# Patient Record
Sex: MALE | Race: BLACK OR AFRICAN AMERICAN | NOT HISPANIC OR LATINO | Employment: OTHER | URBAN - METROPOLITAN AREA
[De-identification: names, ages, dates, MRNs, and addresses within clinical notes are randomized per-mention and may not be internally consistent; named-entity substitution may affect disease eponyms.]

---

## 2017-01-10 ENCOUNTER — HOSPITAL ENCOUNTER (OUTPATIENT)
Dept: OTHER | Facility: HOSPITAL | Age: 82
Discharge: HOME | End: 2017-01-10
Attending: INTERNAL MEDICINE | Admitting: INTERNAL MEDICINE

## 2017-01-10 LAB
ANION GAP SERPL CALC-SCNC: 12.7 MMOL/L (ref 10–20)
APTT BLD: 26.8 SEC (ref 24–31)
BASOPHILS # BLD AUTO: 0 10*3/UL (ref 0–0.2)
BASOPHILS NFR BLD AUTO: 1 % (ref 0–2)
BUN SERPL-MCNC: 17 MG/DL (ref 8–20)
BUN/CREAT SERPL: 15.5 (ref 6.2–20.3)
CALCIUM SERPL-MCNC: 9.2 MG/DL (ref 8.9–10.3)
CHLORIDE SERPL-SCNC: 110 MMOL/L (ref 101–111)
CONV CO2: 24 MMOL/L (ref 22–32)
CREAT UR-MCNC: 1.1 MG/DL (ref 0.7–1.2)
DIFFERENTIAL METHOD BLD: (no result)
EOSINOPHIL # BLD AUTO: 0.1 10*3/UL (ref 0–0.3)
EOSINOPHIL # BLD AUTO: 2 % (ref 0–3)
ERYTHROCYTE [DISTWIDTH] IN BLOOD BY AUTOMATED COUNT: 16.7 % (ref 11.5–14.5)
GLUCOSE SERPL-MCNC: 88 MG/DL (ref 65–99)
HCT VFR BLD AUTO: 29.5 % (ref 40–54)
HGB BLD-MCNC: 9.2 G/DL (ref 14–18)
INR PPP: 0.9 (ref 2–3)
LYMPHOCYTES # BLD AUTO: 0.8 10*3/UL (ref 0.8–4.8)
LYMPHOCYTES NFR BLD AUTO: 14 % (ref 18–42)
MAGNESIUM SERPL-MCNC: 1.9 MG/DL (ref 1.8–2.5)
MCH RBC QN AUTO: 23.5 PG (ref 26–32)
MCHC RBC AUTO-ENTMCNC: 31.1 G/DL (ref 32–36)
MCV RBC AUTO: 75.6 FL (ref 80–94)
MONOCYTES # BLD AUTO: 0.6 10*3/UL (ref 0.1–1.3)
MONOCYTES NFR BLD AUTO: 11 % (ref 2–11)
NEUTROPHILS # BLD AUTO: 3.9 10*3/UL (ref 2.3–8.6)
NEUTROPHILS NFR BLD AUTO: 72 % (ref 50–75)
NRBC BLD AUTO-RTO: 0 /100{WBCS}
NRBC/RBC NFR BLD MANUAL: 0 10*3/UL
PLATELET # BLD AUTO: 177 10*3/UL (ref 150–450)
PMV BLD AUTO: 8.4 FL (ref 7.4–10.4)
POTASSIUM SERPL-SCNC: 3.7 MMOL/L (ref 3.6–5.1)
PROTHROMBIN TIME: 11.7 SEC (ref 19.4–28.5)
RBC # BLD AUTO: 3.9 10*6/UL (ref 4.6–6)
SODIUM SERPL-SCNC: 143 MMOL/L (ref 136–144)
WBC # BLD AUTO: 5.4 10*3/UL (ref 4.5–11.5)

## 2017-02-03 ENCOUNTER — HOSPITAL ENCOUNTER (OUTPATIENT)
Dept: ONCOLOGY | Facility: CLINIC | Age: 82
Discharge: HOME | End: 2017-02-03
Attending: INTERNAL MEDICINE | Admitting: INTERNAL MEDICINE

## 2017-02-03 LAB
FERRITIN SERPL-MCNC: 27 NG/ML (ref 24–336)
IRON SATN MFR SERPL: 6 % (ref 20–50)
IRON SERPL-MCNC: 20 UG/DL (ref 45–182)
LDH SERPL-CCNC: 195 IU/L (ref 98–192)
TIBC SERPL-MCNC: 363 UG/DL (ref 228–428)

## 2017-02-13 ENCOUNTER — HOSPITAL ENCOUNTER (OUTPATIENT)
Dept: ONCOLOGY | Facility: CLINIC | Age: 82
Discharge: HOME | End: 2017-02-13
Attending: INTERNAL MEDICINE | Admitting: INTERNAL MEDICINE

## 2017-02-13 LAB — GLUCOSE BLD-MCNC: 73 MG/DL (ref 70–105)

## 2017-02-17 ENCOUNTER — HOSPITAL ENCOUNTER (OUTPATIENT)
Dept: INFUSION THERAPY | Facility: HOSPITAL | Age: 82
Discharge: HOME | End: 2017-02-17
Attending: RADIOLOGY | Admitting: RADIOLOGY

## 2017-02-17 ENCOUNTER — HOSPITAL ENCOUNTER (OUTPATIENT)
Dept: ONCOLOGY | Facility: CLINIC | Age: 82
Discharge: HOME | End: 2017-02-17
Attending: NURSE PRACTITIONER | Admitting: NURSE PRACTITIONER

## 2017-02-20 ENCOUNTER — HOSPITAL ENCOUNTER (OUTPATIENT)
Dept: INFUSION THERAPY | Facility: HOSPITAL | Age: 82
Discharge: HOME | End: 2017-02-20
Attending: RADIOLOGY | Admitting: RADIOLOGY

## 2017-02-20 LAB
HBV CORE IGM SERPL QL IA: NONREACTIVE
HBV SURFACE AB SER QL: NONREACTIVE
HBV SURFACE AB SER RIA-ACNC: NORMAL

## 2017-02-21 ENCOUNTER — HOSPITAL ENCOUNTER (OUTPATIENT)
Dept: OTHER | Facility: HOSPITAL | Age: 82
Discharge: HOME | End: 2017-02-21
Attending: INTERNAL MEDICINE | Admitting: INTERNAL MEDICINE

## 2017-02-21 LAB
ALBUMIN SERPL-MCNC: 3.3 G/DL (ref 3.5–4.8)
ALBUMIN/GLOB SERPL: 1.1 {RATIO} (ref 1–1.7)
ALP SERPL-CCNC: 74 IU/L (ref 32–91)
ALT SERPL-CCNC: 18 IU/L (ref 17–63)
ANION GAP SERPL CALC-SCNC: 10.3 MMOL/L (ref 10–20)
AST SERPL-CCNC: 26 IU/L (ref 15–41)
BILIRUB SERPL-MCNC: 0.8 MG/DL (ref 0.3–1.2)
BUN SERPL-MCNC: 13 MG/DL (ref 8–20)
BUN/CREAT SERPL: 10.8 (ref 6.2–20.3)
CALCIUM SERPL-MCNC: 8.6 MG/DL (ref 8.9–10.3)
CHLORIDE SERPL-SCNC: 107 MMOL/L (ref 101–111)
CONV CO2: 28 MMOL/L (ref 22–32)
CONV TOTAL PROTEIN: 6.3 G/DL (ref 6.1–7.9)
CREAT UR-MCNC: 1.2 MG/DL (ref 0.7–1.2)
GLOBULIN UR ELPH-MCNC: 3 G/DL (ref 2.5–3.8)
GLUCOSE SERPL-MCNC: 77 MG/DL (ref 65–99)
POTASSIUM SERPL-SCNC: 3.3 MMOL/L (ref 3.6–5.1)
SODIUM SERPL-SCNC: 142 MMOL/L (ref 136–144)
URATE SERPL-MCNC: 8.3 MG/DL (ref 4.8–8.7)

## 2017-02-22 ENCOUNTER — HOSPITAL ENCOUNTER (OUTPATIENT)
Dept: ONCOLOGY | Facility: CLINIC | Age: 82
Discharge: HOME | End: 2017-02-22
Attending: INTERNAL MEDICINE | Admitting: INTERNAL MEDICINE

## 2017-03-01 ENCOUNTER — HOSPITAL ENCOUNTER (OUTPATIENT)
Dept: ONCOLOGY | Facility: CLINIC | Age: 82
Discharge: HOME | End: 2017-03-01
Attending: INTERNAL MEDICINE | Admitting: INTERNAL MEDICINE

## 2017-03-01 LAB
ALBUMIN SERPL-MCNC: 3.3 G/DL (ref 3.5–4.8)
ALBUMIN/GLOB SERPL: 1.1 {RATIO} (ref 1–1.7)
ALP SERPL-CCNC: 75 IU/L (ref 32–91)
ALT SERPL-CCNC: 24 IU/L (ref 17–63)
ANION GAP SERPL CALC-SCNC: 12.9 MMOL/L (ref 10–20)
AST SERPL-CCNC: 32 IU/L (ref 15–41)
BILIRUB SERPL-MCNC: 0.7 MG/DL (ref 0.3–1.2)
BUN SERPL-MCNC: 17 MG/DL (ref 8–20)
BUN/CREAT SERPL: 14.2 (ref 6.2–20.3)
CALCIUM SERPL-MCNC: 8.7 MG/DL (ref 8.9–10.3)
CHLORIDE SERPL-SCNC: 105 MMOL/L (ref 101–111)
CONV CO2: 29 MMOL/L (ref 22–32)
CONV TOTAL PROTEIN: 6.4 G/DL (ref 6.1–7.9)
CREAT UR-MCNC: 1.2 MG/DL (ref 0.7–1.2)
GLOBULIN UR ELPH-MCNC: 3.1 G/DL (ref 2.5–3.8)
GLUCOSE SERPL-MCNC: 80 MG/DL (ref 65–99)
POTASSIUM SERPL-SCNC: 3.9 MMOL/L (ref 3.6–5.1)
SODIUM SERPL-SCNC: 143 MMOL/L (ref 136–144)
URATE SERPL-MCNC: 9.3 MG/DL (ref 4.8–8.7)

## 2017-03-08 ENCOUNTER — HOSPITAL ENCOUNTER (OUTPATIENT)
Dept: ONCOLOGY | Facility: CLINIC | Age: 82
Discharge: HOME | End: 2017-03-08
Attending: INTERNAL MEDICINE | Admitting: INTERNAL MEDICINE

## 2017-03-15 ENCOUNTER — HOSPITAL ENCOUNTER (OUTPATIENT)
Dept: ONCOLOGY | Facility: CLINIC | Age: 82
Discharge: HOME | End: 2017-03-15
Attending: INTERNAL MEDICINE | Admitting: INTERNAL MEDICINE

## 2017-03-22 ENCOUNTER — HOSPITAL ENCOUNTER (OUTPATIENT)
Dept: ONCOLOGY | Facility: CLINIC | Age: 82
Discharge: HOME | End: 2017-03-22
Attending: INTERNAL MEDICINE | Admitting: INTERNAL MEDICINE

## 2017-03-22 LAB
ALBUMIN SERPL-MCNC: 3 G/DL (ref 3.5–4.8)
ALBUMIN/GLOB SERPL: 1.1 {RATIO} (ref 1–1.7)
ALP SERPL-CCNC: 76 IU/L (ref 32–91)
ALT SERPL-CCNC: 24 IU/L (ref 17–63)
ANION GAP SERPL CALC-SCNC: 13.5 MMOL/L (ref 10–20)
AST SERPL-CCNC: 34 IU/L (ref 15–41)
BILIRUB SERPL-MCNC: 0.5 MG/DL (ref 0.3–1.2)
BUN SERPL-MCNC: 15 MG/DL (ref 8–20)
BUN/CREAT SERPL: 12.5 (ref 6.2–20.3)
CALCIUM SERPL-MCNC: 8.7 MG/DL (ref 8.9–10.3)
CHLORIDE SERPL-SCNC: 105 MMOL/L (ref 101–111)
CONV CO2: 26 MMOL/L (ref 22–32)
CONV TOTAL PROTEIN: 5.7 G/DL (ref 6.1–7.9)
CREAT UR-MCNC: 1.2 MG/DL (ref 0.7–1.2)
GLOBULIN UR ELPH-MCNC: 2.7 G/DL (ref 2.5–3.8)
GLUCOSE SERPL-MCNC: 116 MG/DL (ref 65–99)
POTASSIUM SERPL-SCNC: 3.5 MMOL/L (ref 3.6–5.1)
SODIUM SERPL-SCNC: 141 MMOL/L (ref 136–144)

## 2017-03-23 ENCOUNTER — HOSPITAL ENCOUNTER (OUTPATIENT)
Dept: ONCOLOGY | Facility: CLINIC | Age: 82
Discharge: HOME | End: 2017-03-23
Attending: INTERNAL MEDICINE | Admitting: INTERNAL MEDICINE

## 2017-03-29 ENCOUNTER — HOSPITAL ENCOUNTER (OUTPATIENT)
Dept: ONCOLOGY | Facility: CLINIC | Age: 82
Discharge: HOME | End: 2017-03-29
Attending: NURSE PRACTITIONER | Admitting: NURSE PRACTITIONER

## 2017-03-29 LAB — URATE SERPL-MCNC: 6.1 MG/DL (ref 4.8–8.7)

## 2017-04-05 ENCOUNTER — HOSPITAL ENCOUNTER (OUTPATIENT)
Dept: ONCOLOGY | Facility: CLINIC | Age: 82
Discharge: HOME | End: 2017-04-05
Attending: INTERNAL MEDICINE | Admitting: INTERNAL MEDICINE

## 2017-04-12 ENCOUNTER — HOSPITAL ENCOUNTER (OUTPATIENT)
Dept: ONCOLOGY | Facility: CLINIC | Age: 82
Discharge: HOME | End: 2017-04-12
Attending: INTERNAL MEDICINE | Admitting: INTERNAL MEDICINE

## 2017-04-19 ENCOUNTER — HOSPITAL ENCOUNTER (OUTPATIENT)
Dept: ONCOLOGY | Facility: CLINIC | Age: 82
Discharge: HOME | End: 2017-04-19
Attending: INTERNAL MEDICINE | Admitting: INTERNAL MEDICINE

## 2017-04-19 LAB
ALBUMIN SERPL-MCNC: 3.2 G/DL (ref 3.5–4.8)
ALBUMIN/GLOB SERPL: 1 {RATIO} (ref 1–1.7)
ALP SERPL-CCNC: 75 IU/L (ref 32–91)
ALT SERPL-CCNC: 17 IU/L (ref 17–63)
ANION GAP SERPL CALC-SCNC: 13.9 MMOL/L (ref 10–20)
AST SERPL-CCNC: 25 IU/L (ref 15–41)
BILIRUB SERPL-MCNC: 0.4 MG/DL (ref 0.3–1.2)
BUN SERPL-MCNC: 18 MG/DL (ref 8–20)
BUN/CREAT SERPL: 20 (ref 6.2–20.3)
CALCIUM SERPL-MCNC: 8.9 MG/DL (ref 8.9–10.3)
CHLORIDE SERPL-SCNC: 104 MMOL/L (ref 101–111)
CONV CO2: 25 MMOL/L (ref 22–32)
CONV TOTAL PROTEIN: 6.3 G/DL (ref 6.1–7.9)
CREAT UR-MCNC: 0.9 MG/DL (ref 0.7–1.2)
GLOBULIN UR ELPH-MCNC: 3.1 G/DL (ref 2.5–3.8)
GLUCOSE SERPL-MCNC: 72 MG/DL (ref 65–99)
POTASSIUM SERPL-SCNC: 3.9 MMOL/L (ref 3.6–5.1)
SODIUM SERPL-SCNC: 139 MMOL/L (ref 136–144)

## 2017-04-20 ENCOUNTER — HOSPITAL ENCOUNTER (OUTPATIENT)
Dept: ONCOLOGY | Facility: CLINIC | Age: 82
Discharge: HOME | End: 2017-04-20
Attending: INTERNAL MEDICINE | Admitting: INTERNAL MEDICINE

## 2017-04-25 ENCOUNTER — HOSPITAL ENCOUNTER (OUTPATIENT)
Dept: ONCOLOGY | Facility: CLINIC | Age: 82
Discharge: HOME | End: 2017-04-25
Attending: INTERNAL MEDICINE | Admitting: INTERNAL MEDICINE

## 2017-05-03 ENCOUNTER — HOSPITAL ENCOUNTER (OUTPATIENT)
Dept: ONCOLOGY | Facility: CLINIC | Age: 82
Discharge: HOME | End: 2017-05-03
Attending: INTERNAL MEDICINE | Admitting: INTERNAL MEDICINE

## 2017-05-17 ENCOUNTER — HOSPITAL ENCOUNTER (OUTPATIENT)
Dept: ONCOLOGY | Facility: CLINIC | Age: 82
Discharge: HOME | End: 2017-05-17
Attending: INTERNAL MEDICINE | Admitting: INTERNAL MEDICINE

## 2017-05-17 LAB
ALBUMIN SERPL-MCNC: 3.1 G/DL (ref 3.5–4.8)
ALBUMIN/GLOB SERPL: 0.9 {RATIO} (ref 1–1.7)
ALP SERPL-CCNC: 64 IU/L (ref 32–91)
ALT SERPL-CCNC: 32 IU/L (ref 17–63)
ANION GAP SERPL CALC-SCNC: 10.9 MMOL/L (ref 10–20)
AST SERPL-CCNC: 37 IU/L (ref 15–41)
BILIRUB SERPL-MCNC: 0.4 MG/DL (ref 0.3–1.2)
BUN SERPL-MCNC: 18 MG/DL (ref 8–20)
BUN/CREAT SERPL: 13.8 (ref 6.2–20.3)
CALCIUM SERPL-MCNC: 9 MG/DL (ref 8.9–10.3)
CHLORIDE SERPL-SCNC: 103 MMOL/L (ref 101–111)
CONV CO2: 27 MMOL/L (ref 22–32)
CONV TOTAL PROTEIN: 6.7 G/DL (ref 6.1–7.9)
CREAT UR-MCNC: 1.3 MG/DL (ref 0.7–1.2)
GLOBULIN UR ELPH-MCNC: 3.6 G/DL (ref 2.5–3.8)
GLUCOSE SERPL-MCNC: 118 MG/DL (ref 65–99)
POTASSIUM SERPL-SCNC: 3.9 MMOL/L (ref 3.6–5.1)
SODIUM SERPL-SCNC: 137 MMOL/L (ref 136–144)

## 2017-05-18 ENCOUNTER — HOSPITAL ENCOUNTER (OUTPATIENT)
Dept: ONCOLOGY | Facility: CLINIC | Age: 82
Discharge: HOME | End: 2017-05-18
Attending: INTERNAL MEDICINE | Admitting: INTERNAL MEDICINE

## 2017-05-23 ENCOUNTER — HOSPITAL ENCOUNTER (OUTPATIENT)
Dept: ONCOLOGY | Facility: CLINIC | Age: 82
Discharge: HOME | End: 2017-05-23
Attending: NURSE PRACTITIONER | Admitting: NURSE PRACTITIONER

## 2017-05-23 LAB
ALBUMIN SERPL-MCNC: 2.9 G/DL (ref 3.5–4.8)
ALBUMIN/GLOB SERPL: 0.8 {RATIO} (ref 1–1.7)
ALP SERPL-CCNC: 55 IU/L (ref 32–91)
ALT SERPL-CCNC: 42 IU/L (ref 17–63)
ANION GAP SERPL CALC-SCNC: 14.7 MMOL/L (ref 10–20)
AST SERPL-CCNC: 51 IU/L (ref 15–41)
BILIRUB SERPL-MCNC: 0.5 MG/DL (ref 0.3–1.2)
BUN SERPL-MCNC: 27 MG/DL (ref 8–20)
BUN/CREAT SERPL: 20.8 (ref 6.2–20.3)
CALCIUM SERPL-MCNC: 8.7 MG/DL (ref 8.9–10.3)
CHLORIDE SERPL-SCNC: 101 MMOL/L (ref 101–111)
CONV CO2: 27 MMOL/L (ref 22–32)
CONV TOTAL PROTEIN: 6.7 G/DL (ref 6.1–7.9)
CREAT UR-MCNC: 1.3 MG/DL (ref 0.7–1.2)
FERRITIN SERPL-MCNC: 90 NG/ML (ref 24–336)
GLOBULIN UR ELPH-MCNC: 3.8 G/DL (ref 2.5–3.8)
GLUCOSE SERPL-MCNC: 85 MG/DL (ref 65–99)
POTASSIUM SERPL-SCNC: 3.7 MMOL/L (ref 3.6–5.1)
SODIUM SERPL-SCNC: 139 MMOL/L (ref 136–144)
URATE SERPL-MCNC: 6.9 MG/DL (ref 4.8–8.7)

## 2017-05-31 ENCOUNTER — HOSPITAL ENCOUNTER (OUTPATIENT)
Dept: ONCOLOGY | Facility: CLINIC | Age: 82
Discharge: HOME | End: 2017-05-31
Attending: INTERNAL MEDICINE | Admitting: INTERNAL MEDICINE

## 2017-06-07 ENCOUNTER — HOSPITAL ENCOUNTER (OUTPATIENT)
Dept: ONCOLOGY | Facility: CLINIC | Age: 82
Discharge: HOME | End: 2017-06-07
Attending: INTERNAL MEDICINE | Admitting: INTERNAL MEDICINE

## 2017-06-14 ENCOUNTER — HOSPITAL ENCOUNTER (OUTPATIENT)
Dept: ONCOLOGY | Facility: CLINIC | Age: 82
Discharge: HOME | End: 2017-06-14
Attending: INTERNAL MEDICINE | Admitting: INTERNAL MEDICINE

## 2017-06-14 LAB
ALBUMIN SERPL-MCNC: 3.2 G/DL (ref 3.5–4.8)
ALBUMIN/GLOB SERPL: 0.9 {RATIO} (ref 1–1.7)
ALP SERPL-CCNC: 74 IU/L (ref 32–91)
ALT SERPL-CCNC: 22 IU/L (ref 17–63)
ANION GAP SERPL CALC-SCNC: 13.7 MMOL/L (ref 10–20)
AST SERPL-CCNC: 31 IU/L (ref 15–41)
BILIRUB SERPL-MCNC: 0.6 MG/DL (ref 0.3–1.2)
BUN SERPL-MCNC: 20 MG/DL (ref 8–20)
BUN/CREAT SERPL: ABNORMAL (ref 6.2–20.3)
CALCIUM SERPL-MCNC: 9 MG/DL (ref 8.9–10.3)
CHLORIDE SERPL-SCNC: 106 MMOL/L (ref 101–111)
CONV CO2: 24 MMOL/L (ref 22–32)
CONV TOTAL PROTEIN: 6.6 G/DL (ref 6.1–7.9)
CREAT UR-MCNC: ABNORMAL MG/DL (ref 0.7–1.2)
GLOBULIN UR ELPH-MCNC: 3.4 G/DL (ref 2.5–3.8)
GLUCOSE SERPL-MCNC: 115 MG/DL (ref 65–99)
POTASSIUM SERPL-SCNC: 3.7 MMOL/L (ref 3.6–5.1)
SODIUM SERPL-SCNC: 140 MMOL/L (ref 136–144)
URATE SERPL-MCNC: 6 MG/DL (ref 4.8–8.7)

## 2017-06-15 ENCOUNTER — HOSPITAL ENCOUNTER (OUTPATIENT)
Dept: ONCOLOGY | Facility: CLINIC | Age: 82
Discharge: HOME | End: 2017-06-15
Attending: INTERNAL MEDICINE | Admitting: INTERNAL MEDICINE

## 2017-06-21 ENCOUNTER — HOSPITAL ENCOUNTER (OUTPATIENT)
Dept: ONCOLOGY | Facility: CLINIC | Age: 82
Discharge: HOME | End: 2017-06-21
Attending: INTERNAL MEDICINE | Admitting: INTERNAL MEDICINE

## 2017-06-28 ENCOUNTER — HOSPITAL ENCOUNTER (OUTPATIENT)
Dept: ONCOLOGY | Facility: CLINIC | Age: 82
Discharge: HOME | End: 2017-06-28
Attending: INTERNAL MEDICINE | Admitting: INTERNAL MEDICINE

## 2017-07-05 ENCOUNTER — HOSPITAL ENCOUNTER (OUTPATIENT)
Dept: ONCOLOGY | Facility: CLINIC | Age: 82
Discharge: HOME | End: 2017-07-05
Attending: INTERNAL MEDICINE | Admitting: INTERNAL MEDICINE

## 2017-07-12 ENCOUNTER — HOSPITAL ENCOUNTER (OUTPATIENT)
Dept: ONCOLOGY | Facility: CLINIC | Age: 82
Discharge: HOME | End: 2017-07-12
Attending: INTERNAL MEDICINE | Admitting: INTERNAL MEDICINE

## 2017-07-12 LAB
ALBUMIN SERPL-MCNC: 2.8 G/DL (ref 3.5–4.8)
ALBUMIN/GLOB SERPL: 0.9 {RATIO} (ref 1–1.7)
ALP SERPL-CCNC: 60 IU/L (ref 32–91)
ALT SERPL-CCNC: 18 IU/L (ref 17–63)
ANION GAP SERPL CALC-SCNC: 14.4 MMOL/L (ref 10–20)
AST SERPL-CCNC: 33 IU/L (ref 15–41)
BILIRUB SERPL-MCNC: 0.6 MG/DL (ref 0.3–1.2)
BUN SERPL-MCNC: 15 MG/DL (ref 8–20)
BUN/CREAT SERPL: 11.5 (ref 6.2–20.3)
CALCIUM SERPL-MCNC: 8.9 MG/DL (ref 8.9–10.3)
CHLORIDE SERPL-SCNC: 103 MMOL/L (ref 101–111)
CONV CO2: 24 MMOL/L (ref 22–32)
CONV TOTAL PROTEIN: 6 G/DL (ref 6.1–7.9)
CREAT UR-MCNC: 1.3 MG/DL (ref 0.7–1.2)
GLOBULIN UR ELPH-MCNC: 3.2 G/DL (ref 2.5–3.8)
GLUCOSE SERPL-MCNC: 82 MG/DL (ref 65–99)
POTASSIUM SERPL-SCNC: 3.4 MMOL/L (ref 3.6–5.1)
SODIUM SERPL-SCNC: 138 MMOL/L (ref 136–144)

## 2017-07-19 ENCOUNTER — HOSPITAL ENCOUNTER (OUTPATIENT)
Dept: ONCOLOGY | Facility: CLINIC | Age: 82
Discharge: HOME | End: 2017-07-19
Attending: INTERNAL MEDICINE | Admitting: INTERNAL MEDICINE

## 2017-07-19 LAB
ALBUMIN SERPL-MCNC: 2.9 G/DL (ref 3.5–4.8)
ALBUMIN/GLOB SERPL: 0.9 {RATIO} (ref 1–1.7)
ALP SERPL-CCNC: 61 IU/L (ref 32–91)
ALT SERPL-CCNC: 14 IU/L (ref 17–63)
ANION GAP SERPL CALC-SCNC: 11.3 MMOL/L (ref 10–20)
AST SERPL-CCNC: 25 IU/L (ref 15–41)
BILIRUB SERPL-MCNC: 0.7 MG/DL (ref 0.3–1.2)
BUN SERPL-MCNC: 12 MG/DL (ref 8–20)
BUN/CREAT SERPL: 10 (ref 6.2–20.3)
CALCIUM SERPL-MCNC: 8.9 MG/DL (ref 8.9–10.3)
CHLORIDE SERPL-SCNC: 105 MMOL/L (ref 101–111)
CONV CO2: 24 MMOL/L (ref 22–32)
CONV TOTAL PROTEIN: 6 G/DL (ref 6.1–7.9)
CREAT UR-MCNC: 1.2 MG/DL (ref 0.7–1.2)
FERRITIN SERPL-MCNC: 205 NG/ML (ref 24–336)
FOLATE SERPL-MCNC: 14 NG/ML (ref 5.9–24.8)
GLOBULIN UR ELPH-MCNC: 3.1 G/DL (ref 2.5–3.8)
GLUCOSE SERPL-MCNC: 71 MG/DL (ref 65–99)
IRON SATN MFR SERPL: 9 % (ref 20–50)
IRON SERPL-MCNC: 28 UG/DL (ref 45–182)
MAGNESIUM SERPL-MCNC: 2 MG/DL (ref 1.8–2.5)
POTASSIUM SERPL-SCNC: 3.3 MMOL/L (ref 3.6–5.1)
SODIUM SERPL-SCNC: 137 MMOL/L (ref 136–144)
TIBC SERPL-MCNC: 298 UG/DL (ref 228–428)

## 2017-07-24 ENCOUNTER — HOSPITAL ENCOUNTER (OUTPATIENT)
Dept: ONCOLOGY | Facility: CLINIC | Age: 82
Discharge: HOME | End: 2017-07-24
Attending: INTERNAL MEDICINE | Admitting: INTERNAL MEDICINE

## 2017-07-24 LAB — GLUCOSE BLD-MCNC: 81 MG/DL (ref 70–105)

## 2017-07-26 ENCOUNTER — HOSPITAL ENCOUNTER (OUTPATIENT)
Dept: ONCOLOGY | Facility: CLINIC | Age: 82
Discharge: HOME | End: 2017-07-26
Attending: INTERNAL MEDICINE | Admitting: INTERNAL MEDICINE

## 2017-07-26 LAB
ALBUMIN SERPL-MCNC: 3.3 G/DL (ref 3.5–4.8)
ALBUMIN/GLOB SERPL: 0.9 {RATIO} (ref 1–1.7)
ALP SERPL-CCNC: 66 IU/L (ref 32–91)
ALT SERPL-CCNC: 12 IU/L (ref 17–63)
ANION GAP SERPL CALC-SCNC: 14.2 MMOL/L (ref 10–20)
AST SERPL-CCNC: 25 IU/L (ref 15–41)
BILIRUB SERPL-MCNC: 0.8 MG/DL (ref 0.3–1.2)
BUN SERPL-MCNC: 16 MG/DL (ref 8–20)
BUN/CREAT SERPL: 12.3 (ref 6.2–20.3)
CALCIUM SERPL-MCNC: 9 MG/DL (ref 8.9–10.3)
CHLORIDE SERPL-SCNC: 104 MMOL/L (ref 101–111)
CONV CO2: 22 MMOL/L (ref 22–32)
CONV TOTAL PROTEIN: 6.8 G/DL (ref 6.1–7.9)
CREAT UR-MCNC: 1.3 MG/DL (ref 0.7–1.2)
GLOBULIN UR ELPH-MCNC: 3.5 G/DL (ref 2.5–3.8)
GLUCOSE SERPL-MCNC: 82 MG/DL (ref 65–99)
POTASSIUM SERPL-SCNC: 3.2 MMOL/L (ref 3.6–5.1)
SODIUM SERPL-SCNC: 137 MMOL/L (ref 136–144)
URATE SERPL-MCNC: 6 MG/DL (ref 4.8–8.7)

## 2017-08-02 ENCOUNTER — HOSPITAL ENCOUNTER (OUTPATIENT)
Dept: ONCOLOGY | Facility: CLINIC | Age: 82
Discharge: HOME | End: 2017-08-02
Attending: INTERNAL MEDICINE | Admitting: INTERNAL MEDICINE

## 2017-08-23 ENCOUNTER — HOSPITAL ENCOUNTER (OUTPATIENT)
Dept: ONCOLOGY | Facility: HOSPITAL | Age: 82
Discharge: HOME | End: 2017-08-23
Attending: INTERNAL MEDICINE | Admitting: INTERNAL MEDICINE

## 2017-08-30 ENCOUNTER — HOSPITAL ENCOUNTER (OUTPATIENT)
Dept: ONCOLOGY | Facility: HOSPITAL | Age: 82
Discharge: HOME | End: 2017-08-30
Attending: INTERNAL MEDICINE | Admitting: INTERNAL MEDICINE

## 2017-08-30 ENCOUNTER — HOSPITAL ENCOUNTER (OUTPATIENT)
Dept: ONCOLOGY | Facility: CLINIC | Age: 82
Setting detail: INFUSION SERIES
Discharge: HOME | End: 2017-08-30
Attending: INTERNAL MEDICINE | Admitting: INTERNAL MEDICINE

## 2017-08-30 ENCOUNTER — CLINICAL SUPPORT (OUTPATIENT)
Dept: ONCOLOGY | Facility: HOSPITAL | Age: 82
End: 2017-08-30

## 2017-08-30 LAB
ALBUMIN SERPL-MCNC: 3.6 G/DL (ref 3.5–4.8)
ALBUMIN/GLOB SERPL: 1.1 {RATIO} (ref 1–1.7)
ALP SERPL-CCNC: 68 IU/L (ref 32–91)
ALT SERPL-CCNC: 16 IU/L (ref 17–63)
ANION GAP SERPL CALC-SCNC: 13.5 MMOL/L (ref 10–20)
AST SERPL-CCNC: 26 IU/L (ref 15–41)
BILIRUB SERPL-MCNC: 0.6 MG/DL (ref 0.3–1.2)
BUN SERPL-MCNC: 16 MG/DL (ref 8–20)
BUN/CREAT SERPL: 13.3 (ref 6.2–20.3)
CALCIUM SERPL-MCNC: 9.3 MG/DL (ref 8.9–10.3)
CHLORIDE SERPL-SCNC: 107 MMOL/L (ref 101–111)
CONV CO2: 25 MMOL/L (ref 22–32)
CONV TOTAL PROTEIN: 6.8 G/DL (ref 6.1–7.9)
CREAT UR-MCNC: 1.2 MG/DL (ref 0.7–1.2)
GLOBULIN UR ELPH-MCNC: 3.2 G/DL (ref 2.5–3.8)
GLUCOSE SERPL-MCNC: 81 MG/DL (ref 65–99)
POTASSIUM SERPL-SCNC: 3.5 MMOL/L (ref 3.6–5.1)
SODIUM SERPL-SCNC: 142 MMOL/L (ref 136–144)

## 2017-08-30 NOTE — PROGRESS NOTES
PATIENTS ONCOLOGY RECORD LOCATED IN [facility]      Subjective     Name:  MERCED PADGETT     Date:  2017  Address:  [address] [place] [address]  Home: [phone]  :  1932 AGE:  85 y.o.        RECORDS OBTAINED:  Patients Oncology Record is located in [facility]

## 2017-09-19 ENCOUNTER — HOSPITAL ENCOUNTER (OUTPATIENT)
Dept: ONCOLOGY | Facility: CLINIC | Age: 82
Setting detail: INFUSION SERIES
Discharge: HOME | End: 2017-09-19
Attending: INTERNAL MEDICINE | Admitting: INTERNAL MEDICINE

## 2017-09-19 ENCOUNTER — HOSPITAL ENCOUNTER (OUTPATIENT)
Dept: ONCOLOGY | Facility: HOSPITAL | Age: 82
Discharge: HOME | End: 2017-09-19
Attending: INTERNAL MEDICINE | Admitting: INTERNAL MEDICINE

## 2017-09-19 ENCOUNTER — CLINICAL SUPPORT (OUTPATIENT)
Dept: ONCOLOGY | Facility: HOSPITAL | Age: 82
End: 2017-09-19

## 2017-09-19 NOTE — PROGRESS NOTES
PATIENTS ONCOLOGY RECORD LOCATED IN [facility]      Subjective     Name:  MERCED PADGETT     Date:  2017  Address:  [address] [place] [address]  Home: [phone]  :  1932 AGE:  85 y.o.        RECORDS OBTAINED:  Patients Oncology Record is located in [facility]

## 2017-10-04 ENCOUNTER — HOSPITAL ENCOUNTER (OUTPATIENT)
Dept: ONCOLOGY | Facility: HOSPITAL | Age: 82
Discharge: HOME | End: 2017-10-04
Attending: INTERNAL MEDICINE | Admitting: INTERNAL MEDICINE

## 2017-10-04 LAB
ALBUMIN SERPL-MCNC: 3.4 G/DL (ref 3.5–4.8)
ALBUMIN/GLOB SERPL: 1 {RATIO} (ref 1–1.7)
ALP SERPL-CCNC: 65 IU/L (ref 32–91)
ALT SERPL-CCNC: 13 IU/L (ref 17–63)
ANION GAP SERPL CALC-SCNC: 10.4 MMOL/L (ref 10–20)
AST SERPL-CCNC: 25 IU/L (ref 15–41)
BILIRUB SERPL-MCNC: 0.6 MG/DL (ref 0.3–1.2)
BUN SERPL-MCNC: 21 MG/DL (ref 8–20)
BUN/CREAT SERPL: 16.2 (ref 6.2–20.3)
CALCIUM SERPL-MCNC: 9.2 MG/DL (ref 8.9–10.3)
CHLORIDE SERPL-SCNC: 107 MMOL/L (ref 101–111)
CONV CO2: 24 MMOL/L (ref 22–32)
CONV TOTAL PROTEIN: 6.8 G/DL (ref 6.1–7.9)
CREAT UR-MCNC: 1.3 MG/DL (ref 0.7–1.2)
GLOBULIN UR ELPH-MCNC: 3.4 G/DL (ref 2.5–3.8)
GLUCOSE SERPL-MCNC: 79 MG/DL (ref 65–99)
POTASSIUM SERPL-SCNC: 3.4 MMOL/L (ref 3.6–5.1)
SODIUM SERPL-SCNC: 138 MMOL/L (ref 136–144)

## 2017-10-11 ENCOUNTER — HOSPITAL ENCOUNTER (OUTPATIENT)
Dept: ONCOLOGY | Facility: HOSPITAL | Age: 82
Discharge: HOME | End: 2017-10-11
Attending: INTERNAL MEDICINE | Admitting: INTERNAL MEDICINE

## 2017-10-24 ENCOUNTER — CLINICAL SUPPORT (OUTPATIENT)
Dept: ONCOLOGY | Facility: HOSPITAL | Age: 82
End: 2017-10-24

## 2017-10-24 ENCOUNTER — HOSPITAL ENCOUNTER (OUTPATIENT)
Dept: ONCOLOGY | Facility: HOSPITAL | Age: 82
Discharge: HOME | End: 2017-10-24
Attending: NURSE PRACTITIONER | Admitting: NURSE PRACTITIONER

## 2017-10-24 ENCOUNTER — HOSPITAL ENCOUNTER (OUTPATIENT)
Dept: ONCOLOGY | Facility: CLINIC | Age: 82
Setting detail: INFUSION SERIES
Discharge: HOME | End: 2017-10-24
Attending: NURSE PRACTITIONER | Admitting: NURSE PRACTITIONER

## 2017-10-24 LAB
FERRITIN SERPL-MCNC: 706 NG/ML (ref 24–336)
IRON SATN MFR SERPL: 14 % (ref 20–50)
IRON SERPL-MCNC: 38 UG/DL (ref 45–182)
TIBC SERPL-MCNC: 280 UG/DL (ref 228–428)

## 2017-10-24 NOTE — PROGRESS NOTES
PATIENTS ONCOLOGY RECORD LOCATED IN [facility]      Subjective     Name:  MERCED PADGETT     Date:  10/24/2017  Address:  [address] [place] [address]  Home: [phone]  :  1932 AGE:  85 y.o.        RECORDS OBTAINED:  Patients Oncology Record is located in [facility]

## 2017-10-31 ENCOUNTER — HOSPITAL ENCOUNTER (OUTPATIENT)
Dept: ONCOLOGY | Facility: HOSPITAL | Age: 82
Discharge: HOME | End: 2017-10-31
Attending: INTERNAL MEDICINE | Admitting: INTERNAL MEDICINE

## 2017-10-31 LAB
FERRITIN SERPL-MCNC: 440 NG/ML (ref 24–336)
INR PPP: 1.8
IRON SATN MFR SERPL: 15 % (ref 20–50)
IRON SERPL-MCNC: 41 UG/DL (ref 45–182)
PROTHROMBIN TIME: 18.3 SEC (ref 9.6–11.7)
TIBC SERPL-MCNC: 281 UG/DL (ref 228–428)

## 2017-11-08 ENCOUNTER — HOSPITAL ENCOUNTER (OUTPATIENT)
Dept: ONCOLOGY | Facility: HOSPITAL | Age: 82
Discharge: HOME | End: 2017-11-08
Attending: INTERNAL MEDICINE | Admitting: INTERNAL MEDICINE

## 2017-12-06 ENCOUNTER — HOSPITAL ENCOUNTER (OUTPATIENT)
Dept: ONCOLOGY | Facility: HOSPITAL | Age: 82
Discharge: HOME | End: 2017-12-06
Attending: INTERNAL MEDICINE | Admitting: INTERNAL MEDICINE

## 2017-12-06 LAB
ALBUMIN SERPL-MCNC: 3.8 G/DL (ref 3.5–4.8)
ALBUMIN/GLOB SERPL: 1.3 {RATIO} (ref 1–1.7)
ALP SERPL-CCNC: 69 IU/L (ref 32–91)
ALT SERPL-CCNC: 9 IU/L (ref 17–63)
ANION GAP SERPL CALC-SCNC: 12.4 MMOL/L (ref 10–20)
AST SERPL-CCNC: 21 IU/L (ref 15–41)
BILIRUB SERPL-MCNC: 0.6 MG/DL (ref 0.3–1.2)
BUN SERPL-MCNC: 16 MG/DL (ref 8–20)
BUN/CREAT SERPL: 11.4 (ref 6.2–20.3)
CALCIUM SERPL-MCNC: 9.2 MG/DL (ref 8.9–10.3)
CHLORIDE SERPL-SCNC: 103 MMOL/L (ref 101–111)
CONV CO2: 26 MMOL/L (ref 22–32)
CONV TOTAL PROTEIN: 6.8 G/DL (ref 6.1–7.9)
CREAT UR-MCNC: 1.4 MG/DL (ref 0.7–1.2)
GLOBULIN UR ELPH-MCNC: 3 G/DL (ref 2.5–3.8)
GLUCOSE SERPL-MCNC: 77 MG/DL (ref 65–99)
POTASSIUM SERPL-SCNC: 3.4 MMOL/L (ref 3.6–5.1)
SODIUM SERPL-SCNC: 138 MMOL/L (ref 136–144)

## 2017-12-07 ENCOUNTER — HOSPITAL ENCOUNTER (OUTPATIENT)
Dept: ONCOLOGY | Facility: CLINIC | Age: 82
Setting detail: INFUSION SERIES
Discharge: HOME | End: 2017-12-07
Attending: INTERNAL MEDICINE | Admitting: INTERNAL MEDICINE

## 2017-12-07 ENCOUNTER — HOSPITAL ENCOUNTER (OUTPATIENT)
Dept: ONCOLOGY | Facility: HOSPITAL | Age: 82
Discharge: HOME | End: 2017-12-07
Attending: INTERNAL MEDICINE | Admitting: INTERNAL MEDICINE

## 2017-12-07 ENCOUNTER — CLINICAL SUPPORT (OUTPATIENT)
Dept: ONCOLOGY | Facility: HOSPITAL | Age: 82
End: 2017-12-07

## 2017-12-07 NOTE — PROGRESS NOTES
PATIENTS ONCOLOGY RECORD LOCATED IN [facility]      Subjective     Name:  MERCED PADGETT     Date:  2017  Address:  [address] [place] [address]  Home: [phone]  :  1932 AGE:  85 y.o.        RECORDS OBTAINED:  Patients Oncology Record is located in [facility]

## 2018-01-03 ENCOUNTER — CLINICAL SUPPORT (OUTPATIENT)
Dept: ONCOLOGY | Facility: HOSPITAL | Age: 83
End: 2018-01-03

## 2018-01-03 ENCOUNTER — HOSPITAL ENCOUNTER (OUTPATIENT)
Dept: ONCOLOGY | Facility: HOSPITAL | Age: 83
Discharge: HOME | End: 2018-01-03
Attending: NURSE PRACTITIONER | Admitting: NURSE PRACTITIONER

## 2018-01-03 ENCOUNTER — HOSPITAL ENCOUNTER (OUTPATIENT)
Dept: ONCOLOGY | Facility: CLINIC | Age: 83
Setting detail: INFUSION SERIES
Discharge: HOME | End: 2018-01-03
Attending: NURSE PRACTITIONER | Admitting: NURSE PRACTITIONER

## 2018-01-03 NOTE — PROGRESS NOTES
PATIENTS ONCOLOGY RECORD LOCATED IN [facility]      Subjective     Name:  MERCED PADGETT     Date:  2018  Address:  [address] [place] [address]  Home: [phone]  :  1932 AGE:  85 y.o.        RECORDS OBTAINED:  Patients Oncology Record is located in [facility]

## 2018-01-04 LAB
ALBUMIN SERPL-MCNC: 3.9 G/DL (ref 3.5–4.8)
ALBUMIN/GLOB SERPL: 1.4 {RATIO} (ref 1–1.7)
ALP SERPL-CCNC: 70 IU/L (ref 32–91)
ALT SERPL-CCNC: 12 IU/L (ref 17–63)
ANION GAP SERPL CALC-SCNC: 13.9 MMOL/L (ref 10–20)
AST SERPL-CCNC: 26 IU/L (ref 15–41)
BILIRUB SERPL-MCNC: 0.3 MG/DL (ref 0.3–1.2)
BUN SERPL-MCNC: 33 MG/DL (ref 8–20)
BUN/CREAT SERPL: 20.6 (ref 6.2–20.3)
CALCIUM SERPL-MCNC: 9.5 MG/DL (ref 8.9–10.3)
CHLORIDE SERPL-SCNC: 103 MMOL/L (ref 101–111)
CONV CO2: 24 MMOL/L (ref 22–32)
CONV TOTAL PROTEIN: 6.7 G/DL (ref 6.1–7.9)
CREAT UR-MCNC: 1.6 MG/DL (ref 0.7–1.2)
GLOBULIN UR ELPH-MCNC: 2.8 G/DL (ref 2.5–3.8)
GLUCOSE SERPL-MCNC: 92 MG/DL (ref 65–99)
MAGNESIUM SERPL-MCNC: 2.6 MG/DL (ref 1.8–2.5)
POTASSIUM SERPL-SCNC: 4.9 MMOL/L (ref 3.6–5.1)
SODIUM SERPL-SCNC: 136 MMOL/L (ref 136–144)
URATE SERPL-MCNC: 7.6 MG/DL (ref 4.8–8.7)

## 2018-01-31 ENCOUNTER — HOSPITAL ENCOUNTER (OUTPATIENT)
Dept: ONCOLOGY | Facility: HOSPITAL | Age: 83
Discharge: HOME | End: 2018-01-31
Attending: INTERNAL MEDICINE | Admitting: INTERNAL MEDICINE

## 2018-02-05 ENCOUNTER — HOSPITAL ENCOUNTER (OUTPATIENT)
Dept: ONCOLOGY | Facility: CLINIC | Age: 83
Setting detail: INFUSION SERIES
Discharge: HOME | End: 2018-02-05
Attending: INTERNAL MEDICINE | Admitting: INTERNAL MEDICINE

## 2018-02-05 ENCOUNTER — CLINICAL SUPPORT (OUTPATIENT)
Dept: ONCOLOGY | Facility: HOSPITAL | Age: 83
End: 2018-02-05

## 2018-02-05 NOTE — PROGRESS NOTES
PATIENTS ONCOLOGY RECORD LOCATED IN [facility]      Subjective     Name:  MERCED PADGETT     Date:  2018  Address:  [address] [place] [address]  Home: [phone]  :  1932 AGE:  85 y.o.        RECORDS OBTAINED:  Patients Oncology Record is located in [facility]

## 2018-02-06 ENCOUNTER — HOSPITAL ENCOUNTER (OUTPATIENT)
Dept: ONCOLOGY | Facility: HOSPITAL | Age: 83
Discharge: HOME | End: 2018-02-06
Attending: INTERNAL MEDICINE | Admitting: INTERNAL MEDICINE

## 2018-02-06 ENCOUNTER — CLINICAL SUPPORT (OUTPATIENT)
Dept: ONCOLOGY | Facility: HOSPITAL | Age: 83
End: 2018-02-06

## 2018-02-06 ENCOUNTER — HOSPITAL ENCOUNTER (OUTPATIENT)
Dept: ONCOLOGY | Facility: CLINIC | Age: 83
Setting detail: INFUSION SERIES
Discharge: HOME | End: 2018-02-06
Attending: INTERNAL MEDICINE | Admitting: INTERNAL MEDICINE

## 2018-02-06 LAB
ALBUMIN SERPL-MCNC: 3.8 G/DL (ref 3.5–4.8)
ALBUMIN/GLOB SERPL: 1.3 {RATIO} (ref 1–1.7)
ALP SERPL-CCNC: 65 IU/L (ref 32–91)
ALT SERPL-CCNC: 19 IU/L (ref 17–63)
ANION GAP SERPL CALC-SCNC: 11.6 MMOL/L (ref 10–20)
AST SERPL-CCNC: 30 IU/L (ref 15–41)
BILIRUB SERPL-MCNC: 0.6 MG/DL (ref 0.3–1.2)
BUN SERPL-MCNC: 25 MG/DL (ref 8–20)
BUN/CREAT SERPL: 17.9 (ref 6.2–20.3)
CALCIUM SERPL-MCNC: 9.2 MG/DL (ref 8.9–10.3)
CHLORIDE SERPL-SCNC: 106 MMOL/L (ref 101–111)
CONV CO2: 26 MMOL/L (ref 22–32)
CONV TOTAL PROTEIN: 6.8 G/DL (ref 6.1–7.9)
CREAT UR-MCNC: 1.4 MG/DL (ref 0.7–1.2)
GLOBULIN UR ELPH-MCNC: 3 G/DL (ref 2.5–3.8)
GLUCOSE SERPL-MCNC: 93 MG/DL (ref 65–99)
POTASSIUM SERPL-SCNC: 3.6 MMOL/L (ref 3.6–5.1)
SODIUM SERPL-SCNC: 140 MMOL/L (ref 136–144)

## 2018-02-06 NOTE — PROGRESS NOTES
PATIENTS ONCOLOGY RECORD LOCATED IN [facility]      Subjective     Name:  MERCED PADGETT     Date:  2018  Address:  [address] [place] [address]  Home: [phone]  :  1932 AGE:  85 y.o.        RECORDS OBTAINED:  Patients Oncology Record is located in [facility]

## 2018-04-03 ENCOUNTER — HOSPITAL ENCOUNTER (OUTPATIENT)
Dept: ONCOLOGY | Facility: HOSPITAL | Age: 83
Discharge: HOME | End: 2018-04-03
Attending: INTERNAL MEDICINE | Admitting: INTERNAL MEDICINE

## 2018-04-03 ENCOUNTER — CLINICAL SUPPORT (OUTPATIENT)
Dept: ONCOLOGY | Facility: HOSPITAL | Age: 83
End: 2018-04-03

## 2018-04-03 ENCOUNTER — HOSPITAL ENCOUNTER (OUTPATIENT)
Dept: ONCOLOGY | Facility: CLINIC | Age: 83
Setting detail: INFUSION SERIES
Discharge: HOME | End: 2018-04-03
Attending: INTERNAL MEDICINE | Admitting: INTERNAL MEDICINE

## 2018-04-03 LAB
ALBUMIN SERPL-MCNC: 3.9 G/DL (ref 3.5–4.8)
ALBUMIN/GLOB SERPL: 1.5 {RATIO} (ref 1–1.7)
ALP SERPL-CCNC: 68 IU/L (ref 32–91)
ALT SERPL-CCNC: 26 IU/L (ref 17–63)
ANION GAP SERPL CALC-SCNC: 10.9 MMOL/L (ref 10–20)
AST SERPL-CCNC: 31 IU/L (ref 15–41)
BILIRUB SERPL-MCNC: 0.6 MG/DL (ref 0.3–1.2)
BUN SERPL-MCNC: 30 MG/DL (ref 8–20)
BUN/CREAT SERPL: 20 (ref 6.2–20.3)
CALCIUM SERPL-MCNC: 9.2 MG/DL (ref 8.9–10.3)
CHLORIDE SERPL-SCNC: 106 MMOL/L (ref 101–111)
CONV CO2: 26 MMOL/L (ref 22–32)
CONV TOTAL PROTEIN: 6.5 G/DL (ref 6.1–7.9)
CREAT UR-MCNC: 1.5 MG/DL (ref 0.7–1.2)
GLOBULIN UR ELPH-MCNC: 2.6 G/DL (ref 2.5–3.8)
GLUCOSE SERPL-MCNC: 79 MG/DL (ref 65–99)
POTASSIUM SERPL-SCNC: 3.9 MMOL/L (ref 3.6–5.1)
SODIUM SERPL-SCNC: 139 MMOL/L (ref 136–144)

## 2018-04-03 NOTE — PROGRESS NOTES
PATIENTS ONCOLOGY RECORD LOCATED IN [facility]      Subjective     Name:  MERCED PADGETT     Date:  2018  Address:  [address] [place] [address]  Home: [phone]  :  1932 AGE:  85 y.o.        RECORDS OBTAINED:  Patients Oncology Record is located in [facility]

## 2018-04-09 ENCOUNTER — HOSPITAL ENCOUNTER (OUTPATIENT)
Dept: ONCOLOGY | Facility: HOSPITAL | Age: 83
Discharge: HOME | End: 2018-04-09
Attending: NURSE PRACTITIONER | Admitting: NURSE PRACTITIONER

## 2018-04-09 ENCOUNTER — CLINICAL SUPPORT (OUTPATIENT)
Dept: ONCOLOGY | Facility: HOSPITAL | Age: 83
End: 2018-04-09

## 2018-04-09 ENCOUNTER — HOSPITAL ENCOUNTER (OUTPATIENT)
Dept: ONCOLOGY | Facility: CLINIC | Age: 83
Setting detail: INFUSION SERIES
Discharge: HOME | End: 2018-04-09
Attending: NURSE PRACTITIONER | Admitting: NURSE PRACTITIONER

## 2018-04-09 LAB — ERYTHROCYTE [SEDIMENTATION RATE] IN BLOOD BY WESTERGREN METHOD: 16 MM/HR (ref 0–20)

## 2018-04-09 NOTE — PROGRESS NOTES
PATIENTS ONCOLOGY RECORD LOCATED IN [facility]      Subjective     Name:  MERCED PADGETT     Date:  2018  Address:  [address] [place] [address]  Home: [phone]  :  1932 AGE:  85 y.o.        RECORDS OBTAINED:  Patients Oncology Record is located in [facility]

## 2018-05-08 ENCOUNTER — CLINICAL SUPPORT (OUTPATIENT)
Dept: ONCOLOGY | Facility: HOSPITAL | Age: 83
End: 2018-05-08

## 2018-05-08 ENCOUNTER — HOSPITAL ENCOUNTER (OUTPATIENT)
Dept: ONCOLOGY | Facility: CLINIC | Age: 83
Setting detail: INFUSION SERIES
Discharge: HOME | End: 2018-05-08
Attending: INTERNAL MEDICINE | Admitting: INTERNAL MEDICINE

## 2018-05-08 NOTE — PROGRESS NOTES
PATIENTS ONCOLOGY RECORD LOCATED IN [facility]      Subjective     Name:  MERCED PADGETT     Date:  2018  Address:  [address] [place] [address]  Home: [phone]  :  1932 AGE:  85 y.o.        RECORDS OBTAINED:  Patients Oncology Record is located in [facility]

## 2018-06-04 ENCOUNTER — CLINICAL SUPPORT (OUTPATIENT)
Dept: ONCOLOGY | Facility: HOSPITAL | Age: 83
End: 2018-06-04

## 2018-06-04 ENCOUNTER — HOSPITAL ENCOUNTER (OUTPATIENT)
Dept: ONCOLOGY | Facility: HOSPITAL | Age: 83
Discharge: HOME | End: 2018-06-04
Attending: INTERNAL MEDICINE | Admitting: INTERNAL MEDICINE

## 2018-06-04 ENCOUNTER — HOSPITAL ENCOUNTER (OUTPATIENT)
Dept: ONCOLOGY | Facility: CLINIC | Age: 83
Setting detail: INFUSION SERIES
Discharge: HOME | End: 2018-06-04
Attending: INTERNAL MEDICINE | Admitting: INTERNAL MEDICINE

## 2018-06-04 LAB
ALBUMIN SERPL-MCNC: 3.7 G/DL (ref 3.5–4.8)
ALBUMIN/GLOB SERPL: 1.2 {RATIO} (ref 1–1.7)
ALP SERPL-CCNC: 73 IU/L (ref 32–91)
ALT SERPL-CCNC: 19 IU/L (ref 17–63)
ANION GAP SERPL CALC-SCNC: 11.1 MMOL/L (ref 10–20)
AST SERPL-CCNC: 27 IU/L (ref 15–41)
BILIRUB SERPL-MCNC: 0.9 MG/DL (ref 0.3–1.2)
BUN SERPL-MCNC: 23 MG/DL (ref 8–20)
BUN/CREAT SERPL: 14.4 (ref 6.2–20.3)
CALCIUM SERPL-MCNC: 9 MG/DL (ref 8.9–10.3)
CHLORIDE SERPL-SCNC: 102 MMOL/L (ref 101–111)
CONV CO2: 26 MMOL/L (ref 22–32)
CONV TOTAL PROTEIN: 6.8 G/DL (ref 6.1–7.9)
CREAT UR-MCNC: 1.6 MG/DL (ref 0.7–1.2)
GLOBULIN UR ELPH-MCNC: 3.1 G/DL (ref 2.5–3.8)
GLUCOSE SERPL-MCNC: 89 MG/DL (ref 65–99)
POTASSIUM SERPL-SCNC: 4.1 MMOL/L (ref 3.6–5.1)
SODIUM SERPL-SCNC: 135 MMOL/L (ref 136–144)

## 2018-06-04 NOTE — PROGRESS NOTES
PATIENTS ONCOLOGY RECORD LOCATED IN [facility]      Subjective     Name:  MERCED PADGETT     Date:  2018  Address:  [address] [place] [address]  Home: [phone]  :  1932 AGE:  85 y.o.        RECORDS OBTAINED:  Patients Oncology Record is located in [facility]

## 2018-06-11 ENCOUNTER — HOSPITAL ENCOUNTER (OUTPATIENT)
Dept: ONCOLOGY | Facility: CLINIC | Age: 83
Setting detail: INFUSION SERIES
Discharge: HOME | End: 2018-06-11
Attending: INTERNAL MEDICINE | Admitting: INTERNAL MEDICINE

## 2018-06-11 ENCOUNTER — CLINICAL SUPPORT (OUTPATIENT)
Dept: ONCOLOGY | Facility: HOSPITAL | Age: 83
End: 2018-06-11

## 2018-06-11 ENCOUNTER — HOSPITAL ENCOUNTER (OUTPATIENT)
Dept: ONCOLOGY | Facility: HOSPITAL | Age: 83
Discharge: HOME | End: 2018-06-11
Attending: INTERNAL MEDICINE | Admitting: INTERNAL MEDICINE

## 2018-06-11 LAB
ALBUMIN SERPL-MCNC: 3.7 G/DL (ref 3.5–4.8)
ALBUMIN/GLOB SERPL: 1.3 {RATIO} (ref 1–1.7)
ALP SERPL-CCNC: 72 IU/L (ref 32–91)
ALT SERPL-CCNC: 12 IU/L (ref 17–63)
ANION GAP SERPL CALC-SCNC: 14.9 MMOL/L (ref 10–20)
AST SERPL-CCNC: 20 IU/L (ref 15–41)
BILIRUB SERPL-MCNC: 0.6 MG/DL (ref 0.3–1.2)
BUN SERPL-MCNC: 25 MG/DL (ref 8–20)
BUN/CREAT SERPL: 16.7 (ref 6.2–20.3)
CALCIUM SERPL-MCNC: 8.8 MG/DL (ref 8.9–10.3)
CHLORIDE SERPL-SCNC: 99 MMOL/L (ref 101–111)
CONV CO2: 26 MMOL/L (ref 22–32)
CONV TOTAL PROTEIN: 6.6 G/DL (ref 6.1–7.9)
CREAT UR-MCNC: 1.5 MG/DL (ref 0.7–1.2)
GLOBULIN UR ELPH-MCNC: 2.9 G/DL (ref 2.5–3.8)
GLUCOSE SERPL-MCNC: 89 MG/DL (ref 65–99)
POTASSIUM SERPL-SCNC: 3.9 MMOL/L (ref 3.6–5.1)
SODIUM SERPL-SCNC: 136 MMOL/L (ref 136–144)

## 2018-06-11 NOTE — PROGRESS NOTES
PATIENTS ONCOLOGY RECORD LOCATED IN [facility]      Subjective     Name:  MERCED PADGETT     Date:  2018  Address:  [address] [place] [address]  Home: [phone]  :  1932 AGE:  85 y.o.        RECORDS OBTAINED:  Patients Oncology Record is located in [facility]

## 2018-07-30 ENCOUNTER — HOSPITAL ENCOUNTER (OUTPATIENT)
Dept: ONCOLOGY | Facility: CLINIC | Age: 83
Setting detail: INFUSION SERIES
Discharge: HOME | End: 2018-07-30
Attending: INTERNAL MEDICINE | Admitting: INTERNAL MEDICINE

## 2018-07-30 ENCOUNTER — HOSPITAL ENCOUNTER (OUTPATIENT)
Dept: ONCOLOGY | Facility: HOSPITAL | Age: 83
Discharge: HOME | End: 2018-07-30
Attending: INTERNAL MEDICINE | Admitting: INTERNAL MEDICINE

## 2018-07-30 ENCOUNTER — CLINICAL SUPPORT (OUTPATIENT)
Dept: ONCOLOGY | Facility: HOSPITAL | Age: 83
End: 2018-07-30

## 2018-07-30 LAB
ALBUMIN SERPL-MCNC: 3.7 G/DL (ref 3.5–4.8)
ALBUMIN/GLOB SERPL: 1.3 {RATIO} (ref 1–1.7)
ALP SERPL-CCNC: 68 IU/L (ref 32–91)
ALT SERPL-CCNC: 12 IU/L (ref 17–63)
ANION GAP SERPL CALC-SCNC: 9.7 MMOL/L (ref 10–20)
AST SERPL-CCNC: 22 IU/L (ref 15–41)
BILIRUB SERPL-MCNC: 0.9 MG/DL (ref 0.3–1.2)
BUN SERPL-MCNC: 22 MG/DL (ref 8–20)
BUN/CREAT SERPL: 13.8 (ref 6.2–20.3)
CALCIUM SERPL-MCNC: 9.2 MG/DL (ref 8.9–10.3)
CHLORIDE SERPL-SCNC: 105 MMOL/L (ref 101–111)
CONV CO2: 27 MMOL/L (ref 22–32)
CONV TOTAL PROTEIN: 6.6 G/DL (ref 6.1–7.9)
CREAT UR-MCNC: 1.6 MG/DL (ref 0.7–1.2)
GLOBULIN UR ELPH-MCNC: 2.9 G/DL (ref 2.5–3.8)
GLUCOSE SERPL-MCNC: 86 MG/DL (ref 65–99)
POTASSIUM SERPL-SCNC: 3.7 MMOL/L (ref 3.6–5.1)
SODIUM SERPL-SCNC: 138 MMOL/L (ref 136–144)

## 2018-07-30 NOTE — PROGRESS NOTES
PATIENTS ONCOLOGY RECORD LOCATED IN [facility]      Subjective     Name:  MERCED PADGETT     Date:  2018  Address:  [address] [place] [address]  Home: [phone]  :  1932 AGE:  86 y.o.        RECORDS OBTAINED:  Patients Oncology Record is located in [facility]

## 2018-08-08 ENCOUNTER — HOSPITAL ENCOUNTER (OUTPATIENT)
Dept: ONCOLOGY | Facility: CLINIC | Age: 83
Setting detail: INFUSION SERIES
Discharge: HOME | End: 2018-08-08
Attending: NURSE PRACTITIONER | Admitting: NURSE PRACTITIONER

## 2018-08-08 ENCOUNTER — CLINICAL SUPPORT (OUTPATIENT)
Dept: ONCOLOGY | Facility: HOSPITAL | Age: 83
End: 2018-08-08

## 2018-08-08 NOTE — PROGRESS NOTES
PATIENTS ONCOLOGY RECORD LOCATED IN [facility]      Subjective     Name:  MERCED PADGETT     Date:  2018  Address:  [address] [place] [address]  Home: [phone]  :  1932 AGE:  86 y.o.        RECORDS OBTAINED:  Patients Oncology Record is located in [facility]

## 2018-09-24 ENCOUNTER — CLINICAL SUPPORT (OUTPATIENT)
Dept: ONCOLOGY | Facility: HOSPITAL | Age: 83
End: 2018-09-24

## 2018-09-24 ENCOUNTER — HOSPITAL ENCOUNTER (OUTPATIENT)
Dept: ONCOLOGY | Facility: CLINIC | Age: 83
Setting detail: INFUSION SERIES
Discharge: HOME | End: 2018-09-24
Attending: INTERNAL MEDICINE | Admitting: INTERNAL MEDICINE

## 2018-09-24 ENCOUNTER — HOSPITAL ENCOUNTER (OUTPATIENT)
Dept: ONCOLOGY | Facility: HOSPITAL | Age: 83
Discharge: HOME | End: 2018-09-24
Attending: INTERNAL MEDICINE | Admitting: INTERNAL MEDICINE

## 2018-09-24 LAB
ALBUMIN SERPL-MCNC: 3.7 G/DL (ref 3.5–4.8)
ALBUMIN/GLOB SERPL: 1.3 {RATIO} (ref 1–1.7)
ALP SERPL-CCNC: 73 IU/L (ref 32–91)
ALT SERPL-CCNC: 17 IU/L (ref 17–63)
ANION GAP SERPL CALC-SCNC: 9.9 MMOL/L (ref 10–20)
AST SERPL-CCNC: 22 IU/L (ref 15–41)
BILIRUB SERPL-MCNC: 0.7 MG/DL (ref 0.3–1.2)
BUN SERPL-MCNC: 25 MG/DL (ref 8–20)
BUN/CREAT SERPL: 17.9 (ref 6.2–20.3)
CALCIUM SERPL-MCNC: 8.8 MG/DL (ref 8.9–10.3)
CHLORIDE SERPL-SCNC: 103 MMOL/L (ref 101–111)
CONV CO2: 27 MMOL/L (ref 22–32)
CONV TOTAL PROTEIN: 6.5 G/DL (ref 6.1–7.9)
CREAT UR-MCNC: 1.4 MG/DL (ref 0.7–1.2)
GLOBULIN UR ELPH-MCNC: 2.8 G/DL (ref 2.5–3.8)
GLUCOSE SERPL-MCNC: 81 MG/DL (ref 65–99)
POTASSIUM SERPL-SCNC: 3.9 MMOL/L (ref 3.6–5.1)
SODIUM SERPL-SCNC: 136 MMOL/L (ref 136–144)

## 2018-09-24 NOTE — PROGRESS NOTES
PATIENTS ONCOLOGY RECORD LOCATED IN [facility]      Subjective     Name:  MERCED PADGETT     Date:  2018  Address:  [address] [place] [address]  Home: [phone]  :  1932 AGE:  86 y.o.        RECORDS OBTAINED:  Patients Oncology Record is located in [facility]

## 2018-10-10 ENCOUNTER — HOSPITAL ENCOUNTER (OUTPATIENT)
Dept: ONCOLOGY | Facility: CLINIC | Age: 83
Setting detail: INFUSION SERIES
Discharge: HOME | End: 2018-10-10
Attending: INTERNAL MEDICINE | Admitting: INTERNAL MEDICINE

## 2018-10-10 ENCOUNTER — CLINICAL SUPPORT (OUTPATIENT)
Dept: ONCOLOGY | Facility: HOSPITAL | Age: 83
End: 2018-10-10

## 2018-10-10 NOTE — PROGRESS NOTES
PATIENTS ONCOLOGY RECORD LOCATED IN [facility]      Subjective     Name:  MERCED PADGETT     Date:  10/10/2018  Address:  [address] [place] [address]  Home: [phone]  :  1932 AGE:  86 y.o.        RECORDS OBTAINED:  Patients Oncology Record is located in [facility]

## 2018-10-18 ENCOUNTER — HOSPITAL ENCOUNTER (OUTPATIENT)
Dept: ONCOLOGY | Facility: HOSPITAL | Age: 83
Discharge: HOME | End: 2018-10-18
Attending: INTERNAL MEDICINE | Admitting: INTERNAL MEDICINE

## 2018-11-26 ENCOUNTER — HOSPITAL ENCOUNTER (OUTPATIENT)
Dept: ONCOLOGY | Facility: CLINIC | Age: 83
Setting detail: INFUSION SERIES
Discharge: HOME | End: 2018-11-26
Attending: INTERNAL MEDICINE | Admitting: INTERNAL MEDICINE

## 2018-11-26 ENCOUNTER — HOSPITAL ENCOUNTER (OUTPATIENT)
Dept: ONCOLOGY | Facility: HOSPITAL | Age: 83
Discharge: HOME | End: 2018-11-26
Attending: INTERNAL MEDICINE | Admitting: INTERNAL MEDICINE

## 2018-11-26 ENCOUNTER — CLINICAL SUPPORT (OUTPATIENT)
Dept: ONCOLOGY | Facility: HOSPITAL | Age: 83
End: 2018-11-26

## 2018-11-26 LAB
ALBUMIN SERPL-MCNC: 3.6 G/DL (ref 3.5–4.8)
ALBUMIN/GLOB SERPL: 1.3 {RATIO} (ref 1–1.7)
ALP SERPL-CCNC: 79 IU/L (ref 32–91)
ALT SERPL-CCNC: 9 IU/L (ref 17–63)
ANION GAP SERPL CALC-SCNC: 11.8 MMOL/L (ref 10–20)
AST SERPL-CCNC: 22 IU/L (ref 15–41)
BILIRUB SERPL-MCNC: 0.9 MG/DL (ref 0.3–1.2)
BUN SERPL-MCNC: 32 MG/DL (ref 8–20)
BUN/CREAT SERPL: 20 (ref 6.2–20.3)
CALCIUM SERPL-MCNC: 8.8 MG/DL (ref 8.9–10.3)
CHLORIDE SERPL-SCNC: 102 MMOL/L (ref 101–111)
CONV CO2: 26 MMOL/L (ref 22–32)
CONV TOTAL PROTEIN: 6.4 G/DL (ref 6.1–7.9)
CREAT UR-MCNC: 1.6 MG/DL (ref 0.7–1.2)
GLOBULIN UR ELPH-MCNC: 2.8 G/DL (ref 2.5–3.8)
GLUCOSE SERPL-MCNC: 81 MG/DL (ref 65–99)
HBV CORE IGM SERPL QL IA: NONREACTIVE
HBV SURFACE AG SERPL QL IA: NONREACTIVE
HCV AB SER DONR QL: ABNORMAL
POTASSIUM SERPL-SCNC: 3.8 MMOL/L (ref 3.6–5.1)
SODIUM SERPL-SCNC: 136 MMOL/L (ref 136–144)

## 2018-11-29 ENCOUNTER — CLINICAL SUPPORT (OUTPATIENT)
Dept: ONCOLOGY | Facility: HOSPITAL | Age: 83
End: 2018-11-29

## 2018-11-29 ENCOUNTER — HOSPITAL ENCOUNTER (OUTPATIENT)
Dept: ONCOLOGY | Facility: CLINIC | Age: 83
Setting detail: INFUSION SERIES
Discharge: HOME | End: 2018-11-29
Attending: INTERNAL MEDICINE | Admitting: INTERNAL MEDICINE

## 2018-11-29 NOTE — PROGRESS NOTES
PATIENTS ONCOLOGY RECORD LOCATED IN [facility]      Subjective     Name:  MERCED PADGETT     Date:  2018  Address:  [address] [place] [address] [place] [facility]  Home: [unfilled]  :  1932 AGE:  86 y.o.        RECORDS OBTAINED:  Patients Oncology Record is located in [facility]

## 2018-12-12 ENCOUNTER — HOSPITAL ENCOUNTER (OUTPATIENT)
Dept: ONCOLOGY | Facility: CLINIC | Age: 83
Setting detail: INFUSION SERIES
Discharge: HOME | End: 2018-12-12
Attending: NURSE PRACTITIONER | Admitting: NURSE PRACTITIONER

## 2018-12-12 ENCOUNTER — HOSPITAL ENCOUNTER (OUTPATIENT)
Dept: ONCOLOGY | Facility: HOSPITAL | Age: 83
Discharge: HOME | End: 2018-12-12
Attending: NURSE PRACTITIONER | Admitting: NURSE PRACTITIONER

## 2018-12-12 ENCOUNTER — CLINICAL SUPPORT (OUTPATIENT)
Dept: ONCOLOGY | Facility: HOSPITAL | Age: 83
End: 2018-12-12

## 2018-12-12 LAB — ERYTHROCYTE [SEDIMENTATION RATE] IN BLOOD BY WESTERGREN METHOD: 18 MM/HR (ref 0–20)

## 2018-12-12 NOTE — PROGRESS NOTES
PATIENTS ONCOLOGY RECORD LOCATED IN [facility]      Subjective     Name:  MERCED PADGETT     Date:  2018  Address:  [address] [place] [address]  Home: [unfilled]  :  1932 AGE:  86 y.o.        RECORDS OBTAINED:  Patients Oncology Record is located in [facility]

## 2019-01-21 ENCOUNTER — CLINICAL SUPPORT (OUTPATIENT)
Dept: ONCOLOGY | Facility: HOSPITAL | Age: 84
End: 2019-01-21

## 2019-01-21 ENCOUNTER — HOSPITAL ENCOUNTER (OUTPATIENT)
Dept: ONCOLOGY | Facility: HOSPITAL | Age: 84
Discharge: HOME | End: 2019-01-21
Attending: INTERNAL MEDICINE | Admitting: INTERNAL MEDICINE

## 2019-01-21 ENCOUNTER — HOSPITAL ENCOUNTER (OUTPATIENT)
Dept: ONCOLOGY | Facility: CLINIC | Age: 84
Setting detail: INFUSION SERIES
Discharge: HOME | End: 2019-01-21
Attending: INTERNAL MEDICINE | Admitting: INTERNAL MEDICINE

## 2019-01-21 LAB
ALBUMIN SERPL-MCNC: 3.7 G/DL (ref 3.5–4.8)
ALBUMIN/GLOB SERPL: 1.5 {RATIO} (ref 1–1.7)
ALP SERPL-CCNC: 77 IU/L (ref 32–91)
ALT SERPL-CCNC: 23 IU/L (ref 17–63)
ANION GAP SERPL CALC-SCNC: 13.1 MMOL/L (ref 10–20)
AST SERPL-CCNC: 37 IU/L (ref 15–41)
BILIRUB SERPL-MCNC: 0.7 MG/DL (ref 0.3–1.2)
BUN SERPL-MCNC: 36 MG/DL (ref 8–20)
BUN/CREAT SERPL: 22.5 (ref 6.2–20.3)
CALCIUM SERPL-MCNC: 8.8 MG/DL (ref 8.9–10.3)
CHLORIDE SERPL-SCNC: 103 MMOL/L (ref 101–111)
CONV CO2: 24 MMOL/L (ref 22–32)
CONV TOTAL PROTEIN: 6.2 G/DL (ref 6.1–7.9)
CREAT UR-MCNC: 1.6 MG/DL (ref 0.7–1.2)
GLOBULIN UR ELPH-MCNC: 2.5 G/DL (ref 2.5–3.8)
GLUCOSE SERPL-MCNC: 81 MG/DL (ref 65–99)
POTASSIUM SERPL-SCNC: 4.1 MMOL/L (ref 3.6–5.1)
SODIUM SERPL-SCNC: 136 MMOL/L (ref 136–144)

## 2019-01-21 NOTE — PROGRESS NOTES
PATIENTS ONCOLOGY RECORD LOCATED IN [facility]      Subjective     Name:  MERCED PADGETT     Date:  2019  Address:  [address] [place] [address]  Home: [unfilled]  :  1932 AGE:  86 y.o.        RECORDS OBTAINED:  Patients Oncology Record is located in [facility]

## 2019-01-31 ENCOUNTER — HOSPITAL ENCOUNTER (OUTPATIENT)
Dept: ONCOLOGY | Facility: CLINIC | Age: 84
Setting detail: INFUSION SERIES
Discharge: HOME | End: 2019-01-31
Attending: INTERNAL MEDICINE | Admitting: INTERNAL MEDICINE

## 2019-01-31 ENCOUNTER — CLINICAL SUPPORT (OUTPATIENT)
Dept: ONCOLOGY | Facility: HOSPITAL | Age: 84
End: 2019-01-31

## 2019-01-31 NOTE — PROGRESS NOTES
PATIENTS ONCOLOGY RECORD LOCATED IN [facility]      Subjective     Name:  MERCED PADGETT     Date:  2019  Address:  [address] [place] [address]  Home: [unfilled]  :  1932 AGE:  86 y.o.        RECORDS OBTAINED:  Patients Oncology Record is located in [facility]

## 2019-02-04 ENCOUNTER — CLINICAL SUPPORT (OUTPATIENT)
Dept: ONCOLOGY | Facility: HOSPITAL | Age: 84
End: 2019-02-04

## 2019-02-04 ENCOUNTER — HOSPITAL ENCOUNTER (OUTPATIENT)
Dept: ONCOLOGY | Facility: CLINIC | Age: 84
Setting detail: INFUSION SERIES
Discharge: HOME | End: 2019-02-04
Attending: INTERNAL MEDICINE | Admitting: INTERNAL MEDICINE

## 2019-02-04 NOTE — PROGRESS NOTES
PATIENTS ONCOLOGY RECORD LOCATED IN [facility]      Subjective     Name:  MERCED PADGETT     Date:  2019  Address:  [address] [place] [address]  Home: [unfilled]  :  1932 AGE:  86 y.o.        RECORDS OBTAINED:  Patients Oncology Record is located in [facility]

## 2019-02-13 ENCOUNTER — HOSPITAL ENCOUNTER (OUTPATIENT)
Dept: ONCOLOGY | Facility: CLINIC | Age: 84
Setting detail: INFUSION SERIES
Discharge: HOME | End: 2019-02-13
Attending: INTERNAL MEDICINE | Admitting: INTERNAL MEDICINE

## 2019-02-13 ENCOUNTER — CLINICAL SUPPORT (OUTPATIENT)
Dept: ONCOLOGY | Facility: HOSPITAL | Age: 84
End: 2019-02-13

## 2019-02-13 NOTE — PROGRESS NOTES
PATIENTS ONCOLOGY RECORD LOCATED IN [facility]      Subjective     Name:  MERCED PADGETT     Date:  2019  Address:  [address] [place] [address]  Home: [unfilled]  :  1932 AGE:  86 y.o.        RECORDS OBTAINED:  Patients Oncology Record is located in [facility]

## 2019-03-18 ENCOUNTER — HOSPITAL ENCOUNTER (OUTPATIENT)
Dept: ONCOLOGY | Facility: CLINIC | Age: 84
Setting detail: INFUSION SERIES
Discharge: HOME | End: 2019-03-18
Attending: INTERNAL MEDICINE | Admitting: INTERNAL MEDICINE

## 2019-03-18 ENCOUNTER — CLINICAL SUPPORT (OUTPATIENT)
Dept: ONCOLOGY | Facility: HOSPITAL | Age: 84
End: 2019-03-18

## 2019-03-18 ENCOUNTER — HOSPITAL ENCOUNTER (OUTPATIENT)
Dept: ONCOLOGY | Facility: HOSPITAL | Age: 84
Discharge: HOME | End: 2019-03-18
Attending: INTERNAL MEDICINE | Admitting: INTERNAL MEDICINE

## 2019-03-18 LAB
ALBUMIN SERPL-MCNC: 3.8 G/DL (ref 3.5–4.8)
ALBUMIN/GLOB SERPL: 1.3 {RATIO} (ref 1–1.7)
ALP SERPL-CCNC: 77 IU/L (ref 32–91)
ALT SERPL-CCNC: 22 IU/L (ref 17–63)
ANION GAP SERPL CALC-SCNC: 13.8 MMOL/L (ref 10–20)
AST SERPL-CCNC: 31 IU/L (ref 15–41)
BILIRUB SERPL-MCNC: 0.6 MG/DL (ref 0.3–1.2)
BUN SERPL-MCNC: 30 MG/DL (ref 8–20)
BUN/CREAT SERPL: 18.8 (ref 6.2–20.3)
CALCIUM SERPL-MCNC: 9 MG/DL (ref 8.9–10.3)
CHLORIDE SERPL-SCNC: 104 MMOL/L (ref 101–111)
CONV CO2: 24 MMOL/L (ref 22–32)
CONV TOTAL PROTEIN: 6.7 G/DL (ref 6.1–7.9)
CREAT UR-MCNC: 1.6 MG/DL (ref 0.7–1.2)
GLOBULIN UR ELPH-MCNC: 2.9 G/DL (ref 2.5–3.8)
GLUCOSE SERPL-MCNC: 83 MG/DL (ref 65–99)
POTASSIUM SERPL-SCNC: 3.8 MMOL/L (ref 3.6–5.1)
SODIUM SERPL-SCNC: 138 MMOL/L (ref 136–144)

## 2019-03-18 NOTE — PROGRESS NOTES
PATIENTS ONCOLOGY RECORD LOCATED IN [facility]      Subjective     Name:  MERCED PADGETT     Date:  2019  Address:  [address] [place] [address]  Home: [unfilled]  :  1932 AGE:  86 y.o.        RECORDS OBTAINED:  Patients Oncology Record is located in [facility]

## 2019-04-08 ENCOUNTER — CLINICAL SUPPORT (OUTPATIENT)
Dept: ONCOLOGY | Facility: HOSPITAL | Age: 84
End: 2019-04-08

## 2019-04-08 ENCOUNTER — HOSPITAL ENCOUNTER (OUTPATIENT)
Dept: ONCOLOGY | Facility: CLINIC | Age: 84
Setting detail: INFUSION SERIES
Discharge: HOME | End: 2019-04-08
Attending: NURSE PRACTITIONER | Admitting: NURSE PRACTITIONER

## 2019-04-08 ENCOUNTER — HOSPITAL ENCOUNTER (OUTPATIENT)
Dept: ONCOLOGY | Facility: HOSPITAL | Age: 84
Discharge: HOME | End: 2019-04-08
Attending: NURSE PRACTITIONER | Admitting: NURSE PRACTITIONER

## 2019-04-08 LAB
IRON SATN MFR SERPL: 5 % (ref 20–50)
IRON SERPL-MCNC: 22 UG/DL (ref 45–182)
MAGNESIUM UR-MCNC: 1.01 % (ref 0.5–1.5)
RETICS/RBC NFR MANUAL: 0.04 10*6/UL
TIBC SERPL-MCNC: 412 UG/DL (ref 228–428)

## 2019-04-08 NOTE — PROGRESS NOTES
PATIENTS ONCOLOGY RECORD LOCATED IN [facility]      Subjective     Name:  MERCED PADGETT     Date:  2019  Address:  [address] [place] [address]  Home: [unfilled]  :  1932 AGE:  86 y.o.        RECORDS OBTAINED:  Patients Oncology Record is located in [facility]

## 2019-04-09 LAB — HAPTOGLOB SERPL-MCNC: 120 MG/DL (ref 36–195)

## 2019-04-10 LAB
ALBUMIN SERPL-MCNC: 3.5 G/DL (ref 3.5–4.8)
ALPHA1 GLOB FLD ELPH-MCNC: 0.2 GM/DL (ref 0.1–0.4)
ALPHA2 GLOB SERPL ELPH-MCNC: 0.7 GM/DL (ref 0.5–1)
B-GLOBULIN SERPL ELPH-MCNC: 0.9 GM/DL (ref 0.7–1.4)
CONV TOTAL PROTEIN: 6.4 G/DL (ref 6.1–7.9)
GAMMA GLOB SERPL ELPH-MCNC: 1.1 GM/DL (ref 0.6–1.6)
INSULIN SERPL-ACNC: NORMAL U[IU]/ML

## 2019-04-17 ENCOUNTER — HOSPITAL ENCOUNTER (OUTPATIENT)
Dept: ONCOLOGY | Facility: CLINIC | Age: 84
Setting detail: INFUSION SERIES
Discharge: HOME | End: 2019-04-17
Attending: INTERNAL MEDICINE | Admitting: INTERNAL MEDICINE

## 2019-04-17 ENCOUNTER — CLINICAL SUPPORT (OUTPATIENT)
Dept: ONCOLOGY | Facility: HOSPITAL | Age: 84
End: 2019-04-17

## 2019-04-17 NOTE — PROGRESS NOTES
PATIENTS ONCOLOGY RECORD LOCATED IN [facility]      Subjective     Name:  MERCED PADGETT     Date:  2019  Address:  [address] [place] [address]  Home: [unfilled]  :  1932 AGE:  86 y.o.        RECORDS OBTAINED:  Patients Oncology Record is located in [facility]

## 2019-05-21 ENCOUNTER — CLINICAL SUPPORT (OUTPATIENT)
Dept: ONCOLOGY | Facility: HOSPITAL | Age: 84
End: 2019-05-21

## 2019-05-21 ENCOUNTER — HOSPITAL ENCOUNTER (OUTPATIENT)
Dept: ONCOLOGY | Facility: HOSPITAL | Age: 84
Discharge: HOME | End: 2019-05-21
Attending: INTERNAL MEDICINE | Admitting: INTERNAL MEDICINE

## 2019-05-21 ENCOUNTER — HOSPITAL ENCOUNTER (OUTPATIENT)
Dept: ONCOLOGY | Facility: CLINIC | Age: 84
Setting detail: INFUSION SERIES
Discharge: HOME | End: 2019-05-21
Attending: INTERNAL MEDICINE | Admitting: INTERNAL MEDICINE

## 2019-05-21 VITALS — HEIGHT: 69 IN | BODY MASS INDEX: 25.34 KG/M2 | WEIGHT: 171.1 LBS

## 2019-05-21 DIAGNOSIS — C82.53 DIFFUSE FOLLICLE CENTER LYMPHOMA OF INTRA-ABDOMINAL LYMPH NODES (HCC): ICD-10-CM

## 2019-05-21 PROBLEM — C83.33: Status: ACTIVE | Noted: 2019-05-21

## 2019-05-21 PROBLEM — C85.90 NON-HODGKIN'S LYMPHOMA (HCC): Status: ACTIVE | Noted: 2019-05-21

## 2019-05-21 LAB
ALBUMIN SERPL-MCNC: 3.7 G/DL (ref 3.5–4.8)
ALBUMIN/GLOB SERPL: 1.3 {RATIO} (ref 1–1.7)
ALP SERPL-CCNC: 73 IU/L (ref 32–91)
ALT SERPL-CCNC: 17 IU/L (ref 17–63)
ANION GAP SERPL CALC-SCNC: 15.1 MMOL/L (ref 10–20)
AST SERPL-CCNC: 29 IU/L (ref 15–41)
BILIRUB SERPL-MCNC: 0.5 MG/DL (ref 0.3–1.2)
BUN SERPL-MCNC: 25 MG/DL (ref 8–20)
BUN/CREAT SERPL: 15.6 (ref 6.2–20.3)
CALCIUM SERPL-MCNC: 9 MG/DL (ref 8.9–10.3)
CHLORIDE SERPL-SCNC: 104 MMOL/L (ref 101–111)
CONV CO2: 22 MMOL/L (ref 22–32)
CONV TOTAL PROTEIN: 6.6 G/DL (ref 6.1–7.9)
CREAT UR-MCNC: 1.6 MG/DL (ref 0.7–1.2)
GLOBULIN UR ELPH-MCNC: 2.9 G/DL (ref 2.5–3.8)
GLUCOSE SERPL-MCNC: 84 MG/DL (ref 65–99)
POTASSIUM SERPL-SCNC: 4.1 MMOL/L (ref 3.6–5.1)
SODIUM SERPL-SCNC: 137 MMOL/L (ref 136–144)

## 2019-05-21 RX ORDER — SODIUM CHLORIDE 9 MG/ML
250 INJECTION, SOLUTION INTRAVENOUS ONCE
Status: CANCELLED | OUTPATIENT
Start: 2019-07-08

## 2019-05-21 RX ORDER — ACETAMINOPHEN 325 MG/1
650 TABLET ORAL ONCE
Status: CANCELLED | OUTPATIENT
Start: 2019-07-08

## 2019-05-21 RX ORDER — MEPERIDINE HYDROCHLORIDE 50 MG/ML
25 INJECTION INTRAMUSCULAR; INTRAVENOUS; SUBCUTANEOUS
Status: CANCELLED | OUTPATIENT
Start: 2019-07-08 | End: 2019-07-09

## 2019-05-21 RX ORDER — DIPHENHYDRAMINE HYDROCHLORIDE 50 MG/ML
50 INJECTION INTRAMUSCULAR; INTRAVENOUS AS NEEDED
Status: CANCELLED | OUTPATIENT
Start: 2019-07-08

## 2019-05-21 RX ORDER — FAMOTIDINE 10 MG/ML
20 INJECTION, SOLUTION INTRAVENOUS AS NEEDED
Status: CANCELLED | OUTPATIENT
Start: 2019-07-08

## 2019-05-21 NOTE — PROGRESS NOTES
PATIENTS ONCOLOGY RECORD LOCATED IN [facility]      Subjective     Name:  MERCED PADGETT     Date:  2019  Address:  [address] [place] [address]  Home: [unfilled]  :  1932 AGE:  86 y.o.        RECORDS OBTAINED:  Patients Oncology Record is located in [facility]

## 2019-06-11 ENCOUNTER — HOSPITAL ENCOUNTER (OUTPATIENT)
Dept: ONCOLOGY | Facility: CLINIC | Age: 84
Setting detail: INFUSION SERIES
Discharge: HOME | End: 2019-06-11
Attending: NURSE PRACTITIONER | Admitting: NURSE PRACTITIONER

## 2019-06-11 ENCOUNTER — HOSPITAL ENCOUNTER (OUTPATIENT)
Dept: ONCOLOGY | Facility: HOSPITAL | Age: 84
Discharge: HOME | End: 2019-06-11
Attending: NURSE PRACTITIONER | Admitting: NURSE PRACTITIONER

## 2019-06-11 LAB
ERYTHROCYTE [SEDIMENTATION RATE] IN BLOOD BY WESTERGREN METHOD: 13 MM/HR (ref 0–20)
LDH SERPL-CCNC: 223 IU/L (ref 98–192)
URATE SERPL-MCNC: 6.5 MG/DL (ref 4.8–8.7)

## 2019-06-14 VITALS — HEIGHT: 69 IN | WEIGHT: 170 LBS | BODY MASS INDEX: 25.18 KG/M2

## 2019-06-14 PROBLEM — Z45.2 ENCOUNTER FOR CARE RELATED TO VASCULAR ACCESS PORT: Status: ACTIVE | Noted: 2019-06-14

## 2019-06-14 RX ORDER — SODIUM CHLORIDE 0.9 % (FLUSH) 0.9 %
10 SYRINGE (ML) INJECTION AS NEEDED
Status: CANCELLED | OUTPATIENT
Start: 2019-06-14

## 2019-06-14 RX ORDER — ACETAMINOPHEN 500 MG
1000 TABLET ORAL ONCE
Status: CANCELLED | OUTPATIENT
Start: 2019-07-08

## 2019-07-08 ENCOUNTER — HOSPITAL ENCOUNTER (OUTPATIENT)
Dept: ONCOLOGY | Facility: HOSPITAL | Age: 84
Setting detail: INFUSION SERIES
Discharge: HOME | End: 2019-07-08

## 2019-07-08 VITALS
RESPIRATION RATE: 18 BRPM | TEMPERATURE: 97.5 F | DIASTOLIC BLOOD PRESSURE: 75 MMHG | HEIGHT: 69 IN | HEART RATE: 70 BPM | BODY MASS INDEX: 25.25 KG/M2 | WEIGHT: 170.5 LBS | SYSTOLIC BLOOD PRESSURE: 126 MMHG

## 2019-07-08 DIAGNOSIS — Z45.2 ENCOUNTER FOR CARE RELATED TO VASCULAR ACCESS PORT: ICD-10-CM

## 2019-07-08 DIAGNOSIS — C82.53 DIFFUSE FOLLICLE CENTER LYMPHOMA OF INTRA-ABDOMINAL LYMPH NODES (HCC): Primary | ICD-10-CM

## 2019-07-08 LAB
ALBUMIN SERPL-MCNC: 3.9 G/DL (ref 3.5–4.8)
ALBUMIN/GLOB SERPL: 1.4 G/DL (ref 1–1.7)
ALP SERPL-CCNC: 71 U/L (ref 32–91)
ALT SERPL W P-5'-P-CCNC: 12 U/L (ref 17–63)
ANION GAP SERPL CALCULATED.3IONS-SCNC: 12.7 MMOL/L (ref 10–20)
AST SERPL-CCNC: 23 U/L (ref 15–41)
BASOPHILS NFR BLD AUTO: ABNORMAL % (ref 0–1.5)
BILIRUB SERPL-MCNC: 0.9 MG/DL (ref 0.3–1.2)
BUN BLD-MCNC: 30 MG/DL (ref 8–20)
BUN/CREAT SERPL: 20 (ref 6.2–20.3)
CALCIUM SPEC-SCNC: 9.1 MG/DL (ref 8.9–10.3)
CHLORIDE SERPL-SCNC: 107 MMOL/L (ref 101–111)
CO2 SERPL-SCNC: 23 MMOL/L (ref 22–32)
CREAT BLD-MCNC: 1.5 MG/DL (ref 0.7–1.2)
DEPRECATED RDW RBC AUTO: 60.8 FL (ref 37–54)
EOSINOPHIL # BLD AUTO: 0.14 10*3/MM3 (ref 0–0.4)
EOSINOPHIL NFR BLD AUTO: 2.3 % (ref 0.3–6.2)
ERYTHROCYTE [DISTWIDTH] IN BLOOD BY AUTOMATED COUNT: 20 % (ref 12.3–15.4)
GFR SERPL CREATININE-BSD FRML MDRD: 54 ML/MIN/1.73
GLOBULIN UR ELPH-MCNC: 2.8 GM/DL (ref 2.5–3.8)
GLUCOSE BLD-MCNC: 78 MG/DL (ref 65–99)
HCT VFR BLD AUTO: 36.7 % (ref 37.5–51)
HGB BLD-MCNC: 11.5 G/DL (ref 13–17.7)
LYMPHOCYTES # BLD AUTO: 0.79 10*3/MM3 (ref 0.7–3.1)
LYMPHOCYTES NFR BLD AUTO: 12.8 % (ref 19.6–45.3)
MCH RBC QN AUTO: 26.7 PG (ref 26.6–33)
MCHC RBC AUTO-ENTMCNC: 31.3 G/DL (ref 31.5–35.7)
MCV RBC AUTO: 85.2 FL (ref 79–97)
MONOCYTES # BLD AUTO: 0.64 10*3/MM3 (ref 0.1–0.9)
MONOCYTES NFR BLD AUTO: 10.4 % (ref 5–12)
NEUTROPHILS NFR BLD AUTO: ABNORMAL % (ref 42.7–76)
PLATELET # BLD AUTO: 112 10*3/MM3 (ref 140–450)
PMV BLD AUTO: 10.4 FL (ref 6–12)
POTASSIUM BLD-SCNC: 3.7 MMOL/L (ref 3.6–5.1)
PROT SERPL-MCNC: 6.7 G/DL (ref 6.1–7.9)
RBC # BLD AUTO: 4.31 10*6/MM3 (ref 4.14–5.8)
SODIUM BLD-SCNC: 139 MMOL/L (ref 136–144)
WBC NRBC COR # BLD: 6.16 10*3/MM3 (ref 3.4–10.8)

## 2019-07-08 PROCEDURE — 25010000002 RITUXIMAB 10 MG/ML SOLUTION 50 ML VIAL: Performed by: NURSE PRACTITIONER

## 2019-07-08 PROCEDURE — 25010000002 RITUXIMAB 10 MG/ML SOLUTION 10 ML VIAL: Performed by: NURSE PRACTITIONER

## 2019-07-08 PROCEDURE — 96413 CHEMO IV INFUSION 1 HR: CPT | Performed by: INTERNAL MEDICINE

## 2019-07-08 PROCEDURE — 96367 TX/PROPH/DG ADDL SEQ IV INF: CPT | Performed by: INTERNAL MEDICINE

## 2019-07-08 PROCEDURE — 96415 CHEMO IV INFUSION ADDL HR: CPT | Performed by: INTERNAL MEDICINE

## 2019-07-08 PROCEDURE — 80053 COMPREHEN METABOLIC PANEL: CPT | Performed by: INTERNAL MEDICINE

## 2019-07-08 PROCEDURE — 96375 TX/PRO/DX INJ NEW DRUG ADDON: CPT | Performed by: INTERNAL MEDICINE

## 2019-07-08 PROCEDURE — 85025 COMPLETE CBC W/AUTO DIFF WBC: CPT | Performed by: INTERNAL MEDICINE

## 2019-07-08 PROCEDURE — 25010000002 DEXAMETHASONE: Performed by: INTERNAL MEDICINE

## 2019-07-08 PROCEDURE — 25010000002 DIPHENHYDRAMINE PER 50 MG: Performed by: NURSE PRACTITIONER

## 2019-07-08 RX ORDER — CLONIDINE 0.1 MG/24H
1 PATCH, EXTENDED RELEASE TRANSDERMAL WEEKLY
COMMUNITY
End: 2019-11-06

## 2019-07-08 RX ORDER — CLOPIDOGREL BISULFATE 75 MG/1
75 TABLET ORAL DAILY
COMMUNITY
End: 2021-08-30 | Stop reason: SDUPTHER

## 2019-07-08 RX ORDER — ATORVASTATIN CALCIUM 20 MG/1
20 TABLET, FILM COATED ORAL NIGHTLY
Status: ON HOLD | COMMUNITY
End: 2022-08-26

## 2019-07-08 RX ORDER — BUMETANIDE 1 MG/1
1 TABLET ORAL DAILY
COMMUNITY

## 2019-07-08 RX ORDER — ISOSORBIDE MONONITRATE 30 MG/1
30 TABLET, EXTENDED RELEASE ORAL DAILY
COMMUNITY

## 2019-07-08 RX ORDER — WARFARIN SODIUM 1 MG/1
1 TABLET ORAL
COMMUNITY
End: 2021-04-09 | Stop reason: SDUPTHER

## 2019-07-08 RX ORDER — ZOLPIDEM TARTRATE 5 MG/1
TABLET ORAL
Qty: 30 TABLET | Refills: 0 | Status: SHIPPED | OUTPATIENT
Start: 2019-07-08 | End: 2019-07-16 | Stop reason: SDUPTHER

## 2019-07-08 RX ORDER — SODIUM CHLORIDE 0.9 % (FLUSH) 0.9 %
10 SYRINGE (ML) INJECTION AS NEEDED
Status: DISCONTINUED | OUTPATIENT
Start: 2019-07-08 | End: 2019-07-09 | Stop reason: HOSPADM

## 2019-07-08 RX ORDER — POTASSIUM CHLORIDE 750 MG/1
10 TABLET, FILM COATED, EXTENDED RELEASE ORAL 3 TIMES DAILY
COMMUNITY
End: 2019-11-06 | Stop reason: SDUPTHER

## 2019-07-08 RX ORDER — AMOXICILLIN AND CLAVULANATE POTASSIUM 500; 125 MG/1; MG/1
1 TABLET, FILM COATED ORAL 2 TIMES DAILY
COMMUNITY
End: 2019-08-12

## 2019-07-08 RX ORDER — SODIUM CHLORIDE 9 MG/ML
250 INJECTION, SOLUTION INTRAVENOUS ONCE
Status: COMPLETED | OUTPATIENT
Start: 2019-07-08 | End: 2019-07-08

## 2019-07-08 RX ORDER — CLONIDINE HYDROCHLORIDE 0.1 MG/1
0.1 TABLET ORAL EVERY 6 HOURS
COMMUNITY

## 2019-07-08 RX ORDER — SODIUM CHLORIDE 0.9 % (FLUSH) 0.9 %
10 SYRINGE (ML) INJECTION AS NEEDED
Status: CANCELLED | OUTPATIENT
Start: 2019-07-08

## 2019-07-08 RX ORDER — ALLOPURINOL 100 MG/1
100 TABLET ORAL DAILY
COMMUNITY
End: 2020-05-22

## 2019-07-08 RX ORDER — RANITIDINE 150 MG/1
150 TABLET ORAL DAILY
COMMUNITY
End: 2021-02-15

## 2019-07-08 RX ORDER — HYDRALAZINE HYDROCHLORIDE 50 MG/1
50 TABLET, FILM COATED ORAL 4 TIMES DAILY
COMMUNITY
End: 2021-08-30 | Stop reason: SDUPTHER

## 2019-07-08 RX ORDER — ACETAMINOPHEN 500 MG
1000 TABLET ORAL ONCE
Status: COMPLETED | OUTPATIENT
Start: 2019-07-08 | End: 2019-07-08

## 2019-07-08 RX ADMIN — DIPHENHYDRAMINE HYDROCHLORIDE 50 MG: 50 INJECTION, SOLUTION INTRAMUSCULAR; INTRAVENOUS at 09:48

## 2019-07-08 RX ADMIN — Medication 500 UNITS: at 13:26

## 2019-07-08 RX ADMIN — SODIUM CHLORIDE 250 ML: 900 INJECTION, SOLUTION INTRAVENOUS at 09:46

## 2019-07-08 RX ADMIN — Medication 10 ML: at 13:26

## 2019-07-08 RX ADMIN — ACETAMINOPHEN 1000 MG: 500 TABLET ORAL at 09:45

## 2019-07-08 RX ADMIN — DEXAMETHASONE SODIUM PHOSPHATE 12 MG: 4 INJECTION, SOLUTION INTRAMUSCULAR; INTRAVENOUS at 10:29

## 2019-07-08 RX ADMIN — RITUXIMAB 700 MG: 10 INJECTION, SOLUTION INTRAVENOUS at 11:03

## 2019-07-10 ENCOUNTER — TRANSCRIBE ORDERS (OUTPATIENT)
Dept: PET IMAGING | Facility: HOSPITAL | Age: 84
End: 2019-07-10

## 2019-07-10 DIAGNOSIS — D63.1 ANEMIA IN END-STAGE RENAL DISEASE (HCC): ICD-10-CM

## 2019-07-10 DIAGNOSIS — N18.6 ANEMIA IN END-STAGE RENAL DISEASE (HCC): ICD-10-CM

## 2019-07-10 DIAGNOSIS — N18.30 CHRONIC KIDNEY DISEASE, STAGE III (MODERATE) (HCC): ICD-10-CM

## 2019-07-10 DIAGNOSIS — C83.33 LARGE CELL ABDOMINAL LYMPHOMA (HCC): Primary | ICD-10-CM

## 2019-07-10 DIAGNOSIS — D61.818 ACQUIRED PANCYTOPENIA (HCC): ICD-10-CM

## 2019-07-15 ENCOUNTER — HOSPITAL ENCOUNTER (OUTPATIENT)
Dept: PET IMAGING | Facility: HOSPITAL | Age: 84
Discharge: HOME | End: 2019-07-15
Admitting: INTERNAL MEDICINE

## 2019-07-15 DIAGNOSIS — N18.6 ANEMIA IN END-STAGE RENAL DISEASE (HCC): ICD-10-CM

## 2019-07-15 DIAGNOSIS — N18.30 CHRONIC KIDNEY DISEASE, STAGE III (MODERATE) (HCC): ICD-10-CM

## 2019-07-15 DIAGNOSIS — D61.818 ACQUIRED PANCYTOPENIA (HCC): ICD-10-CM

## 2019-07-15 DIAGNOSIS — C83.33 LARGE CELL ABDOMINAL LYMPHOMA (HCC): ICD-10-CM

## 2019-07-15 DIAGNOSIS — D63.1 ANEMIA IN END-STAGE RENAL DISEASE (HCC): ICD-10-CM

## 2019-07-15 DIAGNOSIS — C85.90 NON HODGKIN'S LYMPHOMA (HCC): ICD-10-CM

## 2019-07-15 PROCEDURE — 74177 CT ABD & PELVIS W/CONTRAST: CPT

## 2019-07-15 PROCEDURE — 0 IOPAMIDOL PER 1 ML: Performed by: INTERNAL MEDICINE

## 2019-07-15 PROCEDURE — 71260 CT THORAX DX C+: CPT

## 2019-07-15 RX ADMIN — IOPAMIDOL 100 ML: 755 INJECTION, SOLUTION INTRAVENOUS at 11:15

## 2019-07-17 RX ORDER — ZOLPIDEM TARTRATE 5 MG/1
TABLET ORAL
Qty: 30 TABLET | Refills: 0 | Status: SHIPPED | OUTPATIENT
Start: 2019-07-17 | End: 2019-08-14 | Stop reason: SDUPTHER

## 2019-08-12 ENCOUNTER — HOSPITAL ENCOUNTER (OUTPATIENT)
Dept: ONCOLOGY | Facility: HOSPITAL | Age: 84
Discharge: HOME | End: 2019-08-12
Admitting: NURSE PRACTITIONER

## 2019-08-12 ENCOUNTER — APPOINTMENT (OUTPATIENT)
Dept: LAB | Facility: HOSPITAL | Age: 84
End: 2019-08-12

## 2019-08-12 ENCOUNTER — OFFICE VISIT (OUTPATIENT)
Dept: ONCOLOGY | Facility: CLINIC | Age: 84
End: 2019-08-12

## 2019-08-12 ENCOUNTER — HOSPITAL ENCOUNTER (OUTPATIENT)
Dept: ONCOLOGY | Facility: HOSPITAL | Age: 84
Discharge: HOME | End: 2019-08-12

## 2019-08-12 VITALS
BODY MASS INDEX: 26.07 KG/M2 | SYSTOLIC BLOOD PRESSURE: 162 MMHG | TEMPERATURE: 97.9 F | HEIGHT: 69 IN | DIASTOLIC BLOOD PRESSURE: 78 MMHG | HEART RATE: 61 BPM | WEIGHT: 176 LBS | RESPIRATION RATE: 18 BRPM

## 2019-08-12 VITALS
TEMPERATURE: 98 F | WEIGHT: 176.8 LBS | HEART RATE: 61 BPM | SYSTOLIC BLOOD PRESSURE: 162 MMHG | BODY MASS INDEX: 27.75 KG/M2 | RESPIRATION RATE: 18 BRPM | HEIGHT: 67 IN | DIASTOLIC BLOOD PRESSURE: 73 MMHG

## 2019-08-12 DIAGNOSIS — C82.93 FOLLICULAR LYMPHOMA OF INTRA-ABDOMINAL LYMPH NODES, UNSPECIFIED FOLLICULAR LYMPHOMA TYPE (HCC): ICD-10-CM

## 2019-08-12 DIAGNOSIS — D63.1 ANEMIA DUE TO STAGE 3 CHRONIC KIDNEY DISEASE (HCC): ICD-10-CM

## 2019-08-12 DIAGNOSIS — Z45.2 ENCOUNTER FOR CARE RELATED TO VASCULAR ACCESS PORT: Primary | ICD-10-CM

## 2019-08-12 DIAGNOSIS — N18.30 ANEMIA DUE TO STAGE 3 CHRONIC KIDNEY DISEASE (HCC): ICD-10-CM

## 2019-08-12 DIAGNOSIS — Z79.01 LONG TERM CURRENT USE OF ANTICOAGULANT: ICD-10-CM

## 2019-08-12 DIAGNOSIS — Z51.12 ENCOUNTER FOR MONOCLONAL ANTIBODY TREATMENT FOR MALIGNANCY: ICD-10-CM

## 2019-08-12 DIAGNOSIS — D50.0 IRON DEFICIENCY ANEMIA DUE TO CHRONIC BLOOD LOSS: ICD-10-CM

## 2019-08-12 DIAGNOSIS — I48.20 CHRONIC ATRIAL FIBRILLATION (HCC): ICD-10-CM

## 2019-08-12 DIAGNOSIS — D61.818 PANCYTOPENIA (HCC): ICD-10-CM

## 2019-08-12 DIAGNOSIS — Z45.2 ENCOUNTER FOR CARE RELATED TO VASCULAR ACCESS PORT: ICD-10-CM

## 2019-08-12 DIAGNOSIS — C82.93 FOLLICULAR LYMPHOMA OF INTRA-ABDOMINAL LYMPH NODES, UNSPECIFIED FOLLICULAR LYMPHOMA TYPE (HCC): Primary | ICD-10-CM

## 2019-08-12 DIAGNOSIS — Z51.81 ENCOUNTER FOR MONITORING COUMADIN THERAPY: ICD-10-CM

## 2019-08-12 DIAGNOSIS — R31.0 GROSS HEMATURIA: ICD-10-CM

## 2019-08-12 DIAGNOSIS — Z79.01 ENCOUNTER FOR MONITORING COUMADIN THERAPY: ICD-10-CM

## 2019-08-12 PROBLEM — D63.8 ANEMIA OF CHRONIC DISEASE: Status: ACTIVE | Noted: 2019-08-12

## 2019-08-12 PROBLEM — I48.91 ATRIAL FIBRILLATION: Status: ACTIVE | Noted: 2019-08-12

## 2019-08-12 PROBLEM — N18.9 ANEMIA DUE TO CHRONIC KIDNEY DISEASE: Status: ACTIVE | Noted: 2019-08-12

## 2019-08-12 LAB
BASOPHILS NFR BLD AUTO: ABNORMAL % (ref 0–1.5)
DEPRECATED RDW RBC AUTO: 57.4 FL (ref 37–54)
EOSINOPHIL # BLD AUTO: 0.13 10*3/MM3 (ref 0–0.4)
EOSINOPHIL NFR BLD AUTO: 2.2 % (ref 0.3–6.2)
ERYTHROCYTE [DISTWIDTH] IN BLOOD BY AUTOMATED COUNT: 18.5 % (ref 12.3–15.4)
HCT VFR BLD AUTO: 37.1 % (ref 37.5–51)
HGB BLD-MCNC: 12.1 G/DL (ref 13–17.7)
INR PPP: 1.9
LYMPHOCYTES # BLD AUTO: 0.96 10*3/MM3 (ref 0.7–3.1)
LYMPHOCYTES NFR BLD AUTO: 16.1 % (ref 19.6–45.3)
MCH RBC QN AUTO: 28.5 PG (ref 26.6–33)
MCHC RBC AUTO-ENTMCNC: 32.6 G/DL (ref 31.5–35.7)
MCV RBC AUTO: 87.5 FL (ref 79–97)
MONOCYTES # BLD AUTO: 0.6 10*3/MM3 (ref 0.1–0.9)
MONOCYTES NFR BLD AUTO: 10.1 % (ref 5–12)
NEUTROPHILS NFR BLD AUTO: ABNORMAL % (ref 42.7–76)
PLATELET # BLD AUTO: 103 10*3/MM3 (ref 140–450)
PMV BLD AUTO: 10 FL (ref 6–12)
PROTHROMBIN TIME: 22.9 SECONDS (ref 11–15)
RBC # BLD AUTO: 4.24 10*6/MM3 (ref 4.14–5.8)
WBC NRBC COR # BLD: 5.97 10*3/MM3 (ref 3.4–10.8)

## 2019-08-12 PROCEDURE — 96523 IRRIG DRUG DELIVERY DEVICE: CPT

## 2019-08-12 PROCEDURE — 85610 PROTHROMBIN TIME: CPT | Performed by: INTERNAL MEDICINE

## 2019-08-12 PROCEDURE — 85025 COMPLETE CBC W/AUTO DIFF WBC: CPT | Performed by: NURSE PRACTITIONER

## 2019-08-12 PROCEDURE — 99214 OFFICE O/P EST MOD 30 MIN: CPT | Performed by: INTERNAL MEDICINE

## 2019-08-12 RX ORDER — SODIUM CHLORIDE 0.9 % (FLUSH) 0.9 %
10 SYRINGE (ML) INJECTION AS NEEDED
Status: CANCELLED | OUTPATIENT
Start: 2019-08-12

## 2019-08-12 RX ORDER — SODIUM CHLORIDE 0.9 % (FLUSH) 0.9 %
10 SYRINGE (ML) INJECTION AS NEEDED
Status: DISCONTINUED | OUTPATIENT
Start: 2019-08-12 | End: 2019-08-13 | Stop reason: HOSPADM

## 2019-08-12 RX ADMIN — Medication 30 ML: at 09:36

## 2019-08-12 RX ADMIN — HEPARIN 500 UNITS: 100 SYRINGE at 11:04

## 2019-08-12 NOTE — PROGRESS NOTES
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 fib on Coumadin        HISTORY OF PRESENT ILLNESS:   1. 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   • CT abdomen and pelvis done 11/4/15 that showed a new retroperitoneal tissue between the aorta and IVC measuring 2.6 x 2.2 cm, extending from the level of origin of the superior mesenteric artery. The differential diagnosis included lymphoma as well as atypical retroperitoneal fibrosis. Metastatic adenopathy was also in the differential as well as amyloidosis. Recommending a PET CT for correlation. There was non-specific prostate enlargement and coronary artery and aortoiliac atherosclerosis. On 3/8/16 he saw Dr. Pedro Purvis in follow-up. He was complaining of leg swelling and insomnia. He was able to go to sleep but would wake up a few hours after sleeping and could not get back to sleep. He also reported a metallic taste in his mouth. Bilateral lower extremity Dopplers were performed that were negative for DVT. A CT abdomen and pelvis was ordered and performed on 3/8/16 that showed a retroperitoneal mass extending dorsal to the aorta on the left and right side, obscuring the periaortic fat, transversing caudally along the psoas muscles. The mass caused left-sided obstructive uropathy just caudal to the ureteropelvic junction. Differential diagnosis included lymphoma, metastasis or primary sarcoma. There were no other soft tissue masses throughout the abdomen. There was an enlarged prostate with central lucent lesion, possibly due to a TURP  defect. The mass measured 4.2 x 1.9 cm on transaxial on the right periaortic retroperitoneum. On the left periaortic retroperitoneum it measured 3.8 x 2.8 cm. He was seen by Dr. Briseno on 3/16/16. Urinalysis was negative for blood, nitrates and leukocytes. He was diagnosed with BPH with LUTS and hydronephrosis with plan to perform a cysto with stent placement soon.   • 3/17/16 - Patient seen for initial consultation regarding enlarging retroperitoneal mass at [facility]. Quantitative hCG <0.1 (<2.0). ESR 45 (<21). AFP 4 (0-9). Chest x-ray with linear opacity right middle lobe. Question of atelectasis or scar and cardiomegaly with changes of CABG. PSA 6.37 (0-4).  Result sent to Dr. Briseno.   • 4/2/16 - CT head had a focal hyperdense area in the left cerebral hemisphere representing acute ischemia.   • 4/5/16 - CT chest, abdomen and pelvis done during hospitalization at [facility] revealed no findings of primary or metastatic disease in the chest. Dilated main pulmonary artery. Mild ascending thoracic aortic ectasia measuring up to 4.1 cm. Bilateral ureteral stents. The proximal left ureteral stent may be located within the renal parenchyma or perhaps anterior calyx. Possibly mild left hydronephrosis. Abnormal periaortic soft tissue mass ascending from the renal vessels through the iliac bifurcation.   • 4/14/16 - Patient did not have CT-guided biopsy of retroperitoneal mass done as he was hospitalized at [facility] with a stroke.   • 5/2/16 - Chest x-ray with cardiomegaly and chronic changes about the chest. Recording time device overlying the left side of the chest.   • 5/17/16 - Patient did not want to schedule biopsy of the lymph nodes. Patient claims not to want to go for CT-guided retroperitoneal mass biopsy, but would proceed with a bone marrow biopsy.   • 5/27/16 - Bone marrow aspiration and biopsy with adequate normocellular bone marrow (35%) showing trilineage  hematopoiesis, a moderate plasma cytosis, and increased iron stores. Flow cytometry revealed a decreased CD-4, CD-8 helper suppressor T-cell ratio suggesting viral infection or immunocompromise. Cytogenetics revealed normal karyotype. Plasma cells were increased to 12-15%. Copper 141 ().  • 6/9/16 - WBC 4.23, hemoglobin 10.1, MCV 85.9, platelet count 152,000.   • 7/7/16 - Patient has agreed to have a CT-guided retroperitoneal mass biopsy which I am concerned may be a plasma cytoma. Beta-2 microglobulin 3.72 (<2.16).    • 8/2/16 - CT abdomen and pelvis with significant progression of extensive infiltrative retroperitoneal process. New left ureteral stent with mild residual caliectasis on the left with new right hydronephrosis due to partial obstruction of the right ureter. Enlarged prostate.   • 12/9/16 - Patient underwent cystoscopy with bilateral stent exchange and bilateral retrograde pyelogram by Ganesh Briseno M.D.   • 1/13/17 - Chest x-ray with mild cardiac enlargement with borderline pulmonary venous redistribution , unchanged, with chronic elevation or eventration of the right hemidiaphragm.   • 1/17/17 - Patient was hospitalized at [facility] between 1/17/17 and 1/25/17 with right scrotal swelling. Stool Hemoccult was negative. Hemoglobin 8.9, MCV 79.2. IV diuretics were given. Cardiology and Urology consultations were obtained. He then underwent a core needle biopsy of the retroperitoneal mass while holding Coumadin, Aspirin and Plavix. Bilateral lower extremity Dopplers were negative for DVT. CT of the abdomen and pelvis had extensive confluent soft tissue density in the retroperitoneum, increased in size from previous study. In addition, there was some iliac adenopathy, especially on the right. Bilateral double pigtail ureteral stents were in good position without hydronephrosis. Extensive soft tissue stranding in the subcutaneous sub mesenteric fat was present.   • 1/18/17 - Echocardiogram with pulmonary  artery systolic pressure 60-65 mmHg. Moderate pulmonary hypertension. Left ventricular systolic function normal with EF of 55-60%. LV diastolic dysfunction noted. Transmitral spectral Doppler flow pattern suggestive of restrictive physiology with moderate mitral regurgitation.   • 1/23/17 - Patient underwent ultrasound-guided fine needle aspiration of retroperitoneal lymph node at [facility] by Stephen Ricci M.D. with pathology revealing friable adipose tissue with prominent infiltrative lymphoid cell population suspicious for lymphoma. Focal areas demonstrated vague follicle formations. Crush artifact was present. Flow cytometry revealed low viability and hypocellular specimen which failed to reveal a significant B-cell or monoclonal B-cell population. The majority of the lymphocytes were negative for lambda and kappa light chains. The impression was B-cell lymphoma favoring follicle center cell lymphoma and final report was supposed to be pending outside expert consultation.   • 1/30/17 - Retroperitoneal lymph node biopsy specimen was reviewed by Lenny Johnson M.D. and classified as B-cell lymphoma. It was somewhat difficult to classify given the architectural distortion as well as the crusted areas of the tumor. It was thought that at least a follicular lymphoma with areas of diffuse fat was present. There was concern of areas of large cell lymphoma, particularly in the adipose tissue.   • 2/3/17 - Patient returns after a seven month absence. Had to cancel his scheduled lymph node biopsies until his last hospitalization at [facility] where he was not seen by us in consultation. Diagnosis discussion had with patient in detail. Though he may have a higher grade lymphoma than follicular given his age and cardiac status, he is not going to be able to tolerate more aggressive chemotherapy. Will hence treat it as a follicular lymphoma and then follow him for response in hopes of palliating him and decreasing his total swelling.  Chemotherapy orders written for Rituximab 375 mg/M2 IV day 1 and Bendamustine 70 mg/M2 IV piggyback days 1 and 2, repeat q 4 weeks.   • 2/13/17 - PET scan with hypermetabolic enlarged retroperitoneal lymph nodes increased in size since March 2016. Retrocrural lymph nodes along the iliac chains bilaterally were also hypermetabolic and a single hypermetabolic lymph node in the paratracheal location in the chest. Cardiomegaly with small pleural effusions and severe coronary artery atherosclerotic calcifications. Bilateral ureteral stents with mild to moderate hydronephrosis. Moderate amount of stool within the colon, questioning constipation. Diffuse anasarca.   • 2/16/17 - Patient underwent right IJ port insertion by Stephen Ricci M.D. in IR under fluoro guidance.   • 2/17/17 - Hepatitis B/C non-reactive.   • 2/21/17 - Potassium 3.3. Patient asked to increase dietary potassium intake. Uric acid 8.3 (4.8-8.7). Patient developed upper abdominal pain shortly after starting Rituxan infusion with stable vital signs and examination. Was taking Zantac at home. Patient given Pepcid 40 mg IV and Decadron 10 mg IV prior to resuming Rituxan.  Patient received cycle 1 chemotherapy.   • 3/1/17 -  (). ESR 28 (<21). Patient complains of insomnia. Ambien 5 mg p.o. q.h.s. p.r.n. prescribed. CMP significant for calcium 8.7 (L), albumin 3.3 (L), creatinine 1.2 (N). Uric acid elevated at 9.3 (H).   • 3/2/17 - Patient started on Allopurinol 100 mg by mouth daily for hyperuricemia.   • 3/22/17 - The patient started cycle 2 day 1 of Rituxan and Bendamustine.   • 5/23/17 - Uric acid 6.9 (4.8-8.7), ESR 68 (<21).     • 6/14/17 - Comprehensive metabolic panel with no significant abnormality. Cycle 5 chemotherapy given.   • 6/28/17 - INR 2.6 on 7.5 alternating with 10 mg of Warfarin being managed by Dr. Purvis. Patient complains of dry throat and dysuria.   • 3/29/17 - WBC 4.71, hemoglobin 9.9, MCV 76.9, platelets 146,000,  ANC 3.6. Patient is scheduled for a CT scan of the chest, abdomen and pelvis with p.o. contrast only on 4/19/17. Orders written to continue chemotherapy. Patient instructed to continue Allopurinol daily and Ambien as needed. Uric acid level ordered. Uric acid 6.1 (4.8-8.7).    • 4/19/17 - Comprehensive metabolic panel with no significant abnormality. CT chest, abdomen and pelvis with 16 mm lymph node seen on recent PET CT decreased to 12 mm but pretracheal lymph node increased to 14 mm. Mild atelectasis in the right middle lobe with mild scarring change in the right lower lobe medially. Decreasing lymphadenopathy in the abdomen and pelvis. Marked adenopathy at the level of the renal hilum measuring 11.4 x 5.5 down to 10 x 4.5 cm. Bilateral renal stents. Cycle 3 chemotherapy given. Patient’s Coumadin dose is being adjusted by Dr. Soni. He has an appointment with Dr. Briseno in follow-up of hematuria. Claims not to be gaining weight and advised taking Ensure or Boost.   • 5/17/17 - Received cycle 4 Bendamustine and Rituxan.   • 5/18/17 - White blood cell count 8.7, hemoglobin 9.1, MCV 78.1 and platelets 237,000. Weight loss of 18 pounds in five weeks.   • 5/23/17 - White count 4.48, hemoglobin 8.9, MCV 77.1 and platelet count 199,000. Allopurinol planned to discontinue if uric acid <6.0. ESR, CMP and uric acid ordered. Megace 200 mg p.o. daily ordered. Ferritin 90 ().    • 6/14/17 - Creatinine 1.2 (0.7-1.2).  Cycle 5 chemotherapy given.   • 6/28/17 - WBC 5.5, hemoglobin 8.8, MCV 74.5, platelet count 190,000.   • 7/12/17 - Chemotherapy held one week. ANC 0.48. Ciprofloxacin 500 mg b.i.d. prescribed for five days. Neutropenic precautions reviewed with the patient and reinforced.   • 7/17/17 - Potassium increased from 10 mEq daily to 10 mEq b.i.d.   • 7/19/17 - Weight loss of 7 pounds in one month. Persistent neutropenia. WBC 2.4, ANC 0.3, platelet count 136,000, hemoglobin 8.7, MCV 77.8. EKG performed for  irregular heartbeat. Reviewed neutropenic precautions. Ciprofloxacin refilled 500 mg b.i.d. x1 week. Leukopenia and thrombocytopenia workup ordered. Chemotherapy on hold for one week. Magnesium 2.0 (1.8-2.5), potassium 3.3 (3.6-5.1). Oral potassium supplementation increased. Platelet antibodies not detected. EBV IgM <0.2 (<0.9), EBV IgG >8 (<0.9). CMV IgM 0.35 (<0.91), CMV IgG 17.3 (<0.9). Neutrophil-associated antibody negative.        • 7/24/17 - PET scan with interval mixed treatment response with marked improvement in retroperitoneal adenopathy with metabolic activity just slightly higher than background. Interval marked increase in mediastinal adenopathy and development of bilateral hilar adenopathy.   • 7/26/17 - Order written to discontinue current chemotherapy because of progressive disease in chest and consultation obtained with CTS for mediastinal lymph node biopsy. ESR 37 (<21). Comprehensive metabolic panel with potassium 3.2. Potassium supplementation increased orally. Creatinine 1.3 (0.7-1.2), uric acid 6 (4.8-8.7).      • 8/10/17 - Patient underwent a mediastinoscopy by Donald Islas M.D. at [facility] with pathology of lymph node R4 revealing portions of benign lymph node with prominent anthracotic pigmented histocytes and focal hyalinized fibrosis. Special stain for acid-fast bacilli and fungal organisms was negative.   • 8/30/17 - Discussed options with the patient. Ideally would like to do six cycles of chemotherapy and then put him on maintenance Rituxan. However, cannot proceed because of low blood counts. Patient has not had INR’s in followup of AFib. Will have it drawn and sent to Dr. Bustamante for adjustment. Will check leukopenia workup and plan on maintenance Rituxan after improvement in blood counts. Comprehensive metabolic panel with potassium 3.5 (3.6-5.1).   • 9/6/17 - Patient hospitalized at [facility] between 9/6/17 and 9/15/17 with urosepsis. Chest x-ray had stable  cardiomegaly and postsurgical changes with stable elevation of the right hemidiaphragm with overlying scarring or atelectasis. CT scan of the abdomen and pelvis had bilateral ureteral stents, retroperitoneal soft tissue masses/adenopathy significantly decreased in size compared to January 2017, large stool burden throughout the colon, small volume of pelvic ascites, enlarged prostate gland, extensive atherosclerotic calcification and advanced multilevel lumbar disc degermation and facet arthropathy. Patient underwent cystoscopy with bilateral stent change by Yao Woods M.D. and was seen in consultation by Bimal Saenz M.D.   • 9/19/17 - Order written for Rituxan 375 mg/M2 IV q.2 months for two years as maintenance treatment and for monthly port flushes. BMP without clinical significance. Uric acid 5.4 (4.8-8.7).    • 10/11/17 - Patient began cycle 1 of maintenance dose Rituxan.   • 12/6/17 - Cycle 2 maintenance Rituxan given.   • 12/7/17 - Patient only taking one potassium pill a day. Asked to increase to twice a day due to hypokalemia. INR’s being managed by Pedro Purvis M.D. with AFib.   • 1/31/18 - CT chest with stable appearance of right paratracheal and bilateral hilar adenopathy.  CT scan of abdomen and pelvis showed stable appearance of extensive retroperitoneal stranding related to an adenopathy that encases the infrarenal aorta without significant change from prior exams.  Bilateral nephroureteral stents were in place.  Mild prostatomegaly was present.     • 2/6/18 - Cycle 3 Rituximab given.   • 4/3/18 - Patient received cycle 4 maintenance Rituximab.   • 4/9/18 - ESR 16 (0-20).    • 6/4/18 - Cycle 5 maintenance Rituximab administered.   • 7/30/18 - Cycle 6 maintenance Rituximab administered.   • 9/24/18 - Cycle 7 maintenance Rituximab initiated.   • 10/18/18 - CT chest, abdomen and pelvis without contrast showed stable appearance of paratracheal and bilateral hilar adenopathy and paratracheal  lymph node measured 10 mm. The report stated evaluation of hilar lymph nodes was suboptimal but lymphadenopathy appeared normal. There was stable appearance of the retroperitoneal stranding that encases the abdominal aorta and inferior vena cava felt related to lymphadenopathy and stable. Bilateral ureteral stents appeared in good position.   • 11/26/18 - Cycle 8 maintenance Rituximab initiated. Hepatitis B core antibody and hepatitis B surface antigen both non-reactive. Hepatitis C antibody was positive with hepatitis CRNA <15 not detected.   • 12/12/18 - ESR 18 (0-20).    • 1/21/19 - Patient received cycle 9 maintenance Rituximab.   • 3/18/19 - Cycle 10 Rituxan maintenance initiated.   • 5/21/19 - Received cycle 11 Rituxan.   • 6/11/19 - Uric acid 6.5 (4.8-8.7).  (). Sed rate 13 (0-20).   • 7/8/19-Cycle 12 Rituximab.  • 7/15/19 - CT scan chest, abdomen and pelvis showed mildly prominent bilateral hilar mediastinal lymph nodes unchanged since 10/18/2018 and there is no evidence of new or progressive metastatic disease in the chest. Irregular retroperitoneal soft tissue stranding in the abdomen and pelvis which may represent features related to patient's treated lymphoma. No evidence of disease progression in abdomen or pelvis since the 10/18/2018 examination.    • 8/12/19 -Rituxan discontinued due to completion of planned course.     2. Anemia diagnosed March 2016. Iron deficiency anemia diagnosed February 2017.   • CBC showed a white blood cell count of 4.8, hemoglobin 11.8, MCV 88.2, RDW 15.6 (H), platelet count 146,000. Creatinine 1.31 (H). LFT’s normal. Stool heme negative.  • 3/17/16 - Vitamin B12 of 228 (180-914), folate 21.2 (5.9-24.8), erythropoietin 13.65 (2.59-18.5). Serum protein electrophoresis with normal pattern.  (), ferritin 147 (), iron 36 (), TIBC 291 (228-428), retic count 0.69 (0.5-1.5), haptoglobin 158 ().         • 4/5/16 - Labs drawn during  hospitalization at [facility]: Vitamin B12 of 265 (180-914), serum iron 11 (). Hemoglobin 10.9. Creatinine 1.0 (0.6-1.3).      • 4/14/16 - WBC 5.61, hemoglobin 11.6, MCV 84.5, platelet count 186,000.   • 5/17/16 - WBC 4.5, hemoglobin 11.1, MCV 87.5, platelet count 150,000. Hemoglobin electrophoresis with normal pattern.   • 5/27/16 - Bone marrow aspiration and biopsy with adequate normocellular bone marrow (35%) showing trilineage hematopoiesis, a moderate plasma cytosis, and increased iron stores. Flow cytometry revealed a decreased CD-4, CD-8 helper suppressor T-cell ratio suggesting viral infection or immunocompromise. Cytogenetics revealed normal karyotype. Plasma cells were increased to 12-15%. Copper 141 ().   • 6/9/16 - HFE gene with no mutation.   • 6/10/16 - Serum immunofixation with no monoclonal gammopathy identified. Kappa/lambda FLC ratio 17.38 (0.26-1.65). Comprehensive metabolic panel with no significant abnormality. Creatinine 1.2 (0.7-1.2).     • 7/7/16 - Beta-2 microglobulin 3.72 (<2.16).    • 7/28/16 - Skeletal survey with no suspicious lytic or blastic bony lesions identified. Right ureteral stent migrated and coiled within the urinary bladder. Degenerative changes as noted above. Patient referred to his urologist.   • 2/3/17 - WBC 5.7, hemoglobin 10.2, MCV 78.6, platelet count 237,000. Ferrous Sulfate 325 mg p.o. b.i.d. started. Ferritin 27 (), iron 20 (), TIBC 363 (228-428).      • 3/1/17 - Patient not taking oral iron supplements. WBC 5, hemoglobin 9.7, MCV 77.2, platelet count 164,000. Ferrous Sulfate 325 mg p.o. b.i.d. prescribed.   • 3/29/17 - Hemoglobin 9.9, MCV 76.9. Patient instructed to continue Iron Sulfate 325 mg b.i.d. Patient instructed to followup with urologist regarding hematuria.   • 4/25/17 - WBC 3.6 with 75% neutrophils, 6% lymphocytes, 14% monocytes, hemoglobin 9.6, MCV 77.4, platelet count 149,000. Hemoglobin electrophoresis normal with  98% hemoglobin A.   • 5/23/17 - White count 4.48, hemoglobin 8.9, MCV 77.1 and platelet count 199,000. Ferrous Sulfate 325 mg p.o. b.i.d. ordered. Stool cards ordered. Ferritin ordered.   • 7/19/17 - WBC 2.4, ANC 0.3, platelet count 136,000, hemoglobin 8.7, MCV 77.8. Iron 28 (), TIBC 298 (228-428), iron saturation 9 (20-50), ferritin 205 (), folate 14 (5.9-24.8), creatinine 1.2 (0.7-1.2). Iron saturation 9 (20-50).          • 7/26/17 - WBC 2.5 with 14% neutrophils, 62% lymphocytes, 23% monocytes, hemoglobin 8.9, MCV 75.7, RDW 20.6 and platelet count 151,000. Orders written for Injectafer 750 mg IV days 1 and 8.   • 8/2/17 - Injectafer 750 mg IV given. Hemoglobin 8.6.   • 8/30/17 - Hemoglobin electrophoresis with normal pattern. Vitamin B12 of 597 (211-911).  Creatinine 1.2 (0.7-1.2).    • 9/19/17 - Order written for Procrit 30,000 units subq q.2 weeks for ACD secondary to CKD Stage III. Iron 32 low (), TIBC 297 normal (228-428), percentage saturation 11 low (20-50). Erythropoietin 32.3 high (2.59-18.50). Uric acid 5.4 normal (4.8-8.7).   • 10/4/17 - Patient received cycle 1 day 8 of Injectafer 750 mg IV.   • 10/24/17 - WBC 2.0, hemoglobin 10.3, MCV 86.0, platelet count 133,000.       • 10/31/17 - Iron 41 (), TIBC 281 (228-428), iron saturation 15 (20-50), ferritin 440 ().   • 12/7/17 - Procrit order discontinued as hemoglobin running above 11 post IV iron.    • 1/3/18 - Patient reports taking one Ferrous Sulfate 325 mg tablet daily. Hemoglobin 12.5, MCV 91.7. Reports hematuria. Referred for followup with Dr. Woods. Creatinine 1.6 (0.7-1.2).    • 6/4/18 - Creatinine 1.6 (0.7-1.2).    • 4/8/19 - Patient reports continued hematuria. He has not seen Dr. Briseno since last stent change in approximately November 2018. Hemoglobin dropping to 9.7 today. Anemia labs sent. INR subtherapeutic at 1.4. Patient instructed to contact Dr. Purvis today regarding dosing. Expressed patient concerns  regarding change of Coumadin dose with continued hematuria and worsening anemia and advised patient to followup with Dr. Purvis about Coumadin dosing. Will fax today’s CBC and INR with notation regarding drop in hemoglobin to Dr. Purvis. Reviewed note from Dr. Burns, cardiologist, from 2/18/19 visit where Dr. Burns reported possible Watchman procedure should hematuria continue. SPEP normal. Haptoglobin 120 (H). B12 was 1031 (H). Iron 22 (L),  (N), iron saturation 5 (L), ferritin 17 (L), folate 14.3 (N), retic 1.01 (N). Ordered to receive Injectafer 750 day 1 and day 8.   • 4/17/19 - Received day 1 Injectafer.   • 4/24/19 - Received day 8 Injectafer.   • 5/13/19 - Cystoscopy per Dr. Briseno for bilateral hydronephrosis due to ureteral obstruction. Bilateral ureteral stents were exchanged. Creatinine 1.6 (H).     3.   Pancytopenia diagnosed in July 2017.   • 7/5/17 - WBC 3.1, hemoglobin 8.2, platelet count 147,000.   • 7/19/17 - Iron 28 (), TIBC 298 (228-428), ferritin 205 (), folate 14 (5.9-24.8), creatinine 1.2 (0.7-1.2).        • 8/30/17 - WBC 1.4 with 33% neutrophils, 45% lymphocytes, 16% monocytes, hemoglobin 10.1, MCV 79.7, platelet count 129,000. EBV capsid antibody IgM 0.3 (<0.9), EBV capsid antigen antibodies/IgG >8 (<0.9). CMV antibody IgM 0.45 (<0.91), CMV antibody IgG 19.8 (<0.9).  Neutrophil-associated antibody negative.   • 9/19/17 - WBC 2.8 with 58% neutrophils, 28% lymphocytes, 11% monocytes, hemoglobin 9.8, MCV 85.8, platelet count 220,000. ANNE screen negative. Erythropoietin 32.3 high (2.59-18.50). Iron 32 low (), TIBC 297 normal (228-428), percentage saturation 11 low (20-50). BMP without clinical significance. ANNE screen negative.   • 10/24/17 -WBC 2.0 with 24% neutrophils, 62% lymphocytes, 10% monocytes, hemoglobin 10.3, platelet count 133,000.     4.  Atrial fibrillation on long-term Coumadin (warfarin).      · 8/12/2019.  Patient requesting INR and  Coumadin dosing to be followed through the cancer center.    Past Medical History:   Diagnosis Date   • Asthma    • COPD (chronic obstructive pulmonary disease) (CMS/HCC)    • Coronary heart disease     CABG   • CVA (cerebral vascular accident) (CMS/[facility])     recent CVA   • Gout    • Hypertension        Past Surgical History:   Procedure Laterality Date   • CARDIAC CATHETERIZATION  2016    BHF   • CORONARY ARTERY BYPASS GRAFT      CABG X3, reoperation, 09; CAB and    • OTHER SURGICAL HISTORY  2016    loop recorder implant          Current Outpatient Medications:   •  allopurinol (ZYLOPRIM) 100 MG tablet, Take 100 mg by mouth Daily., Disp: , Rfl:   •  atorvastatin (LIPITOR) 20 MG tablet, Take 20 mg by mouth Every Night., Disp: , Rfl:   •  bumetanide (BUMEX) 1 MG tablet, Take 1 mg by mouth Daily., Disp: , Rfl:   •  CloNIDine (CATAPRES) 0.1 MG tablet, Take 0.1 mg by mouth Every 6 (Six) Hours., Disp: , Rfl:   •  cloNIDine (CATAPRES-TTS) 0.1 MG/24HR patch, Place 1 patch on the skin as directed by provider 1 (One) Time Per Week., Disp: , Rfl:   •  clopidogrel (PLAVIX) 75 MG tablet, Take 75 mg by mouth Daily., Disp: , Rfl:   •  hydrALAZINE (APRESOLINE) 50 MG tablet, Take 50 mg by mouth 4 (Four) Times a Day., Disp: , Rfl:   •  isosorbide mononitrate (IMDUR) 30 MG 24 hr tablet, Take 30 mg by mouth Daily., Disp: , Rfl:   •  metoprolol tartrate (LOPRESSOR) 25 MG tablet, Take 12.5 mg by mouth 2 (Two) Times a Day., Disp: , Rfl:   •  potassium chloride (K-DUR) 10 MEQ CR tablet, Take 10 mEq by mouth 3 (Three) Times a Day., Disp: , Rfl:   •  raNITIdine (ZANTAC) 150 MG tablet, Take 150 mg by mouth Daily., Disp: , Rfl:   •  warfarin (COUMADIN) 1 MG tablet, Take 1 mg by mouth Daily., Disp: , Rfl:   •  zolpidem (AMBIEN) 5 MG tablet, TAKE 1 TABLET BY MOUTH EVERY DAY AT BEDTIME AS NEEDED, Disp: 30 tablet, Rfl: 0  No current facility-administered medications for this visit.     Facility-Administered Medications  Ordered in Other Visits:   •  heparin flush (porcine) 100 UNIT/ML injection 500 Units, 500 Units, Intravenous, PRN, Monalisa, Carmen, APRN  •  sodium chloride 0.9 % flush 10 mL, 10 mL, Intravenous, PRN, Monalisa, Carmen, APRN, 30 mL at 08/12/19 0936    No Known Allergies    Family History   Problem Relation Age of Onset   • Stroke Other        Cancer-related family history is not on file.    Social History     Tobacco Use   • Smoking status: Never Smoker   • Smokeless tobacco: Never Used   Substance Use Topics   • Alcohol use: No     Frequency: Never   • Drug use: No       I have reviewed the history of present illness, past medical history, family history, social history, lab results, all notes and other records since the patient was last seen on 2/13/19.    SUBJECTIVE: Hematuria remains present but is somewhat less than had been with prior visits.  He has a follow-up appointment with Dr. Briseno in November.  He is not sure the date of the last colonoscopy or EGD but was done through [facility].  He states he had inform Dr. Purvis that would like warfarin dosing managed through our office.  His appetite is very good.          REVIEW OF SYSTEMS:  Review of Systems   Constitutional: Negative for activity change, appetite change, chills, diaphoresis, fatigue, fever and unexpected weight change.   HENT: Negative for dental problem, ear pain, mouth sores, nosebleeds, postnasal drip, rhinorrhea and sore throat.    Eyes: Negative for photophobia and visual disturbance.   Respiratory: Negative for cough, chest tightness, shortness of breath, wheezing and stridor.    Cardiovascular: Negative for chest pain, palpitations and leg swelling.   Gastrointestinal: Negative for abdominal distention, abdominal pain, anal bleeding, blood in stool, constipation, diarrhea, nausea and vomiting.   Endocrine: Negative for cold intolerance and heat intolerance.   Genitourinary: Positive for hematuria. Negative for difficulty urinating, dysuria  "and frequency.   Musculoskeletal: Positive for gait problem. Negative for joint swelling and neck stiffness.   Skin: Negative for color change and rash.   Neurological: Negative for dizziness, seizures, syncope, weakness and light-headedness.   Hematological: Negative for adenopathy. Bruises/bleeds easily.        No obvious bleeding   Psychiatric/Behavioral: Negative for agitation, confusion and hallucinations.   All other systems reviewed and are negative.      OBJECTIVE:    Vitals:    08/12/19 0939   BP: 162/73   Pulse: 61   Resp: 18   Temp: 98 °F (36.7 °C)   Weight: 80.2 kg (176 lb 12.8 oz)   Height: 170.2 cm (67\")   PainSc: 0-No pain       ECOG  (1) Restricted in physically strenuous activity, ambulatory and able to do work of light nature    Physical Exam   Constitutional: He is oriented to person, place, and time. He appears well-developed and well-nourished. No distress.   HENT:   Head: Normocephalic and atraumatic.   Nose: Nose normal.   Mouth/Throat: Oropharynx is clear and moist. No oropharyngeal exudate.   dental fillings   Eyes: Conjunctivae and EOM are normal. Pupils are equal, round, and reactive to light. Right eye exhibits no discharge. Left eye exhibits no discharge. No scleral icterus.   Neck: Normal range of motion. Neck supple. No thyromegaly present.   Cardiovascular: Normal rate, normal heart sounds and intact distal pulses. An irregular rhythm present. Exam reveals no gallop and no friction rub.   No murmur heard.  Pulmonary/Chest: Effort normal and breath sounds normal. No stridor. No respiratory distress. He has no wheezes. He has no rales.   Right chest wall port.  Local midline chest wall scar present.   Abdominal: Soft. Bowel sounds are normal. He exhibits no distension and no mass. There is no tenderness. There is no rebound and no guarding.   Musculoskeletal: Normal range of motion. He exhibits edema ( Trace bilateral ankle). He exhibits no tenderness. Deformity:  Arthritic changes in " the absence of second digit on right hand.   Lymphadenopathy:     He has no cervical adenopathy.   Neurological: He is alert and oriented to person, place, and time. He exhibits normal muscle tone. Coordination ( Ambulance with the assistance of a cane) abnormal.   Skin: Skin is warm and dry. Capillary refill takes less than 2 seconds. No rash noted. He is not diaphoretic. No erythema. No pallor.   Psychiatric: He has a normal mood and affect. His behavior is normal.   Nursing note and vitals reviewed.      RECENT LABS  WBC   Date Value Ref Range Status   08/12/2019 5.97 3.40 - 10.80 10*3/mm3 Final     RBC   Date Value Ref Range Status   08/12/2019 4.24 4.14 - 5.80 10*6/mm3 Final     Hemoglobin   Date Value Ref Range Status   08/12/2019 12.1 (L) 13.0 - 17.7 g/dL Final     Hematocrit   Date Value Ref Range Status   08/12/2019 37.1 (L) 37.5 - 51.0 % Final     MCV   Date Value Ref Range Status   08/12/2019 87.5 79.0 - 97.0 fL Final     MCH   Date Value Ref Range Status   08/12/2019 28.5 26.6 - 33.0 pg Final     MCHC   Date Value Ref Range Status   08/12/2019 32.6 31.5 - 35.7 g/dL Final     RDW   Date Value Ref Range Status   08/12/2019 18.5 (H) 12.3 - 15.4 % Final     RDW-SD   Date Value Ref Range Status   08/12/2019 57.4 (H) 37.0 - 54.0 fl Final     MPV   Date Value Ref Range Status   08/12/2019 10.0 6.0 - 12.0 fL Final     Platelets   Date Value Ref Range Status   08/12/2019 103 (L) 140 - 450 10*3/mm3 Final     Neutrophil Rel %   Date Value Ref Range Status   02/16/2017 64 50 - 75 % Final     Lymphocyte %   Date Value Ref Range Status   08/12/2019 16.1 (L) 19.6 - 45.3 % Final     Monocyte %   Date Value Ref Range Status   08/12/2019 10.1 5.0 - 12.0 % Final     Eosinophil %   Date Value Ref Range Status   08/12/2019 2.2 0.3 - 6.2 % Final     Basophil Rel %   Date Value Ref Range Status   02/16/2017 1 0 - 2 % Final     Neutrophils Absolute   Date Value Ref Range Status   02/16/2017 3.2 2.3 - 8.6 10*3/uL Final      Lymphocytes, Absolute   Date Value Ref Range Status   08/12/2019 0.96 0.70 - 3.10 10*3/mm3 Final     Monocytes, Absolute   Date Value Ref Range Status   08/12/2019 0.60 0.10 - 0.90 10*3/mm3 Final     Eosinophils, Absolute   Date Value Ref Range Status   08/12/2019 0.13 0.00 - 0.40 10*3/mm3 Final     Basophils Absolute   Date Value Ref Range Status   02/16/2017 0.0 0 - 0.2 10*3/uL Final     nRBC   Date Value Ref Range Status   02/16/2017 0 0 /100[WBCs] Final       Lab Results   Component Value Date    GLUCOSE 78 07/08/2019    BUN 30 (H) 07/08/2019    CREATININE 1.50 (H) 07/08/2019    EGFRIFAFRI 54 (L) 07/08/2019    BCR 20.0 07/08/2019    K 3.7 07/08/2019    CO2 23.0 07/08/2019    CALCIUM 9.1 07/08/2019    ALBUMIN 3.90 07/08/2019    LABIL2 1.3 05/21/2019    AST 23 07/08/2019    ALT 12 (L) 07/08/2019     ASSESSMENT:    Assessment/Plan     Follicular lymphoma of intra-abdominal lymph nodes, unspecified follicular lymphoma type (CMS/HCC)  - CBC & Differential  - Comprehensive Metabolic Panel    Pancytopenia (CMS/HCC)    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  - Ferritin    Anemia due to stage 3 chronic kidney disease (CMS/HCC)  - Comprehensive Metabolic Panel    Monitoring of monoclonal antibody treatment for malignancy    Chronic atrial fibrillation (CMS/HCC)  - Protime-INR    Encounter for care related to vascular access port    Gross hematuria, chronic    Encounter for monitoring Coumadin therapy  - Protime-INR    He is completed his final dose of maintenance Rituxan.  Recent scans show no evidence of disease progression or new areas of disease.  His sed rate was normal at the last visit.  LDH was mildly elevated but not significantly so.  He does not exhibit any B symptoms. Uric acid last visit was normal. His hemoglobin had improved after receiving Injectafer and has dropped a little but is overall stable at present.  He does continue to have some hematuria and continues to  follow-up with his urologist.  He is likely overdue for screening colonoscopy and EGD and I have explained the importance of keeping up with the screening test in particular given chronic VALETNIN.  It was remains slightly low but adequate.  Chemistries have remained stable with stable elevation of creatinine.  There is no elevation of LFTs.  He is requesting warfarin monitoring to be done through our office and this will be arranged.  He will continue to have monthly port flushes.      PLAN:  · EGD and colonoscopy by [facility].  · PT/INR today through Coumadin nurse with plan to follow protocol and INR range 2-3.  · Discontinue Rituxan due to completion of planned course.  · CMP and ferritin with next office visit.  · Continue monthly port flushes.       Electronically signed by KIARA Bautista, 08/12/19, 10:31 AM.    I have reviewed labs results, imaging, vitals, and medications with the patient today. Will follow up in 3 months with the nurse practitioner x2 visits.    Patient verbalized understanding and is in agreement of the above plan.    I have personally performed a face-to-face diagnostic evaluation on this patient.  I have discussed the case with Kristy Hooker NP, have edited/reviewed the note,  and agree with the care plan.  The patient claims to be feeling well post completion of his rituximab course.  On exam he does not have any palpable lymphadenopathy and his labs are significant for a mild anemia.  He is in remission of his lymphoma but does need follow-up endoscopies which will be scheduled.        Bowen Glass M.D., F.A.C.P.     Much of the above report is an electronic transcription/translation of the spoken language to printed text using Dragon Software. As such, the subtleties and finesse of the spoken language may permit erroneous, or at times, nonsensical words or phrases to be inadvertently transcribed; thus changes may be made at a later date to rectify these errors.

## 2019-08-15 RX ORDER — ZOLPIDEM TARTRATE 5 MG/1
TABLET ORAL
Qty: 30 TABLET | Refills: 0 | Status: SHIPPED | OUTPATIENT
Start: 2019-08-15 | End: 2020-04-16 | Stop reason: SDUPTHER

## 2019-08-16 DIAGNOSIS — I48.20 CHRONIC ATRIAL FIBRILLATION (HCC): Primary | ICD-10-CM

## 2019-08-19 ENCOUNTER — HOSPITAL ENCOUNTER (OUTPATIENT)
Dept: ONCOLOGY | Facility: HOSPITAL | Age: 84
Discharge: HOME | End: 2019-08-19
Admitting: NURSE PRACTITIONER

## 2019-08-19 ENCOUNTER — LAB (OUTPATIENT)
Dept: LAB | Facility: HOSPITAL | Age: 84
End: 2019-08-19

## 2019-08-19 VITALS
TEMPERATURE: 97.9 F | HEART RATE: 62 BPM | SYSTOLIC BLOOD PRESSURE: 149 MMHG | WEIGHT: 172 LBS | BODY MASS INDEX: 27 KG/M2 | RESPIRATION RATE: 18 BRPM | HEIGHT: 67 IN | DIASTOLIC BLOOD PRESSURE: 75 MMHG

## 2019-08-19 DIAGNOSIS — I48.20 CHRONIC ATRIAL FIBRILLATION (HCC): Primary | ICD-10-CM

## 2019-08-19 DIAGNOSIS — I48.20 CHRONIC ATRIAL FIBRILLATION (HCC): ICD-10-CM

## 2019-08-19 LAB — INR PPP: 2.2

## 2019-08-19 PROCEDURE — 85610 PROTHROMBIN TIME: CPT

## 2019-08-26 ENCOUNTER — HOSPITAL ENCOUNTER (OUTPATIENT)
Dept: ONCOLOGY | Facility: HOSPITAL | Age: 84
Discharge: HOME | End: 2019-08-26
Admitting: NURSE PRACTITIONER

## 2019-08-26 ENCOUNTER — LAB (OUTPATIENT)
Dept: LAB | Facility: HOSPITAL | Age: 84
End: 2019-08-26

## 2019-08-26 VITALS
HEART RATE: 70 BPM | DIASTOLIC BLOOD PRESSURE: 85 MMHG | RESPIRATION RATE: 18 BRPM | BODY MASS INDEX: 27.15 KG/M2 | HEIGHT: 67 IN | WEIGHT: 173 LBS | SYSTOLIC BLOOD PRESSURE: 155 MMHG | TEMPERATURE: 97.6 F

## 2019-08-26 DIAGNOSIS — I48.20 CHRONIC ATRIAL FIBRILLATION (HCC): ICD-10-CM

## 2019-08-26 DIAGNOSIS — I48.20 CHRONIC ATRIAL FIBRILLATION (HCC): Primary | ICD-10-CM

## 2019-08-26 LAB — INR PPP: 2.8

## 2019-08-26 PROCEDURE — 85610 PROTHROMBIN TIME: CPT

## 2019-09-05 ENCOUNTER — TELEPHONE (OUTPATIENT)
Dept: CARDIOLOGY | Facility: CLINIC | Age: 84
End: 2019-09-05

## 2019-09-05 NOTE — TELEPHONE ENCOUNTER
Follow up call made to patient in regards to his loop recorder.  Patient aware that loop recorder has been at [facility].  I asked patient if his loop is causing him any discomfort or if he would like to have loop removed.  Pt states his loop is not bothering him.  Pt states it is alright for the office to deactivate him from home monitor website.

## 2019-09-09 RX ORDER — POTASSIUM CHLORIDE 750 MG/1
TABLET, EXTENDED RELEASE ORAL
Qty: 90 TABLET | Refills: 0 | Status: SHIPPED | OUTPATIENT
Start: 2019-09-09 | End: 2019-11-01 | Stop reason: SDUPTHER

## 2019-09-23 ENCOUNTER — LAB (OUTPATIENT)
Dept: LAB | Facility: HOSPITAL | Age: 84
End: 2019-09-23

## 2019-09-23 ENCOUNTER — HOSPITAL ENCOUNTER (OUTPATIENT)
Dept: ONCOLOGY | Facility: HOSPITAL | Age: 84
Discharge: HOME | End: 2019-09-23
Admitting: NURSE PRACTITIONER

## 2019-09-23 VITALS
DIASTOLIC BLOOD PRESSURE: 70 MMHG | RESPIRATION RATE: 18 BRPM | SYSTOLIC BLOOD PRESSURE: 100 MMHG | WEIGHT: 169 LBS | HEIGHT: 67 IN | BODY MASS INDEX: 26.53 KG/M2 | HEART RATE: 72 BPM | TEMPERATURE: 97.6 F

## 2019-09-23 DIAGNOSIS — I48.20 CHRONIC ATRIAL FIBRILLATION (HCC): ICD-10-CM

## 2019-09-23 DIAGNOSIS — I48.20 CHRONIC ATRIAL FIBRILLATION (HCC): Primary | ICD-10-CM

## 2019-09-23 LAB — INR PPP: 1.3

## 2019-09-23 PROCEDURE — 85610 PROTHROMBIN TIME: CPT

## 2019-09-23 RX ORDER — WARFARIN SODIUM 5 MG/1
TABLET ORAL
Qty: 60 TABLET | Refills: 1 | Status: SHIPPED | OUTPATIENT
Start: 2019-09-23 | End: 2019-09-23 | Stop reason: SDUPTHER

## 2019-09-23 RX ORDER — WARFARIN SODIUM 5 MG/1
TABLET ORAL
Qty: 90 TABLET | Refills: 1 | Status: SHIPPED | OUTPATIENT
Start: 2019-09-23 | End: 2020-04-14

## 2019-09-30 ENCOUNTER — LAB (OUTPATIENT)
Dept: LAB | Facility: HOSPITAL | Age: 84
End: 2019-09-30

## 2019-09-30 ENCOUNTER — HOSPITAL ENCOUNTER (OUTPATIENT)
Dept: ONCOLOGY | Facility: HOSPITAL | Age: 84
Discharge: HOME | End: 2019-09-30
Admitting: NURSE PRACTITIONER

## 2019-09-30 VITALS
HEART RATE: 50 BPM | WEIGHT: 169 LBS | RESPIRATION RATE: 18 BRPM | DIASTOLIC BLOOD PRESSURE: 72 MMHG | SYSTOLIC BLOOD PRESSURE: 142 MMHG | HEIGHT: 67 IN | BODY MASS INDEX: 26.53 KG/M2 | TEMPERATURE: 98 F

## 2019-09-30 DIAGNOSIS — I48.20 CHRONIC ATRIAL FIBRILLATION (HCC): ICD-10-CM

## 2019-09-30 DIAGNOSIS — I48.20 CHRONIC ATRIAL FIBRILLATION (HCC): Primary | ICD-10-CM

## 2019-09-30 LAB
INR PPP: 1.6
INR PPP: 1.6 (ref 2–3)

## 2019-09-30 PROCEDURE — 85610 PROTHROMBIN TIME: CPT

## 2019-10-07 ENCOUNTER — HOSPITAL ENCOUNTER (OUTPATIENT)
Dept: ONCOLOGY | Facility: HOSPITAL | Age: 84
Discharge: HOME | End: 2019-10-07
Admitting: NURSE PRACTITIONER

## 2019-10-07 ENCOUNTER — LAB (OUTPATIENT)
Dept: LAB | Facility: HOSPITAL | Age: 84
End: 2019-10-07

## 2019-10-07 VITALS
DIASTOLIC BLOOD PRESSURE: 86 MMHG | SYSTOLIC BLOOD PRESSURE: 149 MMHG | HEART RATE: 64 BPM | RESPIRATION RATE: 18 BRPM | BODY MASS INDEX: 26.37 KG/M2 | WEIGHT: 168 LBS | TEMPERATURE: 98 F | HEIGHT: 67 IN

## 2019-10-07 DIAGNOSIS — I48.20 CHRONIC ATRIAL FIBRILLATION (HCC): ICD-10-CM

## 2019-10-07 DIAGNOSIS — Z45.2 ENCOUNTER FOR CARE RELATED TO VASCULAR ACCESS PORT: ICD-10-CM

## 2019-10-07 DIAGNOSIS — I48.20 CHRONIC ATRIAL FIBRILLATION (HCC): Primary | ICD-10-CM

## 2019-10-07 LAB
INR PPP: 2.6
INR PPP: 2.6 (ref 2–3)

## 2019-10-07 PROCEDURE — 36416 COLLJ CAPILLARY BLOOD SPEC: CPT | Performed by: INTERNAL MEDICINE

## 2019-10-07 PROCEDURE — 85610 PROTHROMBIN TIME: CPT

## 2019-10-07 PROCEDURE — 96523 IRRIG DRUG DELIVERY DEVICE: CPT

## 2019-10-07 PROCEDURE — 85610 PROTHROMBIN TIME: CPT | Performed by: INTERNAL MEDICINE

## 2019-10-07 RX ORDER — SODIUM CHLORIDE 0.9 % (FLUSH) 0.9 %
10 SYRINGE (ML) INJECTION AS NEEDED
Status: CANCELLED | OUTPATIENT
Start: 2019-10-07

## 2019-10-07 RX ORDER — SODIUM CHLORIDE 0.9 % (FLUSH) 0.9 %
10 SYRINGE (ML) INJECTION AS NEEDED
Status: DISCONTINUED | OUTPATIENT
Start: 2019-10-07 | End: 2019-10-08 | Stop reason: HOSPADM

## 2019-10-07 RX ADMIN — HEPARIN 500 UNITS: 100 SYRINGE at 09:01

## 2019-10-07 RX ADMIN — Medication 30 ML: at 09:00

## 2019-10-07 NOTE — ADDENDUM NOTE
Encounter addended by: Cathryn Lu RN on: 10/7/2019 10:00 AM   Actions taken: Diagnosis association updated, Order list changed, MAR administration accepted, Flowsheet Filed in Popup/Entry activity, Charge Capture section accepted

## 2019-10-14 ENCOUNTER — LAB (OUTPATIENT)
Dept: LAB | Facility: HOSPITAL | Age: 84
End: 2019-10-14

## 2019-10-14 ENCOUNTER — HOSPITAL ENCOUNTER (OUTPATIENT)
Dept: ONCOLOGY | Facility: HOSPITAL | Age: 84
Discharge: HOME | End: 2019-10-14
Admitting: NURSE PRACTITIONER

## 2019-10-14 VITALS
RESPIRATION RATE: 18 BRPM | SYSTOLIC BLOOD PRESSURE: 114 MMHG | TEMPERATURE: 97.8 F | DIASTOLIC BLOOD PRESSURE: 69 MMHG | WEIGHT: 168 LBS | HEIGHT: 67 IN | HEART RATE: 54 BPM | BODY MASS INDEX: 26.37 KG/M2

## 2019-10-14 DIAGNOSIS — I48.20 CHRONIC ATRIAL FIBRILLATION (HCC): Primary | ICD-10-CM

## 2019-10-14 DIAGNOSIS — I48.20 CHRONIC ATRIAL FIBRILLATION (HCC): ICD-10-CM

## 2019-10-14 LAB
INR PPP: 3.7
INR PPP: 3.7 (ref 2–3)

## 2019-10-14 PROCEDURE — 85610 PROTHROMBIN TIME: CPT

## 2019-10-14 PROCEDURE — 36416 COLLJ CAPILLARY BLOOD SPEC: CPT

## 2019-10-16 ENCOUNTER — LAB (OUTPATIENT)
Dept: LAB | Facility: HOSPITAL | Age: 84
End: 2019-10-16

## 2019-10-16 ENCOUNTER — HOSPITAL ENCOUNTER (OUTPATIENT)
Dept: ONCOLOGY | Facility: HOSPITAL | Age: 84
Discharge: HOME | End: 2019-10-16
Admitting: NURSE PRACTITIONER

## 2019-10-16 VITALS
BODY MASS INDEX: 26.37 KG/M2 | HEIGHT: 67 IN | HEART RATE: 61 BPM | TEMPERATURE: 97.5 F | RESPIRATION RATE: 18 BRPM | WEIGHT: 168 LBS | DIASTOLIC BLOOD PRESSURE: 75 MMHG | SYSTOLIC BLOOD PRESSURE: 138 MMHG

## 2019-10-16 DIAGNOSIS — I48.20 CHRONIC ATRIAL FIBRILLATION (HCC): ICD-10-CM

## 2019-10-16 DIAGNOSIS — Z79.01 LONG TERM CURRENT USE OF ANTICOAGULANT: Primary | ICD-10-CM

## 2019-10-16 LAB
INR PPP: 3.6
INR PPP: 3.6 (ref 2–3)

## 2019-10-16 PROCEDURE — 85610 PROTHROMBIN TIME: CPT

## 2019-10-16 PROCEDURE — 36416 COLLJ CAPILLARY BLOOD SPEC: CPT

## 2019-10-18 ENCOUNTER — LAB (OUTPATIENT)
Dept: LAB | Facility: HOSPITAL | Age: 84
End: 2019-10-18

## 2019-10-18 ENCOUNTER — HOSPITAL ENCOUNTER (OUTPATIENT)
Dept: ONCOLOGY | Facility: HOSPITAL | Age: 84
Discharge: HOME | End: 2019-10-18
Admitting: NURSE PRACTITIONER

## 2019-10-18 VITALS
TEMPERATURE: 97.4 F | RESPIRATION RATE: 18 BRPM | WEIGHT: 169 LBS | HEIGHT: 67 IN | BODY MASS INDEX: 26.53 KG/M2 | SYSTOLIC BLOOD PRESSURE: 164 MMHG | HEART RATE: 62 BPM | DIASTOLIC BLOOD PRESSURE: 78 MMHG

## 2019-10-18 DIAGNOSIS — I48.20 CHRONIC ATRIAL FIBRILLATION (HCC): ICD-10-CM

## 2019-10-18 DIAGNOSIS — I48.20 CHRONIC ATRIAL FIBRILLATION (HCC): Primary | ICD-10-CM

## 2019-10-18 LAB
INR PPP: 3.3
INR PPP: 3.3 (ref 2–3)

## 2019-10-18 PROCEDURE — 85610 PROTHROMBIN TIME: CPT

## 2019-10-18 PROCEDURE — 36416 COLLJ CAPILLARY BLOOD SPEC: CPT

## 2019-10-24 ENCOUNTER — HOSPITAL ENCOUNTER (OUTPATIENT)
Dept: ONCOLOGY | Facility: HOSPITAL | Age: 84
Discharge: HOME | End: 2019-10-24
Admitting: NURSE PRACTITIONER

## 2019-10-24 ENCOUNTER — LAB (OUTPATIENT)
Dept: LAB | Facility: HOSPITAL | Age: 84
End: 2019-10-24

## 2019-10-24 VITALS
HEART RATE: 70 BPM | SYSTOLIC BLOOD PRESSURE: 110 MMHG | WEIGHT: 167 LBS | TEMPERATURE: 97.4 F | RESPIRATION RATE: 18 BRPM | HEIGHT: 67 IN | BODY MASS INDEX: 26.21 KG/M2 | DIASTOLIC BLOOD PRESSURE: 68 MMHG

## 2019-10-24 DIAGNOSIS — I48.20 CHRONIC ATRIAL FIBRILLATION (HCC): Primary | ICD-10-CM

## 2019-10-24 DIAGNOSIS — I48.20 CHRONIC ATRIAL FIBRILLATION (HCC): ICD-10-CM

## 2019-10-24 LAB
INR PPP: 2.3
INR PPP: 2.3 (ref 2–3)

## 2019-10-24 PROCEDURE — 36416 COLLJ CAPILLARY BLOOD SPEC: CPT

## 2019-10-24 PROCEDURE — 85610 PROTHROMBIN TIME: CPT

## 2019-11-01 RX ORDER — POTASSIUM CHLORIDE 750 MG/1
TABLET, EXTENDED RELEASE ORAL
Qty: 90 TABLET | Refills: 0 | Status: ON HOLD | OUTPATIENT
Start: 2019-11-01 | End: 2022-08-26

## 2019-11-06 ENCOUNTER — HOSPITAL ENCOUNTER (OUTPATIENT)
Dept: ONCOLOGY | Facility: HOSPITAL | Age: 84
Discharge: HOME | End: 2019-11-06
Admitting: NURSE PRACTITIONER

## 2019-11-06 ENCOUNTER — TELEPHONE (OUTPATIENT)
Dept: ONCOLOGY | Facility: CLINIC | Age: 84
End: 2019-11-06

## 2019-11-06 ENCOUNTER — HOSPITAL ENCOUNTER (OUTPATIENT)
Dept: ONCOLOGY | Facility: HOSPITAL | Age: 84
Discharge: HOME | End: 2019-11-06

## 2019-11-06 ENCOUNTER — LAB (OUTPATIENT)
Dept: LAB | Facility: HOSPITAL | Age: 84
End: 2019-11-06

## 2019-11-06 ENCOUNTER — OFFICE VISIT (OUTPATIENT)
Dept: ONCOLOGY | Facility: CLINIC | Age: 84
End: 2019-11-06

## 2019-11-06 VITALS
SYSTOLIC BLOOD PRESSURE: 107 MMHG | HEART RATE: 64 BPM | TEMPERATURE: 97.4 F | RESPIRATION RATE: 18 BRPM | HEIGHT: 67 IN | WEIGHT: 168 LBS | BODY MASS INDEX: 26.37 KG/M2 | DIASTOLIC BLOOD PRESSURE: 70 MMHG

## 2019-11-06 VITALS
HEART RATE: 64 BPM | HEIGHT: 67 IN | RESPIRATION RATE: 18 BRPM | SYSTOLIC BLOOD PRESSURE: 107 MMHG | TEMPERATURE: 97.4 F | DIASTOLIC BLOOD PRESSURE: 70 MMHG | BODY MASS INDEX: 26.37 KG/M2 | WEIGHT: 168 LBS

## 2019-11-06 DIAGNOSIS — Z45.2 ENCOUNTER FOR CARE RELATED TO VASCULAR ACCESS PORT: ICD-10-CM

## 2019-11-06 DIAGNOSIS — Z51.12 ENCOUNTER FOR MONOCLONAL ANTIBODY TREATMENT FOR MALIGNANCY: ICD-10-CM

## 2019-11-06 DIAGNOSIS — N18.30 ANEMIA DUE TO STAGE 3 CHRONIC KIDNEY DISEASE (HCC): ICD-10-CM

## 2019-11-06 DIAGNOSIS — C82.93 FOLLICULAR LYMPHOMA OF INTRA-ABDOMINAL LYMPH NODES, UNSPECIFIED FOLLICULAR LYMPHOMA TYPE (HCC): Primary | ICD-10-CM

## 2019-11-06 DIAGNOSIS — I48.20 CHRONIC ATRIAL FIBRILLATION (HCC): ICD-10-CM

## 2019-11-06 DIAGNOSIS — D61.818 PANCYTOPENIA (HCC): ICD-10-CM

## 2019-11-06 DIAGNOSIS — Z00.00 HEALTHCARE MAINTENANCE: ICD-10-CM

## 2019-11-06 DIAGNOSIS — D63.1 ANEMIA DUE TO STAGE 3 CHRONIC KIDNEY DISEASE (HCC): ICD-10-CM

## 2019-11-06 DIAGNOSIS — Z51.81 ENCOUNTER FOR MONITORING COUMADIN THERAPY: Primary | ICD-10-CM

## 2019-11-06 DIAGNOSIS — Z79.01 ENCOUNTER FOR MONITORING COUMADIN THERAPY: Primary | ICD-10-CM

## 2019-11-06 DIAGNOSIS — I48.20 CHRONIC ATRIAL FIBRILLATION (HCC): Primary | ICD-10-CM

## 2019-11-06 DIAGNOSIS — D50.0 IRON DEFICIENCY ANEMIA DUE TO CHRONIC BLOOD LOSS: ICD-10-CM

## 2019-11-06 LAB
ALBUMIN SERPL-MCNC: 4.1 G/DL (ref 3.5–5.2)
ALBUMIN/GLOB SERPL: 1.3 G/DL
ALP SERPL-CCNC: 84 U/L (ref 39–117)
ALT SERPL W P-5'-P-CCNC: 10 U/L (ref 1–41)
ANION GAP SERPL CALCULATED.3IONS-SCNC: 11 MMOL/L (ref 5–15)
AST SERPL-CCNC: 22 U/L (ref 1–40)
BASOPHILS # BLD AUTO: 0.24 10*3/MM3 (ref 0–0.2)
BASOPHILS NFR BLD AUTO: 4.5 % (ref 0–1.5)
BILIRUB SERPL-MCNC: 0.5 MG/DL (ref 0.2–1.2)
BUN BLD-MCNC: 26 MG/DL (ref 8–23)
BUN/CREAT SERPL: 18.2 (ref 7–25)
CALCIUM SPEC-SCNC: 9.4 MG/DL (ref 8.6–10.5)
CHLORIDE SERPL-SCNC: 103 MMOL/L (ref 98–107)
CO2 SERPL-SCNC: 25 MMOL/L (ref 22–29)
CREAT BLD-MCNC: 1.43 MG/DL (ref 0.76–1.27)
DEPRECATED RDW RBC AUTO: 53.4 FL (ref 37–54)
EOSINOPHIL # BLD AUTO: 0.13 10*3/MM3 (ref 0–0.4)
EOSINOPHIL NFR BLD AUTO: 2.4 % (ref 0.3–6.2)
ERYTHROCYTE [DISTWIDTH] IN BLOOD BY AUTOMATED COUNT: 16.6 % (ref 12.3–15.4)
FERRITIN SERPL-MCNC: 47.03 NG/ML (ref 30–400)
GFR SERPL CREATININE-BSD FRML MDRD: 57 ML/MIN/1.73
GLOBULIN UR ELPH-MCNC: 3.2 GM/DL
GLUCOSE BLD-MCNC: 108 MG/DL (ref 65–99)
HCT VFR BLD AUTO: 39 % (ref 37.5–51)
HGB BLD-MCNC: 12.6 G/DL (ref 13–17.7)
INR PPP: 2
INR PPP: 2 (ref 2–3)
IRON 24H UR-MRATE: 34 MCG/DL (ref 59–158)
IRON SATN MFR SERPL: 8 % (ref 20–50)
LDH SERPL-CCNC: 272 U/L (ref 135–225)
LYMPHOCYTES # BLD AUTO: 0.58 10*3/MM3 (ref 0.7–3.1)
LYMPHOCYTES NFR BLD AUTO: 10.8 % (ref 19.6–45.3)
MCH RBC QN AUTO: 29.5 PG (ref 26.6–33)
MCHC RBC AUTO-ENTMCNC: 32.3 G/DL (ref 31.5–35.7)
MCV RBC AUTO: 91.3 FL (ref 79–97)
MONOCYTES # BLD AUTO: 0.41 10*3/MM3 (ref 0.1–0.9)
MONOCYTES NFR BLD AUTO: 7.6 % (ref 5–12)
NEUTROPHILS # BLD AUTO: 4.03 10*3/MM3 (ref 1.7–7)
NEUTROPHILS NFR BLD AUTO: 74.7 % (ref 42.7–76)
PLATELET # BLD AUTO: 134 10*3/MM3 (ref 140–450)
PMV BLD AUTO: 11.1 FL (ref 6–12)
POTASSIUM BLD-SCNC: 4.3 MMOL/L (ref 3.5–5.2)
PROT SERPL-MCNC: 7.3 G/DL (ref 6–8.5)
RBC # BLD AUTO: 4.27 10*6/MM3 (ref 4.14–5.8)
SODIUM BLD-SCNC: 139 MMOL/L (ref 136–145)
TIBC SERPL-MCNC: 405 MCG/DL (ref 298–536)
TRANSFERRIN SERPL-MCNC: 272 MG/DL (ref 200–360)
WBC NRBC COR # BLD: 5.39 10*3/MM3 (ref 3.4–10.8)

## 2019-11-06 PROCEDURE — 83615 LACTATE (LD) (LDH) ENZYME: CPT | Performed by: NURSE PRACTITIONER

## 2019-11-06 PROCEDURE — 83540 ASSAY OF IRON: CPT | Performed by: NURSE PRACTITIONER

## 2019-11-06 PROCEDURE — 82728 ASSAY OF FERRITIN: CPT | Performed by: NURSE PRACTITIONER

## 2019-11-06 PROCEDURE — 85610 PROTHROMBIN TIME: CPT

## 2019-11-06 PROCEDURE — 90674 CCIIV4 VAC NO PRSV 0.5 ML IM: CPT | Performed by: NURSE PRACTITIONER

## 2019-11-06 PROCEDURE — 25010000002 INFLUENZA VAC SUBUNIT QUAD 0.5 ML SUSPENSION PREFILLED SYRINGE: Performed by: NURSE PRACTITIONER

## 2019-11-06 PROCEDURE — 85025 COMPLETE CBC W/AUTO DIFF WBC: CPT | Performed by: NURSE PRACTITIONER

## 2019-11-06 PROCEDURE — 80053 COMPREHEN METABOLIC PANEL: CPT | Performed by: NURSE PRACTITIONER

## 2019-11-06 PROCEDURE — 36416 COLLJ CAPILLARY BLOOD SPEC: CPT

## 2019-11-06 PROCEDURE — 84466 ASSAY OF TRANSFERRIN: CPT | Performed by: NURSE PRACTITIONER

## 2019-11-06 PROCEDURE — G0008 ADMIN INFLUENZA VIRUS VAC: HCPCS | Performed by: NURSE PRACTITIONER

## 2019-11-06 PROCEDURE — 36415 COLL VENOUS BLD VENIPUNCTURE: CPT | Performed by: NURSE PRACTITIONER

## 2019-11-06 PROCEDURE — 99214 OFFICE O/P EST MOD 30 MIN: CPT | Performed by: NURSE PRACTITIONER

## 2019-11-06 RX ORDER — SODIUM CHLORIDE 0.9 % (FLUSH) 0.9 %
10 SYRINGE (ML) INJECTION AS NEEDED
Status: CANCELLED | OUTPATIENT
Start: 2019-11-06

## 2019-11-06 RX ORDER — SODIUM CHLORIDE 0.9 % (FLUSH) 0.9 %
10 SYRINGE (ML) INJECTION AS NEEDED
Status: DISCONTINUED | OUTPATIENT
Start: 2019-11-06 | End: 2019-11-07 | Stop reason: HOSPADM

## 2019-11-06 RX ORDER — FERROUS SULFATE 325(65) MG
325 TABLET ORAL
Qty: 30 TABLET | Refills: 3 | Status: SHIPPED | OUTPATIENT
Start: 2019-11-06 | End: 2021-02-15

## 2019-11-06 RX ORDER — TAMSULOSIN HYDROCHLORIDE 0.4 MG/1
1 CAPSULE ORAL DAILY
Status: ON HOLD | COMMUNITY
End: 2022-08-26

## 2019-11-06 RX ADMIN — Medication 30 ML: at 09:57

## 2019-11-06 RX ADMIN — HEPARIN 500 UNITS: 100 SYRINGE at 11:31

## 2019-11-06 RX ADMIN — INFLUENZA A VIRUS A/IDAHO/07/2018 (H1N1) ANTIGEN (MDCK CELL DERIVED, PROPIOLACTONE INACTIVATED, INFLUENZA A VIRUS A/INDIANA/08/2018 (H3N2) ANTIGEN (MDCK CELL DERIVED, PROPIOLACTONE INACTIVATED), INFLUENZA B VIRUS B/SINGAPORE/INFTT-16-0610/2016 ANTIGEN (MDCK CELL DERIVED, PROPIOLACTONE INACTIVATED), INFLUENZA B VIRUS B/IOWA/06/2017 ANTIGEN (MDCK CELL DERIVED, PROPIOLACTONE INACTIVATED) 0.5 ML: 15; 15; 15; 15 INJECTION, SUSPENSION INTRAMUSCULAR at 11:32

## 2019-11-06 NOTE — ADDENDUM NOTE
Encounter addended by: Cathryn Lu RN on: 11/6/2019 11:34 AM   Actions taken: Charge Capture section accepted

## 2019-11-06 NOTE — PROGRESS NOTES
Patient has iron deficiency anemia.  He should start taking ferrous sulfate 325 mg daily I will send to his pharmacy.

## 2019-11-06 NOTE — ADDENDUM NOTE
Encounter addended by: Cathryn Lu RN on: 11/6/2019 9:59 AM   Actions taken: Order list changed, Diagnosis association updated

## 2019-11-06 NOTE — PATIENT INSTRUCTIONS
Referring pt to gastroenterology for a scope into stomach and intestines to check for bleeding.  Pt. To call Dr. Briseno for f/u. Continue coumadin 5 mg daily..

## 2019-11-06 NOTE — ADDENDUM NOTE
Encounter addended by: Cathryn Lu RN on: 11/6/2019 11:32 AM   Actions taken: Order list changed, Diagnosis association updated, MAR administration accepted, Immunization record saved

## 2019-12-05 ENCOUNTER — APPOINTMENT (OUTPATIENT)
Dept: ONCOLOGY | Facility: HOSPITAL | Age: 84
End: 2019-12-05

## 2019-12-05 ENCOUNTER — HOSPITAL ENCOUNTER (OUTPATIENT)
Dept: ONCOLOGY | Facility: HOSPITAL | Age: 84
Discharge: HOME | End: 2019-12-05
Admitting: NURSE PRACTITIONER

## 2019-12-05 ENCOUNTER — LAB (OUTPATIENT)
Dept: LAB | Facility: HOSPITAL | Age: 84
End: 2019-12-05

## 2019-12-05 VITALS
WEIGHT: 169 LBS | HEIGHT: 67 IN | HEART RATE: 69 BPM | DIASTOLIC BLOOD PRESSURE: 90 MMHG | RESPIRATION RATE: 18 BRPM | TEMPERATURE: 97.8 F | BODY MASS INDEX: 26.53 KG/M2 | SYSTOLIC BLOOD PRESSURE: 178 MMHG

## 2019-12-05 DIAGNOSIS — I48.20 CHRONIC ATRIAL FIBRILLATION (HCC): ICD-10-CM

## 2019-12-05 DIAGNOSIS — Z45.2 ENCOUNTER FOR CARE RELATED TO VASCULAR ACCESS PORT: Primary | ICD-10-CM

## 2019-12-05 LAB — INR PPP: 2.2

## 2019-12-05 PROCEDURE — 85610 PROTHROMBIN TIME: CPT

## 2019-12-05 PROCEDURE — 96523 IRRIG DRUG DELIVERY DEVICE: CPT | Performed by: INTERNAL MEDICINE

## 2019-12-05 RX ORDER — SODIUM CHLORIDE 0.9 % (FLUSH) 0.9 %
10 SYRINGE (ML) INJECTION AS NEEDED
Status: CANCELLED | OUTPATIENT
Start: 2019-12-05

## 2019-12-05 RX ORDER — SODIUM CHLORIDE 0.9 % (FLUSH) 0.9 %
10 SYRINGE (ML) INJECTION AS NEEDED
Status: DISCONTINUED | OUTPATIENT
Start: 2019-12-05 | End: 2019-12-06 | Stop reason: HOSPADM

## 2019-12-05 RX ADMIN — HEPARIN 500 UNITS: 100 SYRINGE at 11:41

## 2019-12-05 RX ADMIN — Medication 30 ML: at 11:40

## 2019-12-13 ENCOUNTER — OFFICE VISIT (OUTPATIENT)
Dept: CARDIOLOGY | Facility: CLINIC | Age: 84
End: 2019-12-13

## 2019-12-13 VITALS
SYSTOLIC BLOOD PRESSURE: 174 MMHG | WEIGHT: 168 LBS | DIASTOLIC BLOOD PRESSURE: 85 MMHG | HEART RATE: 78 BPM | BODY MASS INDEX: 26.37 KG/M2

## 2019-12-13 DIAGNOSIS — D50.0 IRON DEFICIENCY ANEMIA DUE TO CHRONIC BLOOD LOSS: ICD-10-CM

## 2019-12-13 DIAGNOSIS — I48.20 ATRIAL FIBRILLATION, CHRONIC (HCC): Primary | ICD-10-CM

## 2019-12-13 DIAGNOSIS — I10 ESSENTIAL HYPERTENSION: ICD-10-CM

## 2019-12-13 PROCEDURE — 93000 ELECTROCARDIOGRAM COMPLETE: CPT | Performed by: NURSE PRACTITIONER

## 2019-12-13 PROCEDURE — 99214 OFFICE O/P EST MOD 30 MIN: CPT | Performed by: NURSE PRACTITIONER

## 2019-12-13 NOTE — PROGRESS NOTES
Cardiology Office Follow Up Visit      Primary Care Provider:  Pedro Purvis MD    Reason for f/u: Atrial fibrillation, NSVT       Pedro Purvis MD    History of Present Illness     It was nice to see Barry Calhoun in follow-up for arrhythmia surveillance. He is a 87 y.o. male with a history of nonsustained ventricular tachycardia discovered on his loop recorder, underwent an EP study and was found to have noninducible sustained arrhythmias and was recommended for medical treatment with his having a normal ejection fraction.  Other known history includes CAD status post CABG reop x3 (most recent 2009) and PCI 2013, hypertension, CVA, loop recorder implantation (now ROCCO), COPD, permanent atrial fibrillation, chronic kidney disease, BPH, non-Hodgkin's lymphoma currently in remission and on immunosuppressive treatment with Rituxan through [facility] in [place], port placement on the right.  The patient had been having hematuria, was found to have bilateral hydronephrosis and underwent cystoscopy with bilateral stent exchange 5/2019, the patient states that he has not had no further issues with hematuria.  [facility] is following his INR, he states that it is elevated on his last check and his Coumadin is currently on hold.  Upon review of [facility] note, he is being referred to gastroenterology for ongoing iron deficiency anemia.  He comes in today for routine follow-up.  He denies shortness of breath, chest/back pain, palpitations, syncopal or near syncopal events since his last office visit.  He denies any recurrent bleeding issues at this time.    8/3/2016 cardiac cath at [facility]: Severe native three-vessel coronary artery disease, patent LIMA to LAD, patent to diagonal, known occluded vein graft to ramus intermedius, new SVG to PDA occlusion.  Medical management was recommended at that time due to ongoing biopsy plans    1/18/2017 transthoracic echo: EF 55 to 60%, moderate  LVH, LV diastolic dysfunction, moderate mitral regurgitation, moderate pulmonary hypertension    Social history: Lives with girlfriend who is in fairly good health, utilizes a cane for ambulation, does not drive and has transportation company that brings him to his appointments.  Patient denies smoking, denies EtOH, drinks approximately 4 cups of day of caffeinated beverages    Past Medical History:   Diagnosis Date   • Asthma    • COPD (chronic obstructive pulmonary disease) (CMS/[facility])    • Coronary heart disease     CABG   • CVA (cerebral vascular accident) (CMS/[facility])     recent CVA   • Gout    • Hypertension        Past Surgical History:   Procedure Laterality Date   • CARDIAC CATHETERIZATION  2016    BHF   • CORONARY ARTERY BYPASS GRAFT      CABG X3, reoperation, 09; CAB and    • OTHER SURGICAL HISTORY  2016    loop recorder implant          Current Outpatient Medications:   •  allopurinol (ZYLOPRIM) 100 MG tablet, Take 100 mg by mouth Daily., Disp: , Rfl:   •  atorvastatin (LIPITOR) 20 MG tablet, Take 20 mg by mouth Every Night., Disp: , Rfl:   •  bumetanide (BUMEX) 1 MG tablet, Take 1 mg by mouth Daily., Disp: , Rfl:   •  CloNIDine (CATAPRES) 0.1 MG tablet, Take 0.1 mg by mouth Every 6 (Six) Hours., Disp: , Rfl:   •  clopidogrel (PLAVIX) 75 MG tablet, Take 75 mg by mouth Daily., Disp: , Rfl:   •  ferrous sulfate 325 (65 FE) MG tablet, Take 1 tablet by mouth Daily With Breakfast., Disp: 30 tablet, Rfl: 3  •  hydrALAZINE (APRESOLINE) 50 MG tablet, Take 50 mg by mouth 4 (Four) Times a Day., Disp: , Rfl:   •  isosorbide mononitrate (IMDUR) 30 MG 24 hr tablet, Take 30 mg by mouth Daily., Disp: , Rfl:   •  potassium chloride (K-DUR,KLOR-CON) 10 MEQ CR tablet, TAKE 1 TABLET BY MOUTH THREE TIMES A DAY, Disp: 90 tablet, Rfl: 0  •  raNITIdine (ZANTAC) 150 MG tablet, Take 150 mg by mouth Daily., Disp: , Rfl:   •  tamsulosin (FLOMAX) 0.4 MG capsule 24 hr capsule, Take 1 capsule by mouth Daily.,  Disp: , Rfl:   •  warfarin (COUMADIN) 1 MG tablet, Take 1 mg by mouth Daily., Disp: , Rfl:   •  warfarin (COUMADIN) 5 MG tablet, TAKE 1 TABLET BY MOUTH DAILY, Disp: 90 tablet, Rfl: 1  •  zolpidem (AMBIEN) 5 MG tablet, TAKE 1 TABLET BY MOUTH EVERY DAY AT BEDTIME AS NEEDED, Disp: 30 tablet, Rfl: 0    Social History     Socioeconomic History   • Marital status:      Spouse name: Not on file   • Number of children: Not on file   • Years of education: Not on file   • Highest education level: Not on file   Tobacco Use   • Smoking status: Never Smoker   • Smokeless tobacco: Never Used   Substance and Sexual Activity   • Alcohol use: No     Frequency: Never   • Drug use: No   • Sexual activity: Defer       Family History   Problem Relation Age of Onset   • Stroke Other        The following portions of the patient's history were reviewed and updated as appropriate: allergies, current medications, past family history, past medical history, past social history, past surgical history and problem list.        Review of Systems   Constitution: Negative for diaphoresis, fever, malaise/fatigue, weight gain and weight loss.   Cardiovascular: Negative for chest pain, dyspnea on exertion, leg swelling, near-syncope, orthopnea, palpitations and syncope.   Respiratory: Negative for hemoptysis, shortness of breath, sputum production and wheezing.    Skin: Negative for rash.   Musculoskeletal: Positive for arthritis.   Gastrointestinal: Negative for abdominal pain, hematemesis, hematochezia, nausea and vomiting.   Genitourinary: Negative for hematuria and pelvic pain.   Neurological: Negative for dizziness, light-headedness, numbness and seizures.   Psychiatric/Behavioral: Negative.    All other systems reviewed and are negative.    /85   Pulse 78   Wt 76.2 kg (168 lb)   BMI 26.37 kg/m² .  Objective     Physical Exam   Constitutional: He is oriented to person, place, and time. He appears well-developed and well-nourished.  He is cooperative.   Frail appearing   Neck: Normal carotid pulses and no JVD present. Carotid bruit is not present.   Cardiovascular: Normal rate, regular rhythm, normal heart sounds and intact distal pulses. Exam reveals no gallop and no friction rub.   No murmur heard.  Pulses:       Carotid pulses are 2+ on the right side, and 2+ on the left side.       Radial pulses are 2+ on the right side, and 2+ on the left side.        Posterior tibial pulses are 2+ on the right side, and 2+ on the left side.   Pulmonary/Chest: Effort normal and breath sounds normal. He has no decreased breath sounds. He has no wheezes. He has no rhonchi. He has no rales.   Abdominal: Soft. Bowel sounds are normal. He exhibits no distension and no mass. There is no hepatosplenomegaly. There is no tenderness. There is no guarding.   Musculoskeletal: He exhibits no edema.   Neurological: He is alert and oriented to person, place, and time.   Skin: Skin is warm and dry. No rash noted. No erythema. No pallor.   Psychiatric: He has a normal mood and affect. His speech is normal and behavior is normal. Judgment and thought content normal.           ECG 12 Lead  Date/Time: 12/13/2019 5:40 PM  Performed by: Mariaa White APRN  Authorized by: Mariaa White APRN   Comparison: not compared with previous ECG   Rhythm: atrial fibrillation  BPM: 81          Assessment/Plan:    1.  Permanent atrial fibrillation, history of asymptomatic NSVT with prior negative EP study----rate controlled atrial fibrillation  2.  Hypertension----blood pressure elevated in the office today   3.  CAD, CABG 2009, subsequent PCI 3/2016----stable  4.  Iron deficiency anemia-----GI evaluation pending  5.  Non-Hodgkin's lymphoma, in remission-----managed by cancer care center  6.  Hematuria--- resolved after ureteral stent replacement, managed by urology  7.  CVA----no significant deficit  8.  COPD----stable  9.  Diastolic heart failure, pulmonary hypertension----compensated,  on bumex  10. CKD    Patient states that he did not take any of his medications today due to the early nature of his transport arrival.  He will monitor his blood pressure at home and notify us if his blood pressure is maintaining above 130, he wishes not to adjust or add any medications at this time, educated regarding the importance of maintaining adequate blood pressure control and follow-up.  The patient states that his loop recorder is not bothering him in any way, we will leave in situ.  Follow up in 6 months for continued surveillance.    MAISHA Pike  EP cardiology  **All problems new to this practitioner

## 2020-01-02 ENCOUNTER — LAB (OUTPATIENT)
Dept: LAB | Facility: HOSPITAL | Age: 85
End: 2020-01-02

## 2020-01-02 ENCOUNTER — HOSPITAL ENCOUNTER (OUTPATIENT)
Dept: ONCOLOGY | Facility: HOSPITAL | Age: 85
Discharge: HOME | End: 2020-01-02
Admitting: NURSE PRACTITIONER

## 2020-01-02 VITALS
BODY MASS INDEX: 26.84 KG/M2 | RESPIRATION RATE: 18 BRPM | HEIGHT: 67 IN | WEIGHT: 171 LBS | SYSTOLIC BLOOD PRESSURE: 155 MMHG | HEART RATE: 74 BPM | DIASTOLIC BLOOD PRESSURE: 83 MMHG | TEMPERATURE: 97.9 F

## 2020-01-02 DIAGNOSIS — Z45.2 ENCOUNTER FOR CARE RELATED TO VASCULAR ACCESS PORT: Primary | ICD-10-CM

## 2020-01-02 DIAGNOSIS — I48.20 CHRONIC ATRIAL FIBRILLATION (HCC): ICD-10-CM

## 2020-01-02 LAB — INR PPP: 2.1

## 2020-01-02 PROCEDURE — 96523 IRRIG DRUG DELIVERY DEVICE: CPT | Performed by: NURSE PRACTITIONER

## 2020-01-02 PROCEDURE — 85610 PROTHROMBIN TIME: CPT

## 2020-01-02 RX ORDER — SODIUM CHLORIDE 0.9 % (FLUSH) 0.9 %
10 SYRINGE (ML) INJECTION AS NEEDED
Status: DISCONTINUED | OUTPATIENT
Start: 2020-01-02 | End: 2020-01-03 | Stop reason: HOSPADM

## 2020-01-02 RX ORDER — HEPARIN SODIUM (PORCINE) LOCK FLUSH IV SOLN 100 UNIT/ML 100 UNIT/ML
500 SOLUTION INTRAVENOUS AS NEEDED
Status: CANCELLED | OUTPATIENT
Start: 2020-01-02

## 2020-01-02 RX ORDER — SODIUM CHLORIDE 0.9 % (FLUSH) 0.9 %
10 SYRINGE (ML) INJECTION AS NEEDED
Status: CANCELLED | OUTPATIENT
Start: 2020-01-02

## 2020-01-02 RX ADMIN — HEPARIN 500 UNITS: 100 SYRINGE at 09:41

## 2020-01-02 RX ADMIN — Medication 30 ML: at 09:40

## 2020-01-11 NOTE — PROGRESS NOTES
PATIENTS ONCOLOGY RECORD LOCATED IN [facility]      Subjective     Name:  MERCED PADGETT     Date:  2018  Address:  [address] [place] [address]  Home: [unfilled]  :  1932 AGE:  86 y.o.        RECORDS OBTAINED:  Patients Oncology Record is located in [facility]   - - -

## 2020-01-29 ENCOUNTER — HOSPITAL ENCOUNTER (OUTPATIENT)
Dept: PET IMAGING | Facility: HOSPITAL | Age: 85
Discharge: HOME | End: 2020-01-29
Admitting: NURSE PRACTITIONER

## 2020-01-29 ENCOUNTER — HOSPITAL ENCOUNTER (OUTPATIENT)
Dept: ONCOLOGY | Facility: HOSPITAL | Age: 85
Discharge: HOME | End: 2020-01-29
Admitting: NURSE PRACTITIONER

## 2020-01-29 ENCOUNTER — LAB (OUTPATIENT)
Dept: LAB | Facility: HOSPITAL | Age: 85
End: 2020-01-29

## 2020-01-29 VITALS
HEIGHT: 67 IN | DIASTOLIC BLOOD PRESSURE: 82 MMHG | SYSTOLIC BLOOD PRESSURE: 159 MMHG | HEART RATE: 67 BPM | BODY MASS INDEX: 27.15 KG/M2 | WEIGHT: 173 LBS | TEMPERATURE: 97.6 F | RESPIRATION RATE: 18 BRPM

## 2020-01-29 DIAGNOSIS — C82.93 FOLLICULAR LYMPHOMA OF INTRA-ABDOMINAL LYMPH NODES, UNSPECIFIED FOLLICULAR LYMPHOMA TYPE (HCC): ICD-10-CM

## 2020-01-29 DIAGNOSIS — Z79.01 LONG TERM (CURRENT) USE OF ANTICOAGULANTS: Primary | ICD-10-CM

## 2020-01-29 DIAGNOSIS — I48.20 CHRONIC ATRIAL FIBRILLATION (HCC): ICD-10-CM

## 2020-01-29 LAB — INR PPP: 1.9

## 2020-01-29 PROCEDURE — 74176 CT ABD & PELVIS W/O CONTRAST: CPT

## 2020-01-29 PROCEDURE — 85610 PROTHROMBIN TIME: CPT

## 2020-01-29 PROCEDURE — 71250 CT THORAX DX C-: CPT

## 2020-01-29 NOTE — ADDENDUM NOTE
Encounter addended by: Cathryn Lu RN on: 1/29/2020 11:41 AM   Actions taken: Anticoagulation related activity accessed

## 2020-02-03 NOTE — PROGRESS NOTES
Hematology/Oncology Outpatient Follow Up    PATIENT NAME:Barry Calhoun  :1932  MRN: [id number]  PRIMARY CARE PHYSICIAN: Pedro Purvis MD  REFERRING PHYSICIAN: Pedro Purvis MD    Chief Complaint   Patient presents with   • Follow-up     Follicular lymphoma        HISTORY OF PRESENT ILLNESS:   1. 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   · CT abdomen and pelvis done 11/4/15 that showed a new retroperitoneal tissue between the aorta and IVC measuring 2.6 x 2.2 cm, extending from the level of origin of the superior mesenteric artery. The differential diagnosis included lymphoma as well as atypical retroperitoneal fibrosis. Metastatic adenopathy was also in the differential as well as amyloidosis. Recommending a PET CT for correlation. There was non-specific prostate enlargement and coronary artery and aortoiliac atherosclerosis. On 3/8/16 he saw Dr. Pedro Purvis in follow-up. He was complaining of leg swelling and insomnia. He was able to go to sleep but would wake up a few hours after sleeping and could not get back to sleep. He also reported a metallic taste in his mouth. Bilateral lower extremity Dopplers were performed that were negative for DVT. A CT abdomen and pelvis was ordered and performed on 3/8/16 that showed a retroperitoneal mass extending dorsal to the aorta on the left and right side, obscuring the periaortic fat, transversing caudally along the psoas muscles. The mass caused left-sided obstructive uropathy just caudal to the ureteropelvic junction. Differential diagnosis included lymphoma, metastasis or primary sarcoma. There were no other soft tissue masses throughout the abdomen. There was an enlarged prostate with central lucent lesion, possibly due to a TURP defect. The mass measured 4.2 x 1.9 cm on transaxial on the right periaortic retroperitoneum. On the left periaortic retroperitoneum it  measured 3.8 x 2.8 cm. He was seen by Dr. Briseno on 3/16/16. Urinalysis was negative for blood, nitrates and leukocytes. He was diagnosed with BPH with LUTS and hydronephrosis with plan to perform a cysto with stent placement soon.   · 3/17/16 - Patient seen for initial consultation regarding enlarging retroperitoneal mass at the [facility]. Quantitative hCG <0.1 (<2.0). ESR 45 (<21). AFP 4 (0-9). Chest x-ray with linear opacity right middle lobe. Question of atelectasis or scar and cardiomegaly with changes of CABG. PSA 6.37 (0-4).  Result sent to Dr. Briseno.   · 4/2/16 - CT head had a focal hyperdense area in the left cerebral hemisphere representing acute ischemia.   · 4/5/16 - CT chest, abdomen and pelvis done during hospitalization at [facility] revealed no findings of primary or metastatic disease in the chest. Dilated main pulmonary artery. Mild ascending thoracic aortic ectasia measuring up to 4.1 cm. Bilateral ureteral stents. The proximal left ureteral stent may be located within the renal parenchyma or perhaps anterior calyx. Possibly mild left hydronephrosis. Abnormal periaortic soft tissue mass ascending from the renal vessels through the iliac bifurcation.   · 4/14/16 - Patient did not have CT-guided biopsy of retroperitoneal mass done as he was hospitalized at [facility] with a stroke.   · 5/2/16 - Chest x-ray with cardiomegaly and chronic changes about the chest. Recording time device overlying the left side of the chest.   · 5/17/16 - Patient did not want to schedule biopsy of the lymph nodes. Patient claims not to want to go for CT-guided retroperitoneal mass biopsy, but would proceed with a bone marrow biopsy.   · 5/27/16 - Bone marrow aspiration and biopsy with adequate normocellular bone marrow (35%) showing trilineage hematopoiesis, a moderate plasma cytosis, and increased iron stores. Flow cytometry revealed a decreased CD-4, CD-8 helper suppressor  T-cell ratio suggesting viral infection or immunocompromise. Cytogenetics revealed normal karyotype. Plasma cells were increased to 12-15%. Copper 141 ().  · 6/9/16 - WBC 4.23, hemoglobin 10.1, MCV 85.9, platelet count 152,000.   · 7/7/16 - Patient has agreed to have a CT-guided retroperitoneal mass biopsy which I am concerned may be a plasma cytoma. Beta-2 microglobulin 3.72 (<2.16).    · 8/2/16 - CT abdomen and pelvis with significant progression of extensive infiltrative retroperitoneal process. New left ureteral stent with mild residual caliectasis on the left with new right hydronephrosis due to partial obstruction of the right ureter. Enlarged prostate.   · 12/9/16 - Patient underwent cystoscopy with bilateral stent exchange and bilateral retrograde pyelogram by Ganesh Briseno M.D.   · 1/13/17 - Chest x-ray with mild cardiac enlargement with borderline pulmonary venous redistribution , unchanged, with chronic elevation or eventration of the right hemidiaphragm.   · 1/17/17 - Patient was hospitalized at [facility] between 1/17/17 and 1/25/17 with right scrotal swelling. Stool Hemoccult was negative. Hemoglobin 8.9, MCV 79.2. IV diuretics were given. Cardiology and Urology consultations were obtained. He then underwent a core needle biopsy of the retroperitoneal mass while holding Coumadin, Aspirin and Plavix. Bilateral lower extremity Dopplers were negative for DVT. CT of the abdomen and pelvis had extensive confluent soft tissue density in the retroperitoneum, increased in size from previous study. In addition, there was some iliac adenopathy, especially on the right. Bilateral double pigtail ureteral stents were in good position without hydronephrosis. Extensive soft tissue stranding in the subcutaneous sub mesenteric fat was present.   · 1/18/17 - Echocardiogram with pulmonary artery systolic pressure 60-65 mmHg. Moderate pulmonary hypertension. Left ventricular systolic function normal with EF of 55-60%. LV  diastolic dysfunction noted. Transmitral spectral Doppler flow pattern suggestive of restrictive physiology with moderate mitral regurgitation.   · 1/23/17 - Patient underwent ultrasound-guided fine needle aspiration of retroperitoneal lymph node at [facility] by Stephen Ricci M.D. with pathology revealing friable adipose tissue with prominent infiltrative lymphoid cell population suspicious for lymphoma. Focal areas demonstrated vague follicle formations. Crush artifact was present. Flow cytometry revealed low viability and hypocellular specimen which failed to reveal a significant B-cell or monoclonal B-cell population. The majority of the lymphocytes were negative for lambda and kappa light chains. The impression was B-cell lymphoma favoring follicle center cell lymphoma and final report was supposed to be pending outside expert consultation.   · 1/30/17 - Retroperitoneal lymph node biopsy specimen was reviewed by Lenny Johnson M.D. and classified as B-cell lymphoma. It was somewhat difficult to classify given the architectural distortion as well as the crusted areas of the tumor. It was thought that at least a follicular lymphoma with areas of diffuse fat was present. There was concern of areas of large cell lymphoma, particularly in the adipose tissue.   · 2/3/17 - Patient returns after a seven month absence. Had to cancel his scheduled lymph node biopsies until his last hospitalization at [facility] where he was not seen by us in consultation. Diagnosis discussion had with patient in detail. Though he may have a higher grade lymphoma than follicular given his age and cardiac status, he is not going to be able to tolerate more aggressive chemotherapy. Will hence treat it as a follicular lymphoma and then follow him for response in hopes of palliating him and decreasing his total swelling. Chemotherapy orders written for Rituximab 375 mg/M2 IV day 1 and Bendamustine 70 mg/M2 IV piggyback days 1 and 2, repeat q 4 weeks.    · 2/13/17 - PET scan with hypermetabolic enlarged retroperitoneal lymph nodes increased in size since March 2016. Retrocrural lymph nodes along the iliac chains bilaterally were also hypermetabolic and a single hypermetabolic lymph node in the paratracheal location in the chest. Cardiomegaly with small pleural effusions and severe coronary artery atherosclerotic calcifications. Bilateral ureteral stents with mild to moderate hydronephrosis. Moderate amount of stool within the colon, questioning constipation. Diffuse anasarca.   · 2/16/17 - Patient underwent right IJ port insertion by Stephen Ricci M.D. in IR under fluoro guidance.   · 2/17/17 - Hepatitis B/C non-reactive.   · 2/21/17 - Potassium 3.3. Patient asked to increase dietary potassium intake. Uric acid 8.3 (4.8-8.7). Patient developed upper abdominal pain shortly after starting Rituxan infusion with stable vital signs and examination. Was taking Zantac at home. Patient given Pepcid 40 mg IV and Decadron 10 mg IV prior to resuming Rituxan.  Patient received cycle 1 chemotherapy.   · 3/1/17 -  (). ESR 28 (<21). Patient complains of insomnia. Ambien 5 mg p.o. q.h.s. p.r.n. prescribed. CMP significant for calcium 8.7 (L), albumin 3.3 (L), creatinine 1.2 (N). Uric acid elevated at 9.3 (H).   · 3/2/17 - Patient started on Allopurinol 100 mg by mouth daily for hyperuricemia.   · 3/22/17 - The patient started cycle 2 day 1 of Rituxan and Bendamustine.   · 5/23/17 - Uric acid 6.9 (4.8-8.7), ESR 68 (<21).     · 6/14/17 - Comprehensive metabolic panel with no significant abnormality. Cycle 5 chemotherapy given.   · 6/28/17 - INR 2.6 on 7.5 alternating with 10 mg of Warfarin being managed by Dr. Purvis. Patient complains of dry throat and dysuria.   · 3/29/17 - WBC 4.71, hemoglobin 9.9, MCV 76.9, platelets 146,000, ANC 3.6. Patient is scheduled for a CT scan of the chest, abdomen and pelvis with p.o. contrast only on 4/19/17. Orders written to  continue chemotherapy. Patient instructed to continue Allopurinol daily and Ambien as needed. Uric acid level ordered. Uric acid 6.1 (4.8-8.7).    · 4/19/17 - Comprehensive metabolic panel with no significant abnormality. CT chest, abdomen and pelvis with 16 mm lymph node seen on recent PET CT decreased to 12 mm but pretracheal lymph node increased to 14 mm. Mild atelectasis in the right middle lobe with mild scarring change in the right lower lobe medially. Decreasing lymphadenopathy in the abdomen and pelvis. Marked adenopathy at the level of the renal hilum measuring 11.4 x 5.5 down to 10 x 4.5 cm. Bilateral renal stents. Cycle 3 chemotherapy given. Patient’s Coumadin dose is being adjusted by Dr. Soni. He has an appointment with Dr. Briseno in follow-up of hematuria. Claims not to be gaining weight and advised taking Ensure or Boost.   · 5/17/17 - Received cycle 4 Bendamustine and Rituxan.   · 5/18/17 - White blood cell count 8.7, hemoglobin 9.1, MCV 78.1 and platelets 237,000. Weight loss of 18 pounds in five weeks.   · 5/23/17 - White count 4.48, hemoglobin 8.9, MCV 77.1 and platelet count 199,000. Allopurinol planned to discontinue if uric acid <6.0. ESR, CMP and uric acid ordered. Megace 200 mg p.o. daily ordered. Ferritin 90 ().    · 6/14/17 - Creatinine 1.2 (0.7-1.2).  Cycle 5 chemotherapy given.   · 6/28/17 - WBC 5.5, hemoglobin 8.8, MCV 74.5, platelet count 190,000.   · 7/12/17 - Chemotherapy held one week. ANC 0.48. Ciprofloxacin 500 mg b.i.d. prescribed for five days. Neutropenic precautions reviewed with the patient and reinforced.   · 7/17/17 - Potassium increased from 10 mEq daily to 10 mEq b.i.d.   · 7/19/17 - Weight loss of 7 pounds in one month. Persistent neutropenia. WBC 2.4, ANC 0.3, platelet count 136,000, hemoglobin 8.7, MCV 77.8. EKG performed for irregular heartbeat. Reviewed neutropenic precautions. Ciprofloxacin refilled 500 mg b.i.d. x1 week. Leukopenia and thrombocytopenia  workup ordered. Chemotherapy on hold for one week. Magnesium 2.0 (1.8-2.5), potassium 3.3 (3.6-5.1). Oral potassium supplementation increased. Platelet antibodies not detected. EBV IgM <0.2 (<0.9), EBV IgG >8 (<0.9). CMV IgM 0.35 (<0.91), CMV IgG 17.3 (<0.9). Neutrophil-associated antibody negative.        · 7/24/17 - PET scan with interval mixed treatment response with marked improvement in retroperitoneal adenopathy with metabolic activity just slightly higher than background. Interval marked increase in mediastinal adenopathy and development of bilateral hilar adenopathy.   · 7/26/17 - Order written to discontinue current chemotherapy because of progressive disease in chest and consultation obtained with CTS for mediastinal lymph node biopsy. ESR 37 (<21). Comprehensive metabolic panel with potassium 3.2. Potassium supplementation increased orally. Creatinine 1.3 (0.7-1.2), uric acid 6 (4.8-8.7).      · 8/10/17 - Patient underwent a mediastinoscopy by Donald Islas M.D. at [facility] with pathology of lymph node R4 revealing portions of benign lymph node with prominent anthracotic pigmented histocytes and focal hyalinized fibrosis. Special stain for acid-fast bacilli and fungal organisms was negative.   · 8/30/17 - Discussed options with the patient. Ideally would like to do six cycles of chemotherapy and then put him on maintenance Rituxan. However, cannot proceed because of low blood counts. Patient has not had INR’s in followup of AFib. Will have it drawn and sent to Dr. Bustamante for adjustment. Will check leukopenia workup and plan on maintenance Rituxan after improvement in blood counts. Comprehensive metabolic panel with potassium 3.5 (3.6-5.1).   · 9/6/17 - Patient hospitalized at [facility] between 9/6/17 and 9/15/17 with urosepsis. Chest x-ray had stable cardiomegaly and postsurgical changes with stable elevation of the right hemidiaphragm with overlying scarring or atelectasis. CT scan of the  abdomen and pelvis had bilateral ureteral stents, retroperitoneal soft tissue masses/adenopathy significantly decreased in size compared to January 2017, large stool burden throughout the colon, small volume of pelvic ascites, enlarged prostate gland, extensive atherosclerotic calcification and advanced multilevel lumbar disc degermation and facet arthropathy. Patient underwent cystoscopy with bilateral stent change by Yao Woods M.D. and was seen in consultation by Bimal Saenz M.D.   · 9/19/17 - Order written for Rituxan 375 mg/M2 IV q.2 months for two years as maintenance treatment and for monthly port flushes. BMP without clinical significance. Uric acid 5.4 (4.8-8.7).    · 10/11/17 - Patient began cycle 1 of maintenance dose Rituxan.   · 12/6/17 - Cycle 2 maintenance Rituxan given.   · 12/7/17 - Patient only taking one potassium pill a day. Asked to increase to twice a day due to hypokalemia. INR’s being managed by Pedro Purvis M.D. with AFib.   · 1/31/18 - CT chest with stable appearance of right paratracheal and bilateral hilar adenopathy.  CT scan of abdomen and pelvis showed stable appearance of extensive retroperitoneal stranding related to an adenopathy that encases the infrarenal aorta without significant change from prior exams.  Bilateral nephroureteral stents were in place.  Mild prostatomegaly was present.     · 2/6/18 - Cycle 3 Rituximab given.   · 4/3/18 - Patient received cycle 4 maintenance Rituximab.   · 4/9/18 - ESR 16 (0-20).    · 6/4/18 - Cycle 5 maintenance Rituximab administered.   · 7/30/18 - Cycle 6 maintenance Rituximab administered.   · 9/24/18 - Cycle 7 maintenance Rituximab initiated.   · 10/18/18 - CT chest, abdomen and pelvis without contrast showed stable appearance of paratracheal and bilateral hilar adenopathy and paratracheal lymph node measured 10 mm. The report stated evaluation of hilar lymph nodes was suboptimal but lymphadenopathy appeared normal. There was stable  appearance of the retroperitoneal stranding that encases the abdominal aorta and inferior vena cava felt related to lymphadenopathy and stable. Bilateral ureteral stents appeared in good position.   · 11/26/18 - Cycle 8 maintenance Rituximab initiated. Hepatitis B core antibody and hepatitis B surface antigen both non-reactive. Hepatitis C antibody was positive with hepatitis CRNA <15 not detected.   · 12/12/18 - ESR 18 (0-20).    · 1/21/19 - Patient received cycle 9 maintenance Rituximab.   · 3/18/19 - Cycle 10 Rituxan maintenance initiated.   · 5/21/19 - Received cycle 11 Rituxan.   · 6/11/19 - Uric acid 6.5 (4.8-8.7).  (). Sed rate 13 (0-20).   · 7/8/19-Cycle 12 Rituximab.  · 7/15/19 - CT scan chest, abdomen and pelvis showed mildly prominent bilateral hilar mediastinal lymph nodes unchanged since 10/18/2018 and there is no evidence of new or progressive metastatic disease in the chest. Irregular retroperitoneal soft tissue stranding in the abdomen and pelvis which may represent features related to patient's treated lymphoma. No evidence of disease progression in abdomen or pelvis since the 10/18/2018 examination.    · 8/12/19 -Rituxan discontinued due to completion of planned course.   · 11/6/2019 patient with a 8 pound weight loss but denies night sweats or fevers.   · 1/29/2020 patient had CT scan of the chest abdomen and pelvis this showed a dominant index right lower lobe paratracheal node measuring 11 mm and a right infrahilar hilar node measuring 16 mm and left hilar node 10 mm unchanged.   Stable thoracic aortic aneurysm at 4.2 cm.  In the abdomen there was no evidence of relapsed lymphoma.  He has prostatic enlargement and is seen by urologist Dr. Woods.  There is evidence of treated disease in the retroperitoneal soft tissue.  2. Anemia diagnosed March 2016. Iron deficiency anemia diagnosed February 2017.   ? CBC showed a white blood cell count of 4.8, hemoglobin 11.8, MCV 88.2, RDW 15.6  (H), platelet count 146,000. Creatinine 1.31 (H). LFT’s normal. Stool heme negative.  ? 3/17/16 - Vitamin B12 of 228 (180-914), folate 21.2 (5.9-24.8), erythropoietin 13.65 (2.59-18.5). Serum protein electrophoresis with normal pattern.  (), ferritin 147 (), iron 36 (), TIBC 291 (228-428), retic count 0.69 (0.5-1.5), haptoglobin 158 ().         ? 4/5/16 - Labs drawn during hospitalization at [facility]: Vitamin B12 of 265 (180-914), serum iron 11 (). Hemoglobin 10.9. Creatinine 1.0 (0.6-1.3).      ? 4/14/16 - WBC 5.61, hemoglobin 11.6, MCV 84.5, platelet count 186,000.   ? 5/17/16 - WBC 4.5, hemoglobin 11.1, MCV 87.5, platelet count 150,000. Hemoglobin electrophoresis with normal pattern.   ? 5/27/16 - Bone marrow aspiration and biopsy with adequate normocellular bone marrow (35%) showing trilineage hematopoiesis, a moderate plasma cytosis, and increased iron stores. Flow cytometry revealed a decreased CD-4, CD-8 helper suppressor T-cell ratio suggesting viral infection or immunocompromise. Cytogenetics revealed normal karyotype. Plasma cells were increased to 12-15%. Copper 141 ().   ? 6/9/16 - HFE gene with no mutation.   ? 6/10/16 - Serum immunofixation with no monoclonal gammopathy identified. Kappa/lambda FLC ratio 17.38 (0.26-1.65). Comprehensive metabolic panel with no significant abnormality. Creatinine 1.2 (0.7-1.2).     ? 7/7/16 - Beta-2 microglobulin 3.72 (<2.16).    ? 7/28/16 - Skeletal survey with no suspicious lytic or blastic bony lesions identified. Right ureteral stent migrated and coiled within the urinary bladder. Degenerative changes as noted above. Patient referred to his urologist.   ? 2/3/17 - WBC 5.7, hemoglobin 10.2, MCV 78.6, platelet count 237,000. Ferrous Sulfate 325 mg p.o. b.i.d. started. Ferritin 27 (), iron 20 (), TIBC 363 (228-428).      ? 3/1/17 - Patient not taking oral iron supplements. WBC 5, hemoglobin 9.7, MCV  77.2, platelet count 164,000. Ferrous Sulfate 325 mg p.o. b.i.d. prescribed.   ? 3/29/17 - Hemoglobin 9.9, MCV 76.9. Patient instructed to continue Iron Sulfate 325 mg b.i.d. Patient instructed to followup with urologist regarding hematuria.   ? 4/25/17 - WBC 3.6 with 75% neutrophils, 6% lymphocytes, 14% monocytes, hemoglobin 9.6, MCV 77.4, platelet count 149,000. Hemoglobin electrophoresis normal with 98% hemoglobin A.   ? 5/23/17 - White count 4.48, hemoglobin 8.9, MCV 77.1 and platelet count 199,000. Ferrous Sulfate 325 mg p.o. b.i.d. ordered. Stool cards ordered. Ferritin ordered.   ? 7/19/17 - WBC 2.4, ANC 0.3, platelet count 136,000, hemoglobin 8.7, MCV 77.8. Iron 28 (), TIBC 298 (228-428), iron saturation 9 (20-50), ferritin 205 (), folate 14 (5.9-24.8), creatinine 1.2 (0.7-1.2). Iron saturation 9 (20-50).          ? 7/26/17 - WBC 2.5 with 14% neutrophils, 62% lymphocytes, 23% monocytes, hemoglobin 8.9, MCV 75.7, RDW 20.6 and platelet count 151,000. Orders written for Injectafer 750 mg IV days 1 and 8.   ? 8/2/17 - Injectafer 750 mg IV given. Hemoglobin 8.6.   ? 8/30/17 - Hemoglobin electrophoresis with normal pattern. Vitamin B12 of 597 (211-911).  Creatinine 1.2 (0.7-1.2).    ? 9/19/17 - Order written for Procrit 30,000 units subq q.2 weeks for ACD secondary to CKD Stage III. Iron 32 low (), TIBC 297 normal (228-428), percentage saturation 11 low (20-50). Erythropoietin 32.3 high (2.59-18.50). Uric acid 5.4 normal (4.8-8.7).   ? 10/4/17 - Patient received cycle 1 day 8 of Injectafer 750 mg IV.   ? 10/24/17 - WBC 2.0, hemoglobin 10.3, MCV 86.0, platelet count 133,000.       ? 10/31/17 - Iron 41 (), TIBC 281 (228-428), iron saturation 15 (20-50), ferritin 440 ().   ? 12/7/17 - Procrit order discontinued as hemoglobin running above 11 post IV iron.    ? 1/3/18 - Patient reports taking one Ferrous Sulfate 325 mg tablet daily. Hemoglobin 12.5, MCV 91.7. Reports hematuria. Referred  for followup with Dr. Woods. Creatinine 1.6 (0.7-1.2).    ? 6/4/18 - Creatinine 1.6 (0.7-1.2).    ? 4/8/19 - Patient reports continued hematuria. He has not seen Dr. Briseno since last stent change in approximately November 2018. Hemoglobin dropping to 9.7 today. Anemia labs sent. INR subtherapeutic at 1.4. Patient instructed to contact Dr. Purvis today regarding dosing. Expressed patient concerns regarding change of Coumadin dose with continued hematuria and worsening anemia and advised patient to followup with Dr. Purvis about Coumadin dosing. Will fax today’s CBC and INR with notation regarding drop in hemoglobin to Dr. Purvis. Reviewed note from Dr. Burns, cardiologist, from 2/18/19 visit where Dr. Burns reported possible Watchman procedure should hematuria continue. SPEP normal. Haptoglobin 120 (H). B12 was 1031 (H). Iron 22 (L),  (N), iron saturation 5 (L), ferritin 17 (L), folate 14.3 (N), retic 1.01 (N). Ordered to receive Injectafer 750 day 1 and day 8.   ? 4/17/19 - Received day 1 Injectafer.   ? 4/24/19 - Received day 8 Injectafer.   ? 5/13/19 - Cystoscopy per Dr. Briseno for bilateral hydronephrosis due to ureteral obstruction. Bilateral ureteral stents were exchanged. Creatinine 1.6 (H).   · 11/6/2019 referred to [facility] for EGD/colonoscopy due to chronic VALENTIN.     3.   Pancytopenia diagnosed in July 2017.   · 7/5/17 - WBC 3.1, hemoglobin 8.2, platelet count 147,000.   · 7/19/17 - Iron 28 (), TIBC 298 (228-428), ferritin 205 (), folate 14 (5.9-24.8), creatinine 1.2 (0.7-1.2).        · 8/30/17 - WBC 1.4 with 33% neutrophils, 45% lymphocytes, 16% monocytes, hemoglobin 10.1, MCV 79.7, platelet count 129,000. EBV capsid antibody IgM 0.3 (<0.9), EBV capsid antigen antibodies/IgG >8 (<0.9). CMV antibody IgM 0.45 (<0.91), CMV antibody IgG 19.8 (<0.9).  Neutrophil-associated antibody negative.   · 9/19/17 - WBC 2.8 with 58% neutrophils, 28% lymphocytes, 11% monocytes, hemoglobin  9.8, MCV 85.8, platelet count 220,000. ANNE screen negative. Erythropoietin 32.3 high (2.59-18.50). Iron 32 low (), TIBC 297 normal (228-428), percentage saturation 11 low (20-50). BMP without clinical significance. ANNE screen negative.   · 10/24/17 -WBC 2.0 with 24% neutrophils, 62% lymphocytes, 10% monocytes, hemoglobin 10.3, platelet count 133,000.      4.  Atrial fibrillation on long-term Coumadin (warfarin).      · 2019.  Patient requesting INR and Coumadin dosing to be followed through the cancer center.  Past Medical History:   Diagnosis Date   • Asthma    • COPD (chronic obstructive pulmonary disease) (CMS/[facility] [facility])    • Coronary heart disease     CABG   • CVA (cerebral vascular accident) (CMS/[facility])     recent CVA   • Gout    • Hypertension        Past Surgical History:   Procedure Laterality Date   • CARDIAC CATHETERIZATION  2016    [facility]   • CORONARY ARTERY BYPASS GRAFT      CABG X3, reoperation, 09; CAB and    • OTHER SURGICAL HISTORY  2016    loop recorder implant          Current Outpatient Medications:   •  allopurinol (ZYLOPRIM) 100 MG tablet, Take 100 mg by mouth Daily., Disp: , Rfl:   •  CloNIDine (CATAPRES) 0.1 MG tablet, Take 0.1 mg by mouth Every 6 (Six) Hours., Disp: , Rfl:   •  clopidogrel (PLAVIX) 75 MG tablet, Take 75 mg by mouth Daily., Disp: , Rfl:   •  ferrous sulfate 325 (65 FE) MG tablet, Take 1 tablet by mouth Daily With Breakfast., Disp: 30 tablet, Rfl: 3  •  hydrALAZINE (APRESOLINE) 50 MG tablet, Take 50 mg by mouth 4 (Four) Times a Day., Disp: , Rfl:   •  isosorbide mononitrate (IMDUR) 30 MG 24 hr tablet, Take 30 mg by mouth Daily., Disp: , Rfl:   •  metoprolol tartrate (LOPRESSOR) 12.5 MG half tablet, Take 12.5 mg by mouth 2 (Two) Times a Day., Disp: , Rfl:   •  potassium chloride (K-DUR,KLOR-CON) 10 MEQ CR tablet, TAKE 1 TABLET BY MOUTH THREE TIMES A DAY, Disp: 90 tablet, Rfl: 0  •  raNITIdine (ZANTAC) 150 MG tablet, Take 150 mg by mouth Daily., Disp: ,  Rfl:   •  tamsulosin (FLOMAX) 0.4 MG capsule 24 hr capsule, Take 1 capsule by mouth Daily., Disp: , Rfl:   •  warfarin (COUMADIN) 1 MG tablet, Take 1 mg by mouth Daily., Disp: , Rfl:   •  warfarin (COUMADIN) 5 MG tablet, TAKE 1 TABLET BY MOUTH DAILY, Disp: 90 tablet, Rfl: 1  •  zolpidem (AMBIEN) 5 MG tablet, TAKE 1 TABLET BY MOUTH EVERY DAY AT BEDTIME AS NEEDED, Disp: 30 tablet, Rfl: 0  •  atorvastatin (LIPITOR) 20 MG tablet, Take 20 mg by mouth Every Night., Disp: , Rfl:   •  bumetanide (BUMEX) 1 MG tablet, Take 1 mg by mouth Daily., Disp: , Rfl:   No current facility-administered medications for this visit.     Facility-Administered Medications Ordered in Other Visits:   •  heparin injection 500 Units, 500 Units, Intravenous, PRN, Monalisa, Carmen, APRN, 500 Units at 02/04/20 1103  •  sodium chloride 0.9 % flush 10 mL, 10 mL, Intravenous, PRN, Monalisa, Carmen, APRN, 30 mL at 02/04/20 0925    No Known Allergies    Family History   Problem Relation Age of Onset   • Stroke Other        Cancer-related family history is not on file.    Social History     Tobacco Use   • Smoking status: Never Smoker   • Smokeless tobacco: Never Used   Substance Use Topics   • Alcohol use: No     Frequency: Never   • Drug use: No       I have reviewed the history of present illness, past medical history, family history, social history, lab results, all notes and other records since the patient was last seen on 11/6/2019.    SUBJECTIVE:  The patient is here for a follow up appointment.  The patient is accompanied by family for this appointment.  The patient denies any new symptoms today.            REVIEW OF SYSTEMS:  Review of Systems   Constitutional: Negative for chills and fever.   HENT: Negative for ear pain, mouth sores, nosebleeds and sore throat.    Eyes: Negative for photophobia and visual disturbance.   Respiratory: Negative for wheezing and stridor.    Cardiovascular: Negative for chest pain and palpitations.   Gastrointestinal:  "Negative for abdominal pain, diarrhea, nausea and vomiting.   Endocrine: Negative for cold intolerance and heat intolerance.   Genitourinary: Negative for dysuria and hematuria.   Musculoskeletal: Negative for joint swelling and neck stiffness.   Skin: Negative for color change and rash.   Neurological: Negative for seizures and syncope.   Hematological: Negative for adenopathy.        No obvious bleeding   Psychiatric/Behavioral: Negative for agitation, confusion and hallucinations.       OBJECTIVE:    Vitals:    02/04/20 1008   BP: 156/75   Pulse: 61   Resp: 18   Temp: 97.8 °F (36.6 °C)   Weight: 78.4 kg (172 lb 12.8 oz)   Height: 175.3 cm (69\")   PainSc: 0-No pain       ECOG  (2) Ambulatory and capable of self care, unable to carry out work activity, up and about > 50% or waking hours    Physical Exam   Constitutional: He is oriented to person, place, and time. No distress.   HENT:   Head: Normocephalic and atraumatic.   Eyes: Conjunctivae and EOM are normal. Right eye exhibits no discharge. Left eye exhibits no discharge. No scleral icterus.   Neck: Normal range of motion. Neck supple. No thyromegaly present.   Cardiovascular: Normal rate, regular rhythm and normal heart sounds. Exam reveals no gallop and no friction rub.   Pulmonary/Chest: Effort normal. No stridor. No respiratory distress. He has no wheezes.   Abdominal: Soft. Bowel sounds are normal. He exhibits no mass. There is no tenderness. There is no rebound and no guarding.   Musculoskeletal: Normal range of motion. He exhibits no tenderness.   Lymphadenopathy:     He has no cervical adenopathy.   Neurological: He is alert and oriented to person, place, and time. He exhibits normal muscle tone.   Skin: Skin is warm. No rash noted. He is not diaphoretic. No erythema.   Psychiatric: He has a normal mood and affect. His behavior is normal.   Nursing note and vitals reviewed.      RECENT LABS  WBC   Date Value Ref Range Status   02/04/2020 6.18 3.40 - " 10.80 10*3/mm3 Final     RBC   Date Value Ref Range Status   02/04/2020 4.30 4.14 - 5.80 10*6/mm3 Final     Hemoglobin   Date Value Ref Range Status   02/04/2020 12.8 (L) 13.0 - 17.7 g/dL Final     Hematocrit   Date Value Ref Range Status   02/04/2020 40.1 37.5 - 51.0 % Final     MCV   Date Value Ref Range Status   02/04/2020 93.3 79.0 - 97.0 fL Final     MCH   Date Value Ref Range Status   02/04/2020 29.8 26.6 - 33.0 pg Final     MCHC   Date Value Ref Range Status   02/04/2020 31.9 31.5 - 35.7 g/dL Final     RDW   Date Value Ref Range Status   02/04/2020 16.5 (H) 12.3 - 15.4 % Final     RDW-SD   Date Value Ref Range Status   02/04/2020 54.9 (H) 37.0 - 54.0 fl Final     MPV   Date Value Ref Range Status   02/04/2020 10.8 6.0 - 12.0 fL Final     Platelets   Date Value Ref Range Status   02/04/2020 110 (L) 140 - 450 10*3/mm3 Final     Neutrophil %   Date Value Ref Range Status   02/04/2020 67.7 42.7 - 76.0 % Final     Lymphocyte %   Date Value Ref Range Status   02/04/2020 20.2 19.6 - 45.3 % Final     Monocyte %   Date Value Ref Range Status   02/04/2020 8.4 5.0 - 12.0 % Final     Eosinophil %   Date Value Ref Range Status   02/04/2020 1.9 0.3 - 6.2 % Final     Basophil %   Date Value Ref Range Status   02/04/2020 1.8 (H) 0.0 - 1.5 % Final     Neutrophils, Absolute   Date Value Ref Range Status   02/04/2020 4.18 1.70 - 7.00 10*3/mm3 Final     Lymphocytes, Absolute   Date Value Ref Range Status   02/04/2020 1.25 0.70 - 3.10 10*3/mm3 Final     Monocytes, Absolute   Date Value Ref Range Status   02/04/2020 0.52 0.10 - 0.90 10*3/mm3 Final     Eosinophils, Absolute   Date Value Ref Range Status   02/04/2020 0.12 0.00 - 0.40 10*3/mm3 Final     Basophils, Absolute   Date Value Ref Range Status   02/04/2020 0.11 0.00 - 0.20 10*3/mm3 Final     nRBC   Date Value Ref Range Status   02/16/2017 0 0 /100[WBCs] Final       Lab Results   Component Value Date    GLUCOSE 108 (H) 11/06/2019    BUN 26 (H) 11/06/2019    CREATININE 1.43  (H) 11/06/2019    EGFRIFAFRI 57 (L) 11/06/2019    BCR 18.2 11/06/2019    K 4.3 11/06/2019    CO2 25.0 11/06/2019    CALCIUM 9.4 11/06/2019    ALBUMIN 4.10 11/06/2019    LABIL2 1.3 05/21/2019    AST 22 11/06/2019    ALT 10 11/06/2019         Assessment/Plan     There are no diagnoses linked to this encounter.    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  He has stable findings with no evidence of progression.  He also has an ascending aortic aneurysm 4.2 cm.  I have given patient referral to vascular for further evaluation.  His INR is subtherapeutic and is being monitored in the warfarin clinic. Adjustment has been made.        PLANS:     · Follow-up in Coumadin clinic  · Continue monthly port flushes.  · Follow up with Dr. Briseno with Urology   · Reviewed labs including ferritin which was noted to be 47 as of 11/6/2019  · To vascular for aortic aneurysm  · Referred to [facility] for EGD/colonoscopy.  · Follow-up in 3 months         I have reviewed labs results, imaging, vitals, and medications with the patient today. Will follow up in 3 months with me.    Patient verbalized understanding and is in agreement of the above plan.    Part of this document was scribed by Zuleika Mccord, RN, BSN.      This patient is new to me  I spent greater than 40 minutes in face-to-face time with the patient and 95% of that time were spent in counseling and  coordination of care, including review of imaging and pathology; indications for treatment, goals of therapy, alternatives and risks - both common and rare - as well as surveillance and potential outcomes.

## 2020-02-04 ENCOUNTER — HOSPITAL ENCOUNTER (OUTPATIENT)
Dept: ONCOLOGY | Facility: HOSPITAL | Age: 85
Discharge: HOME | End: 2020-02-04
Admitting: NURSE PRACTITIONER

## 2020-02-04 ENCOUNTER — OFFICE VISIT (OUTPATIENT)
Dept: ONCOLOGY | Facility: CLINIC | Age: 85
End: 2020-02-04

## 2020-02-04 ENCOUNTER — HOSPITAL ENCOUNTER (OUTPATIENT)
Dept: ONCOLOGY | Facility: HOSPITAL | Age: 85
Discharge: HOME | End: 2020-02-04

## 2020-02-04 ENCOUNTER — LAB (OUTPATIENT)
Dept: LAB | Facility: HOSPITAL | Age: 85
End: 2020-02-04

## 2020-02-04 VITALS
RESPIRATION RATE: 18 BRPM | DIASTOLIC BLOOD PRESSURE: 75 MMHG | SYSTOLIC BLOOD PRESSURE: 156 MMHG | HEART RATE: 61 BPM | HEIGHT: 69 IN | TEMPERATURE: 97.8 F | WEIGHT: 172.8 LBS | BODY MASS INDEX: 25.59 KG/M2

## 2020-02-04 DIAGNOSIS — D63.1 ANEMIA DUE TO STAGE 3 CHRONIC KIDNEY DISEASE (HCC): ICD-10-CM

## 2020-02-04 DIAGNOSIS — C82.93 FOLLICULAR LYMPHOMA OF INTRA-ABDOMINAL LYMPH NODES, UNSPECIFIED FOLLICULAR LYMPHOMA TYPE (HCC): Primary | ICD-10-CM

## 2020-02-04 DIAGNOSIS — I48.20 CHRONIC ATRIAL FIBRILLATION (HCC): ICD-10-CM

## 2020-02-04 DIAGNOSIS — I72.9 ANEURYSM (HCC): ICD-10-CM

## 2020-02-04 DIAGNOSIS — Z79.01 LONG TERM (CURRENT) USE OF ANTICOAGULANTS: Primary | ICD-10-CM

## 2020-02-04 DIAGNOSIS — N18.30 ANEMIA DUE TO STAGE 3 CHRONIC KIDNEY DISEASE (HCC): ICD-10-CM

## 2020-02-04 DIAGNOSIS — Z45.2 ENCOUNTER FOR CARE RELATED TO VASCULAR ACCESS PORT: ICD-10-CM

## 2020-02-04 DIAGNOSIS — Z45.2 ENCOUNTER FOR CARE RELATED TO VASCULAR ACCESS PORT: Primary | ICD-10-CM

## 2020-02-04 DIAGNOSIS — D50.0 IRON DEFICIENCY ANEMIA DUE TO CHRONIC BLOOD LOSS: ICD-10-CM

## 2020-02-04 DIAGNOSIS — D61.818 PANCYTOPENIA (HCC): ICD-10-CM

## 2020-02-04 LAB
BASOPHILS # BLD AUTO: 0.11 10*3/MM3 (ref 0–0.2)
BASOPHILS NFR BLD AUTO: 1.8 % (ref 0–1.5)
DEPRECATED RDW RBC AUTO: 54.9 FL (ref 37–54)
EOSINOPHIL # BLD AUTO: 0.12 10*3/MM3 (ref 0–0.4)
EOSINOPHIL NFR BLD AUTO: 1.9 % (ref 0.3–6.2)
ERYTHROCYTE [DISTWIDTH] IN BLOOD BY AUTOMATED COUNT: 16.5 % (ref 12.3–15.4)
HCT VFR BLD AUTO: 40.1 % (ref 37.5–51)
HGB BLD-MCNC: 12.8 G/DL (ref 13–17.7)
INR PPP: 1.8
LYMPHOCYTES # BLD AUTO: 1.25 10*3/MM3 (ref 0.7–3.1)
LYMPHOCYTES NFR BLD AUTO: 20.2 % (ref 19.6–45.3)
MCH RBC QN AUTO: 29.8 PG (ref 26.6–33)
MCHC RBC AUTO-ENTMCNC: 31.9 G/DL (ref 31.5–35.7)
MCV RBC AUTO: 93.3 FL (ref 79–97)
MONOCYTES # BLD AUTO: 0.52 10*3/MM3 (ref 0.1–0.9)
MONOCYTES NFR BLD AUTO: 8.4 % (ref 5–12)
NEUTROPHILS # BLD AUTO: 4.18 10*3/MM3 (ref 1.7–7)
NEUTROPHILS NFR BLD AUTO: 67.7 % (ref 42.7–76)
PLATELET # BLD AUTO: 110 10*3/MM3 (ref 140–450)
PMV BLD AUTO: 10.8 FL (ref 6–12)
RBC # BLD AUTO: 4.3 10*6/MM3 (ref 4.14–5.8)
WBC NRBC COR # BLD: 6.18 10*3/MM3 (ref 3.4–10.8)

## 2020-02-04 PROCEDURE — 85610 PROTHROMBIN TIME: CPT

## 2020-02-04 PROCEDURE — 85025 COMPLETE CBC W/AUTO DIFF WBC: CPT | Performed by: NURSE PRACTITIONER

## 2020-02-04 PROCEDURE — G0463 HOSPITAL OUTPT CLINIC VISIT: HCPCS

## 2020-02-04 PROCEDURE — 25010000003 HEPARIN LOCK FLUCH PER 10 UNITS: Performed by: NURSE PRACTITIONER

## 2020-02-04 PROCEDURE — 99215 OFFICE O/P EST HI 40 MIN: CPT | Performed by: INTERNAL MEDICINE

## 2020-02-04 RX ORDER — HEPARIN SODIUM (PORCINE) LOCK FLUSH IV SOLN 100 UNIT/ML 100 UNIT/ML
500 SOLUTION INTRAVENOUS AS NEEDED
Status: DISCONTINUED | OUTPATIENT
Start: 2020-02-04 | End: 2020-02-05 | Stop reason: HOSPADM

## 2020-02-04 RX ORDER — HEPARIN SODIUM (PORCINE) LOCK FLUSH IV SOLN 100 UNIT/ML 100 UNIT/ML
500 SOLUTION INTRAVENOUS AS NEEDED
Status: CANCELLED | OUTPATIENT
Start: 2020-02-04

## 2020-02-04 RX ORDER — SODIUM CHLORIDE 0.9 % (FLUSH) 0.9 %
10 SYRINGE (ML) INJECTION AS NEEDED
Status: DISCONTINUED | OUTPATIENT
Start: 2020-02-04 | End: 2020-02-05 | Stop reason: HOSPADM

## 2020-02-04 RX ORDER — SODIUM CHLORIDE 0.9 % (FLUSH) 0.9 %
10 SYRINGE (ML) INJECTION AS NEEDED
Status: CANCELLED | OUTPATIENT
Start: 2020-02-04

## 2020-02-04 RX ORDER — POTASSIUM CHLORIDE 750 MG/1
TABLET, FILM COATED, EXTENDED RELEASE ORAL
COMMUNITY
Start: 2019-12-16 | End: 2020-02-04 | Stop reason: SDUPTHER

## 2020-02-04 RX ADMIN — HEPARIN 500 UNITS: 100 SYRINGE at 11:03

## 2020-02-04 RX ADMIN — Medication 30 ML: at 09:25

## 2020-02-05 ENCOUNTER — APPOINTMENT (OUTPATIENT)
Dept: ONCOLOGY | Facility: HOSPITAL | Age: 85
End: 2020-02-05

## 2020-02-05 ENCOUNTER — APPOINTMENT (OUTPATIENT)
Dept: LAB | Facility: HOSPITAL | Age: 85
End: 2020-02-05

## 2020-02-11 ENCOUNTER — LAB (OUTPATIENT)
Dept: LAB | Facility: HOSPITAL | Age: 85
End: 2020-02-11

## 2020-02-11 ENCOUNTER — HOSPITAL ENCOUNTER (OUTPATIENT)
Dept: ONCOLOGY | Facility: HOSPITAL | Age: 85
Discharge: HOME | End: 2020-02-11
Admitting: NURSE PRACTITIONER

## 2020-02-11 VITALS
WEIGHT: 169 LBS | HEIGHT: 69 IN | SYSTOLIC BLOOD PRESSURE: 187 MMHG | RESPIRATION RATE: 18 BRPM | BODY MASS INDEX: 25.03 KG/M2 | DIASTOLIC BLOOD PRESSURE: 92 MMHG | TEMPERATURE: 97.4 F | HEART RATE: 61 BPM

## 2020-02-11 DIAGNOSIS — I48.20 CHRONIC ATRIAL FIBRILLATION (HCC): ICD-10-CM

## 2020-02-11 DIAGNOSIS — Z79.01 LONG TERM (CURRENT) USE OF ANTICOAGULANTS: Primary | ICD-10-CM

## 2020-02-11 LAB — INR PPP: 2.2

## 2020-02-11 PROCEDURE — 85610 PROTHROMBIN TIME: CPT

## 2020-02-11 PROCEDURE — G0463 HOSPITAL OUTPT CLINIC VISIT: HCPCS

## 2020-02-18 ENCOUNTER — HOSPITAL ENCOUNTER (OUTPATIENT)
Dept: ONCOLOGY | Facility: HOSPITAL | Age: 85
Discharge: HOME | End: 2020-02-18
Admitting: NURSE PRACTITIONER

## 2020-02-18 ENCOUNTER — LAB (OUTPATIENT)
Dept: LAB | Facility: HOSPITAL | Age: 85
End: 2020-02-18

## 2020-02-18 VITALS
TEMPERATURE: 98 F | HEART RATE: 56 BPM | DIASTOLIC BLOOD PRESSURE: 82 MMHG | RESPIRATION RATE: 18 BRPM | WEIGHT: 170 LBS | SYSTOLIC BLOOD PRESSURE: 157 MMHG | HEIGHT: 69 IN | BODY MASS INDEX: 25.18 KG/M2

## 2020-02-18 DIAGNOSIS — I48.20 CHRONIC ATRIAL FIBRILLATION (HCC): ICD-10-CM

## 2020-02-18 DIAGNOSIS — Z79.01 LONG TERM (CURRENT) USE OF ANTICOAGULANTS: Primary | ICD-10-CM

## 2020-02-18 LAB — INR PPP: 2.6

## 2020-02-18 PROCEDURE — 36416 COLLJ CAPILLARY BLOOD SPEC: CPT

## 2020-02-18 PROCEDURE — 85610 PROTHROMBIN TIME: CPT

## 2020-03-04 ENCOUNTER — APPOINTMENT (OUTPATIENT)
Dept: ONCOLOGY | Facility: HOSPITAL | Age: 85
End: 2020-03-04

## 2020-03-05 ENCOUNTER — OFFICE VISIT (OUTPATIENT)
Dept: CARDIAC SURGERY | Facility: CLINIC | Age: 85
End: 2020-03-05

## 2020-03-05 VITALS
OXYGEN SATURATION: 96 % | DIASTOLIC BLOOD PRESSURE: 84 MMHG | BODY MASS INDEX: 25.49 KG/M2 | TEMPERATURE: 97.6 F | SYSTOLIC BLOOD PRESSURE: 142 MMHG | HEART RATE: 69 BPM | HEIGHT: 69 IN | RESPIRATION RATE: 20 BRPM | WEIGHT: 172.1 LBS

## 2020-03-05 DIAGNOSIS — I10 ESSENTIAL HYPERTENSION: ICD-10-CM

## 2020-03-05 DIAGNOSIS — R31.0 GROSS HEMATURIA: ICD-10-CM

## 2020-03-05 DIAGNOSIS — I34.0 MODERATE TO SEVERE MITRAL REGURGITATION: ICD-10-CM

## 2020-03-05 DIAGNOSIS — I48.20 CHRONIC ATRIAL FIBRILLATION (HCC): Primary | ICD-10-CM

## 2020-03-05 DIAGNOSIS — C82.93 FOLLICULAR LYMPHOMA OF INTRA-ABDOMINAL LYMPH NODES, UNSPECIFIED FOLLICULAR LYMPHOMA TYPE (HCC): ICD-10-CM

## 2020-03-05 PROCEDURE — 99204 OFFICE O/P NEW MOD 45 MIN: CPT | Performed by: THORACIC SURGERY (CARDIOTHORACIC VASCULAR SURGERY)

## 2020-03-05 NOTE — PROGRESS NOTES
3/5/2020    Chief Complaint     Evaluation of ascending aortic dilatation    History of Present Illness:       Dear Dr. Vargas, Stacie Decker,* and Colleagues,  It was nice to see Barry Calhoun in consultation at your request. He is a 87 y.o. male with a history of lymphoma, pancytopenia, presumably atrial fibrillation,  hypertension, hematuria and anemia with chronic kidney disease 3 previous CABG operations with a recent CT scan of 4.2 cm ascending aorta.  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  CT scan of the abdomen and pelvis showed a 2.6 x 2.2 cm mass between the IVC and aorta which extends to the origin of his coronary.  There was diagnosed metastatic adenopathy however a differential could have been amyloidosis.  A PET scan seemingly is scheduled.  The findings in the ascending aorta showed a mild iron without a central ulceration and also mild rotation of the pulmonary artery.  He has no family history of aneurysms, dissections or connective tissue disorders.  He has history of coronary artery disease and multiple cavities however he is not having chest pain at the present.    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   • Anemia due to chronic kidney disease stage III   • Atrial fibrillation (CMS/HCC)   • Monitoring of monoclonal antibody treatment for malignancy   • Gross hematuria, chronic   • Healthcare maintenance   • Essential hypertension   • Moderate to severe mitral regurgitation       Past Medical History:   Diagnosis Date   • Asthma    • COPD (chronic obstructive pulmonary disease) (CMS/HCC)    • Coronary heart disease     CABG   • CVA (cerebral vascular accident) (CMS/HCC)     recent CVA   •  Gout    • Hypertension        Past Surgical History:   Procedure Laterality Date   • CARDIAC CATHETERIZATION  2016    [facility]   • CORONARY ARTERY BYPASS GRAFT      CABG X3, reoperation, 09; CAB and    • OTHER SURGICAL HISTORY  2016    loop recorder implant        No Known Allergies      Current Outpatient Medications:   •  allopurinol (ZYLOPRIM) 100 MG tablet, Take 100 mg by mouth Daily., Disp: , Rfl:   •  atorvastatin (LIPITOR) 20 MG tablet, Take 20 mg by mouth Every Night., Disp: , Rfl:   •  bumetanide (BUMEX) 1 MG tablet, Take 1 mg by mouth Daily., Disp: , Rfl:   •  CloNIDine (CATAPRES) 0.1 MG tablet, Take 0.1 mg by mouth Every 6 (Six) Hours., Disp: , Rfl:   •  clopidogrel (PLAVIX) 75 MG tablet, Take 75 mg by mouth Daily., Disp: , Rfl:   •  ferrous sulfate 325 (65 FE) MG tablet, Take 1 tablet by mouth Daily With Breakfast., Disp: 30 tablet, Rfl: 3  •  hydrALAZINE (APRESOLINE) 50 MG tablet, Take 50 mg by mouth 4 (Four) Times a Day., Disp: , Rfl:   •  isosorbide mononitrate (IMDUR) 30 MG 24 hr tablet, Take 30 mg by mouth Daily., Disp: , Rfl:   •  metoprolol tartrate (LOPRESSOR) 12.5 MG half tablet, Take 12.5 mg by mouth 2 (Two) Times a Day., Disp: , Rfl:   •  potassium chloride (K-DUR,KLOR-CON) 10 MEQ CR tablet, TAKE 1 TABLET BY MOUTH THREE TIMES A DAY, Disp: 90 tablet, Rfl: 0  •  raNITIdine (ZANTAC) 150 MG tablet, Take 150 mg by mouth Daily., Disp: , Rfl:   •  tamsulosin (FLOMAX) 0.4 MG capsule 24 hr capsule, Take 1 capsule by mouth Daily., Disp: , Rfl:   •  warfarin (COUMADIN) 1 MG tablet, Take 1 mg by mouth Daily., Disp: , Rfl:   •  warfarin (COUMADIN) 5 MG tablet, TAKE 1 TABLET BY MOUTH DAILY, Disp: 90 tablet, Rfl: 1  •  zolpidem (AMBIEN) 5 MG tablet, TAKE 1 TABLET BY MOUTH EVERY DAY AT BEDTIME AS NEEDED, Disp: 30 tablet, Rfl: 0    Social History     Socioeconomic History   • Marital status:      Spouse name: Not on file   • Number of children: Not on file   • Years of education: Not  on file   • Highest education level: Not on file   Tobacco Use   • Smoking status: Never Smoker   • Smokeless tobacco: Never Used   Substance and Sexual Activity   • Alcohol use: No     Frequency: Never   • Drug use: No   • Sexual activity: Defer       Family History   Problem Relation Age of Onset   • Stroke Other      Review of Systems   Constitutional: Positive for fatigue.   Respiratory: Negative for shortness of breath.    Cardiovascular: Negative for palpitations and leg swelling.   Gastrointestinal: Negative for blood in stool, constipation and diarrhea.   Genitourinary: Negative for flank pain and hematuria.   Neurological: Negative for dizziness and weakness.   All other systems reviewed and are negative.      Physical Exam:    Vital Signs:  Weight: 78.1 kg (172 lb 1.6 oz)   Body mass index is 25.41 kg/m².  Temp: 97.6 °F (36.4 °C)   Heart Rate: 69   BP: 142/84     Physical Exam   Constitutional: He is oriented to person, place, and time. He appears well-developed and well-nourished.   HENT:   Head: Normocephalic and atraumatic.   Nose: Nose normal.   Mouth/Throat: Oropharynx is clear and moist.   Eyes: Pupils are equal, round, and reactive to light. Conjunctivae are normal.   Neck: Normal range of motion. Neck supple. No JVD present. No thyromegaly present.   Cardiovascular: Normal rate, regular rhythm, S1 normal, S2 normal and intact distal pulses. Exam reveals no gallop and no friction rub.   No murmur heard.  Pulses:       Radial pulses are 2+ on the right side, and 2+ on the left side.        Posterior tibial pulses are 2+ on the right side, and 2+ on the left side.   Pulmonary/Chest: Effort normal and breath sounds normal. He has no rales.   Abdominal: Soft. Bowel sounds are normal. He exhibits no distension and no mass. There is no tenderness.   Musculoskeletal: Normal range of motion. He exhibits no tenderness or deformity.   Lymphadenopathy:     He has no cervical adenopathy.   Neurological: He is  alert and oriented to person, place, and time. He has normal reflexes.   Skin: Skin is warm and dry. No rash noted. No erythema.   Psychiatric: He has a normal mood and affect. His behavior is normal.   No neck or groin adenopathy     Assessment:     Problem List Items Addressed This Visit        Cardiovascular and Mediastinum    Atrial fibrillation (CMS/HCC) - Primary    Essential hypertension    Moderate to severe mitral regurgitation       Genitourinary    Gross hematuria, chronic       Hematopoietic and Hemostatic    Non-Hodgkin's lymphoma (CMS/HCC)          1. History of hematological malignancy.  He has stage III follicular, and evidence of a new mass in the abdomen/peritoneum.  Oncology is following him regarding further studies.  2. He has mild elevation of ascending aorta.  I not see any evidence of dissection or ulceration.  3. Hypertension, with borderline control  4. Chronic hematuria, not at the present    Diagnosis above of atrial fibrillation and mitral regurgitations are incorrect    Recommendation/Plan:     He has mild dilatation of the ascending aorta, near aneurysmal. No concerning changes. I recommend a chest CT and office visit in 2 years  HTN, needs better control . He will see primary  CAD S/P CABG. No anginal symptoms    Thank you for allowing me to participate in his care.    Regards,    Juan Daniel Prajapati M.D.

## 2020-03-16 ENCOUNTER — TELEPHONE (OUTPATIENT)
Dept: ONCOLOGY | Facility: HOSPITAL | Age: 85
End: 2020-03-16

## 2020-03-17 ENCOUNTER — HOSPITAL ENCOUNTER (OUTPATIENT)
Dept: ONCOLOGY | Facility: HOSPITAL | Age: 85
Discharge: HOME | End: 2020-03-17
Admitting: NURSE PRACTITIONER

## 2020-03-17 ENCOUNTER — LAB (OUTPATIENT)
Dept: LAB | Facility: HOSPITAL | Age: 85
End: 2020-03-17

## 2020-03-17 VITALS
SYSTOLIC BLOOD PRESSURE: 135 MMHG | DIASTOLIC BLOOD PRESSURE: 81 MMHG | WEIGHT: 171 LBS | TEMPERATURE: 97.6 F | HEIGHT: 69 IN | BODY MASS INDEX: 25.33 KG/M2 | HEART RATE: 67 BPM | RESPIRATION RATE: 18 BRPM

## 2020-03-17 DIAGNOSIS — I48.20 CHRONIC ATRIAL FIBRILLATION (HCC): ICD-10-CM

## 2020-03-17 DIAGNOSIS — Z45.2 ENCOUNTER FOR CARE RELATED TO VASCULAR ACCESS PORT: Primary | ICD-10-CM

## 2020-03-17 LAB — INR PPP: 2.4

## 2020-03-17 PROCEDURE — 25010000003 HEPARIN LOCK FLUCH PER 10 UNITS: Performed by: NURSE PRACTITIONER

## 2020-03-17 PROCEDURE — 85610 PROTHROMBIN TIME: CPT

## 2020-03-17 PROCEDURE — 36416 COLLJ CAPILLARY BLOOD SPEC: CPT

## 2020-03-17 PROCEDURE — G0463 HOSPITAL OUTPT CLINIC VISIT: HCPCS

## 2020-03-17 RX ORDER — HEPARIN SODIUM (PORCINE) LOCK FLUSH IV SOLN 100 UNIT/ML 100 UNIT/ML
500 SOLUTION INTRAVENOUS AS NEEDED
Status: DISCONTINUED | OUTPATIENT
Start: 2020-03-17 | End: 2020-03-19 | Stop reason: HOSPADM

## 2020-03-17 RX ORDER — HEPARIN SODIUM (PORCINE) LOCK FLUSH IV SOLN 100 UNIT/ML 100 UNIT/ML
500 SOLUTION INTRAVENOUS AS NEEDED
Status: CANCELLED | OUTPATIENT
Start: 2020-03-17

## 2020-03-17 RX ORDER — SODIUM CHLORIDE 0.9 % (FLUSH) 0.9 %
10 SYRINGE (ML) INJECTION AS NEEDED
Status: CANCELLED | OUTPATIENT
Start: 2020-03-17

## 2020-03-17 RX ORDER — SODIUM CHLORIDE 0.9 % (FLUSH) 0.9 %
10 SYRINGE (ML) INJECTION AS NEEDED
Status: DISCONTINUED | OUTPATIENT
Start: 2020-03-17 | End: 2020-03-19 | Stop reason: HOSPADM

## 2020-03-17 RX ADMIN — Medication 30 ML: at 09:34

## 2020-03-17 RX ADMIN — HEPARIN 500 UNITS: 100 SYRINGE at 09:40

## 2020-04-01 ENCOUNTER — APPOINTMENT (OUTPATIENT)
Dept: ONCOLOGY | Facility: HOSPITAL | Age: 85
End: 2020-04-01

## 2020-04-14 ENCOUNTER — LAB (OUTPATIENT)
Dept: LAB | Facility: HOSPITAL | Age: 85
End: 2020-04-14

## 2020-04-14 ENCOUNTER — HOSPITAL ENCOUNTER (OUTPATIENT)
Dept: ONCOLOGY | Facility: HOSPITAL | Age: 85
Discharge: HOME | End: 2020-04-14
Admitting: NURSE PRACTITIONER

## 2020-04-14 DIAGNOSIS — Z45.2 ENCOUNTER FOR CARE RELATED TO VASCULAR ACCESS PORT: Primary | ICD-10-CM

## 2020-04-14 DIAGNOSIS — I48.20 CHRONIC ATRIAL FIBRILLATION (HCC): ICD-10-CM

## 2020-04-14 LAB — INR PPP: 2.9

## 2020-04-14 PROCEDURE — 85610 PROTHROMBIN TIME: CPT

## 2020-04-14 PROCEDURE — 25010000003 HEPARIN LOCK FLUCH PER 10 UNITS: Performed by: NURSE PRACTITIONER

## 2020-04-14 PROCEDURE — 36416 COLLJ CAPILLARY BLOOD SPEC: CPT

## 2020-04-14 RX ORDER — HEPARIN SODIUM (PORCINE) LOCK FLUSH IV SOLN 100 UNIT/ML 100 UNIT/ML
500 SOLUTION INTRAVENOUS AS NEEDED
Status: CANCELLED | OUTPATIENT
Start: 2020-04-14

## 2020-04-14 RX ORDER — WARFARIN SODIUM 5 MG/1
TABLET ORAL
Qty: 90 TABLET | Refills: 1 | Status: SHIPPED | OUTPATIENT
Start: 2020-04-14 | End: 2020-11-18 | Stop reason: SDUPTHER

## 2020-04-14 RX ORDER — HEPARIN SODIUM (PORCINE) LOCK FLUSH IV SOLN 100 UNIT/ML 100 UNIT/ML
500 SOLUTION INTRAVENOUS AS NEEDED
Status: DISCONTINUED | OUTPATIENT
Start: 2020-04-14 | End: 2020-04-15 | Stop reason: HOSPADM

## 2020-04-14 RX ORDER — SODIUM CHLORIDE 0.9 % (FLUSH) 0.9 %
10 SYRINGE (ML) INJECTION AS NEEDED
Status: CANCELLED | OUTPATIENT
Start: 2020-04-14

## 2020-04-14 RX ORDER — SODIUM CHLORIDE 0.9 % (FLUSH) 0.9 %
10 SYRINGE (ML) INJECTION AS NEEDED
Status: DISCONTINUED | OUTPATIENT
Start: 2020-04-14 | End: 2020-04-15 | Stop reason: HOSPADM

## 2020-04-14 RX ADMIN — HEPARIN 500 UNITS: 100 SYRINGE at 08:52

## 2020-04-14 RX ADMIN — Medication 30 ML: at 08:51

## 2020-04-16 RX ORDER — ZOLPIDEM TARTRATE 5 MG/1
5 TABLET ORAL NIGHTLY PRN
Qty: 30 TABLET | Refills: 1 | Status: SHIPPED | OUTPATIENT
Start: 2020-04-16 | End: 2020-06-15

## 2020-05-04 ENCOUNTER — TELEPHONE (OUTPATIENT)
Dept: ONCOLOGY | Facility: CLINIC | Age: 85
End: 2020-05-04

## 2020-05-04 NOTE — TELEPHONE ENCOUNTER
CALLED I FOR EGD, OFFIC NOTE. PATIENT HAS NOT BEEN THERE SINCE 2013. HE WAS SCHEDULED FOR AN EGD BUT CANCELLED THE PROCEDURE.

## 2020-05-04 NOTE — TELEPHONE ENCOUNTER
CALLED [facility] FOR RECENT PROGRESS NOTE AND ANY RECORDS PERTAINING TO VISIT.    PER TASHI GOMEZ/ DR. MARLOW. PATIENT HAS NOT BEEN INTO THE OFFICE SINCE HIS SURGERY AT [facility] IN 2019. RECORDS ALREADY IN HIS CHART.

## 2020-05-05 ENCOUNTER — OFFICE VISIT (OUTPATIENT)
Dept: ONCOLOGY | Facility: CLINIC | Age: 85
End: 2020-05-05

## 2020-05-05 ENCOUNTER — APPOINTMENT (OUTPATIENT)
Dept: ONCOLOGY | Facility: HOSPITAL | Age: 85
End: 2020-05-05

## 2020-05-05 ENCOUNTER — APPOINTMENT (OUTPATIENT)
Dept: LAB | Facility: HOSPITAL | Age: 85
End: 2020-05-05

## 2020-05-05 DIAGNOSIS — C82.93 FOLLICULAR LYMPHOMA OF INTRA-ABDOMINAL LYMPH NODES, UNSPECIFIED FOLLICULAR LYMPHOMA TYPE (HCC): Primary | ICD-10-CM

## 2020-05-05 PROCEDURE — 99443 PR PHYS/QHP TELEPHONE EVALUATION 21-30 MIN: CPT | Performed by: INTERNAL MEDICINE

## 2020-05-05 NOTE — PROGRESS NOTES
Hematology/Oncology Outpatient Follow Up    PATIENT NAME:Barry Calhoun  :1932  MRN: [id number]  PRIMARY CARE PHYSICIAN: Pedro Purvis MD  REFERRING PHYSICIAN: Pedro Purvis MD    Chief Complaint   Patient presents with   • Follow-up     History of follicular lymphoma   • Follow-up     History of iron deficiency anemia       You have chosen to receive care through a telephone visit. Do you consent to use a telephone visit for your medical care today? Yes     HISTORY OF PRESENT ILLNESS:   1. 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   · CT abdomen and pelvis done 11/4/15 that showed a new retroperitoneal tissue between the aorta and IVC measuring 2.6 x 2.2 cm, extending from the level of origin of the superior mesenteric artery. The differential diagnosis included lymphoma as well as atypical retroperitoneal fibrosis. Metastatic adenopathy was also in the differential as well as amyloidosis. Recommending a PET CT for correlation. There was non-specific prostate enlargement and coronary artery and aortoiliac atherosclerosis. On 3/8/16 he saw Dr. Pedro Purvis in follow-up. He was complaining of leg swelling and insomnia. He was able to go to sleep but would wake up a few hours after sleeping and could not get back to sleep. He also reported a metallic taste in his mouth. Bilateral lower extremity Dopplers were performed that were negative for DVT. A CT abdomen and pelvis was ordered and performed on 3/8/16 that showed a retroperitoneal mass extending dorsal to the aorta on the left and right side, obscuring the periaortic fat, transversing caudally along the psoas muscles. The mass caused left-sided obstructive uropathy just caudal to the ureteropelvic junction. Differential diagnosis included lymphoma, metastasis or primary sarcoma. There were no other soft tissue masses throughout the abdomen. There was an enlarged  prostate with central lucent lesion, possibly due to a TURP defect. The mass measured 4.2 x 1.9 cm on transaxial on the right periaortic retroperitoneum. On the left periaortic retroperitoneum it measured 3.8 x 2.8 cm. He was seen by Dr. Briseno on 3/16/16. Urinalysis was negative for blood, nitrates and leukocytes. He was diagnosed with BPH with LUTS and hydronephrosis with plan to perform a cysto with stent placement soon.   · 3/17/16 - Patient seen for initial consultation regarding enlarging retroperitoneal mass at the [facility]. Quantitative hCG <0.1 (<2.0). ESR 45 (<21). AFP 4 (0-9). Chest x-ray with linear opacity right middle lobe. Question of atelectasis or scar and cardiomegaly with changes of CABG. PSA 6.37 (0-4).  Result sent to Dr. Briseno.   · 4/2/16 - CT head had a focal hyperdense area in the left cerebral hemisphere representing acute ischemia.   · 4/5/16 - CT chest, abdomen and pelvis done during hospitalization at [facility] revealed no findings of primary or metastatic disease in the chest. Dilated main pulmonary artery. Mild ascending thoracic aortic ectasia measuring up to 4.1 cm. Bilateral ureteral stents. The proximal left ureteral stent may be located within the renal parenchyma or perhaps anterior calyx. Possibly mild left hydronephrosis. Abnormal periaortic soft tissue mass ascending from the renal vessels through the iliac bifurcation.   · 4/14/16 - Patient did not have CT-guided biopsy of retroperitoneal mass done as he was hospitalized at [facility] with a stroke.   · 5/2/16 - Chest x-ray with cardiomegaly and chronic changes about the chest. Recording time device overlying the left side of the chest.   · 5/17/16 - Patient did not want to schedule biopsy of the lymph nodes. Patient claims not to want to go for CT-guided retroperitoneal mass biopsy, but would proceed with a bone marrow biopsy.   · 5/27/16 - Bone marrow aspiration and biopsy with  adequate normocellular bone marrow (35%) showing trilineage hematopoiesis, a moderate plasma cytosis, and increased iron stores. Flow cytometry revealed a decreased CD-4, CD-8 helper suppressor T-cell ratio suggesting viral infection or immunocompromise. Cytogenetics revealed normal karyotype. Plasma cells were increased to 12-15%. Copper 141 ().  · 6/9/16 - WBC 4.23, hemoglobin 10.1, MCV 85.9, platelet count 152,000.   · 7/7/16 - Patient has agreed to have a CT-guided retroperitoneal mass biopsy which I am concerned may be a plasma cytoma. Beta-2 microglobulin 3.72 (<2.16).    · 8/2/16 - CT abdomen and pelvis with significant progression of extensive infiltrative retroperitoneal process. New left ureteral stent with mild residual caliectasis on the left with new right hydronephrosis due to partial obstruction of the right ureter. Enlarged prostate.   · 12/9/16 - Patient underwent cystoscopy with bilateral stent exchange and bilateral retrograde pyelogram by Ganesh Briseno M.D.   · 1/13/17 - Chest x-ray with mild cardiac enlargement with borderline pulmonary venous redistribution , unchanged, with chronic elevation or eventration of the right hemidiaphragm.   · 1/17/17 - Patient was hospitalized at [facility] between 1/17/17 and 1/25/17 with right scrotal swelling. Stool Hemoccult was negative. Hemoglobin 8.9, MCV 79.2. IV diuretics were given. Cardiology and Urology consultations were obtained. He then underwent a core needle biopsy of the retroperitoneal mass while holding Coumadin, Aspirin and Plavix. Bilateral lower extremity Dopplers were negative for DVT. CT of the abdomen and pelvis had extensive confluent soft tissue density in the retroperitoneum, increased in size from previous study. In addition, there was some iliac adenopathy, especially on the right. Bilateral double pigtail ureteral stents were in good position without hydronephrosis. Extensive soft tissue stranding in the subcutaneous sub mesenteric fat  was present.   · 1/18/17 - Echocardiogram with pulmonary artery systolic pressure 60-65 mmHg. Moderate pulmonary hypertension. Left ventricular systolic function normal with EF of 55-60%. LV diastolic dysfunction noted. Transmitral spectral Doppler flow pattern suggestive of restrictive physiology with moderate mitral regurgitation.   · 1/23/17 - Patient underwent ultrasound-guided fine needle aspiration of retroperitoneal lymph node at [facility] by Stephen Ricci M.D. with pathology revealing friable adipose tissue with prominent infiltrative lymphoid cell population suspicious for lymphoma. Focal areas demonstrated vague follicle formations. Crush artifact was present. Flow cytometry revealed low viability and hypocellular specimen which failed to reveal a significant B-cell or monoclonal B-cell population. The majority of the lymphocytes were negative for lambda and kappa light chains. The impression was B-cell lymphoma favoring follicle center cell lymphoma and final report was supposed to be pending outside expert consultation.   · 1/30/17 - Retroperitoneal lymph node biopsy specimen was reviewed by Lenny Johnson M.D. and classified as B-cell lymphoma. It was somewhat difficult to classify given the architectural distortion as well as the crusted areas of the tumor. It was thought that at least a follicular lymphoma with areas of diffuse fat was present. There was concern of areas of large cell lymphoma, particularly in the adipose tissue.   · 2/3/17 - Patient returns after a seven month absence. Had to cancel his scheduled lymph node biopsies until his last hospitalization at [facility] where he was not seen by us in consultation. Diagnosis discussion had with patient in detail. Though he may have a higher grade lymphoma than follicular given his age and cardiac status, he is not going to be able to tolerate more aggressive chemotherapy. Will hence treat it as a follicular lymphoma and then follow him for response in  hopes of palliating him and decreasing his total swelling. Chemotherapy orders written for Rituximab 375 mg/M2 IV day 1 and Bendamustine 70 mg/M2 IV piggyback days 1 and 2, repeat q 4 weeks.   · 2/13/17 - PET scan with hypermetabolic enlarged retroperitoneal lymph nodes increased in size since March 2016. Retrocrural lymph nodes along the iliac chains bilaterally were also hypermetabolic and a single hypermetabolic lymph node in the paratracheal location in the chest. Cardiomegaly with small pleural effusions and severe coronary artery atherosclerotic calcifications. Bilateral ureteral stents with mild to moderate hydronephrosis. Moderate amount of stool within the colon, questioning constipation. Diffuse anasarca.   · 2/16/17 - Patient underwent right IJ port insertion by Stephen Ricci M.D. in IR under fluoro guidance.   · 2/17/17 - Hepatitis B/C non-reactive.   · 2/21/17 - Potassium 3.3. Patient asked to increase dietary potassium intake. Uric acid 8.3 (4.8-8.7). Patient developed upper abdominal pain shortly after starting Rituxan infusion with stable vital signs and examination. Was taking Zantac at home. Patient given Pepcid 40 mg IV and Decadron 10 mg IV prior to resuming Rituxan.  Patient received cycle 1 chemotherapy.   · 3/1/17 -  (). ESR 28 (<21). Patient complains of insomnia. Ambien 5 mg p.o. q.h.s. p.r.n. prescribed. CMP significant for calcium 8.7 (L), albumin 3.3 (L), creatinine 1.2 (N). Uric acid elevated at 9.3 (H).   · 3/2/17 - Patient started on Allopurinol 100 mg by mouth daily for hyperuricemia.   · 3/22/17 - The patient started cycle 2 day 1 of Rituxan and Bendamustine.   · 5/23/17 - Uric acid 6.9 (4.8-8.7), ESR 68 (<21).     · 6/14/17 - Comprehensive metabolic panel with no significant abnormality. Cycle 5 chemotherapy given.   · 6/28/17 - INR 2.6 on 7.5 alternating with 10 mg of Warfarin being managed by Dr. Purvis. Patient complains of dry throat and dysuria.   · 3/29/17  - WBC 4.71, hemoglobin 9.9, MCV 76.9, platelets 146,000, ANC 3.6. Patient is scheduled for a CT scan of the chest, abdomen and pelvis with p.o. contrast only on 4/19/17. Orders written to continue chemotherapy. Patient instructed to continue Allopurinol daily and Ambien as needed. Uric acid level ordered. Uric acid 6.1 (4.8-8.7).    · 4/19/17 - Comprehensive metabolic panel with no significant abnormality. CT chest, abdomen and pelvis with 16 mm lymph node seen on recent PET CT decreased to 12 mm but pretracheal lymph node increased to 14 mm. Mild atelectasis in the right middle lobe with mild scarring change in the right lower lobe medially. Decreasing lymphadenopathy in the abdomen and pelvis. Marked adenopathy at the level of the renal hilum measuring 11.4 x 5.5 down to 10 x 4.5 cm. Bilateral renal stents. Cycle 3 chemotherapy given. Patient’s Coumadin dose is being adjusted by Dr. Soni. He has an appointment with Dr. Briseno in follow-up of hematuria. Claims not to be gaining weight and advised taking Ensure or Boost.   · 5/17/17 - Received cycle 4 Bendamustine and Rituxan.   · 5/18/17 - White blood cell count 8.7, hemoglobin 9.1, MCV 78.1 and platelets 237,000. Weight loss of 18 pounds in five weeks.   · 5/23/17 - White count 4.48, hemoglobin 8.9, MCV 77.1 and platelet count 199,000. Allopurinol planned to discontinue if uric acid <6.0. ESR, CMP and uric acid ordered. Megace 200 mg p.o. daily ordered. Ferritin 90 ().    · 6/14/17 - Creatinine 1.2 (0.7-1.2).  Cycle 5 chemotherapy given.   · 6/28/17 - WBC 5.5, hemoglobin 8.8, MCV 74.5, platelet count 190,000.   · 7/12/17 - Chemotherapy held one week. ANC 0.48. Ciprofloxacin 500 mg b.i.d. prescribed for five days. Neutropenic precautions reviewed with the patient and reinforced.   · 7/17/17 - Potassium increased from 10 mEq daily to 10 mEq b.i.d.   · 7/19/17 - Weight loss of 7 pounds in one month. Persistent neutropenia. WBC 2.4, ANC 0.3, platelet count  136,000, hemoglobin 8.7, MCV 77.8. EKG performed for irregular heartbeat. Reviewed neutropenic precautions. Ciprofloxacin refilled 500 mg b.i.d. x1 week. Leukopenia and thrombocytopenia workup ordered. Chemotherapy on hold for one week. Magnesium 2.0 (1.8-2.5), potassium 3.3 (3.6-5.1). Oral potassium supplementation increased. Platelet antibodies not detected. EBV IgM <0.2 (<0.9), EBV IgG >8 (<0.9). CMV IgM 0.35 (<0.91), CMV IgG 17.3 (<0.9). Neutrophil-associated antibody negative.        · 7/24/17 - PET scan with interval mixed treatment response with marked improvement in retroperitoneal adenopathy with metabolic activity just slightly higher than background. Interval marked increase in mediastinal adenopathy and development of bilateral hilar adenopathy.   · 7/26/17 - Order written to discontinue current chemotherapy because of progressive disease in chest and consultation obtained with CTS for mediastinal lymph node biopsy. ESR 37 (<21). Comprehensive metabolic panel with potassium 3.2. Potassium supplementation increased orally. Creatinine 1.3 (0.7-1.2), uric acid 6 (4.8-8.7).      · 8/10/17 - Patient underwent a mediastinoscopy by Donald Islas M.D. at [facility] with pathology of lymph node R4 revealing portions of benign lymph node with prominent anthracotic pigmented histocytes and focal hyalinized fibrosis. Special stain for acid-fast bacilli and fungal organisms was negative.   · 8/30/17 - Discussed options with the patient. Ideally would like to do six cycles of chemotherapy and then put him on maintenance Rituxan. However, cannot proceed because of low blood counts. Patient has not had INR’s in followup of AFib. Will have it drawn and sent to Dr. Bustamante for adjustment. Will check leukopenia workup and plan on maintenance Rituxan after improvement in blood counts. Comprehensive metabolic panel with potassium 3.5 (3.6-5.1).   · 9/6/17 - Patient hospitalized at [facility] between 9/6/17 and  9/15/17 with urosepsis. Chest x-ray had stable cardiomegaly and postsurgical changes with stable elevation of the right hemidiaphragm with overlying scarring or atelectasis. CT scan of the abdomen and pelvis had bilateral ureteral stents, retroperitoneal soft tissue masses/adenopathy significantly decreased in size compared to January 2017, large stool burden throughout the colon, small volume of pelvic ascites, enlarged prostate gland, extensive atherosclerotic calcification and advanced multilevel lumbar disc degermation and facet arthropathy. Patient underwent cystoscopy with bilateral stent change by Yao Woods M.D. and was seen in consultation by Bimal Saenz M.D.   · 9/19/17 - Order written for Rituxan 375 mg/M2 IV q.2 months for two years as maintenance treatment and for monthly port flushes. BMP without clinical significance. Uric acid 5.4 (4.8-8.7).    · 10/11/17 - Patient began cycle 1 of maintenance dose Rituxan.   · 12/6/17 - Cycle 2 maintenance Rituxan given.   · 12/7/17 - Patient only taking one potassium pill a day. Asked to increase to twice a day due to hypokalemia. INR’s being managed by Pedro Purvis M.D. with AFib.   · 1/31/18 - CT chest with stable appearance of right paratracheal and bilateral hilar adenopathy.  CT scan of abdomen and pelvis showed stable appearance of extensive retroperitoneal stranding related to an adenopathy that encases the infrarenal aorta without significant change from prior exams.  Bilateral nephroureteral stents were in place.  Mild prostatomegaly was present.     · 2/6/18 - Cycle 3 Rituximab given.   · 4/3/18 - Patient received cycle 4 maintenance Rituximab.   · 4/9/18 - ESR 16 (0-20).    · 6/4/18 - Cycle 5 maintenance Rituximab administered.   · 7/30/18 - Cycle 6 maintenance Rituximab administered.   · 9/24/18 - Cycle 7 maintenance Rituximab initiated.   · 10/18/18 - CT chest, abdomen and pelvis without contrast showed stable appearance of paratracheal and  bilateral hilar adenopathy and paratracheal lymph node measured 10 mm. The report stated evaluation of hilar lymph nodes was suboptimal but lymphadenopathy appeared normal. There was stable appearance of the retroperitoneal stranding that encases the abdominal aorta and inferior vena cava felt related to lymphadenopathy and stable. Bilateral ureteral stents appeared in good position.   · 11/26/18 - Cycle 8 maintenance Rituximab initiated. Hepatitis B core antibody and hepatitis B surface antigen both non-reactive. Hepatitis C antibody was positive with hepatitis CRNA <15 not detected.   · 12/12/18 - ESR 18 (0-20).    · 1/21/19 - Patient received cycle 9 maintenance Rituximab.   · 3/18/19 - Cycle 10 Rituxan maintenance initiated.   · 5/21/19 - Received cycle 11 Rituxan.   · 6/11/19 - Uric acid 6.5 (4.8-8.7).  (). Sed rate 13 (0-20).   · 7/8/19-Cycle 12 Rituximab.  · 7/15/19 - CT scan chest, abdomen and pelvis showed mildly prominent bilateral hilar mediastinal lymph nodes unchanged since 10/18/2018 and there is no evidence of new or progressive metastatic disease in the chest. Irregular retroperitoneal soft tissue stranding in the abdomen and pelvis which may represent features related to patient's treated lymphoma. No evidence of disease progression in abdomen or pelvis since the 10/18/2018 examination.    · 8/12/19 -Rituxan discontinued due to completion of planned course.   · 11/6/2019 patient with a 8 pound weight loss but denies night sweats or fevers.   · 1/29/2020 patient had CT scan of the chest abdomen and pelvis this showed a dominant index right lower lobe paratracheal node measuring 11 mm and a right infrahilar hilar node measuring 16 mm and left hilar node 10 mm unchanged.   Stable thoracic aortic aneurysm at 4.2 cm.  In the abdomen there was no evidence of relapsed lymphoma.  He has prostatic enlargement and is seen by urologist Dr. Woods.  There is evidence of treated disease in the  retroperitoneal soft tissue.  · Patient denies any new issues since the last visit  2. Anemia diagnosed March 2016. Iron deficiency anemia diagnosed February 2017.   ? CBC showed a white blood cell count of 4.8, hemoglobin 11.8, MCV 88.2, RDW 15.6 (H), platelet count 146,000. Creatinine 1.31 (H). LFT’s normal. Stool heme negative.  ? 3/17/16 - Vitamin B12 of 228 (180-914), folate 21.2 (5.9-24.8), erythropoietin 13.65 (2.59-18.5). Serum protein electrophoresis with normal pattern.  (), ferritin 147 (), iron 36 (), TIBC 291 (228-428), retic count 0.69 (0.5-1.5), haptoglobin 158 ().         ? 4/5/16 - Labs drawn during hospitalization at [facility]: Vitamin B12 of 265 (180-914), serum iron 11 (). Hemoglobin 10.9. Creatinine 1.0 (0.6-1.3).      ? 4/14/16 - WBC 5.61, hemoglobin 11.6, MCV 84.5, platelet count 186,000.   ? 5/17/16 - WBC 4.5, hemoglobin 11.1, MCV 87.5, platelet count 150,000. Hemoglobin electrophoresis with normal pattern.   ? 5/27/16 - Bone marrow aspiration and biopsy with adequate normocellular bone marrow (35%) showing trilineage hematopoiesis, a moderate plasma cytosis, and increased iron stores. Flow cytometry revealed a decreased CD-4, CD-8 helper suppressor T-cell ratio suggesting viral infection or immunocompromise. Cytogenetics revealed normal karyotype. Plasma cells were increased to 12-15%. Copper 141 ().   ? 6/9/16 - HFE gene with no mutation.   ? 6/10/16 - Serum immunofixation with no monoclonal gammopathy identified. Kappa/lambda FLC ratio 17.38 (0.26-1.65). Comprehensive metabolic panel with no significant abnormality. Creatinine 1.2 (0.7-1.2).     ? 7/7/16 - Beta-2 microglobulin 3.72 (<2.16).    ? 7/28/16 - Skeletal survey with no suspicious lytic or blastic bony lesions identified. Right ureteral stent migrated and coiled within the urinary bladder. Degenerative changes as noted above. Patient referred to his urologist.   ? 2/3/17 -  WBC 5.7, hemoglobin 10.2, MCV 78.6, platelet count 237,000. Ferrous Sulfate 325 mg p.o. b.i.d. started. Ferritin 27 (), iron 20 (), TIBC 363 (228-428).      ? 3/1/17 - Patient not taking oral iron supplements. WBC 5, hemoglobin 9.7, MCV 77.2, platelet count 164,000. Ferrous Sulfate 325 mg p.o. b.i.d. prescribed.   ? 3/29/17 - Hemoglobin 9.9, MCV 76.9. Patient instructed to continue Iron Sulfate 325 mg b.i.d. Patient instructed to followup with urologist regarding hematuria.   ? 4/25/17 - WBC 3.6 with 75% neutrophils, 6% lymphocytes, 14% monocytes, hemoglobin 9.6, MCV 77.4, platelet count 149,000. Hemoglobin electrophoresis normal with 98% hemoglobin A.   ? 5/23/17 - White count 4.48, hemoglobin 8.9, MCV 77.1 and platelet count 199,000. Ferrous Sulfate 325 mg p.o. b.i.d. ordered. Stool cards ordered. Ferritin ordered.   ? 7/19/17 - WBC 2.4, ANC 0.3, platelet count 136,000, hemoglobin 8.7, MCV 77.8. Iron 28 (), TIBC 298 (228-428), iron saturation 9 (20-50), ferritin 205 (), folate 14 (5.9-24.8), creatinine 1.2 (0.7-1.2). Iron saturation 9 (20-50).          ? 7/26/17 - WBC 2.5 with 14% neutrophils, 62% lymphocytes, 23% monocytes, hemoglobin 8.9, MCV 75.7, RDW 20.6 and platelet count 151,000. Orders written for Injectafer 750 mg IV days 1 and 8.   ? 8/2/17 - Injectafer 750 mg IV given. Hemoglobin 8.6.   ? 8/30/17 - Hemoglobin electrophoresis with normal pattern. Vitamin B12 of 597 (211-911).  Creatinine 1.2 (0.7-1.2).    ? 9/19/17 - Order written for Procrit 30,000 units subq q.2 weeks for ACD secondary to CKD Stage III. Iron 32 low (), TIBC 297 normal (228-428), percentage saturation 11 low (20-50). Erythropoietin 32.3 high (2.59-18.50). Uric acid 5.4 normal (4.8-8.7).   ? 10/4/17 - Patient received cycle 1 day 8 of Injectafer 750 mg IV.   ? 10/24/17 - WBC 2.0, hemoglobin 10.3, MCV 86.0, platelet count 133,000.       ? 10/31/17 - Iron 41 (), TIBC 281 (228-428), iron saturation 15  (20-50), ferritin 440 ().   ? 12/7/17 - Procrit order discontinued as hemoglobin running above 11 post IV iron.    ? 1/3/18 - Patient reports taking one Ferrous Sulfate 325 mg tablet daily. Hemoglobin 12.5, MCV 91.7. Reports hematuria. Referred for followup with Dr. Woods. Creatinine 1.6 (0.7-1.2).    ? 6/4/18 - Creatinine 1.6 (0.7-1.2).    ? 4/8/19 - Patient reports continued hematuria. He has not seen Dr. Briseno since last stent change in approximately November 2018. Hemoglobin dropping to 9.7 today. Anemia labs sent. INR subtherapeutic at 1.4. Patient instructed to contact Dr. Purvis today regarding dosing. Expressed patient concerns regarding change of Coumadin dose with continued hematuria and worsening anemia and advised patient to followup with Dr. Purvis about Coumadin dosing. Will fax today’s CBC and INR with notation regarding drop in hemoglobin to Dr. Purvis. Reviewed note from Dr. Burns, cardiologist, from 2/18/19 visit where Dr. Burns reported possible Watchman procedure should hematuria continue. SPEP normal. Haptoglobin 120 (H). B12 was 1031 (H). Iron 22 (L),  (N), iron saturation 5 (L), ferritin 17 (L), folate 14.3 (N), retic 1.01 (N). Ordered to receive Injectafer 750 day 1 and day 8.   ? 4/17/19 - Received day 1 Injectafer.   ? 4/24/19 - Received day 8 Injectafer.   ? 5/13/19 - Cystoscopy per Dr. Briseno for bilateral hydronephrosis due to ureteral obstruction. Bilateral ureteral stents were exchanged. Creatinine 1.6 (H).   · 11/6/2019 referred to [facility] for EGD/colonoscopy due to chronic VALENTIN.  · 2/4/2020-patient had a white count of 6.1, hemoglobin 12.8 and platelets were 110,000     3.   Pancytopenia diagnosed in July 2017.   · 7/5/17 - WBC 3.1, hemoglobin 8.2, platelet count 147,000.   · 7/19/17 - Iron 28 (), TIBC 298 (228-428), ferritin 205 (), folate 14 (5.9-24.8), creatinine 1.2 (0.7-1.2).        · 8/30/17 - WBC 1.4 with 33% neutrophils, 45%  lymphocytes, 16% monocytes, hemoglobin 10.1, MCV 79.7, platelet count 129,000. EBV capsid antibody IgM 0.3 (<0.9), EBV capsid antigen antibodies/IgG >8 (<0.9). CMV antibody IgM 0.45 (<0.91), CMV antibody IgG 19.8 (<0.9).  Neutrophil-associated antibody negative.   · 17 - WBC 2.8 with 58% neutrophils, 28% lymphocytes, 11% monocytes, hemoglobin 9.8, MCV 85.8, platelet count 220,000. ANNE screen negative. Erythropoietin 32.3 high (2.59-18.50). Iron 32 low (), TIBC 297 normal (228-428), percentage saturation 11 low (20-50). BMP without clinical significance. ANNE screen negative.   · 10/24/17 -WBC 2.0 with 24% neutrophils, 62% lymphocytes, 10% monocytes, hemoglobin 10.3, platelet count 133,000.      4.  Atrial fibrillation on long-term Coumadin (warfarin).      · 2019.  Patient requesting INR and Coumadin dosing to be followed through the cancer center.  Past Medical History:   Diagnosis Date   • Asthma    • COPD (chronic obstructive pulmonary disease) (CMS/[facility])    • Coronary heart disease     CABG   • CVA (cerebral vascular accident) (CMS/[facility])     recent CVA   • Gout    • Hypertension        Past Surgical History:   Procedure Laterality Date   • CARDIAC CATHETERIZATION  2016    F   • CORONARY ARTERY BYPASS GRAFT      CABG X3, reoperation, 09; CAB and    • OTHER SURGICAL HISTORY  2016    loop recorder implant          Current Outpatient Medications:   •  allopurinol (ZYLOPRIM) 100 MG tablet, Take 100 mg by mouth Daily., Disp: , Rfl:   •  atorvastatin (LIPITOR) 20 MG tablet, Take 20 mg by mouth Every Night., Disp: , Rfl:   •  bumetanide (BUMEX) 1 MG tablet, Take 1 mg by mouth Daily., Disp: , Rfl:   •  CloNIDine (CATAPRES) 0.1 MG tablet, Take 0.1 mg by mouth Every 6 (Six) Hours., Disp: , Rfl:   •  clopidogrel (PLAVIX) 75 MG tablet, Take 75 mg by mouth Daily., Disp: , Rfl:   •  ferrous sulfate 325 (65 FE) MG tablet, Take 1 tablet by mouth Daily With Breakfast., Disp: 30 tablet, Rfl:  3  •  hydrALAZINE (APRESOLINE) 50 MG tablet, Take 50 mg by mouth 4 (Four) Times a Day., Disp: , Rfl:   •  isosorbide mononitrate (IMDUR) 30 MG 24 hr tablet, Take 30 mg by mouth Daily., Disp: , Rfl:   •  metoprolol tartrate (LOPRESSOR) 12.5 MG half tablet, Take 12.5 mg by mouth 2 (Two) Times a Day., Disp: , Rfl:   •  potassium chloride (K-DUR,KLOR-CON) 10 MEQ CR tablet, TAKE 1 TABLET BY MOUTH THREE TIMES A DAY, Disp: 90 tablet, Rfl: 0  •  raNITIdine (ZANTAC) 150 MG tablet, Take 150 mg by mouth Daily., Disp: , Rfl:   •  tamsulosin (FLOMAX) 0.4 MG capsule 24 hr capsule, Take 1 capsule by mouth Daily., Disp: , Rfl:   •  warfarin (COUMADIN) 1 MG tablet, Take 1 mg by mouth Daily., Disp: , Rfl:   •  warfarin (COUMADIN) 5 MG tablet, TAKE 1 TABLET BY MOUTH DAILY, Disp: 90 tablet, Rfl: 1  •  zolpidem (AMBIEN) 5 MG tablet, Take 1 tablet by mouth At Night As Needed for Sleep., Disp: 30 tablet, Rfl: 1    No Known Allergies    Family History   Problem Relation Age of Onset   • Stroke Other        Cancer-related family history is not on file.    Social History     Tobacco Use   • Smoking status: Never Smoker   • Smokeless tobacco: Never Used   Substance Use Topics   • Alcohol use: No     Frequency: Never   • Drug use: No       I have reviewed and confirmed the accuracy of the patient's history:  as entered by the MA/LPN/RN. Stacie Vargas MD 05/05/20     SUBJECTIVE:  The patient is here for a follow up appointment.  Patient has no specific complaints today.  He denies any bleeding issues.  He has chronic fatigue but this has not changed.        REVIEW OF SYSTEMS:  Review of Systems   Constitutional: Positive for fatigue. Negative for chills and fever.   HENT: Negative for ear pain, mouth sores, nosebleeds and sore throat.    Eyes: Negative for photophobia and visual disturbance.   Respiratory: Negative for wheezing and stridor.    Cardiovascular: Negative for chest pain and palpitations.   Gastrointestinal: Negative for  abdominal pain, diarrhea, nausea and vomiting.   Endocrine: Negative for cold intolerance and heat intolerance.   Genitourinary: Negative for dysuria and hematuria.   Musculoskeletal: Negative for joint swelling and neck stiffness.   Skin: Negative for color change and rash.   Neurological: Negative for seizures and syncope.   Hematological: Negative for adenopathy.        No obvious bleeding   Psychiatric/Behavioral: Negative for agitation, confusion and hallucinations.       OBJECTIVE:    There were no vitals filed for this visit.    ECOG  (2) Ambulatory and capable of self care, unable to carry out work activity, up and about > 50% or waking hours    Physical Exam   Constitutional: He is oriented to person, place, and time.   Neurological: He is alert and oriented to person, place, and time.   Psychiatric: He has a normal mood and affect. His behavior is normal. Judgment and thought content normal.       RECENT LABS  WBC   Date Value Ref Range Status   02/04/2020 6.18 3.40 - 10.80 10*3/mm3 Final     RBC   Date Value Ref Range Status   02/04/2020 4.30 4.14 - 5.80 10*6/mm3 Final     Hemoglobin   Date Value Ref Range Status   02/04/2020 12.8 (L) 13.0 - 17.7 g/dL Final     Hematocrit   Date Value Ref Range Status   02/04/2020 40.1 37.5 - 51.0 % Final     MCV   Date Value Ref Range Status   02/04/2020 93.3 79.0 - 97.0 fL Final     MCH   Date Value Ref Range Status   02/04/2020 29.8 26.6 - 33.0 pg Final     MCHC   Date Value Ref Range Status   02/04/2020 31.9 31.5 - 35.7 g/dL Final     RDW   Date Value Ref Range Status   02/04/2020 16.5 (H) 12.3 - 15.4 % Final     RDW-SD   Date Value Ref Range Status   02/04/2020 54.9 (H) 37.0 - 54.0 fl Final     MPV   Date Value Ref Range Status   02/04/2020 10.8 6.0 - 12.0 fL Final     Platelets   Date Value Ref Range Status   02/04/2020 110 (L) 140 - 450 10*3/mm3 Final     Neutrophil %   Date Value Ref Range Status   02/04/2020 67.7 42.7 - 76.0 % Final     Lymphocyte %   Date Value  Ref Range Status   02/04/2020 20.2 19.6 - 45.3 % Final     Monocyte %   Date Value Ref Range Status   02/04/2020 8.4 5.0 - 12.0 % Final     Eosinophil %   Date Value Ref Range Status   02/04/2020 1.9 0.3 - 6.2 % Final     Basophil %   Date Value Ref Range Status   02/04/2020 1.8 (H) 0.0 - 1.5 % Final     Neutrophils, Absolute   Date Value Ref Range Status   02/04/2020 4.18 1.70 - 7.00 10*3/mm3 Final     Lymphocytes, Absolute   Date Value Ref Range Status   02/04/2020 1.25 0.70 - 3.10 10*3/mm3 Final     Monocytes, Absolute   Date Value Ref Range Status   02/04/2020 0.52 0.10 - 0.90 10*3/mm3 Final     Eosinophils, Absolute   Date Value Ref Range Status   02/04/2020 0.12 0.00 - 0.40 10*3/mm3 Final     Basophils, Absolute   Date Value Ref Range Status   02/04/2020 0.11 0.00 - 0.20 10*3/mm3 Final     nRBC   Date Value Ref Range Status   02/16/2017 0 0 /100[WBCs] Final       Lab Results   Component Value Date    GLUCOSE 108 (H) 11/06/2019    BUN 26 (H) 11/06/2019    CREATININE 1.43 (H) 11/06/2019    EGFRIFAFRI 57 (L) 11/06/2019    BCR 18.2 11/06/2019    K 4.3 11/06/2019    CO2 25.0 11/06/2019    CALCIUM 9.4 11/06/2019    ALBUMIN 4.10 11/06/2019    LABIL2 1.3 05/21/2019    AST 22 11/06/2019    ALT 10 11/06/2019         Assessment/Plan     Follicular lymphoma of intra-abdominal lymph nodes, unspecified follicular lymphoma type (CMS/HCC)  - CT Chest Without Contrast  - CT Abdomen Pelvis Without Contrast  - CBC & Differential  - Comprehensive Metabolic Panel      ASSESSMENT:    This patient is new to me      · Follicular lymphoma of intra-abdominal lymph nodes, unspecified follicular lymphoma type (CMS/HCC), status post chemoimmunotherapy therapy and Rituxan maintenance  · Pancytopenia (CMS/HCC)  · 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  He has stable findings with no evidence of progression.  He also has an ascending aortic aneurysm 4.2 cm.  I have given patient referral to vascular for further evaluation.  His INR is subtherapeutic and is being monitored in the warfarin clinic. Adjustment has been made.        PLANS:     · Follow-up in Coumadin clinic as recommended  · Obtain restaging CT scans of the chest, abdomen and pelvis without contrast which is due June 2020.  We will coordinate the above with his visit to the Coumadin clinic.  Also check a CBC and a CMP at that time.  · Continue monthly port flushes.  · Follow up with Dr. Briseno with Urology   · Reviewed labs including ferritin which was noted to be 47 as of 11/6/2019  · To vascular for aortic aneurysm  · Follow-up in July 2020         I have reviewed labs results, imaging, vitals, and medications with the patient today. Will follow up in 3 months with me.    Patient verbalized understanding and is in agreement of the above plan.    I spent more than 30 minutes discussing with patient management recommendations, reviewing imaging studies, lab results, progress notes and formulating management decisions.  Time was also spent answering all his/ her questions to the best of my abilities.

## 2020-05-18 ENCOUNTER — HOSPITAL ENCOUNTER (OUTPATIENT)
Dept: ONCOLOGY | Facility: HOSPITAL | Age: 85
Discharge: HOME | End: 2020-05-18
Admitting: NURSE PRACTITIONER

## 2020-05-18 ENCOUNTER — LAB (OUTPATIENT)
Dept: LAB | Facility: HOSPITAL | Age: 85
End: 2020-05-18

## 2020-05-18 VITALS
TEMPERATURE: 97.5 F | RESPIRATION RATE: 18 BRPM | BODY MASS INDEX: 25.77 KG/M2 | HEIGHT: 69 IN | HEART RATE: 62 BPM | SYSTOLIC BLOOD PRESSURE: 162 MMHG | WEIGHT: 174 LBS | DIASTOLIC BLOOD PRESSURE: 73 MMHG

## 2020-05-18 DIAGNOSIS — I48.20 CHRONIC ATRIAL FIBRILLATION (HCC): ICD-10-CM

## 2020-05-18 DIAGNOSIS — Z45.2 ENCOUNTER FOR CARE RELATED TO VASCULAR ACCESS PORT: Primary | ICD-10-CM

## 2020-05-18 LAB — INR PPP: 2.5

## 2020-05-18 PROCEDURE — 36416 COLLJ CAPILLARY BLOOD SPEC: CPT

## 2020-05-18 PROCEDURE — 25010000003 HEPARIN LOCK FLUSH PER 10 UNITS: Performed by: NURSE PRACTITIONER

## 2020-05-18 PROCEDURE — 85610 PROTHROMBIN TIME: CPT

## 2020-05-18 RX ORDER — SODIUM CHLORIDE 0.9 % (FLUSH) 0.9 %
10 SYRINGE (ML) INJECTION AS NEEDED
Status: CANCELLED | OUTPATIENT
Start: 2020-05-18

## 2020-05-18 RX ORDER — HEPARIN SODIUM (PORCINE) LOCK FLUSH IV SOLN 100 UNIT/ML 100 UNIT/ML
500 SOLUTION INTRAVENOUS AS NEEDED
Status: CANCELLED | OUTPATIENT
Start: 2020-05-18

## 2020-05-18 RX ORDER — SODIUM CHLORIDE 0.9 % (FLUSH) 0.9 %
10 SYRINGE (ML) INJECTION AS NEEDED
Status: DISCONTINUED | OUTPATIENT
Start: 2020-05-18 | End: 2020-05-19 | Stop reason: HOSPADM

## 2020-05-18 RX ORDER — HEPARIN SODIUM (PORCINE) LOCK FLUSH IV SOLN 100 UNIT/ML 100 UNIT/ML
500 SOLUTION INTRAVENOUS AS NEEDED
Status: DISCONTINUED | OUTPATIENT
Start: 2020-05-18 | End: 2020-05-19 | Stop reason: HOSPADM

## 2020-05-18 RX ADMIN — HEPARIN 500 UNITS: 100 SYRINGE at 09:05

## 2020-05-18 RX ADMIN — Medication 30 ML: at 09:04

## 2020-05-22 RX ORDER — ALLOPURINOL 100 MG/1
TABLET ORAL
Qty: 90 TABLET | Refills: 0 | Status: SHIPPED | OUTPATIENT
Start: 2020-05-22 | End: 2020-08-25 | Stop reason: SDUPTHER

## 2020-06-10 ENCOUNTER — HOSPITAL ENCOUNTER (OUTPATIENT)
Dept: PET IMAGING | Facility: HOSPITAL | Age: 85
Discharge: HOME | End: 2020-06-10
Admitting: NURSE PRACTITIONER

## 2020-06-10 DIAGNOSIS — C82.93 FOLLICULAR LYMPHOMA OF INTRA-ABDOMINAL LYMPH NODES, UNSPECIFIED FOLLICULAR LYMPHOMA TYPE (HCC): ICD-10-CM

## 2020-06-10 PROCEDURE — 71250 CT THORAX DX C-: CPT

## 2020-06-10 PROCEDURE — 74176 CT ABD & PELVIS W/O CONTRAST: CPT

## 2020-06-14 DIAGNOSIS — G47.00 INSOMNIA, UNSPECIFIED TYPE: Primary | ICD-10-CM

## 2020-06-15 RX ORDER — ZOLPIDEM TARTRATE 5 MG/1
5 TABLET ORAL NIGHTLY PRN
Qty: 30 TABLET | Refills: 0 | Status: SHIPPED | OUTPATIENT
Start: 2020-06-15 | End: 2020-07-23

## 2020-06-16 ENCOUNTER — HOSPITAL ENCOUNTER (OUTPATIENT)
Dept: ONCOLOGY | Facility: HOSPITAL | Age: 85
Setting detail: INFUSION SERIES
Discharge: HOME | End: 2020-06-16

## 2020-06-16 ENCOUNTER — LAB (OUTPATIENT)
Dept: LAB | Facility: HOSPITAL | Age: 85
End: 2020-06-16

## 2020-06-16 DIAGNOSIS — C82.93 FOLLICULAR LYMPHOMA OF INTRA-ABDOMINAL LYMPH NODES, UNSPECIFIED FOLLICULAR LYMPHOMA TYPE (HCC): Primary | ICD-10-CM

## 2020-06-16 DIAGNOSIS — I48.20 CHRONIC ATRIAL FIBRILLATION (HCC): ICD-10-CM

## 2020-06-16 DIAGNOSIS — Z45.2 ENCOUNTER FOR CARE RELATED TO VASCULAR ACCESS PORT: ICD-10-CM

## 2020-06-16 LAB
ALBUMIN SERPL-MCNC: 4.2 G/DL (ref 3.5–5.2)
ALBUMIN/GLOB SERPL: 1.4 G/DL
ALP SERPL-CCNC: 83 U/L (ref 39–117)
ALT SERPL W P-5'-P-CCNC: 15 U/L (ref 1–41)
ANION GAP SERPL CALCULATED.3IONS-SCNC: 11 MMOL/L (ref 5–15)
AST SERPL-CCNC: 23 U/L (ref 1–40)
BASOPHILS # BLD AUTO: 0.01 10*3/MM3 (ref 0–0.2)
BASOPHILS NFR BLD AUTO: 0.2 % (ref 0–1.5)
BILIRUB SERPL-MCNC: 0.5 MG/DL (ref 0.2–1.2)
BUN BLD-MCNC: 37 MG/DL (ref 8–23)
BUN BLD-MCNC: ABNORMAL MG/DL
BUN/CREAT SERPL: ABNORMAL
CALCIUM SPEC-SCNC: 9.2 MG/DL (ref 8.6–10.5)
CHLORIDE SERPL-SCNC: 103 MMOL/L (ref 98–107)
CO2 SERPL-SCNC: 27 MMOL/L (ref 22–29)
CREAT BLD-MCNC: 1.52 MG/DL (ref 0.76–1.27)
DEPRECATED RDW RBC AUTO: 51 FL (ref 37–54)
EOSINOPHIL # BLD AUTO: 0.08 10*3/MM3 (ref 0–0.4)
EOSINOPHIL NFR BLD AUTO: 1.6 % (ref 0.3–6.2)
ERYTHROCYTE [DISTWIDTH] IN BLOOD BY AUTOMATED COUNT: 15.2 % (ref 12.3–15.4)
GFR SERPL CREATININE-BSD FRML MDRD: 53 ML/MIN/1.73
GLOBULIN UR ELPH-MCNC: 3.1 GM/DL
GLUCOSE BLD-MCNC: 99 MG/DL (ref 65–99)
HCT VFR BLD AUTO: 40.9 % (ref 37.5–51)
HGB BLD-MCNC: 13.2 G/DL (ref 13–17.7)
INR PPP: 2.8
LYMPHOCYTES # BLD AUTO: 1.33 10*3/MM3 (ref 0.7–3.1)
LYMPHOCYTES NFR BLD AUTO: 26.5 % (ref 19.6–45.3)
MCH RBC QN AUTO: 30.3 PG (ref 26.6–33)
MCHC RBC AUTO-ENTMCNC: 32.3 G/DL (ref 31.5–35.7)
MCV RBC AUTO: 93.8 FL (ref 79–97)
MONOCYTES # BLD AUTO: 0.41 10*3/MM3 (ref 0.1–0.9)
MONOCYTES NFR BLD AUTO: 8.2 % (ref 5–12)
NEUTROPHILS # BLD AUTO: 3.19 10*3/MM3 (ref 1.7–7)
NEUTROPHILS NFR BLD AUTO: 63.5 % (ref 42.7–76)
PLATELET # BLD AUTO: 106 10*3/MM3 (ref 140–450)
PMV BLD AUTO: 10.7 FL (ref 6–12)
POTASSIUM BLD-SCNC: 4.1 MMOL/L (ref 3.5–5.2)
PROT SERPL-MCNC: 7.3 G/DL (ref 6–8.5)
RBC # BLD AUTO: 4.36 10*6/MM3 (ref 4.14–5.8)
SODIUM BLD-SCNC: 141 MMOL/L (ref 136–145)
WBC NRBC COR # BLD: 5.02 10*3/MM3 (ref 3.4–10.8)

## 2020-06-16 PROCEDURE — 25010000003 HEPARIN LOCK FLUSH PER 10 UNITS: Performed by: INTERNAL MEDICINE

## 2020-06-16 PROCEDURE — 85610 PROTHROMBIN TIME: CPT

## 2020-06-16 PROCEDURE — 85025 COMPLETE CBC W/AUTO DIFF WBC: CPT | Performed by: NURSE PRACTITIONER

## 2020-06-16 PROCEDURE — 80053 COMPREHEN METABOLIC PANEL: CPT | Performed by: NURSE PRACTITIONER

## 2020-06-16 PROCEDURE — 36416 COLLJ CAPILLARY BLOOD SPEC: CPT

## 2020-06-16 PROCEDURE — 36591 DRAW BLOOD OFF VENOUS DEVICE: CPT

## 2020-06-16 RX ORDER — HEPARIN SODIUM (PORCINE) LOCK FLUSH IV SOLN 100 UNIT/ML 100 UNIT/ML
500 SOLUTION INTRAVENOUS AS NEEDED
Status: CANCELLED | OUTPATIENT
Start: 2020-06-16

## 2020-06-16 RX ORDER — SODIUM CHLORIDE 0.9 % (FLUSH) 0.9 %
20 SYRINGE (ML) INJECTION AS NEEDED
Status: DISCONTINUED | OUTPATIENT
Start: 2020-06-16 | End: 2020-06-18 | Stop reason: HOSPADM

## 2020-06-16 RX ORDER — SODIUM CHLORIDE 0.9 % (FLUSH) 0.9 %
20 SYRINGE (ML) INJECTION AS NEEDED
Status: CANCELLED | OUTPATIENT
Start: 2020-06-16

## 2020-06-16 RX ORDER — HEPARIN SODIUM (PORCINE) LOCK FLUSH IV SOLN 100 UNIT/ML 100 UNIT/ML
500 SOLUTION INTRAVENOUS AS NEEDED
Status: DISCONTINUED | OUTPATIENT
Start: 2020-06-16 | End: 2020-06-18 | Stop reason: HOSPADM

## 2020-06-16 RX ADMIN — HEPARIN 500 UNITS: 100 SYRINGE at 09:35

## 2020-06-16 RX ADMIN — Medication 30 ML: at 09:34

## 2020-06-17 ENCOUNTER — TELEPHONE (OUTPATIENT)
Dept: ONCOLOGY | Facility: CLINIC | Age: 85
End: 2020-06-17

## 2020-07-01 NOTE — PROGRESS NOTES
Hematology/Oncology Outpatient Follow Up    PATIENT NAME:Barry Calhoun  :1932  MRN: [id number]  PRIMARY CARE PHYSICIAN: Pedro Purvis MD  REFERRING PHYSICIAN: Pedro Purvis MD    Chief Complaint   Patient presents with   • Follow-up     follicular lymphoma of intra abdominal lymph nodes            HISTORY OF PRESENT ILLNESS:   1. 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   · CT abdomen and pelvis done 11/4/15 that showed a new retroperitoneal tissue between the aorta and IVC measuring 2.6 x 2.2 cm, extending from the level of origin of the superior mesenteric artery. The differential diagnosis included lymphoma as well as atypical retroperitoneal fibrosis. Metastatic adenopathy was also in the differential as well as amyloidosis. Recommending a PET CT for correlation. There was non-specific prostate enlargement and coronary artery and aortoiliac atherosclerosis. On 3/8/16 he saw Dr. Pedro Purvis in follow-up. He was complaining of leg swelling and insomnia. He was able to go to sleep but would wake up a few hours after sleeping and could not get back to sleep. He also reported a metallic taste in his mouth. Bilateral lower extremity Dopplers were performed that were negative for DVT. A CT abdomen and pelvis was ordered and performed on 3/8/16 that showed a retroperitoneal mass extending dorsal to the aorta on the left and right side, obscuring the periaortic fat, transversing caudally along the psoas muscles. The mass caused left-sided obstructive uropathy just caudal to the ureteropelvic junction. Differential diagnosis included lymphoma, metastasis or primary sarcoma. There were no other soft tissue masses throughout the abdomen. There was an enlarged prostate with central lucent lesion, possibly due to a TURP defect. The mass measured 4.2 x 1.9 cm on transaxial on the right periaortic retroperitoneum. On the left  periaortic retroperitoneum it measured 3.8 x 2.8 cm. He was seen by Dr. Briseno on 3/16/16. Urinalysis was negative for blood, nitrates and leukocytes. He was diagnosed with BPH with LUTS and hydronephrosis with plan to perform a cysto with stent placement soon.   · 3/17/16 - Patient seen for initial consultation regarding enlarging retroperitoneal mass at the [facility]. Quantitative hCG <0.1 (<2.0). ESR 45 (<21). AFP 4 (0-9). Chest x-ray with linear opacity right middle lobe. Question of atelectasis or scar and cardiomegaly with changes of CABG. PSA 6.37 (0-4).  Result sent to Dr. Briseno.   · 4/2/16 - CT head had a focal hyperdense area in the left cerebral hemisphere representing acute ischemia.   · 4/5/16 - CT chest, abdomen and pelvis done during hospitalization at [facility] revealed no findings of primary or metastatic disease in the chest. Dilated main pulmonary artery. Mild ascending thoracic aortic ectasia measuring up to 4.1 cm. Bilateral ureteral stents. The proximal left ureteral stent may be located within the renal parenchyma or perhaps anterior calyx. Possibly mild left hydronephrosis. Abnormal periaortic soft tissue mass ascending from the renal vessels through the iliac bifurcation.   · 4/14/16 - Patient did not have CT-guided biopsy of retroperitoneal mass done as he was hospitalized at [facility] with a stroke.   · 5/2/16 - Chest x-ray with cardiomegaly and chronic changes about the chest. Recording time device overlying the left side of the chest.   · 5/17/16 - Patient did not want to schedule biopsy of the lymph nodes. Patient claims not to want to go for CT-guided retroperitoneal mass biopsy, but would proceed with a bone marrow biopsy.   · 5/27/16 - Bone marrow aspiration and biopsy with adequate normocellular bone marrow (35%) showing trilineage hematopoiesis, a moderate plasma cytosis, and increased iron stores. Flow cytometry revealed a decreased  CD-4, CD-8 helper suppressor T-cell ratio suggesting viral infection or immunocompromise. Cytogenetics revealed normal karyotype. Plasma cells were increased to 12-15%. Copper 141 ().  · 6/9/16 - WBC 4.23, hemoglobin 10.1, MCV 85.9, platelet count 152,000.   · 7/7/16 - Patient has agreed to have a CT-guided retroperitoneal mass biopsy which I am concerned may be a plasma cytoma. Beta-2 microglobulin 3.72 (<2.16).    · 8/2/16 - CT abdomen and pelvis with significant progression of extensive infiltrative retroperitoneal process. New left ureteral stent with mild residual caliectasis on the left with new right hydronephrosis due to partial obstruction of the right ureter. Enlarged prostate.   · 12/9/16 - Patient underwent cystoscopy with bilateral stent exchange and bilateral retrograde pyelogram by Ganesh Briseno M.D.   · 1/13/17 - Chest x-ray with mild cardiac enlargement with borderline pulmonary venous redistribution , unchanged, with chronic elevation or eventration of the right hemidiaphragm.   · 1/17/17 - Patient was hospitalized at [facility] between 1/17/17 and 1/25/17 with right scrotal swelling. Stool Hemoccult was negative. Hemoglobin 8.9, MCV 79.2. IV diuretics were given. Cardiology and Urology consultations were obtained. He then underwent a core needle biopsy of the retroperitoneal mass while holding Coumadin, Aspirin and Plavix. Bilateral lower extremity Dopplers were negative for DVT. CT of the abdomen and pelvis had extensive confluent soft tissue density in the retroperitoneum, increased in size from previous study. In addition, there was some iliac adenopathy, especially on the right. Bilateral double pigtail ureteral stents were in good position without hydronephrosis. Extensive soft tissue stranding in the subcutaneous sub mesenteric fat was present.   · 1/18/17 - Echocardiogram with pulmonary artery systolic pressure 60-65 mmHg. Moderate pulmonary hypertension. Left ventricular systolic function  normal with EF of 55-60%. LV diastolic dysfunction noted. Transmitral spectral Doppler flow pattern suggestive of restrictive physiology with moderate mitral regurgitation.   · 1/23/17 - Patient underwent ultrasound-guided fine needle aspiration of retroperitoneal lymph node at [facility] by Stephen Ricci M.D. with pathology revealing friable adipose tissue with prominent infiltrative lymphoid cell population suspicious for lymphoma. Focal areas demonstrated vague follicle formations. Crush artifact was present. Flow cytometry revealed low viability and hypocellular specimen which failed to reveal a significant B-cell or monoclonal B-cell population. The majority of the lymphocytes were negative for lambda and kappa light chains. The impression was B-cell lymphoma favoring follicle center cell lymphoma and final report was supposed to be pending outside expert consultation.   · 1/30/17 - Retroperitoneal lymph node biopsy specimen was reviewed by Lenny Johnson M.D. and classified as B-cell lymphoma. It was somewhat difficult to classify given the architectural distortion as well as the crusted areas of the tumor. It was thought that at least a follicular lymphoma with areas of diffuse fat was present. There was concern of areas of large cell lymphoma, particularly in the adipose tissue.   · 2/3/17 - Patient returns after a seven month absence. Had to cancel his scheduled lymph node biopsies until his last hospitalization at [facility] where he was not seen by us in consultation. Diagnosis discussion had with patient in detail. Though he may have a higher grade lymphoma than follicular given his age and cardiac status, he is not going to be able to tolerate more aggressive chemotherapy. Will hence treat it as a follicular lymphoma and then follow him for response in hopes of palliating him and decreasing his total swelling. Chemotherapy orders written for Rituximab 375 mg/M2 IV day 1 and Bendamustine 70 mg/M2 IV piggyback days  1 and 2, repeat q 4 weeks.   · 2/13/17 - PET scan with hypermetabolic enlarged retroperitoneal lymph nodes increased in size since March 2016. Retrocrural lymph nodes along the iliac chains bilaterally were also hypermetabolic and a single hypermetabolic lymph node in the paratracheal location in the chest. Cardiomegaly with small pleural effusions and severe coronary artery atherosclerotic calcifications. Bilateral ureteral stents with mild to moderate hydronephrosis. Moderate amount of stool within the colon, questioning constipation. Diffuse anasarca.   · 2/16/17 - Patient underwent right IJ port insertion by Stephen Ricci M.D. in IR under fluoro guidance.   · 2/17/17 - Hepatitis B/C non-reactive.   · 2/21/17 - Potassium 3.3. Patient asked to increase dietary potassium intake. Uric acid 8.3 (4.8-8.7). Patient developed upper abdominal pain shortly after starting Rituxan infusion with stable vital signs and examination. Was taking Zantac at home. Patient given Pepcid 40 mg IV and Decadron 10 mg IV prior to resuming Rituxan.  Patient received cycle 1 chemotherapy.   · 3/1/17 -  (). ESR 28 (<21). Patient complains of insomnia. Ambien 5 mg p.o. q.h.s. p.r.n. prescribed. CMP significant for calcium 8.7 (L), albumin 3.3 (L), creatinine 1.2 (N). Uric acid elevated at 9.3 (H).   · 3/2/17 - Patient started on Allopurinol 100 mg by mouth daily for hyperuricemia.   · 3/22/17 - The patient started cycle 2 day 1 of Rituxan and Bendamustine.   · 5/23/17 - Uric acid 6.9 (4.8-8.7), ESR 68 (<21).     · 6/14/17 - Comprehensive metabolic panel with no significant abnormality. Cycle 5 chemotherapy given.   · 6/28/17 - INR 2.6 on 7.5 alternating with 10 mg of Warfarin being managed by Dr. Purvis. Patient complains of dry throat and dysuria.   · 3/29/17 - WBC 4.71, hemoglobin 9.9, MCV 76.9, platelets 146,000, ANC 3.6. Patient is scheduled for a CT scan of the chest, abdomen and pelvis with p.o. contrast only on  4/19/17. Orders written to continue chemotherapy. Patient instructed to continue Allopurinol daily and Ambien as needed. Uric acid level ordered. Uric acid 6.1 (4.8-8.7).    · 4/19/17 - Comprehensive metabolic panel with no significant abnormality. CT chest, abdomen and pelvis with 16 mm lymph node seen on recent PET CT decreased to 12 mm but pretracheal lymph node increased to 14 mm. Mild atelectasis in the right middle lobe with mild scarring change in the right lower lobe medially. Decreasing lymphadenopathy in the abdomen and pelvis. Marked adenopathy at the level of the renal hilum measuring 11.4 x 5.5 down to 10 x 4.5 cm. Bilateral renal stents. Cycle 3 chemotherapy given. Patient’s Coumadin dose is being adjusted by Dr. Soni. He has an appointment with Dr. Briseno in follow-up of hematuria. Claims not to be gaining weight and advised taking Ensure or Boost.   · 5/17/17 - Received cycle 4 Bendamustine and Rituxan.   · 5/18/17 - White blood cell count 8.7, hemoglobin 9.1, MCV 78.1 and platelets 237,000. Weight loss of 18 pounds in five weeks.   · 5/23/17 - White count 4.48, hemoglobin 8.9, MCV 77.1 and platelet count 199,000. Allopurinol planned to discontinue if uric acid <6.0. ESR, CMP and uric acid ordered. Megace 200 mg p.o. daily ordered. Ferritin 90 ().    · 6/14/17 - Creatinine 1.2 (0.7-1.2).  Cycle 5 chemotherapy given.   · 6/28/17 - WBC 5.5, hemoglobin 8.8, MCV 74.5, platelet count 190,000.   · 7/12/17 - Chemotherapy held one week. ANC 0.48. Ciprofloxacin 500 mg b.i.d. prescribed for five days. Neutropenic precautions reviewed with the patient and reinforced.   · 7/17/17 - Potassium increased from 10 mEq daily to 10 mEq b.i.d.   · 7/19/17 - Weight loss of 7 pounds in one month. Persistent neutropenia. WBC 2.4, ANC 0.3, platelet count 136,000, hemoglobin 8.7, MCV 77.8. EKG performed for irregular heartbeat. Reviewed neutropenic precautions. Ciprofloxacin refilled 500 mg b.i.d. x1 week.  Leukopenia and thrombocytopenia workup ordered. Chemotherapy on hold for one week. Magnesium 2.0 (1.8-2.5), potassium 3.3 (3.6-5.1). Oral potassium supplementation increased. Platelet antibodies not detected. EBV IgM <0.2 (<0.9), EBV IgG >8 (<0.9). CMV IgM 0.35 (<0.91), CMV IgG 17.3 (<0.9). Neutrophil-associated antibody negative.        · 7/24/17 - PET scan with interval mixed treatment response with marked improvement in retroperitoneal adenopathy with metabolic activity just slightly higher than background. Interval marked increase in mediastinal adenopathy and development of bilateral hilar adenopathy.   · 7/26/17 - Order written to discontinue current chemotherapy because of progressive disease in chest and consultation obtained with CTS for mediastinal lymph node biopsy. ESR 37 (<21). Comprehensive metabolic panel with potassium 3.2. Potassium supplementation increased orally. Creatinine 1.3 (0.7-1.2), uric acid 6 (4.8-8.7).      · 8/10/17 - Patient underwent a mediastinoscopy by Donald Islas M.D. at [facility] with pathology of lymph node R4 revealing portions of benign lymph node with prominent anthracotic pigmented histocytes and focal hyalinized fibrosis. Special stain for acid-fast bacilli and fungal organisms was negative.   · 8/30/17 - Discussed options with the patient. Ideally would like to do six cycles of chemotherapy and then put him on maintenance Rituxan. However, cannot proceed because of low blood counts. Patient has not had INR’s in followup of AFib. Will have it drawn and sent to Dr. Bustamante for adjustment. Will check leukopenia workup and plan on maintenance Rituxan after improvement in blood counts. Comprehensive metabolic panel with potassium 3.5 (3.6-5.1).   · 9/6/17 - Patient hospitalized at [facility] between 9/6/17 and 9/15/17 with urosepsis. Chest x-ray had stable cardiomegaly and postsurgical changes with stable elevation of the right hemidiaphragm with overlying scarring  or atelectasis. CT scan of the abdomen and pelvis had bilateral ureteral stents, retroperitoneal soft tissue masses/adenopathy significantly decreased in size compared to January 2017, large stool burden throughout the colon, small volume of pelvic ascites, enlarged prostate gland, extensive atherosclerotic calcification and advanced multilevel lumbar disc degermation and facet arthropathy. Patient underwent cystoscopy with bilateral stent change by Yao Woods M.D. and was seen in consultation by Bimal Saenz M.D.   · 9/19/17 - Order written for Rituxan 375 mg/M2 IV q.2 months for two years as maintenance treatment and for monthly port flushes. BMP without clinical significance. Uric acid 5.4 (4.8-8.7).    · 10/11/17 - Patient began cycle 1 of maintenance dose Rituxan.   · 12/6/17 - Cycle 2 maintenance Rituxan given.   · 12/7/17 - Patient only taking one potassium pill a day. Asked to increase to twice a day due to hypokalemia. INR’s being managed by Pedro Purvis M.D. with AFib.   · 1/31/18 - CT chest with stable appearance of right paratracheal and bilateral hilar adenopathy.  CT scan of abdomen and pelvis showed stable appearance of extensive retroperitoneal stranding related to an adenopathy that encases the infrarenal aorta without significant change from prior exams.  Bilateral nephroureteral stents were in place.  Mild prostatomegaly was present.     · 2/6/18 - Cycle 3 Rituximab given.   · 4/3/18 - Patient received cycle 4 maintenance Rituximab.   · 4/9/18 - ESR 16 (0-20).    · 6/4/18 - Cycle 5 maintenance Rituximab administered.   · 7/30/18 - Cycle 6 maintenance Rituximab administered.   · 9/24/18 - Cycle 7 maintenance Rituximab initiated.   · 10/18/18 - CT chest, abdomen and pelvis without contrast showed stable appearance of paratracheal and bilateral hilar adenopathy and paratracheal lymph node measured 10 mm. The report stated evaluation of hilar lymph nodes was suboptimal but lymphadenopathy  appeared normal. There was stable appearance of the retroperitoneal stranding that encases the abdominal aorta and inferior vena cava felt related to lymphadenopathy and stable. Bilateral ureteral stents appeared in good position.   · 11/26/18 - Cycle 8 maintenance Rituximab initiated. Hepatitis B core antibody and hepatitis B surface antigen both non-reactive. Hepatitis C antibody was positive with hepatitis CRNA <15 not detected.   · 12/12/18 - ESR 18 (0-20).    · 1/21/19 - Patient received cycle 9 maintenance Rituximab.   · 3/18/19 - Cycle 10 Rituxan maintenance initiated.   · 5/21/19 - Received cycle 11 Rituxan.   · 6/11/19 - Uric acid 6.5 (4.8-8.7).  (). Sed rate 13 (0-20).   · 7/8/19-Cycle 12 Rituximab.  · 7/15/19 - CT scan chest, abdomen and pelvis showed mildly prominent bilateral hilar mediastinal lymph nodes unchanged since 10/18/2018 and there is no evidence of new or progressive metastatic disease in the chest. Irregular retroperitoneal soft tissue stranding in the abdomen and pelvis which may represent features related to patient's treated lymphoma. No evidence of disease progression in abdomen or pelvis since the 10/18/2018 examination.    · 8/12/19 -Rituxan discontinued due to completion of planned course.   · 11/6/2019 patient with a 8 pound weight loss but denies night sweats or fevers.   · 1/29/2020 patient had CT scan of the chest abdomen and pelvis this showed a dominant index right lower lobe paratracheal node measuring 11 mm and a right infrahilar hilar node measuring 16 mm and left hilar node 10 mm unchanged.   Stable thoracic aortic aneurysm at 4.2 cm.  In the abdomen there was no evidence of relapsed lymphoma.  He has prostatic enlargement and is seen by urologist Dr. Woods.  There is evidence of treated disease in the retroperitoneal soft tissue.  · 6/10/2020 patient had CT scan of the chest abdomen and pelvis showed a stable 7 mm thyroid nodule which is nonspecific.  Mild  mediastinal and hilar lymphadenopathy which is unchanged.  Defined soft tissue thickening in the retroperitoneum is unchanged and is favored to represent treated disease.  No abnormally enlarged lymph nodes identified.  Enlarged prostate  2. Anemia diagnosed March 2016. Iron deficiency anemia diagnosed February 2017.   ? CBC showed a white blood cell count of 4.8, hemoglobin 11.8, MCV 88.2, RDW 15.6 (H), platelet count 146,000. Creatinine 1.31 (H). LFT’s normal. Stool heme negative.  ? 3/17/16 - Vitamin B12 of 228 (180-914), folate 21.2 (5.9-24.8), erythropoietin 13.65 (2.59-18.5). Serum protein electrophoresis with normal pattern.  (), ferritin 147 (), iron 36 (), TIBC 291 (228-428), retic count 0.69 (0.5-1.5), haptoglobin 158 ().         ? 4/5/16 - Labs drawn during hospitalization at [facility]: Vitamin B12 of 265 (180-914), serum iron 11 (). Hemoglobin 10.9. Creatinine 1.0 (0.6-1.3).      ? 4/14/16 - WBC 5.61, hemoglobin 11.6, MCV 84.5, platelet count 186,000.   ? 5/17/16 - WBC 4.5, hemoglobin 11.1, MCV 87.5, platelet count 150,000. Hemoglobin electrophoresis with normal pattern.   ? 5/27/16 - Bone marrow aspiration and biopsy with adequate normocellular bone marrow (35%) showing trilineage hematopoiesis, a moderate plasma cytosis, and increased iron stores. Flow cytometry revealed a decreased CD-4, CD-8 helper suppressor T-cell ratio suggesting viral infection or immunocompromise. Cytogenetics revealed normal karyotype. Plasma cells were increased to 12-15%. Copper 141 ().   ? 6/9/16 - HFE gene with no mutation.   ? 6/10/16 - Serum immunofixation with no monoclonal gammopathy identified. Kappa/lambda FLC ratio 17.38 (0.26-1.65). Comprehensive metabolic panel with no significant abnormality. Creatinine 1.2 (0.7-1.2).     ? 7/7/16 - Beta-2 microglobulin 3.72 (<2.16).    ? 7/28/16 - Skeletal survey with no suspicious lytic or blastic bony lesions identified.  Right ureteral stent migrated and coiled within the urinary bladder. Degenerative changes as noted above. Patient referred to his urologist.   ? 2/3/17 - WBC 5.7, hemoglobin 10.2, MCV 78.6, platelet count 237,000. Ferrous Sulfate 325 mg p.o. b.i.d. started. Ferritin 27 (), iron 20 (), TIBC 363 (228-428).      ? 3/1/17 - Patient not taking oral iron supplements. WBC 5, hemoglobin 9.7, MCV 77.2, platelet count 164,000. Ferrous Sulfate 325 mg p.o. b.i.d. prescribed.   ? 3/29/17 - Hemoglobin 9.9, MCV 76.9. Patient instructed to continue Iron Sulfate 325 mg b.i.d. Patient instructed to followup with urologist regarding hematuria.   ? 4/25/17 - WBC 3.6 with 75% neutrophils, 6% lymphocytes, 14% monocytes, hemoglobin 9.6, MCV 77.4, platelet count 149,000. Hemoglobin electrophoresis normal with 98% hemoglobin A.   ? 5/23/17 - White count 4.48, hemoglobin 8.9, MCV 77.1 and platelet count 199,000. Ferrous Sulfate 325 mg p.o. b.i.d. ordered. Stool cards ordered. Ferritin ordered.   ? 7/19/17 - WBC 2.4, ANC 0.3, platelet count 136,000, hemoglobin 8.7, MCV 77.8. Iron 28 (), TIBC 298 (228-428), iron saturation 9 (20-50), ferritin 205 (), folate 14 (5.9-24.8), creatinine 1.2 (0.7-1.2). Iron saturation 9 (20-50).          ? 7/26/17 - WBC 2.5 with 14% neutrophils, 62% lymphocytes, 23% monocytes, hemoglobin 8.9, MCV 75.7, RDW 20.6 and platelet count 151,000. Orders written for Injectafer 750 mg IV days 1 and 8.   ? 8/2/17 - Injectafer 750 mg IV given. Hemoglobin 8.6.   ? 8/30/17 - Hemoglobin electrophoresis with normal pattern. Vitamin B12 of 597 (211-911).  Creatinine 1.2 (0.7-1.2).    ? 9/19/17 - Order written for Procrit 30,000 units subq q.2 weeks for ACD secondary to CKD Stage III. Iron 32 low (), TIBC 297 normal (228-428), percentage saturation 11 low (20-50). Erythropoietin 32.3 high (2.59-18.50). Uric acid 5.4 normal (4.8-8.7).   ? 10/4/17 - Patient received cycle 1 day 8 of Injectafer 750 mg IV.    ? 10/24/17 - WBC 2.0, hemoglobin 10.3, MCV 86.0, platelet count 133,000.       ? 10/31/17 - Iron 41 (), TIBC 281 (228-428), iron saturation 15 (20-50), ferritin 440 ().   ? 12/7/17 - Procrit order discontinued as hemoglobin running above 11 post IV iron.    ? 1/3/18 - Patient reports taking one Ferrous Sulfate 325 mg tablet daily. Hemoglobin 12.5, MCV 91.7. Reports hematuria. Referred for followup with Dr. Woods. Creatinine 1.6 (0.7-1.2).    ? 6/4/18 - Creatinine 1.6 (0.7-1.2).    ? 4/8/19 - Patient reports continued hematuria. He has not seen Dr. Briseno since last stent change in approximately November 2018. Hemoglobin dropping to 9.7 today. Anemia labs sent. INR subtherapeutic at 1.4. Patient instructed to contact Dr. Purvis today regarding dosing. Expressed patient concerns regarding change of Coumadin dose with continued hematuria and worsening anemia and advised patient to followup with Dr. Purvis about Coumadin dosing. Will fax today’s CBC and INR with notation regarding drop in hemoglobin to Dr. Purvis. Reviewed note from Dr. Burns, cardiologist, from 2/18/19 visit where Dr. Burns reported possible Watchman procedure should hematuria continue. SPEP normal. Haptoglobin 120 (H). B12 was 1031 (H). Iron 22 (L),  (N), iron saturation 5 (L), ferritin 17 (L), folate 14.3 (N), retic 1.01 (N). Ordered to receive Injectafer 750 day 1 and day 8.   ? 4/17/19 - Received day 1 Injectafer.   ? 4/24/19 - Received day 8 Injectafer.   ? 5/13/19 - Cystoscopy per Dr. Briseno for bilateral hydronephrosis due to ureteral obstruction. Bilateral ureteral stents were exchanged. Creatinine 1.6 (H).   · 11/6/2019 referred to [facility] for EGD/colonoscopy due to chronic VALENTIN.  · 2/4/2020-patient had a white count of 6.1, hemoglobin 12.8 and platelets were 110,000     3.   Pancytopenia diagnosed in July 2017.   · 7/5/17 - WBC 3.1, hemoglobin 8.2, platelet count 147,000.   · 7/19/17 - Iron 28 (),  TIBC 298 (228-428), ferritin 205 (), folate 14 (5.9-24.8), creatinine 1.2 (0.7-1.2).        · 17 - WBC 1.4 with 33% neutrophils, 45% lymphocytes, 16% monocytes, hemoglobin 10.1, MCV 79.7, platelet count 129,000. EBV capsid antibody IgM 0.3 (<0.9), EBV capsid antigen antibodies/IgG >8 (<0.9). CMV antibody IgM 0.45 (<0.91), CMV antibody IgG 19.8 (<0.9).  Neutrophil-associated antibody negative.   · 17 - WBC 2.8 with 58% neutrophils, 28% lymphocytes, 11% monocytes, hemoglobin 9.8, MCV 85.8, platelet count 220,000. ANNE screen negative. Erythropoietin 32.3 high (2.59-18.50). Iron 32 low (), TIBC 297 normal (228-428), percentage saturation 11 low (20-50). BMP without clinical significance. ANNE screen negative.   · 10/24/17 -WBC 2.0 with 24% neutrophils, 62% lymphocytes, 10% monocytes, hemoglobin 10.3, platelet count 133,000.      4.  Atrial fibrillation on long-term Coumadin (warfarin).      · 2019.  Patient requesting INR and Coumadin dosing to be followed through the cancer center.  Past Medical History:   Diagnosis Date   • Asthma    • COPD (chronic obstructive pulmonary disease) (CMS/[facility])    • Coronary heart disease     CABG   • CVA (cerebral vascular accident) (CMS/[facility])     recent CVA   • Gout    • Hypertension        Past Surgical History:   Procedure Laterality Date   • CARDIAC CATHETERIZATION  2016    [facility]   • CORONARY ARTERY BYPASS GRAFT      CABG X3, reoperation, 09; CAB and    • OTHER SURGICAL HISTORY  2016    loop recorder implant          Current Outpatient Medications:   •  allopurinol (ZYLOPRIM) 100 MG tablet, TAKE 1 TABLET BY MOUTH ONCE DAILY, Disp: 90 tablet, Rfl: 0  •  atorvastatin (LIPITOR) 20 MG tablet, Take 20 mg by mouth Every Night., Disp: , Rfl:   •  bumetanide (BUMEX) 1 MG tablet, Take 1 mg by mouth Daily., Disp: , Rfl:   •  CloNIDine (CATAPRES) 0.1 MG tablet, Take 0.1 mg by mouth Every 6 (Six) Hours., Disp: , Rfl:   •  clopidogrel (PLAVIX) 75 MG  tablet, Take 75 mg by mouth Daily., Disp: , Rfl:   •  ferrous sulfate 325 (65 FE) MG tablet, Take 1 tablet by mouth Daily With Breakfast., Disp: 30 tablet, Rfl: 3  •  hydrALAZINE (APRESOLINE) 50 MG tablet, Take 50 mg by mouth 4 (Four) Times a Day., Disp: , Rfl:   •  isosorbide mononitrate (IMDUR) 30 MG 24 hr tablet, Take 30 mg by mouth Daily., Disp: , Rfl:   •  metoprolol tartrate (LOPRESSOR) 12.5 MG half tablet, Take 12.5 mg by mouth 2 (Two) Times a Day., Disp: , Rfl:   •  potassium chloride (K-DUR,KLOR-CON) 10 MEQ CR tablet, TAKE 1 TABLET BY MOUTH THREE TIMES A DAY, Disp: 90 tablet, Rfl: 0  •  raNITIdine (ZANTAC) 150 MG tablet, Take 150 mg by mouth Daily., Disp: , Rfl:   •  tamsulosin (FLOMAX) 0.4 MG capsule 24 hr capsule, Take 1 capsule by mouth Daily., Disp: , Rfl:   •  warfarin (COUMADIN) 1 MG tablet, Take 1 mg by mouth Daily., Disp: , Rfl:   •  warfarin (COUMADIN) 5 MG tablet, TAKE 1 TABLET BY MOUTH DAILY, Disp: 90 tablet, Rfl: 1  •  zolpidem (AMBIEN) 5 MG tablet, TAKE 1 TABLET BY MOUTH AT NIGHT AS NEEDED FOR SLEEP, Disp: 30 tablet, Rfl: 0    No Known Allergies    Family History   Problem Relation Age of Onset   • Stroke Other        Cancer-related family history is not on file.    Social History     Tobacco Use   • Smoking status: Never Smoker   • Smokeless tobacco: Never Used   Substance Use Topics   • Alcohol use: No     Frequency: Never   • Drug use: No       I have reviewed and confirmed the accuracy of the patient's history:  as entered by the MA/MARK/RN. Stacie Vargas MD 07/06/20     SUBJECTIVE:    The patient is here for a follow up appointment.  Patient denies any new issues.  He is here today with his significant order.  He denies B symptoms      REVIEW OF SYSTEMS:  Review of Systems   Constitutional: Positive for fatigue. Negative for chills and fever.   HENT: Negative for ear pain, mouth sores, nosebleeds and sore throat.    Eyes: Negative for photophobia and visual disturbance.  "  Respiratory: Negative for wheezing and stridor.    Cardiovascular: Negative for chest pain and palpitations.   Gastrointestinal: Negative for abdominal pain, diarrhea, nausea and vomiting.   Endocrine: Negative for cold intolerance and heat intolerance.   Genitourinary: Negative for dysuria and hematuria.   Musculoskeletal: Negative for joint swelling and neck stiffness.   Skin: Negative for color change and rash.   Neurological: Negative for seizures and syncope.   Hematological: Negative for adenopathy.   Psychiatric/Behavioral: Negative for agitation, confusion and hallucinations.       OBJECTIVE:    Vitals:    07/06/20 1332   BP: 148/86   Pulse: 73   Resp: 20   Temp: 98 °F (36.7 °C)   Weight: 78 kg (172 lb)   Height: 175.3 cm (69\")   PainSc: 0-No pain       ECOG  (2) Ambulatory and capable of self care, unable to carry out work activity, up and about > 50% or waking hours    Physical Exam   Constitutional: He is oriented to person, place, and time. No distress.   HENT:   Head: Normocephalic and atraumatic.   Eyes: Conjunctivae and EOM are normal. Right eye exhibits no discharge. Left eye exhibits no discharge. No scleral icterus.   Neck: Normal range of motion. Neck supple. No thyromegaly present.   Cardiovascular: Normal rate, regular rhythm and normal heart sounds. Exam reveals no gallop and no friction rub.   Pulmonary/Chest: Effort normal. No stridor. No respiratory distress. He has no wheezes.   Abdominal: Soft. Bowel sounds are normal. He exhibits no mass. There is no tenderness. There is no rebound and no guarding.   Musculoskeletal: Normal range of motion. He exhibits no tenderness.   Lymphadenopathy:     He has no cervical adenopathy.   Neurological: He is alert and oriented to person, place, and time. He exhibits normal muscle tone.   Skin: Skin is warm. No rash noted. He is not diaphoretic. No erythema.   Psychiatric: He has a normal mood and affect. His behavior is normal. Judgment and thought " content normal.   Nursing note and vitals reviewed.      RECENT LABS  WBC   Date Value Ref Range Status   07/06/2020 5.15 3.40 - 10.80 10*3/mm3 Final     RBC   Date Value Ref Range Status   07/06/2020 4.44 4.14 - 5.80 10*6/mm3 Final     Hemoglobin   Date Value Ref Range Status   07/06/2020 13.3 13.0 - 17.7 g/dL Final     Hematocrit   Date Value Ref Range Status   07/06/2020 41.3 37.5 - 51.0 % Final     MCV   Date Value Ref Range Status   07/06/2020 93.0 79.0 - 97.0 fL Final     MCH   Date Value Ref Range Status   07/06/2020 30.0 26.6 - 33.0 pg Final     MCHC   Date Value Ref Range Status   07/06/2020 32.2 31.5 - 35.7 g/dL Final     RDW   Date Value Ref Range Status   07/06/2020 15.0 12.3 - 15.4 % Final     RDW-SD   Date Value Ref Range Status   07/06/2020 48.7 37.0 - 54.0 fl Final     MPV   Date Value Ref Range Status   07/06/2020 10.3 6.0 - 12.0 fL Final     Platelets   Date Value Ref Range Status   07/06/2020 116 (L) 140 - 450 10*3/mm3 Final     Neutrophil %   Date Value Ref Range Status   07/06/2020 61.0 42.7 - 76.0 % Final     Lymphocyte %   Date Value Ref Range Status   07/06/2020 27.4 19.6 - 45.3 % Final     Monocyte %   Date Value Ref Range Status   07/06/2020 8.9 5.0 - 12.0 % Final     Eosinophil %   Date Value Ref Range Status   07/06/2020 2.5 0.3 - 6.2 % Final     Basophil %   Date Value Ref Range Status   07/06/2020 0.2 0.0 - 1.5 % Final     Neutrophils, Absolute   Date Value Ref Range Status   07/06/2020 3.14 1.70 - 7.00 10*3/mm3 Final     Lymphocytes, Absolute   Date Value Ref Range Status   07/06/2020 1.41 0.70 - 3.10 10*3/mm3 Final     Monocytes, Absolute   Date Value Ref Range Status   07/06/2020 0.46 0.10 - 0.90 10*3/mm3 Final     Eosinophils, Absolute   Date Value Ref Range Status   07/06/2020 0.13 0.00 - 0.40 10*3/mm3 Final     Basophils, Absolute   Date Value Ref Range Status   07/06/2020 0.01 0.00 - 0.20 10*3/mm3 Final     nRBC   Date Value Ref Range Status   02/16/2017 0 0 /100[WBCs] Final        Lab Results   Component Value Date    GLUCOSE 99 06/16/2020    BUN  06/16/2020      Comment:      Testing performed by alternate method    BUN 37 (H) 06/16/2020    CREATININE 1.52 (H) 06/16/2020    EGFRIFAFRI 53 (L) 06/16/2020    BCR  06/16/2020      Comment:      Testing not performed    K 4.1 06/16/2020    CO2 27.0 06/16/2020    CALCIUM 9.2 06/16/2020    ALBUMIN 4.20 06/16/2020    LABIL2 1.3 05/21/2019    AST 23 06/16/2020    ALT 15 06/16/2020         Assessment/Plan     Pancytopenia (CMS/HCC)  - CBC & Differential    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  - CBC & Differential      ASSESSMENT:    This patient is new to me      · Follicular lymphoma of intra-abdominal lymph nodes, unspecified follicular lymphoma type (CMS/HCC), status post chemoimmunotherapy therapy and Rituxan maintenance  · Pancytopenia (CMS/HCC)  · 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  There is no evidence of progression.  He has enlarged prostate and ureteral stents are in place.  He also has an ascending aortic aneurysm 4.2 cm.  I was given  referral to vascular for further evaluation.  INRs are monitored in our Coumadin clinic      PLANS:     · Follow-up in Coumadin clinic as recommended.INR is therapeutic today  · Reviewed restaging CT scans of the chest, abdomen and pelvis without contrast done in June 2020 patient.  · CMP LDH beta-2 microglobulin today  · Continue monthly port flushes.  · Follow up with Dr. Briseno with Urology; reviewed  · Follow-up in 3 months with me.         I have reviewed labs results, imaging, vitals, and medications with the patient  today. Will follow up in 3 months with me.    Patient verbalized understanding and is in agreement of the above plan.

## 2020-07-06 ENCOUNTER — HOSPITAL ENCOUNTER (OUTPATIENT)
Dept: ONCOLOGY | Facility: HOSPITAL | Age: 85
Setting detail: INFUSION SERIES
Discharge: HOME | End: 2020-07-06

## 2020-07-06 ENCOUNTER — LAB (OUTPATIENT)
Dept: LAB | Facility: HOSPITAL | Age: 85
End: 2020-07-06

## 2020-07-06 ENCOUNTER — OFFICE VISIT (OUTPATIENT)
Dept: ONCOLOGY | Facility: CLINIC | Age: 85
End: 2020-07-06

## 2020-07-06 VITALS
DIASTOLIC BLOOD PRESSURE: 86 MMHG | RESPIRATION RATE: 20 BRPM | BODY MASS INDEX: 25.48 KG/M2 | SYSTOLIC BLOOD PRESSURE: 148 MMHG | HEIGHT: 69 IN | HEART RATE: 73 BPM | TEMPERATURE: 98 F | WEIGHT: 172 LBS

## 2020-07-06 DIAGNOSIS — D61.818 PANCYTOPENIA (HCC): Primary | ICD-10-CM

## 2020-07-06 DIAGNOSIS — D50.0 IRON DEFICIENCY ANEMIA DUE TO CHRONIC BLOOD LOSS: ICD-10-CM

## 2020-07-06 DIAGNOSIS — I48.20 CHRONIC ATRIAL FIBRILLATION (HCC): ICD-10-CM

## 2020-07-06 DIAGNOSIS — Z45.2 ENCOUNTER FOR CARE RELATED TO VASCULAR ACCESS PORT: ICD-10-CM

## 2020-07-06 LAB
ALBUMIN SERPL-MCNC: 4.5 G/DL (ref 3.5–5.2)
ALBUMIN/GLOB SERPL: 2 G/DL
ALP SERPL-CCNC: 82 U/L (ref 39–117)
ALT SERPL W P-5'-P-CCNC: 14 U/L (ref 1–41)
ANION GAP SERPL CALCULATED.3IONS-SCNC: 13 MMOL/L (ref 5–15)
AST SERPL-CCNC: 25 U/L (ref 1–40)
B2 MICROGLOB SERPL-MCNC: 4.4 MG/L (ref 0.8–2.2)
BASOPHILS # BLD AUTO: 0.01 10*3/MM3 (ref 0–0.2)
BASOPHILS NFR BLD AUTO: 0.2 % (ref 0–1.5)
BILIRUB SERPL-MCNC: 0.5 MG/DL (ref 0–1.2)
BUN SERPL-MCNC: 28 MG/DL (ref 8–23)
BUN SERPL-MCNC: ABNORMAL MG/DL
BUN/CREAT SERPL: ABNORMAL
CALCIUM SPEC-SCNC: 9.3 MG/DL (ref 8.6–10.5)
CHLORIDE SERPL-SCNC: 104 MMOL/L (ref 98–107)
CO2 SERPL-SCNC: 24 MMOL/L (ref 22–29)
CREAT SERPL-MCNC: 1.54 MG/DL (ref 0.76–1.27)
DEPRECATED RDW RBC AUTO: 48.7 FL (ref 37–54)
EOSINOPHIL # BLD AUTO: 0.13 10*3/MM3 (ref 0–0.4)
EOSINOPHIL NFR BLD AUTO: 2.5 % (ref 0.3–6.2)
ERYTHROCYTE [DISTWIDTH] IN BLOOD BY AUTOMATED COUNT: 15 % (ref 12.3–15.4)
GFR SERPL CREATININE-BSD FRML MDRD: 52 ML/MIN/1.73
GLOBULIN UR ELPH-MCNC: 2.3 GM/DL
GLUCOSE SERPL-MCNC: 83 MG/DL (ref 65–99)
HCT VFR BLD AUTO: 41.3 % (ref 37.5–51)
HGB BLD-MCNC: 13.3 G/DL (ref 13–17.7)
INR PPP: 2.6
LDH SERPL-CCNC: 246 U/L (ref 135–225)
LYMPHOCYTES # BLD AUTO: 1.41 10*3/MM3 (ref 0.7–3.1)
LYMPHOCYTES NFR BLD AUTO: 27.4 % (ref 19.6–45.3)
MCH RBC QN AUTO: 30 PG (ref 26.6–33)
MCHC RBC AUTO-ENTMCNC: 32.2 G/DL (ref 31.5–35.7)
MCV RBC AUTO: 93 FL (ref 79–97)
MONOCYTES # BLD AUTO: 0.46 10*3/MM3 (ref 0.1–0.9)
MONOCYTES NFR BLD AUTO: 8.9 % (ref 5–12)
NEUTROPHILS NFR BLD AUTO: 3.14 10*3/MM3 (ref 1.7–7)
NEUTROPHILS NFR BLD AUTO: 61 % (ref 42.7–76)
PLATELET # BLD AUTO: 116 10*3/MM3 (ref 140–450)
PMV BLD AUTO: 10.3 FL (ref 6–12)
POTASSIUM SERPL-SCNC: 4.3 MMOL/L (ref 3.5–5.2)
PROT SERPL-MCNC: 6.8 G/DL (ref 6–8.5)
RBC # BLD AUTO: 4.44 10*6/MM3 (ref 4.14–5.8)
SODIUM SERPL-SCNC: 141 MMOL/L (ref 136–145)
WBC # BLD AUTO: 5.15 10*3/MM3 (ref 3.4–10.8)

## 2020-07-06 PROCEDURE — 83615 LACTATE (LD) (LDH) ENZYME: CPT | Performed by: INTERNAL MEDICINE

## 2020-07-06 PROCEDURE — 36591 DRAW BLOOD OFF VENOUS DEVICE: CPT

## 2020-07-06 PROCEDURE — 25010000003 HEPARIN LOCK FLUSH PER 10 UNITS: Performed by: INTERNAL MEDICINE

## 2020-07-06 PROCEDURE — 85610 PROTHROMBIN TIME: CPT

## 2020-07-06 PROCEDURE — 99214 OFFICE O/P EST MOD 30 MIN: CPT | Performed by: INTERNAL MEDICINE

## 2020-07-06 PROCEDURE — 85025 COMPLETE CBC W/AUTO DIFF WBC: CPT | Performed by: INTERNAL MEDICINE

## 2020-07-06 PROCEDURE — 80053 COMPREHEN METABOLIC PANEL: CPT | Performed by: INTERNAL MEDICINE

## 2020-07-06 PROCEDURE — 82232 ASSAY OF BETA-2 PROTEIN: CPT | Performed by: INTERNAL MEDICINE

## 2020-07-06 RX ORDER — HEPARIN SODIUM (PORCINE) LOCK FLUSH IV SOLN 100 UNIT/ML 100 UNIT/ML
500 SOLUTION INTRAVENOUS AS NEEDED
Status: DISCONTINUED | OUTPATIENT
Start: 2020-07-06 | End: 2020-07-07 | Stop reason: HOSPADM

## 2020-07-06 RX ORDER — HEPARIN SODIUM (PORCINE) LOCK FLUSH IV SOLN 100 UNIT/ML 100 UNIT/ML
500 SOLUTION INTRAVENOUS AS NEEDED
Status: CANCELLED | OUTPATIENT
Start: 2020-07-06

## 2020-07-06 RX ORDER — SODIUM CHLORIDE 0.9 % (FLUSH) 0.9 %
20 SYRINGE (ML) INJECTION AS NEEDED
Status: CANCELLED | OUTPATIENT
Start: 2020-07-06

## 2020-07-06 RX ORDER — SODIUM CHLORIDE 0.9 % (FLUSH) 0.9 %
20 SYRINGE (ML) INJECTION AS NEEDED
Status: DISCONTINUED | OUTPATIENT
Start: 2020-07-06 | End: 2020-07-07 | Stop reason: HOSPADM

## 2020-07-06 RX ADMIN — Medication 30 ML: at 13:07

## 2020-07-06 RX ADMIN — HEPARIN 500 UNITS: 100 SYRINGE at 14:55

## 2020-07-23 DIAGNOSIS — G47.00 INSOMNIA, UNSPECIFIED TYPE: ICD-10-CM

## 2020-07-23 RX ORDER — ZOLPIDEM TARTRATE 5 MG/1
5 TABLET ORAL NIGHTLY PRN
Qty: 30 TABLET | Refills: 0 | Status: SHIPPED | OUTPATIENT
Start: 2020-07-23 | End: 2020-08-25 | Stop reason: SDUPTHER

## 2020-08-03 PROCEDURE — 96523 IRRIG DRUG DELIVERY DEVICE: CPT

## 2020-08-04 ENCOUNTER — HOSPITAL ENCOUNTER (OUTPATIENT)
Dept: ONCOLOGY | Facility: HOSPITAL | Age: 85
Setting detail: INFUSION SERIES
Discharge: HOME | End: 2020-08-04

## 2020-08-04 ENCOUNTER — LAB (OUTPATIENT)
Dept: LAB | Facility: HOSPITAL | Age: 85
End: 2020-08-04

## 2020-08-04 VITALS — HEIGHT: 69 IN | BODY MASS INDEX: 25.18 KG/M2 | WEIGHT: 170 LBS

## 2020-08-04 DIAGNOSIS — I48.20 CHRONIC ATRIAL FIBRILLATION (HCC): ICD-10-CM

## 2020-08-04 DIAGNOSIS — Z45.2 ENCOUNTER FOR CARE RELATED TO VASCULAR ACCESS PORT: Primary | ICD-10-CM

## 2020-08-04 LAB — INR PPP: 2.9

## 2020-08-04 PROCEDURE — 85610 PROTHROMBIN TIME: CPT

## 2020-08-04 PROCEDURE — 25010000003 HEPARIN LOCK FLUSH PER 10 UNITS: Performed by: INTERNAL MEDICINE

## 2020-08-04 RX ORDER — HEPARIN SODIUM (PORCINE) LOCK FLUSH IV SOLN 100 UNIT/ML 100 UNIT/ML
500 SOLUTION INTRAVENOUS AS NEEDED
Status: CANCELLED | OUTPATIENT
Start: 2020-08-04

## 2020-08-04 RX ORDER — HEPARIN SODIUM (PORCINE) LOCK FLUSH IV SOLN 100 UNIT/ML 100 UNIT/ML
500 SOLUTION INTRAVENOUS AS NEEDED
Status: DISCONTINUED | OUTPATIENT
Start: 2020-08-04 | End: 2020-08-05 | Stop reason: HOSPADM

## 2020-08-04 RX ORDER — SODIUM CHLORIDE 0.9 % (FLUSH) 0.9 %
20 SYRINGE (ML) INJECTION AS NEEDED
Status: DISCONTINUED | OUTPATIENT
Start: 2020-08-04 | End: 2020-08-05 | Stop reason: HOSPADM

## 2020-08-04 RX ORDER — SODIUM CHLORIDE 0.9 % (FLUSH) 0.9 %
20 SYRINGE (ML) INJECTION AS NEEDED
Status: CANCELLED | OUTPATIENT
Start: 2020-08-04

## 2020-08-04 RX ADMIN — Medication 30 ML: at 09:35

## 2020-08-04 RX ADMIN — HEPARIN 500 UNITS: 100 SYRINGE at 09:38

## 2020-08-10 ENCOUNTER — OFFICE VISIT (OUTPATIENT)
Dept: CARDIOLOGY | Facility: CLINIC | Age: 85
End: 2020-08-10

## 2020-08-10 VITALS
OXYGEN SATURATION: 94 % | HEART RATE: 56 BPM | BODY MASS INDEX: 24.66 KG/M2 | WEIGHT: 167 LBS | SYSTOLIC BLOOD PRESSURE: 154 MMHG | DIASTOLIC BLOOD PRESSURE: 68 MMHG

## 2020-08-10 DIAGNOSIS — I48.20 ATRIAL FIBRILLATION, CHRONIC (HCC): Primary | ICD-10-CM

## 2020-08-10 DIAGNOSIS — E78.2 MIXED HYPERLIPIDEMIA: ICD-10-CM

## 2020-08-10 DIAGNOSIS — D50.0 IRON DEFICIENCY ANEMIA DUE TO CHRONIC BLOOD LOSS: ICD-10-CM

## 2020-08-10 DIAGNOSIS — I10 ESSENTIAL HYPERTENSION: ICD-10-CM

## 2020-08-10 PROCEDURE — 99214 OFFICE O/P EST MOD 30 MIN: CPT | Performed by: INTERNAL MEDICINE

## 2020-08-10 PROCEDURE — 93000 ELECTROCARDIOGRAM COMPLETE: CPT | Performed by: INTERNAL MEDICINE

## 2020-08-10 NOTE — PROGRESS NOTES
CC-atrial fibrillation, coronary artery disease and nonsustained ventricular arrhythmias      Sub--88-year-old male patient was noted to have nonsustained VT on his loop recorder - patient denies palpitations or syncope and underwent an EP study  and was found to have noninducible sustained arrhythmias and was recommended only medical treatment because of normal ejection fraction. he has known history of ischemic heart disease as well as CVA.  He has additional history that includes  permanent atrial fibrillation, NSVT, COPD and implantable loop recorder.  He presents today for followup on the above conditions. he has Coronary artery disease status post PCI and stenting in the past. Patent stents on cardiac catheterization May 2016.Patient has recurrent hematuria with current anticoagulation anti-platelet   agents and is being followed by urologist and had stents placed in the past without recent recurrence according to him   chads Vasc score--CHF,HTN,CVA,AGE--6  HASBLED--5  Remains fairly active for his age and denies any new symptoms         assessment plan     nonsustained VT asymptomatic with prior negative EP study   normal EF   coronary artery disease   permanent atrial fib   internal loop recorder in situ  Remote history of stroke  Cardiovascular wise patient is very stable and follow-up after few months and medications reviewed        Vital Signs: Blood pressure 154/68 pulse rate 56 patient afebrile respiration 12 times a minute  EKG shows underlying atrial fibrillation with a heart rate of 56 QRS of 133 QTC of 445 and no significant changes compared to prior EKG and EKG indication includes chronic atrial fibrillation prior ventricular arrhythmias      Past Medical History:     Reviewed history from 04/15/2016 and no changes required:        Coronary Heart Disease--CABG        Hypertension        C O P D        Asthma        Gout        PRIOR PCI        RECENT CVA                  Past Surgical History:      Reviewed history from 2016 and no changes required:        MARTIN FLEMING G x 3, reoperation,  09        MARTIN FLEMING  and         P T C A: 2016 [facility]        Loop recorder implant 2016        Heart Catherization: 2016 [facility]        Review of Systems   General: No fatigue or tiredness, No change in weight   Eyes: No redness  Cardiovascular: No chest pain, no palpitations  Respiratory: No shortness of breath  Gastrointestinal: No nausea or vomiting, bleeding  Genitourinary: no hematuria or dysuria  Musculoskeletal: No arthralgia or myalgia  Skin: No rash  Neurologic: No numbness, tingling, syncope  Hematologic/Lymphatic: No abnormal bleeding      Physical Exam    General:      well developed, well nourished, in no acute distress.    Head:      normocephalic and atraumatic.    Eyes:      PERRL/EOM intact, conjunctiva and sclera clear with out nystagmus.    Lungs:      clear bilaterally to auscultation.    Heart:      non-displaced PMI, chest non-tender; irregular rate and rhythm, S1, S2 without murmurs, rubs, or gallops  Skin:      intact without lesions or rashes.    Psych:      alert and cooperative; normal mood and affect; normal attention span and concentration.      Electronically signed by Simeon Foley MD, 08/10/20, 11:53 AM.

## 2020-08-25 DIAGNOSIS — G47.00 INSOMNIA, UNSPECIFIED TYPE: ICD-10-CM

## 2020-08-25 RX ORDER — ALLOPURINOL 100 MG/1
100 TABLET ORAL DAILY
Qty: 90 TABLET | Refills: 2 | Status: SHIPPED | OUTPATIENT
Start: 2020-08-25

## 2020-08-25 RX ORDER — ZOLPIDEM TARTRATE 5 MG/1
5 TABLET ORAL NIGHTLY PRN
Qty: 30 TABLET | Refills: 0 | Status: SHIPPED | OUTPATIENT
Start: 2020-08-25 | End: 2020-09-25 | Stop reason: SDUPTHER

## 2020-09-09 ENCOUNTER — TELEPHONE (OUTPATIENT)
Dept: ONCOLOGY | Facility: HOSPITAL | Age: 85
End: 2020-09-09

## 2020-09-09 NOTE — TELEPHONE ENCOUNTER
I attempted to reach pt to schedule him for INR with no answer, voicemail was left for pt to return call to office.

## 2020-09-15 ENCOUNTER — HOSPITAL ENCOUNTER (OUTPATIENT)
Dept: ONCOLOGY | Facility: HOSPITAL | Age: 85
Setting detail: INFUSION SERIES
Discharge: HOME | End: 2020-09-15

## 2020-09-15 ENCOUNTER — TELEPHONE (OUTPATIENT)
Dept: ONCOLOGY | Facility: HOSPITAL | Age: 85
End: 2020-09-15

## 2020-09-15 ENCOUNTER — LAB (OUTPATIENT)
Dept: LAB | Facility: HOSPITAL | Age: 85
End: 2020-09-15

## 2020-09-15 DIAGNOSIS — Z79.01 LONG TERM (CURRENT) USE OF ANTICOAGULANTS: Primary | ICD-10-CM

## 2020-09-15 PROCEDURE — 36416 COLLJ CAPILLARY BLOOD SPEC: CPT

## 2020-09-15 NOTE — TELEPHONE ENCOUNTER
I called an spoke with pt he states that Dr. Carter put stents in last Thursday so he was off coumadin. Pt is back on coumadin now and hasn't had level checked. I made appointment for pt to come in for INR check today at 1415, pt and his wife verbalize understanding.

## 2020-09-25 DIAGNOSIS — G47.00 INSOMNIA, UNSPECIFIED TYPE: ICD-10-CM

## 2020-09-25 RX ORDER — ZOLPIDEM TARTRATE 5 MG/1
5 TABLET ORAL NIGHTLY PRN
Qty: 30 TABLET | Refills: 0 | Status: ON HOLD | OUTPATIENT
Start: 2020-09-25 | End: 2022-08-26

## 2020-10-05 ENCOUNTER — OFFICE VISIT (OUTPATIENT)
Dept: ONCOLOGY | Facility: CLINIC | Age: 85
End: 2020-10-05

## 2020-10-05 ENCOUNTER — HOSPITAL ENCOUNTER (OUTPATIENT)
Dept: ONCOLOGY | Facility: HOSPITAL | Age: 85
Setting detail: INFUSION SERIES
Discharge: HOME | End: 2020-10-05

## 2020-10-05 VITALS
WEIGHT: 163 LBS | DIASTOLIC BLOOD PRESSURE: 73 MMHG | RESPIRATION RATE: 18 BRPM | TEMPERATURE: 97.3 F | HEART RATE: 78 BPM | HEIGHT: 69 IN | BODY MASS INDEX: 24.14 KG/M2 | SYSTOLIC BLOOD PRESSURE: 124 MMHG

## 2020-10-05 DIAGNOSIS — C82.93 FOLLICULAR LYMPHOMA OF INTRA-ABDOMINAL LYMPH NODES, UNSPECIFIED FOLLICULAR LYMPHOMA TYPE (HCC): ICD-10-CM

## 2020-10-05 DIAGNOSIS — D61.818 PANCYTOPENIA (HCC): Primary | ICD-10-CM

## 2020-10-05 DIAGNOSIS — Z45.2 ENCOUNTER FOR CARE RELATED TO VASCULAR ACCESS PORT: ICD-10-CM

## 2020-10-05 LAB
ALBUMIN SERPL-MCNC: 3.8 G/DL (ref 3.5–5.2)
ALBUMIN/GLOB SERPL: 1.3 G/DL
ALP SERPL-CCNC: 73 U/L (ref 39–117)
ALT SERPL W P-5'-P-CCNC: 16 U/L (ref 1–41)
ANION GAP SERPL CALCULATED.3IONS-SCNC: 12 MMOL/L (ref 5–15)
AST SERPL-CCNC: 29 U/L (ref 1–40)
BASOPHILS # BLD AUTO: 0.02 10*3/MM3 (ref 0–0.2)
BASOPHILS NFR BLD AUTO: 0.5 % (ref 0–1.5)
BILIRUB SERPL-MCNC: 0.7 MG/DL (ref 0–1.2)
BUN SERPL-MCNC: ABNORMAL MG/DL
BUN/CREAT SERPL: ABNORMAL
CALCIUM SPEC-SCNC: 8.6 MG/DL (ref 8.6–10.5)
CHLORIDE SERPL-SCNC: 102 MMOL/L (ref 98–107)
CO2 SERPL-SCNC: 24 MMOL/L (ref 22–29)
CREAT SERPL-MCNC: 1.73 MG/DL (ref 0.76–1.27)
DEPRECATED RDW RBC AUTO: 50.3 FL (ref 37–54)
EOSINOPHIL # BLD AUTO: 0.01 10*3/MM3 (ref 0–0.4)
EOSINOPHIL NFR BLD AUTO: 0.2 % (ref 0.3–6.2)
ERYTHROCYTE [DISTWIDTH] IN BLOOD BY AUTOMATED COUNT: 15.4 % (ref 12.3–15.4)
GFR SERPL CREATININE-BSD FRML MDRD: 45 ML/MIN/1.73
GLOBULIN UR ELPH-MCNC: 2.9 GM/DL
GLUCOSE SERPL-MCNC: 84 MG/DL (ref 65–99)
HCT VFR BLD AUTO: 39.6 % (ref 37.5–51)
HGB BLD-MCNC: 13.1 G/DL (ref 13–17.7)
LDH SERPL-CCNC: 214 U/L (ref 135–225)
LYMPHOCYTES # BLD AUTO: 1.44 10*3/MM3 (ref 0.7–3.1)
LYMPHOCYTES NFR BLD AUTO: 33.3 % (ref 19.6–45.3)
MCH RBC QN AUTO: 30.3 PG (ref 26.6–33)
MCHC RBC AUTO-ENTMCNC: 33.1 G/DL (ref 31.5–35.7)
MCV RBC AUTO: 91.5 FL (ref 79–97)
MONOCYTES # BLD AUTO: 0.6 10*3/MM3 (ref 0.1–0.9)
MONOCYTES NFR BLD AUTO: 13.9 % (ref 5–12)
NEUTROPHILS NFR BLD AUTO: 2.26 10*3/MM3 (ref 1.7–7)
NEUTROPHILS NFR BLD AUTO: 52.1 % (ref 42.7–76)
PLATELET # BLD AUTO: 96 10*3/MM3 (ref 140–450)
PMV BLD AUTO: 10.2 FL (ref 6–12)
POTASSIUM SERPL-SCNC: 4.2 MMOL/L (ref 3.5–5.2)
PROT SERPL-MCNC: 6.7 G/DL (ref 6–8.5)
RBC # BLD AUTO: 4.33 10*6/MM3 (ref 4.14–5.8)
SODIUM SERPL-SCNC: 138 MMOL/L (ref 136–145)
URATE SERPL-MCNC: 6.4 MG/DL (ref 3.4–7)
WBC # BLD AUTO: 4.33 10*3/MM3 (ref 3.4–10.8)

## 2020-10-05 PROCEDURE — 85025 COMPLETE CBC W/AUTO DIFF WBC: CPT | Performed by: INTERNAL MEDICINE

## 2020-10-05 PROCEDURE — 83615 LACTATE (LD) (LDH) ENZYME: CPT | Performed by: INTERNAL MEDICINE

## 2020-10-05 PROCEDURE — 99214 OFFICE O/P EST MOD 30 MIN: CPT | Performed by: INTERNAL MEDICINE

## 2020-10-05 PROCEDURE — 80053 COMPREHEN METABOLIC PANEL: CPT | Performed by: INTERNAL MEDICINE

## 2020-10-05 PROCEDURE — G0463 HOSPITAL OUTPT CLINIC VISIT: HCPCS

## 2020-10-05 PROCEDURE — 25010000003 HEPARIN LOCK FLUSH PER 10 UNITS: Performed by: INTERNAL MEDICINE

## 2020-10-05 PROCEDURE — 84550 ASSAY OF BLOOD/URIC ACID: CPT | Performed by: INTERNAL MEDICINE

## 2020-10-05 RX ORDER — HEPARIN SODIUM (PORCINE) LOCK FLUSH IV SOLN 100 UNIT/ML 100 UNIT/ML
500 SOLUTION INTRAVENOUS AS NEEDED
Status: DISCONTINUED | OUTPATIENT
Start: 2020-10-05 | End: 2020-10-06 | Stop reason: HOSPADM

## 2020-10-05 RX ORDER — HEPARIN SODIUM (PORCINE) LOCK FLUSH IV SOLN 100 UNIT/ML 100 UNIT/ML
500 SOLUTION INTRAVENOUS AS NEEDED
Status: CANCELLED | OUTPATIENT
Start: 2020-10-05

## 2020-10-05 RX ORDER — SODIUM CHLORIDE 0.9 % (FLUSH) 0.9 %
20 SYRINGE (ML) INJECTION AS NEEDED
Status: DISCONTINUED | OUTPATIENT
Start: 2020-10-05 | End: 2020-10-06 | Stop reason: HOSPADM

## 2020-10-05 RX ORDER — SODIUM CHLORIDE 0.9 % (FLUSH) 0.9 %
20 SYRINGE (ML) INJECTION AS NEEDED
Status: CANCELLED | OUTPATIENT
Start: 2020-10-05

## 2020-10-05 RX ADMIN — HEPARIN 500 UNITS: 100 SYRINGE at 12:29

## 2020-10-05 RX ADMIN — Medication 30 ML: at 11:18

## 2020-10-06 LAB — BUN SERPL-MCNC: 34 MG/DL (ref 8–23)

## 2020-10-07 ENCOUNTER — TELEPHONE (OUTPATIENT)
Dept: ONCOLOGY | Facility: CLINIC | Age: 85
End: 2020-10-07

## 2020-10-07 NOTE — TELEPHONE ENCOUNTER
I called and spoke with Vesta, the patient's caregiver, and notified her that the patient's creatinine is elevated at 1.7 from 1.5 and requested that the patient follow up with his nephrologist.

## 2020-10-07 NOTE — TELEPHONE ENCOUNTER
----- Message from Stacie Vargas MD sent at 10/5/2020  7:18 PM EDT -----  Patient needs to follow-up with nephrology.  His creatinine is  elevated at 1.73 from his baseline

## 2020-10-15 ENCOUNTER — TELEPHONE (OUTPATIENT)
Dept: ONCOLOGY | Facility: HOSPITAL | Age: 85
End: 2020-10-15

## 2020-10-15 NOTE — TELEPHONE ENCOUNTER
Pt missed his appt phone call made to home phone spoke to Shonna who notified me he was in [facility] with Covid 19 she will call and schedule when he is able to be covid free.

## 2020-10-20 ENCOUNTER — TELEPHONE (OUTPATIENT)
Dept: ONCOLOGY | Facility: HOSPITAL | Age: 85
End: 2020-10-20

## 2020-10-20 NOTE — TELEPHONE ENCOUNTER
I attempted to reach pt to schedule him for an INR appointment with no answer, voicemail was left for pt to return call to office.

## 2020-11-09 ENCOUNTER — HOSPITAL ENCOUNTER (OUTPATIENT)
Dept: ONCOLOGY | Facility: HOSPITAL | Age: 85
Setting detail: INFUSION SERIES
Discharge: HOME | End: 2020-11-09

## 2020-11-09 ENCOUNTER — TELEPHONE (OUTPATIENT)
Dept: ONCOLOGY | Facility: HOSPITAL | Age: 85
End: 2020-11-09

## 2020-11-09 ENCOUNTER — LAB (OUTPATIENT)
Dept: LAB | Facility: HOSPITAL | Age: 85
End: 2020-11-09

## 2020-11-09 NOTE — TELEPHONE ENCOUNTER
I called pt and spoke with his wife about INR appointment. She states that she will call her daughter to get a ride for them to come to office this afternoon. I gave her an appointment time of 1430 and she will return call to office if they can't make it.

## 2020-11-11 ENCOUNTER — HOSPITAL ENCOUNTER (OUTPATIENT)
Dept: ONCOLOGY | Facility: HOSPITAL | Age: 85
Setting detail: INFUSION SERIES
Discharge: HOME | End: 2020-11-11

## 2020-11-11 ENCOUNTER — LAB (OUTPATIENT)
Dept: LAB | Facility: HOSPITAL | Age: 85
End: 2020-11-11

## 2020-11-11 DIAGNOSIS — I48.20 CHRONIC ATRIAL FIBRILLATION (HCC): Primary | ICD-10-CM

## 2020-11-11 DIAGNOSIS — Z79.01 LONG TERM (CURRENT) USE OF ANTICOAGULANTS: Primary | ICD-10-CM

## 2020-11-11 LAB — INR PPP: 3.1

## 2020-11-11 PROCEDURE — 85610 PROTHROMBIN TIME: CPT

## 2020-11-11 NOTE — PROGRESS NOTES
Patient has held coumadin last two day. Dropped to 3.10. Continue with 6mg and will recheck Friday 11/13. Patient denies changes in diet and any antibiotic use or drinking.   Asked Alexandra and also gave her some instructions.

## 2020-11-13 ENCOUNTER — LAB (OUTPATIENT)
Dept: LAB | Facility: HOSPITAL | Age: 85
End: 2020-11-13

## 2020-11-13 ENCOUNTER — HOSPITAL ENCOUNTER (OUTPATIENT)
Dept: ONCOLOGY | Facility: HOSPITAL | Age: 85
Setting detail: INFUSION SERIES
Discharge: HOME | End: 2020-11-13

## 2020-11-13 DIAGNOSIS — Z79.01 LONG TERM (CURRENT) USE OF ANTICOAGULANTS: Primary | ICD-10-CM

## 2020-11-13 DIAGNOSIS — I48.20 CHRONIC ATRIAL FIBRILLATION (HCC): Primary | ICD-10-CM

## 2020-11-13 LAB — INR PPP: 2.5

## 2020-11-13 PROCEDURE — 36416 COLLJ CAPILLARY BLOOD SPEC: CPT

## 2020-11-13 PROCEDURE — 85610 PROTHROMBIN TIME: CPT

## 2020-11-18 DIAGNOSIS — G47.00 INSOMNIA, UNSPECIFIED TYPE: ICD-10-CM

## 2020-11-18 RX ORDER — WARFARIN SODIUM 5 MG/1
5 TABLET ORAL DAILY
Qty: 90 TABLET | Refills: 1 | Status: ON HOLD | OUTPATIENT
Start: 2020-11-18 | End: 2022-08-26

## 2020-11-18 RX ORDER — ZOLPIDEM TARTRATE 5 MG/1
5 TABLET ORAL NIGHTLY PRN
Qty: 30 TABLET | Refills: 0 | OUTPATIENT
Start: 2020-11-18

## 2020-11-18 NOTE — TELEPHONE ENCOUNTER
I received a refill request on Warfarin 5mg to be sent to [facility] on [address] in [place]

## 2020-11-20 ENCOUNTER — HOSPITAL ENCOUNTER (OUTPATIENT)
Dept: ONCOLOGY | Facility: HOSPITAL | Age: 85
Setting detail: INFUSION SERIES
Discharge: HOME | End: 2020-11-20

## 2020-11-20 ENCOUNTER — LAB (OUTPATIENT)
Dept: LAB | Facility: HOSPITAL | Age: 85
End: 2020-11-20

## 2020-11-20 DIAGNOSIS — I48.20 CHRONIC ATRIAL FIBRILLATION (HCC): Primary | ICD-10-CM

## 2020-11-20 DIAGNOSIS — Z79.01 LONG TERM (CURRENT) USE OF ANTICOAGULANTS: Primary | ICD-10-CM

## 2020-11-20 LAB — INR PPP: 3.4

## 2020-11-20 PROCEDURE — 85610 PROTHROMBIN TIME: CPT

## 2020-11-20 PROCEDURE — 36416 COLLJ CAPILLARY BLOOD SPEC: CPT

## 2020-12-01 ENCOUNTER — TELEPHONE (OUTPATIENT)
Dept: ONCOLOGY | Facility: CLINIC | Age: 85
End: 2020-12-01

## 2020-12-01 ENCOUNTER — HOSPITAL ENCOUNTER (OUTPATIENT)
Dept: ONCOLOGY | Facility: HOSPITAL | Age: 85
Setting detail: INFUSION SERIES
Discharge: HOME | End: 2020-12-01

## 2020-12-01 ENCOUNTER — ANTICOAGULATION VISIT (OUTPATIENT)
Dept: ONCOLOGY | Facility: HOSPITAL | Age: 85
End: 2020-12-01

## 2020-12-01 ENCOUNTER — OFFICE VISIT (OUTPATIENT)
Dept: ONCOLOGY | Facility: CLINIC | Age: 85
End: 2020-12-01

## 2020-12-01 ENCOUNTER — LAB (OUTPATIENT)
Dept: LAB | Facility: HOSPITAL | Age: 85
End: 2020-12-01

## 2020-12-01 VITALS
BODY MASS INDEX: 23.11 KG/M2 | SYSTOLIC BLOOD PRESSURE: 157 MMHG | WEIGHT: 156 LBS | TEMPERATURE: 97 F | RESPIRATION RATE: 18 BRPM | HEIGHT: 69 IN | DIASTOLIC BLOOD PRESSURE: 76 MMHG | HEART RATE: 72 BPM

## 2020-12-01 DIAGNOSIS — D61.818 PANCYTOPENIA (HCC): Primary | ICD-10-CM

## 2020-12-01 DIAGNOSIS — Z79.01 LONG TERM (CURRENT) USE OF ANTICOAGULANTS: ICD-10-CM

## 2020-12-01 DIAGNOSIS — Z45.2 ENCOUNTER FOR CARE RELATED TO VASCULAR ACCESS PORT: ICD-10-CM

## 2020-12-01 LAB
BASOPHILS # BLD AUTO: 0.02 10*3/MM3 (ref 0–0.2)
BASOPHILS NFR BLD AUTO: 0.3 % (ref 0–1.5)
DEPRECATED RDW RBC AUTO: 58.3 FL (ref 37–54)
EOSINOPHIL # BLD AUTO: 0.09 10*3/MM3 (ref 0–0.4)
EOSINOPHIL NFR BLD AUTO: 1.5 % (ref 0.3–6.2)
ERYTHROCYTE [DISTWIDTH] IN BLOOD BY AUTOMATED COUNT: 17.1 % (ref 12.3–15.4)
HCT VFR BLD AUTO: 36.8 % (ref 37.5–51)
HGB BLD-MCNC: 11.4 G/DL (ref 13–17.7)
INR PPP: 3.3
INR PPP: 3.3 (ref 0.9–1.1)
LYMPHOCYTES # BLD AUTO: 2.27 10*3/MM3 (ref 0.7–3.1)
LYMPHOCYTES NFR BLD AUTO: 38.2 % (ref 19.6–45.3)
MCH RBC QN AUTO: 29.8 PG (ref 26.6–33)
MCHC RBC AUTO-ENTMCNC: 31 G/DL (ref 31.5–35.7)
MCV RBC AUTO: 96.3 FL (ref 79–97)
MONOCYTES # BLD AUTO: 0.64 10*3/MM3 (ref 0.1–0.9)
MONOCYTES NFR BLD AUTO: 10.8 % (ref 5–12)
NEUTROPHILS NFR BLD AUTO: 2.92 10*3/MM3 (ref 1.7–7)
NEUTROPHILS NFR BLD AUTO: 49.2 % (ref 42.7–76)
PLATELET # BLD AUTO: 155 10*3/MM3 (ref 140–450)
PMV BLD AUTO: 9.7 FL (ref 6–12)
RBC # BLD AUTO: 3.82 10*6/MM3 (ref 4.14–5.8)
WBC # BLD AUTO: 5.94 10*3/MM3 (ref 3.4–10.8)

## 2020-12-01 PROCEDURE — 85610 PROTHROMBIN TIME: CPT

## 2020-12-01 PROCEDURE — 85025 COMPLETE CBC W/AUTO DIFF WBC: CPT | Performed by: INTERNAL MEDICINE

## 2020-12-01 PROCEDURE — 36416 COLLJ CAPILLARY BLOOD SPEC: CPT

## 2020-12-01 PROCEDURE — 99214 OFFICE O/P EST MOD 30 MIN: CPT | Performed by: INTERNAL MEDICINE

## 2020-12-01 PROCEDURE — G0463 HOSPITAL OUTPT CLINIC VISIT: HCPCS

## 2020-12-01 PROCEDURE — 25010000003 HEPARIN LOCK FLUSH PER 10 UNITS: Performed by: INTERNAL MEDICINE

## 2020-12-01 RX ORDER — SODIUM CHLORIDE 0.9 % (FLUSH) 0.9 %
20 SYRINGE (ML) INJECTION AS NEEDED
Status: CANCELLED | OUTPATIENT
Start: 2020-12-01

## 2020-12-01 RX ORDER — HEPARIN SODIUM (PORCINE) LOCK FLUSH IV SOLN 100 UNIT/ML 100 UNIT/ML
500 SOLUTION INTRAVENOUS AS NEEDED
Status: DISCONTINUED | OUTPATIENT
Start: 2020-12-01 | End: 2020-12-02 | Stop reason: HOSPADM

## 2020-12-01 RX ORDER — SODIUM CHLORIDE 0.9 % (FLUSH) 0.9 %
20 SYRINGE (ML) INJECTION AS NEEDED
Status: DISCONTINUED | OUTPATIENT
Start: 2020-12-01 | End: 2020-12-02 | Stop reason: HOSPADM

## 2020-12-01 RX ORDER — HEPARIN SODIUM (PORCINE) LOCK FLUSH IV SOLN 100 UNIT/ML 100 UNIT/ML
500 SOLUTION INTRAVENOUS AS NEEDED
Status: CANCELLED | OUTPATIENT
Start: 2020-12-01

## 2020-12-01 RX ADMIN — HEPARIN 500 UNITS: 100 SYRINGE at 10:08

## 2020-12-01 RX ADMIN — Medication 30 ML: at 09:17

## 2020-12-01 NOTE — TELEPHONE ENCOUNTER
Per Dr. Vargas request, find out how much Warfarin pt is taking daily.   Per Vesta, pt is taking 6mg daily(5mg+1mg tablet).      Pt has 5mg and 1mg tablets available in the home.

## 2020-12-01 NOTE — TELEPHONE ENCOUNTER
Per Dr. Vargas, it is okay to have pt hold today and tomorrow like Ruth has advised patient to do.   Confirmed with Vesta that pt is to hold today and tomorrow and return Thursday for a recheck.   Vesta v/u.

## 2020-12-01 NOTE — PROGRESS NOTES
Pt was advised to hold dose today and silvano an get recheck on Thursday. Pt verbalized understanding.

## 2020-12-03 ENCOUNTER — LAB (OUTPATIENT)
Dept: LAB | Facility: HOSPITAL | Age: 85
End: 2020-12-03

## 2020-12-03 ENCOUNTER — HOSPITAL ENCOUNTER (OUTPATIENT)
Dept: ONCOLOGY | Facility: HOSPITAL | Age: 85
Setting detail: INFUSION SERIES
Discharge: HOME | End: 2020-12-03

## 2020-12-03 DIAGNOSIS — Z79.01 LONG TERM (CURRENT) USE OF ANTICOAGULANTS: Primary | ICD-10-CM

## 2020-12-03 LAB — INR PPP: 2.2

## 2020-12-03 PROCEDURE — 36416 COLLJ CAPILLARY BLOOD SPEC: CPT

## 2020-12-03 PROCEDURE — 85610 PROTHROMBIN TIME: CPT

## 2020-12-10 ENCOUNTER — HOSPITAL ENCOUNTER (OUTPATIENT)
Dept: ONCOLOGY | Facility: HOSPITAL | Age: 85
Setting detail: INFUSION SERIES
Discharge: HOME | End: 2020-12-10

## 2020-12-10 ENCOUNTER — LAB (OUTPATIENT)
Dept: LAB | Facility: HOSPITAL | Age: 85
End: 2020-12-10

## 2020-12-10 DIAGNOSIS — Z79.01 LONG TERM (CURRENT) USE OF ANTICOAGULANTS: Primary | ICD-10-CM

## 2020-12-10 LAB — INR PPP: 2

## 2020-12-10 PROCEDURE — 85610 PROTHROMBIN TIME: CPT

## 2020-12-17 ENCOUNTER — HOSPITAL ENCOUNTER (OUTPATIENT)
Dept: ONCOLOGY | Facility: HOSPITAL | Age: 85
Setting detail: INFUSION SERIES
Discharge: HOME | End: 2020-12-17

## 2020-12-17 ENCOUNTER — LAB (OUTPATIENT)
Dept: LAB | Facility: HOSPITAL | Age: 85
End: 2020-12-17

## 2020-12-17 DIAGNOSIS — Z79.01 LONG TERM (CURRENT) USE OF ANTICOAGULANTS: Primary | ICD-10-CM

## 2020-12-17 LAB — INR PPP: 2.2

## 2020-12-17 PROCEDURE — 85610 PROTHROMBIN TIME: CPT

## 2020-12-17 PROCEDURE — 36416 COLLJ CAPILLARY BLOOD SPEC: CPT

## 2021-01-12 NOTE — PROGRESS NOTES
Hematology/Oncology Outpatient Follow Up    PATIENT NAME:Barry Calhoun  :1932  MRN: [id number]  PRIMARY CARE PHYSICIAN: Pedro Purvis MD  REFERRING PHYSICIAN: Pedro Purvis MD    Chief Complaint   Patient presents with   • Follow-up     Pancytopenia        HISTORY OF PRESENT ILLNESS:     1. 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     · CT abdomen and pelvis done 11/4/15 that showed a new retroperitoneal tissue between the aorta and IVC measuring 2.6 x 2.2 cm, extending from the level of origin of the superior mesenteric artery. The differential diagnosis included lymphoma as well as atypical retroperitoneal fibrosis. Metastatic adenopathy was also in the differential as well as amyloidosis. Recommending a PET CT for correlation. There was non-specific prostate enlargement and coronary artery and aortoiliac atherosclerosis. On 3/8/16 he saw Dr. Pedro Purvis in follow-up. He was complaining of leg swelling and insomnia. He was able to go to sleep but would wake up a few hours after sleeping and could not get back to sleep. He also reported a metallic taste in his mouth. Bilateral lower extremity Dopplers were performed that were negative for DVT. A CT abdomen and pelvis was ordered and performed on 3/8/16 that showed a retroperitoneal mass extending dorsal to the aorta on the left and right side, obscuring the periaortic fat, transversing caudally along the psoas muscles. The mass caused left-sided obstructive uropathy just caudal to the ureteropelvic junction. Differential diagnosis included lymphoma, metastasis or primary sarcoma. There were no other soft tissue masses throughout the abdomen. There was an enlarged prostate with central lucent lesion, possibly due to a TURP defect. The mass measured 4.2 x 1.9 cm on transaxial on the right periaortic retroperitoneum. On the left periaortic retroperitoneum it measured  3.8 x 2.8 cm. He was seen by Dr. Briseno on 3/16/16. Urinalysis was negative for blood, nitrates and leukocytes. He was diagnosed with BPH with LUTS and hydronephrosis with plan to perform a cysto with stent placement soon.   · 3/17/16 - Patient seen for initial consultation regarding enlarging retroperitoneal mass at the [facility]. Quantitative hCG <0.1 (<2.0). ESR 45 (<21). AFP 4 (0-9). Chest x-ray with linear opacity right middle lobe. Question of atelectasis or scar and cardiomegaly with changes of CABG. PSA 6.37 (0-4).  Result sent to Dr. Briseno.   · 4/2/16 - CT head had a focal hyperdense area in the left cerebral hemisphere representing acute ischemia.   · 4/5/16 - CT chest, abdomen and pelvis done during hospitalization at [facility] revealed no findings of primary or metastatic disease in the chest. Dilated main pulmonary artery. Mild ascending thoracic aortic ectasia measuring up to 4.1 cm. Bilateral ureteral stents. The proximal left ureteral stent may be located within the renal parenchyma or perhaps anterior calyx. Possibly mild left hydronephrosis. Abnormal periaortic soft tissue mass ascending from the renal vessels through the iliac bifurcation.   · 4/14/16 - Patient did not have CT-guided biopsy of retroperitoneal mass done as he was hospitalized at [facility] with a stroke.   · 5/2/16 - Chest x-ray with cardiomegaly and chronic changes about the chest. Recording time device overlying the left side of the chest.   · 5/17/16 - Patient did not want to schedule biopsy of the lymph nodes. Patient claims not to want to go for CT-guided retroperitoneal mass biopsy, but would proceed with a bone marrow biopsy.   · 5/27/16 - Bone marrow aspiration and biopsy with adequate normocellular bone marrow (35%) showing trilineage hematopoiesis, a moderate plasma cytosis, and increased iron stores. Flow cytometry revealed a decreased CD-4, CD-8 helper suppressor T-cell ratio  suggesting viral infection or immunocompromise. Cytogenetics revealed normal karyotype. Plasma cells were increased to 12-15%. Copper 141 ().  · 6/9/16 - WBC 4.23, hemoglobin 10.1, MCV 85.9, platelet count 152,000.   · 7/7/16 - Patient has agreed to have a CT-guided retroperitoneal mass biopsy which I am concerned may be a plasma cytoma. Beta-2 microglobulin 3.72 (<2.16).    · 8/2/16 - CT abdomen and pelvis with significant progression of extensive infiltrative retroperitoneal process. New left ureteral stent with mild residual caliectasis on the left with new right hydronephrosis due to partial obstruction of the right ureter. Enlarged prostate.   · 12/9/16 - Patient underwent cystoscopy with bilateral stent exchange and bilateral retrograde pyelogram by Ganesh Briseno M.D.   · 1/13/17 - Chest x-ray with mild cardiac enlargement with borderline pulmonary venous redistribution , unchanged, with chronic elevation or eventration of the right hemidiaphragm.   · 1/17/17 - Patient was hospitalized at [facility] between 1/17/17 and 1/25/17 with right scrotal swelling. Stool Hemoccult was negative. Hemoglobin 8.9, MCV 79.2. IV diuretics were given. Cardiology and Urology consultations were obtained. He then underwent a core needle biopsy of the retroperitoneal mass while holding Coumadin, Aspirin and Plavix. Bilateral lower extremity Dopplers were negative for DVT. CT of the abdomen and pelvis had extensive confluent soft tissue density in the retroperitoneum, increased in size from previous study. In addition, there was some iliac adenopathy, especially on the right. Bilateral double pigtail ureteral stents were in good position without hydronephrosis. Extensive soft tissue stranding in the subcutaneous sub mesenteric fat was present.   · 1/18/17 - Echocardiogram with pulmonary artery systolic pressure 60-65 mmHg. Moderate pulmonary hypertension. Left ventricular systolic function normal with EF of 55-60%. LV diastolic  dysfunction noted. Transmitral spectral Doppler flow pattern suggestive of restrictive physiology with moderate mitral regurgitation.   · 1/23/17 - Patient underwent ultrasound-guided fine needle aspiration of retroperitoneal lymph node at [facility] by Stephen Ricci M.D. with pathology revealing friable adipose tissue with prominent infiltrative lymphoid cell population suspicious for lymphoma. Focal areas demonstrated vague follicle formations. Crush artifact was present. Flow cytometry revealed low viability and hypocellular specimen which failed to reveal a significant B-cell or monoclonal B-cell population. The majority of the lymphocytes were negative for lambda and kappa light chains. The impression was B-cell lymphoma favoring follicle center cell lymphoma and final report was supposed to be pending outside expert consultation.   · 1/30/17 - Retroperitoneal lymph node biopsy specimen was reviewed by Lenny Johnson M.D. and classified as B-cell lymphoma. It was somewhat difficult to classify given the architectural distortion as well as the crusted areas of the tumor. It was thought that at least a follicular lymphoma with areas of diffuse fat was present. There was concern of areas of large cell lymphoma, particularly in the adipose tissue.   · 2/3/17 - Patient returns after a seven month absence. Had to cancel his scheduled lymph node biopsies until his last hospitalization at [facility] where he was not seen by us in consultation. Diagnosis discussion had with patient in detail. Though he may have a higher grade lymphoma than follicular given his age and cardiac status, he is not going to be able to tolerate more aggressive chemotherapy. Will hence treat it as a follicular lymphoma and then follow him for response in hopes of palliating him and decreasing his total swelling. Chemotherapy orders written for Rituximab 375 mg/M2 IV day 1 and Bendamustine 70 mg/M2 IV piggyback days 1 and 2, repeat q 4 weeks.   · 2/13/17  - PET scan with hypermetabolic enlarged retroperitoneal lymph nodes increased in size since March 2016. Retrocrural lymph nodes along the iliac chains bilaterally were also hypermetabolic and a single hypermetabolic lymph node in the paratracheal location in the chest. Cardiomegaly with small pleural effusions and severe coronary artery atherosclerotic calcifications. Bilateral ureteral stents with mild to moderate hydronephrosis. Moderate amount of stool within the colon, questioning constipation. Diffuse anasarca.   · 2/16/17 - Patient underwent right IJ port insertion by Stephen Ricci M.D. in IR under fluoro guidance.   · 2/17/17 - Hepatitis B/C non-reactive.   · 2/21/17 - Potassium 3.3. Patient asked to increase dietary potassium intake. Uric acid 8.3 (4.8-8.7). Patient developed upper abdominal pain shortly after starting Rituxan infusion with stable vital signs and examination. Was taking Zantac at home. Patient given Pepcid 40 mg IV and Decadron 10 mg IV prior to resuming Rituxan.  Patient received cycle 1 chemotherapy.   · 3/1/17 -  (). ESR 28 (<21). Patient complains of insomnia. Ambien 5 mg p.o. q.h.s. p.r.n. prescribed. CMP significant for calcium 8.7 (L), albumin 3.3 (L), creatinine 1.2 (N). Uric acid elevated at 9.3 (H).   · 3/2/17 - Patient started on Allopurinol 100 mg by mouth daily for hyperuricemia.   · 3/22/17 - The patient started cycle 2 day 1 of Rituxan and Bendamustine.   · 5/23/17 - Uric acid 6.9 (4.8-8.7), ESR 68 (<21).     · 6/14/17 - Comprehensive metabolic panel with no significant abnormality. Cycle 5 chemotherapy given.   · 6/28/17 - INR 2.6 on 7.5 alternating with 10 mg of Warfarin being managed by Dr. Purvis. Patient complains of dry throat and dysuria.   · 3/29/17 - WBC 4.71, hemoglobin 9.9, MCV 76.9, platelets 146,000, ANC 3.6. Patient is scheduled for a CT scan of the chest, abdomen and pelvis with p.o. contrast only on 4/19/17. Orders written to continue  chemotherapy. Patient instructed to continue Allopurinol daily and Ambien as needed. Uric acid level ordered. Uric acid 6.1 (4.8-8.7).    · 4/19/17 - Comprehensive metabolic panel with no significant abnormality. CT chest, abdomen and pelvis with 16 mm lymph node seen on recent PET CT decreased to 12 mm but pretracheal lymph node increased to 14 mm. Mild atelectasis in the right middle lobe with mild scarring change in the right lower lobe medially. Decreasing lymphadenopathy in the abdomen and pelvis. Marked adenopathy at the level of the renal hilum measuring 11.4 x 5.5 down to 10 x 4.5 cm. Bilateral renal stents. Cycle 3 chemotherapy given. Patient’s Coumadin dose is being adjusted by Dr. Soni. He has an appointment with Dr. Briseno in follow-up of hematuria. Claims not to be gaining weight and advised taking Ensure or Boost.   · 5/17/17 - Received cycle 4 Bendamustine and Rituxan.   · 5/18/17 - White blood cell count 8.7, hemoglobin 9.1, MCV 78.1 and platelets 237,000. Weight loss of 18 pounds in five weeks.   · 5/23/17 - White count 4.48, hemoglobin 8.9, MCV 77.1 and platelet count 199,000. Allopurinol planned to discontinue if uric acid <6.0. ESR, CMP and uric acid ordered. Megace 200 mg p.o. daily ordered. Ferritin 90 ().    · 6/14/17 - Creatinine 1.2 (0.7-1.2).  Cycle 5 chemotherapy given.   · 6/28/17 - WBC 5.5, hemoglobin 8.8, MCV 74.5, platelet count 190,000.   · 7/12/17 - Chemotherapy held one week. ANC 0.48. Ciprofloxacin 500 mg b.i.d. prescribed for five days. Neutropenic precautions reviewed with the patient and reinforced.   · 7/17/17 - Potassium increased from 10 mEq daily to 10 mEq b.i.d.   · 7/19/17 - Weight loss of 7 pounds in one month. Persistent neutropenia. WBC 2.4, ANC 0.3, platelet count 136,000, hemoglobin 8.7, MCV 77.8. EKG performed for irregular heartbeat. Reviewed neutropenic precautions. Ciprofloxacin refilled 500 mg b.i.d. x1 week. Leukopenia and thrombocytopenia workup  ordered. Chemotherapy on hold for one week. Magnesium 2.0 (1.8-2.5), potassium 3.3 (3.6-5.1). Oral potassium supplementation increased. Platelet antibodies not detected. EBV IgM <0.2 (<0.9), EBV IgG >8 (<0.9). CMV IgM 0.35 (<0.91), CMV IgG 17.3 (<0.9). Neutrophil-associated antibody negative.        · 7/24/17 - PET scan with interval mixed treatment response with marked improvement in retroperitoneal adenopathy with metabolic activity just slightly higher than background. Interval marked increase in mediastinal adenopathy and development of bilateral hilar adenopathy.   · 7/26/17 - Order written to discontinue current chemotherapy because of progressive disease in chest and consultation obtained with CTS for mediastinal lymph node biopsy. ESR 37 (<21). Comprehensive metabolic panel with potassium 3.2. Potassium supplementation increased orally. Creatinine 1.3 (0.7-1.2), uric acid 6 (4.8-8.7).      · 8/10/17 - Patient underwent a mediastinoscopy by Donald Islas M.D. at [facility] with pathology of lymph node R4 revealing portions of benign lymph node with prominent anthracotic pigmented histocytes and focal hyalinized fibrosis. Special stain for acid-fast bacilli and fungal organisms was negative.   · 8/30/17 - Discussed options with the patient. Ideally would like to do six cycles of chemotherapy and then put him on maintenance Rituxan. However, cannot proceed because of low blood counts. Patient has not had INR’s in followup of AFib. Will have it drawn and sent to Dr. Bustamante for adjustment. Will check leukopenia workup and plan on maintenance Rituxan after improvement in blood counts. Comprehensive metabolic panel with potassium 3.5 (3.6-5.1).   · 9/6/17 - Patient hospitalized at [facility] between 9/6/17 and 9/15/17 with urosepsis. Chest x-ray had stable cardiomegaly and postsurgical changes with stable elevation of the right hemidiaphragm with overlying scarring or atelectasis. CT scan of the abdomen  and pelvis had bilateral ureteral stents, retroperitoneal soft tissue masses/adenopathy significantly decreased in size compared to January 2017, large stool burden throughout the colon, small volume of pelvic ascites, enlarged prostate gland, extensive atherosclerotic calcification and advanced multilevel lumbar disc degermation and facet arthropathy. Patient underwent cystoscopy with bilateral stent change by Yao Woods M.D. and was seen in consultation by Bimal Saenz M.D.   · 9/19/17 - Order written for Rituxan 375 mg/M2 IV q.2 months for two years as maintenance treatment and for monthly port flushes. BMP without clinical significance. Uric acid 5.4 (4.8-8.7).    · 10/11/17 - Patient began cycle 1 of maintenance dose Rituxan.   · 12/6/17 - Cycle 2 maintenance Rituxan given.   · 12/7/17 - Patient only taking one potassium pill a day. Asked to increase to twice a day due to hypokalemia. INR’s being managed by Pedro Purvis M.D. with AFib.   · 1/31/18 - CT chest with stable appearance of right paratracheal and bilateral hilar adenopathy.  CT scan of abdomen and pelvis showed stable appearance of extensive retroperitoneal stranding related to an adenopathy that encases the infrarenal aorta without significant change from prior exams.  Bilateral nephroureteral stents were in place.  Mild prostatomegaly was present.     · 2/6/18 - Cycle 3 Rituximab given.   · 4/3/18 - Patient received cycle 4 maintenance Rituximab.   · 4/9/18 - ESR 16 (0-20).    · 6/4/18 - Cycle 5 maintenance Rituximab administered.   · 7/30/18 - Cycle 6 maintenance Rituximab administered.   · 9/24/18 - Cycle 7 maintenance Rituximab initiated.   · 10/18/18 - CT chest, abdomen and pelvis without contrast showed stable appearance of paratracheal and bilateral hilar adenopathy and paratracheal lymph node measured 10 mm. The report stated evaluation of hilar lymph nodes was suboptimal but lymphadenopathy appeared normal. There was stable  appearance of the retroperitoneal stranding that encases the abdominal aorta and inferior vena cava felt related to lymphadenopathy and stable. Bilateral ureteral stents appeared in good position.   · 11/26/18 - Cycle 8 maintenance Rituximab initiated. Hepatitis B core antibody and hepatitis B surface antigen both non-reactive. Hepatitis C antibody was positive with hepatitis CRNA <15 not detected.   · 12/12/18 - ESR 18 (0-20).    · 1/21/19 - Patient received cycle 9 maintenance Rituximab.   · 3/18/19 - Cycle 10 Rituxan maintenance initiated.   · 5/21/19 - Received cycle 11 Rituxan.   · 6/11/19 - Uric acid 6.5 (4.8-8.7).  (). Sed rate 13 (0-20).   · 7/8/19-Cycle 12 Rituximab.  · 7/15/19 - CT scan chest, abdomen and pelvis showed mildly prominent bilateral hilar mediastinal lymph nodes unchanged since 10/18/2018 and there is no evidence of new or progressive metastatic disease in the chest. Irregular retroperitoneal soft tissue stranding in the abdomen and pelvis which may represent features related to patient's treated lymphoma. No evidence of disease progression in abdomen or pelvis since the 10/18/2018 examination.    · 8/12/19 -Rituxan discontinued due to completion of planned course.   · 11/6/2019 patient with a 8 pound weight loss but denies night sweats or fevers.   · 1/29/2020 patient had CT scan of the chest abdomen and pelvis this showed a dominant index right lower lobe paratracheal node measuring 11 mm and a right infrahilar hilar node measuring 16 mm and left hilar node 10 mm unchanged.   Stable thoracic aortic aneurysm at 4.2 cm.  In the abdomen there was no evidence of relapsed lymphoma.  He has prostatic enlargement and is seen by urologist Dr. Woods.  There is evidence of treated disease in the retroperitoneal soft tissue.  · 6/10/2020 patient had CT scan of the chest abdomen and pelvis showed a stable 7 mm thyroid nodule which is nonspecific.  Mild mediastinal and hilar lymphadenopathy  which is unchanged.  Defined soft tissue thickening in the retroperitoneum is unchanged and is favored to represent treated disease.  No abnormally enlarged lymph nodes identified.  Enlarged prostate.  2. Anemia diagnosed March 2016. Iron deficiency anemia diagnosed February 2017.   ? CBC showed a white blood cell count of 4.8, hemoglobin 11.8, MCV 88.2, RDW 15.6 (H), platelet count 146,000. Creatinine 1.31 (H). LFT’s normal. Stool heme negative.  ? 3/17/16 - Vitamin B12 of 228 (180-914), folate 21.2 (5.9-24.8), erythropoietin 13.65 (2.59-18.5). Serum protein electrophoresis with normal pattern.  (), ferritin 147 (), iron 36 (), TIBC 291 (228-428), retic count 0.69 (0.5-1.5), haptoglobin 158 ().         ? 4/5/16 - Labs drawn during hospitalization at [facility]: Vitamin B12 of 265 (180-914), serum iron 11 (). Hemoglobin 10.9. Creatinine 1.0 (0.6-1.3).      ? 4/14/16 - WBC 5.61, hemoglobin 11.6, MCV 84.5, platelet count 186,000.   ? 5/17/16 - WBC 4.5, hemoglobin 11.1, MCV 87.5, platelet count 150,000. Hemoglobin electrophoresis with normal pattern.   ? 5/27/16 - Bone marrow aspiration and biopsy with adequate normocellular bone marrow (35%) showing trilineage hematopoiesis, a moderate plasma cytosis, and increased iron stores. Flow cytometry revealed a decreased CD-4, CD-8 helper suppressor T-cell ratio suggesting viral infection or immunocompromise. Cytogenetics revealed normal karyotype. Plasma cells were increased to 12-15%. Copper 141 ().   ? 6/9/16 - HFE gene with no mutation.   ? 6/10/16 - Serum immunofixation with no monoclonal gammopathy identified. Kappa/lambda FLC ratio 17.38 (0.26-1.65). Comprehensive metabolic panel with no significant abnormality. Creatinine 1.2 (0.7-1.2).     ? 7/7/16 - Beta-2 microglobulin 3.72 (<2.16).    ? 7/28/16 - Skeletal survey with no suspicious lytic or blastic bony lesions identified. Right ureteral stent migrated and  coiled within the urinary bladder. Degenerative changes as noted above. Patient referred to his urologist.   ? 2/3/17 - WBC 5.7, hemoglobin 10.2, MCV 78.6, platelet count 237,000. Ferrous Sulfate 325 mg p.o. b.i.d. started. Ferritin 27 (), iron 20 (), TIBC 363 (228-428).      ? 3/1/17 - Patient not taking oral iron supplements. WBC 5, hemoglobin 9.7, MCV 77.2, platelet count 164,000. Ferrous Sulfate 325 mg p.o. b.i.d. prescribed.   ? 3/29/17 - Hemoglobin 9.9, MCV 76.9. Patient instructed to continue Iron Sulfate 325 mg b.i.d. Patient instructed to followup with urologist regarding hematuria.   ? 4/25/17 - WBC 3.6 with 75% neutrophils, 6% lymphocytes, 14% monocytes, hemoglobin 9.6, MCV 77.4, platelet count 149,000. Hemoglobin electrophoresis normal with 98% hemoglobin A.   ? 5/23/17 - White count 4.48, hemoglobin 8.9, MCV 77.1 and platelet count 199,000. Ferrous Sulfate 325 mg p.o. b.i.d. ordered. Stool cards ordered. Ferritin ordered.   ? 7/19/17 - WBC 2.4, ANC 0.3, platelet count 136,000, hemoglobin 8.7, MCV 77.8. Iron 28 (), TIBC 298 (228-428), iron saturation 9 (20-50), ferritin 205 (), folate 14 (5.9-24.8), creatinine 1.2 (0.7-1.2). Iron saturation 9 (20-50).          ? 7/26/17 - WBC 2.5 with 14% neutrophils, 62% lymphocytes, 23% monocytes, hemoglobin 8.9, MCV 75.7, RDW 20.6 and platelet count 151,000. Orders written for Injectafer 750 mg IV days 1 and 8.   ? 8/2/17 - Injectafer 750 mg IV given. Hemoglobin 8.6.   ? 8/30/17 - Hemoglobin electrophoresis with normal pattern. Vitamin B12 of 597 (211-911).  Creatinine 1.2 (0.7-1.2).    ? 9/19/17 - Order written for Procrit 30,000 units subq q.2 weeks for ACD secondary to CKD Stage III. Iron 32 low (), TIBC 297 normal (228-428), percentage saturation 11 low (20-50). Erythropoietin 32.3 high (2.59-18.50). Uric acid 5.4 normal (4.8-8.7).   ? 10/4/17 - Patient received cycle 1 day 8 of Injectafer 750 mg IV.   ? 10/24/17 - WBC 2.0, hemoglobin  10.3, MCV 86.0, platelet count 133,000.       ? 10/31/17 - Iron 41 (), TIBC 281 (228-428), iron saturation 15 (20-50), ferritin 440 ().   ? 12/7/17 - Procrit order discontinued as hemoglobin running above 11 post IV iron.    ? 1/3/18 - Patient reports taking one Ferrous Sulfate 325 mg tablet daily. Hemoglobin 12.5, MCV 91.7. Reports hematuria. Referred for followup with Dr. Woods. Creatinine 1.6 (0.7-1.2).    ? 6/4/18 - Creatinine 1.6 (0.7-1.2).    ? 4/8/19 - Patient reports continued hematuria. He has not seen Dr. Briseno since last stent change in approximately November 2018. Hemoglobin dropping to 9.7 today. Anemia labs sent. INR subtherapeutic at 1.4. Patient instructed to contact Dr. Purvis today regarding dosing. Expressed patient concerns regarding change of Coumadin dose with continued hematuria and worsening anemia and advised patient to followup with Dr. Purvis about Coumadin dosing. Will fax today’s CBC and INR with notation regarding drop in hemoglobin to Dr. Purvis. Reviewed note from Dr. Burns, cardiologist, from 2/18/19 visit where Dr. Burns reported possible Watchman procedure should hematuria continue. SPEP normal. Haptoglobin 120 (H). B12 was 1031 (H). Iron 22 (L),  (N), iron saturation 5 (L), ferritin 17 (L), folate 14.3 (N), retic 1.01 (N). Ordered to receive Injectafer 750 day 1 and day 8.   ? 4/17/19 - Received day 1 Injectafer.   ? 4/24/19 - Received day 8 Injectafer.   ? 5/13/19 - Cystoscopy per Dr. Briseno for bilateral hydronephrosis due to ureteral obstruction. Bilateral ureteral stents were exchanged. Creatinine 1.6 (H).   · 11/6/2019 referred to [facility] for EGD/colonoscopy due to chronic VALENTIN.  · 2/4/2020-patient had a white count of 6.1, hemoglobin 12.8 and platelets were 110,000     3.   Pancytopenia diagnosed in July 2017.   · 7/5/17 - WBC 3.1, hemoglobin 8.2, platelet count 147,000.   · 7/19/17 - Iron 28 (), TIBC 298 (228-428), ferritin 205  (), folate 14 (5.9-24.8), creatinine 1.2 (0.7-1.2).        · 17 - WBC 1.4 with 33% neutrophils, 45% lymphocytes, 16% monocytes, hemoglobin 10.1, MCV 79.7, platelet count 129,000. EBV capsid antibody IgM 0.3 (<0.9), EBV capsid antigen antibodies/IgG >8 (<0.9). CMV antibody IgM 0.45 (<0.91), CMV antibody IgG 19.8 (<0.9).  Neutrophil-associated antibody negative.   · 17 - WBC 2.8 with 58% neutrophils, 28% lymphocytes, 11% monocytes, hemoglobin 9.8, MCV 85.8, platelet count 220,000. ANNE screen negative. Erythropoietin 32.3 high (2.59-18.50). Iron 32 low (), TIBC 297 normal (228-428), percentage saturation 11 low (20-50). BMP without clinical significance. ANNE screen negative.   · 10/24/17 -WBC 2.0 with 24% neutrophils, 62% lymphocytes, 10% monocytes, hemoglobin 10.3, platelet count 133,000.      4.  Atrial fibrillation on long-term Coumadin (warfarin).      · 2019.  Patient requesting INR and Coumadin dosing to be followed through the cancer center.  Past Medical History:   Diagnosis Date   • Asthma    • COPD (chronic obstructive pulmonary disease) (CMS/Formerly [facility])    • Coronary heart disease     CABG   • CVA (cerebral vascular accident) (CMS/Formerly [facility])     recent CVA   • Gout    • Hypertension        Past Surgical History:   Procedure Laterality Date   • CARDIAC CATHETERIZATION  2016    [facility]   • CORONARY ARTERY BYPASS GRAFT      CABG X3, reoperation, 09; CAB and    • OTHER SURGICAL HISTORY  2016    loop recorder implant          Current Outpatient Medications:   •  allopurinol (ZYLOPRIM) 100 MG tablet, Take 1 tablet by mouth Daily., Disp: 90 tablet, Rfl: 2  •  atorvastatin (LIPITOR) 20 MG tablet, Take 20 mg by mouth Every Night., Disp: , Rfl:   •  bumetanide (BUMEX) 1 MG tablet, Take 1 mg by mouth Daily., Disp: , Rfl:   •  CloNIDine (CATAPRES) 0.1 MG tablet, Take 0.1 mg by mouth Every 6 (Six) Hours., Disp: , Rfl:   •  clopidogrel (PLAVIX) 75 MG tablet, Take 75 mg by mouth Daily.,  Disp: , Rfl:   •  ferrous sulfate 325 (65 FE) MG tablet, Take 1 tablet by mouth Daily With Breakfast., Disp: 30 tablet, Rfl: 3  •  hydrALAZINE (APRESOLINE) 50 MG tablet, Take 50 mg by mouth 4 (Four) Times a Day., Disp: , Rfl:   •  isosorbide mononitrate (IMDUR) 30 MG 24 hr tablet, Take 30 mg by mouth Daily., Disp: , Rfl:   •  metoprolol tartrate (LOPRESSOR) 12.5 MG half tablet, Take 12.5 mg by mouth 2 (Two) Times a Day., Disp: , Rfl:   •  potassium chloride (K-DUR,KLOR-CON) 10 MEQ CR tablet, TAKE 1 TABLET BY MOUTH THREE TIMES A DAY, Disp: 90 tablet, Rfl: 0  •  raNITIdine (ZANTAC) 150 MG tablet, Take 150 mg by mouth Daily., Disp: , Rfl:   •  tamsulosin (FLOMAX) 0.4 MG capsule 24 hr capsule, Take 1 capsule by mouth Daily., Disp: , Rfl:   •  warfarin (COUMADIN) 1 MG tablet, Take 1 mg by mouth Daily., Disp: , Rfl:   •  warfarin (COUMADIN) 5 MG tablet, Take 1 tablet by mouth Daily. At 1700 as directed, Disp: 90 tablet, Rfl: 1  •  zolpidem (AMBIEN) 5 MG tablet, Take 1 tablet by mouth At Night As Needed for Sleep., Disp: 30 tablet, Rfl: 0  No current facility-administered medications for this visit.     Facility-Administered Medications Ordered in Other Visits:   •  heparin injection 500 Units, 500 Units, Intravenous, PRN, Stacie Vargas MD  •  sodium chloride 0.9 % flush 20 mL, 20 mL, Intravenous, PRN, Stacie Vargas MD    No Known Allergies    Family History   Problem Relation Age of Onset   • Stroke Other        Cancer-related family history is not on file.    Social History     Tobacco Use   • Smoking status: Never Smoker   • Smokeless tobacco: Never Used   Substance Use Topics   • Alcohol use: No     Frequency: Never   • Drug use: No       I have reviewed and confirmed the accuracy of the patient's history:  as entered by the MA/LPMARIAM/RN. Stacie Vargas MD 01/14/21     SUBJECTIVE:    The patient is here for a follow up appointment.  He had COVID-19 pneumonia back in October 2020.  Doing better  "now.    REVIEW OF SYSTEMS:    Review of Systems   Constitutional: Negative for chills, fatigue and fever.   HENT: Negative for ear pain, mouth sores, nosebleeds and sore throat.    Eyes: Negative for photophobia and visual disturbance.   Respiratory: Negative for wheezing and stridor.    Cardiovascular: Negative for chest pain and palpitations.   Gastrointestinal: Negative for abdominal pain, diarrhea, nausea and vomiting.   Endocrine: Negative for cold intolerance and heat intolerance.   Genitourinary: Negative for dysuria and hematuria.   Musculoskeletal: Negative for joint swelling and neck stiffness.   Skin: Negative for color change and rash.   Neurological: Negative for seizures and syncope.   Hematological: Negative for adenopathy.   Psychiatric/Behavioral: Negative for agitation, confusion and hallucinations.     I have reviewed and confirmed the accuracy of the ROS as documented by the MA/LPN/RN Staciemandie Vargas MD      OBJECTIVE:    Vitals:    01/14/21 0919   BP: 141/77   Pulse: 76   Resp: 18   Temp: 97.1 °F (36.2 °C)   Weight: 73 kg (161 lb)   Height: 175.3 cm (69\")   PainSc: 0-No pain       ECOG  (2) Ambulatory and capable of self care, unable to carry out work activity, up and about > 50% or waking hours    Physical Exam   Constitutional: He is oriented to person, place, and time. No distress.   HENT:   Head: Normocephalic and atraumatic.   Eyes: Conjunctivae are normal. Right eye exhibits no discharge. Left eye exhibits no discharge. No scleral icterus.   Neck: Normal range of motion. Neck supple. No thyromegaly present.   Cardiovascular: Normal rate, regular rhythm and normal heart sounds. Exam reveals no gallop and no friction rub.   Pulmonary/Chest: Effort normal. No stridor. No respiratory distress. He has no wheezes.   Abdominal: Soft. Bowel sounds are normal. He exhibits no mass. There is no abdominal tenderness. There is no rebound and no guarding.   Musculoskeletal: Normal range of " motion. No tenderness.   Lymphadenopathy:     He has no cervical adenopathy.   Neurological: He is alert and oriented to person, place, and time. He exhibits normal muscle tone.   Skin: Skin is warm. No rash noted. He is not diaphoretic. No erythema.   Psychiatric: His behavior is normal. Judgment and thought content normal.   Nursing note and vitals reviewed.    I have reexamined the patient and the results are consistent with the previously documented exam. Staciemandie Vargas MD     RECENT LABS    WBC   Date Value Ref Range Status   12/01/2020 5.94 3.40 - 10.80 10*3/mm3 Final     RBC   Date Value Ref Range Status   12/01/2020 3.82 (L) 4.14 - 5.80 10*6/mm3 Final     Hemoglobin   Date Value Ref Range Status   12/01/2020 11.4 (L) 13.0 - 17.7 g/dL Final     Hematocrit   Date Value Ref Range Status   12/01/2020 36.8 (L) 37.5 - 51.0 % Final     MCV   Date Value Ref Range Status   12/01/2020 96.3 79.0 - 97.0 fL Final     MCH   Date Value Ref Range Status   12/01/2020 29.8 26.6 - 33.0 pg Final     MCHC   Date Value Ref Range Status   12/01/2020 31.0 (L) 31.5 - 35.7 g/dL Final     RDW   Date Value Ref Range Status   12/01/2020 17.1 (H) 12.3 - 15.4 % Final     RDW-SD   Date Value Ref Range Status   12/01/2020 58.3 (H) 37.0 - 54.0 fl Final     MPV   Date Value Ref Range Status   12/01/2020 9.7 6.0 - 12.0 fL Final     Platelets   Date Value Ref Range Status   12/01/2020 155 140 - 450 10*3/mm3 Final     Neutrophil %   Date Value Ref Range Status   12/01/2020 49.2 42.7 - 76.0 % Final     Lymphocyte %   Date Value Ref Range Status   12/01/2020 38.2 19.6 - 45.3 % Final     Monocyte %   Date Value Ref Range Status   12/01/2020 10.8 5.0 - 12.0 % Final     Eosinophil %   Date Value Ref Range Status   12/01/2020 1.5 0.3 - 6.2 % Final     Basophil %   Date Value Ref Range Status   12/01/2020 0.3 0.0 - 1.5 % Final     Neutrophils, Absolute   Date Value Ref Range Status   12/01/2020 2.92 1.70 - 7.00 10*3/mm3 Final     Lymphocytes,  Absolute   Date Value Ref Range Status   12/01/2020 2.27 0.70 - 3.10 10*3/mm3 Final     Monocytes, Absolute   Date Value Ref Range Status   12/01/2020 0.64 0.10 - 0.90 10*3/mm3 Final     Eosinophils, Absolute   Date Value Ref Range Status   12/01/2020 0.09 0.00 - 0.40 10*3/mm3 Final     Basophils, Absolute   Date Value Ref Range Status   12/01/2020 0.02 0.00 - 0.20 10*3/mm3 Final     nRBC   Date Value Ref Range Status   02/16/2017 0 0 /100[WBCs] Final       Lab Results   Component Value Date    GLUCOSE 84 10/05/2020    BUN  10/05/2020      Comment:      Testing performed by alternate method    BUN 34 (H) 10/05/2020    CREATININE 1.73 (H) 10/05/2020    EGFRIFAFRI 45 (L) 10/05/2020    BCR  10/05/2020      Comment:      Testing not performed    K 4.2 10/05/2020    CO2 24.0 10/05/2020    CALCIUM 8.6 10/05/2020    ALBUMIN 3.80 10/05/2020    LABIL2 1.3 05/21/2019    AST 29 10/05/2020    ALT 16 10/05/2020           ASSESSMENT:    · Follicular lymphoma of intra-abdominal lymph nodes, unspecified follicular lymphoma type (CMS/HCC), status post chemoimmunotherapy therapy and Rituxan maintenance: Ongoing surveillance for relapse  · Pancytopenia (CMS/HCC): Ongoing management  · Recent hospitalization at [facility] reviewed hospital records  · 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  There is no evidence of progression.  He has enlarged prostate and ureteral stents are in place.  He also has an ascending aortic aneurysm 4.2 cm.  I have given  referral to vascular for further evaluation.  INRs are monitored in our Coumadin  clinic      PLANS:     · Follow-up in Coumadin clinic as recommended.  Reviewed  · Schedule CT scan of the chest, abdomen and pelvis due none  · Reviewed his records from [facility]  · CBC today, CMP, uric acid and LDH  · Continue monthly port flushes.  Schedule  · Follow up with Dr. Briseno with Urology; reviewed  · Follow-up in 8 weeks with me  · Review CT scans once available         I have reviewed labs results, imaging, vitals, and medications with the patient today. Will follow up in 3 months with me.    Patient verbalized understanding and is in agreement of the above plan.

## 2021-01-13 DIAGNOSIS — Z79.01 LONG TERM (CURRENT) USE OF ANTICOAGULANTS: Primary | ICD-10-CM

## 2021-01-14 ENCOUNTER — ANTICOAGULATION VISIT (OUTPATIENT)
Dept: ONCOLOGY | Facility: HOSPITAL | Age: 86
End: 2021-01-14

## 2021-01-14 ENCOUNTER — OFFICE VISIT (OUTPATIENT)
Dept: ONCOLOGY | Facility: CLINIC | Age: 86
End: 2021-01-14

## 2021-01-14 ENCOUNTER — HOSPITAL ENCOUNTER (OUTPATIENT)
Dept: ONCOLOGY | Facility: HOSPITAL | Age: 86
Setting detail: INFUSION SERIES
Discharge: HOME | End: 2021-01-14

## 2021-01-14 ENCOUNTER — LAB (OUTPATIENT)
Dept: LAB | Facility: HOSPITAL | Age: 86
End: 2021-01-14

## 2021-01-14 VITALS
DIASTOLIC BLOOD PRESSURE: 77 MMHG | HEIGHT: 69 IN | WEIGHT: 161 LBS | SYSTOLIC BLOOD PRESSURE: 141 MMHG | HEART RATE: 76 BPM | RESPIRATION RATE: 18 BRPM | BODY MASS INDEX: 23.85 KG/M2 | TEMPERATURE: 97.1 F

## 2021-01-14 DIAGNOSIS — Z45.2 ENCOUNTER FOR CARE RELATED TO VASCULAR ACCESS PORT: ICD-10-CM

## 2021-01-14 DIAGNOSIS — C82.93 FOLLICULAR LYMPHOMA OF INTRA-ABDOMINAL LYMPH NODES, UNSPECIFIED FOLLICULAR LYMPHOMA TYPE (HCC): Primary | ICD-10-CM

## 2021-01-14 DIAGNOSIS — Z79.01 LONG TERM (CURRENT) USE OF ANTICOAGULANTS: ICD-10-CM

## 2021-01-14 LAB
ALBUMIN SERPL-MCNC: 3.9 G/DL (ref 3.5–5.2)
ALBUMIN/GLOB SERPL: 1.4 G/DL
ALP SERPL-CCNC: 84 U/L (ref 39–117)
ALT SERPL W P-5'-P-CCNC: 14 U/L (ref 1–41)
ANION GAP SERPL CALCULATED.3IONS-SCNC: 12 MMOL/L (ref 5–15)
AST SERPL-CCNC: 26 U/L (ref 1–40)
BILIRUB SERPL-MCNC: 0.6 MG/DL (ref 0–1.2)
BUN SERPL-MCNC: 34 MG/DL (ref 8–23)
BUN/CREAT SERPL: 18.2 (ref 7–25)
CALCIUM SPEC-SCNC: 8.9 MG/DL (ref 8.6–10.5)
CHLORIDE SERPL-SCNC: 102 MMOL/L (ref 98–107)
CO2 SERPL-SCNC: 25 MMOL/L (ref 22–29)
CREAT SERPL-MCNC: 1.87 MG/DL (ref 0.76–1.27)
GFR SERPL CREATININE-BSD FRML MDRD: 42 ML/MIN/1.73
GLOBULIN UR ELPH-MCNC: 2.7 GM/DL
GLUCOSE SERPL-MCNC: 73 MG/DL (ref 65–99)
INR PPP: 1.9
LDH SERPL-CCNC: 291 U/L (ref 135–225)
POTASSIUM SERPL-SCNC: 4.8 MMOL/L (ref 3.5–5.2)
PROT SERPL-MCNC: 6.6 G/DL (ref 6–8.5)
SODIUM SERPL-SCNC: 139 MMOL/L (ref 136–145)
URATE SERPL-MCNC: 6 MG/DL (ref 3.4–7)

## 2021-01-14 PROCEDURE — 36591 DRAW BLOOD OFF VENOUS DEVICE: CPT

## 2021-01-14 PROCEDURE — 80053 COMPREHEN METABOLIC PANEL: CPT | Performed by: INTERNAL MEDICINE

## 2021-01-14 PROCEDURE — 25010000003 HEPARIN LOCK FLUSH PER 10 UNITS: Performed by: INTERNAL MEDICINE

## 2021-01-14 PROCEDURE — 99214 OFFICE O/P EST MOD 30 MIN: CPT | Performed by: INTERNAL MEDICINE

## 2021-01-14 PROCEDURE — 84550 ASSAY OF BLOOD/URIC ACID: CPT | Performed by: INTERNAL MEDICINE

## 2021-01-14 PROCEDURE — 85610 PROTHROMBIN TIME: CPT

## 2021-01-14 PROCEDURE — 83615 LACTATE (LD) (LDH) ENZYME: CPT | Performed by: INTERNAL MEDICINE

## 2021-01-14 RX ORDER — SODIUM CHLORIDE 0.9 % (FLUSH) 0.9 %
20 SYRINGE (ML) INJECTION AS NEEDED
Status: DISCONTINUED | OUTPATIENT
Start: 2021-01-14 | End: 2021-01-15 | Stop reason: HOSPADM

## 2021-01-14 RX ORDER — SODIUM CHLORIDE 0.9 % (FLUSH) 0.9 %
20 SYRINGE (ML) INJECTION AS NEEDED
Status: CANCELLED | OUTPATIENT
Start: 2021-01-14

## 2021-01-14 RX ORDER — HEPARIN SODIUM (PORCINE) LOCK FLUSH IV SOLN 100 UNIT/ML 100 UNIT/ML
500 SOLUTION INTRAVENOUS AS NEEDED
Status: CANCELLED | OUTPATIENT
Start: 2021-01-14

## 2021-01-14 RX ORDER — HEPARIN SODIUM (PORCINE) LOCK FLUSH IV SOLN 100 UNIT/ML 100 UNIT/ML
500 SOLUTION INTRAVENOUS AS NEEDED
Status: DISCONTINUED | OUTPATIENT
Start: 2021-01-14 | End: 2021-01-15 | Stop reason: HOSPADM

## 2021-01-14 RX ADMIN — HEPARIN 500 UNITS: 100 SYRINGE at 09:00

## 2021-01-14 RX ADMIN — Medication 20 ML: at 09:00

## 2021-01-14 NOTE — PROGRESS NOTES
Port accessed for blood sampling. 10ml blood wasted prior to blood for labs being collected. Pt tolerated well.

## 2021-01-15 ENCOUNTER — TELEPHONE (OUTPATIENT)
Dept: ONCOLOGY | Facility: CLINIC | Age: 86
End: 2021-01-15

## 2021-01-15 NOTE — TELEPHONE ENCOUNTER
"Called pt to let him know that his kidney function was slightly worse and that he needs to follow-up with his nephrologist. Pt stated \"I don't know if I will.\" I tried to find out why and his wife came on the phone. I told her what I had told the pt, she confirmed that the pt has an established nephrologist, and verbalized understanding.  "

## 2021-01-15 NOTE — TELEPHONE ENCOUNTER
----- Message from Stacie Vargas MD sent at 1/15/2021  8:52 AM EST -----  Please asked patient to follow-up with his nephrologist.  His kidney function is slightly worse.

## 2021-01-26 ENCOUNTER — TELEPHONE (OUTPATIENT)
Dept: ONCOLOGY | Facility: HOSPITAL | Age: 86
End: 2021-01-26

## 2021-01-26 NOTE — TELEPHONE ENCOUNTER
I called pt an spoke with his wife in regards to getting him scheduled for INR visit. She states that he can come next week on 2/2 at 1100. Appointment was scheduled an she verbalized understanding.

## 2021-01-28 ENCOUNTER — HOSPITAL ENCOUNTER (OUTPATIENT)
Dept: CT IMAGING | Facility: HOSPITAL | Age: 86
Discharge: HOME | End: 2021-01-28
Admitting: INTERNAL MEDICINE

## 2021-01-28 DIAGNOSIS — C82.93 FOLLICULAR LYMPHOMA OF INTRA-ABDOMINAL LYMPH NODES, UNSPECIFIED FOLLICULAR LYMPHOMA TYPE (HCC): ICD-10-CM

## 2021-01-28 PROCEDURE — 71250 CT THORAX DX C-: CPT

## 2021-01-28 PROCEDURE — 74176 CT ABD & PELVIS W/O CONTRAST: CPT

## 2021-02-02 ENCOUNTER — HOSPITAL ENCOUNTER (OUTPATIENT)
Dept: ONCOLOGY | Facility: HOSPITAL | Age: 86
Setting detail: INFUSION SERIES
Discharge: HOME | End: 2021-02-02

## 2021-02-02 ENCOUNTER — LAB (OUTPATIENT)
Dept: LAB | Facility: HOSPITAL | Age: 86
End: 2021-02-02

## 2021-02-02 DIAGNOSIS — Z79.01 LONG TERM (CURRENT) USE OF ANTICOAGULANTS: Primary | ICD-10-CM

## 2021-02-02 DIAGNOSIS — Z79.01 LONG TERM (CURRENT) USE OF ANTICOAGULANTS: ICD-10-CM

## 2021-02-02 LAB — INR PPP: 2.1

## 2021-02-02 PROCEDURE — 85610 PROTHROMBIN TIME: CPT

## 2021-02-02 PROCEDURE — 36416 COLLJ CAPILLARY BLOOD SPEC: CPT

## 2021-02-08 ENCOUNTER — TELEPHONE (OUTPATIENT)
Dept: ONCOLOGY | Facility: CLINIC | Age: 86
End: 2021-02-08

## 2021-02-08 NOTE — TELEPHONE ENCOUNTER
Attempted to call pt regarding his CT results. No answer or identifying VM. Callback number left.

## 2021-02-08 NOTE — TELEPHONE ENCOUNTER
----- Message from Stacie Vargas MD sent at 2/7/2021 11:30 AM EST -----  Call patient and let him know I have reviewed his CT of the chest.  Ask about pulmonary symptoms.  He may benefit from antibiotics.  I had called him but did not get any response

## 2021-02-09 ENCOUNTER — TELEPHONE (OUTPATIENT)
Dept: ONCOLOGY | Facility: CLINIC | Age: 86
End: 2021-02-09

## 2021-02-09 DIAGNOSIS — B99.9 INFECTION: Primary | ICD-10-CM

## 2021-02-09 DIAGNOSIS — R91.8 ABNORMAL CT SCAN OF LUNG: ICD-10-CM

## 2021-02-09 RX ORDER — LEVOFLOXACIN 250 MG/1
500 TABLET ORAL DAILY
Qty: 20 TABLET | Refills: 0 | Status: SHIPPED | OUTPATIENT
Start: 2021-02-09 | End: 2021-02-19

## 2021-02-09 NOTE — TELEPHONE ENCOUNTER
Spoke to pt's significant other about pt's respiratory function. I asked if she felt like he was struggling to breathe. She said sometimes he is. I told her that Dr. Vargas is ordering an antibiotic for him, but if she felt like he was struggling she needs to take him to the ED. If he seems like he's doing better, Dr. Vargas would like for him to come in for a nursing assessment tomorrow. She stated that it is difficult for her to find a ride and it would depend on what time he would need to come in because she has a friend that might be able to bring them. I told her I would talk to her in the morning and we can figure out a time that works for them, but in the meantime if she feels like he is having trouble breathing she needs to take him to the ED. She verbalized understanding.

## 2021-02-09 NOTE — TELEPHONE ENCOUNTER
----- Message from Stacie Vargas MD sent at 2/9/2021  5:16 PM EST -----  Levaquin 500 mg po daily for ten days

## 2021-02-09 NOTE — TELEPHONE ENCOUNTER
Called pt to let him know that his CT of the chest showed that he may have pneumonia or some other type of infection. I asked if he was having any respiratory issues like SOA, cough, or wheezing. Pt denied having any symptoms, but ask that I talk to his friend. Harinder came on the phone and I explained everything to her and asked about respiratory symptoms. She said the pt has been wheezing and SOA. I told her I would pass this on to Dr. Vargas and that I would call them back if she orders any antibiotics. She verbalized understanding.

## 2021-02-10 ENCOUNTER — TELEPHONE (OUTPATIENT)
Dept: ONCOLOGY | Facility: CLINIC | Age: 86
End: 2021-02-10

## 2021-02-10 NOTE — TELEPHONE ENCOUNTER
Spoke to pt's significant other, Harinder, to find out how he was doing. She said that the pt slept well and seems to be breathing better. I told her again that if his breathing gets any worse to take him to the ED and if he seems to be doing ok, Dr. Vargas would like for him to be evaluated at the office today. I asked if they would be able to come in and she said they were trying to find a ride. I asked that she call and let us know if they will be in. I also told her that Dr. Vargas has ordered Levaquin for him and he needs to have his INR checked 2 days into taking it. She verbalized understanding.

## 2021-02-15 ENCOUNTER — OFFICE VISIT (OUTPATIENT)
Dept: CARDIOLOGY | Facility: CLINIC | Age: 86
End: 2021-02-15

## 2021-02-15 VITALS
BODY MASS INDEX: 24.66 KG/M2 | SYSTOLIC BLOOD PRESSURE: 148 MMHG | WEIGHT: 167 LBS | DIASTOLIC BLOOD PRESSURE: 71 MMHG | OXYGEN SATURATION: 99 % | HEART RATE: 69 BPM

## 2021-02-15 DIAGNOSIS — I10 ESSENTIAL HYPERTENSION: ICD-10-CM

## 2021-02-15 DIAGNOSIS — I48.20 ATRIAL FIBRILLATION, CHRONIC (HCC): Primary | ICD-10-CM

## 2021-02-15 DIAGNOSIS — E78.2 MIXED HYPERLIPIDEMIA: ICD-10-CM

## 2021-02-15 DIAGNOSIS — I25.10 CORONARY ARTERY DISEASE INVOLVING NATIVE CORONARY ARTERY OF NATIVE HEART WITHOUT ANGINA PECTORIS: ICD-10-CM

## 2021-02-15 DIAGNOSIS — I47.29 NSVT (NONSUSTAINED VENTRICULAR TACHYCARDIA) (HCC): ICD-10-CM

## 2021-02-15 PROCEDURE — 99214 OFFICE O/P EST MOD 30 MIN: CPT | Performed by: INTERNAL MEDICINE

## 2021-02-15 PROCEDURE — 93000 ELECTROCARDIOGRAM COMPLETE: CPT | Performed by: INTERNAL MEDICINE

## 2021-02-15 RX ORDER — PANTOPRAZOLE SODIUM 40 MG/1
40 TABLET, DELAYED RELEASE ORAL DAILY
COMMUNITY

## 2021-02-15 RX ORDER — FAMOTIDINE 20 MG/1
20 TABLET, FILM COATED ORAL 2 TIMES DAILY
COMMUNITY
End: 2021-08-30 | Stop reason: SDUPTHER

## 2021-02-15 NOTE — PROGRESS NOTES
CC-atrial fibrillation, coronary artery disease and nonsustained ventricular arrhythmias      Sub--88-year-old male patient was noted to have nonsustained VT on his loop recorder - patient denies palpitations or syncope and underwent an EP study  and was found to have noninducible sustained arrhythmias and was recommended only medical treatment because of normal ejection fraction. he has known history of ischemic heart disease as well as CVA.  He has additional history that includes  permanent atrial fibrillation, NSVT, COPD and implantable loop recorder.  He presents today for followup on the above conditions. he has Coronary artery disease status post PCI and stenting in the past. Patent stents on cardiac catheterization May 2016.Patient has recurrent hematuria with current anticoagulation anti-platelet   agents and is being followed by urologist and had stents placed in the past without recent recurrence according to him   chads Vasc score--CHF,HTN,CVA,AGE--6  HASBLED--5  Remains fairly active for his age and denies any new symptoms  Patient has excellent appetite and is very compliant with medications and offers no new symptoms      Past Medical History:     Reviewed history from 04/15/2016 and no changes required:        Coronary Heart Disease--CABG        Hypertension        C O P D        Asthma        Gout        PRIOR PCI        RECENT CVA                  Past Surgical History:     Reviewed history from 2016 and no changes required:        C A B G x 3, reoperation,  --        C A B  and         P T C A: 2016 [facility]        Loop recorder implant 2016        Heart Catherization: 2016 [facility]          Physical Exam    General:      well developed, well nourished, in no acute distress.    Head:      normocephalic and atraumatic.    Eyes:      PERRL/EOM intact, conjunctiva and sclera clear with out nystagmus.    Lungs:      clear bilaterally to auscultation.    Heart:       irregular rate  and rhythm, S1, S2 without murmurs, rubs, or gallops         assessment plan     nonsustained VT asymptomatic with prior negative EP study   normal EF   coronary artery disease   permanent atrial fib   internal loop recorder in situ  Remote history of stroke  Cardiovascular wise patient is very stable and follow-up after few months and medications reviewed  Hypertension well controlled  Chronic kidney disease stable  Labs showed INR of 2.1 and creatinine of 1.87, calcium of 4.8 which was reviewed with patient and family      ECG 12 Lead    Date/Time: 2/15/2021 9:52 AM  Performed by: Simeon Foley MD  Authorized by: Simeon Foley MD   Comparison: compared with previous ECG   Similar to previous ECG  Rhythm: atrial fibrillation  Rate: normal  Conduction: conduction normal  QRS axis: normal  Other findings: non-specific ST-T wave changes and early transition            Electronically signed by Simeon Foely MD, 02/15/21, 9:52 AM EST.

## 2021-03-02 ENCOUNTER — HOSPITAL ENCOUNTER (OUTPATIENT)
Dept: ONCOLOGY | Facility: HOSPITAL | Age: 86
Setting detail: INFUSION SERIES
Discharge: HOME | End: 2021-03-02

## 2021-03-02 ENCOUNTER — LAB (OUTPATIENT)
Dept: LAB | Facility: HOSPITAL | Age: 86
End: 2021-03-02

## 2021-03-02 DIAGNOSIS — Z79.01 LONG TERM (CURRENT) USE OF ANTICOAGULANTS: ICD-10-CM

## 2021-03-02 DIAGNOSIS — Z79.01 ANTICOAGULANT LONG-TERM USE: Primary | ICD-10-CM

## 2021-03-02 LAB — INR PPP: 2.2

## 2021-03-02 PROCEDURE — 36416 COLLJ CAPILLARY BLOOD SPEC: CPT

## 2021-03-02 PROCEDURE — 85610 PROTHROMBIN TIME: CPT

## 2021-03-02 RX ORDER — WARFARIN SODIUM 1 MG/1
1 TABLET ORAL
Qty: 30 TABLET | Refills: 3 | Status: CANCELLED | OUTPATIENT
Start: 2021-03-02

## 2021-03-02 NOTE — PROGRESS NOTES
Pt wife had medication bottles in hand and needs a refill on the 1mg tablet. When reviewing his Coumadin bottles from the pharmacy he is taking a 5.5mg daily and has been since November.  However he has been telling us he is on 4.5mg daily. His INR is WNL today and advised him to recheck in 1 week.

## 2021-03-10 ENCOUNTER — LAB (OUTPATIENT)
Dept: LAB | Facility: HOSPITAL | Age: 86
End: 2021-03-10

## 2021-03-10 ENCOUNTER — HOSPITAL ENCOUNTER (OUTPATIENT)
Dept: ONCOLOGY | Facility: HOSPITAL | Age: 86
Setting detail: INFUSION SERIES
Discharge: HOME | End: 2021-03-10

## 2021-03-10 DIAGNOSIS — Z79.01 LONG TERM (CURRENT) USE OF ANTICOAGULANTS: ICD-10-CM

## 2021-03-10 DIAGNOSIS — Z79.01 ANTICOAGULANT LONG-TERM USE: Primary | ICD-10-CM

## 2021-03-10 LAB — INR PPP: 2.3

## 2021-03-10 PROCEDURE — 36416 COLLJ CAPILLARY BLOOD SPEC: CPT

## 2021-03-10 PROCEDURE — 85610 PROTHROMBIN TIME: CPT

## 2021-03-10 RX ORDER — WARFARIN SODIUM 5 MG/1
5 TABLET ORAL DAILY
Qty: 90 TABLET | Refills: 1 | Status: CANCELLED | OUTPATIENT
Start: 2021-03-10

## 2021-03-10 NOTE — PROGRESS NOTES
INR 2.3 5mg, Wife stated he has been taking 5mg since last week, pended refill for 5mg  RECHECK IN ONE MONTH

## 2021-03-18 ENCOUNTER — APPOINTMENT (OUTPATIENT)
Dept: ONCOLOGY | Facility: HOSPITAL | Age: 86
End: 2021-03-18

## 2021-04-02 ENCOUNTER — LAB (OUTPATIENT)
Dept: LAB | Facility: HOSPITAL | Age: 86
End: 2021-04-02

## 2021-04-02 ENCOUNTER — HOSPITAL ENCOUNTER (OUTPATIENT)
Dept: ONCOLOGY | Facility: HOSPITAL | Age: 86
Setting detail: INFUSION SERIES
Discharge: HOME | End: 2021-04-02

## 2021-04-02 DIAGNOSIS — Z79.01 ANTICOAGULANT LONG-TERM USE: ICD-10-CM

## 2021-04-02 DIAGNOSIS — I48.20 CHRONIC ATRIAL FIBRILLATION (HCC): ICD-10-CM

## 2021-04-02 DIAGNOSIS — Z79.01 ANTICOAGULANT LONG-TERM USE: Primary | ICD-10-CM

## 2021-04-02 DIAGNOSIS — D61.818 PANCYTOPENIA (HCC): ICD-10-CM

## 2021-04-02 DIAGNOSIS — C82.93 FOLLICULAR LYMPHOMA OF INTRA-ABDOMINAL LYMPH NODES, UNSPECIFIED FOLLICULAR LYMPHOMA TYPE (HCC): ICD-10-CM

## 2021-04-02 LAB — INR PPP: 1.8

## 2021-04-02 PROCEDURE — 36416 COLLJ CAPILLARY BLOOD SPEC: CPT

## 2021-04-02 PROCEDURE — 85610 PROTHROMBIN TIME: CPT

## 2021-04-09 ENCOUNTER — ANTICOAGULATION VISIT (OUTPATIENT)
Dept: ONCOLOGY | Facility: HOSPITAL | Age: 86
End: 2021-04-09

## 2021-04-09 ENCOUNTER — LAB (OUTPATIENT)
Dept: LAB | Facility: HOSPITAL | Age: 86
End: 2021-04-09

## 2021-04-09 ENCOUNTER — HOSPITAL ENCOUNTER (OUTPATIENT)
Dept: ONCOLOGY | Facility: HOSPITAL | Age: 86
Setting detail: INFUSION SERIES
Discharge: HOME | End: 2021-04-09

## 2021-04-09 DIAGNOSIS — Z79.01 LONG TERM (CURRENT) USE OF ANTICOAGULANTS: Primary | ICD-10-CM

## 2021-04-09 DIAGNOSIS — Z79.01 LONG TERM (CURRENT) USE OF ANTICOAGULANTS: ICD-10-CM

## 2021-04-09 LAB
INR PPP: 1.3
INR PPP: 1.3 (ref 0.9–1.1)

## 2021-04-09 PROCEDURE — 36416 COLLJ CAPILLARY BLOOD SPEC: CPT

## 2021-04-09 PROCEDURE — 85610 PROTHROMBIN TIME: CPT

## 2021-04-09 RX ORDER — WARFARIN SODIUM 1 MG/1
1 TABLET ORAL
Qty: 30 TABLET | Refills: 3 | Status: ON HOLD | OUTPATIENT
Start: 2021-04-09 | End: 2022-08-26

## 2021-04-09 NOTE — PROGRESS NOTES
Pt's INR, 1.3 today. He reports eating greens recently. Pt to increase warfarin to 6 mg daily and return in 1 week for INR check. Pt requests refill on warfarin 1 mg tablets. Will send refill request to MD. Pt to call with any issues. Pt verbalized understanding.

## 2021-04-14 ENCOUNTER — TELEPHONE (OUTPATIENT)
Dept: ONCOLOGY | Facility: CLINIC | Age: 86
End: 2021-04-14

## 2021-04-14 ENCOUNTER — INPATIENT HOSPITAL (OUTPATIENT)
Dept: URBAN - METROPOLITAN AREA HOSPITAL 76 | Facility: HOSPITAL | Age: 86
End: 2021-04-14

## 2021-04-14 DIAGNOSIS — I25.10 ATHEROSCLEROTIC HEART DISEASE OF NATIVE CORONARY ARTERY WITH: ICD-10-CM

## 2021-04-14 DIAGNOSIS — Z86.010 PERSONAL HISTORY OF COLONIC POLYPS: ICD-10-CM

## 2021-04-14 DIAGNOSIS — R19.7 DIARRHEA, UNSPECIFIED: ICD-10-CM

## 2021-04-14 PROCEDURE — 99222 1ST HOSP IP/OBS MODERATE 55: CPT | Performed by: INTERNAL MEDICINE

## 2021-04-14 NOTE — TELEPHONE ENCOUNTER
Patient's wife came to office informing us that patient is in [facility] and will will not be here for 4-16--2021 INR.  Appt cancelled.  She states she will contact our office to reschedule INR once patient out of [facility].

## 2021-04-15 PROCEDURE — 99231 SBSQ HOSP IP/OBS SF/LOW 25: CPT | Performed by: INTERNAL MEDICINE

## 2021-04-16 ENCOUNTER — APPOINTMENT (OUTPATIENT)
Dept: ONCOLOGY | Facility: HOSPITAL | Age: 86
End: 2021-04-16

## 2021-04-16 ENCOUNTER — APPOINTMENT (OUTPATIENT)
Dept: LAB | Facility: HOSPITAL | Age: 86
End: 2021-04-16

## 2021-04-21 ENCOUNTER — TELEPHONE (OUTPATIENT)
Dept: ONCOLOGY | Facility: HOSPITAL | Age: 86
End: 2021-04-21

## 2021-04-21 NOTE — TELEPHONE ENCOUNTER
Called pt to see if he had checked INR since 4/9/21. No answer, so left a voice message asking pt to return call regarding INR and warfarin therapy.

## 2021-04-27 ENCOUNTER — TELEPHONE (OUTPATIENT)
Dept: ONCOLOGY | Facility: HOSPITAL | Age: 86
End: 2021-04-27

## 2021-05-10 ENCOUNTER — TELEPHONE (OUTPATIENT)
Dept: ONCOLOGY | Facility: HOSPITAL | Age: 86
End: 2021-05-10

## 2021-06-18 ENCOUNTER — TELEPHONE (OUTPATIENT)
Dept: ONCOLOGY | Facility: HOSPITAL | Age: 86
End: 2021-06-18

## 2021-06-18 NOTE — TELEPHONE ENCOUNTER
Spoke with patient and he confirmed date and time to come in Monday at 0930 to have his INR checked.

## 2021-06-21 ENCOUNTER — LAB (OUTPATIENT)
Dept: LAB | Facility: HOSPITAL | Age: 86
End: 2021-06-21

## 2021-06-21 ENCOUNTER — HOSPITAL ENCOUNTER (OUTPATIENT)
Dept: ONCOLOGY | Facility: HOSPITAL | Age: 86
Setting detail: INFUSION SERIES
Discharge: HOME | End: 2021-06-21

## 2021-06-21 DIAGNOSIS — Z79.01 LONG TERM (CURRENT) USE OF ANTICOAGULANTS: ICD-10-CM

## 2021-06-21 DIAGNOSIS — Z79.01 LONG TERM (CURRENT) USE OF ANTICOAGULANTS: Primary | ICD-10-CM

## 2021-06-21 LAB — INR PPP: 2.9 (ref 0.8–1.2)

## 2021-06-21 PROCEDURE — 36416 COLLJ CAPILLARY BLOOD SPEC: CPT

## 2021-06-21 PROCEDURE — 85610 PROTHROMBIN TIME: CPT

## 2021-07-23 ENCOUNTER — TELEPHONE (OUTPATIENT)
Dept: ONCOLOGY | Facility: HOSPITAL | Age: 86
End: 2021-07-23

## 2021-08-03 ENCOUNTER — TELEPHONE (OUTPATIENT)
Dept: ONCOLOGY | Facility: HOSPITAL | Age: 86
End: 2021-08-03

## 2021-08-03 NOTE — TELEPHONE ENCOUNTER
I spoke with pt's wife and she states that they can come tomorrow for pt to get his INR check. Appointment was scheduled and she verbalized understanding.

## 2021-08-04 ENCOUNTER — LAB (OUTPATIENT)
Dept: LAB | Facility: HOSPITAL | Age: 86
End: 2021-08-04

## 2021-08-04 ENCOUNTER — HOSPITAL ENCOUNTER (OUTPATIENT)
Dept: ONCOLOGY | Facility: HOSPITAL | Age: 86
Setting detail: INFUSION SERIES
Discharge: HOME | End: 2021-08-04

## 2021-08-04 DIAGNOSIS — Z79.01 LONG TERM (CURRENT) USE OF ANTICOAGULANTS: ICD-10-CM

## 2021-08-04 DIAGNOSIS — Z79.01 LONG TERM (CURRENT) USE OF ANTICOAGULANTS: Primary | ICD-10-CM

## 2021-08-04 LAB — INR PPP: 2.6 (ref 0.8–1.2)

## 2021-08-04 PROCEDURE — 36416 COLLJ CAPILLARY BLOOD SPEC: CPT

## 2021-08-04 PROCEDURE — 85610 PROTHROMBIN TIME: CPT

## 2021-08-30 ENCOUNTER — OFFICE VISIT (OUTPATIENT)
Dept: CARDIOLOGY | Facility: CLINIC | Age: 86
End: 2021-08-30

## 2021-08-30 VITALS
HEART RATE: 69 BPM | WEIGHT: 158 LBS | BODY MASS INDEX: 23.33 KG/M2 | OXYGEN SATURATION: 96 % | SYSTOLIC BLOOD PRESSURE: 165 MMHG | DIASTOLIC BLOOD PRESSURE: 80 MMHG

## 2021-08-30 DIAGNOSIS — I48.20 ATRIAL FIBRILLATION, CHRONIC (HCC): Primary | ICD-10-CM

## 2021-08-30 DIAGNOSIS — E78.2 MIXED HYPERLIPIDEMIA: ICD-10-CM

## 2021-08-30 DIAGNOSIS — I47.29 NSVT (NONSUSTAINED VENTRICULAR TACHYCARDIA) (HCC): ICD-10-CM

## 2021-08-30 DIAGNOSIS — I25.10 CORONARY ARTERY DISEASE INVOLVING NATIVE CORONARY ARTERY OF NATIVE HEART WITHOUT ANGINA PECTORIS: ICD-10-CM

## 2021-08-30 PROCEDURE — 93000 ELECTROCARDIOGRAM COMPLETE: CPT | Performed by: INTERNAL MEDICINE

## 2021-08-30 PROCEDURE — 99214 OFFICE O/P EST MOD 30 MIN: CPT | Performed by: INTERNAL MEDICINE

## 2021-08-30 RX ORDER — HYDRALAZINE HYDROCHLORIDE 50 MG/1
TABLET, FILM COATED ORAL
Qty: 360 TABLET | Refills: 0 | Status: ON HOLD | OUTPATIENT
Start: 2021-08-30 | End: 2022-08-26

## 2021-08-30 RX ORDER — FERROUS SULFATE 325(65) MG
325 TABLET ORAL
Status: ON HOLD | COMMUNITY
End: 2022-08-26

## 2021-08-30 RX ORDER — FAMOTIDINE 20 MG/1
TABLET, FILM COATED ORAL
Qty: 180 TABLET | Refills: 0 | Status: SHIPPED | OUTPATIENT
Start: 2021-08-30 | End: 2021-11-12

## 2021-08-30 RX ORDER — CLOPIDOGREL BISULFATE 75 MG/1
75 TABLET ORAL DAILY
Qty: 90 TABLET | Refills: 1 | Status: SHIPPED | OUTPATIENT
Start: 2021-08-30 | End: 2022-06-29

## 2021-08-30 RX ORDER — HYDRALAZINE HYDROCHLORIDE 50 MG/1
50 TABLET, FILM COATED ORAL 4 TIMES DAILY
Qty: 120 TABLET | Refills: 1 | Status: SHIPPED | OUTPATIENT
Start: 2021-08-30 | End: 2021-08-30

## 2021-08-30 RX ORDER — FAMOTIDINE 20 MG/1
20 TABLET, FILM COATED ORAL 2 TIMES DAILY
Qty: 60 TABLET | Refills: 1 | Status: SHIPPED | OUTPATIENT
Start: 2021-08-30 | End: 2021-08-30

## 2021-08-30 RX ORDER — DOCUSATE SODIUM 100 MG/1
100 CAPSULE, LIQUID FILLED ORAL 2 TIMES DAILY
COMMUNITY

## 2021-08-30 NOTE — PROGRESS NOTES
CC-atrial fibrillation, coronary artery disease and nonsustained ventricular arrhythmias      Sub--89-year-old male patient was noted to have nonsustained VT on his loop recorder - patient denies palpitations or syncope and underwent an EP study  and was found to have noninducible sustained arrhythmias and was recommended only medical treatment because of normal ejection fraction. he has known history of ischemic heart disease as well as CVA.  He has additional history that includes  permanent atrial fibrillation, NSVT, COPD and implantable loop recorder.  He presents today for followup on the above conditions. he has Coronary artery disease status post PCI and stenting in the past. Patent stents on cardiac catheterization May 2016.Patient has recurrent hematuria with current anticoagulation anti-platelet   agents and is being followed by urologist and had stents placed in the past without recent recurrence according to him   chads Vasc score--CHF,HTN,CVA,AGE--6  HASBLED--5  Remains fairly active for his age and denies any new symptoms  Patient has excellent appetite and is very compliant with medications and offers no new symptoms      Past Medical History:     Reviewed history from 04/15/2016 and no changes required:        Coronary Heart Disease--CABG        Hypertension        C O P D        Asthma        Gout        PRIOR PCI        RECENT CVA                  Past Surgical History:     Reviewed history from 2016 and no changes required:        C A B G x 3, reoperation,  --        C A B  and         P T C A: 2016 [facility]        Loop recorder implant 2016        Heart Catherization: 2016 [facility]          Physical Exam    General:      well developed, well nourished, in no acute distress.    Head:      normocephalic and atraumatic.    Eyes:      PERRL/EOM intact, conjunctiva and sclera clear with out nystagmus.    Lungs:      clear bilaterally to auscultation.    Heart:       irregular rate  and rhythm, S1, S2 without murmurs, rubs, or gallops         assessment plan     nonsustained VT asymptomatic with prior negative EP study   normal EF   coronary artery disease   permanent atrial fib   internal loop recorder in situ  Remote history of stroke  Cardiovascular wise patient is very stable and follow-up after few months and medications reviewed  Hypertension   Chronic kidney disease stable  Labs showed INR of 2.6 and creatinine of 1.87        ECG 12 Lead    Date/Time: 8/30/2021 11:34 AM  Performed by: Simeon Foley MD  Authorized by: Simeon Foley MD   Comparison: compared with previous ECG   Similar to previous ECG  Rhythm: atrial fibrillation  Rate: normal  QRS axis: normal  Other findings: non-specific ST-T wave changes            Electronically signed by Simeon Foley MD, 08/30/21, 11:34 AM EDT.

## 2021-09-01 ENCOUNTER — LAB (OUTPATIENT)
Dept: LAB | Facility: HOSPITAL | Age: 86
End: 2021-09-01

## 2021-09-01 ENCOUNTER — HOSPITAL ENCOUNTER (OUTPATIENT)
Dept: ONCOLOGY | Facility: HOSPITAL | Age: 86
Setting detail: INFUSION SERIES
Discharge: HOME | End: 2021-09-01

## 2021-09-01 DIAGNOSIS — Z79.01 LONG TERM (CURRENT) USE OF ANTICOAGULANTS: ICD-10-CM

## 2021-09-01 DIAGNOSIS — Z79.01 LONG TERM (CURRENT) USE OF ANTICOAGULANTS: Primary | ICD-10-CM

## 2021-09-01 LAB — INR PPP: 2 (ref 0.8–1.2)

## 2021-09-01 PROCEDURE — 36416 COLLJ CAPILLARY BLOOD SPEC: CPT

## 2021-09-01 PROCEDURE — 85610 PROTHROMBIN TIME: CPT

## 2021-09-01 NOTE — PROGRESS NOTES
Pt advised to continue 6mg daily and recheck in 1 month. Pt and his wife verbalized understanding.

## 2021-09-09 ENCOUNTER — OFFICE VISIT (OUTPATIENT)
Dept: ONCOLOGY | Facility: CLINIC | Age: 86
End: 2021-09-09

## 2021-09-09 ENCOUNTER — LAB (OUTPATIENT)
Dept: LAB | Facility: HOSPITAL | Age: 86
End: 2021-09-09

## 2021-09-09 VITALS
HEART RATE: 76 BPM | DIASTOLIC BLOOD PRESSURE: 84 MMHG | SYSTOLIC BLOOD PRESSURE: 167 MMHG | HEIGHT: 69 IN | TEMPERATURE: 97.3 F | RESPIRATION RATE: 20 BRPM | BODY MASS INDEX: 23.55 KG/M2 | WEIGHT: 159 LBS

## 2021-09-09 DIAGNOSIS — D61.818 PANCYTOPENIA (HCC): ICD-10-CM

## 2021-09-09 DIAGNOSIS — C82.93 FOLLICULAR LYMPHOMA OF INTRA-ABDOMINAL LYMPH NODES, UNSPECIFIED FOLLICULAR LYMPHOMA TYPE (HCC): ICD-10-CM

## 2021-09-09 DIAGNOSIS — Z79.01 LONG TERM (CURRENT) USE OF ANTICOAGULANTS: ICD-10-CM

## 2021-09-09 DIAGNOSIS — C82.93 FOLLICULAR LYMPHOMA OF INTRA-ABDOMINAL LYMPH NODES, UNSPECIFIED FOLLICULAR LYMPHOMA TYPE (HCC): Primary | ICD-10-CM

## 2021-09-09 LAB
ALBUMIN SERPL-MCNC: 4.5 G/DL (ref 3.5–5.2)
ALBUMIN/GLOB SERPL: 1.5 G/DL
ALP SERPL-CCNC: 79 U/L (ref 39–117)
ALT SERPL W P-5'-P-CCNC: 7 U/L (ref 1–41)
ANION GAP SERPL CALCULATED.3IONS-SCNC: 11 MMOL/L (ref 5–15)
AST SERPL-CCNC: 17 U/L (ref 1–40)
BASOPHILS # BLD AUTO: 0.02 10*3/MM3 (ref 0–0.2)
BASOPHILS NFR BLD AUTO: 0.3 % (ref 0–1.5)
BILIRUB SERPL-MCNC: 0.4 MG/DL (ref 0–1.2)
BUN SERPL-MCNC: 44 MG/DL (ref 8–23)
BUN/CREAT SERPL: 19.3 (ref 7–25)
CALCIUM SPEC-SCNC: 9.3 MG/DL (ref 8.6–10.5)
CHLORIDE SERPL-SCNC: 103 MMOL/L (ref 98–107)
CO2 SERPL-SCNC: 27 MMOL/L (ref 22–29)
CREAT SERPL-MCNC: 2.28 MG/DL (ref 0.76–1.27)
DEPRECATED RDW RBC AUTO: 52.8 FL (ref 37–54)
EOSINOPHIL # BLD AUTO: 0.25 10*3/MM3 (ref 0–0.4)
EOSINOPHIL NFR BLD AUTO: 3.9 % (ref 0.3–6.2)
ERYTHROCYTE [DISTWIDTH] IN BLOOD BY AUTOMATED COUNT: 17 % (ref 12.3–15.4)
GFR SERPL CREATININE-BSD FRML MDRD: 33 ML/MIN/1.73
GLOBULIN UR ELPH-MCNC: 3 GM/DL
GLUCOSE SERPL-MCNC: 83 MG/DL (ref 65–99)
HCT VFR BLD AUTO: 35.9 % (ref 37.5–51)
HGB BLD-MCNC: 11.3 G/DL (ref 13–17.7)
INR PPP: 2.6 (ref 0.8–1.2)
LDH SERPL-CCNC: 206 U/L (ref 135–225)
LYMPHOCYTES # BLD AUTO: 1.86 10*3/MM3 (ref 0.7–3.1)
LYMPHOCYTES NFR BLD AUTO: 29.2 % (ref 19.6–45.3)
MCH RBC QN AUTO: 27 PG (ref 26.6–33)
MCHC RBC AUTO-ENTMCNC: 31.5 G/DL (ref 31.5–35.7)
MCV RBC AUTO: 85.9 FL (ref 79–97)
MONOCYTES # BLD AUTO: 0.75 10*3/MM3 (ref 0.1–0.9)
MONOCYTES NFR BLD AUTO: 11.8 % (ref 5–12)
NEUTROPHILS NFR BLD AUTO: 3.5 10*3/MM3 (ref 1.7–7)
NEUTROPHILS NFR BLD AUTO: 54.8 % (ref 42.7–76)
PLATELET # BLD AUTO: 132 10*3/MM3 (ref 140–450)
PMV BLD AUTO: 10.8 FL (ref 6–12)
POTASSIUM SERPL-SCNC: 4.3 MMOL/L (ref 3.5–5.2)
PROT SERPL-MCNC: 7.5 G/DL (ref 6–8.5)
RBC # BLD AUTO: 4.18 10*6/MM3 (ref 4.14–5.8)
SODIUM SERPL-SCNC: 141 MMOL/L (ref 136–145)
URATE SERPL-MCNC: 6.6 MG/DL (ref 3.4–7)
WBC # BLD AUTO: 6.38 10*3/MM3 (ref 3.4–10.8)

## 2021-09-09 PROCEDURE — 99214 OFFICE O/P EST MOD 30 MIN: CPT | Performed by: INTERNAL MEDICINE

## 2021-09-09 PROCEDURE — 36415 COLL VENOUS BLD VENIPUNCTURE: CPT | Performed by: INTERNAL MEDICINE

## 2021-09-09 PROCEDURE — 84550 ASSAY OF BLOOD/URIC ACID: CPT | Performed by: INTERNAL MEDICINE

## 2021-09-09 PROCEDURE — 83615 LACTATE (LD) (LDH) ENZYME: CPT | Performed by: INTERNAL MEDICINE

## 2021-09-09 PROCEDURE — 85025 COMPLETE CBC W/AUTO DIFF WBC: CPT

## 2021-09-09 PROCEDURE — 85610 PROTHROMBIN TIME: CPT

## 2021-09-09 PROCEDURE — 80053 COMPREHEN METABOLIC PANEL: CPT | Performed by: INTERNAL MEDICINE

## 2021-09-09 NOTE — PROGRESS NOTES
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     · CT abdomen and pelvis done 11/4/15 that showed a new retroperitoneal tissue between the aorta and IVC measuring 2.6 x 2.2 cm, extending from the level of origin of the superior mesenteric artery. The differential diagnosis included lymphoma as well as atypical retroperitoneal fibrosis. Metastatic adenopathy was also in the differential as well as amyloidosis. Recommending a PET CT for correlation. There was non-specific prostate enlargement and coronary artery and aortoiliac atherosclerosis. On 3/8/16 he saw Dr. Pedro Purvis in follow-up. He was complaining of leg swelling and insomnia. He was able to go to sleep but would wake up a few hours after sleeping and could not get back to sleep. He also reported a metallic taste in his mouth. Bilateral lower extremity Dopplers were performed that were negative for DVT. A CT abdomen and pelvis was ordered and performed on 3/8/16 that showed a retroperitoneal mass extending dorsal to the aorta on the left and right side, obscuring the periaortic fat, transversing caudally along the psoas muscles. The mass caused left-sided obstructive uropathy just caudal to the ureteropelvic junction. Differential diagnosis included lymphoma, metastasis or primary sarcoma. There were no other soft tissue masses throughout the abdomen. There was an enlarged prostate with central lucent lesion, possibly due to a TURP defect. The mass measured 4.2 x 1.9 cm on transaxial on the right periaortic retroperitoneum. On the left  periaortic retroperitoneum it measured 3.8 x 2.8 cm. He was seen by Dr. Briseno on 3/16/16. Urinalysis was negative for blood, nitrates and leukocytes. He was diagnosed with BPH with LUTS and hydronephrosis with plan to perform a cysto with stent placement soon.   · 3/17/16 - Patient seen for initial consultation regarding enlarging retroperitoneal mass at the [facility]. Quantitative hCG <0.1 (<2.0). ESR 45 (<21). AFP 4 (0-9). Chest x-ray with linear opacity right middle lobe. Question of atelectasis or scar and cardiomegaly with changes of CABG. PSA 6.37 (0-4).  Result sent to Dr. Briseno.   · 4/2/16 - CT head had a focal hyperdense area in the left cerebral hemisphere representing acute ischemia.   · 4/5/16 - CT chest, abdomen and pelvis done during hospitalization at [facility] revealed no findings of primary or metastatic disease in the chest. Dilated main pulmonary artery. Mild ascending thoracic aortic ectasia measuring up to 4.1 cm. Bilateral ureteral stents. The proximal left ureteral stent may be located within the renal parenchyma or perhaps anterior calyx. Possibly mild left hydronephrosis. Abnormal periaortic soft tissue mass ascending from the renal vessels through the iliac bifurcation.   · 4/14/16 - Patient did not have CT-guided biopsy of retroperitoneal mass done as he was hospitalized at [facility] with a stroke.   · 5/2/16 - Chest x-ray with cardiomegaly and chronic changes about the chest. Recording time device overlying the left side of the chest.   · 5/17/16 - Patient did not want to schedule biopsy of the lymph nodes. Patient claims not to want to go for CT-guided retroperitoneal mass biopsy, but would proceed with a bone marrow biopsy.   · 5/27/16 - Bone marrow aspiration and biopsy with adequate normocellular bone marrow (35%) showing trilineage hematopoiesis, a moderate plasma cytosis, and increased iron stores. Flow cytometry revealed a decreased  CD-4, CD-8 helper suppressor T-cell ratio suggesting viral infection or immunocompromise. Cytogenetics revealed normal karyotype. Plasma cells were increased to 12-15%. Copper 141 ().  · 6/9/16 - WBC 4.23, hemoglobin 10.1, MCV 85.9, platelet count 152,000.   · 7/7/16 - Patient has agreed to have a CT-guided retroperitoneal mass biopsy which I am concerned may be a plasma cytoma. Beta-2 microglobulin 3.72 (<2.16).    · 8/2/16 - CT abdomen and pelvis with significant progression of extensive infiltrative retroperitoneal process. New left ureteral stent with mild residual caliectasis on the left with new right hydronephrosis due to partial obstruction of the right ureter. Enlarged prostate.   · 12/9/16 - Patient underwent cystoscopy with bilateral stent exchange and bilateral retrograde pyelogram by Ganesh Briseno M.D.   · 1/13/17 - Chest x-ray with mild cardiac enlargement with borderline pulmonary venous redistribution , unchanged, with chronic elevation or eventration of the right hemidiaphragm.   · 1/17/17 - Patient was hospitalized at [facility] between 1/17/17 and 1/25/17 with right scrotal swelling. Stool Hemoccult was negative. Hemoglobin 8.9, MCV 79.2. IV diuretics were given. Cardiology and Urology consultations were obtained. He then underwent a core needle biopsy of the retroperitoneal mass while holding Coumadin, Aspirin and Plavix. Bilateral lower extremity Dopplers were negative for DVT. CT of the abdomen and pelvis had extensive confluent soft tissue density in the retroperitoneum, increased in size from previous study. In addition, there was some iliac adenopathy, especially on the right. Bilateral double pigtail ureteral stents were in good position without hydronephrosis. Extensive soft tissue stranding in the subcutaneous sub mesenteric fat was present.   · 1/18/17 - Echocardiogram with pulmonary artery systolic pressure 60-65 mmHg. Moderate pulmonary hypertension. Left ventricular systolic function  normal with EF of 55-60%. LV diastolic dysfunction noted. Transmitral spectral Doppler flow pattern suggestive of restrictive physiology with moderate mitral regurgitation.   · 1/23/17 - Patient underwent ultrasound-guided fine needle aspiration of retroperitoneal lymph node at [facility] by Stephen Ricci M.D. with pathology revealing friable adipose tissue with prominent infiltrative lymphoid cell population suspicious for lymphoma. Focal areas demonstrated vague follicle formations. Crush artifact was present. Flow cytometry revealed low viability and hypocellular specimen which failed to reveal a significant B-cell or monoclonal B-cell population. The majority of the lymphocytes were negative for lambda and kappa light chains. The impression was B-cell lymphoma favoring follicle center cell lymphoma and final report was supposed to be pending outside expert consultation.   · 1/30/17 - Retroperitoneal lymph node biopsy specimen was reviewed by Lenny Johnson M.D. and classified as B-cell lymphoma. It was somewhat difficult to classify given the architectural distortion as well as the crusted areas of the tumor. It was thought that at least a follicular lymphoma with areas of diffuse fat was present. There was concern of areas of large cell lymphoma, particularly in the adipose tissue.   · 2/3/17 - Patient returns after a seven month absence. Had to cancel his scheduled lymph node biopsies until his last hospitalization at [facility] where he was not seen by us in consultation. Diagnosis discussion had with patient in detail. Though he may have a higher grade lymphoma than follicular given his age and cardiac status, he is not going to be able to tolerate more aggressive chemotherapy. Will hence treat it as a follicular lymphoma and then follow him for response in hopes of palliating him and decreasing his total swelling. Chemotherapy orders written for Rituximab 375 mg/M2 IV day 1 and Bendamustine 70 mg/M2 IV piggyback days  1 and 2, repeat q 4 weeks.   · 2/13/17 - PET scan with hypermetabolic enlarged retroperitoneal lymph nodes increased in size since March 2016. Retrocrural lymph nodes along the iliac chains bilaterally were also hypermetabolic and a single hypermetabolic lymph node in the paratracheal location in the chest. Cardiomegaly with small pleural effusions and severe coronary artery atherosclerotic calcifications. Bilateral ureteral stents with mild to moderate hydronephrosis. Moderate amount of stool within the colon, questioning constipation. Diffuse anasarca.   · 2/16/17 - Patient underwent right IJ port insertion by Stephen Ricci M.D. in IR under fluoro guidance.   · 2/17/17 - Hepatitis B/C non-reactive.   · 2/21/17 - Potassium 3.3. Patient asked to increase dietary potassium intake. Uric acid 8.3 (4.8-8.7). Patient developed upper abdominal pain shortly after starting Rituxan infusion with stable vital signs and examination. Was taking Zantac at home. Patient given Pepcid 40 mg IV and Decadron 10 mg IV prior to resuming Rituxan.  Patient received cycle 1 chemotherapy.   · 3/1/17 -  (). ESR 28 (<21). Patient complains of insomnia. Ambien 5 mg p.o. q.h.s. p.r.n. prescribed. CMP significant for calcium 8.7 (L), albumin 3.3 (L), creatinine 1.2 (N). Uric acid elevated at 9.3 (H).   · 3/2/17 - Patient started on Allopurinol 100 mg by mouth daily for hyperuricemia.   · 3/22/17 - The patient started cycle 2 day 1 of Rituxan and Bendamustine.   · 5/23/17 - Uric acid 6.9 (4.8-8.7), ESR 68 (<21).     · 6/14/17 - Comprehensive metabolic panel with no significant abnormality. Cycle 5 chemotherapy given.   · 6/28/17 - INR 2.6 on 7.5 alternating with 10 mg of Warfarin being managed by Dr. Purvis. Patient complains of dry throat and dysuria.   · 3/29/17 - WBC 4.71, hemoglobin 9.9, MCV 76.9, platelets 146,000, ANC 3.6. Patient is scheduled for a CT scan of the chest, abdomen and pelvis with p.o. contrast only on  4/19/17. Orders written to continue chemotherapy. Patient instructed to continue Allopurinol daily and Ambien as needed. Uric acid level ordered. Uric acid 6.1 (4.8-8.7).    · 4/19/17 - Comprehensive metabolic panel with no significant abnormality. CT chest, abdomen and pelvis with 16 mm lymph node seen on recent PET CT decreased to 12 mm but pretracheal lymph node increased to 14 mm. Mild atelectasis in the right middle lobe with mild scarring change in the right lower lobe medially. Decreasing lymphadenopathy in the abdomen and pelvis. Marked adenopathy at the level of the renal hilum measuring 11.4 x 5.5 down to 10 x 4.5 cm. Bilateral renal stents. Cycle 3 chemotherapy given. Patient’s Coumadin dose is being adjusted by Dr. Soni. He has an appointment with Dr. Briseno in follow-up of hematuria. Claims not to be gaining weight and advised taking Ensure or Boost.   · 5/17/17 - Received cycle 4 Bendamustine and Rituxan.   · 5/18/17 - White blood cell count 8.7, hemoglobin 9.1, MCV 78.1 and platelets 237,000. Weight loss of 18 pounds in five weeks.   · 5/23/17 - White count 4.48, hemoglobin 8.9, MCV 77.1 and platelet count 199,000. Allopurinol planned to discontinue if uric acid <6.0. ESR, CMP and uric acid ordered. Megace 200 mg p.o. daily ordered. Ferritin 90 ().    · 6/14/17 - Creatinine 1.2 (0.7-1.2).  Cycle 5 chemotherapy given.   · 6/28/17 - WBC 5.5, hemoglobin 8.8, MCV 74.5, platelet count 190,000.   · 7/12/17 - Chemotherapy held one week. ANC 0.48. Ciprofloxacin 500 mg b.i.d. prescribed for five days. Neutropenic precautions reviewed with the patient and reinforced.   · 7/17/17 - Potassium increased from 10 mEq daily to 10 mEq b.i.d.   · 7/19/17 - Weight loss of 7 pounds in one month. Persistent neutropenia. WBC 2.4, ANC 0.3, platelet count 136,000, hemoglobin 8.7, MCV 77.8. EKG performed for irregular heartbeat. Reviewed neutropenic precautions. Ciprofloxacin refilled 500 mg b.i.d. x1 week.  Leukopenia and thrombocytopenia workup ordered. Chemotherapy on hold for one week. Magnesium 2.0 (1.8-2.5), potassium 3.3 (3.6-5.1). Oral potassium supplementation increased. Platelet antibodies not detected. EBV IgM <0.2 (<0.9), EBV IgG >8 (<0.9). CMV IgM 0.35 (<0.91), CMV IgG 17.3 (<0.9). Neutrophil-associated antibody negative.        · 7/24/17 - PET scan with interval mixed treatment response with marked improvement in retroperitoneal adenopathy with metabolic activity just slightly higher than background. Interval marked increase in mediastinal adenopathy and development of bilateral hilar adenopathy.   · 7/26/17 - Order written to discontinue current chemotherapy because of progressive disease in chest and consultation obtained with CTS for mediastinal lymph node biopsy. ESR 37 (<21). Comprehensive metabolic panel with potassium 3.2. Potassium supplementation increased orally. Creatinine 1.3 (0.7-1.2), uric acid 6 (4.8-8.7).      · 8/10/17 - Patient underwent a mediastinoscopy by Donald Islas M.D. at [facility] with pathology of lymph node R4 revealing portions of benign lymph node with prominent anthracotic pigmented histocytes and focal hyalinized fibrosis. Special stain for acid-fast bacilli and fungal organisms was negative.   · 8/30/17 - Discussed options with the patient. Ideally would like to do six cycles of chemotherapy and then put him on maintenance Rituxan. However, cannot proceed because of low blood counts. Patient has not had INR’s in followup of AFib. Will have it drawn and sent to Dr. Bustamante for adjustment. Will check leukopenia workup and plan on maintenance Rituxan after improvement in blood counts. Comprehensive metabolic panel with potassium 3.5 (3.6-5.1).   · 9/6/17 - Patient hospitalized at [facility] between 9/6/17 and 9/15/17 with urosepsis. Chest x-ray had stable cardiomegaly and postsurgical changes with stable elevation of the right hemidiaphragm with overlying scarring  or atelectasis. CT scan of the abdomen and pelvis had bilateral ureteral stents, retroperitoneal soft tissue masses/adenopathy significantly decreased in size compared to January 2017, large stool burden throughout the colon, small volume of pelvic ascites, enlarged prostate gland, extensive atherosclerotic calcification and advanced multilevel lumbar disc degermation and facet arthropathy. Patient underwent cystoscopy with bilateral stent change by Yao Woods M.D. and was seen in consultation by Bimal Saenz M.D.   · 9/19/17 - Order written for Rituxan 375 mg/M2 IV q.2 months for two years as maintenance treatment and for monthly port flushes. BMP without clinical significance. Uric acid 5.4 (4.8-8.7).    · 10/11/17 - Patient began cycle 1 of maintenance dose Rituxan.   · 12/6/17 - Cycle 2 maintenance Rituxan given.   · 12/7/17 - Patient only taking one potassium pill a day. Asked to increase to twice a day due to hypokalemia. INR’s being managed by Pedro Purvis M.D. with AFib.   · 1/31/18 - CT chest with stable appearance of right paratracheal and bilateral hilar adenopathy.  CT scan of abdomen and pelvis showed stable appearance of extensive retroperitoneal stranding related to an adenopathy that encases the infrarenal aorta without significant change from prior exams.  Bilateral nephroureteral stents were in place.  Mild prostatomegaly was present.     · 2/6/18 - Cycle 3 Rituximab given.   · 4/3/18 - Patient received cycle 4 maintenance Rituximab.   · 4/9/18 - ESR 16 (0-20).    · 6/4/18 - Cycle 5 maintenance Rituximab administered.   · 7/30/18 - Cycle 6 maintenance Rituximab administered.   · 9/24/18 - Cycle 7 maintenance Rituximab initiated.   · 10/18/18 - CT chest, abdomen and pelvis without contrast showed stable appearance of paratracheal and bilateral hilar adenopathy and paratracheal lymph node measured 10 mm. The report stated evaluation of hilar lymph nodes was suboptimal but lymphadenopathy  appeared normal. There was stable appearance of the retroperitoneal stranding that encases the abdominal aorta and inferior vena cava felt related to lymphadenopathy and stable. Bilateral ureteral stents appeared in good position.   · 11/26/18 - Cycle 8 maintenance Rituximab initiated. Hepatitis B core antibody and hepatitis B surface antigen both non-reactive. Hepatitis C antibody was positive with hepatitis CRNA <15 not detected.   · 12/12/18 - ESR 18 (0-20).    · 1/21/19 - Patient received cycle 9 maintenance Rituximab.   · 3/18/19 - Cycle 10 Rituxan maintenance initiated.   · 5/21/19 - Received cycle 11 Rituxan.   · 6/11/19 - Uric acid 6.5 (4.8-8.7).  (). Sed rate 13 (0-20).   · 7/8/19-Cycle 12 Rituximab.  · 7/15/19 - CT scan chest, abdomen and pelvis showed mildly prominent bilateral hilar mediastinal lymph nodes unchanged since 10/18/2018 and there is no evidence of new or progressive metastatic disease in the chest. Irregular retroperitoneal soft tissue stranding in the abdomen and pelvis which may represent features related to patient's treated lymphoma. No evidence of disease progression in abdomen or pelvis since the 10/18/2018 examination.    · 8/12/19 -Rituxan discontinued due to completion of planned course.   · 11/6/2019 patient with a 8 pound weight loss but denies night sweats or fevers.   · 1/29/2020 patient had CT scan of the chest abdomen and pelvis this showed a dominant index right lower lobe paratracheal node measuring 11 mm and a right infrahilar hilar node measuring 16 mm and left hilar node 10 mm unchanged.   Stable thoracic aortic aneurysm at 4.2 cm.  In the abdomen there was no evidence of relapsed lymphoma.  He has prostatic enlargement and is seen by urologist Dr. Woods.  There is evidence of treated disease in the retroperitoneal soft tissue.  · 6/10/2020 patient had CT scan of the chest abdomen and pelvis showed a stable 7 mm thyroid nodule which is nonspecific.  Mild  mediastinal and hilar lymphadenopathy which is unchanged.  Defined soft tissue thickening in the retroperitoneum is unchanged and is favored to represent treated disease.  No abnormally enlarged lymph nodes identified.  Enlarged prostate.  2. Anemia diagnosed March 2016. Iron deficiency anemia diagnosed February 2017.   ? CBC showed a white blood cell count of 4.8, hemoglobin 11.8, MCV 88.2, RDW 15.6 (H), platelet count 146,000. Creatinine 1.31 (H). LFT’s normal. Stool heme negative.  ? 3/17/16 - Vitamin B12 of 228 (180-914), folate 21.2 (5.9-24.8), erythropoietin 13.65 (2.59-18.5). Serum protein electrophoresis with normal pattern.  (), ferritin 147 (), iron 36 (), TIBC 291 (228-428), retic count 0.69 (0.5-1.5), haptoglobin 158 ().         ? 4/5/16 - Labs drawn during hospitalization at [facility]: Vitamin B12 of 265 (180-914), serum iron 11 (). Hemoglobin 10.9. Creatinine 1.0 (0.6-1.3).      ? 4/14/16 - WBC 5.61, hemoglobin 11.6, MCV 84.5, platelet count 186,000.   ? 5/17/16 - WBC 4.5, hemoglobin 11.1, MCV 87.5, platelet count 150,000. Hemoglobin electrophoresis with normal pattern.   ? 5/27/16 - Bone marrow aspiration and biopsy with adequate normocellular bone marrow (35%) showing trilineage hematopoiesis, a moderate plasma cytosis, and increased iron stores. Flow cytometry revealed a decreased CD-4, CD-8 helper suppressor T-cell ratio suggesting viral infection or immunocompromise. Cytogenetics revealed normal karyotype. Plasma cells were increased to 12-15%. Copper 141 ().   ? 6/9/16 - HFE gene with no mutation.   ? 6/10/16 - Serum immunofixation with no monoclonal gammopathy identified. Kappa/lambda FLC ratio 17.38 (0.26-1.65). Comprehensive metabolic panel with no significant abnormality. Creatinine 1.2 (0.7-1.2).     ? 7/7/16 - Beta-2 microglobulin 3.72 (<2.16).    ? 7/28/16 - Skeletal survey with no suspicious lytic or blastic bony lesions identified.  Right ureteral stent migrated and coiled within the urinary bladder. Degenerative changes as noted above. Patient referred to his urologist.   ? 2/3/17 - WBC 5.7, hemoglobin 10.2, MCV 78.6, platelet count 237,000. Ferrous Sulfate 325 mg p.o. b.i.d. started. Ferritin 27 (), iron 20 (), TIBC 363 (228-428).      ? 3/1/17 - Patient not taking oral iron supplements. WBC 5, hemoglobin 9.7, MCV 77.2, platelet count 164,000. Ferrous Sulfate 325 mg p.o. b.i.d. prescribed.   ? 3/29/17 - Hemoglobin 9.9, MCV 76.9. Patient instructed to continue Iron Sulfate 325 mg b.i.d. Patient instructed to followup with urologist regarding hematuria.   ? 4/25/17 - WBC 3.6 with 75% neutrophils, 6% lymphocytes, 14% monocytes, hemoglobin 9.6, MCV 77.4, platelet count 149,000. Hemoglobin electrophoresis normal with 98% hemoglobin A.   ? 5/23/17 - White count 4.48, hemoglobin 8.9, MCV 77.1 and platelet count 199,000. Ferrous Sulfate 325 mg p.o. b.i.d. ordered. Stool cards ordered. Ferritin ordered.   ? 7/19/17 - WBC 2.4, ANC 0.3, platelet count 136,000, hemoglobin 8.7, MCV 77.8. Iron 28 (), TIBC 298 (228-428), iron saturation 9 (20-50), ferritin 205 (), folate 14 (5.9-24.8), creatinine 1.2 (0.7-1.2). Iron saturation 9 (20-50).          ? 7/26/17 - WBC 2.5 with 14% neutrophils, 62% lymphocytes, 23% monocytes, hemoglobin 8.9, MCV 75.7, RDW 20.6 and platelet count 151,000. Orders written for Injectafer 750 mg IV days 1 and 8.   ? 8/2/17 - Injectafer 750 mg IV given. Hemoglobin 8.6.   ? 8/30/17 - Hemoglobin electrophoresis with normal pattern. Vitamin B12 of 597 (211-911).  Creatinine 1.2 (0.7-1.2).    ? 9/19/17 - Order written for Procrit 30,000 units subq q.2 weeks for ACD secondary to CKD Stage III. Iron 32 low (), TIBC 297 normal (228-428), percentage saturation 11 low (20-50). Erythropoietin 32.3 high (2.59-18.50). Uric acid 5.4 normal (4.8-8.7).   ? 10/4/17 - Patient received cycle 1 day 8 of Injectafer 750 mg IV.    ? 10/24/17 - WBC 2.0, hemoglobin 10.3, MCV 86.0, platelet count 133,000.       ? 10/31/17 - Iron 41 (), TIBC 281 (228-428), iron saturation 15 (20-50), ferritin 440 ().   ? 12/7/17 - Procrit order discontinued as hemoglobin running above 11 post IV iron.    ? 1/3/18 - Patient reports taking one Ferrous Sulfate 325 mg tablet daily. Hemoglobin 12.5, MCV 91.7. Reports hematuria. Referred for followup with Dr. Woods. Creatinine 1.6 (0.7-1.2).    ? 6/4/18 - Creatinine 1.6 (0.7-1.2).    ? 4/8/19 - Patient reports continued hematuria. He has not seen Dr. Briseno since last stent change in approximately November 2018. Hemoglobin dropping to 9.7 today. Anemia labs sent. INR subtherapeutic at 1.4. Patient instructed to contact Dr. Purvis today regarding dosing. Expressed patient concerns regarding change of Coumadin dose with continued hematuria and worsening anemia and advised patient to followup with Dr. Purvis about Coumadin dosing. Will fax today’s CBC and INR with notation regarding drop in hemoglobin to Dr. Purvis. Reviewed note from Dr. Burns, cardiologist, from 2/18/19 visit where Dr. Burns reported possible Watchman procedure should hematuria continue. SPEP normal. Haptoglobin 120 (H). B12 was 1031 (H). Iron 22 (L),  (N), iron saturation 5 (L), ferritin 17 (L), folate 14.3 (N), retic 1.01 (N). Ordered to receive Injectafer 750 day 1 and day 8.   ? 4/17/19 - Received day 1 Injectafer.   ? 4/24/19 - Received day 8 Injectafer.   ? 5/13/19 - Cystoscopy per Dr. Briseno for bilateral hydronephrosis due to ureteral obstruction. Bilateral ureteral stents were exchanged. Creatinine 1.6 (H).   · 11/6/2019 referred to [facility] for EGD/colonoscopy due to chronic VALENTIN.  · 2/4/2020-patient had a white count of 6.1, hemoglobin 12.8 and platelets were 110,000     3.   Pancytopenia diagnosed in July 2017.   · 7/5/17 - WBC 3.1, hemoglobin 8.2, platelet count 147,000.   · 7/19/17 - Iron 28 (),  TIBC 298 (228-428), ferritin 205 (), folate 14 (5.9-24.8), creatinine 1.2 (0.7-1.2).        · 17 - WBC 1.4 with 33% neutrophils, 45% lymphocytes, 16% monocytes, hemoglobin 10.1, MCV 79.7, platelet count 129,000. EBV capsid antibody IgM 0.3 (<0.9), EBV capsid antigen antibodies/IgG >8 (<0.9). CMV antibody IgM 0.45 (<0.91), CMV antibody IgG 19.8 (<0.9).  Neutrophil-associated antibody negative.   · 17 - WBC 2.8 with 58% neutrophils, 28% lymphocytes, 11% monocytes, hemoglobin 9.8, MCV 85.8, platelet count 220,000. ANNE screen negative. Erythropoietin 32.3 high (2.59-18.50). Iron 32 low (), TIBC 297 normal (228-428), percentage saturation 11 low (20-50). BMP without clinical significance. ANNE screen negative.   · 10/24/17 -WBC 2.0 with 24% neutrophils, 62% lymphocytes, 10% monocytes, hemoglobin 10.3, platelet count 133,000.      4.  Atrial fibrillation on long-term Coumadin (warfarin).      · 2019.  Patient requesting INR and Coumadin dosing to be followed through the cancer center.  Past Medical History:   Diagnosis Date   • Asthma    • COPD (chronic obstructive pulmonary disease) (CMS/[facility])    • Coronary heart disease     CABG   • CVA (cerebral vascular accident) (CMS/[facility])     recent CVA   • Gout    • Hypertension        Past Surgical History:   Procedure Laterality Date   • CARDIAC CATHETERIZATION  2016    [facility]   • CORONARY ARTERY BYPASS GRAFT      CABG X3, reoperation, 09; CAB and    • OTHER SURGICAL HISTORY  2016    loop recorder implant          Current Outpatient Medications:   •  allopurinol (ZYLOPRIM) 100 MG tablet, Take 1 tablet by mouth Daily., Disp: 90 tablet, Rfl: 2  •  atorvastatin (LIPITOR) 20 MG tablet, Take 20 mg by mouth Every Night., Disp: , Rfl:   •  bumetanide (BUMEX) 1 MG tablet, Take 1 mg by mouth Daily., Disp: , Rfl:   •  CloNIDine (CATAPRES) 0.1 MG tablet, Take 0.1 mg by mouth Every 6 (Six) Hours., Disp: , Rfl:   •  clopidogrel (PLAVIX) 75 MG  tablet, Take 1 tablet by mouth Daily., Disp: 90 tablet, Rfl: 1  •  docusate sodium (COLACE) 100 MG capsule, Take 100 mg by mouth 2 (Two) Times a Day., Disp: , Rfl:   •  famotidine (PEPCID) 20 MG tablet, TAKE 1 TABLET BY MOUTH TWICE DAILY, Disp: 180 tablet, Rfl: 0  •  ferrous sulfate 325 (65 FE) MG tablet, Take 325 mg by mouth Daily With Breakfast., Disp: , Rfl:   •  hydrALAZINE (APRESOLINE) 50 MG tablet, TAKE 1 TABLET BY MOUTH FOUR TIMES DAILY, Disp: 360 tablet, Rfl: 0  •  isosorbide mononitrate (IMDUR) 30 MG 24 hr tablet, Take 30 mg by mouth Daily., Disp: , Rfl:   •  metoprolol tartrate (LOPRESSOR) 12.5 MG half tablet, Take 12.5 mg by mouth 2 (Two) Times a Day., Disp: , Rfl:   •  metoprolol tartrate (LOPRESSOR) 25 MG tablet, Take 1/2 tablet Bid, Disp: 90 tablet, Rfl: 1  •  pantoprazole (PROTONIX) 40 MG EC tablet, Take 40 mg by mouth Daily., Disp: , Rfl:   •  potassium chloride (K-DUR,KLOR-CON) 10 MEQ CR tablet, TAKE 1 TABLET BY MOUTH THREE TIMES A DAY, Disp: 90 tablet, Rfl: 0  •  tamsulosin (FLOMAX) 0.4 MG capsule 24 hr capsule, Take 1 capsule by mouth Daily., Disp: , Rfl:   •  warfarin (COUMADIN) 1 MG tablet, Take 1 tablet by mouth Daily., Disp: 30 tablet, Rfl: 3  •  warfarin (COUMADIN) 5 MG tablet, Take 1 tablet by mouth Daily. At 1700 as directed, Disp: 90 tablet, Rfl: 1  •  zolpidem (AMBIEN) 5 MG tablet, Take 1 tablet by mouth At Night As Needed for Sleep., Disp: 30 tablet, Rfl: 0    No Known Allergies    Family History   Problem Relation Age of Onset   • Stroke Other        Cancer-related family history is not on file.    Social History     Tobacco Use   • Smoking status: Never Smoker   • Smokeless tobacco: Never Used   Vaping Use   • Vaping Use: Never used   Substance Use Topics   • Alcohol use: No   • Drug use: No       I have reviewed and confirmed the accuracy of the patient's history:  as entered by the MA/LPN/RN. Staciemandie Vargas MD 09/09/21     SUBJECTIVE:    The patient is here for a follow up  "appointment.  Patient has no new issues.        REVIEW OF SYSTEMS:    Review of Systems   Constitutional: Negative for chills, fatigue and fever.   HENT: Negative for ear pain, mouth sores, nosebleeds and sore throat.    Eyes: Negative for photophobia and visual disturbance.   Respiratory: Negative for wheezing and stridor.    Cardiovascular: Negative for chest pain and palpitations.   Gastrointestinal: Negative for abdominal pain, diarrhea, nausea and vomiting.   Endocrine: Negative for cold intolerance and heat intolerance.   Genitourinary: Negative for dysuria and hematuria.   Musculoskeletal: Negative for joint swelling and neck stiffness.   Skin: Negative for color change and rash.   Neurological: Negative for seizures and syncope.   Hematological: Negative for adenopathy.   Psychiatric/Behavioral: Negative for agitation, confusion and hallucinations.     I have reviewed and confirmed the accuracy of the ROS as documented by the MA/LPN/RN Staciemandie Vargas MD      OBJECTIVE:    Vitals:    09/09/21 0952   BP: 167/84   Pulse: 76   Resp: 20   Temp: 97.3 °F (36.3 °C)   TempSrc: Infrared   Weight: 72.1 kg (159 lb)   Height: 175.3 cm (69\")   PainSc: 0-No pain       ECOG  (2) Ambulatory and capable of self care, unable to carry out work activity, up and about > 50% or waking hours    Physical Exam   Constitutional: He is oriented to person, place, and time. No distress.   HENT:   Head: Normocephalic and atraumatic.   Eyes: Conjunctivae are normal. Right eye exhibits no discharge. Left eye exhibits no discharge. No scleral icterus.   Neck: No thyromegaly present.   Cardiovascular: Normal rate, regular rhythm and normal heart sounds. Exam reveals no gallop and no friction rub.   Pulmonary/Chest: Effort normal. No stridor. No respiratory distress. He has no wheezes.   Abdominal: Soft. Bowel sounds are normal. He exhibits no mass. There is no abdominal tenderness. There is no rebound and no guarding. "   Musculoskeletal: Normal range of motion. No tenderness.   Lymphadenopathy:     He has no cervical adenopathy.   Neurological: He is alert and oriented to person, place, and time. He exhibits normal muscle tone.   Skin: Skin is warm. No rash noted. He is not diaphoretic. No erythema.   Psychiatric: His behavior is normal. Judgment and thought content normal.   Nursing note and vitals reviewed.    I have reexamined the patient and the results are consistent with the previously documented exam. Stacielilo Vargas MD     RECENT LABS    WBC   Date Value Ref Range Status   09/09/2021 6.38 3.40 - 10.80 10*3/mm3 Final     RBC   Date Value Ref Range Status   09/09/2021 4.18 4.14 - 5.80 10*6/mm3 Final     Hemoglobin   Date Value Ref Range Status   09/09/2021 11.3 (L) 13.0 - 17.7 g/dL Final     Hematocrit   Date Value Ref Range Status   09/09/2021 35.9 (L) 37.5 - 51.0 % Final     MCV   Date Value Ref Range Status   09/09/2021 85.9 79.0 - 97.0 fL Final     MCH   Date Value Ref Range Status   09/09/2021 27.0 26.6 - 33.0 pg Final     MCHC   Date Value Ref Range Status   09/09/2021 31.5 31.5 - 35.7 g/dL Final     RDW   Date Value Ref Range Status   09/09/2021 17.0 (H) 12.3 - 15.4 % Final     RDW-SD   Date Value Ref Range Status   09/09/2021 52.8 37.0 - 54.0 fl Final     MPV   Date Value Ref Range Status   09/09/2021 10.8 6.0 - 12.0 fL Final     Platelets   Date Value Ref Range Status   09/09/2021 132 (L) 140 - 450 10*3/mm3 Final     Neutrophil %   Date Value Ref Range Status   09/09/2021 54.8 42.7 - 76.0 % Final     Lymphocyte %   Date Value Ref Range Status   09/09/2021 29.2 19.6 - 45.3 % Final     Monocyte %   Date Value Ref Range Status   09/09/2021 11.8 5.0 - 12.0 % Final     Eosinophil %   Date Value Ref Range Status   09/09/2021 3.9 0.3 - 6.2 % Final     Basophil %   Date Value Ref Range Status   09/09/2021 0.3 0.0 - 1.5 % Final     Neutrophils, Absolute   Date Value Ref Range Status   09/09/2021 3.50 1.70 - 7.00  10*3/mm3 Final     Lymphocytes, Absolute   Date Value Ref Range Status   09/09/2021 1.86 0.70 - 3.10 10*3/mm3 Final     Monocytes, Absolute   Date Value Ref Range Status   09/09/2021 0.75 0.10 - 0.90 10*3/mm3 Final     Eosinophils, Absolute   Date Value Ref Range Status   09/09/2021 0.25 0.00 - 0.40 10*3/mm3 Final     Basophils, Absolute   Date Value Ref Range Status   09/09/2021 0.02 0.00 - 0.20 10*3/mm3 Final     nRBC   Date Value Ref Range Status   02/16/2017 0 0 /100[WBCs] Final       Lab Results   Component Value Date    GLUCOSE 83 09/09/2021    BUN 44 (H) 09/09/2021    CREATININE 2.28 (H) 09/09/2021    EGFRIFAFRI 33 (L) 09/09/2021    BCR 19.3 09/09/2021    K 4.3 09/09/2021    CO2 27.0 09/09/2021    CALCIUM 9.3 09/09/2021    ALBUMIN 4.50 09/09/2021    LABIL2 1.3 05/21/2019    AST 17 09/09/2021    ALT 7 09/09/2021           ASSESSMENT:    · Follicular lymphoma of intra-abdominal lymph nodes, unspecified follicular lymphoma type (CMS/HCC), status post chemoimmunotherapy therapy and Rituxan maintenance: Ongoing surveillance for relapse.  Reviewed. 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  There is no evidence of progression.  He has enlarged prostate and ureteral stents are in place.  He also has an ascending aortic aneurysm 4.2 cm.  I have given  referral to vascular for further evaluation.  INRs are monitored in our Coumadin clinic      PLANS:     · Follow-up in Coumadin clinic as  recommended.  Reviewed  · Schedule CT scan of the chest, abdomen and pelvis due now  · Reviewed his records from [facility]  · CBC today, CMP, uric acid and LDH today  · Continue monthly port flushes.  Schedule  · Follow up with Dr. Briseno with Urology; reviewed  · Follow-up in 3 months  · Review CT scans once available         I have reviewed labs results, imaging, vitals, and medications with the patient today. Will follow up in 3 months with me.    Patient verbalized understanding and is in agreement of the above plan.      I spent 30 total minutes, face-to-face, caring for Barry correa.  90% of this time involved counseling and/or coordination of care as documented within this note.

## 2021-09-29 ENCOUNTER — HOSPITAL ENCOUNTER (OUTPATIENT)
Dept: PET IMAGING | Facility: HOSPITAL | Age: 86
Discharge: HOME | End: 2021-09-29
Admitting: INTERNAL MEDICINE

## 2021-09-29 ENCOUNTER — LAB (OUTPATIENT)
Dept: LAB | Facility: HOSPITAL | Age: 86
End: 2021-09-29

## 2021-09-29 ENCOUNTER — HOSPITAL ENCOUNTER (OUTPATIENT)
Dept: ONCOLOGY | Facility: HOSPITAL | Age: 86
Setting detail: INFUSION SERIES
Discharge: HOME | End: 2021-09-29

## 2021-09-29 DIAGNOSIS — Z79.01 LONG TERM (CURRENT) USE OF ANTICOAGULANTS: ICD-10-CM

## 2021-09-29 DIAGNOSIS — C82.93 FOLLICULAR LYMPHOMA OF INTRA-ABDOMINAL LYMPH NODES, UNSPECIFIED FOLLICULAR LYMPHOMA TYPE (HCC): ICD-10-CM

## 2021-09-29 DIAGNOSIS — Z79.01 LONG TERM (CURRENT) USE OF ANTICOAGULANTS: Primary | ICD-10-CM

## 2021-09-29 LAB — INR PPP: 2.7 (ref 0.8–1.2)

## 2021-09-29 PROCEDURE — 85610 PROTHROMBIN TIME: CPT

## 2021-09-29 PROCEDURE — 74176 CT ABD & PELVIS W/O CONTRAST: CPT

## 2021-09-29 PROCEDURE — 36416 COLLJ CAPILLARY BLOOD SPEC: CPT

## 2021-09-29 PROCEDURE — 71250 CT THORAX DX C-: CPT

## 2021-10-27 ENCOUNTER — LAB (OUTPATIENT)
Dept: LAB | Facility: HOSPITAL | Age: 86
End: 2021-10-27

## 2021-10-27 ENCOUNTER — HOSPITAL ENCOUNTER (OUTPATIENT)
Dept: ONCOLOGY | Facility: HOSPITAL | Age: 86
Setting detail: INFUSION SERIES
Discharge: HOME | End: 2021-10-27

## 2021-10-27 DIAGNOSIS — Z79.01 LONG TERM (CURRENT) USE OF ANTICOAGULANTS: ICD-10-CM

## 2021-10-27 DIAGNOSIS — Z79.01 LONG TERM (CURRENT) USE OF ANTICOAGULANTS: Primary | ICD-10-CM

## 2021-10-27 LAB — INR PPP: 2.4 (ref 0.8–1.2)

## 2021-10-27 PROCEDURE — 36416 COLLJ CAPILLARY BLOOD SPEC: CPT

## 2021-10-27 PROCEDURE — 85610 PROTHROMBIN TIME: CPT

## 2021-10-27 NOTE — PROGRESS NOTES
I advised pt to continue 6mg daily and recheck in 1 month. Pt and his wife verbalized understanding.

## 2021-11-01 ENCOUNTER — LAB (OUTPATIENT)
Dept: LAB | Facility: HOSPITAL | Age: 86
End: 2021-11-01

## 2021-11-01 ENCOUNTER — TRANSCRIBE ORDERS (OUTPATIENT)
Dept: ADMINISTRATIVE | Facility: HOSPITAL | Age: 86
End: 2021-11-01

## 2021-11-01 ENCOUNTER — HOSPITAL ENCOUNTER (OUTPATIENT)
Dept: CARDIOLOGY | Facility: HOSPITAL | Age: 86
Discharge: HOME | End: 2021-11-01

## 2021-11-01 DIAGNOSIS — N13.30 HYDRONEPHROSIS, UNSPECIFIED HYDRONEPHROSIS TYPE: Primary | ICD-10-CM

## 2021-11-01 DIAGNOSIS — N13.30 HYDRONEPHROSIS, UNSPECIFIED HYDRONEPHROSIS TYPE: ICD-10-CM

## 2021-11-01 LAB
ANION GAP SERPL CALCULATED.3IONS-SCNC: 7.9 MMOL/L (ref 5–15)
BASOPHILS # BLD AUTO: 0.03 10*3/MM3 (ref 0–0.2)
BASOPHILS NFR BLD AUTO: 0.6 % (ref 0–1.5)
BUN SERPL-MCNC: 39 MG/DL (ref 8–23)
BUN/CREAT SERPL: 21 (ref 7–25)
CALCIUM SPEC-SCNC: 9.3 MG/DL (ref 8.6–10.5)
CHLORIDE SERPL-SCNC: 104 MMOL/L (ref 98–107)
CO2 SERPL-SCNC: 28.1 MMOL/L (ref 22–29)
CREAT SERPL-MCNC: 1.86 MG/DL (ref 0.76–1.27)
DEPRECATED RDW RBC AUTO: 48.3 FL (ref 37–54)
EOSINOPHIL # BLD AUTO: 0.22 10*3/MM3 (ref 0–0.4)
EOSINOPHIL NFR BLD AUTO: 4.1 % (ref 0.3–6.2)
ERYTHROCYTE [DISTWIDTH] IN BLOOD BY AUTOMATED COUNT: 16.2 % (ref 12.3–15.4)
GFR SERPL CREATININE-BSD FRML MDRD: 42 ML/MIN/1.73
GLUCOSE SERPL-MCNC: 145 MG/DL (ref 65–99)
HCT VFR BLD AUTO: 34.5 % (ref 37.5–51)
HGB BLD-MCNC: 11 G/DL (ref 13–17.7)
IMM GRANULOCYTES # BLD AUTO: 0.02 10*3/MM3 (ref 0–0.05)
IMM GRANULOCYTES NFR BLD AUTO: 0.4 % (ref 0–0.5)
LYMPHOCYTES # BLD AUTO: 1.59 10*3/MM3 (ref 0.7–3.1)
LYMPHOCYTES NFR BLD AUTO: 29.3 % (ref 19.6–45.3)
MCH RBC QN AUTO: 26.3 PG (ref 26.6–33)
MCHC RBC AUTO-ENTMCNC: 31.9 G/DL (ref 31.5–35.7)
MCV RBC AUTO: 82.3 FL (ref 79–97)
MONOCYTES # BLD AUTO: 0.47 10*3/MM3 (ref 0.1–0.9)
MONOCYTES NFR BLD AUTO: 8.7 % (ref 5–12)
NEUTROPHILS NFR BLD AUTO: 3.1 10*3/MM3 (ref 1.7–7)
NEUTROPHILS NFR BLD AUTO: 56.9 % (ref 42.7–76)
NRBC BLD AUTO-RTO: 0 /100 WBC (ref 0–0.2)
PLATELET # BLD AUTO: 130 10*3/MM3 (ref 140–450)
PMV BLD AUTO: 10.9 FL (ref 6–12)
POTASSIUM SERPL-SCNC: 4.1 MMOL/L (ref 3.5–5.2)
QT INTERVAL: 452 MS
RBC # BLD AUTO: 4.19 10*6/MM3 (ref 4.14–5.8)
SODIUM SERPL-SCNC: 140 MMOL/L (ref 136–145)
WBC # BLD AUTO: 5.43 10*3/MM3 (ref 3.4–10.8)

## 2021-11-01 PROCEDURE — 85025 COMPLETE CBC W/AUTO DIFF WBC: CPT

## 2021-11-01 PROCEDURE — 36415 COLL VENOUS BLD VENIPUNCTURE: CPT

## 2021-11-01 PROCEDURE — 80048 BASIC METABOLIC PNL TOTAL CA: CPT

## 2021-11-01 PROCEDURE — 93005 ELECTROCARDIOGRAM TRACING: CPT

## 2021-11-01 PROCEDURE — 93010 ELECTROCARDIOGRAM REPORT: CPT | Performed by: INTERNAL MEDICINE

## 2021-11-12 RX ORDER — FAMOTIDINE 20 MG/1
TABLET, FILM COATED ORAL
Qty: 180 TABLET | Refills: 0 | Status: ON HOLD | OUTPATIENT
Start: 2021-11-12 | End: 2022-08-26

## 2021-11-30 ENCOUNTER — TELEPHONE (OUTPATIENT)
Dept: ONCOLOGY | Facility: HOSPITAL | Age: 86
End: 2021-11-30

## 2021-11-30 NOTE — TELEPHONE ENCOUNTER
Patient is in [facility], wife is not sure when he will be home, but she will call and let us know when he gets home.

## 2021-12-07 PROBLEM — I50.32 CHRONIC DIASTOLIC HEART FAILURE (HCC): Status: ACTIVE | Noted: 2017-01-30

## 2021-12-07 PROBLEM — J44.9 CHRONIC OBSTRUCTIVE PULMONARY DISEASE (HCC): Status: ACTIVE | Noted: 2021-12-07

## 2021-12-07 PROBLEM — C16.9 STOMACH CANCER (HCC): Status: ACTIVE | Noted: 2017-06-01

## 2021-12-07 PROBLEM — N17.9 ACUTE RENAL FAILURE (HCC): Status: ACTIVE | Noted: 2021-12-07

## 2021-12-07 PROBLEM — J45.909 ASTHMA: Status: ACTIVE | Noted: 2021-12-07

## 2021-12-07 PROBLEM — I25.10 CORONARY HEART DISEASE: Status: ACTIVE | Noted: 2021-12-07

## 2021-12-07 RX ORDER — HYDROCODONE BITARTRATE AND ACETAMINOPHEN 5; 325 MG/1; MG/1
TABLET ORAL
Status: ON HOLD | COMMUNITY
Start: 2021-11-08 | End: 2022-08-26

## 2021-12-07 NOTE — PROGRESS NOTES
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     · CT abdomen and pelvis done 11/4/15 that showed a new retroperitoneal tissue between the aorta and IVC measuring 2.6 x 2.2 cm, extending from the level of origin of the superior mesenteric artery. The differential diagnosis included lymphoma as well as atypical retroperitoneal fibrosis. Metastatic adenopathy was also in the differential as well as amyloidosis. Recommending a PET CT for correlation. There was non-specific prostate enlargement and coronary artery and aortoiliac atherosclerosis. On 3/8/16 he saw Dr. Pedro Purvis in follow-up. He was complaining of leg swelling and insomnia. He was able to go to sleep but would wake up a few hours after sleeping and could not get back to sleep. He also reported a metallic taste in his mouth. Bilateral lower extremity Dopplers were performed that were negative for DVT. A CT abdomen and pelvis was ordered and performed on 3/8/16 that showed a retroperitoneal mass extending dorsal to the aorta on the left and right side, obscuring the periaortic fat, transversing caudally along the psoas muscles. The mass caused left-sided obstructive uropathy just caudal to the ureteropelvic junction. Differential diagnosis included lymphoma, metastasis or primary sarcoma. There were no other soft tissue masses throughout the abdomen. There was an enlarged prostate with central lucent lesion, possibly due to a TURP defect. The mass measured 4.2 x 1.9 cm on transaxial on the right periaortic retroperitoneum.  On the left periaortic retroperitoneum it measured 3.8 x 2.8 cm. He was seen by Dr. Briseno on 3/16/16. Urinalysis was negative for blood, nitrates and leukocytes. He was diagnosed with BPH with LUTS and hydronephrosis with plan to perform a cysto with stent placement soon.   · 3/17/16 - Patient seen for initial consultation regarding enlarging retroperitoneal mass at the [facility]. Quantitative hCG <0.1 (<2.0). ESR 45 (<21). AFP 4 (0-9). Chest x-ray with linear opacity right middle lobe. Question of atelectasis or scar and cardiomegaly with changes of CABG. PSA 6.37 (0-4).  Result sent to Dr. Briseno.   · 4/2/16 - CT head had a focal hyperdense area in the left cerebral hemisphere representing acute ischemia.   · 4/5/16 - CT chest, abdomen and pelvis done during hospitalization at [facility] revealed no findings of primary or metastatic disease in the chest. Dilated main pulmonary artery. Mild ascending thoracic aortic ectasia measuring up to 4.1 cm. Bilateral ureteral stents. The proximal left ureteral stent may be located within the renal parenchyma or perhaps anterior calyx. Possibly mild left hydronephrosis. Abnormal periaortic soft tissue mass ascending from the renal vessels through the iliac bifurcation.   · 4/14/16 - Patient did not have CT-guided biopsy of retroperitoneal mass done as he was hospitalized at [facility] with a stroke.   · 5/2/16 - Chest x-ray with cardiomegaly and chronic changes about the chest. Recording time device overlying the left side of the chest.   · 5/17/16 - Patient did not want to schedule biopsy of the lymph nodes. Patient claims not to want to go for CT-guided retroperitoneal mass biopsy, but would proceed with a bone marrow biopsy.   · 5/27/16 - Bone marrow aspiration and biopsy with adequate normocellular bone marrow (35%) showing trilineage hematopoiesis, a moderate plasma cytosis, and increased iron stores. Flow cytometry revealed a  decreased CD-4, CD-8 helper suppressor T-cell ratio suggesting viral infection or immunocompromise. Cytogenetics revealed normal karyotype. Plasma cells were increased to 12-15%. Copper 141 ().  · 6/9/16 - WBC 4.23, hemoglobin 10.1, MCV 85.9, platelet count 152,000.   · 7/7/16 - Patient has agreed to have a CT-guided retroperitoneal mass biopsy which I am concerned may be a plasma cytoma. Beta-2 microglobulin 3.72 (<2.16).    · 8/2/16 - CT abdomen and pelvis with significant progression of extensive infiltrative retroperitoneal process. New left ureteral stent with mild residual caliectasis on the left with new right hydronephrosis due to partial obstruction of the right ureter. Enlarged prostate.   · 12/9/16 - Patient underwent cystoscopy with bilateral stent exchange and bilateral retrograde pyelogram by Ganesh Briseno M.D.   · 1/13/17 - Chest x-ray with mild cardiac enlargement with borderline pulmonary venous redistribution , unchanged, with chronic elevation or eventration of the right hemidiaphragm.   · 1/17/17 - Patient was hospitalized at [facility] between 1/17/17 and 1/25/17 with right scrotal swelling. Stool Hemoccult was negative. Hemoglobin 8.9, MCV 79.2. IV diuretics were given. Cardiology and Urology consultations were obtained. He then underwent a core needle biopsy of the retroperitoneal mass while holding Coumadin, Aspirin and Plavix. Bilateral lower extremity Dopplers were negative for DVT. CT of the abdomen and pelvis had extensive confluent soft tissue density in the retroperitoneum, increased in size from previous study. In addition, there was some iliac adenopathy, especially on the right. Bilateral double pigtail ureteral stents were in good position without hydronephrosis. Extensive soft tissue stranding in the subcutaneous sub mesenteric fat was present.   · 1/18/17 - Echocardiogram with pulmonary artery systolic pressure 60-65 mmHg. Moderate pulmonary hypertension. Left ventricular systolic  function normal with EF of 55-60%. LV diastolic dysfunction noted. Transmitral spectral Doppler flow pattern suggestive of restrictive physiology with moderate mitral regurgitation.   · 1/23/17 - Patient underwent ultrasound-guided fine needle aspiration of retroperitoneal lymph node at [facility] by Stephen Ricci M.D. with pathology revealing friable adipose tissue with prominent infiltrative lymphoid cell population suspicious for lymphoma. Focal areas demonstrated vague follicle formations. Crush artifact was present. Flow cytometry revealed low viability and hypocellular specimen which failed to reveal a significant B-cell or monoclonal B-cell population. The majority of the lymphocytes were negative for lambda and kappa light chains. The impression was B-cell lymphoma favoring follicle center cell lymphoma and final report was supposed to be pending outside expert consultation.   · 1/30/17 - Retroperitoneal lymph node biopsy specimen was reviewed by Lenny Johnson M.D. and classified as B-cell lymphoma. It was somewhat difficult to classify given the architectural distortion as well as the crusted areas of the tumor. It was thought that at least a follicular lymphoma with areas of diffuse fat was present. There was concern of areas of large cell lymphoma, particularly in the adipose tissue.   · 2/3/17 - Patient returns after a seven month absence. Had to cancel his scheduled lymph node biopsies until his last hospitalization at [facility] where he was not seen by us in consultation. Diagnosis discussion had with patient in detail. Though he may have a higher grade lymphoma than follicular given his age and cardiac status, he is not going to be able to tolerate more aggressive chemotherapy. Will hence treat it as a follicular lymphoma and then follow him for response in hopes of palliating him and decreasing his total swelling. Chemotherapy orders written for Rituximab 375 mg/M2 IV day 1 and Bendamustine 70 mg/M2 IV  piggyback days 1 and 2, repeat q 4 weeks.   · 2/13/17 - PET scan with hypermetabolic enlarged retroperitoneal lymph nodes increased in size since March 2016. Retrocrural lymph nodes along the iliac chains bilaterally were also hypermetabolic and a single hypermetabolic lymph node in the paratracheal location in the chest. Cardiomegaly with small pleural effusions and severe coronary artery atherosclerotic calcifications. Bilateral ureteral stents with mild to moderate hydronephrosis. Moderate amount of stool within the colon, questioning constipation. Diffuse anasarca.   · 2/16/17 - Patient underwent right IJ port insertion by Stephen Ricci M.D. in IR under fluoro guidance.   · 2/17/17 - Hepatitis B/C non-reactive.   · 2/21/17 - Potassium 3.3. Patient asked to increase dietary potassium intake. Uric acid 8.3 (4.8-8.7). Patient developed upper abdominal pain shortly after starting Rituxan infusion with stable vital signs and examination. Was taking Zantac at home. Patient given Pepcid 40 mg IV and Decadron 10 mg IV prior to resuming Rituxan.  Patient received cycle 1 chemotherapy.   · 3/1/17 -  (). ESR 28 (<21). Patient complains of insomnia. Ambien 5 mg p.o. q.h.s. p.r.n. prescribed. CMP significant for calcium 8.7 (L), albumin 3.3 (L), creatinine 1.2 (N). Uric acid elevated at 9.3 (H).   · 3/2/17 - Patient started on Allopurinol 100 mg by mouth daily for hyperuricemia.   · 3/22/17 - The patient started cycle 2 day 1 of Rituxan and Bendamustine.   · 5/23/17 - Uric acid 6.9 (4.8-8.7), ESR 68 (<21).     · 6/14/17 - Comprehensive metabolic panel with no significant abnormality. Cycle 5 chemotherapy given.   · 6/28/17 - INR 2.6 on 7.5 alternating with 10 mg of Warfarin being managed by Dr. Purvis. Patient complains of dry throat and dysuria.   · 3/29/17 - WBC 4.71, hemoglobin 9.9, MCV 76.9, platelets 146,000, ANC 3.6. Patient is scheduled for a CT scan of the chest, abdomen and pelvis with p.o.  contrast only on 4/19/17. Orders written to continue chemotherapy. Patient instructed to continue Allopurinol daily and Ambien as needed. Uric acid level ordered. Uric acid 6.1 (4.8-8.7).    · 4/19/17 - Comprehensive metabolic panel with no significant abnormality. CT chest, abdomen and pelvis with 16 mm lymph node seen on recent PET CT decreased to 12 mm but pretracheal lymph node increased to 14 mm. Mild atelectasis in the right middle lobe with mild scarring change in the right lower lobe medially. Decreasing lymphadenopathy in the abdomen and pelvis. Marked adenopathy at the level of the renal hilum measuring 11.4 x 5.5 down to 10 x 4.5 cm. Bilateral renal stents. Cycle 3 chemotherapy given. Patient’s Coumadin dose is being adjusted by Dr. Soni. He has an appointment with Dr. Briseno in follow-up of hematuria. Claims not to be gaining weight and advised taking Ensure or Boost.   · 5/17/17 - Received cycle 4 Bendamustine and Rituxan.   · 5/18/17 - White blood cell count 8.7, hemoglobin 9.1, MCV 78.1 and platelets 237,000. Weight loss of 18 pounds in five weeks.   · 5/23/17 - White count 4.48, hemoglobin 8.9, MCV 77.1 and platelet count 199,000. Allopurinol planned to discontinue if uric acid <6.0. ESR, CMP and uric acid ordered. Megace 200 mg p.o. daily ordered. Ferritin 90 ().    · 6/14/17 - Creatinine 1.2 (0.7-1.2).  Cycle 5 chemotherapy given.   · 6/28/17 - WBC 5.5, hemoglobin 8.8, MCV 74.5, platelet count 190,000.   · 7/12/17 - Chemotherapy held one week. ANC 0.48. Ciprofloxacin 500 mg b.i.d. prescribed for five days. Neutropenic precautions reviewed with the patient and reinforced.   · 7/17/17 - Potassium increased from 10 mEq daily to 10 mEq b.i.d.   · 7/19/17 - Weight loss of 7 pounds in one month. Persistent neutropenia. WBC 2.4, ANC 0.3, platelet count 136,000, hemoglobin 8.7, MCV 77.8. EKG performed for irregular heartbeat. Reviewed neutropenic precautions. Ciprofloxacin refilled 500 mg  b.i.d. x1 week. Leukopenia and thrombocytopenia workup ordered. Chemotherapy on hold for one week. Magnesium 2.0 (1.8-2.5), potassium 3.3 (3.6-5.1). Oral potassium supplementation increased. Platelet antibodies not detected. EBV IgM <0.2 (<0.9), EBV IgG >8 (<0.9). CMV IgM 0.35 (<0.91), CMV IgG 17.3 (<0.9). Neutrophil-associated antibody negative.        · 7/24/17 - PET scan with interval mixed treatment response with marked improvement in retroperitoneal adenopathy with metabolic activity just slightly higher than background. Interval marked increase in mediastinal adenopathy and development of bilateral hilar adenopathy.   · 7/26/17 - Order written to discontinue current chemotherapy because of progressive disease in chest and consultation obtained with CTS for mediastinal lymph node biopsy. ESR 37 (<21). Comprehensive metabolic panel with potassium 3.2. Potassium supplementation increased orally. Creatinine 1.3 (0.7-1.2), uric acid 6 (4.8-8.7).      · 8/10/17 - Patient underwent a mediastinoscopy by Donald Islas M.D. at [facility] with pathology of lymph node R4 revealing portions of benign lymph node with prominent anthracotic pigmented histocytes and focal hyalinized fibrosis. Special stain for acid-fast bacilli and fungal organisms was negative.   · 8/30/17 - Discussed options with the patient. Ideally would like to do six cycles of chemotherapy and then put him on maintenance Rituxan. However, cannot proceed because of low blood counts. Patient has not had INR’s in followup of AFib. Will have it drawn and sent to Dr. Bustamante for adjustment. Will check leukopenia workup and plan on maintenance Rituxan after improvement in blood counts. Comprehensive metabolic panel with potassium 3.5 (3.6-5.1).   · 9/6/17 - Patient hospitalized at [facility] between 9/6/17 and 9/15/17 with urosepsis. Chest x-ray had stable cardiomegaly and postsurgical changes with stable elevation of the right hemidiaphragm with  overlying scarring or atelectasis. CT scan of the abdomen and pelvis had bilateral ureteral stents, retroperitoneal soft tissue masses/adenopathy significantly decreased in size compared to January 2017, large stool burden throughout the colon, small volume of pelvic ascites, enlarged prostate gland, extensive atherosclerotic calcification and advanced multilevel lumbar disc degermation and facet arthropathy. Patient underwent cystoscopy with bilateral stent change by Yao Woods M.D. and was seen in consultation by Bimal Saenz M.D.   · 9/19/17 - Order written for Rituxan 375 mg/M2 IV q.2 months for two years as maintenance treatment and for monthly port flushes. BMP without clinical significance. Uric acid 5.4 (4.8-8.7).    · 10/11/17 - Patient began cycle 1 of maintenance dose Rituxan.   · 12/6/17 - Cycle 2 maintenance Rituxan given.   · 12/7/17 - Patient only taking one potassium pill a day. Asked to increase to twice a day due to hypokalemia. INR’s being managed by Pedro Purvis M.D. with AFib.   · 1/31/18 - CT chest with stable appearance of right paratracheal and bilateral hilar adenopathy.  CT scan of abdomen and pelvis showed stable appearance of extensive retroperitoneal stranding related to an adenopathy that encases the infrarenal aorta without significant change from prior exams.  Bilateral nephroureteral stents were in place.  Mild prostatomegaly was present.     · 2/6/18 - Cycle 3 Rituximab given.   · 4/3/18 - Patient received cycle 4 maintenance Rituximab.   · 4/9/18 - ESR 16 (0-20).    · 6/4/18 - Cycle 5 maintenance Rituximab administered.   · 7/30/18 - Cycle 6 maintenance Rituximab administered.   · 9/24/18 - Cycle 7 maintenance Rituximab initiated.   · 10/18/18 - CT chest, abdomen and pelvis without contrast showed stable appearance of paratracheal and bilateral hilar adenopathy and paratracheal lymph node measured 10 mm. The report stated evaluation of hilar lymph nodes was suboptimal but  lymphadenopathy appeared normal. There was stable appearance of the retroperitoneal stranding that encases the abdominal aorta and inferior vena cava felt related to lymphadenopathy and stable. Bilateral ureteral stents appeared in good position.   · 11/26/18 - Cycle 8 maintenance Rituximab initiated. Hepatitis B core antibody and hepatitis B surface antigen both non-reactive. Hepatitis C antibody was positive with hepatitis CRNA <15 not detected.   · 12/12/18 - ESR 18 (0-20).    · 1/21/19 - Patient received cycle 9 maintenance Rituximab.   · 3/18/19 - Cycle 10 Rituxan maintenance initiated.   · 5/21/19 - Received cycle 11 Rituxan.   · 6/11/19 - Uric acid 6.5 (4.8-8.7).  (). Sed rate 13 (0-20).   · 7/8/19-Cycle 12 Rituximab.  · 7/15/19 - CT scan chest, abdomen and pelvis showed mildly prominent bilateral hilar mediastinal lymph nodes unchanged since 10/18/2018 and there is no evidence of new or progressive metastatic disease in the chest. Irregular retroperitoneal soft tissue stranding in the abdomen and pelvis which may represent features related to patient's treated lymphoma. No evidence of disease progression in abdomen or pelvis since the 10/18/2018 examination.    · 8/12/19 -Rituxan discontinued due to completion of planned course.   · 11/6/2019 patient with a 8 pound weight loss but denies night sweats or fevers.   · 1/29/2020 patient had CT scan of the chest abdomen and pelvis this showed a dominant index right lower lobe paratracheal node measuring 11 mm and a right infrahilar hilar node measuring 16 mm and left hilar node 10 mm unchanged.   Stable thoracic aortic aneurysm at 4.2 cm.  In the abdomen there was no evidence of relapsed lymphoma.  He has prostatic enlargement and is seen by urologist Dr. Woods.  There is evidence of treated disease in the retroperitoneal soft tissue.  · 6/10/2020 patient had CT scan of the chest abdomen and pelvis showed a stable 7 mm thyroid nodule which is  nonspecific.  Mild mediastinal and hilar lymphadenopathy which is unchanged.  Defined soft tissue thickening in the retroperitoneum is unchanged and is favored to represent treated disease.  No abnormally enlarged lymph nodes identified.  Enlarged prostate.  · 9/29/2021 patient had CT scan of the chest, abdomen and pelvis which basically did not show any evidence of progressive lymphoma.  He has bilateral nephroureteral stents in place without hydronephrosis.  2. Anemia diagnosed March 2016. Iron deficiency anemia diagnosed February 2017.   ? CBC showed a white blood cell count of 4.8, hemoglobin 11.8, MCV 88.2, RDW 15.6 (H), platelet count 146,000. Creatinine 1.31 (H). LFT’s normal. Stool heme negative.  ? 3/17/16 - Vitamin B12 of 228 (180-914), folate 21.2 (5.9-24.8), erythropoietin 13.65 (2.59-18.5). Serum protein electrophoresis with normal pattern.  (), ferritin 147 (), iron 36 (), TIBC 291 (228-428), retic count 0.69 (0.5-1.5), haptoglobin 158 ().         ? 4/5/16 - Labs drawn during hospitalization at [facility]: Vitamin B12 of 265 (180-914), serum iron 11 (). Hemoglobin 10.9. Creatinine 1.0 (0.6-1.3).      ? 4/14/16 - WBC 5.61, hemoglobin 11.6, MCV 84.5, platelet count 186,000.   ? 5/17/16 - WBC 4.5, hemoglobin 11.1, MCV 87.5, platelet count 150,000. Hemoglobin electrophoresis with normal pattern.   ? 5/27/16 - Bone marrow aspiration and biopsy with adequate normocellular bone marrow (35%) showing trilineage hematopoiesis, a moderate plasma cytosis, and increased iron stores. Flow cytometry revealed a decreased CD-4, CD-8 helper suppressor T-cell ratio suggesting viral infection or immunocompromise. Cytogenetics revealed normal karyotype. Plasma cells were increased to 12-15%. Copper 141 ().   ? 6/9/16 - HFE gene with no mutation.   ? 6/10/16 - Serum immunofixation with no monoclonal gammopathy identified. Kappa/lambda FLC ratio 17.38 (0.26-1.65).  Comprehensive metabolic panel with no significant abnormality. Creatinine 1.2 (0.7-1.2).     ? 7/7/16 - Beta-2 microglobulin 3.72 (<2.16).    ? 7/28/16 - Skeletal survey with no suspicious lytic or blastic bony lesions identified. Right ureteral stent migrated and coiled within the urinary bladder. Degenerative changes as noted above. Patient referred to his urologist.   ? 2/3/17 - WBC 5.7, hemoglobin 10.2, MCV 78.6, platelet count 237,000. Ferrous Sulfate 325 mg p.o. b.i.d. started. Ferritin 27 (), iron 20 (), TIBC 363 (228-428).      ? 3/1/17 - Patient not taking oral iron supplements. WBC 5, hemoglobin 9.7, MCV 77.2, platelet count 164,000. Ferrous Sulfate 325 mg p.o. b.i.d. prescribed.   ? 3/29/17 - Hemoglobin 9.9, MCV 76.9. Patient instructed to continue Iron Sulfate 325 mg b.i.d. Patient instructed to followup with urologist regarding hematuria.   ? 4/25/17 - WBC 3.6 with 75% neutrophils, 6% lymphocytes, 14% monocytes, hemoglobin 9.6, MCV 77.4, platelet count 149,000. Hemoglobin electrophoresis normal with 98% hemoglobin A.   ? 5/23/17 - White count 4.48, hemoglobin 8.9, MCV 77.1 and platelet count 199,000. Ferrous Sulfate 325 mg p.o. b.i.d. ordered. Stool cards ordered. Ferritin ordered.   ? 7/19/17 - WBC 2.4, ANC 0.3, platelet count 136,000, hemoglobin 8.7, MCV 77.8. Iron 28 (), TIBC 298 (228-428), iron saturation 9 (20-50), ferritin 205 (), folate 14 (5.9-24.8), creatinine 1.2 (0.7-1.2). Iron saturation 9 (20-50).          ? 7/26/17 - WBC 2.5 with 14% neutrophils, 62% lymphocytes, 23% monocytes, hemoglobin 8.9, MCV 75.7, RDW 20.6 and platelet count 151,000. Orders written for Injectafer 750 mg IV days 1 and 8.   ? 8/2/17 - Injectafer 750 mg IV given. Hemoglobin 8.6.   ? 8/30/17 - Hemoglobin electrophoresis with normal pattern. Vitamin B12 of 597 (211-911).  Creatinine 1.2 (0.7-1.2).    ? 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 Iron 32 low (), TIBC 297 normal (228-428), percentage saturation 11 low (20-50). Erythropoietin 32.3 high (2.59-18.50). Uric acid 5.4 normal (4.8-8.7).   ? 10/4/17 - Patient received cycle 1 day 8 of Injectafer 750 mg IV.   ? 10/24/17 - WBC 2.0, hemoglobin 10.3, MCV 86.0, platelet count 133,000.       ? 10/31/17 - Iron 41 (), TIBC 281 (228-428), iron saturation 15 (20-50), ferritin 440 ().   ? 12/7/17 - Procrit order discontinued as hemoglobin running above 11 post IV iron.    ? 1/3/18 - Patient reports taking one Ferrous Sulfate 325 mg tablet daily. Hemoglobin 12.5, MCV 91.7. Reports hematuria. Referred for followup with Dr. Woods. Creatinine 1.6 (0.7-1.2).    ? 6/4/18 - Creatinine 1.6 (0.7-1.2).    ? 4/8/19 - Patient reports continued hematuria. He has not seen Dr. Briseno since last stent change in approximately November 2018. Hemoglobin dropping to 9.7 today. Anemia labs sent. INR subtherapeutic at 1.4. Patient instructed to contact Dr. Purvis today regarding dosing. Expressed patient concerns regarding change of Coumadin dose with continued hematuria and worsening anemia and advised patient to followup with Dr. Purvis about Coumadin dosing. Will fax today’s CBC and INR with notation regarding drop in hemoglobin to Dr. Purvis. Reviewed note from Dr. Burns, cardiologist, from 2/18/19 visit where Dr. Burns reported possible Watchman procedure should hematuria continue. SPEP normal. Haptoglobin 120 (H). B12 was 1031 (H). Iron 22 (L),  (N), iron saturation 5 (L), ferritin 17 (L), folate 14.3 (N), retic 1.01 (N). Ordered to receive Injectafer 750 day 1 and day 8.   ? 4/17/19 - Received day 1 Injectafer.   ? 4/24/19 - Received day 8 Injectafer.   ? 5/13/19 - Cystoscopy per Dr. Briseno for bilateral hydronephrosis due to ureteral obstruction. Bilateral ureteral stents were exchanged. Creatinine 1.6 (H).   · 11/6/2019 referred to [facility] for EGD/colonoscopy due to chronic  VALENTIN.  · 2020-patient had a white count of 6.1, hemoglobin 12.8 and platelets were 110,000     3.   Pancytopenia diagnosed in 2017.   · 17 - WBC 3.1, hemoglobin 8.2, platelet count 147,000.   · 17 - Iron 28 (), TIBC 298 (228-428), ferritin 205 (), folate 14 (5.9-24.8), creatinine 1.2 (0.7-1.2).        · 17 - WBC 1.4 with 33% neutrophils, 45% lymphocytes, 16% monocytes, hemoglobin 10.1, MCV 79.7, platelet count 129,000. EBV capsid antibody IgM 0.3 (<0.9), EBV capsid antigen antibodies/IgG >8 (<0.9). CMV antibody IgM 0.45 (<0.91), CMV antibody IgG 19.8 (<0.9).  Neutrophil-associated antibody negative.   · 17 - WBC 2.8 with 58% neutrophils, 28% lymphocytes, 11% monocytes, hemoglobin 9.8, MCV 85.8, platelet count 220,000. ANNE screen negative. Erythropoietin 32.3 high (2.59-18.50). Iron 32 low (), TIBC 297 normal (228-428), percentage saturation 11 low (20-50). BMP without clinical significance. ANNE screen negative.   · 10/24/17 -WBC 2.0 with 24% neutrophils, 62% lymphocytes, 10% monocytes, hemoglobin 10.3, platelet count 133,000.      4.  Atrial fibrillation on long-term Coumadin (warfarin).      · 2019.  Patient requesting INR and Coumadin dosing to be followed through the cancer center.  Past Medical History:   Diagnosis Date   • Asthma    • COPD (chronic obstructive pulmonary disease) (HCC)    • Coronary heart disease     CABG   • CVA (cerebral vascular accident) (HCC)     recent CVA   • Gout    • Hypertension        Past Surgical History:   Procedure Laterality Date   • CARDIAC CATHETERIZATION  2016    [facility]   • CORONARY ARTERY BYPASS GRAFT      CABG X3, reoperation, [id number]; CAB and    • OTHER SURGICAL HISTORY  2016    loop recorder implant          Current Outpatient Medications:   •  allopurinol (ZYLOPRIM) 100 MG tablet, Take 1 tablet by mouth Daily., Disp: 90 tablet, Rfl: 2  •  atorvastatin (LIPITOR) 20 MG tablet, Take 20 mg by mouth Every  Night., Disp: , Rfl:   •  bumetanide (BUMEX) 1 MG tablet, Take 1 mg by mouth Daily., Disp: , Rfl:   •  CloNIDine (CATAPRES) 0.1 MG tablet, Take 0.1 mg by mouth Every 6 (Six) Hours., Disp: , Rfl:   •  clopidogrel (PLAVIX) 75 MG tablet, Take 1 tablet by mouth Daily., Disp: 90 tablet, Rfl: 1  •  docusate sodium (COLACE) 100 MG capsule, Take 100 mg by mouth 2 (Two) Times a Day., Disp: , Rfl:   •  famotidine (PEPCID) 20 MG tablet, TAKE 1 TABLET BY MOUTH TWICE DAILY, Disp: 180 tablet, Rfl: 0  •  ferrous sulfate 325 (65 FE) MG tablet, Take 325 mg by mouth Daily With Breakfast., Disp: , Rfl:   •  hydrALAZINE (APRESOLINE) 50 MG tablet, TAKE 1 TABLET BY MOUTH FOUR TIMES DAILY, Disp: 360 tablet, Rfl: 0  •  HYDROcodone-acetaminophen (NORCO) 5-325 MG per tablet, , Disp: , Rfl:   •  isosorbide mononitrate (IMDUR) 30 MG 24 hr tablet, Take 30 mg by mouth Daily., Disp: , Rfl:   •  metoprolol tartrate (LOPRESSOR) 12.5 MG half tablet, Take 12.5 mg by mouth 2 (Two) Times a Day., Disp: , Rfl:   •  metoprolol tartrate (LOPRESSOR) 25 MG tablet, Take 1/2 tablet Bid, Disp: 90 tablet, Rfl: 1  •  pantoprazole (PROTONIX) 40 MG EC tablet, Take 40 mg by mouth Daily., Disp: , Rfl:   •  potassium chloride (K-DUR,KLOR-CON) 10 MEQ CR tablet, TAKE 1 TABLET BY MOUTH THREE TIMES A DAY, Disp: 90 tablet, Rfl: 0  •  tamsulosin (FLOMAX) 0.4 MG capsule 24 hr capsule, Take 1 capsule by mouth Daily., Disp: , Rfl:   •  warfarin (COUMADIN) 1 MG tablet, Take 1 tablet by mouth Daily., Disp: 30 tablet, Rfl: 3  •  warfarin (COUMADIN) 5 MG tablet, Take 1 tablet by mouth Daily. At 1700 as directed, Disp: 90 tablet, Rfl: 1  •  zolpidem (AMBIEN) 5 MG tablet, Take 1 tablet by mouth At Night As Needed for Sleep., Disp: 30 tablet, Rfl: 0    No Known Allergies    Family History   Problem Relation Age of Onset   • Stroke Other        Cancer-related family history is not on file.    Social History     Tobacco Use   • Smoking status: Never Smoker   • Smokeless tobacco: Never  "Used   Vaping Use   • Vaping Use: Never used   Substance Use Topics   • Alcohol use: No   • Drug use: No       I have reviewed and confirmed the accuracy of the patient's history:  as entered by the MA/MARK/SANTOS. Stacie Vargas MD 12/08/21     SUBJECTIVE:    Since the last appointment patient ended up in the hospital with acute kidney injury and is currently on hemodialysis.  He resides at [facility].  He is accompanied today by his friend for this appointment      REVIEW OF SYSTEMS:    Review of Systems   Constitutional: Negative for chills, fatigue and fever.   HENT: Negative for ear pain, mouth sores, nosebleeds and sore throat.    Eyes: Negative for photophobia and visual disturbance.   Respiratory: Negative for wheezing and stridor.    Cardiovascular: Negative for chest pain and palpitations.   Gastrointestinal: Negative for abdominal pain, diarrhea, nausea and vomiting.   Endocrine: Negative for cold intolerance and heat intolerance.   Genitourinary: Negative for dysuria and hematuria.   Musculoskeletal: Negative for joint swelling and neck stiffness.   Skin: Negative for color change and rash.   Neurological: Negative for seizures and syncope.   Hematological: Negative for adenopathy.   Psychiatric/Behavioral: Negative for agitation, confusion and hallucinations.     I have reviewed and confirmed the accuracy of the ROS as documented by the MA/OMEGAN/RN Stacie Vargas MD      OBJECTIVE:    Vitals:    12/08/21 0935   BP: 97/62   Pulse: 60   Resp: 18   Temp: 96.9 °F (36.1 °C)   TempSrc: Infrared   Weight: Comment: unable to weigh   Height: 175.3 cm (69\")   PainSc: 0-No pain       ECOG  (2) Ambulatory and capable of self care, unable to carry out work activity, up and about > 50% or waking hours    Physical Exam   Constitutional: He is oriented to person, place, and time. No distress.   HENT:   Head: Normocephalic and atraumatic.   Eyes: Conjunctivae are normal. Right eye exhibits no discharge. Left " eye exhibits no discharge. No scleral icterus.   Neck: No thyromegaly present.   Cardiovascular: Normal rate, regular rhythm and normal heart sounds. Exam reveals no gallop and no friction rub.   Pulmonary/Chest: Effort normal. No stridor. No respiratory distress. He has no wheezes.   Abdominal: Soft. Bowel sounds are normal. He exhibits no mass. There is no abdominal tenderness. There is no rebound and no guarding.   Musculoskeletal: Normal range of motion. No tenderness.   Lymphadenopathy:     He has no cervical adenopathy.   Neurological: He is alert and oriented to person, place, and time. He exhibits normal muscle tone.   Skin: Skin is warm. No rash noted. He is not diaphoretic. No erythema.   Psychiatric: His behavior is normal. Judgment and thought content normal.   Nursing note and vitals reviewed.    I have reexamined the patient and the results are consistent with the previously documented exam. Stacie Vargas MD     RECENT LABS    WBC   Date Value Ref Range Status   12/08/2021 12.86 (H) 3.40 - 10.80 10*3/mm3 Final     RBC   Date Value Ref Range Status   12/08/2021 4.09 (L) 4.14 - 5.80 10*6/mm3 Final     Hemoglobin   Date Value Ref Range Status   12/08/2021 11.1 (L) 13.0 - 17.7 g/dL Final     Hematocrit   Date Value Ref Range Status   12/08/2021 34.8 (L) 37.5 - 51.0 % Final     MCV   Date Value Ref Range Status   12/08/2021 85.1 79.0 - 97.0 fL Final     MCH   Date Value Ref Range Status   12/08/2021 27.1 26.6 - 33.0 pg Final     MCHC   Date Value Ref Range Status   12/08/2021 31.9 31.5 - 35.7 g/dL Final     RDW   Date Value Ref Range Status   12/08/2021 21.1 (H) 12.3 - 15.4 % Final     RDW-SD   Date Value Ref Range Status   12/08/2021 62.2 (H) 37.0 - 54.0 fl Final     MPV   Date Value Ref Range Status   12/08/2021 10.5 6.0 - 12.0 fL Final     Platelets   Date Value Ref Range Status   12/08/2021 107 (L) 140 - 450 10*3/mm3 Final     Neutrophil %   Date Value Ref Range Status   12/08/2021 62.9 42.7 -  76.0 % Final     Lymphocyte %   Date Value Ref Range Status   12/08/2021 22.9 19.6 - 45.3 % Final     Monocyte %   Date Value Ref Range Status   12/08/2021 12.8 (H) 5.0 - 12.0 % Final     Eosinophil %   Date Value Ref Range Status   12/08/2021 1.2 0.3 - 6.2 % Final     Basophil %   Date Value Ref Range Status   12/08/2021 0.2 0.0 - 1.5 % Final     Immature Grans %   Date Value Ref Range Status   11/01/2021 0.4 0.0 - 0.5 % Final     Neutrophils, Absolute   Date Value Ref Range Status   12/08/2021 8.09 (H) 1.70 - 7.00 10*3/mm3 Final     Lymphocytes, Absolute   Date Value Ref Range Status   12/08/2021 2.94 0.70 - 3.10 10*3/mm3 Final     Monocytes, Absolute   Date Value Ref Range Status   12/08/2021 1.65 (H) 0.10 - 0.90 10*3/mm3 Final     Eosinophils, Absolute   Date Value Ref Range Status   12/08/2021 0.16 0.00 - 0.40 10*3/mm3 Final     Basophils, Absolute   Date Value Ref Range Status   12/08/2021 0.02 0.00 - 0.20 10*3/mm3 Final     Immature Grans, Absolute   Date Value Ref Range Status   11/01/2021 0.02 0.00 - 0.05 10*3/mm3 Final     nRBC   Date Value Ref Range Status   11/01/2021 0.0 0.0 - 0.2 /100 WBC Final       Lab Results   Component Value Date    GLUCOSE 145 (H) 11/01/2021    BUN 39 (H) 11/01/2021    CREATININE 1.86 (H) 11/01/2021    EGFRIFAFRI 42 (L) 11/01/2021    BCR 21.0 11/01/2021    K 4.1 11/01/2021    CO2 28.1 11/01/2021    CALCIUM 9.3 11/01/2021    ALBUMIN 4.50 09/09/2021    LABIL2 1.3 05/21/2019    AST 17 09/09/2021    ALT 7 09/09/2021           ASSESSMENT:    · Follicular lymphoma of intra-abdominal lymph nodes, unspecified follicular lymphoma type (CMS/HCC), status post chemoimmunotherapy therapy and Rituxan maintenance: Ongoing surveillance for relapse.  Reviewed his latest CT scans which do not show any evidence of progressive lymphoma  · Acute Kidney in jury: ON hemodialysis 3 times a week.  Currently resides at [facility]  · Pancytopenia (CMS/HCC): Ongoing management: Stable  · Recent  hospitalization at [facility].  I have requested for those records.  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  There is no evidence of progression.  He has enlarged prostate and ureteral stents are in place.  He also has an ascending aortic aneurysm 4.2 cm.  I have given  referral to vascular for further evaluation.  INRs are monitored in our Coumadin clinic      PLANS:     · INR faxed to [facility].  · Reviewed CT scan of the chest, abdomen and pelvis   · Obtain his records from [facility]  · CBC today, CMP, uric acid and LDH today  · Continue monthly port flushes.  Schedule  · Follow up with Dr. Briseno with Urology; reviewed  · Follow-up in 2 months  · Continue hemodialysis 3 times a day per nephrologist recommendation  · Call Dr. Briseno's office for records on hydronephrosis management         I have reviewed labs results, imaging, vitals, and medications with the patient today. Will follow up in 3 months with me.    Patient verbalized understanding and is in agreement of the above plan.      I spent 40 total minutes, face-to-face, caring for Barry correa.  90% of this time involved counseling and/or coordination of care as documented within this note.

## 2021-12-08 ENCOUNTER — LAB (OUTPATIENT)
Dept: LAB | Facility: HOSPITAL | Age: 86
End: 2021-12-08

## 2021-12-08 ENCOUNTER — OFFICE VISIT (OUTPATIENT)
Dept: ONCOLOGY | Facility: CLINIC | Age: 86
End: 2021-12-08

## 2021-12-08 VITALS
HEIGHT: 69 IN | HEART RATE: 60 BPM | DIASTOLIC BLOOD PRESSURE: 62 MMHG | SYSTOLIC BLOOD PRESSURE: 97 MMHG | RESPIRATION RATE: 18 BRPM | BODY MASS INDEX: 23.48 KG/M2 | TEMPERATURE: 96.9 F

## 2021-12-08 DIAGNOSIS — C82.93 FOLLICULAR LYMPHOMA OF INTRA-ABDOMINAL LYMPH NODES, UNSPECIFIED FOLLICULAR LYMPHOMA TYPE (HCC): ICD-10-CM

## 2021-12-08 DIAGNOSIS — C82.93 FOLLICULAR LYMPHOMA OF INTRA-ABDOMINAL LYMPH NODES, UNSPECIFIED FOLLICULAR LYMPHOMA TYPE (HCC): Primary | ICD-10-CM

## 2021-12-08 DIAGNOSIS — Z79.01 LONG TERM (CURRENT) USE OF ANTICOAGULANTS: ICD-10-CM

## 2021-12-08 LAB
BASOPHILS # BLD AUTO: 0.02 10*3/MM3 (ref 0–0.2)
BASOPHILS NFR BLD AUTO: 0.2 % (ref 0–1.5)
DEPRECATED RDW RBC AUTO: 62.2 FL (ref 37–54)
EOSINOPHIL # BLD AUTO: 0.16 10*3/MM3 (ref 0–0.4)
EOSINOPHIL NFR BLD AUTO: 1.2 % (ref 0.3–6.2)
ERYTHROCYTE [DISTWIDTH] IN BLOOD BY AUTOMATED COUNT: 21.1 % (ref 12.3–15.4)
HCT VFR BLD AUTO: 34.8 % (ref 37.5–51)
HGB BLD-MCNC: 11.1 G/DL (ref 13–17.7)
INR PPP: 1.3 (ref 0.8–1.2)
LYMPHOCYTES # BLD AUTO: 2.94 10*3/MM3 (ref 0.7–3.1)
LYMPHOCYTES NFR BLD AUTO: 22.9 % (ref 19.6–45.3)
MCH RBC QN AUTO: 27.1 PG (ref 26.6–33)
MCHC RBC AUTO-ENTMCNC: 31.9 G/DL (ref 31.5–35.7)
MCV RBC AUTO: 85.1 FL (ref 79–97)
MONOCYTES # BLD AUTO: 1.65 10*3/MM3 (ref 0.1–0.9)
MONOCYTES NFR BLD AUTO: 12.8 % (ref 5–12)
NEUTROPHILS NFR BLD AUTO: 62.9 % (ref 42.7–76)
NEUTROPHILS NFR BLD AUTO: 8.09 10*3/MM3 (ref 1.7–7)
PLATELET # BLD AUTO: 107 10*3/MM3 (ref 140–450)
PMV BLD AUTO: 10.5 FL (ref 6–12)
RBC # BLD AUTO: 4.09 10*6/MM3 (ref 4.14–5.8)
WBC NRBC COR # BLD: 12.86 10*3/MM3 (ref 3.4–10.8)

## 2021-12-08 PROCEDURE — 85025 COMPLETE CBC W/AUTO DIFF WBC: CPT

## 2021-12-08 PROCEDURE — 99215 OFFICE O/P EST HI 40 MIN: CPT | Performed by: INTERNAL MEDICINE

## 2021-12-08 PROCEDURE — 85610 PROTHROMBIN TIME: CPT

## 2021-12-11 ENCOUNTER — HOSPITAL ENCOUNTER (EMERGENCY)
Facility: HOSPITAL | Age: 86
Discharge: HOME | End: 2021-12-11
Attending: EMERGENCY MEDICINE | Admitting: EMERGENCY MEDICINE

## 2021-12-11 VITALS
WEIGHT: 159 LBS | TEMPERATURE: 97.5 F | HEIGHT: 72 IN | SYSTOLIC BLOOD PRESSURE: 150 MMHG | RESPIRATION RATE: 18 BRPM | OXYGEN SATURATION: 98 % | BODY MASS INDEX: 21.54 KG/M2 | HEART RATE: 65 BPM | DIASTOLIC BLOOD PRESSURE: 84 MMHG

## 2021-12-11 DIAGNOSIS — Z00.00 NORMAL EXAM: Primary | ICD-10-CM

## 2021-12-11 LAB
ANION GAP SERPL CALCULATED.3IONS-SCNC: 17 MMOL/L (ref 5–15)
BASOPHILS # BLD AUTO: 0 10*3/MM3 (ref 0–0.2)
BASOPHILS NFR BLD AUTO: 0.5 % (ref 0–1.5)
BUN SERPL-MCNC: 29 MG/DL (ref 8–23)
BUN/CREAT SERPL: 4.5 (ref 7–25)
CALCIUM SPEC-SCNC: 8.7 MG/DL (ref 8.6–10.5)
CHLORIDE SERPL-SCNC: 97 MMOL/L (ref 98–107)
CO2 SERPL-SCNC: 24 MMOL/L (ref 22–29)
CREAT SERPL-MCNC: 6.38 MG/DL (ref 0.76–1.27)
DEPRECATED RDW RBC AUTO: 65.2 FL (ref 37–54)
EOSINOPHIL # BLD AUTO: 0.1 10*3/MM3 (ref 0–0.4)
EOSINOPHIL NFR BLD AUTO: 0.9 % (ref 0.3–6.2)
ERYTHROCYTE [DISTWIDTH] IN BLOOD BY AUTOMATED COUNT: 22.3 % (ref 12.3–15.4)
GFR SERPL CREATININE-BSD FRML MDRD: 10 ML/MIN/1.73
GFR SERPL CREATININE-BSD FRML MDRD: ABNORMAL ML/MIN/{1.73_M2}
GLUCOSE SERPL-MCNC: 95 MG/DL (ref 65–99)
HCT VFR BLD AUTO: 32.2 % (ref 37.5–51)
HGB BLD-MCNC: 10.3 G/DL (ref 13–17.7)
INR PPP: 1.32 (ref 2–3)
LYMPHOCYTES # BLD AUTO: 3.5 10*3/MM3 (ref 0.7–3.1)
LYMPHOCYTES NFR BLD AUTO: 39.6 % (ref 19.6–45.3)
MCH RBC QN AUTO: 27.4 PG (ref 26.6–33)
MCHC RBC AUTO-ENTMCNC: 31.9 G/DL (ref 31.5–35.7)
MCV RBC AUTO: 85.7 FL (ref 79–97)
MONOCYTES # BLD AUTO: 1.3 10*3/MM3 (ref 0.1–0.9)
MONOCYTES NFR BLD AUTO: 14.5 % (ref 5–12)
NEUTROPHILS NFR BLD AUTO: 4 10*3/MM3 (ref 1.7–7)
NEUTROPHILS NFR BLD AUTO: 44.5 % (ref 42.7–76)
NRBC BLD AUTO-RTO: 0.1 /100 WBC (ref 0–0.2)
PLATELET # BLD AUTO: 85 10*3/MM3 (ref 140–450)
PMV BLD AUTO: 7.9 FL (ref 6–12)
POTASSIUM SERPL-SCNC: 5 MMOL/L (ref 3.5–5.2)
PROTHROMBIN TIME: 14.4 SECONDS (ref 19.4–28.5)
RBC # BLD AUTO: 3.76 10*6/MM3 (ref 4.14–5.8)
SODIUM SERPL-SCNC: 138 MMOL/L (ref 136–145)
WBC NRBC COR # BLD: 8.9 10*3/MM3 (ref 3.4–10.8)

## 2021-12-11 PROCEDURE — 85610 PROTHROMBIN TIME: CPT | Performed by: EMERGENCY MEDICINE

## 2021-12-11 PROCEDURE — 85025 COMPLETE CBC W/AUTO DIFF WBC: CPT | Performed by: EMERGENCY MEDICINE

## 2021-12-11 PROCEDURE — 36415 COLL VENOUS BLD VENIPUNCTURE: CPT

## 2021-12-11 PROCEDURE — 80048 BASIC METABOLIC PNL TOTAL CA: CPT | Performed by: EMERGENCY MEDICINE

## 2021-12-11 PROCEDURE — 99283 EMERGENCY DEPT VISIT LOW MDM: CPT

## 2021-12-12 NOTE — ED PROVIDER NOTES
Subjective   Patient is an 89-year-old male who was sent from his care facility due to some leakage around his Shiley catheter site in his left chest.  The patient offers no complaints          Review of Systems  Negative cough fever chest pain shortness of breath or other complaint  Past Medical History:   Diagnosis Date   • Asthma    • COPD (chronic obstructive pulmonary disease) ([facility])    • Coronary heart disease     CABG   • CVA (cerebral vascular accident) ([facility])     recent CVA   • Gout    • Hypertension        No Known Allergies    Past Surgical History:   Procedure Laterality Date   • CARDIAC CATHETERIZATION  2016    [facility]   • CORONARY ARTERY BYPASS GRAFT      CABG X3, reoperation, 09; CAB and    • OTHER SURGICAL HISTORY  2016    loop recorder implant        Family History   Problem Relation Age of Onset   • Stroke Other        Social History     Socioeconomic History   • Marital status:    Tobacco Use   • Smoking status: Never Smoker   • Smokeless tobacco: Never Used   Vaping Use   • Vaping Use: Never used   Substance and Sexual Activity   • Alcohol use: No   • Drug use: No   • Sexual activity: Defer           Objective   Physical Exam  Lungs are clear.  Heart has regular rhythm without murmur.  Chest exam shows patient have a Shiley catheter in place in his left subclavian region but there is no purulent drainage or bleeding around the catheter site.  There is no erythema or induration noted.  Procedures           ED Course            Results for orders placed or performed during the hospital encounter of 21   Basic Metabolic Panel    Specimen: Blood   Result Value Ref Range    Glucose 95 65 - 99 mg/dL    BUN 29 (H) 8 - 23 mg/dL    Creatinine 6.38 (H) 0.76 - 1.27 mg/dL    Sodium 138 136 - 145 mmol/L    Potassium 5.0 3.5 - 5.2 mmol/L    Chloride 97 (L) 98 - 107 mmol/L    CO2 24.0 22.0 - 29.0 mmol/L    Calcium 8.7 8.6 - 10.5 mg/dL    eGFR  African Amer 10 (L) >60 mL/min/1.73     eGFR Non African Amer      BUN/Creatinine Ratio 4.5 (L) 7.0 - 25.0    Anion Gap 17.0 (H) 5.0 - 15.0 mmol/L   Protime-INR    Specimen: Blood   Result Value Ref Range    Protime 14.4 (L) 19.4 - 28.5 Seconds    INR 1.32 (L) 2.00 - 3.00   CBC Auto Differential    Specimen: Blood   Result Value Ref Range    WBC 8.90 3.40 - 10.80 10*3/mm3    RBC 3.76 (L) 4.14 - 5.80 10*6/mm3    Hemoglobin 10.3 (L) 13.0 - 17.7 g/dL    Hematocrit 32.2 (L) 37.5 - 51.0 %    MCV 85.7 79.0 - 97.0 fL    MCH 27.4 26.6 - 33.0 pg    MCHC 31.9 31.5 - 35.7 g/dL    RDW 22.3 (H) 12.3 - 15.4 %    RDW-SD 65.2 (H) 37.0 - 54.0 fl    MPV 7.9 6.0 - 12.0 fL    Platelets 85 (L) 140 - 450 10*3/mm3    Neutrophil % 44.5 42.7 - 76.0 %    Lymphocyte % 39.6 19.6 - 45.3 %    Monocyte % 14.5 (H) 5.0 - 12.0 %    Eosinophil % 0.9 0.3 - 6.2 %    Basophil % 0.5 0.0 - 1.5 %    Neutrophils, Absolute 4.00 1.70 - 7.00 10*3/mm3    Lymphocytes, Absolute 3.50 (H) 0.70 - 3.10 10*3/mm3    Monocytes, Absolute 1.30 (H) 0.10 - 0.90 10*3/mm3    Eosinophils, Absolute 0.10 0.00 - 0.40 10*3/mm3    Basophils, Absolute 0.00 0.00 - 0.20 10*3/mm3    nRBC 0.1 0.0 - 0.2 /100 WBC                                             MDM  Number of Diagnoses or Management Options  Diagnosis management comments: Patient has no evidence of infectious process or abnormality at the catheter site.  White count is normal.  Patient will be discharged.  He will follow his renal physician as needed.       Amount and/or Complexity of Data Reviewed  Clinical lab tests: reviewed    Risk of Complications, Morbidity, and/or Mortality  Presenting problems: moderate  Diagnostic procedures: moderate  Management options: moderate    Patient Progress  Patient progress: stable      Final diagnoses:   Normal exam       ED Disposition  ED Disposition     ED Disposition Condition Comment    Discharge Stable           No follow-up provider specified.       Medication List      No changes were made to your prescriptions during  this visit.          Andrew Carter MD  12/11/21 1948

## 2021-12-15 ENCOUNTER — ANTICOAGULATION VISIT (OUTPATIENT)
Dept: ONCOLOGY | Facility: HOSPITAL | Age: 86
End: 2021-12-15

## 2021-12-15 NOTE — PROGRESS NOTES
Per SANTOS Mendoza from [facility] the INR is being monitored and adjusted at their facility.  She does not feel that he will be leaving the rehab facility with his medical issues.

## 2021-12-20 ENCOUNTER — HOSPITAL ENCOUNTER (EMERGENCY)
Facility: HOSPITAL | Age: 86
Discharge: NURSING, REHAB OR LONG-TERM CARE | End: 2021-12-20
Admitting: EMERGENCY MEDICINE

## 2021-12-20 ENCOUNTER — HOME HEALTH ADMISSION (OUTPATIENT)
Dept: HOME HEALTH SERVICES | Facility: HOME HEALTHCARE | Age: 86
End: 2021-12-20

## 2021-12-20 ENCOUNTER — TRANSCRIBE ORDERS (OUTPATIENT)
Dept: HOME HEALTH SERVICES | Facility: HOME HEALTHCARE | Age: 86
End: 2021-12-20

## 2021-12-20 VITALS
SYSTOLIC BLOOD PRESSURE: 132 MMHG | DIASTOLIC BLOOD PRESSURE: 79 MMHG | HEART RATE: 76 BPM | RESPIRATION RATE: 20 BRPM | HEIGHT: 69 IN | TEMPERATURE: 98.1 F | OXYGEN SATURATION: 97 % | BODY MASS INDEX: 20.51 KG/M2 | WEIGHT: 138.45 LBS

## 2021-12-20 DIAGNOSIS — Z00.00 NORMAL EXAM: Primary | ICD-10-CM

## 2021-12-20 DIAGNOSIS — J44.9 CHRONIC OBSTRUCTIVE PULMONARY DISEASE, UNSPECIFIED COPD TYPE (HCC): Primary | ICD-10-CM

## 2021-12-20 LAB
ANION GAP SERPL CALCULATED.3IONS-SCNC: 11 MMOL/L (ref 5–15)
BASOPHILS # BLD AUTO: 0 10*3/MM3 (ref 0–0.2)
BASOPHILS NFR BLD AUTO: 0.3 % (ref 0–1.5)
BUN SERPL-MCNC: 26 MG/DL (ref 8–23)
BUN/CREAT SERPL: 5 (ref 7–25)
CALCIUM SPEC-SCNC: 8.5 MG/DL (ref 8.6–10.5)
CHLORIDE SERPL-SCNC: 101 MMOL/L (ref 98–107)
CO2 SERPL-SCNC: 28 MMOL/L (ref 22–29)
CREAT SERPL-MCNC: 5.24 MG/DL (ref 0.76–1.27)
DEPRECATED RDW RBC AUTO: 64.8 FL (ref 37–54)
EOSINOPHIL # BLD AUTO: 0.1 10*3/MM3 (ref 0–0.4)
EOSINOPHIL NFR BLD AUTO: 1.3 % (ref 0.3–6.2)
ERYTHROCYTE [DISTWIDTH] IN BLOOD BY AUTOMATED COUNT: 21.6 % (ref 12.3–15.4)
GFR SERPL CREATININE-BSD FRML MDRD: 13 ML/MIN/1.73
GFR SERPL CREATININE-BSD FRML MDRD: ABNORMAL ML/MIN/{1.73_M2}
GLUCOSE SERPL-MCNC: 94 MG/DL (ref 65–99)
HCT VFR BLD AUTO: 30.7 % (ref 37.5–51)
HGB BLD-MCNC: 9.8 G/DL (ref 13–17.7)
INR PPP: 2.24 (ref 2–3)
LYMPHOCYTES # BLD AUTO: 1.9 10*3/MM3 (ref 0.7–3.1)
LYMPHOCYTES NFR BLD AUTO: 32.3 % (ref 19.6–45.3)
MCH RBC QN AUTO: 26.8 PG (ref 26.6–33)
MCHC RBC AUTO-ENTMCNC: 31.8 G/DL (ref 31.5–35.7)
MCV RBC AUTO: 84.3 FL (ref 79–97)
MONOCYTES # BLD AUTO: 0.6 10*3/MM3 (ref 0.1–0.9)
MONOCYTES NFR BLD AUTO: 10.2 % (ref 5–12)
NEUTROPHILS NFR BLD AUTO: 3.4 10*3/MM3 (ref 1.7–7)
NEUTROPHILS NFR BLD AUTO: 55.9 % (ref 42.7–76)
NRBC BLD AUTO-RTO: 0 /100 WBC (ref 0–0.2)
PLATELET # BLD AUTO: 132 10*3/MM3 (ref 140–450)
PMV BLD AUTO: 7.4 FL (ref 6–12)
POTASSIUM SERPL-SCNC: 4.8 MMOL/L (ref 3.5–5.2)
PROTHROMBIN TIME: 23.4 SECONDS (ref 19.4–28.5)
RBC # BLD AUTO: 3.64 10*6/MM3 (ref 4.14–5.8)
SODIUM SERPL-SCNC: 140 MMOL/L (ref 136–145)
WBC NRBC COR # BLD: 6 10*3/MM3 (ref 3.4–10.8)

## 2021-12-20 PROCEDURE — 85025 COMPLETE CBC W/AUTO DIFF WBC: CPT | Performed by: PHYSICIAN ASSISTANT

## 2021-12-20 PROCEDURE — 99283 EMERGENCY DEPT VISIT LOW MDM: CPT

## 2021-12-20 PROCEDURE — 85610 PROTHROMBIN TIME: CPT | Performed by: PHYSICIAN ASSISTANT

## 2021-12-20 PROCEDURE — 80048 BASIC METABOLIC PNL TOTAL CA: CPT | Performed by: PHYSICIAN ASSISTANT

## 2021-12-21 ENCOUNTER — HOME CARE VISIT (OUTPATIENT)
Dept: HOME HEALTH SERVICES | Facility: HOME HEALTHCARE | Age: 86
End: 2021-12-21

## 2021-12-21 PROCEDURE — G0299 HHS/HOSPICE OF RN EA 15 MIN: HCPCS

## 2021-12-21 NOTE — DISCHARGE INSTRUCTIONS
Please come back to the ER if you have of chest pain or shortness of breath as you will need reevaluation at that time.  Your vascular access was assessed and had no subsequent leakage at this time.

## 2021-12-21 NOTE — ED PROVIDER NOTES
Subjective     Patient an 89-year-old male comes in complaining of leakage of fluid from left chest Shiley catheter.  Patient was sent in from [facility] for apparent leakage after patient had dialysis treatment earlier today.  Patient denies any other complaints of fever, chills, cough, chest pain, shortness of breath, nausea, vomiting, rash or abdominal pain.  Patient family at bedside report that the patient's shirt was soaked with water and fluid earlier today and are concerned that the catheter may be leaking.  Patient has a history of dementia is alert and oriented x2-3 which is baseline for patient.        Review of Systems   Unable to perform ROS: Dementia       Past Medical History:   Diagnosis Date   • Asthma    • COPD (chronic obstructive pulmonary disease) ([facility])    • Coronary heart disease     CABG   • CVA (cerebral vascular accident) ([facility])     recent CVA   • Gout    • Hypertension        No Known Allergies    Past Surgical History:   Procedure Laterality Date   • CARDIAC CATHETERIZATION  2016    [facility]   • CORONARY ARTERY BYPASS GRAFT      CABG X3, reoperation, 09; CAB and    • OTHER SURGICAL HISTORY  2016    loop recorder implant        Family History   Problem Relation Age of Onset   • Stroke Other        Social History     Socioeconomic History   • Marital status:    Tobacco Use   • Smoking status: Never Smoker   • Smokeless tobacco: Never Used   Vaping Use   • Vaping Use: Never used   Substance and Sexual Activity   • Alcohol use: No   • Drug use: No   • Sexual activity: Defer           Objective   Physical Exam  Vitals and nursing note reviewed.   Constitutional:       General: He is not in acute distress.     Appearance: He is well-developed. He is not diaphoretic.   HENT:      Head: Normocephalic and atraumatic.      Right Ear: External ear normal.      Left Ear: External ear normal.      Nose: Nose normal.      Mouth/Throat:      Pharynx: No  "oropharyngeal exudate.   Eyes:      Extraocular Movements: Extraocular movements intact.      Conjunctiva/sclera: Conjunctivae normal.      Pupils: Pupils are equal, round, and reactive to light.   Cardiovascular:      Rate and Rhythm: Normal rate and regular rhythm.      Pulses: Normal pulses.      Heart sounds: Normal heart sounds.      Comments: S1, S2 audible.  Shiley catheter gauze has soaked fluid at this time.    Pulmonary:      Effort: Pulmonary effort is normal. No respiratory distress.      Breath sounds: Normal breath sounds. No wheezing, rhonchi or rales.      Comments: On room air.  Abdominal:      General: Bowel sounds are normal. There is no distension.      Palpations: Abdomen is soft.      Tenderness: There is no abdominal tenderness. There is no guarding or rebound.   Musculoskeletal:         General: No tenderness or deformity. Normal range of motion.      Cervical back: Normal range of motion.      Right lower leg: No edema.      Left lower leg: No edema.   Skin:     General: Skin is warm.      Capillary Refill: Capillary refill takes less than 2 seconds.      Findings: No erythema or rash.      Comments: No erythema or rash or purulent drainage at this time of the skin of the chest.   Neurological:      General: No focal deficit present.      Mental Status: He is alert and oriented to person, place, and time.      Cranial Nerves: No cranial nerve deficit.   Psychiatric:         Mood and Affect: Mood normal.         Behavior: Behavior normal.         Procedures           ED Course      /68 (BP Location: Left arm, Patient Position: Sitting)   Pulse 65   Temp 99.3 °F (37.4 °C) (Temporal)   Resp 20   Ht 175.3 cm (69\")   Wt 62.8 kg (138 lb 7.2 oz)   SpO2 100%   BMI 20.45 kg/m²   Labs Reviewed   BASIC METABOLIC PANEL - Abnormal; Notable for the following components:       Result Value    BUN 26 (*)     Creatinine 5.24 (*)     Calcium 8.5 (*)     eGFR   Amer 13 (*)     " "BUN/Creatinine Ratio 5.0 (*)     All other components within normal limits    Narrative:     GFR Normal >60  Chronic Kidney Disease <60  Kidney Failure <15     CBC WITH AUTO DIFFERENTIAL - Abnormal; Notable for the following components:    RBC 3.64 (*)     Hemoglobin 9.8 (*)     Hematocrit 30.7 (*)     RDW 21.6 (*)     RDW-SD 64.8 (*)     Platelets 132 (*)     All other components within normal limits   PROTIME-INR - Normal   CBC AND DIFFERENTIAL    Narrative:     The following orders were created for panel order CBC & Differential.  Procedure                               Abnormality         Status                     ---------                               -----------         ------                     CBC Auto Differential[[id number]]        Abnormal            Final result                 Please view results for these tests on the individual orders.     No radiology results for the last day                                             MDM  Number of Diagnoses or Management Options     Amount and/or Complexity of Data Reviewed  Clinical lab tests: reviewed    Risk of Complications, Morbidity, and/or Mortality  Presenting problems: low  Diagnostic procedures: low  Management options: low    Patient Progress  Patient progress: improved    Chart review:    EKG:    Imaging:    Labs: Kidney function at baseline otherwise unremarkable BMP.  CBC shows hemoglobin of 9.8 which was 10.3 about a week ago.  PT/INR normal.  Platelets are low 132.  Vitals:  /68 (BP Location: Left arm, Patient Position: Sitting)   Pulse 65   Temp 99.3 °F (37.4 °C) (Temporal)   Resp 20   Ht 175.3 cm (69\")   Wt 62.8 kg (138 lb 7.2 oz)   SpO2 100%   BMI 20.45 kg/m²     Medications given:  Medications - No data to display    Procedures:    MDM: Patient is an 89-year-old male comes in complaining of apparent leakage from Shiley vascular access left chest area.  The dressing was replaced with sterile gauze and was observed for 1 hour and " patient did not have any further leakage of fluid from the site.  Lab work was obtained and showed no acute changes.  Patient offers no other complaints.  patient is afebrile here and has no signs or symptoms of infection at this time. Patient to be discharged back to [facility] and family at bedside agree with plan. See full discharge instructions for further details.  Results and plan discussed with patient and family and is agreeable with plan.      Final diagnoses:   Normal exam       ED Disposition  ED Disposition     ED Disposition Condition Comment    Discharge Stable           [facility] EMERGENCY DEPARTMENT  [address] [address] [phone]  Go in 1 day  As needed, If symptoms worsen    Pedro Purvis MD  [address] [place] [address] [phone]    Call in 1 week  As needed         Medication List      No changes were made to your prescriptions during this visit.          Ganesh Farias PA  12/20/21 2125       Ganesh Farias PA  12/20/21 2126

## 2021-12-21 NOTE — ED NOTES
See triage note. Pt was at [facility]. Pts partner states she is unsure what is wrong but was told to take him here because his dialysis port is leaking. Pts partner states pt does have sundowners. Pt denies fever and chills.      Rani Calabrese RN  12/20/21 1926

## 2021-12-22 VITALS
TEMPERATURE: 98.3 F | OXYGEN SATURATION: 94 % | SYSTOLIC BLOOD PRESSURE: 116 MMHG | HEART RATE: 81 BPM | DIASTOLIC BLOOD PRESSURE: 69 MMHG | RESPIRATION RATE: 18 BRPM

## 2021-12-22 NOTE — HOME HEALTH
Patient is a pleasant 89 year old male who was hospitalized for SOB about a month ago. Patient was found to have kidney failure and was placed on dialysis. Patient discharged home from [facility] after spending a few weeks there. Patient noted to have productive cough and states that coughing is normal for him. Vitals WNL at this time. Patient lives in his home with his significant other who is his primary caregiver. Patient states that he has cane and w/c but rarely uses them. Patient agrees to a PT eval. Spouse states that they do not know patient's dialysis schedule yet but she thinks it will be on T, TH and S. Would like us to avoid those days if possible. Plan to teach safety/falls prevention, labs as ordered, medication teaching, renal disease education, dialysis teaching, COPD education/monitoring.    Primary focus of care: COPD

## 2021-12-23 ENCOUNTER — HOME CARE VISIT (OUTPATIENT)
Dept: HOME HEALTH SERVICES | Facility: HOME HEALTHCARE | Age: 86
End: 2021-12-23

## 2021-12-23 PROCEDURE — G0151 HHCP-SERV OF PT,EA 15 MIN: HCPCS

## 2021-12-24 NOTE — HOME HEALTH
Mr. Barry Calhoun is a pleasant 89-year old gentleman who was discharged from [facility] following prior hospitalization with complaints of shortness of breath. Patient lives in low cost housing with significant other who is very supportive and assisting at all times as needed. Per patient/significant other, he was independent in indoor/outdoor ambulation without assistive device and would like to regain lost functional mobility.    Patient presents with generalized weakness and demonstrates 4/5 gross strength in ble. Functionally, he is able to perform supine<->sit slowly but independently without safety compromise. Able to come to stand with sba/cga and verbal/visual cues for correct  to reduce multiple attempts and prevent loss of balance on immediate standing. Residence is uncluttered but narrow pathways with inability to use rolling walker. However, he is able to ambulate about 60ft back and forth with cga, wall/furniture support and cues for postural correction to solve occasional rt lateral leaning. Mr. Calhoun would benefit from further skilled PT services for therapeutic/home exercise program to [facility], transfer teaching and gait/endurance training.

## 2021-12-26 VITALS
TEMPERATURE: 97.5 F | HEART RATE: 88 BPM | SYSTOLIC BLOOD PRESSURE: 104 MMHG | RESPIRATION RATE: 18 BRPM | OXYGEN SATURATION: 99 % | DIASTOLIC BLOOD PRESSURE: 58 MMHG

## 2021-12-27 ENCOUNTER — HOME CARE VISIT (OUTPATIENT)
Dept: HOME HEALTH SERVICES | Facility: HOME HEALTHCARE | Age: 86
End: 2021-12-27

## 2021-12-29 ENCOUNTER — HOME CARE VISIT (OUTPATIENT)
Dept: HOME HEALTH SERVICES | Facility: HOME HEALTHCARE | Age: 86
End: 2021-12-29

## 2021-12-29 VITALS
HEART RATE: 78 BPM | TEMPERATURE: 97.8 F | OXYGEN SATURATION: 96 % | RESPIRATION RATE: 15 BRPM | SYSTOLIC BLOOD PRESSURE: 126 MMHG | DIASTOLIC BLOOD PRESSURE: 70 MMHG

## 2021-12-29 PROCEDURE — G0157 HHC PT ASSISTANT EA 15: HCPCS

## 2021-12-30 NOTE — HOME HEALTH
"pt sitting up and ready for PT session but c/o weakness and fatigue.  pt is motivated to improve reporting ongoing weakness.    pt reporting no pain,  had earlier meds but admits he is \"exhausted today\"  but agreeable PT     pt was compliant throughout PT session but weakness and fatigue and needed several rest periods as well as cues to remain on task.  pt was educated on proper lumbar stab with standing ther ex and gait trg.  pt was educated today on deep plb techniques for energy conservation but no dyspnea was reported.  pt was challenged today to ambulate long distance, eventually fatigued and needed to sit.    pts cg was present and educated on the activities performed"

## 2021-12-31 ENCOUNTER — HOME CARE VISIT (OUTPATIENT)
Dept: HOME HEALTH SERVICES | Facility: HOME HEALTHCARE | Age: 86
End: 2021-12-31

## 2021-12-31 PROCEDURE — G0157 HHC PT ASSISTANT EA 15: HCPCS

## 2022-01-02 VITALS
RESPIRATION RATE: 17 BRPM | SYSTOLIC BLOOD PRESSURE: 130 MMHG | OXYGEN SATURATION: 97 % | DIASTOLIC BLOOD PRESSURE: 66 MMHG | TEMPERATURE: 97.8 F

## 2022-01-02 NOTE — HOME HEALTH
pt sitting up and ready for PT session.  pt feels well with no reported issues, no pain today.    pt admits limited ability to perform ther ex due to weakness and fatigue with low endurance.  pt denies any falls and no med changes.        pt was compliant throughout PT session but struggled at times with weakness and fatigue.     pt needed cues to deep plb for energy conservation.  during gait trg,  pt displayed decreased hip flexion and step length but much improved with cues to correct. pt was educated today on lumbar stab techniques during both gait trg and hep review

## 2022-01-03 ENCOUNTER — HOME CARE VISIT (OUTPATIENT)
Dept: HOME HEALTH SERVICES | Facility: HOME HEALTHCARE | Age: 87
End: 2022-01-03

## 2022-01-03 VITALS
OXYGEN SATURATION: 99 % | TEMPERATURE: 98.8 F | HEART RATE: 59 BPM | SYSTOLIC BLOOD PRESSURE: 116 MMHG | DIASTOLIC BLOOD PRESSURE: 58 MMHG | RESPIRATION RATE: 18 BRPM

## 2022-01-03 PROCEDURE — G0299 HHS/HOSPICE OF RN EA 15 MIN: HCPCS

## 2022-01-03 NOTE — HOME HEALTH
Patient denies SOA, CP, recent falls or other symptoms. Reports feeling very well and improvement with mobility after PT. Patient gets dialysis T,Th,Sun and has been compliant with visits. Medications reviewed, no changes noted. Safety assessment indicates no hazards at this time.      Plan for next visit: educate falls prevention, medication review, disease management education.

## 2022-01-05 ENCOUNTER — HOME CARE VISIT (OUTPATIENT)
Dept: HOME HEALTH SERVICES | Facility: HOME HEALTHCARE | Age: 87
End: 2022-01-05

## 2022-01-05 VITALS
TEMPERATURE: 97.8 F | RESPIRATION RATE: 10 BRPM | HEART RATE: 75 BPM | DIASTOLIC BLOOD PRESSURE: 60 MMHG | OXYGEN SATURATION: 97 % | SYSTOLIC BLOOD PRESSURE: 118 MMHG

## 2022-01-05 PROCEDURE — G0157 HHC PT ASSISTANT EA 15: HCPCS

## 2022-01-06 NOTE — HOME HEALTH
pt up and ready for PT session,    feeling well but with low endurance/low level energy.  pt is motivated to improve and very much wants to return to plf.        pt required instruction to perform PT hep correctly and within the correct plane with cues given to deep plb and to maintain proper form/hip position.  pt was cued during gait trg to maintain proper hip flexion,  step length and to heel strike as able for improve foot clearance.   pt was better able to transfer to standing today and with no initial lob noted.     pt was fatigued to end PT session but was pleased to have participated

## 2022-01-11 ENCOUNTER — HOME CARE VISIT (OUTPATIENT)
Dept: HOME HEALTH SERVICES | Facility: HOME HEALTHCARE | Age: 87
End: 2022-01-11

## 2022-01-12 ENCOUNTER — HOME CARE VISIT (OUTPATIENT)
Dept: HOME HEALTH SERVICES | Facility: HOME HEALTHCARE | Age: 87
End: 2022-01-12

## 2022-01-12 VITALS
DIASTOLIC BLOOD PRESSURE: 68 MMHG | OXYGEN SATURATION: 96 % | RESPIRATION RATE: 17 BRPM | SYSTOLIC BLOOD PRESSURE: 108 MMHG | HEART RATE: 66 BPM | TEMPERATURE: 97.8 F

## 2022-01-12 PROCEDURE — G0157 HHC PT ASSISTANT EA 15: HCPCS

## 2022-01-12 PROCEDURE — G0299 HHS/HOSPICE OF RN EA 15 MIN: HCPCS

## 2022-01-13 VITALS
OXYGEN SATURATION: 94 % | SYSTOLIC BLOOD PRESSURE: 119 MMHG | HEART RATE: 79 BPM | DIASTOLIC BLOOD PRESSURE: 72 MMHG | TEMPERATURE: 98.3 F

## 2022-01-13 NOTE — HOME HEALTH
pt sitting up and feeling fair today, improving in all areas but with continued weakness and fatigue with limitations.    pt states he has been performing hep as able/tolerated.   pt is very motivated to improve and return to plf     pt was compliant throughout PT session,   but with weakness and functional deficits.    pt was better able to transfer to standing with less lob upon standing.   pt was challenged to perform ther ex today but ability has improved, with noted rom gains.  pt was challenged today to ambulate long distance,  with continued fatigue and a request to sit due to weakness in the LEs  pt still needing instruction when standing and during gait trg to improve posture/lumbar stab and to avoid forward flexed posture

## 2022-01-17 ENCOUNTER — HOME CARE VISIT (OUTPATIENT)
Dept: HOME HEALTH SERVICES | Facility: HOME HEALTHCARE | Age: 87
End: 2022-01-17

## 2022-01-17 VITALS
OXYGEN SATURATION: 99 % | TEMPERATURE: 97.5 F | DIASTOLIC BLOOD PRESSURE: 60 MMHG | HEART RATE: 57 BPM | SYSTOLIC BLOOD PRESSURE: 130 MMHG

## 2022-01-17 PROCEDURE — G0299 HHS/HOSPICE OF RN EA 15 MIN: HCPCS

## 2022-01-17 NOTE — HOME HEALTH
"Patient denies SOA, CP, recent falls or other symptoms. VS normal and patient at baseline without complaints. Patient reports improvement in strength with therapy and states he feels \"very well.\" Continues to attend dialysis as scheduled and receives PT through home health. No further skilled or educational need for SN noted this visit and patient verbalizes he believes he has met his goals with nursing. Plans for SN discipline discharge discussed and patient verbalizes agreement. PT services to continue."

## 2022-01-20 ENCOUNTER — HOME CARE VISIT (OUTPATIENT)
Dept: HOME HEALTH SERVICES | Facility: HOME HEALTHCARE | Age: 87
End: 2022-01-20

## 2022-01-21 ENCOUNTER — HOME CARE VISIT (OUTPATIENT)
Dept: HOME HEALTH SERVICES | Facility: HOME HEALTHCARE | Age: 87
End: 2022-01-21

## 2022-01-21 VITALS
OXYGEN SATURATION: 97 % | TEMPERATURE: 98.9 F | DIASTOLIC BLOOD PRESSURE: 70 MMHG | HEART RATE: 64 BPM | SYSTOLIC BLOOD PRESSURE: 130 MMHG

## 2022-01-21 PROCEDURE — G0151 HHCP-SERV OF PT,EA 15 MIN: HCPCS

## 2022-01-21 NOTE — HOME HEALTH
Denies medication changes or adverse reactions. No insurance change. No falls reported.  Discussed scheduled PT DC on next visit. Pt and caregiver agrees with this.    Must be completed and at least every 30 days.    Clinical condition of patient at initial or last assessment:  Able to come to stand with sba/cga and verbal/visual cues for correct  to reduce multiple attempts and prevent loss of balance on immediate standing. Residence is uncluttered but narrow pathways with inability to use rolling walker. However, he is able to ambulate about 60ft back and forth with cga, wall/furniture support and cues for postural correction to solve occasional rt lateral leaning.      Current clinical condition of patient:  Pt requires supevision for sit to stand, superivision to ambulate x 200 ft without AD on level surface.     Overall progress towards measurable treatment goals:  Pt making improvement.    Effectiveness of current plan:  Pt with improved mobility.    Plan for continuing or discontinuing service:  Pt has 1 more PT visit.    Changes to goals or care plan update to be completed in guideline section and communicated to MD:  NA    Statement of expectation of continued progress toward goals:  Pt with good prognosis of meeting goals.     Why is it necessary for therapy to continue?  Skilled PT required to restore mobility.

## 2022-01-26 ENCOUNTER — TRANSCRIBE ORDERS (OUTPATIENT)
Dept: ADMINISTRATIVE | Facility: HOSPITAL | Age: 87
End: 2022-01-26

## 2022-01-26 ENCOUNTER — HOME CARE VISIT (OUTPATIENT)
Dept: HOME HEALTH SERVICES | Facility: HOME HEALTHCARE | Age: 87
End: 2022-01-26

## 2022-01-26 DIAGNOSIS — N18.6 END STAGE RENAL DISEASE: Primary | ICD-10-CM

## 2022-01-26 PROCEDURE — G0151 HHCP-SERV OF PT,EA 15 MIN: HCPCS

## 2022-01-27 VITALS
OXYGEN SATURATION: 99 % | RESPIRATION RATE: 18 BRPM | TEMPERATURE: 98.8 F | HEART RATE: 61 BPM | DIASTOLIC BLOOD PRESSURE: 56 MMHG | SYSTOLIC BLOOD PRESSURE: 108 MMHG

## 2022-01-27 NOTE — HOME HEALTH
No pain per patient and he reported compliance with illustrated home exercise program. Functionally, he has met all transfer goals and able to ambulate safely and independently indoor/outdoor without assistive device.    All goals met and no further skilled PT services necessary at this time.

## 2022-01-31 ENCOUNTER — APPOINTMENT (OUTPATIENT)
Dept: VASCULAR SURGERY | Facility: HOSPITAL | Age: 87
End: 2022-01-31

## 2022-01-31 ENCOUNTER — HOSPITAL ENCOUNTER (OUTPATIENT)
Dept: CARDIOLOGY | Facility: HOSPITAL | Age: 87
Discharge: HOME | End: 2022-01-31

## 2022-01-31 DIAGNOSIS — N18.6 END STAGE RENAL DISEASE: ICD-10-CM

## 2022-01-31 LAB
BH CV VAS MEAS BASILIC ANTECUBITAL FOSSA LEFT: 0.1 CM
BH CV VAS MEAS BASILIC ANTECUBITAL FOSSA RIGHT: 0.23 CM
BH CV VAS MEAS BASILIC FOREARM LEFT - DIST: 0.11 CM
BH CV VAS MEAS BASILIC FOREARM LEFT - MID: 0.1 CM
BH CV VAS MEAS BASILIC FOREARM LEFT - PROX: 0.1 CM
BH CV VAS MEAS BASILIC FOREARM RIGHT - DIST: 0.1 CM
BH CV VAS MEAS BASILIC FOREARM RIGHT - MID: 0.07 CM
BH CV VAS MEAS BASILIC FOREARM RIGHT - PROX: 0.12 CM
BH CV VAS MEAS BASILIC UPPER ARM LEFT - DIST: 0.24 CM
BH CV VAS MEAS BASILIC UPPER ARM LEFT - MID: 0.26 CM
BH CV VAS MEAS BASILIC UPPER ARM LEFT - PROX: 0.25 CM
BH CV VAS MEAS BASILIC UPPER ARM RIGHT - DIST: 0.25 CM
BH CV VAS MEAS BASILIC UPPER ARM RIGHT - MID: 0.2 CM
BH CV VAS MEAS CEPHALIC ANTECUBITAL FOSSA LEFT: 0.39 CM
BH CV VAS MEAS CEPHALIC ANTECUBITAL FOSSA RIGHT: 0.32 CM
BH CV VAS MEAS CEPHALIC FOREARM LEFT - DIST: 0.09 CM
BH CV VAS MEAS CEPHALIC FOREARM LEFT - MID: 0.09 CM
BH CV VAS MEAS CEPHALIC FOREARM LEFT - PROX: 0.1 CM
BH CV VAS MEAS CEPHALIC FOREARM RIGHT - DIST: 0.11 CM
BH CV VAS MEAS CEPHALIC FOREARM RIGHT - MID: 0.1 CM
BH CV VAS MEAS CEPHALIC FOREARM RIGHT - PROX: 0.14 CM
BH CV VAS MEAS CEPHALIC UPPER ARM LEFT - DIST: 0.32 CM
BH CV VAS MEAS CEPHALIC UPPER ARM LEFT - MID: 0.25 CM
BH CV VAS MEAS CEPHALIC UPPER ARM LEFT - PROX: 0.28 CM
BH CV VAS MEAS CEPHALIC UPPER ARM RIGHT - DIST: 0.27 CM
BH CV VAS MEAS CEPHALIC UPPER ARM RIGHT - MID: 0.27 CM
BH CV VAS MEAS CEPHALIC UPPER ARM RIGHT - PROX: 0.26 CM
BH CV VAS MEAS RADIAL UPPER ARM LEFT - DIST: 0.32 CM
BH CV VAS MEAS RADIAL UPPER ARM LEFT - MID: 0.37 CM
BH CV VAS MEAS RADIAL UPPER ARM LEFT - PROX: 0.36 CM
BH CV VAS MEAS RADIAL UPPER ARM RIGHT - DIST: 0.23 CM
BH CV VAS MEAS RADIAL UPPER ARM RIGHT - MID: 0.31 CM
BH CV VAS MEAS RADIAL UPPER ARM RIGHT - PROX: 0.23 CM
MAXIMAL PREDICTED HEART RATE: 131 BPM
STRESS TARGET HR: 111 BPM
UPPER ARTERIAL LEFT ARM BRACHIAL LENGTH: 0.52 CM
UPPER ARTERIAL RIGHT ARM BRACHIAL LENGTH: 0.48 CM

## 2022-01-31 PROCEDURE — 93986 DUP-SCAN HEMO COMPL UNI STD: CPT

## 2022-01-31 PROCEDURE — G0463 HOSPITAL OUTPT CLINIC VISIT: HCPCS

## 2022-02-09 ENCOUNTER — HOSPITAL ENCOUNTER (OUTPATIENT)
Dept: CARDIOLOGY | Facility: HOSPITAL | Age: 87
Discharge: HOME | End: 2022-02-09

## 2022-02-09 ENCOUNTER — LAB (OUTPATIENT)
Dept: LAB | Facility: HOSPITAL | Age: 87
End: 2022-02-09

## 2022-02-09 ENCOUNTER — HOSPITAL ENCOUNTER (OUTPATIENT)
Dept: GENERAL RADIOLOGY | Facility: HOSPITAL | Age: 87
Discharge: HOME | End: 2022-02-09

## 2022-02-09 LAB
ANION GAP SERPL CALCULATED.3IONS-SCNC: 9 MMOL/L (ref 5–15)
BUN SERPL-MCNC: 17 MG/DL (ref 8–23)
BUN/CREAT SERPL: 5.6 (ref 7–25)
CALCIUM SPEC-SCNC: 9 MG/DL (ref 8.6–10.5)
CHLORIDE SERPL-SCNC: 98 MMOL/L (ref 98–107)
CO2 SERPL-SCNC: 32 MMOL/L (ref 22–29)
CREAT SERPL-MCNC: 3.04 MG/DL (ref 0.76–1.27)
GFR SERPL CREATININE-BSD FRML MDRD: 24 ML/MIN/1.73
GLUCOSE SERPL-MCNC: 77 MG/DL (ref 65–99)
POTASSIUM SERPL-SCNC: 3.5 MMOL/L (ref 3.5–5.2)
SARS-COV-2 ORF1AB RESP QL NAA+PROBE: NOT DETECTED
SODIUM SERPL-SCNC: 139 MMOL/L (ref 136–145)

## 2022-02-09 PROCEDURE — 80048 BASIC METABOLIC PNL TOTAL CA: CPT | Performed by: SURGERY

## 2022-02-09 PROCEDURE — 85025 COMPLETE CBC W/AUTO DIFF WBC: CPT | Performed by: INTERNAL MEDICINE

## 2022-02-09 PROCEDURE — U0005 INFEC AGEN DETEC AMPLI PROBE: HCPCS

## 2022-02-09 PROCEDURE — 93010 ELECTROCARDIOGRAM REPORT: CPT | Performed by: INTERNAL MEDICINE

## 2022-02-09 PROCEDURE — 71046 X-RAY EXAM CHEST 2 VIEWS: CPT

## 2022-02-09 PROCEDURE — 93005 ELECTROCARDIOGRAM TRACING: CPT | Performed by: SURGERY

## 2022-02-09 PROCEDURE — U0004 COV-19 TEST NON-CDC HGH THRU: HCPCS

## 2022-02-09 PROCEDURE — C9803 HOPD COVID-19 SPEC COLLECT: HCPCS

## 2022-02-10 ENCOUNTER — ANESTHESIA EVENT (OUTPATIENT)
Dept: PERIOP | Facility: HOSPITAL | Age: 87
End: 2022-02-10

## 2022-02-10 LAB — QT INTERVAL: 416 MS

## 2022-02-10 NOTE — PAT
Discussed patients chest pain with patients wife, she states he does not tell her he has pain, she has not seen him make any signs lately that he's hurting.  Patient not available for comment as he is at dialysis.

## 2022-02-11 ENCOUNTER — ANESTHESIA (OUTPATIENT)
Dept: PERIOP | Facility: HOSPITAL | Age: 87
End: 2022-02-11

## 2022-02-11 ENCOUNTER — HOSPITAL ENCOUNTER (OUTPATIENT)
Facility: HOSPITAL | Age: 87
Setting detail: HOSPITAL OUTPATIENT SURGERY
Discharge: HOME | End: 2022-02-11
Attending: SURGERY | Admitting: SURGERY

## 2022-02-11 VITALS
SYSTOLIC BLOOD PRESSURE: 99 MMHG | OXYGEN SATURATION: 99 % | RESPIRATION RATE: 16 BRPM | TEMPERATURE: 97.1 F | WEIGHT: 130.3 LBS | BODY MASS INDEX: 19.3 KG/M2 | HEART RATE: 74 BPM | HEIGHT: 69 IN | DIASTOLIC BLOOD PRESSURE: 42 MMHG

## 2022-02-11 LAB
ANION GAP SERPL CALCULATED.3IONS-SCNC: 8 MMOL/L (ref 5–15)
BUN SERPL-MCNC: 15 MG/DL (ref 8–23)
BUN/CREAT SERPL: 5.6 (ref 7–25)
CALCIUM SPEC-SCNC: 8.9 MG/DL (ref 8.6–10.5)
CHLORIDE SERPL-SCNC: 99 MMOL/L (ref 98–107)
CO2 SERPL-SCNC: 32 MMOL/L (ref 22–29)
CREAT SERPL-MCNC: 2.69 MG/DL (ref 0.76–1.27)
DEPRECATED RDW RBC AUTO: 66.9 FL (ref 37–54)
ERYTHROCYTE [DISTWIDTH] IN BLOOD BY AUTOMATED COUNT: 21 % (ref 12.3–15.4)
GFR SERPL CREATININE-BSD FRML MDRD: 27 ML/MIN/1.73
GLUCOSE SERPL-MCNC: 85 MG/DL (ref 65–99)
HCT VFR BLD AUTO: 25.4 % (ref 37.5–51)
HGB BLD-MCNC: 8.2 G/DL (ref 13–17.7)
MCH RBC QN AUTO: 29.3 PG (ref 26.6–33)
MCHC RBC AUTO-ENTMCNC: 32.4 G/DL (ref 31.5–35.7)
MCV RBC AUTO: 90.6 FL (ref 79–97)
PLATELET # BLD AUTO: 75 10*3/MM3 (ref 140–450)
PMV BLD AUTO: 7.5 FL (ref 6–12)
POTASSIUM SERPL-SCNC: 3.9 MMOL/L (ref 3.5–5.2)
RBC # BLD AUTO: 2.81 10*6/MM3 (ref 4.14–5.8)
SODIUM SERPL-SCNC: 139 MMOL/L (ref 136–145)
WBC NRBC COR # BLD: 5.8 10*3/MM3 (ref 3.4–10.8)

## 2022-02-11 PROCEDURE — 76942 ECHO GUIDE FOR BIOPSY: CPT | Performed by: SURGERY

## 2022-02-11 PROCEDURE — C1889 IMPLANT/INSERT DEVICE, NOC: HCPCS | Performed by: SURGERY

## 2022-02-11 PROCEDURE — 80048 BASIC METABOLIC PNL TOTAL CA: CPT | Performed by: SURGERY

## 2022-02-11 PROCEDURE — 25010000002 HEPARIN (PORCINE) PER 1000 UNITS: Performed by: SURGERY

## 2022-02-11 PROCEDURE — 25010000002 PROPOFOL 10 MG/ML EMULSION: Performed by: ANESTHESIOLOGIST ASSISTANT

## 2022-02-11 PROCEDURE — 25010000002 ROPIVACAINE PER 1 MG: Performed by: ANESTHESIOLOGY

## 2022-02-11 PROCEDURE — 25010000002 DEXAMETHASONE PER 1 MG: Performed by: ANESTHESIOLOGIST ASSISTANT

## 2022-02-11 PROCEDURE — 25010000002 HEPARIN (PORCINE) PER 1000 UNITS: Performed by: ANESTHESIOLOGIST ASSISTANT

## 2022-02-11 PROCEDURE — 25010000002 PROPOFOL 200 MG/20ML EMULSION: Performed by: ANESTHESIOLOGIST ASSISTANT

## 2022-02-11 PROCEDURE — 85027 COMPLETE CBC AUTOMATED: CPT | Performed by: SURGERY

## 2022-02-11 PROCEDURE — 25010000002 FENTANYL CITRATE (PF) 100 MCG/2ML SOLUTION: Performed by: ANESTHESIOLOGIST ASSISTANT

## 2022-02-11 PROCEDURE — 25010000002 PROTAMINE SULFATE PER 10 MG: Performed by: ANESTHESIOLOGIST ASSISTANT

## 2022-02-11 PROCEDURE — 0 CEFAZOLIN PER 500 MG: Performed by: SURGERY

## 2022-02-11 PROCEDURE — 25010000002 MEPIVACAINE PF 1 % SOLUTION: Performed by: ANESTHESIOLOGY

## 2022-02-11 PROCEDURE — 25010000002 ONDANSETRON PER 1 MG: Performed by: ANESTHESIOLOGIST ASSISTANT

## 2022-02-11 DEVICE — LIGACLIP MCA MULTIPLE CLIP APPLIERS, 20 SMALL CLIPS
Type: IMPLANTABLE DEVICE | Site: ARM | Status: FUNCTIONAL
Brand: LIGACLIP

## 2022-02-11 RX ORDER — LIDOCAINE HYDROCHLORIDE 10 MG/ML
0.5 INJECTION, SOLUTION INFILTRATION; PERINEURAL ONCE AS NEEDED
Status: DISCONTINUED | OUTPATIENT
Start: 2022-02-11 | End: 2022-02-11 | Stop reason: HOSPADM

## 2022-02-11 RX ORDER — HEPARIN SODIUM 1000 [USP'U]/ML
INJECTION, SOLUTION INTRAVENOUS; SUBCUTANEOUS AS NEEDED
Status: DISCONTINUED | OUTPATIENT
Start: 2022-02-11 | End: 2022-02-11 | Stop reason: SURG

## 2022-02-11 RX ORDER — DEXAMETHASONE SODIUM PHOSPHATE 4 MG/ML
INJECTION, SOLUTION INTRA-ARTICULAR; INTRALESIONAL; INTRAMUSCULAR; INTRAVENOUS; SOFT TISSUE AS NEEDED
Status: DISCONTINUED | OUTPATIENT
Start: 2022-02-11 | End: 2022-02-11 | Stop reason: SURG

## 2022-02-11 RX ORDER — HYDRALAZINE HYDROCHLORIDE 20 MG/ML
5 INJECTION INTRAMUSCULAR; INTRAVENOUS
Status: DISCONTINUED | OUTPATIENT
Start: 2022-02-11 | End: 2022-02-11 | Stop reason: HOSPADM

## 2022-02-11 RX ORDER — LABETALOL HYDROCHLORIDE 5 MG/ML
5 INJECTION, SOLUTION INTRAVENOUS
Status: DISCONTINUED | OUTPATIENT
Start: 2022-02-11 | End: 2022-02-11 | Stop reason: HOSPADM

## 2022-02-11 RX ORDER — PROPOFOL 10 MG/ML
INJECTION, EMULSION INTRAVENOUS AS NEEDED
Status: DISCONTINUED | OUTPATIENT
Start: 2022-02-11 | End: 2022-02-11 | Stop reason: SURG

## 2022-02-11 RX ORDER — SODIUM CHLORIDE 9 MG/ML
500 INJECTION, SOLUTION INTRAVENOUS CONTINUOUS
Status: DISCONTINUED | OUTPATIENT
Start: 2022-02-11 | End: 2022-02-11 | Stop reason: HOSPADM

## 2022-02-11 RX ORDER — FENTANYL CITRATE 50 UG/ML
25 INJECTION, SOLUTION INTRAMUSCULAR; INTRAVENOUS
Status: DISCONTINUED | OUTPATIENT
Start: 2022-02-11 | End: 2022-02-11 | Stop reason: HOSPADM

## 2022-02-11 RX ORDER — ONDANSETRON 2 MG/ML
INJECTION INTRAMUSCULAR; INTRAVENOUS AS NEEDED
Status: DISCONTINUED | OUTPATIENT
Start: 2022-02-11 | End: 2022-02-11 | Stop reason: SURG

## 2022-02-11 RX ORDER — DEXAMETHASONE SODIUM PHOSPHATE 4 MG/ML
INJECTION, SOLUTION INTRA-ARTICULAR; INTRALESIONAL; INTRAMUSCULAR; INTRAVENOUS; SOFT TISSUE
Status: COMPLETED | OUTPATIENT
Start: 2022-02-11 | End: 2022-02-11

## 2022-02-11 RX ORDER — FLUMAZENIL 0.1 MG/ML
0.2 INJECTION INTRAVENOUS AS NEEDED
Status: DISCONTINUED | OUTPATIENT
Start: 2022-02-11 | End: 2022-02-11 | Stop reason: HOSPADM

## 2022-02-11 RX ORDER — DEXMEDETOMIDINE HYDROCHLORIDE 100 UG/ML
INJECTION, SOLUTION INTRAVENOUS AS NEEDED
Status: DISCONTINUED | OUTPATIENT
Start: 2022-02-11 | End: 2022-02-11

## 2022-02-11 RX ORDER — FENTANYL CITRATE 50 UG/ML
50 INJECTION, SOLUTION INTRAMUSCULAR; INTRAVENOUS
Status: DISCONTINUED | OUTPATIENT
Start: 2022-02-11 | End: 2022-02-11 | Stop reason: HOSPADM

## 2022-02-11 RX ORDER — PROTAMINE SULFATE 10 MG/ML
INJECTION, SOLUTION INTRAVENOUS AS NEEDED
Status: DISCONTINUED | OUTPATIENT
Start: 2022-02-11 | End: 2022-02-11 | Stop reason: SURG

## 2022-02-11 RX ORDER — EPHEDRINE SULFATE 5 MG/ML
INJECTION INTRAVENOUS AS NEEDED
Status: DISCONTINUED | OUTPATIENT
Start: 2022-02-11 | End: 2022-02-11 | Stop reason: SURG

## 2022-02-11 RX ORDER — ROPIVACAINE HYDROCHLORIDE 5 MG/ML
INJECTION, SOLUTION EPIDURAL; INFILTRATION; PERINEURAL
Status: COMPLETED | OUTPATIENT
Start: 2022-02-11 | End: 2022-02-11

## 2022-02-11 RX ORDER — SODIUM CHLORIDE 0.9 % (FLUSH) 0.9 %
10 SYRINGE (ML) INJECTION AS NEEDED
Status: DISCONTINUED | OUTPATIENT
Start: 2022-02-11 | End: 2022-02-11 | Stop reason: HOSPADM

## 2022-02-11 RX ORDER — FENTANYL CITRATE 50 UG/ML
100 INJECTION, SOLUTION INTRAMUSCULAR; INTRAVENOUS
Status: DISCONTINUED | OUTPATIENT
Start: 2022-02-11 | End: 2022-02-11 | Stop reason: HOSPADM

## 2022-02-11 RX ORDER — HYDROMORPHONE HCL 110MG/55ML
0.25 PATIENT CONTROLLED ANALGESIA SYRINGE INTRAVENOUS AS NEEDED
Status: DISCONTINUED | OUTPATIENT
Start: 2022-02-11 | End: 2022-02-11 | Stop reason: HOSPADM

## 2022-02-11 RX ORDER — ONDANSETRON 2 MG/ML
4 INJECTION INTRAMUSCULAR; INTRAVENOUS ONCE AS NEEDED
Status: DISCONTINUED | OUTPATIENT
Start: 2022-02-11 | End: 2022-02-11 | Stop reason: HOSPADM

## 2022-02-11 RX ORDER — EPHEDRINE SULFATE 5 MG/ML
5 INJECTION INTRAVENOUS ONCE AS NEEDED
Status: DISCONTINUED | OUTPATIENT
Start: 2022-02-11 | End: 2022-02-11 | Stop reason: HOSPADM

## 2022-02-11 RX ORDER — FENTANYL CITRATE 50 UG/ML
INJECTION, SOLUTION INTRAMUSCULAR; INTRAVENOUS AS NEEDED
Status: DISCONTINUED | OUTPATIENT
Start: 2022-02-11 | End: 2022-02-11 | Stop reason: SURG

## 2022-02-11 RX ORDER — LIDOCAINE HYDROCHLORIDE 10 MG/ML
INJECTION, SOLUTION EPIDURAL; INFILTRATION; INTRACAUDAL; PERINEURAL AS NEEDED
Status: DISCONTINUED | OUTPATIENT
Start: 2022-02-11 | End: 2022-02-11 | Stop reason: SURG

## 2022-02-11 RX ORDER — HYDROMORPHONE HCL 110MG/55ML
0.5 PATIENT CONTROLLED ANALGESIA SYRINGE INTRAVENOUS AS NEEDED
Status: DISCONTINUED | OUTPATIENT
Start: 2022-02-11 | End: 2022-02-11 | Stop reason: HOSPADM

## 2022-02-11 RX ORDER — NALOXONE HCL 0.4 MG/ML
0.4 VIAL (ML) INJECTION AS NEEDED
Status: DISCONTINUED | OUTPATIENT
Start: 2022-02-11 | End: 2022-02-11 | Stop reason: HOSPADM

## 2022-02-11 RX ORDER — HYDROMORPHONE HCL 110MG/55ML
1 PATIENT CONTROLLED ANALGESIA SYRINGE INTRAVENOUS AS NEEDED
Status: DISCONTINUED | OUTPATIENT
Start: 2022-02-11 | End: 2022-02-11 | Stop reason: HOSPADM

## 2022-02-11 RX ADMIN — PROPOFOL 40 MCG/KG/MIN: 10 INJECTION, EMULSION INTRAVENOUS at 07:38

## 2022-02-11 RX ADMIN — ONDANSETRON 4 MG: 2 INJECTION INTRAMUSCULAR; INTRAVENOUS at 08:25

## 2022-02-11 RX ADMIN — HEPARIN SODIUM 5000 UNITS: 1000 INJECTION, SOLUTION INTRAVENOUS; SUBCUTANEOUS at 08:00

## 2022-02-11 RX ADMIN — EPHEDRINE SULFATE 5 MG: 5 INJECTION INTRAVENOUS at 07:42

## 2022-02-11 RX ADMIN — EPHEDRINE SULFATE 5 MG: 5 INJECTION INTRAVENOUS at 07:58

## 2022-02-11 RX ADMIN — LIDOCAINE HYDROCHLORIDE 50 MG: 10 INJECTION, SOLUTION EPIDURAL; INFILTRATION; INTRACAUDAL; PERINEURAL at 07:32

## 2022-02-11 RX ADMIN — EPHEDRINE SULFATE 5 MG: 5 INJECTION INTRAVENOUS at 07:45

## 2022-02-11 RX ADMIN — CEFAZOLIN SODIUM 2 G: 1 INJECTION, POWDER, FOR SOLUTION INTRAMUSCULAR; INTRAVENOUS at 07:35

## 2022-02-11 RX ADMIN — EPHEDRINE SULFATE 5 MG: 5 INJECTION INTRAVENOUS at 08:01

## 2022-02-11 RX ADMIN — PROTAMINE SULFATE 30 MG: 10 INJECTION, SOLUTION INTRAVENOUS at 08:22

## 2022-02-11 RX ADMIN — PROPOFOL 40 MG: 10 INJECTION, EMULSION INTRAVENOUS at 07:54

## 2022-02-11 RX ADMIN — PROPOFOL 40 MG: 10 INJECTION, EMULSION INTRAVENOUS at 07:35

## 2022-02-11 RX ADMIN — FENTANYL CITRATE 25 MCG: 50 INJECTION INTRAMUSCULAR; INTRAVENOUS at 07:55

## 2022-02-11 RX ADMIN — MEPIVACAINE HYDROCHLORIDE 15 ML: 10 INJECTION, SOLUTION EPIDURAL; INFILTRATION at 07:18

## 2022-02-11 RX ADMIN — ROPIVACAINE HYDROCHLORIDE 15 ML: 5 INJECTION EPIDURAL; INFILTRATION; PERINEURAL at 07:18

## 2022-02-11 RX ADMIN — DEXAMETHASONE SODIUM PHOSPHATE 4 MG: 4 INJECTION, SOLUTION INTRA-ARTICULAR; INTRALESIONAL; INTRAMUSCULAR; INTRAVENOUS; SOFT TISSUE at 07:18

## 2022-02-11 RX ADMIN — DEXAMETHASONE SODIUM PHOSPHATE 4 MG: 4 INJECTION, SOLUTION INTRAMUSCULAR; INTRAVENOUS at 07:44

## 2022-02-11 RX ADMIN — EPHEDRINE SULFATE 10 MG: 5 INJECTION INTRAVENOUS at 08:06

## 2022-02-11 RX ADMIN — FENTANYL CITRATE 25 MCG: 50 INJECTION INTRAMUSCULAR; INTRAVENOUS at 07:30

## 2022-02-11 RX ADMIN — SODIUM CHLORIDE: 0.9 INJECTION, SOLUTION INTRAVENOUS at 07:28

## 2022-02-11 NOTE — ANESTHESIA PREPROCEDURE EVALUATION
Anesthesia Evaluation     Patient summary reviewed and Nursing notes reviewed   no history of anesthetic complications:  NPO Solid Status: > 8 hours  NPO Liquid Status: > 8 hours           Airway   Dental      Pulmonary    (+) COPD, asthma,  Cardiovascular     ECG reviewed  PT is on anticoagulation therapy  Patient on routine beta blocker    (+) hypertension, valvular problems/murmurs MR, CAD, CABG, dysrhythmias Atrial Fib,       Neuro/Psych  (+) CVA,    GI/Hepatic/Renal/Endo    (+)  GERD,  renal disease ESRD and ARF,     Musculoskeletal     Abdominal    Substance History      OB/GYN          Other   blood dyscrasia anemia thrombocytopenia,   history of cancer    ROS/Med Hx Other: Gastric cancer, non-Hodgkin's lymphoma, hematuria, gout, VT, palpitations, iron malabsorption, pancytopenia    PSH  CORONARY ARTERY BYPASS GRAFT OTHER SURGICAL HISTORY  CARDIAC CATHETERIZATION                Anesthesia Plan    ASA 4     regional   (Patient identified; pre-operative vital signs, all relevant labs/studies, complete medical/surgical/anesthetic history, full medication list, full allergy list, and NPO status obtained/reviewed; physical assessment performed; anesthetic options, side effects, potential complications, risks, and benefits discussed; questions answered; written anesthesia consent obtained; patient cleared for procedure; anesthesia machine and equipment checked and functioning)    Anesthetic plan, all risks, benefits, and alternatives have been provided, discussed and informed consent has been obtained with: patient.    Plan discussed with CRNA and CAA.        CODE STATUS:

## 2022-02-11 NOTE — ANESTHESIA POSTPROCEDURE EVALUATION
Patient: Barry Calhoun    Procedure Summary     Date: 02/11/22 Room / Location: [facility] OR 11 / [facility] MAIN OR    Anesthesia Start: 0728 Anesthesia Stop: 0838    Procedure: RIGHT ARM BRACHIAL CEPHALIC ARTERIAL VENOUS FISTULA (Right Arm Upper) Diagnosis:       End stage renal disease (HCC)      (End stage renal disease (HCC) [N18.6])    Surgeons: Drew Gunter MD Provider: Noel Spann MD    Anesthesia Type: regional ASA Status: 4          Anesthesia Type: regional    Vitals  Vitals Value Taken Time   BP 99/44 02/11/22 0903   Temp 97.4 °F (36.3 °C) 02/11/22 0900   Pulse 73 02/11/22 0906   Resp 17 02/11/22 0900   SpO2 97 % 02/11/22 0906   Vitals shown include unvalidated device data.        Post Anesthesia Care and Evaluation    Patient location during evaluation: PACU  Patient participation: complete - patient participated  Level of consciousness: awake  Pain scale: See nurse's notes for pain score.  Pain management: adequate  Airway patency: patent  Anesthetic complications: No anesthetic complications  PONV Status: none  Cardiovascular status: acceptable  Respiratory status: acceptable  Hydration status: acceptable    Comments: Patient seen and examined postoperatively; vital signs stable; SpO2 greater than or equal to 90%; cardiopulmonary status stable; nausea/vomiting adequately controlled; pain adequately controlled; no apparent anesthesia complications; patient discharged from anesthesia care when discharge criteria were met

## 2022-02-11 NOTE — ANESTHESIA PROCEDURE NOTES
Peripheral Block    Pre-sedation assessment completed: 2/11/2022 7:00 AM    Patient reassessed immediately prior to procedure    Patient location during procedure: pre-op  Reason for block: procedure for pain, at surgeon's request, post-op pain management and primary anesthetic  Performed by  Anesthesiologist: Noel Spann MD  Preanesthetic Checklist  Completed: patient identified, IV checked, site marked, risks and benefits discussed, surgical consent, monitors and equipment checked, pre-op evaluation and timeout performed  Prep:  Pt Position: sitting  Sterile barriers:cap, gloves, mask and washed/disinfected hands  Prep: ChloraPrep  Patient monitoring: blood pressure monitoring, continuous pulse oximetry and EKG  Procedure    Sedation: no  Performed under: local infiltration  Guidance:ultrasound guided and landmark technique    ULTRASOUND INTERPRETATION.  Using ultrasound guidance a 22 G gauge needle was placed in close proximity to the brachial plexus nerve, at which point, under ultrasound guidance anesthetic was injected in the area of the nerve and spread of the anesthesia was seen on ultrasound in close proximity thereto.  There were no abnormalities seen on ultrasound; a digital image was taken; and the patient tolerated the procedure with no complications. Images:still images obtained, printed/placed on chart    Laterality:right  Block Type:supraclavicular  Injection Technique:single-shot  Needle Type:echogenic  Needle Gauge:22 G  Resistance on Injection: less than 15 psi    Medications Used: dexamethasone (DECADRON) injection, 4 mg  ropivacaine (NAROPIN) 0.5 % injection, 15 mL  mepivacaine PF (CARBOCAINE) 1 % injection, 15 mL  Med administered at 2/11/2022 7:18 AM      Post Assessment  Injection Assessment: negative aspiration for heme, no paresthesia on injection and incremental injection  Patient Tolerance:comfortable throughout block  Complications:no  Additional  Notes  Pre-procedure:  Peripheral nerve block performed preoperatively prior to the start of anesthesia time at the request of the patient and the surgeon as the primary intraoperative anesthetic as well as for postoperative management of acute surgical pain (regional anesthesia is the primary intraoperative anesthetic); patient identified; pre-procedure vital signs, all relevant labs/studies, complete medical/surgical/anesthetic history, full medication list, full allergy list, and NPO status obtained/reviewed; physical assessment performed; anesthetic options, side effects, potential complications, risks, and benefits discussed; questions answered; patient wishes to proceed with the procedure; written anesthesia procedure consent obtained; patient cleared for procedure; time out performed; IV access in situ    Procedure:  ASA monitor placed; supplemental oxygen provided; patient positioned; hand hygiene performed; sterile technique maintained throughout the procedure; sterile prep applied; insertion site determined by anatomical landmarks, palpation, and ultrasound imaging; live ultrasound guidance throughout the procedure; target nerves/landmarks identified on live ultrasound; skin and subcutaneous tissues numbed by injection of 1% lidocaine; 2 inch 22G StimupXRONet Ultra 360 Insulated Echogenic Needle used; realtime needle advancement and placement near the target nerves witnessed on live ultrasound; negative aspiration prior to injection; correct needle placement confirmed on live ultrasound by local anesthetic spread around the target nerves; local anesthetic mixture injected with negative aspiration prior to each injection and after each 1-5 mL injected; needle withdrawn; dressing applied; ultrasound image printed and placed in the patient's permanent medical record    Post-procedure:  Peripheral nerve block placed successfully; good block; no apparent complications; minimal estimated blood loss; vital signs  stable throughout; see nurse's notes for vitals; transported to the operating room, sedation provided, and surgery started

## 2022-02-11 NOTE — OP NOTE
Date of Admission:  2/11/2022  Today's Date:  02/11/22  Drew Gunter MD  [facility]      Preoperative Diagnosis: End Stage Renal Disease    Postoperative Diagnosis: same as above    Procedure Performed: Right brachial-cephalic fistula creation    CPT:  65819    Surgeon: Drew Gunter MD    Assistant:  Felicitas Mckeon RN    Anesthesia: MAC with regional    Staff:   Circulator: Erica Farias RN  Scrub Person: Debbie Munoz  Assistant: Felicitas Mckeon    Estimated Blood Loss: minimal    Findings:     Vein quality:  Good   Artery quality:  Good   Post operative thrill quality:  Good   Post distal perfusion: Palpable radial pulse.    Implants:    Implant Name Type Inv. Item Serial No.  Lot No. LRB No. Used Action   CLIPAPPLR M/ ENDO LIGACLIP 9 3/8IN  - [id number] Implant CLIPAPPLR M/ ENDO LIGACLIP 9 3/8IN   [employer]  DIV [employer] J [employer] J [id number] Right 1 Implanted        Specimen: none    Complications: none    Dispo: to PACU    Indication for procedure: 89 y.o. male with renal failure dialyzing through left-sided catheter.  Patient Dr. Mario Botello  Discussed risk benefits alternatives to fistula creation.  Informed consent obtained.    Description of procedure:   The patient was taken to the operating room and placed in the supine position.  The arm was extended on an arm board and then prepped and draped in the usual sterile fashion.  Preoperative antibiotics were given.  A full surgical timeout was done.       A longitudinal incision was made under local anesthesia and the cephalic vein was dissected out for several centimeters.  Next the deep fascia was opened and the brachial artery was dissected out. The patient was systemically heparinized. The vein was marked for orientation, divided and tied off distally with silk ties.  The vein was flushed with heparinized saline and a coronary dilator was passed proximally to ensure no venous webs.  After the heparin had been allowed to  circulate for 5 minutes clamps were applied to the brachial artery.  It was opened longitudinally with an 11 blade and then Johnson scissors.  An anastomosis was created using 6-0 running Prolene between the artery and the vein.  Appropriate backbleeding and flushing maneuvers were done.  Cephalic vein branches in the juxta anastomotic segment were ligated.      Flow was then restored.  The field was irrigated with sterile saline.  Hemostasis was obtained.  Incision was closed with multiple layers, the subcutaneous layers using 3-0 Vicryl.  The skin subcuticular layer using 4-0 Vicryl.  The incision was clean and dry and covered in Dermabond.  The patient tolerated procedure well, counts are correct.      Drew Gunter MD  02/11/22    There are no hospital problems to display for this patient.

## 2022-02-11 NOTE — H&P
Name: Barry Calhoun ADMIT: 2022   : 1932  PCP: Pedro Purvis MD    MRN: [id number] LOS: 0 days   AGE/SEX: 89 y.o. male  ROOM: [facility]    Pre-operative H&P    Patient Care Team:  Pedro Purvis MD as PCP - General (Family Medicine)  Pedro Purvis MD as PCP - Family Medicine  No chief complaint on file.    CC: Preop fistula creation.    Subjective     Patient with persistent renal failure dialyzing through left jugular tunneled catheter.    Review of Systems   All other systems reviewed and are negative.      Past Medical History:   Diagnosis Date   • Asthma    • Chronic renal disease    • COPD (chronic obstructive pulmonary disease) (formerly [facility])    • Coronary heart disease     CABG   • CVA (cerebral vascular accident) (formerly [facility])     recent CVA   • Gout    • Hypertension      Past Surgical History:   Procedure Laterality Date   • CARDIAC CATHETERIZATION  2016    BHF   • CORONARY ARTERY BYPASS GRAFT      CABG X3, reoperation, 09; CAB and    • OTHER SURGICAL HISTORY  2016    loop recorder implant      Family History   Problem Relation Age of Onset   • Stroke Other      Social History     Tobacco Use   • Smoking status: Never Smoker   • Smokeless tobacco: Never Used   Vaping Use   • Vaping Use: Never used   Substance Use Topics   • Alcohol use: No   • Drug use: No     Medications Prior to Admission   Medication Sig Dispense Refill Last Dose   • clopidogrel (PLAVIX) 75 MG tablet Take 1 tablet by mouth Daily. (Patient taking differently: Take 75 mg by mouth Daily. Hold DOS) 90 tablet 1 2022   • hydrALAZINE (APRESOLINE) 50 MG tablet TAKE 1 TABLET BY MOUTH FOUR TIMES DAILY (Patient taking differently: Take 50 mg by mouth 4 (Four) Times a Day. Take DOS) 360 tablet 0 2022 at 0400   • isosorbide mononitrate (IMDUR) 30 MG 24 hr tablet Take 30 mg by mouth Daily. Take DOS   2022 at 0400   • metoprolol tartrate (LOPRESSOR) 25 MG tablet Take /2  tablet Bid (Patient taking differently: Take 25 mg by mouth 2 (Two) Times a Day. Take 1/2 tablet Bid  Take DOS) 90 tablet 1 2/11/2022 at 0400   • pantoprazole (PROTONIX) 40 MG EC tablet Take 40 mg by mouth Daily. Take DOS   2/11/2022 at Unknown time   • warfarin (COUMADIN) 1 MG tablet Take 1 tablet by mouth Daily. (Patient taking differently: Take 1 mg by mouth Daily. LD 2/5) 30 tablet 3 2/1/2022   • warfarin (COUMADIN) 5 MG tablet Take 1 tablet by mouth Daily. At 1700 as directed (Patient taking differently: Take 5 mg by mouth Daily. At 1700 as directed  LD 2/5) 90 tablet 1 2/1/2022   • allopurinol (ZYLOPRIM) 100 MG tablet Take 1 tablet by mouth Daily. (Patient not taking: Reported on 2/3/2022) 90 tablet 2 Not Taking at Unknown time   • atorvastatin (LIPITOR) 20 MG tablet Take 20 mg by mouth Every Night. (Patient not taking: Reported on 2/3/2022)   Not Taking at Unknown time   • bumetanide (BUMEX) 1 MG tablet Take 1 mg by mouth Daily. Hold DOS   Unknown at Unknown time   • CloNIDine (CATAPRES) 0.1 MG tablet Take 0.1 mg by mouth Every 6 (Six) Hours. Take DOS   Unknown at Unknown time   • docusate sodium (COLACE) 100 MG capsule Take 100 mg by mouth 2 (Two) Times a Day.   Unknown at Unknown time   • famotidine (PEPCID) 20 MG tablet TAKE 1 TABLET BY MOUTH TWICE DAILY (Patient not taking: Reported on 2/3/2022) 180 tablet 0 Not Taking at Unknown time   • ferrous sulfate 325 (65 FE) MG tablet Take 325 mg by mouth Daily With Breakfast. (Patient not taking: Reported on 2/3/2022)   Not Taking at Unknown time   • HYDROcodone-acetaminophen (NORCO) 5-325 MG per tablet  (Patient not taking: Reported on 2/3/2022)   Not Taking at Unknown time   • metoprolol tartrate (LOPRESSOR) 12.5 MG half tablet Take 12.5 mg by mouth 2 (Two) Times a Day. (Patient not taking: Reported on 2/3/2022)   Not Taking at Unknown time   • potassium chloride (K-DUR,KLOR-CON) 10 MEQ CR tablet TAKE 1 TABLET BY MOUTH THREE TIMES A DAY (Patient taking  "differently: Take 10 mEq by mouth 3 (Three) Times a Day. Hold DOS) 90 tablet 0 Unknown at Unknown time   • tamsulosin (FLOMAX) 0.4 MG capsule 24 hr capsule Take 1 capsule by mouth Daily. (Patient not taking: Reported on 2/3/2022)   Not Taking at Unknown time   • zolpidem (AMBIEN) 5 MG tablet Take 1 tablet by mouth At Night As Needed for Sleep. 30 tablet 0 Unknown at Unknown time     ceFAZolin, 2 g, Intravenous, On Call to OR      phenylephrine, 0.5-3 mcg/kg/min  sodium chloride, 500 mL      ePHEDrine Sulfate  •  fentanyl **OR** fentanyl **OR** fentanyl  •  flumazenil  •  hydrALAZINE  •  HYDROmorphone **OR** HYDROmorphone **OR** HYDROmorphone  •  labetalol  •  lidocaine  •  naloxone  •  ondansetron  •  phenylephrine  •  sodium chloride  Patient has no known allergies.    Objective     Physical Exam:  Physical Exam  Constitutional:       Appearance: He is well-developed.   Pulmonary:      Effort: Pulmonary effort is normal. No respiratory distress.   Abdominal:      General: There is no distension.      Palpations: Abdomen is soft.   Neurological:      Mental Status: He is alert and oriented to person, place, and time.          Vital Signs and Labs:  Vital Signs Patient Vitals for the past 24 hrs:   BP Temp Temp src Pulse Resp SpO2 Height Weight   02/11/22 0718 98/50 -- -- 67 20 99 % -- --   02/11/22 0715 97/52 -- -- 61 16 99 % -- --   02/11/22 0712 99/52 -- -- 68 17 100 % -- --   02/11/22 0613 105/48 97.8 °F (36.6 °C) Oral 69 22 96 % 175.3 cm (69\") 59.1 kg (130 lb 4.8 oz)     I/O:  No intake/output data recorded.    CBC    Results from last 7 days   Lab Units 02/11/22  0604 02/09/22  1034   WBC 10*3/mm3 5.80 5.90   HEMOGLOBIN g/dL 8.2* 9.1*   PLATELETS 10*3/mm3 75* 89*     BMP   Results from last 7 days   Lab Units 02/11/22  0604 02/09/22  1034   SODIUM mmol/L 139 139   POTASSIUM mmol/L 3.9 3.5   CHLORIDE mmol/L 99 98   CO2 mmol/L 32.0* 32.0*   BUN mg/dL 15 17   CREATININE mg/dL 2.69* 3.04*   GLUCOSE mg/dL 85 77 "     Cr Clearance Estimated Creatinine Clearance: 15.6 mL/min (A) (by C-G formula based on SCr of 2.69 mg/dL (H)).  Coag     HbA1C No results found for: HGBA1C  Blood Glucose No results found for: POCGLU  Infection     CMP   Results from last 7 days   Lab Units 02/11/22  0604 02/09/22  1034   SODIUM mmol/L 139 139   POTASSIUM mmol/L 3.9 3.5   CHLORIDE mmol/L 99 98   CO2 mmol/L 32.0* 32.0*   BUN mg/dL 15 17   CREATININE mg/dL 2.69* 3.04*   GLUCOSE mg/dL 85 77     ABG      UA      SHERRY  No results found for: POCMETH, POCAMPHET, POCBARBITUR, POCBENZO, POCCOCAINE, POCOPIATES, POCOXYCODO, POCPHENCYC, POCPROPOXY, POCTHC, POCTRICYC  Radiology(recent) XR Chest PA & Lateral    Result Date: 2/9/2022  Findings compatible with fibrotic changes and/or atelectasis in the mid and lower lung zones bilaterally with probable bronchiectasis. This is a new finding since the most recent chest x-ray from 2016 is felt to have progressed since the 9/9/2021 chest CT  Electronically Signed By-Wesley Mcallister On:2/9/2022 11:15 AM This report was finalized on [id number] by  Wesley Mcallister, .      There are no hospital problems to display for this patient.      Assessment/Plan       * No active hospital problems. *      89 y.o. male end-stage renal disease dialyzing through left jugular catheter referred from Dr. Mario Mclean.  I discussed with him and his family about proceeding with right arm fistula creation.  Risk benefits alternatives all discussed.    I discussed the patients findings and my recommendations with patient, family and nursing staff.    Drew Gunter MD  02/11/22  07:27 EST    Please call my office with any question: [phone]

## 2022-02-11 NOTE — DISCHARGE INSTRUCTIONS
[facility]  Manpreet Marlow, Gian Salinas Scherrer ,Noel, Mario  [address], [address]  [phone]    Post-Operative Instructions for AV Fistula / Graft   Diet: Regular Diet    Medications: Take your regularly scheduled medications on the day of your surgery, unless your doctor has directed you otherwise. You may be sent home with a prescription for pain medication, follow the directions as prescribed.    Activity Restrictions / Driving: Avoid lifting more than 15 pounds or other activities that stress or compress the access area. No driving for the remainder of the day after surgery. You may drive when you no longer are taking narcotic pain medications. If a nerve block was done to numb your arm for surgery, you will be placed in an arm sling.  This numbness and inability to move the arm can last for as little as 6 hours but as many as 18.  The sling should be used during this time but can be removed when sensation and movement of your arm is normal and does not need to be used after that. Use of the arm is encouraged after the surgery.    Incision Care: Some bruising is normal. If you have drainage from the incision please notify the office. Dressing should be removed in 48 hours. After dressing is removed, it is OK to shower. Do not submerge incision until cleared by your surgeon (bath or swimming).    Bathing and Showering: You may shower after you remove your dressing.    Follow-up Appointments: You will need to return to the office for a follow-up visit within 1-3 weeks after your surgery. Please make sure you have your appointment scheduled, call [phone].    The patient (you) should:  1. Avoid wearing tight constrictive clothing over that arm.  2. Avoid wearing jewelry that is tight, such as a watch on the access arm.  3. Avoid carrying heavy objects.  4. Avoid purse straps over the fistula.  5. Avoid sleeping on the arm or keeping it bent for extended periods of time.  6.  "Each day, using your opposite hand, feel over the fistula for the \"thrill\" or vibration that is normally present.    Fistula Information / Care:  ·  It is normal to have swelling in the surgical area. To help control this swelling, you should elevate your arm on a pillow.  ·  Wiggle your fingers and clinch your fist 10 times every hour, while awake, for the first 5-7 days. Also, bend and straighten at the elbow to regain normal range of motion. These exercises are designed to promote circulation in the fingers and aid in draining away the excess fluid accumulation in the immediate area.  · No blood pressures or needle sticks in the arm with your access.    Call the office for the followin. Fever greater than 101.0  2. Uncontrolled pain. This is on a scale of 1-10 (10 being the worst pain imaginable) your pain is a level 7 or above.  3. It is important that you notify our office if you are having numbness and significant pain in the extremity in which you have just had surgery!  4. Decreased or absent thrill.  5. Nausea, diarrhea, and/or vomiting that continue for 12-24 hours.  6. Signs of an infection: redness, increased swelling, drainage, fever and/or chills.  7. Chest pain or difficulty breathing.    The fistula or graft CAN NOT be used until the MD has given written approval. Generally, a graft will be ready to use in 2 weeks, and a fistula will be ready to use in 6-8 weeks.     If you have further questions after reading this handout, the office is open from 8:30am to 5:00pm Monday through Friday. Call [phone].    "

## 2022-02-28 ENCOUNTER — APPOINTMENT (OUTPATIENT)
Dept: VASCULAR SURGERY | Facility: HOSPITAL | Age: 87
End: 2022-02-28

## 2022-02-28 PROCEDURE — G0463 HOSPITAL OUTPT CLINIC VISIT: HCPCS

## 2022-03-01 ENCOUNTER — TRANSCRIBE ORDERS (OUTPATIENT)
Dept: ADMINISTRATIVE | Facility: HOSPITAL | Age: 87
End: 2022-03-01

## 2022-03-01 DIAGNOSIS — N18.6 END STAGE RENAL DISEASE: Primary | ICD-10-CM

## 2022-03-01 DIAGNOSIS — Z99.2 ENCOUNTER FOR EXTRACORPOREAL DIALYSIS: ICD-10-CM

## 2022-04-11 ENCOUNTER — APPOINTMENT (OUTPATIENT)
Dept: VASCULAR SURGERY | Facility: HOSPITAL | Age: 87
End: 2022-04-11

## 2022-04-11 ENCOUNTER — APPOINTMENT (OUTPATIENT)
Dept: CARDIOLOGY | Facility: HOSPITAL | Age: 87
End: 2022-04-11

## 2022-04-18 ENCOUNTER — HOSPITAL ENCOUNTER (OUTPATIENT)
Dept: CARDIOLOGY | Facility: HOSPITAL | Age: 87
Discharge: HOME | End: 2022-04-18

## 2022-04-18 ENCOUNTER — APPOINTMENT (OUTPATIENT)
Dept: VASCULAR SURGERY | Facility: HOSPITAL | Age: 87
End: 2022-04-18

## 2022-04-18 DIAGNOSIS — Z99.2 ENCOUNTER FOR EXTRACORPOREAL DIALYSIS: ICD-10-CM

## 2022-04-18 DIAGNOSIS — N18.6 END STAGE RENAL DISEASE: ICD-10-CM

## 2022-04-18 LAB
BH CV VAS DIALYSIS ARTERIAL ANASTOMOSIS EDV: 179 CM/SEC
BH CV VAS DIALYSIS ARTERIAL ANASTOMOSIS PSV: 424 CM/SEC
BH CV VAS DIALYSIS CONDUIT DIST DEPTH: 0.22 CM
BH CV VAS DIALYSIS CONDUIT DIST DIAMETER: 0.57 CM
BH CV VAS DIALYSIS CONDUIT DIST EDV: 89 CM/SEC
BH CV VAS DIALYSIS CONDUIT DIST FLOW VOL: 1065 ML/MIN
BH CV VAS DIALYSIS CONDUIT DIST PSV: 148 CM/SEC
BH CV VAS DIALYSIS CONDUIT MID DEPTH: 0.27 CM
BH CV VAS DIALYSIS CONDUIT MID DIAMETER: 0.77 CM
BH CV VAS DIALYSIS CONDUIT MID EDV: 71 CM/SEC
BH CV VAS DIALYSIS CONDUIT MID FLOW VOL: 1299 ML/MIN
BH CV VAS DIALYSIS CONDUIT MID PSV: 139 CM/SEC
BH CV VAS DIALYSIS CONDUIT PROX DEPTH: 0.44 CM
BH CV VAS DIALYSIS CONDUIT PROX DIAMETER: 0.2 CM
BH CV VAS DIALYSIS CONDUIT PROX EDV: 494 CM/SEC
BH CV VAS DIALYSIS CONDUIT PROX FLOW VOL: 231 ML/MIN
BH CV VAS DIALYSIS CONDUIT PROX PSV: 863 CM/SEC
BH CV VAS DIALYSIS CONDUIT PROX/MID DEPTH: 0.29 CM
BH CV VAS DIALYSIS CONDUIT PROX/MID DIAMETER: 0.73 CM
BH CV VAS DIALYSIS CONDUIT PROX/MID EDV: 94 CM/SEC
BH CV VAS DIALYSIS CONDUIT PROX/MID FLOW VOL: 1127 ML/MIN
BH CV VAS DIALYSIS CONDUIT PROX/MID PSV: 207 CM/SEC
BH CV VAS DIALYSIS PRE-INFLOW BRACHIAL DIAMETER: 0.53 CM
BH CV VAS DIALYSIS PRE-INFLOW BRACHIAL EDV: 143 CM/SEC
BH CV VAS DIALYSIS PRE-INFLOW BRACHIAL FLOW VOL: 1564 ML/MIN
BH CV VAS DIALYSIS PRE-INFLOW BRACHIAL PSV: 299 CM/SEC
BH CV VAS DIALYSIS VENOUS OUTFLOW CEPHALIC ARCH DIAMETE: 0.73 CM
BH CV VAS DIALYSIS VENOUS OUTFLOW CEPHALIC ARCH EDV: 79 CM/SEC
BH CV VAS DIALYSIS VENOUS OUTFLOW CEPHALIC ARCH PSV: 131 CM/SEC
BH CV VAS DIALYSIS VENOUS OUTFLOW SUBCLAVIAN DIAMETER: 0.62 CM
BH CV VAS DIALYSIS VENOUS OUTFLOW SUBCLAVIAN EDV: 69 CM/SEC
BH CV VAS DIALYSIS VENOUS OUTFLOW SUBCLAVIAN PSV: 123 CM/SEC
MAXIMAL PREDICTED HEART RATE: 131 BPM
STRESS TARGET HR: 111 BPM

## 2022-04-18 PROCEDURE — 93990 DOPPLER FLOW TESTING: CPT

## 2022-04-18 PROCEDURE — G0463 HOSPITAL OUTPT CLINIC VISIT: HCPCS

## 2022-04-22 ENCOUNTER — TRANSCRIBE ORDERS (OUTPATIENT)
Dept: ADMINISTRATIVE | Facility: HOSPITAL | Age: 87
End: 2022-04-22

## 2022-04-22 DIAGNOSIS — N18.6 END STAGE RENAL DISEASE: Primary | ICD-10-CM

## 2022-05-13 ENCOUNTER — TRANSCRIBE ORDERS (OUTPATIENT)
Dept: ADMINISTRATIVE | Facility: HOSPITAL | Age: 87
End: 2022-05-13

## 2022-05-13 DIAGNOSIS — C82.90 FOLLICULAR LYMPHOMA, UNSPECIFIED FOLLICULAR LYMPHOMA TYPE, UNSPECIFIED BODY REGION: Primary | ICD-10-CM

## 2022-05-20 ENCOUNTER — HOSPITAL ENCOUNTER (OUTPATIENT)
Dept: CT IMAGING | Facility: HOSPITAL | Age: 87
Discharge: HOME | End: 2022-05-20
Admitting: INTERNAL MEDICINE

## 2022-05-20 DIAGNOSIS — C82.90 FOLLICULAR LYMPHOMA, UNSPECIFIED FOLLICULAR LYMPHOMA TYPE, UNSPECIFIED BODY REGION: ICD-10-CM

## 2022-05-20 PROCEDURE — 74176 CT ABD & PELVIS W/O CONTRAST: CPT

## 2022-05-20 PROCEDURE — 71250 CT THORAX DX C-: CPT

## 2022-06-29 RX ORDER — CLOPIDOGREL BISULFATE 75 MG/1
75 TABLET ORAL DAILY
Qty: 90 TABLET | Refills: 1 | Status: SHIPPED | OUTPATIENT
Start: 2022-06-29

## 2022-07-18 ENCOUNTER — APPOINTMENT (OUTPATIENT)
Dept: CARDIOLOGY | Facility: HOSPITAL | Age: 87
End: 2022-07-18

## 2022-08-08 ENCOUNTER — APPOINTMENT (OUTPATIENT)
Dept: VASCULAR SURGERY | Facility: HOSPITAL | Age: 87
End: 2022-08-08

## 2022-08-08 ENCOUNTER — HOSPITAL ENCOUNTER (OUTPATIENT)
Dept: CARDIOLOGY | Facility: HOSPITAL | Age: 87
Discharge: HOME | End: 2022-08-08

## 2022-08-08 DIAGNOSIS — N18.6 END STAGE RENAL DISEASE: ICD-10-CM

## 2022-08-08 LAB
BH CV VAS DIALYSIS ARTERIAL ANASTOMOSIS DIAMETER: 0.33 CM
BH CV VAS DIALYSIS ARTERIAL ANASTOMOSIS EDV: 424 CM/SEC
BH CV VAS DIALYSIS ARTERIAL ANASTOMOSIS PSV: 873 CM/SEC
BH CV VAS DIALYSIS CONDUIT DIST DEPTH: 0.17 CM
BH CV VAS DIALYSIS CONDUIT DIST DIAMETER: 0.61 CM
BH CV VAS DIALYSIS CONDUIT DIST EDV: 60 CM/SEC
BH CV VAS DIALYSIS CONDUIT DIST FLOW VOL: 917 ML/MIN
BH CV VAS DIALYSIS CONDUIT DIST PSV: 113 CM/SEC
BH CV VAS DIALYSIS CONDUIT MID DEPTH: 0.19 CM
BH CV VAS DIALYSIS CONDUIT MID DIAMETER: 0.69 CM
BH CV VAS DIALYSIS CONDUIT MID EDV: 77 CM/SEC
BH CV VAS DIALYSIS CONDUIT MID FLOW VOL: 1031 ML/MIN
BH CV VAS DIALYSIS CONDUIT MID PSV: 139 CM/SEC
BH CV VAS DIALYSIS CONDUIT PROX DEPTH: 0.33 CM
BH CV VAS DIALYSIS CONDUIT PROX DIAMETER: 0.43 CM
BH CV VAS DIALYSIS CONDUIT PROX EDV: 256 CM/SEC
BH CV VAS DIALYSIS CONDUIT PROX FLOW VOL: 941 ML/MIN
BH CV VAS DIALYSIS CONDUIT PROX PSV: 505 CM/SEC
BH CV VAS DIALYSIS PRE-INFLOW BRACHIAL DIAMETER: 0.55 CM
BH CV VAS DIALYSIS PRE-INFLOW BRACHIAL EDV: 103 CM/SEC
BH CV VAS DIALYSIS PRE-INFLOW BRACHIAL FLOW VOL: 1260 ML/MIN
BH CV VAS DIALYSIS PRE-INFLOW BRACHIAL PSV: 254 CM/SEC
BH CV VAS DIALYSIS VENOUS OUTFLOW CEPHALIC ARCH DIAMETE: 0.61 CM
BH CV VAS DIALYSIS VENOUS OUTFLOW CEPHALIC ARCH EDV: 224 CM/SEC
BH CV VAS DIALYSIS VENOUS OUTFLOW CEPHALIC ARCH PSV: 428 CM/SEC
BH CV VAS DIALYSIS VENOUS OUTFLOW SUBCLAVIAN DIAMETER: 1.19 CM
BH CV VAS DIALYSIS VENOUS OUTFLOW SUBCLAVIAN EDV: 81 CM/SEC
BH CV VAS DIALYSIS VENOUS OUTFLOW SUBCLAVIAN PSV: 185 CM/SEC
MAXIMAL PREDICTED HEART RATE: 130 BPM
STRESS TARGET HR: 111 BPM

## 2022-08-08 PROCEDURE — 93990 DOPPLER FLOW TESTING: CPT

## 2022-08-08 PROCEDURE — G0463 HOSPITAL OUTPT CLINIC VISIT: HCPCS

## 2022-08-24 ENCOUNTER — TRANSCRIBE ORDERS (OUTPATIENT)
Dept: ADMINISTRATIVE | Facility: HOSPITAL | Age: 87
End: 2022-08-24

## 2022-08-24 ENCOUNTER — LAB (OUTPATIENT)
Dept: LAB | Facility: HOSPITAL | Age: 87
End: 2022-08-24

## 2022-08-24 DIAGNOSIS — N18.6 ESRD (END STAGE RENAL DISEASE) ON DIALYSIS: ICD-10-CM

## 2022-08-24 DIAGNOSIS — Z99.2 ESRD (END STAGE RENAL DISEASE) ON DIALYSIS: Primary | ICD-10-CM

## 2022-08-24 DIAGNOSIS — N18.6 ESRD (END STAGE RENAL DISEASE) ON DIALYSIS: Primary | ICD-10-CM

## 2022-08-24 DIAGNOSIS — Z99.2 ESRD (END STAGE RENAL DISEASE) ON DIALYSIS: ICD-10-CM

## 2022-08-24 PROCEDURE — U0005 INFEC AGEN DETEC AMPLI PROBE: HCPCS

## 2022-08-24 PROCEDURE — U0004 COV-19 TEST NON-CDC HGH THRU: HCPCS

## 2022-08-24 PROCEDURE — C9803 HOPD COVID-19 SPEC COLLECT: HCPCS

## 2022-08-25 LAB — SARS-COV-2 ORF1AB RESP QL NAA+PROBE: NOT DETECTED

## 2022-08-26 ENCOUNTER — HOSPITAL ENCOUNTER (OUTPATIENT)
Facility: HOSPITAL | Age: 87
Setting detail: HOSPITAL OUTPATIENT SURGERY
Discharge: HOME | End: 2022-08-26
Attending: SURGERY | Admitting: SURGERY

## 2022-08-26 VITALS
SYSTOLIC BLOOD PRESSURE: 91 MMHG | WEIGHT: 136.47 LBS | DIASTOLIC BLOOD PRESSURE: 48 MMHG | HEIGHT: 69 IN | OXYGEN SATURATION: 100 % | HEART RATE: 61 BPM | RESPIRATION RATE: 18 BRPM | BODY MASS INDEX: 20.21 KG/M2 | TEMPERATURE: 98 F

## 2022-08-26 LAB
ANION GAP SERPL CALCULATED.3IONS-SCNC: 11 MMOL/L (ref 5–15)
BUN SERPL-MCNC: 18 MG/DL (ref 8–23)
BUN/CREAT SERPL: 5.2 (ref 7–25)
CALCIUM SPEC-SCNC: 8.9 MG/DL (ref 8.2–9.6)
CHLORIDE SERPL-SCNC: 95 MMOL/L (ref 98–107)
CO2 SERPL-SCNC: 30 MMOL/L (ref 22–29)
CREAT SERPL-MCNC: 3.45 MG/DL (ref 0.76–1.27)
EGFRCR SERPLBLD CKD-EPI 2021: 16.2 ML/MIN/1.73
GLUCOSE SERPL-MCNC: 81 MG/DL (ref 65–99)
POTASSIUM SERPL-SCNC: 3.5 MMOL/L (ref 3.5–5.2)
SODIUM SERPL-SCNC: 136 MMOL/L (ref 136–145)

## 2022-08-26 PROCEDURE — C1769 GUIDE WIRE: HCPCS | Performed by: SURGERY

## 2022-08-26 PROCEDURE — 0 IOPAMIDOL PER 1 ML: Performed by: SURGERY

## 2022-08-26 PROCEDURE — C1725 CATH, TRANSLUMIN NON-LASER: HCPCS | Performed by: SURGERY

## 2022-08-26 PROCEDURE — C1894 INTRO/SHEATH, NON-LASER: HCPCS | Performed by: SURGERY

## 2022-08-26 PROCEDURE — C1887 CATHETER, GUIDING: HCPCS | Performed by: SURGERY

## 2022-08-26 PROCEDURE — 80048 BASIC METABOLIC PNL TOTAL CA: CPT | Performed by: SURGERY

## 2022-08-26 RX ORDER — METOPROLOL SUCCINATE 25 MG/1
12.5 TABLET, EXTENDED RELEASE ORAL 2 TIMES DAILY
COMMUNITY

## 2022-08-26 RX ORDER — LIDOCAINE HYDROCHLORIDE 20 MG/ML
INJECTION, SOLUTION INFILTRATION; PERINEURAL AS NEEDED
Status: DISCONTINUED | OUTPATIENT
Start: 2022-08-26 | End: 2022-08-26 | Stop reason: HOSPADM

## 2022-08-26 RX ORDER — HYDRALAZINE HYDROCHLORIDE 50 MG/1
50 TABLET, FILM COATED ORAL 4 TIMES DAILY
COMMUNITY

## 2022-08-26 RX ORDER — POTASSIUM CHLORIDE 750 MG/1
10 TABLET, FILM COATED, EXTENDED RELEASE ORAL 3 TIMES DAILY
COMMUNITY

## 2022-08-26 RX ORDER — WARFARIN SODIUM 5 MG/1
5 TABLET ORAL
COMMUNITY

## 2022-08-26 NOTE — DISCHARGE INSTRUCTIONS
[facility]  Manpreet Marlow, Gian Salinas Scherrer, Thomas  [address]  [place] [id number] [phone]        Discharge Instructions for Fistulogram/ Shuntogram    Please remove bolster stitch from the patient's right arm on 8/27/2022.    Okay to start accessing right arm fistula for dialysis in 1 week.        Go home, rest and take it easy today.       You may experience some dizziness or memory loss from the anesthesia.  This may last for the next 24 hours.  Someone should plan on staying with you for the first 24 hours for your safety.    Do not make any important legal decisions or sign any legal papers for the next 24 hours.      Eat and drink lightly today.  Start off with liquids, jello, soup, crackers or other bland foods at first. You may advance your diet tomorrow as tolerated as long as you do not experience any nausea or vomiting.    No driving for the remainder of the day.  You may resume limited driving tomorrow, if necessary.     You may resume routine medications including blood thinners. However, Glucophage should not be started until 72 hours after the dye is given.      No lifting over 10 pounds and no strenuous activity for the next 2-3 days with the involved arm.        Limit going up and down steps for 2 days.    Leave the dressing on until tomorrow morning.  You may then take this off, as well as the small see through dressing with gauze, tomorrow.  If this does not come off easily, do not pull this off.  If it is stuck, shower and let the warm water loosen it before removal.     Check your groin/ arm dressing regularly for bleeding through the dressing (under the pressure dressing).  A small amount of blood contained by the gauze is normal: the whole dressing should not be filled with blood, or leaking out under the sides.  If the bandage is saturated, you may remove it and replace it with a new dressing.      If you experience bleeding through the  gauze/ clear dressing, lay flat and have someone apply direct pressure for 15 minutes.  If bleeding has stopped after this, you may put a clean gauze and tape over the area.  Continue to lie flat for 1-2 hours.  If bleeding continues after 15 minutes of pressure, call us at [phone]. There is an MD available after hours for urgent matters.            If you experience heavy bleeding continue to hold firm pressure and call 911.    Do not call the MD on call.     If your doctor gave you a prescription for a narcotic pill (Tylenol #3, Vicodin, Hydrocodone, Oxycodone or Percocet), you may not drive a vehicle or operate machinery while taking this type of medication.     13. Severe pain is not expected after this procedure.  If you experience severe pain, please call our office at [phone].          Remember to contact our office for any of the following:    Fever > 101 degrees  Severe pain   Severe nausea or vomiting   Significant bleeding of your incisions  Drainage that has a bad smell or is yellow or green in appearance  Any other questions or concerns

## 2022-08-26 NOTE — OP NOTE
Date of Admission:  8/26/2022  Today's Date:  08/26/22  Drew Gunter MD  [facility]    Preoperative Diagnosis:   Poorly functioning hemodialysis fistula/shunt    Postoperative Diagnosis:   Same    Procedure Performed:     CPT:  17806 Fistulogram with peripheral angioplasty    Surgeon:   Drew Gunter MD    Assistant:    None    Anesthesia:   Local    Estimated Blood Loss:   Minimal    Findings:    Fistulogram performed in a retrograde to blood flow manner with catheter placed in the mid brachial artery.  There was a moderate narrowing in the 2 cm of the perianastomotic fistula.  This was ballooned with a 5 mm balloon with some recoil.  I did not think after ballooning that this narrowing was restricting flow after angioplasty.  There was some cephalic arch/subclavian vein stenosis related to port and catheters.  This was not intervened upon as I did not feel it was restricting flow.  All wires catheters and sheath removed.  Bolster stitch applied for good hemostasis.    Implants:    Nothing was implanted during the procedure    Staff:   Scrub Person: Scott Castro, RRT  Documenter: Saira Singh  Invasive Nurse: Sandra Dodd, SANTOS; Meet Martinez, SANTOS    Specimen:   none    Complications:   none    Dispo:   to PACU    Indication for procedure:   89 y.o. male with right arm fistula.  He has had an infiltrations with access and is no longer allowing his fistula to be accessed.  I discussed with him the risk benefits alternatives to fistulogram with possible intervention.  Informed consent obtained.    Description of procedure:   Patient taken to the cath lab.  Right arm was sterilely prepped and draped in the usual manner.  Surgical timeout was performed.  Ultrasound-guided vascular access performed of the central portion of the fistulogram in a retrograde blood flow manner.  Ultimately upsizing to a 6-Uzbek Brite Tip sheath.  Glide catheter placed into the brachial artery.  Fistulogram performed with  above interventions.  A 5 mm angioplasty was performed of the perianastomotic fistula.  This had some elastic recoil that I did not think was restricting flow.  All wires, catheters, and sheaths removed.  Bolster stitch applied for good hemostasis.    Drew Gunter MD  08/26/22     There are no hospital problems to display for this patient.

## 2022-08-26 NOTE — CASE MANAGEMENT/SOCIAL WORK
Case Management/Social Work    Patient Name:  Barry Calhoun  YOB: 1932  MRN: [id number]  Admit Date:  8/26/2022      MARK called [facility] ([phone]) and spoke with Renu. Trip ID number is [id number]. Transportation was arranged from [facility] to patient home address. ETA is [phone]. Upon arrival,  will call nurse's station ([phone]). CM made aware.        Electronically signed by:  Castro Torres CMA  08/26/22 09:52 EDT

## 2022-09-21 ENCOUNTER — APPOINTMENT (OUTPATIENT)
Dept: VASCULAR SURGERY | Facility: HOSPITAL | Age: 87
End: 2022-09-21

## 2022-09-21 PROCEDURE — G0463 HOSPITAL OUTPT CLINIC VISIT: HCPCS

## 2022-09-22 ENCOUNTER — TRANSCRIBE ORDERS (OUTPATIENT)
Dept: ADMINISTRATIVE | Facility: HOSPITAL | Age: 87
End: 2022-09-22

## 2022-09-22 DIAGNOSIS — N18.6 END STAGE RENAL DISEASE: Primary | ICD-10-CM

## 2022-09-22 DIAGNOSIS — Z99.2 ENCOUNTER FOR EXTRACORPOREAL DIALYSIS: ICD-10-CM

## 2022-10-17 ENCOUNTER — APPOINTMENT (OUTPATIENT)
Dept: VASCULAR SURGERY | Facility: HOSPITAL | Age: 87
End: 2022-10-17

## 2022-10-17 ENCOUNTER — HOSPITAL ENCOUNTER (OUTPATIENT)
Dept: CARDIOLOGY | Facility: HOSPITAL | Age: 87
Discharge: HOME | End: 2022-10-17

## 2022-10-17 DIAGNOSIS — Z99.2 ENCOUNTER FOR EXTRACORPOREAL DIALYSIS: ICD-10-CM

## 2022-10-17 DIAGNOSIS — N18.6 END STAGE RENAL DISEASE: ICD-10-CM

## 2022-10-17 LAB
BH CV VAS DIALYSIS ARTERIAL ANASTOMOSIS DIAMETER: 0.33 CM
BH CV VAS DIALYSIS ARTERIAL ANASTOMOSIS EDV: 395 CM/SEC
BH CV VAS DIALYSIS ARTERIAL ANASTOMOSIS PSV: 827 CM/SEC
BH CV VAS DIALYSIS CONDUIT DIST DEPTH: 0.35 CM
BH CV VAS DIALYSIS CONDUIT DIST DIAMETER: 0.61 CM
BH CV VAS DIALYSIS CONDUIT DIST EDV: 65 CM/SEC
BH CV VAS DIALYSIS CONDUIT DIST FLOW VOL: 813 ML/MIN
BH CV VAS DIALYSIS CONDUIT DIST PSV: 105 CM/SEC
BH CV VAS DIALYSIS CONDUIT MID DEPTH: 0.26 CM
BH CV VAS DIALYSIS CONDUIT MID DIAMETER: 0.67 CM
BH CV VAS DIALYSIS CONDUIT MID EDV: 54 CM/SEC
BH CV VAS DIALYSIS CONDUIT MID FLOW VOL: 749 ML/MIN
BH CV VAS DIALYSIS CONDUIT MID PSV: 101 CM/SEC
BH CV VAS DIALYSIS CONDUIT PROX DEPTH: 0.26 CM
BH CV VAS DIALYSIS CONDUIT PROX DIAMETER: 0.7 CM
BH CV VAS DIALYSIS CONDUIT PROX EDV: 234 CM/SEC
BH CV VAS DIALYSIS CONDUIT PROX FLOW VOL: 2039 ML/MIN
BH CV VAS DIALYSIS CONDUIT PROX PSV: 507 CM/SEC
BH CV VAS DIALYSIS PRE-INFLOW BRACHIAL DIAMETER: 0.56 CM
BH CV VAS DIALYSIS PRE-INFLOW BRACHIAL EDV: 106 CM/SEC
BH CV VAS DIALYSIS PRE-INFLOW BRACHIAL FLOW VOL: 1047 ML/MIN
BH CV VAS DIALYSIS PRE-INFLOW BRACHIAL PSV: 261 CM/SEC
BH CV VAS DIALYSIS VENOUS OUTFLOW CEPHALIC ARCH DIAMETE: 0.49 CM
BH CV VAS DIALYSIS VENOUS OUTFLOW CEPHALIC ARCH EDV: 75 CM/SEC
BH CV VAS DIALYSIS VENOUS OUTFLOW CEPHALIC ARCH PSV: 118 CM/SEC
BH CV VAS DIALYSIS VENOUS OUTFLOW SUBCLAVIAN DIAMETER: 1.19 CM
BH CV VAS DIALYSIS VENOUS OUTFLOW SUBCLAVIAN EDV: 10 CM/SEC
BH CV VAS DIALYSIS VENOUS OUTFLOW SUBCLAVIAN PSV: 26 CM/SEC
MAXIMAL PREDICTED HEART RATE: 130 BPM
STRESS TARGET HR: 111 BPM

## 2022-10-17 PROCEDURE — 93990 DOPPLER FLOW TESTING: CPT

## 2022-10-17 PROCEDURE — G0463 HOSPITAL OUTPT CLINIC VISIT: HCPCS

## 2022-11-18 ENCOUNTER — HOSPITAL ENCOUNTER (OUTPATIENT)
Facility: HOSPITAL | Age: 87
Setting detail: HOSPITAL OUTPATIENT SURGERY
Discharge: HOME | End: 2022-11-18
Attending: SURGERY | Admitting: SURGERY

## 2022-11-18 VITALS
HEIGHT: 66 IN | OXYGEN SATURATION: 100 % | DIASTOLIC BLOOD PRESSURE: 63 MMHG | TEMPERATURE: 97.4 F | BODY MASS INDEX: 21.51 KG/M2 | RESPIRATION RATE: 18 BRPM | HEART RATE: 64 BPM | SYSTOLIC BLOOD PRESSURE: 134 MMHG | WEIGHT: 133.82 LBS

## 2022-11-18 PROCEDURE — C1725 CATH, TRANSLUMIN NON-LASER: HCPCS | Performed by: SURGERY

## 2022-11-18 PROCEDURE — C1894 INTRO/SHEATH, NON-LASER: HCPCS | Performed by: SURGERY

## 2022-11-18 PROCEDURE — C1769 GUIDE WIRE: HCPCS | Performed by: SURGERY

## 2022-11-18 PROCEDURE — C1874 STENT, COATED/COV W/DEL SYS: HCPCS | Performed by: SURGERY

## 2022-11-18 PROCEDURE — 99152 MOD SED SAME PHYS/QHP 5/>YRS: CPT | Performed by: SURGERY

## 2022-11-18 PROCEDURE — 0 IOPAMIDOL PER 1 ML: Performed by: SURGERY

## 2022-11-18 DEVICE — STENTGR ENDOPROSTH VIABAHN RO HEP 7F 8MM 15X120CM: Type: IMPLANTABLE DEVICE | Site: SUBCLAVIAN | Status: FUNCTIONAL

## 2022-11-18 RX ORDER — LIDOCAINE HYDROCHLORIDE 20 MG/ML
INJECTION, SOLUTION INFILTRATION; PERINEURAL
Status: DISCONTINUED | OUTPATIENT
Start: 2022-11-18 | End: 2022-11-18 | Stop reason: HOSPADM

## 2022-11-18 NOTE — DISCHARGE INSTRUCTIONS
[facility]  Manpreet Marlow, Gian Salinas Scherrer, Thomas  [address]  [place] [id number] [phone]        Discharge Instructions for Fistulogram/ Shuntogram    Please remove bolster suture from the right arm 11-19 or 11-20.    Okay to use right arm fistula for hemodialysis access    Go home, rest and take it easy today.       You may experience some dizziness or memory loss from the anesthesia.  This may last for the next 24 hours.  Someone should plan on staying with you for the first 24 hours for your safety.    Do not make any important legal decisions or sign any legal papers for the next 24 hours.      Eat and drink lightly today.  Start off with liquids, jello, soup, crackers or other bland foods at first. You may advance your diet tomorrow as tolerated as long as you do not experience any nausea or vomiting.    No driving for the remainder of the day.  You may resume limited driving tomorrow, if necessary.     You may resume routine medications including blood thinners. However, Glucophage should not be started until 72 hours after the dye is given.      No lifting over 10 pounds and no strenuous activity for the next 2-3 days with the involved arm.        Limit going up and down steps for 2 days.    Leave the dressing on until tomorrow morning.  You may then take this off, as well as the small see through dressing with gauze, tomorrow.  If this does not come off easily, do not pull this off.  If it is stuck, shower and let the warm water loosen it before removal.     Check your groin/ arm dressing regularly for bleeding through the dressing (under the pressure dressing).  A small amount of blood contained by the gauze is normal: the whole dressing should not be filled with blood, or leaking out under the sides.  If the bandage is saturated, you may remove it and replace it with a new dressing.      If you experience bleeding through the gauze/ clear dressing,  lay flat and have someone apply direct pressure for 15 minutes.  If bleeding has stopped after this, you may put a clean gauze and tape over the area.  Continue to lie flat for 1-2 hours.  If bleeding continues after 15 minutes of pressure, call us at [phone]. There is an MD available after hours for urgent matters.            If you experience heavy bleeding continue to hold firm pressure and call 911.    Do not call the MD on call.     If your doctor gave you a prescription for a narcotic pill (Tylenol #3, Vicodin, Hydrocodone, Oxycodone or Percocet), you may not drive a vehicle or operate machinery while taking this type of medication.     13. Severe pain is not expected after this procedure.  If you experience severe pain, please call our office at [phone].          Remember to contact our office for any of the following:    Fever > 101 degrees  Severe pain   Severe nausea or vomiting   Significant bleeding of your incisions  Drainage that has a bad smell or is yellow or green in appearance  Any other questions or concerns

## 2022-11-18 NOTE — OP NOTE
Date of Admission:  11/18/2022  Today's Date:  11/18/22  Drew Gunter MD  [facility]    Preoperative Diagnosis:   Poorly functioning hemodialysis fistula/shunt    Postoperative Diagnosis:   Same    Procedure Performed:     CPT:  77181 Fistulogram with peripheral stent placement  19240 Add on central angioplasty    Surgeon:   Drew Gunter MD    Assistant:    None    Anesthesia:   Local    Estimated Blood Loss:   Minimal    Findings:    Successful right arm fistulogram with cephalic arch multifocal stenosis angioplastied.  There was some contrast pooling at the cephalic arch insertion into the subclavian vein.  While the patient was not complaining of any discomfort post angioplasty I was concerned for a extravasation.  I elected to place a 8 x 15 covered Viabahn stent graft.  This resolved all angiographic issues.  There was also significant wire difficulty crossing the intersection of the right sided port and the left-sided tunneled catheter.  I ballooned this with a 8 mm balloon.    Implants:    Nothing was implanted during the procedure    Staff:   Scrub Person: Livia Kauffman  Documenter: Tieken, Soco, RN  Invasive Nurse: Ines Barcenas RN; Sharon Lubin RN    Specimen:   none    Complications:   none    Dispo:   to PACU    Indication for procedure:   89 y.o. male with end-stage renal disease on dialysis through left-sided catheter.  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  Risk benefits alternatives to fistulogram discussed.  Informed consent obtained    Description of procedure:   Patient was taken to the operating room.  Anesthesia was administered and a monitored setting.  Surgical sites were prepped and draped in the usual sterile fashion.  A full surgical timeout was performed.  Under ultrasound guidance the fistula was accessed using a micropuncture system.  Wire placement was confirmed with fluoroscopy.  Micro-needle was exchanged for the micro-sheath.  Diagnostic arteriovenous  fistulogram was performed.    Upsized to a 7 Luxembourgish sheath.  Above interventions and findings confirmed.  Bolster stitch applied for good hemostasis.    Drew Gunter MD  11/18/22     There are no hospital problems to display for this patient.

## 2022-11-22 NOTE — H&P
Name: Barry Calhoun Sr. ADMIT: 2022   : 1932  PCP: Pedro Purvis MD    MRN: [id number] LOS: 0 days   AGE/SEX: 89 y.o. male  ROOM: [address] [place] [address]    Pre-operative H&P    Patient Care Team:  Pedro Purvis MD as PCP - General (Family Medicine)  Pedro Purvis MD as PCP - Family Medicine  No chief complaint on file.    CC: Preop fistulogram.    Subjective     History of Present Illness  Persistent access related issues with right arm fistula.    Review of Systems   All other systems reviewed and are negative.      Past Medical History:   Diagnosis Date   • A-fib (Formerly [facility])    • Asthma    • Chronic renal disease    • COPD (chronic obstructive pulmonary disease) (Formerly [facility])    • Coronary heart disease     CABG   • CVA (cerebral vascular accident) (Formerly [facility])     recent CVA -- Patient and Family report this is not accurate   • Gout    • Hypertension      Past Surgical History:   Procedure Laterality Date   • ARTERIOVENOUS FISTULA/SHUNT SURGERY Right 2022    Procedure: RIGHT ARM BRACHIAL CEPHALIC ARTERIAL VENOUS FISTULA;  Surgeon: Drew Gunter MD;  Location: [facility];  Service: Vascular;  Laterality: Right;   • CARDIAC CATHETERIZATION  2016    [facility]   • CORONARY ARTERY BYPASS GRAFT      CABG X3, reoperation, 09; CAB and    • OTHER SURGICAL HISTORY  2016    loop recorder implant      Family History   Problem Relation Age of Onset   • Stroke Other      Social History     Tobacco Use   • Smoking status: Never   • Smokeless tobacco: Never   Vaping Use   • Vaping Use: Never used   Substance Use Topics   • Alcohol use: No   • Drug use: No     No medications prior to admission.       No current facility-administered medications for this encounter.      Patient has no known allergies.    Objective     Physical Exam:  Physical Exam  Constitutional:       Appearance: He is well-developed.   Pulmonary:      Effort: Pulmonary effort is normal. No respiratory distress.    Abdominal:      General: There is no distension.      Palpations: Abdomen is soft.   Neurological:      Mental Status: He is alert and oriented to person, place, and time.          Vital Signs and Labs:  Vital Signs No data found.    I/O:  No intake/output data recorded.    CBC      BMP       There are no hospital problems to display for this patient.      Assessment & Plan       * No active hospital problems. *      89 y.o. male patient with persistent dialysis access related issues with right arm fistula.  Has known central/cephalic arch disease.  Risk benefits alternatives to fistulogram discussed with him and his friends informed consent obtained.    I discussed the patients findings and my recommendations with patient, family and nursing staff.    Drew Gunter MD  11/22/22  10:55 EST    Please call my office with any question: [phone]

## 2022-12-21 ENCOUNTER — APPOINTMENT (OUTPATIENT)
Dept: VASCULAR SURGERY | Facility: HOSPITAL | Age: 87
End: 2022-12-21

## 2022-12-21 ENCOUNTER — APPOINTMENT (OUTPATIENT)
Dept: CARDIOLOGY | Facility: HOSPITAL | Age: 87
End: 2022-12-21

## 2023-02-03 ENCOUNTER — HOSPITAL ENCOUNTER (OUTPATIENT)
Dept: INTERVENTIONAL RADIOLOGY/VASCULAR | Facility: HOSPITAL | Age: 88
Discharge: HOME | End: 2023-02-03
Admitting: INTERNAL MEDICINE
Payer: MEDICARE

## 2023-02-03 VITALS — BODY MASS INDEX: 19.26 KG/M2 | HEIGHT: 69 IN | WEIGHT: 130 LBS

## 2023-02-03 DIAGNOSIS — Z99.2 ESRD (END STAGE RENAL DISEASE) ON DIALYSIS: ICD-10-CM

## 2023-02-03 DIAGNOSIS — N18.6 ESRD (END STAGE RENAL DISEASE) ON DIALYSIS: ICD-10-CM

## 2023-02-03 PROCEDURE — 0 LIDOCAINE 1 % SOLUTION: Performed by: RADIOLOGY

## 2023-02-03 RX ORDER — LIDOCAINE HYDROCHLORIDE 10 MG/ML
INJECTION, SOLUTION INFILTRATION; PERINEURAL AS NEEDED
Status: COMPLETED | OUTPATIENT
Start: 2023-02-03 | End: 2023-02-03

## 2023-02-03 RX ADMIN — LIDOCAINE HYDROCHLORIDE 9 ML: 10 INJECTION, SOLUTION INFILTRATION; PERINEURAL at 15:18

## 2023-02-03 NOTE — NURSING NOTE
Pt assisted by IR staff in moving from wheelchair onto stretcher laying semi fowlers. Left upper chest and current hemodialysis catheter in being prepped in sterile fashion using chlorhexidine scrub.

## 2023-02-03 NOTE — NURSING NOTE
Pt was brought back to IR dept via wheelchair. Pt's friend/significant other is in waiting room. Pt is alert but only oriented to name and birth date and knows he is in a hospital. Pt's friend consenting for procedure today. Pt is relaxed and cooperative. Pt skin is appropriate, warm, and dry. Pt was assisted in changing into a hospital gown, from waist up.

## 2023-02-03 NOTE — NURSING NOTE
Pt was discharged home in stable condition on room air. Pt left xray dept with his friend/significant other via wheelchair.  Dressing to left upper chest is dry and intact upon discharge.

## 2023-02-03 NOTE — NURSING NOTE
Pt's discharge instructions will be reviewed with his friend/significant other due to pt having some confusion. Pt will be provided paper copy of discharge instructions to take home with him today. Pt is being taken to [facility] waiting room via wheelchair to meet his friend.

## 2023-02-03 NOTE — NURSING NOTE
Dr. Dorado loosened scar tissue from dialysis catheter and removed it, tip intact, from pt's left chest. Local pressure being held at site for hemostasis.

## 2023-02-03 NOTE — NURSING NOTE
Hemostasis achieved and local dressing of bacitracin, maxorb, and coverderm applied to pt's left upper chest dialysis catheter removal site. Dressing is dry and intact.

## 2023-02-03 NOTE — DISCHARGE INSTRUCTIONS
You had your dialysis catheter removed today from your chest. Stay seated upright for at least the next 4-6 hours to prevent bleeding from site. If bleeding occurs, hold firm pressure at site until bleeding stops. If unable to stop bleeding, go to nearest ER for evaluation while continuing to hold pressure at site. Watch for signs and symptoms of infection at site such as increased redness or swelling, yellow/green drainage from site, or a developing fever over 101 degrees. If any of these symptoms occur, see your physician. Keep dressing clean and dry for the next 48 hours. After 48 hours, you may remove the dressing. If completely healed closed, you may shower/bathe as usual. If not completely healed closed, apply clean bandage every other day until fully healed and then you may shower/bathe as usual. Resume normal activity as you feel able. Follow up with your physician as needed/as usual. If any questions once you're home, you can reach the Interventional Radiology Dept., Monday-Friday, from 7AM-5PM at [phone].     ***You may restart your blood thinner medication tomorrow.

## 2023-02-03 NOTE — NURSING NOTE
Dr. Dorado draped left upper chest in sterile fashion and is numbing area for longterm hemodialysis catheter removal.

## 2023-08-03 RX ORDER — CLOPIDOGREL BISULFATE 75 MG/1
75 TABLET ORAL DAILY
Qty: 30 TABLET | Refills: 0 | Status: SHIPPED | OUTPATIENT
Start: 2023-08-03

## 2023-08-21 ENCOUNTER — HOSPITAL ENCOUNTER (OUTPATIENT)
Dept: INTERVENTIONAL RADIOLOGY/VASCULAR | Facility: HOSPITAL | Age: 88
Discharge: HOME | End: 2023-08-21
Admitting: RADIOLOGY
Payer: MEDICARE

## 2023-08-21 VITALS
SYSTOLIC BLOOD PRESSURE: 123 MMHG | HEART RATE: 70 BPM | DIASTOLIC BLOOD PRESSURE: 63 MMHG | OXYGEN SATURATION: 100 % | RESPIRATION RATE: 17 BRPM

## 2023-08-21 DIAGNOSIS — N18.6 ESRD (END STAGE RENAL DISEASE) ON DIALYSIS: ICD-10-CM

## 2023-08-21 DIAGNOSIS — Z99.2 ESRD (END STAGE RENAL DISEASE) ON DIALYSIS: ICD-10-CM

## 2023-08-21 PROCEDURE — C1725 CATH, TRANSLUMIN NON-LASER: HCPCS

## 2023-08-21 PROCEDURE — C1894 INTRO/SHEATH, NON-LASER: HCPCS

## 2023-08-21 PROCEDURE — 25010000002 FENTANYL CITRATE (PF) 50 MCG/ML SOLUTION: Performed by: RADIOLOGY

## 2023-08-21 PROCEDURE — 25510000001 IOPAMIDOL PER 1 ML: Performed by: INTERNAL MEDICINE

## 2023-08-21 PROCEDURE — C1769 GUIDE WIRE: HCPCS

## 2023-08-21 PROCEDURE — 0 LIDOCAINE 1 % SOLUTION: Performed by: RADIOLOGY

## 2023-08-21 RX ORDER — FENTANYL CITRATE 50 UG/ML
INJECTION, SOLUTION INTRAMUSCULAR; INTRAVENOUS AS NEEDED
Status: COMPLETED | OUTPATIENT
Start: 2023-08-21 | End: 2023-08-21

## 2023-08-21 RX ORDER — LIDOCAINE HYDROCHLORIDE 10 MG/ML
INJECTION, SOLUTION INFILTRATION; PERINEURAL AS NEEDED
Status: COMPLETED | OUTPATIENT
Start: 2023-08-21 | End: 2023-08-21

## 2023-08-21 RX ADMIN — FENTANYL CITRATE 100 MCG: 50 INJECTION, SOLUTION INTRAMUSCULAR; INTRAVENOUS at 14:22

## 2023-08-21 RX ADMIN — LIDOCAINE HYDROCHLORIDE 4 ML: 10 INJECTION, SOLUTION INFILTRATION; PERINEURAL at 14:16

## 2023-08-21 RX ADMIN — IOPAMIDOL 45 ML: 755 INJECTION, SOLUTION INTRAVENOUS at 14:49

## 2023-10-20 ENCOUNTER — HOSPITAL ENCOUNTER (OUTPATIENT)
Dept: INTERVENTIONAL RADIOLOGY/VASCULAR | Facility: HOSPITAL | Age: 88
Discharge: HOME | End: 2023-10-20
Payer: MEDICARE

## 2023-10-20 VITALS
HEIGHT: 69 IN | WEIGHT: 123 LBS | HEART RATE: 76 BPM | RESPIRATION RATE: 17 BRPM | OXYGEN SATURATION: 97 % | BODY MASS INDEX: 18.22 KG/M2

## 2023-10-20 DIAGNOSIS — Z99.2 ESRD (END STAGE RENAL DISEASE) ON DIALYSIS: ICD-10-CM

## 2023-10-20 DIAGNOSIS — N18.6 ESRD (END STAGE RENAL DISEASE) ON DIALYSIS: ICD-10-CM

## 2023-10-20 PROCEDURE — 25510000001 IOPAMIDOL PER 1 ML: Performed by: INTERNAL MEDICINE

## 2023-10-20 PROCEDURE — 25010000002 LIDOCAINE 1 % SOLUTION: Performed by: RADIOLOGY

## 2023-10-20 PROCEDURE — C1894 INTRO/SHEATH, NON-LASER: HCPCS

## 2023-10-20 RX ORDER — LIDOCAINE HYDROCHLORIDE 10 MG/ML
INJECTION, SOLUTION INFILTRATION; PERINEURAL AS NEEDED
Status: DISCONTINUED | OUTPATIENT
Start: 2023-10-20 | End: 2023-10-21 | Stop reason: HOSPADM

## 2023-10-20 RX ORDER — FENTANYL CITRATE 50 UG/ML
INJECTION, SOLUTION INTRAMUSCULAR; INTRAVENOUS AS NEEDED
Status: DISCONTINUED | OUTPATIENT
Start: 2023-10-20 | End: 2023-10-21 | Stop reason: HOSPADM

## 2023-10-20 RX ADMIN — LIDOCAINE HYDROCHLORIDE 1 ML: 10 INJECTION, SOLUTION INFILTRATION; PERINEURAL at 14:04

## 2023-10-20 RX ADMIN — IOPAMIDOL 25 ML: 755 INJECTION, SOLUTION INTRAVENOUS at 14:11

## 2023-10-20 NOTE — DISCHARGE INSTRUCTIONS
Take it easy at home for 48 hours.  No lifting more than 10 pounds for that period of time.  Keep dressing clean and dry, it can be changed at next dialysis appointment.  Some blood on the dressing is to be expected.  If dressing becomes saturated, hold firm but gentle pressure until it stops.  If you can't get it to stop, return to ER for treatment.  If severe pain, dizziness, racing heart, shortness of breath occur suddenly, call 911.  Look for signs of infection: redness, swelling, discharge, fever, etc., and report these to your family doctor immediately.  Follow up with referring provider for results and/or plan of care.

## 2023-12-18 ENCOUNTER — HOSPITAL ENCOUNTER (OUTPATIENT)
Dept: INTERVENTIONAL RADIOLOGY/VASCULAR | Facility: HOSPITAL | Age: 88
Discharge: HOME | End: 2023-12-18
Admitting: INTERNAL MEDICINE
Payer: MEDICARE

## 2023-12-18 VITALS
HEIGHT: 69 IN | RESPIRATION RATE: 11 BRPM | WEIGHT: 130 LBS | OXYGEN SATURATION: 100 % | HEART RATE: 71 BPM | BODY MASS INDEX: 19.26 KG/M2 | SYSTOLIC BLOOD PRESSURE: 112 MMHG | DIASTOLIC BLOOD PRESSURE: 91 MMHG

## 2023-12-18 DIAGNOSIS — N18.6 ESRD (END STAGE RENAL DISEASE): ICD-10-CM

## 2023-12-18 PROCEDURE — 25010000002 LIDOCAINE 1 % SOLUTION: Performed by: RADIOLOGY

## 2023-12-18 PROCEDURE — C1894 INTRO/SHEATH, NON-LASER: HCPCS

## 2023-12-18 PROCEDURE — C1769 GUIDE WIRE: HCPCS

## 2023-12-18 PROCEDURE — 25010000002 FENTANYL CITRATE (PF) 50 MCG/ML SOLUTION: Performed by: RADIOLOGY

## 2023-12-18 PROCEDURE — 25010000002 ONDANSETRON PER 1 MG: Performed by: RADIOLOGY

## 2023-12-18 PROCEDURE — C1725 CATH, TRANSLUMIN NON-LASER: HCPCS

## 2023-12-18 PROCEDURE — 25510000001 IOPAMIDOL PER 1 ML: Performed by: INTERNAL MEDICINE

## 2023-12-18 RX ORDER — LEVOTHYROXINE SODIUM 0.03 MG/1
25 TABLET ORAL
COMMUNITY

## 2023-12-18 RX ORDER — LIDOCAINE HYDROCHLORIDE 10 MG/ML
INJECTION, SOLUTION INFILTRATION; PERINEURAL AS NEEDED
Status: COMPLETED | OUTPATIENT
Start: 2023-12-18 | End: 2023-12-18

## 2023-12-18 RX ORDER — ONDANSETRON 2 MG/ML
INJECTION INTRAMUSCULAR; INTRAVENOUS AS NEEDED
Status: COMPLETED | OUTPATIENT
Start: 2023-12-18 | End: 2023-12-18

## 2023-12-18 RX ORDER — FENTANYL CITRATE 50 UG/ML
INJECTION, SOLUTION INTRAMUSCULAR; INTRAVENOUS AS NEEDED
Status: COMPLETED | OUTPATIENT
Start: 2023-12-18 | End: 2023-12-18

## 2023-12-18 RX ORDER — LANOLIN ALCOHOL/MO/W.PET/CERES
1000 CREAM (GRAM) TOPICAL DAILY
COMMUNITY

## 2023-12-18 RX ORDER — TAMSULOSIN HYDROCHLORIDE 0.4 MG/1
1 CAPSULE ORAL NIGHTLY
COMMUNITY

## 2023-12-18 RX ADMIN — IOPAMIDOL 20 ML: 755 INJECTION, SOLUTION INTRAVENOUS at 15:01

## 2023-12-18 RX ADMIN — ONDANSETRON 4 MG: 2 INJECTION INTRAMUSCULAR; INTRAVENOUS at 14:51

## 2023-12-18 RX ADMIN — LIDOCAINE HYDROCHLORIDE 3 ML: 10 INJECTION, SOLUTION INFILTRATION; PERINEURAL at 14:43

## 2023-12-18 RX ADMIN — FENTANYL CITRATE 50 MCG: 50 INJECTION, SOLUTION INTRAMUSCULAR; INTRAVENOUS at 14:52

## 2024-02-12 ENCOUNTER — INPATIENT HOSPITAL (OUTPATIENT)
Dept: URBAN - METROPOLITAN AREA HOSPITAL 76 | Facility: HOSPITAL | Age: 89
End: 2024-02-12
Payer: MEDICAID

## 2024-02-12 DIAGNOSIS — Z79.01 LONG TERM (CURRENT) USE OF ANTICOAGULANTS: ICD-10-CM

## 2024-02-12 DIAGNOSIS — Z79.02 LONG TERM (CURRENT) USE OF ANTITHROMBOTICS/ANTIPLATELETS: ICD-10-CM

## 2024-02-12 DIAGNOSIS — D69.6 THROMBOCYTOPENIA, UNSPECIFIED: ICD-10-CM

## 2024-02-12 DIAGNOSIS — D64.9 ANEMIA, UNSPECIFIED: ICD-10-CM

## 2024-02-12 PROCEDURE — 99222 1ST HOSP IP/OBS MODERATE 55: CPT | Mod: FS | Performed by: NURSE PRACTITIONER

## 2024-02-13 ENCOUNTER — INPATIENT HOSPITAL (OUTPATIENT)
Dept: URBAN - METROPOLITAN AREA HOSPITAL 76 | Facility: HOSPITAL | Age: 89
End: 2024-02-13
Payer: MEDICAID

## 2024-02-13 DIAGNOSIS — K29.50 UNSPECIFIED CHRONIC GASTRITIS WITHOUT BLEEDING: ICD-10-CM

## 2024-02-13 DIAGNOSIS — K44.9 DIAPHRAGMATIC HERNIA WITHOUT OBSTRUCTION OR GANGRENE: ICD-10-CM

## 2024-02-13 DIAGNOSIS — D64.9 ANEMIA, UNSPECIFIED: ICD-10-CM

## 2024-02-13 PROCEDURE — 43239 EGD BIOPSY SINGLE/MULTIPLE: CPT | Performed by: INTERNAL MEDICINE

## 2024-02-14 ENCOUNTER — INPATIENT HOSPITAL (OUTPATIENT)
Dept: URBAN - METROPOLITAN AREA HOSPITAL 76 | Facility: HOSPITAL | Age: 89
End: 2024-02-14
Payer: MEDICAID

## 2024-02-14 DIAGNOSIS — K44.9 DIAPHRAGMATIC HERNIA WITHOUT OBSTRUCTION OR GANGRENE: ICD-10-CM

## 2024-02-14 PROCEDURE — 99232 SBSQ HOSP IP/OBS MODERATE 35: CPT | Mod: FS | Performed by: NURSE PRACTITIONER

## 2024-02-16 ENCOUNTER — HOSPITAL ENCOUNTER (OUTPATIENT)
Facility: HOSPITAL | Age: 89
Discharge: NURSING, REHAB OR LONG-TERM CARE | End: 2024-02-17
Attending: EMERGENCY MEDICINE | Admitting: EMERGENCY MEDICINE
Payer: MEDICARE

## 2024-02-16 ENCOUNTER — APPOINTMENT (OUTPATIENT)
Dept: CT IMAGING | Facility: HOSPITAL | Age: 89
End: 2024-02-16
Payer: MEDICARE
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
## 2024-02-16 DIAGNOSIS — R53.1 WEAKNESS: ICD-10-CM

## 2024-02-16 DIAGNOSIS — S20.20XA CONTUSION OF MULTIPLE SITES OF TRUNK, INITIAL ENCOUNTER: ICD-10-CM

## 2024-02-16 DIAGNOSIS — S09.90XA MINOR CLOSED HEAD INJURY: ICD-10-CM

## 2024-02-16 LAB
ALBUMIN SERPL-MCNC: 4.2 G/DL (ref 3.5–5.2)
ALBUMIN/GLOB SERPL: 1.4 G/DL
ALP SERPL-CCNC: 86 U/L (ref 39–117)
ALT SERPL W P-5'-P-CCNC: 39 U/L (ref 1–41)
ANION GAP SERPL CALCULATED.3IONS-SCNC: 14 MMOL/L (ref 5–15)
AST SERPL-CCNC: 101 U/L (ref 1–40)
BASOPHILS # BLD AUTO: 0 10*3/MM3 (ref 0–0.2)
BASOPHILS NFR BLD AUTO: 0.4 % (ref 0–1.5)
BILIRUB SERPL-MCNC: 1.4 MG/DL (ref 0–1.2)
BUN SERPL-MCNC: 11 MG/DL (ref 8–23)
BUN/CREAT SERPL: 6.3 (ref 7–25)
CALCIUM SPEC-SCNC: 9.5 MG/DL (ref 8.2–9.6)
CHLORIDE SERPL-SCNC: 97 MMOL/L (ref 98–107)
CO2 SERPL-SCNC: 27 MMOL/L (ref 22–29)
CREAT SERPL-MCNC: 1.75 MG/DL (ref 0.76–1.27)
DEPRECATED RDW RBC AUTO: 52.9 FL (ref 37–54)
EGFRCR SERPLBLD CKD-EPI 2021: 36.3 ML/MIN/1.73
EOSINOPHIL # BLD AUTO: 0.1 10*3/MM3 (ref 0–0.4)
EOSINOPHIL NFR BLD AUTO: 0.8 % (ref 0.3–6.2)
ERYTHROCYTE [DISTWIDTH] IN BLOOD BY AUTOMATED COUNT: 15.2 % (ref 12.3–15.4)
GLOBULIN UR ELPH-MCNC: 3 GM/DL
GLUCOSE SERPL-MCNC: 75 MG/DL (ref 65–99)
HCT VFR BLD AUTO: 30.1 % (ref 37.5–51)
HGB BLD-MCNC: 9.8 G/DL (ref 13–17.7)
INR PPP: 1.62 (ref 2–3)
LYMPHOCYTES # BLD AUTO: 0.7 10*3/MM3 (ref 0.7–3.1)
LYMPHOCYTES NFR BLD AUTO: 9.1 % (ref 19.6–45.3)
MCH RBC QN AUTO: 32.8 PG (ref 26.6–33)
MCHC RBC AUTO-ENTMCNC: 32.5 G/DL (ref 31.5–35.7)
MCV RBC AUTO: 100.9 FL (ref 79–97)
MONOCYTES # BLD AUTO: 0.6 10*3/MM3 (ref 0.1–0.9)
MONOCYTES NFR BLD AUTO: 8.7 % (ref 5–12)
NEUTROPHILS NFR BLD AUTO: 5.8 10*3/MM3 (ref 1.7–7)
NEUTROPHILS NFR BLD AUTO: 81 % (ref 42.7–76)
NRBC BLD AUTO-RTO: 0.7 /100 WBC (ref 0–0.2)
PLATELET # BLD AUTO: 116 10*3/MM3 (ref 140–450)
PMV BLD AUTO: 8.3 FL (ref 6–12)
POTASSIUM SERPL-SCNC: 3.7 MMOL/L (ref 3.5–5.2)
PROT SERPL-MCNC: 7.2 G/DL (ref 6–8.5)
PROTHROMBIN TIME: 17 SECONDS (ref 19.4–28.5)
RBC # BLD AUTO: 2.98 10*6/MM3 (ref 4.14–5.8)
SODIUM SERPL-SCNC: 138 MMOL/L (ref 136–145)
WBC NRBC COR # BLD AUTO: 7.2 10*3/MM3 (ref 3.4–10.8)

## 2024-02-16 PROCEDURE — 85025 COMPLETE CBC W/AUTO DIFF WBC: CPT | Performed by: NURSE PRACTITIONER

## 2024-02-16 PROCEDURE — G0378 HOSPITAL OBSERVATION PER HR: HCPCS

## 2024-02-16 PROCEDURE — 99285 EMERGENCY DEPT VISIT HI MDM: CPT

## 2024-02-16 PROCEDURE — 74177 CT ABD & PELVIS W/CONTRAST: CPT

## 2024-02-16 PROCEDURE — 72125 CT NECK SPINE W/O DYE: CPT

## 2024-02-16 PROCEDURE — 70450 CT HEAD/BRAIN W/O DYE: CPT

## 2024-02-16 PROCEDURE — 85610 PROTHROMBIN TIME: CPT | Performed by: NURSE PRACTITIONER

## 2024-02-16 PROCEDURE — 80053 COMPREHEN METABOLIC PANEL: CPT | Performed by: NURSE PRACTITIONER

## 2024-02-16 PROCEDURE — 25510000001 IOPAMIDOL PER 1 ML: Performed by: EMERGENCY MEDICINE

## 2024-02-16 RX ORDER — DIPHENHYDRAMINE HCL 25 MG
25 CAPSULE ORAL DAILY PRN
COMMUNITY

## 2024-02-16 RX ORDER — FAMOTIDINE 20 MG/1
20 TABLET, FILM COATED ORAL 2 TIMES DAILY
COMMUNITY

## 2024-02-16 RX ORDER — MIDODRINE HYDROCHLORIDE 10 MG/1
10 TABLET ORAL
COMMUNITY

## 2024-02-16 RX ORDER — DOXYCYCLINE HYCLATE 100 MG/1
100 CAPSULE ORAL 2 TIMES DAILY
COMMUNITY
Start: 2024-02-15 | End: 2024-02-23

## 2024-02-16 RX ORDER — ATORVASTATIN CALCIUM 20 MG/1
20 TABLET, FILM COATED ORAL NIGHTLY
COMMUNITY

## 2024-02-16 RX ORDER — ACETAMINOPHEN 325 MG/1
650 TABLET ORAL EVERY 4 HOURS PRN
COMMUNITY

## 2024-02-16 RX ORDER — SODIUM CHLORIDE 0.9 % (FLUSH) 0.9 %
10 SYRINGE (ML) INJECTION AS NEEDED
Status: DISCONTINUED | OUTPATIENT
Start: 2024-02-16 | End: 2024-02-17 | Stop reason: HOSPADM

## 2024-02-16 RX ORDER — IPRATROPIUM BROMIDE AND ALBUTEROL SULFATE 2.5; .5 MG/3ML; MG/3ML
3 SOLUTION RESPIRATORY (INHALATION) EVERY 4 HOURS PRN
COMMUNITY

## 2024-02-16 RX ORDER — FOLIC ACID 1 MG/1
1 TABLET ORAL DAILY
COMMUNITY

## 2024-02-16 RX ORDER — NALOXONE HYDROCHLORIDE 4 MG/.1ML
1 SPRAY NASAL AS NEEDED
COMMUNITY

## 2024-02-16 RX ORDER — LIDOCAINE 40 MG/G
1 CREAM TOPICAL 3 TIMES WEEKLY
COMMUNITY

## 2024-02-16 RX ADMIN — IOPAMIDOL 100 ML: 755 INJECTION, SOLUTION INTRAVENOUS at 23:51

## 2024-02-16 NOTE — LETTER
EMS Transport Request  For use at [facility], [place], [place], [place], and [place] only   Patient Name: Barry Calhoun Sr. : 1932   Weight:55 kg (121 lb 4.1 oz) Pick-up Location: [facility] BLS/ALS: BLS   Insurance: MEDICARE Auth End Date:    Pre-Cert #: D/C Summary complete:    Destination: Other [facility]   Contact Precautions: None   Equipment (O2, Fluids, etc.): O2, settings 2L con .   Arrive By Date/Time: 24 1526 Stretcher/WC: Stretcher   CM Requesting: Zoe Palmer RN Ext: unit [id number] CM [id number]   Notes/Medical Necessity: 2L cont 02, weakness, CVA, confusion- alert to self only, max assist to sit up, hypotension noted when moving from supine to sitting.     ______________________________________________________________________    *Only 2 patient bags OR 1 carry-on size bag are permitted.  Wheelchairs and walkers CANNOT transported with the patient. Acknowledge: Yes

## 2024-02-16 NOTE — LETTER
EMS Transport Request  For use at [facility], [place], [place], [place], and [place] only   Patient Name: Barry Calhoun Sr. : 1932   Weight:55 kg (121 lb 4.1 oz) Pick-up Location: [facility] BLS/ALS: BLS/ALS: BLS   Insurance: MEDICARE Auth End Date:    Pre-Cert #: D/C Summary complete:    Destination: Other [facility]   Contact Precautions: None   Equipment (O2, Fluids, etc.): O2, settings 2L cont   Arrive By Date/Time: 24 1306 Stretcher/WC: Wheelchair   CM Requesting: Zoe Palmer RN Ext: unit [id number] CM [id number]   Notes/Medical Necessity: 2L cont 02, weakness, CVA, confusion- alert to self only, max assist to sit up, hypotension noted when moving from supine to sitting.     ______________________________________________________________________    *Only 2 patient bags OR 1 carry-on size bag are permitted.  Wheelchairs and walkers CANNOT transported with the patient. Acknowledge: Yes

## 2024-02-17 ENCOUNTER — APPOINTMENT (OUTPATIENT)
Dept: GENERAL RADIOLOGY | Facility: HOSPITAL | Age: 89
End: 2024-02-17
Payer: MEDICARE

## 2024-02-17 VITALS
DIASTOLIC BLOOD PRESSURE: 57 MMHG | HEART RATE: 64 BPM | WEIGHT: 121.25 LBS | OXYGEN SATURATION: 95 % | HEIGHT: 69 IN | TEMPERATURE: 97.6 F | RESPIRATION RATE: 14 BRPM | BODY MASS INDEX: 17.96 KG/M2 | SYSTOLIC BLOOD PRESSURE: 102 MMHG

## 2024-02-17 LAB
ANION GAP SERPL CALCULATED.3IONS-SCNC: 14 MMOL/L (ref 5–15)
BASOPHILS # BLD AUTO: 0 10*3/MM3 (ref 0–0.2)
BASOPHILS NFR BLD AUTO: 0.3 % (ref 0–1.5)
BUN SERPL-MCNC: 15 MG/DL (ref 8–23)
BUN/CREAT SERPL: 6.6 (ref 7–25)
CALCIUM SPEC-SCNC: 9.1 MG/DL (ref 8.2–9.6)
CHLORIDE SERPL-SCNC: 98 MMOL/L (ref 98–107)
CO2 SERPL-SCNC: 24 MMOL/L (ref 22–29)
CREAT SERPL-MCNC: 2.26 MG/DL (ref 0.76–1.27)
DEPRECATED RDW RBC AUTO: 52.1 FL (ref 37–54)
EGFRCR SERPLBLD CKD-EPI 2021: 26.7 ML/MIN/1.73
EOSINOPHIL # BLD AUTO: 0.1 10*3/MM3 (ref 0–0.4)
EOSINOPHIL NFR BLD AUTO: 1 % (ref 0.3–6.2)
ERYTHROCYTE [DISTWIDTH] IN BLOOD BY AUTOMATED COUNT: 14.9 % (ref 12.3–15.4)
GLUCOSE BLDC GLUCOMTR-MCNC: 108 MG/DL (ref 70–105)
GLUCOSE BLDC GLUCOMTR-MCNC: 126 MG/DL (ref 70–105)
GLUCOSE BLDC GLUCOMTR-MCNC: 179 MG/DL (ref 70–105)
GLUCOSE BLDC GLUCOMTR-MCNC: 64 MG/DL (ref 70–105)
GLUCOSE BLDC GLUCOMTR-MCNC: 65 MG/DL (ref 70–105)
GLUCOSE SERPL-MCNC: 61 MG/DL (ref 65–99)
HCT VFR BLD AUTO: 26.5 % (ref 37.5–51)
HGB BLD-MCNC: 8.4 G/DL (ref 13–17.7)
INR PPP: 1.51 (ref 2–3)
LYMPHOCYTES # BLD AUTO: 0.7 10*3/MM3 (ref 0.7–3.1)
LYMPHOCYTES NFR BLD AUTO: 11.2 % (ref 19.6–45.3)
MAGNESIUM SERPL-MCNC: 2 MG/DL (ref 1.7–2.3)
MCH RBC QN AUTO: 31.9 PG (ref 26.6–33)
MCHC RBC AUTO-ENTMCNC: 31.9 G/DL (ref 31.5–35.7)
MCV RBC AUTO: 99.9 FL (ref 79–97)
MONOCYTES # BLD AUTO: 0.7 10*3/MM3 (ref 0.1–0.9)
MONOCYTES NFR BLD AUTO: 10.2 % (ref 5–12)
NEUTROPHILS NFR BLD AUTO: 5 10*3/MM3 (ref 1.7–7)
NEUTROPHILS NFR BLD AUTO: 77.3 % (ref 42.7–76)
NRBC BLD AUTO-RTO: 0.4 /100 WBC (ref 0–0.2)
NT-PROBNP SERPL-MCNC: ABNORMAL PG/ML (ref 0–1800)
PLATELET # BLD AUTO: 100 10*3/MM3 (ref 140–450)
PMV BLD AUTO: 9 FL (ref 6–12)
POTASSIUM SERPL-SCNC: 3.5 MMOL/L (ref 3.5–5.2)
PROTHROMBIN TIME: 16 SECONDS (ref 19.4–28.5)
RBC # BLD AUTO: 2.65 10*6/MM3 (ref 4.14–5.8)
SODIUM SERPL-SCNC: 136 MMOL/L (ref 136–145)
WBC NRBC COR # BLD AUTO: 6.5 10*3/MM3 (ref 3.4–10.8)

## 2024-02-17 PROCEDURE — 85025 COMPLETE CBC W/AUTO DIFF WBC: CPT | Performed by: NURSE PRACTITIONER

## 2024-02-17 PROCEDURE — 82948 REAGENT STRIP/BLOOD GLUCOSE: CPT

## 2024-02-17 PROCEDURE — 83880 ASSAY OF NATRIURETIC PEPTIDE: CPT | Performed by: NURSE PRACTITIONER

## 2024-02-17 PROCEDURE — 71045 X-RAY EXAM CHEST 1 VIEW: CPT

## 2024-02-17 PROCEDURE — G0378 HOSPITAL OBSERVATION PER HR: HCPCS

## 2024-02-17 PROCEDURE — 83735 ASSAY OF MAGNESIUM: CPT | Performed by: NURSE PRACTITIONER

## 2024-02-17 PROCEDURE — 80048 BASIC METABOLIC PNL TOTAL CA: CPT | Performed by: NURSE PRACTITIONER

## 2024-02-17 PROCEDURE — 96374 THER/PROPH/DIAG INJ IV PUSH: CPT

## 2024-02-17 PROCEDURE — 97162 PT EVAL MOD COMPLEX 30 MIN: CPT

## 2024-02-17 PROCEDURE — 87102 FUNGUS ISOLATION CULTURE: CPT | Performed by: EMERGENCY MEDICINE

## 2024-02-17 PROCEDURE — 96376 TX/PRO/DX INJ SAME DRUG ADON: CPT

## 2024-02-17 PROCEDURE — 25810000003 SODIUM CHLORIDE 0.9 % SOLUTION: Performed by: NURSE PRACTITIONER

## 2024-02-17 PROCEDURE — 85610 PROTHROMBIN TIME: CPT | Performed by: NURSE PRACTITIONER

## 2024-02-17 RX ORDER — MIDODRINE HYDROCHLORIDE 5 MG/1
10 TABLET ORAL
Status: DISCONTINUED | OUTPATIENT
Start: 2024-02-17 | End: 2024-02-17 | Stop reason: HOSPADM

## 2024-02-17 RX ORDER — ALLOPURINOL 100 MG/1
100 TABLET ORAL DAILY
Status: DISCONTINUED | OUTPATIENT
Start: 2024-02-17 | End: 2024-02-17 | Stop reason: HOSPADM

## 2024-02-17 RX ORDER — NICOTINE POLACRILEX 4 MG
15 LOZENGE BUCCAL
Status: DISCONTINUED | OUTPATIENT
Start: 2024-02-17 | End: 2024-02-17 | Stop reason: HOSPADM

## 2024-02-17 RX ORDER — IPRATROPIUM BROMIDE AND ALBUTEROL SULFATE 2.5; .5 MG/3ML; MG/3ML
3 SOLUTION RESPIRATORY (INHALATION) EVERY 4 HOURS PRN
Status: DISCONTINUED | OUTPATIENT
Start: 2024-02-17 | End: 2024-02-17 | Stop reason: HOSPADM

## 2024-02-17 RX ORDER — POLYETHYLENE GLYCOL 3350 17 G/17G
17 POWDER, FOR SOLUTION ORAL DAILY PRN
Status: DISCONTINUED | OUTPATIENT
Start: 2024-02-17 | End: 2024-02-17 | Stop reason: HOSPADM

## 2024-02-17 RX ORDER — ACETAMINOPHEN 325 MG/1
650 TABLET ORAL EVERY 4 HOURS PRN
Status: DISCONTINUED | OUTPATIENT
Start: 2024-02-17 | End: 2024-02-17

## 2024-02-17 RX ORDER — BISACODYL 5 MG/1
5 TABLET, DELAYED RELEASE ORAL DAILY PRN
Status: DISCONTINUED | OUTPATIENT
Start: 2024-02-17 | End: 2024-02-17 | Stop reason: HOSPADM

## 2024-02-17 RX ORDER — BUMETANIDE 1 MG/1
1 TABLET ORAL DAILY
Status: DISCONTINUED | OUTPATIENT
Start: 2024-02-17 | End: 2024-02-17 | Stop reason: HOSPADM

## 2024-02-17 RX ORDER — SODIUM CHLORIDE 9 MG/ML
100 INJECTION, SOLUTION INTRAVENOUS CONTINUOUS
Status: DISCONTINUED | OUTPATIENT
Start: 2024-02-17 | End: 2024-02-17

## 2024-02-17 RX ORDER — BISACODYL 10 MG
10 SUPPOSITORY, RECTAL RECTAL DAILY PRN
Status: DISCONTINUED | OUTPATIENT
Start: 2024-02-17 | End: 2024-02-17 | Stop reason: HOSPADM

## 2024-02-17 RX ORDER — ACETAMINOPHEN 160 MG/5ML
650 SOLUTION ORAL EVERY 4 HOURS PRN
Status: DISCONTINUED | OUTPATIENT
Start: 2024-02-17 | End: 2024-02-17 | Stop reason: HOSPADM

## 2024-02-17 RX ORDER — TAMSULOSIN HYDROCHLORIDE 0.4 MG/1
0.4 CAPSULE ORAL NIGHTLY
Status: DISCONTINUED | OUTPATIENT
Start: 2024-02-17 | End: 2024-02-17 | Stop reason: HOSPADM

## 2024-02-17 RX ORDER — ATORVASTATIN CALCIUM 20 MG/1
20 TABLET, FILM COATED ORAL NIGHTLY
Status: DISCONTINUED | OUTPATIENT
Start: 2024-02-17 | End: 2024-02-17 | Stop reason: HOSPADM

## 2024-02-17 RX ORDER — WARFARIN SODIUM 3 MG/1
3 TABLET ORAL
Status: DISCONTINUED | OUTPATIENT
Start: 2024-02-17 | End: 2024-02-17 | Stop reason: HOSPADM

## 2024-02-17 RX ORDER — AMOXICILLIN 250 MG
2 CAPSULE ORAL 2 TIMES DAILY PRN
Status: DISCONTINUED | OUTPATIENT
Start: 2024-02-17 | End: 2024-02-17 | Stop reason: HOSPADM

## 2024-02-17 RX ORDER — SODIUM CHLORIDE 0.9 % (FLUSH) 0.9 %
10 SYRINGE (ML) INJECTION EVERY 12 HOURS SCHEDULED
Status: DISCONTINUED | OUTPATIENT
Start: 2024-02-17 | End: 2024-02-17 | Stop reason: HOSPADM

## 2024-02-17 RX ORDER — CLOPIDOGREL BISULFATE 75 MG/1
75 TABLET ORAL DAILY
Status: DISCONTINUED | OUTPATIENT
Start: 2024-02-17 | End: 2024-02-17 | Stop reason: HOSPADM

## 2024-02-17 RX ORDER — ACETAMINOPHEN 325 MG/1
650 TABLET ORAL EVERY 4 HOURS PRN
Status: DISCONTINUED | OUTPATIENT
Start: 2024-02-17 | End: 2024-02-17 | Stop reason: HOSPADM

## 2024-02-17 RX ORDER — SODIUM CHLORIDE 0.9 % (FLUSH) 0.9 %
10 SYRINGE (ML) INJECTION AS NEEDED
Status: DISCONTINUED | OUTPATIENT
Start: 2024-02-17 | End: 2024-02-17 | Stop reason: HOSPADM

## 2024-02-17 RX ORDER — NITROGLYCERIN 0.4 MG/1
0.4 TABLET SUBLINGUAL
Status: DISCONTINUED | OUTPATIENT
Start: 2024-02-17 | End: 2024-02-17 | Stop reason: HOSPADM

## 2024-02-17 RX ORDER — FAMOTIDINE 20 MG/1
10 TABLET, FILM COATED ORAL DAILY
Status: DISCONTINUED | OUTPATIENT
Start: 2024-02-18 | End: 2024-02-17 | Stop reason: HOSPADM

## 2024-02-17 RX ORDER — LEVOTHYROXINE SODIUM 0.05 MG/1
50 TABLET ORAL NIGHTLY
Status: DISCONTINUED | OUTPATIENT
Start: 2024-02-17 | End: 2024-02-17 | Stop reason: HOSPADM

## 2024-02-17 RX ORDER — ACETAMINOPHEN 650 MG/1
650 SUPPOSITORY RECTAL EVERY 4 HOURS PRN
Status: DISCONTINUED | OUTPATIENT
Start: 2024-02-17 | End: 2024-02-17 | Stop reason: HOSPADM

## 2024-02-17 RX ORDER — FAMOTIDINE 20 MG/1
20 TABLET, FILM COATED ORAL 2 TIMES DAILY
Status: DISCONTINUED | OUTPATIENT
Start: 2024-02-17 | End: 2024-02-17

## 2024-02-17 RX ORDER — DOXYCYCLINE 100 MG/1
100 CAPSULE ORAL EVERY 12 HOURS SCHEDULED
Status: DISCONTINUED | OUTPATIENT
Start: 2024-02-17 | End: 2024-02-17 | Stop reason: HOSPADM

## 2024-02-17 RX ORDER — DOCUSATE SODIUM 100 MG/1
200 CAPSULE, LIQUID FILLED ORAL EVERY MORNING
Status: DISCONTINUED | OUTPATIENT
Start: 2024-02-17 | End: 2024-02-17 | Stop reason: HOSPADM

## 2024-02-17 RX ORDER — SODIUM CHLORIDE 9 MG/ML
40 INJECTION, SOLUTION INTRAVENOUS AS NEEDED
Status: DISCONTINUED | OUTPATIENT
Start: 2024-02-17 | End: 2024-02-17 | Stop reason: HOSPADM

## 2024-02-17 RX ORDER — CLONIDINE HYDROCHLORIDE 0.1 MG/1
0.1 TABLET ORAL EVERY 6 HOURS
Status: DISCONTINUED | OUTPATIENT
Start: 2024-02-17 | End: 2024-02-17 | Stop reason: HOSPADM

## 2024-02-17 RX ORDER — IBUPROFEN 600 MG/1
1 TABLET ORAL
Status: DISCONTINUED | OUTPATIENT
Start: 2024-02-17 | End: 2024-02-17 | Stop reason: HOSPADM

## 2024-02-17 RX ORDER — DEXTROSE MONOHYDRATE 25 G/50ML
25 INJECTION, SOLUTION INTRAVENOUS
Status: DISCONTINUED | OUTPATIENT
Start: 2024-02-17 | End: 2024-02-17 | Stop reason: HOSPADM

## 2024-02-17 RX ORDER — HYDRALAZINE HYDROCHLORIDE 25 MG/1
50 TABLET, FILM COATED ORAL DAILY
Status: DISCONTINUED | OUTPATIENT
Start: 2024-02-17 | End: 2024-02-17 | Stop reason: HOSPADM

## 2024-02-17 RX ADMIN — Medication 10 ML: at 08:00

## 2024-02-17 RX ADMIN — SODIUM CHLORIDE 500 ML: 9 INJECTION, SOLUTION INTRAVENOUS at 00:30

## 2024-02-17 RX ADMIN — DEXTROSE MONOHYDRATE 25 G: 25 INJECTION, SOLUTION INTRAVENOUS at 15:45

## 2024-02-17 RX ADMIN — DEXTROSE MONOHYDRATE 25 G: 25 INJECTION, SOLUTION INTRAVENOUS at 09:55

## 2024-02-17 NOTE — CASE MANAGEMENT/SOCIAL WORK
Continued Stay Note  DEISY Nichols     Patient Name: Barry Calhoun Sr.  MRN: [id number]  Today's Date: 2/17/2024    Admit Date: 2/16/2024    Plan: Return to [facility]. No precert or PASRR required for return. Plan for [facility] to transport.   Discharge Plan       Row Name 02/17/24 1317       Plan    Plan Return to [facility]. No precert or PASRR required for return. Plan for [facility] to transport.    Patient/Family in Agreement with Plan yes    Plan Comments [facility] liaison Eugenia confirmed pt can return today. RN and NP updated via secure chat. EMS request and medical necessity forms complete.

## 2024-02-17 NOTE — PLAN OF CARE
Goal Outcome Evaluation:  Plan of Care Reviewed With: other (see comments)        Progress: no change  Outcome Evaluation: Googled pts address to find out what facility pt came from. Called [facility] and spoke to Maryellen, answered what admission questions we were able to go over. Maryellen stated he had been at the facility less then 24 hrs and was at [facility] prior to admission. pt placed in contact precautions. PT NOT aware of situation, place, time, self. Unable to answer questions .

## 2024-02-17 NOTE — NURSING NOTE
Rn called [facility] to ask what diet pt was on while at their facility. Pt was on a renal diet, reg texture, thin liquids per MARK Bojorquez.

## 2024-02-17 NOTE — PLAN OF CARE
Goal Outcome Evaluation:    Patient had an episode of low BG that's resolved with D50. Patient will return to [facility] today and RN made aware that HD already set up.

## 2024-02-17 NOTE — ED NOTES
"Nursing report ED to floor  Barry Calhoun Sr.  89 y.o.  male    HPI:   Chief Complaint   Patient presents with    Head Injury       Admitting doctor:   Noel Aggarwal MD    Admitting diagnosis:   The primary encounter diagnosis was Fall, initial encounter. Diagnoses of Contusion of multiple sites of trunk, initial encounter, Weakness, and Minor closed head injury were also pertinent to this visit.    Code status:   Current Code Status       Date Active Code Status Order ID Comments User Context       Not on file            Allergies:   Patient has no known allergies.    Isolation:  No active isolations     Fall Risk:  Fall Risk Assessment was completed, and patient is at high risk for falls.   Predictive Model Details         19 (Low) Factor Value    Calculated 2/16/2024 23:43 Age 89    Risk of Fall Model Musculoskeletal Assessment WDL     Active Peripheral IV Present     Imaging order in this encounter Present     Diastolic BP 42     Magnesium not on file     Financial Class Medicare     Drug Use No     Seth Scale not on file     Total Bilirubin 1.4 mg/dL     Creatinine 1.75 mg/dL     Chloride 97 mmol/L     Peripheral Vascular Assessment WDL     Clinically Relevant Sex Not Female     Gastrointestinal Assessment WDL     ALT 39 U/L     Cardiac Assessment WDL     Respiratory Rate 16     Albumin 4.2 g/dL     Skin Assessment X     Days after Admission 0.095     Calcium 9.5 mg/dL     Potassium 3.7 mmol/L         Weight:       02/16/24 2124   Weight: 54.4 kg (120 lb)       Intake and Output  No intake or output data in the 24 hours ending 02/16/24 2343    Diet:        Most recent vitals:   Vitals:    02/16/24 2124   BP: 120/42   Pulse: 82   Resp: 16   Temp: 97.6 °F (36.4 °C)   SpO2: 97%   Weight: 54.4 kg (120 lb)   Height: 175.3 cm (69\")       Active LDAs/IV Access:   Lines, Drains & Airways       Active LDAs       Name Placement date Placement time Site Days    Peripheral IV 02/16/24 2148 Anterior;Left;Upper Arm " 02/16/24  2148  Arm  less than 1    Single Lumen Implantable Port Right Chest --  --  Chest  --                    Skin Condition:   Skin Assessments (last day)       Date/Time Skin WDL Skin Integrity    02/16/24 21:36:22 X;characteristics bruised (ecchymotic)             Labs (abnormal labs have a star):   Labs Reviewed   PROTIME-INR - Abnormal; Notable for the following components:       Result Value    Protime 17.0 (*)     INR 1.62 (*)     All other components within normal limits   COMPREHENSIVE METABOLIC PANEL - Abnormal; Notable for the following components:    Creatinine 1.75 (*)     Chloride 97 (*)     AST (SGOT) 101 (*)     Total Bilirubin 1.4 (*)     BUN/Creatinine Ratio 6.3 (*)     eGFR 36.3 (*)     All other components within normal limits    Narrative:     GFR Normal >60  Chronic Kidney Disease <60  Kidney Failure <15    The GFR formula is only valid for adults with stable renal function between ages 18 and 70.   CBC WITH AUTO DIFFERENTIAL - Abnormal; Notable for the following components:    RBC 2.98 (*)     Hemoglobin 9.8 (*)     Hematocrit 30.1 (*)     .9 (*)     Platelets 116 (*)     Neutrophil % 81.0 (*)     Lymphocyte % 9.1 (*)     nRBC 0.7 (*)     All other components within normal limits   CBC AND DIFFERENTIAL    Narrative:     The following orders were created for panel order CBC & Differential.  Procedure                               Abnormality         Status                     ---------                               -----------         ------                     CBC Auto Differential[[id number]]        Abnormal            Final result                 Please view results for these tests on the individual orders.       LOC: Person, Place, Time, and Situation    Telemetry:  Observation Unit    Cardiac Monitoring Ordered: yes    EKG:   No orders to display       Medications Given in the ED:   Medications   sodium chloride 0.9 % flush 10 mL (has no administration in time range)   sodium  chloride 0.9 % bolus 500 mL (has no administration in time range)       Imaging results:  CT Cervical Spine Without Contrast    Result Date: 2/16/2024  Impression: No evidence of acute fracture or traumatic subluxation of the cervical spine. Advanced multilevel degenerative changes. Electronically Signed: Mike Prado MD  2/16/2024 10:23 PM EST  Workstation ID: [id number]    CT Head Without Contrast    Result Date: 2/16/2024  Impression: No acute intracranial process evident. Age-related illusional changes. 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 Electronically Signed: Mike Prado MD  2/16/2024 10:20 PM EST  Workstation ID: [id number]     Social issues:   Social History     Socioeconomic History    Marital status:    Tobacco Use    Smoking status: Never    Smokeless tobacco: Never   Vaping Use    Vaping Use: Never used   Substance and Sexual Activity    Alcohol use: No    Drug use: No    Sexual activity: Defer       NIH Stroke Scale:  Interval: (not recorded)  1a. Level of Consciousness: (not recorded)  1b. LOC Questions: (not recorded)  1c. LOC Commands: (not recorded)  2. Best Gaze: (not recorded)  3. Visual: (not recorded)  4. Facial Palsy: (not recorded)  5a. Motor Arm, Left: (not recorded)  5b. Motor Arm, Right: (not recorded)  6a. Motor Leg, Left: (not recorded)  6b. Motor Leg, Right: (not recorded)  7. Limb Ataxia: (not recorded)  8. Sensory: (not recorded)  9. Best Language: (not recorded)  10. Dysarthria: (not recorded)  11. Extinction and Inattention (formerly Neglect): (not recorded)    Total (NIH Stroke Scale): (not recorded)     Additional notable assessment information:    Nursing report ED to floor:  [facility] rm 240    Drew Shelton LPN   02/16/24 23:43 EST

## 2024-02-17 NOTE — NURSING NOTE
RN witnessed pt have a run of vtach around 0310. RN has called and asked Palak mcneal to print and fax strip twice. First time at 0313 and 2nd attempt 0623. Will report to dayshift nurse and NP

## 2024-02-17 NOTE — ED PROVIDER NOTES
Subjective   History of Present Illness  89-year-old gentleman who comes in from the extended care facility after having a fall where they state he struck his forehead.  Patient can give no history      Review of Systems   Unable to perform ROS: Dementia       Past Medical History:   Diagnosis Date    A-fib     Asthma     Chronic renal disease     COPD (chronic obstructive pulmonary disease)     Coronary heart disease     CABG    CVA (cerebral vascular accident)     recent CVA -- Patient and Family report this is not accurate    Gout     Hypertension        No Known Allergies    Past Surgical History:   Procedure Laterality Date    ARTERIOVENOUS FISTULA/SHUNT SURGERY Right 2022    Procedure: RIGHT ARM BRACHIAL CEPHALIC ARTERIAL VENOUS FISTULA;  Surgeon: Drew Gunter MD;  Location: [facility] MAIN OR;  Service: Vascular;  Laterality: Right;    CARDIAC CATHETERIZATION  2016    F    CORONARY ARTERY BYPASS GRAFT      CABG X3, reoperation, 09; CAB and     OTHER SURGICAL HISTORY  2016    loop recorder implant        Family History   Problem Relation Age of Onset    Stroke Other        Social History     Socioeconomic History    Marital status:    Tobacco Use    Smoking status: Never    Smokeless tobacco: Never   Vaping Use    Vaping Use: Never used   Substance and Sexual Activity    Alcohol use: No    Drug use: No    Sexual activity: Defer           Objective   Physical Exam  Vitals reviewed.   Constitutional:       Appearance: Normal appearance. He is well-developed.   HENT:      Head: Normocephalic and atraumatic.   Eyes:      Conjunctiva/sclera: Conjunctivae normal.      Pupils: Pupils are equal, round, and reactive to light.   Cardiovascular:      Rate and Rhythm: Normal rate and regular rhythm.      Heart sounds: Normal heart sounds.   Pulmonary:      Effort: Pulmonary effort is normal.      Breath sounds: Normal breath sounds.   Abdominal:      General: Bowel sounds are  "normal.      Palpations: Abdomen is soft.   Musculoskeletal:         General: Normal range of motion.      Cervical back: Normal range of motion and neck supple.   Skin:     General: Skin is warm and dry.      Capillary Refill: Capillary refill takes 2 to 3 seconds.      Findings: Ecchymosis present.      Comments: Patient has extensive ecchymosis on his left flank as well as some on his right hip as well as into his back see photos for further description   Neurological:      Mental Status: He is alert and oriented to person, place, and time.      GCS: GCS eye subscore is 4. GCS verbal subscore is 5. GCS motor subscore is 6.         Procedures                     ED Course  ED Course as of 02/17/24 1456   Fri Feb 16, 2024 2130 Marked ready for CT [KW]   2311 Patient discussed with Dr. Bell who visualized the pictures of the flank contusions and recommended CT abdomen pelvis with IV contrast that was ordered at this time as well as some hydration [KW]   2312  Maurice ready for CT [KW]      ED Course User Index  [KW] Carly Bentley, APRN      BP 97/56 (BP Location: Left arm, Patient Position: Lying)   Pulse 69   Temp 97.9 °F (36.6 °C) (Axillary)   Resp 13   Ht 175.3 cm (69\")   Wt 55 kg (121 lb 4.1 oz)   SpO2 91%   BMI 17.91 kg/m²   Labs Reviewed   PROTIME-INR - Abnormal; Notable for the following components:       Result Value    Protime 17.0 (*)     INR 1.62 (*)     All other components within normal limits   COMPREHENSIVE METABOLIC PANEL - Abnormal; Notable for the following components:    Creatinine 1.75 (*)     Chloride 97 (*)     AST (SGOT) 101 (*)     Total Bilirubin 1.4 (*)     BUN/Creatinine Ratio 6.3 (*)     eGFR 36.3 (*)     All other components within normal limits    Narrative:     GFR Normal >60  Chronic Kidney Disease <60  Kidney Failure <15    The GFR formula is only valid for adults with stable renal function between ages 18 and 70.   CBC WITH AUTO DIFFERENTIAL - Abnormal; Notable for " the following components:    RBC 2.98 (*)     Hemoglobin 9.8 (*)     Hematocrit 30.1 (*)     .9 (*)     Platelets 116 (*)     Neutrophil % 81.0 (*)     Lymphocyte % 9.1 (*)     nRBC 0.7 (*)     All other components within normal limits   BASIC METABOLIC PANEL - Abnormal; Notable for the following components:    Glucose 61 (*)     Creatinine 2.26 (*)     BUN/Creatinine Ratio 6.6 (*)     eGFR 26.7 (*)     All other components within normal limits    Narrative:     GFR Normal >60  Chronic Kidney Disease <60  Kidney Failure <15    The GFR formula is only valid for adults with stable renal function between ages 18 and 70.   CBC WITH AUTO DIFFERENTIAL - Abnormal; Notable for the following components:    RBC 2.65 (*)     Hemoglobin 8.4 (*)     Hematocrit 26.5 (*)     MCV 99.9 (*)     Platelets 100 (*)     Neutrophil % 77.3 (*)     Lymphocyte % 11.2 (*)     nRBC 0.4 (*)     All other components within normal limits   BNP (IN-HOUSE) - Abnormal; Notable for the following components:    proBNP 44,003.0 (*)     All other components within normal limits    Narrative:     This assay is used as an aid in the diagnosis of individuals suspected of having heart failure. It can be used as an aid in the diagnosis of acute decompensated heart failure (ADHF) in patients presenting with signs and symptoms of ADHF to the emergency department (ED). In addition, NT-proBNP of <300 pg/mL indicates ADHF is not likely.    Age Range Result Interpretation  NT-proBNP Concentration (pg/mL:      <50             Positive            >450                   Gray                 300-450                    Negative             <300    50-75           Positive            >900                  Gray                300-900                  Negative            <300      >75             Positive            >1800                  Gray                300-1800                  Negative            <300   PROTIME-INR - Abnormal; Notable for the following  components:    Protime 16.0 (*)     INR 1.51 (*)     All other components within normal limits   POCT GLUCOSE FINGERSTICK - Abnormal; Notable for the following components:    Glucose 65 (*)     All other components within normal limits   POCT GLUCOSE FINGERSTICK - Abnormal; Notable for the following components:    Glucose 126 (*)     All other components within normal limits   MAGNESIUM - Normal   CANDIDA AURIS SCREEN   POCT GLUCOSE FINGERSTICK   POCT GLUCOSE FINGERSTICK   POCT GLUCOSE FINGERSTICK   POCT GLUCOSE FINGERSTICK   POCT GLUCOSE FINGERSTICK   CBC AND DIFFERENTIAL    Narrative:     The following orders were created for panel order CBC & Differential.  Procedure                               Abnormality         Status                     ---------                               -----------         ------                     CBC Auto Differential[[id number]]        Abnormal            Final result                 Please view results for these tests on the individual orders.   CBC AND DIFFERENTIAL    Narrative:     The following orders were created for panel order CBC & Differential.  Procedure                               Abnormality         Status                     ---------                               -----------         ------                     CBC Auto Differential[[id number]]        Abnormal            Final result                 Please view results for these tests on the individual orders.     Medications   sodium chloride 0.9 % flush 10 mL (has no administration in time range)   allopurinol (ZYLOPRIM) tablet 100 mg (100 mg Oral Not Given 2/17/24 1050)   atorvastatin (LIPITOR) tablet 20 mg (has no administration in time range)   bumetanide (BUMEX) tablet 1 mg (1 mg Oral Not Given 2/17/24 1050)   cloNIDine (CATAPRES) tablet 0.1 mg (0.1 mg Oral Not Given 2/17/24 0935)   clopidogrel (PLAVIX) tablet 75 mg (75 mg Oral Not Given 2/17/24 1055)   docusate sodium (COLACE) capsule 200 mg (200 mg Oral Not  Given 2/17/24 1050)   doxycycline (MONODOX) capsule 100 mg (100 mg Oral Not Given 2/17/24 1100)   famotidine (PEPCID) tablet 20 mg (20 mg Oral Not Given 2/17/24 1055)   hydrALAZINE (APRESOLINE) tablet 50 mg (50 mg Oral Not Given 2/17/24 0935)   ipratropium-albuterol (DUO-NEB) nebulizer solution 3 mL (has no administration in time range)   levothyroxine (SYNTHROID, LEVOTHROID) tablet 50 mcg (has no administration in time range)   metoprolol tartrate (LOPRESSOR) half tablet 37.5 mg (37.5 mg Oral Not Given 2/17/24 0935)   midodrine (PROAMATINE) tablet 10 mg (10 mg Oral Not Given 2/17/24 1050)   tamsulosin (FLOMAX) 24 hr capsule 0.4 mg (has no administration in time range)   warfarin (COUMADIN) tablet 3 mg (has no administration in time range)   Pharmacy to dose warfarin (has no administration in time range)   sodium chloride 0.9 % flush 10 mL (10 mL Intravenous Given 2/17/24 0800)   sodium chloride 0.9 % flush 10 mL (has no administration in time range)   sodium chloride 0.9 % infusion 40 mL (has no administration in time range)   nitroglycerin (NITROSTAT) SL tablet 0.4 mg (has no administration in time range)   acetaminophen (TYLENOL) tablet 650 mg (has no administration in time range)     Or   acetaminophen (TYLENOL) 160 MG/5ML oral solution 650 mg (has no administration in time range)     Or   acetaminophen (TYLENOL) suppository 650 mg (has no administration in time range)   sennosides-docusate (PERICOLACE) 8.6-50 MG per tablet 2 tablet (has no administration in time range)     And   polyethylene glycol (MIRALAX) packet 17 g (has no administration in time range)     And   bisacodyl (DULCOLAX) EC tablet 5 mg (has no administration in time range)     And   bisacodyl (DULCOLAX) suppository 10 mg (has no administration in time range)   dextrose (GLUTOSE) oral gel 15 g (has no administration in time range)   dextrose (D50W) (25 g/50 mL) IV injection 25 g (25 g Intravenous Given 2/17/24 6165)   glucagon (GLUCAGEN)  injection 1 mg (has no administration in time range)   sodium chloride 0.9 % bolus 500 mL (0 mL Intravenous Stopped 2/17/24 0700)   iopamidol (ISOVUE-370) 76 % injection 100 mL (100 mL Intravenous Given 2/16/24 5141)     XR Chest 1 View    Result Date: 2/17/2024  Impression: Cardiomegaly with vague bilateral perihilar opacities which may represent edema or infiltrates. Electronically Signed: Jose Be MD  2/17/2024 10:11 AM EST  Workstation ID: [id number]    CT Abdomen Pelvis With Contrast    Result Date: 2/16/2024  Impression: 1.No acute intra-abdominal or intrapelvic process. There is mild anasarca of the soft tissues with increased fluid and soft tissue swelling present within the left flank. No drainable collection identified. Findings may be related to contusion. No acute osseous abnormality. 2.Hepatic steatosis. 3.Ancillary findings as described above. Electronically Signed: Deepa Healy MD  2/16/2024 11:57 PM EST  Workstation ID: [id number]    CT Cervical Spine Without Contrast    Result Date: 2/16/2024  Impression: No evidence of acute fracture or traumatic subluxation of the cervical spine. Advanced multilevel degenerative changes. Electronically Signed: Mike Prado MD  2/16/2024 10:23 PM EST  Workstation ID: [id number]    CT Head Without Contrast    Result Date: 2/16/2024  Impression: No acute intracranial process evident. Age-related illusional changes. 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 Electronically Signed: Mike Prado MD  2/16/2024 10:20 PM EST  Workstation ID: [id number]                                          Medical Decision Making  Is a 89-year-old gentleman who comes in with fall from the extended care facility where he hit the right side of his forehead he can give me no history the patient has extensive bruising's on his trunk.  This was discussed with Dr. Bell and CT abdomen pelvis was identified and there was found to be a large contusion but no drainable place identified by the  radiologist or myself the patient had a head CT and a neck CT which were both within normal limits no fractures or intracranial abnormalities identified by myself or the radiologist.  The patient also had an abdominal CT with IV contrast and was hydrated the CT showed no large hematoma no drainable area on the left flank just contusion.    The patient will be placed in ED observation for PT evaluation and further hydration    Problems Addressed:  Contusion of multiple sites of trunk, initial encounter: complicated acute illness or injury  Fall, initial encounter: complicated acute illness or injury  Minor closed head injury: complicated acute illness or injury  Weakness: complicated acute illness or injury    Amount and/or Complexity of Data Reviewed  Labs: ordered.  Radiology: ordered.    Risk  Prescription drug management.  Decision regarding hospitalization.        Final diagnoses:   Fall, initial encounter   Contusion of multiple sites of trunk, initial encounter   Weakness   Minor closed head injury       ED Disposition  ED Disposition       ED Disposition   Decision to Admit    Condition   --    Comment   --               Pedro Purvis MD  [address] [facility] [place] [address]  [phone]    Schedule an appointment as soon as possible for a visit in 1 week(s)  5-7 days    Simeon Foley MD  [address] [facility] [place] [address]  [phone]    Schedule an appointment as soon as possible for a visit in 1 week(s)      Pino Mclean MD  [address] [facility] [place] [address]  [phone]    Schedule an appointment as soon as possible for a visit in 1 week(s)      [facility] [place]  [address]  [facility] [address]  [phone]             Medication List      No changes were made to your prescriptions during this visit.            Carly Bentley, APRN  02/17/24 1451

## 2024-02-17 NOTE — DISCHARGE PLACEMENT REQUEST
"Lj Barry DASH Sr. (89 y.o. Male)       Date of Birth   07/12/1932    Social Security Number       Address   [address] [place] [address]    Home Phone   [phone]    MRN   [id number]       Scientology   None    Marital Status                               Admission Date   2/16/24    Admission Type   Emergency    Admitting Provider   Noel Aggarwal MD    Attending Provider   Noel Aggarwal MD    Department, Room/Bed   [facility] OBSERVATION, 239/1       Discharge Date       Discharge Disposition       Discharge Destination                                 Attending Provider: Noel Aggarwal MD    Allergies: No Known Allergies    Isolation: Contact   Infection: Candida Auris (rule out) (02/17/24)   Code Status: No CPR    Ht: 175.3 cm (69\")   Wt: 55 kg (121 lb 4.1 oz)    Admission Cmt: None   Principal Problem: Weakness [R53.1]                   Active Insurance as of 2/16/2024       Primary Coverage       Payor Plan Insurance Group Employer/Plan Group    MEDICARE MEDICARE A & B        Payor Plan Address Payor Plan Phone Number Payor Plan Fax Number Effective Dates    [address] [phone]  2/1/1980 - None Entered    [facility] [id number]         Subscriber Name Subscriber Birth Date Member ID       BARRY PADGETT SR. 7/12/1932 [id number]               Secondary Coverage       Payor Plan Insurance Group Employer/Plan Group    [place]        Payor Plan Address Payor Plan Phone Number Payor Plan Fax Number Effective Dates    [address]   7/8/2019 - None Entered    [place] [address]         Subscriber Name Subscriber Birth Date Member ID       BARRY PADGETT SR. 7/12/1932 [id number]                     Emergency Contacts        (Rel.) Home Phone Work Phone Mobile Phone    KARLA JENNINGS (Significant Other) [phone] -- [phone]    BARRY PADGETT JR HARDY (Son) -- -- [phone]    Shaila Dupont (Daughter) -- -- [phone] "    Anish Calhoun (Son) -- -- [phone]

## 2024-02-17 NOTE — DISCHARGE SUMMARY
[facility]    Pedro Purvis MD    CHIEF COMPLAINT:     Fall    HISTORY OF PRESENT ILLNESS:    Head Injury       ED 02/16/2024  89-year-old gentleman who comes in from the extended care facility after having a fall where they state he struck his forehead.  Patient can give no history     Observation 02/17/2024  Patient is room air and hemodynamically stable.  Per nursing staff he is confused which is his baseline.  However, he is currently asleep comfortably.  RN at bedside  Reviewed nursing notes from night shift and saw that patient has been in [facility] for about 24 hours.  He was recently discharged from [facility] and sent to ED last night due to fall. I spoke with dayshift RN who obtained records from [facility].  Patient has multiple comorbidities including CKD with recent HD, hypertension, A-fib and  CAD.  He had multiple consults at [facility] including cardiologist, nephrologist, GI, hematology and psychiatry.  Patient is a DNR.  Reviewed baseline BNP from [facility] was 53,000 and recent echocardiogram on 2/13/2024 showed EF of 50-55%.  Patient received dialysis on Wednesday and per CM has dialysis chair arranged at [facility].   IVF ordered in ED last night but not started by nursing staff due to hx of dialysis and CHF. Patient appears to be at his baseline and per CM, may return to [facility] today. PT/OT eval not needed.    Past Medical History:   Diagnosis Date    A-fib     Asthma     Chronic renal disease     COPD (chronic obstructive pulmonary disease)     Coronary heart disease     CABG    CVA (cerebral vascular accident)     recent CVA -- Patient and Family report this is not accurate    Gout     Hypertension      Past Surgical History:   Procedure Laterality Date    ARTERIOVENOUS FISTULA/SHUNT SURGERY Right 2/11/2022    Procedure: RIGHT ARM BRACHIAL CEPHALIC ARTERIAL VENOUS FISTULA;  Surgeon: Drew Gunter MD;  Location: [facility] OR;   Service: Vascular;  Laterality: Right;    CARDIAC CATHETERIZATION  2016    [facility]    CORONARY ARTERY BYPASS GRAFT      CABG X3, reoperation, 09; CAB and     OTHER SURGICAL HISTORY  2016    loop recorder implant      Family History   Problem Relation Age of Onset    Stroke Other      Social History     Tobacco Use    Smoking status: Never    Smokeless tobacco: Never   Vaping Use    Vaping Use: Never used   Substance Use Topics    Alcohol use: No    Drug use: No     Medications Prior to Admission   Medication Sig Dispense Refill Last Dose    acetaminophen (TYLENOL) 325 MG tablet Take 2 tablets by mouth Every 4 (Four) Hours As Needed for Mild Pain.       allopurinol (ZYLOPRIM) 100 MG tablet Take 1 tablet by mouth Daily. 90 tablet 2     atorvastatin (LIPITOR) 20 MG tablet Take 1 tablet by mouth Every Night.       bumetanide (BUMEX) 1 MG tablet Take 1 tablet by mouth Daily.       CloNIDine (CATAPRES) 0.1 MG tablet Take 1 tablet by mouth Every 6 (Six) Hours.       clopidogrel (PLAVIX) 75 MG tablet Take 1 tablet by mouth Daily. Must be seen for more refills 30 tablet 0     diphenhydrAMINE (BENADRYL) 25 mg capsule Take 1 capsule by mouth Daily As Needed for Itching.       docusate sodium (COLACE) 100 MG capsule Take 2 capsules by mouth Every Morning.       doxycycline (VIBRAMYCIN) 100 MG capsule Take 1 capsule by mouth 2 (Two) Times a Day.       famotidine (PEPCID) 20 MG tablet Take 1 tablet by mouth 2 (Two) Times a Day.       folic acid (FOLVITE) 1 MG tablet Take 1 tablet by mouth Daily.       hydrALAZINE (APRESOLINE) 50 MG tablet Take 1 tablet by mouth Daily.       ipratropium-albuterol (DUO-NEB) 0.5-2.5 mg/3 ml nebulizer Take 3 mL by nebulization Every 4 (Four) Hours As Needed for Wheezing.       levothyroxine (SYNTHROID, LEVOTHROID) 50 MCG tablet Take 1 tablet by mouth Every Night.       lidocaine (LMX) 4 % cream Apply 1 Application topically to the appropriate area as directed 3 (Three) Times a  Week. Mon, Wed, Fri       metoprolol tartrate (LOPRESSOR) 25 MG tablet Take 1.5 tablets by mouth 2 (Two) Times a Day.       midodrine (PROAMATINE) 10 MG tablet Take 1 tablet by mouth 3 (Three) Times a Day Before Meals.       naloxone (NARCAN) 4 MG/0.1ML nasal spray 1 spray into the nostril(s) as directed by provider As Needed for Opioid Reversal.       tamsulosin (FLOMAX) 0.4 MG capsule 24 hr capsule Take 1 capsule by mouth Every Night.       vitamin B-12 (CYANOCOBALAMIN) 1000 MCG tablet Take 0.5 tablets by mouth Daily.       warfarin (COUMADIN) 3 MG tablet Take 1 tablet by mouth Daily.        Allergies:  Patient has no known allergies.    Immunization History   Administered Date(s) Administered    Flu Vaccine Intradermal Quad 18-64YR 09/30/2015    Fluzone High Dose =>65 Years ([employer] ONLY) 08/13/2015    Influenza Quad Vaccine (Inpatient) 08/12/2014    Pneumococcal Conjugate 13-Valent (PCV13) 07/01/2016    Zostavax 07/01/2016    flucelvax quad pfs =>4 YRS 11/06/2019           REVIEW OF SYSTEMS:    Review of Systems   Unable to perform ROS: Dementia       Vital Signs  Temp:  [96.3 °F (35.7 °C)-97.9 °F (36.6 °C)] 97.9 °F (36.6 °C)  Heart Rate:  [61-82] 65  Resp:  [12-18] 13  BP: ()/(42-72) 97/56          Physical Exam:  Physical Exam  Vitals and nursing note reviewed.   Constitutional:       Appearance: He is underweight. He is ill-appearing.   HENT:      Head: Normocephalic and atraumatic.      Right Ear: External ear normal.      Left Ear: External ear normal.      Nose: Nose normal.      Mouth/Throat:      Mouth: Mucous membranes are moist.   Eyes:      Extraocular Movements: Extraocular movements intact.   Cardiovascular:      Rate and Rhythm: Normal rate. Rhythm irregular.      Pulses: Normal pulses.      Heart sounds: Normal heart sounds.   Pulmonary:      Effort: Pulmonary effort is normal.      Breath sounds: Normal breath sounds.   Abdominal:      General: Abdomen is flat. Bowel sounds are normal.       Palpations: Abdomen is soft.   Skin:     General: Skin is warm.   Neurological:      Mental Status: He is alert. Mental status is at baseline. He is disoriented.   Psychiatric:         Behavior: Behavior is cooperative.      Comments: Patient is not agitated but he is at his baseline (disoriented). Currently resting in bed. Notified by RN that he spit out most of his medications.        Emotional Behavior:    Normal    Debilities:   Disoriented, at baseline  Results Review:    I reviewed the patient's new clinical results.  Lab Results (most recent)       Procedure Component Value Units Date/Time    POC Glucose Once [[id number]]  (Abnormal) Collected: 02/17/24 1022    Specimen: Blood Updated: 02/17/24 1024     Glucose 126 mg/dL      Comment: Serial Number: [id number]:  [id number]       POC Glucose Once [[id number]]  (Abnormal) Collected: 02/17/24 0924    Specimen: Blood Updated: 02/17/24 0935     Glucose 65 mg/dL      Comment: Serial Number: [id number]:  [id number]       BNP [[id number]]  (Abnormal) Collected: 02/17/24 0759    Specimen: Blood from Arm, Left Updated: 02/17/24 0922     proBNP 44,003.0 pg/mL     Narrative:      This assay is used as an aid in the diagnosis of individuals suspected of having heart failure. It can be used as an aid in the diagnosis of acute decompensated heart failure (ADHF) in patients presenting with signs and symptoms of ADHF to the emergency department (ED). In addition, NT-proBNP of <300 pg/mL indicates ADHF is not likely.    Age Range Result Interpretation  NT-proBNP Concentration (pg/mL:      <50             Positive            >450                   Gray                 300-450                    Negative             <300    50-75           Positive            >900                  Gray                300-900                  Negative            <300      >75             Positive            >1800                  Gray                300-1800                  Negative             <300    Magnesium [[id number]]  (Normal) Collected: 02/17/24 0759    Specimen: Blood from Arm, Left Updated: 02/17/24 0922     Magnesium 2.0 mg/dL     Basic Metabolic Panel [[id number]]  (Abnormal) Collected: 02/17/24 0759    Specimen: Blood from Arm, Left Updated: 02/17/24 0922     Glucose 61 mg/dL      BUN 15 mg/dL      Creatinine 2.26 mg/dL      Sodium 136 mmol/L      Potassium 3.5 mmol/L      Chloride 98 mmol/L      CO2 24.0 mmol/L      Calcium 9.1 mg/dL      BUN/Creatinine Ratio 6.6     Anion Gap 14.0 mmol/L      eGFR 26.7 mL/min/1.73     Narrative:      GFR Normal >60  Chronic Kidney Disease <60  Kidney Failure <15    The GFR formula is only valid for adults with stable renal function between ages 18 and 70.    Protime-INR [[id number]]  (Abnormal) Collected: 02/17/24 0759    Specimen: Blood from Arm, Left Updated: 02/17/24 0853     Protime 16.0 Seconds      INR 1.51    CBC & Differential [[id number]]  (Abnormal) Collected: 02/17/24 0759    Specimen: Blood from Arm, Left Updated: 02/17/24 0846    Narrative:      The following orders were created for panel order CBC & Differential.  Procedure                               Abnormality         Status                     ---------                               -----------         ------                     CBC Auto Differential[[id number]]        Abnormal            Final result                 Please view results for these tests on the individual orders.    CBC Auto Differential [[id number]]  (Abnormal) Collected: 02/17/24 0759    Specimen: Blood from Arm, Left Updated: 02/17/24 0846     WBC 6.50 10*3/mm3      RBC 2.65 10*6/mm3      Hemoglobin 8.4 g/dL      Hematocrit 26.5 %      MCV 99.9 fL      MCH 31.9 pg      MCHC 31.9 g/dL      RDW 14.9 %      RDW-SD 52.1 fl      MPV 9.0 fL      Platelets 100 10*3/mm3      Neutrophil % 77.3 %      Lymphocyte % 11.2 %      Monocyte % 10.2 %      Eosinophil % 1.0 %      Basophil % 0.3 %      Neutrophils, Absolute 5.00 10*3/mm3       Lymphocytes, Absolute 0.70 10*3/mm3      Monocytes, Absolute 0.70 10*3/mm3      Eosinophils, Absolute 0.10 10*3/mm3      Basophils, Absolute 0.00 10*3/mm3      nRBC 0.4 /100 WBC     CANDIDA AURIS SCREEN - Swab, Axilla Right, Axilla Left and Groin [[id number]] Collected: 02/17/24 0309    Specimen: Swab from Axilla Right, Axilla Left and Groin Updated: 02/17/24 0320    Comprehensive Metabolic Panel [[id number]]  (Abnormal) Collected: 02/16/24 2147    Specimen: Blood Updated: 02/16/24 2221     Glucose 75 mg/dL      BUN 11 mg/dL      Creatinine 1.75 mg/dL      Sodium 138 mmol/L      Potassium 3.7 mmol/L      Chloride 97 mmol/L      CO2 27.0 mmol/L      Calcium 9.5 mg/dL      Total Protein 7.2 g/dL      Albumin 4.2 g/dL      ALT (SGPT) 39 U/L      AST (SGOT) 101 U/L      Alkaline Phosphatase 86 U/L      Total Bilirubin 1.4 mg/dL      Globulin 3.0 gm/dL      A/G Ratio 1.4 g/dL      BUN/Creatinine Ratio 6.3     Anion Gap 14.0 mmol/L      eGFR 36.3 mL/min/1.73     Narrative:      GFR Normal >60  Chronic Kidney Disease <60  Kidney Failure <15    The GFR formula is only valid for adults with stable renal function between ages 18 and 70.    Protime-INR [[id number]]  (Abnormal) Collected: 02/16/24 2147    Specimen: Blood Updated: 02/16/24 2210     Protime 17.0 Seconds      INR 1.62    CBC & Differential [[id number]]  (Abnormal) Collected: 02/16/24 2147    Specimen: Blood Updated: 02/16/24 2155    Narrative:      The following orders were created for panel order CBC & Differential.  Procedure                               Abnormality         Status                     ---------                               -----------         ------                     CBC Auto Differential[[id number]]        Abnormal            Final result                 Please view results for these tests on the individual orders.    CBC Auto Differential [[id number]]  (Abnormal) Collected: 02/16/24 2147    Specimen: Blood Updated: 02/16/24 2155     WBC 7.20  10*3/mm3      RBC 2.98 10*6/mm3      Hemoglobin 9.8 g/dL      Hematocrit 30.1 %      .9 fL      MCH 32.8 pg      MCHC 32.5 g/dL      RDW 15.2 %      RDW-SD 52.9 fl      MPV 8.3 fL      Platelets 116 10*3/mm3      Neutrophil % 81.0 %      Lymphocyte % 9.1 %      Monocyte % 8.7 %      Eosinophil % 0.8 %      Basophil % 0.4 %      Neutrophils, Absolute 5.80 10*3/mm3      Lymphocytes, Absolute 0.70 10*3/mm3      Monocytes, Absolute 0.60 10*3/mm3      Eosinophils, Absolute 0.10 10*3/mm3      Basophils, Absolute 0.00 10*3/mm3      nRBC 0.7 /100 WBC             Imaging Results (Most Recent)       Procedure Component Value Units Date/Time    XR Chest 1 View [[id number]] Collected: 02/17/24 1009     Updated: 02/17/24 1013    Narrative:      XR CHEST 1 VW    Date of Exam: 2/17/2024 9:55 AM EST    Indication: hx of CHF, increased BNP    Comparison: 2/11/2024    Findings:   Patient is status post median sternotomy and CABG. Cardiac silhouette is enlarged. Vague perihilar opacities may represent edema or infiltrates. No significant pleural effusion or pneumothorax is seen. No acute osseous lesion is seen. Right chest wall   Port-A-Cath terminates overlying the superior cavoatrial junction. Loop recorder projects over the left thorax. Metallic stent projects over the left upper thorax.        Impression:      Impression:  Cardiomegaly with vague bilateral perihilar opacities which may represent edema or infiltrates.    Electronically Signed: Jose Be MD    2/17/2024 10:11 AM EST    Workstation ID: [id number]    CT Abdomen Pelvis With Contrast [[id number]] Collected: 02/16/24 2354     Updated: 02/16/24 2359    Narrative:      CT ABDOMEN PELVIS W CONTRAST    Date of Exam: 2/16/2024 11:30 PM EST    Indication: flank pain brusing..    Comparison: None available.    Technique: Axial CT images were obtained of the abdomen and pelvis following the uneventful intravenous administration of iodinated contrast. Sagittal and  coronal reconstructions were performed.  Automated exposure control and iterative reconstruction   methods were used.        Findings:  Lung Bases:     The visualized lung bases and lower mediastinal structures are unremarkable. The heart appears enlarged. There is anasarca of the soft tissues. Some increased fluid is present within the left flank (series 3 image 71). No drainable focal collection   identified.    Liver:  The liver appears diffusely hypodense consistent with steatosis.. No focal lesions.    Biliary/Gallbladder:    The gallbladder is normal without evidence of radiopaque stones. The biliary tree is nondilated.    Spleen:  Spleen is normal in size and CT density.    Pancreas:    The pancreas appears atrophic.. There is no evidence of pancreatic mass or peripancreatic fluid.    Kidneys:    Kidneys are normal in size. There are no stones or hydronephrosis.    Adrenals:    Adrenal glands are unremarkable.    Retroperitoneal/Lymph Nodes/Vasculature:    No retroperitoneal adenopathy is identified. Atherosclerotic calcifications are present. No evidence of thrombus.    Gastrointestinal/Mesentery:    The bowel loops are non-dilated without wall thickening or mass. The appendix appears within normal limits. No evidence of obstruction. No free air. No mesenteric fluid collections identified. No evidence of hernia. Mild to moderate stool burden present.    Bladder:    The bladder is normal.    Genital:     Unremarkable          Bony Structures:     Visualized bony structures are consistent with the patient's age. Degenerative changes are present throughout the spine. No acute osseous abnormality identified.        Impression:      Impression:  1.No acute intra-abdominal or intrapelvic process. There is mild anasarca of the soft tissues with increased fluid and soft tissue swelling present within the left flank. No drainable collection identified. Findings may be related to contusion. No acute   osseous  abnormality.  2.Hepatic steatosis.  3.Ancillary findings as described above.        Electronically Signed: Deepa Healy MD    2/16/2024 11:57 PM EST    Workstation ID: [id number]    CT Cervical Spine Without Contrast [[id number]] Collected: 02/16/24 2220     Updated: 02/16/24 2225    Narrative:        CT CERVICAL SPINE WO CONTRAST    Date of Exam: 2/16/2024 10:00 PM EST    Indication: Neck trauma (Age >= 65y).    Comparison: None available.    Technique: Axial CT images were obtained of the cervical spine without contrast administration.  Sagittal and coronal reconstructions were performed.  Automated exposure control and iterative reconstruction methods were used.      Findings:  No evidence of acute fracture or traumatic subluxation.  No evidence of aggressive lesions. There is anatomic alignment of C1 on C2.  The prevertebral soft tissues appear unremarkable. Surrounding soft tissues appear within normal limits.  Advanced multilevel disc space narrowing is visualized with associated facet osteoarthropathy. No CT evidence of high-grade canal stenosis.  The lung apices are clear.        Impression:      Impression:  No evidence of acute fracture or traumatic subluxation of the cervical spine.    Advanced multilevel degenerative changes.      Electronically Signed: Mike Prado MD    2/16/2024 10:23 PM EST    Workstation ID: [id number]    CT Head Without Contrast [[id number]] Collected: 02/16/24 2218     Updated: 02/16/24 2225    Narrative:      CT HEAD WO CONTRAST    Date of Exam: 2/16/2024 10:00 PM EST    Indication: Head trauma, minor (Age >= 65y).    Comparison: Noncontrast CT of the brain performed on July 29, 2023    Technique: Axial CT images were obtained of the head without contrast administration.  Coronal reconstructions were performed.  Automated exposure control and iterative reconstruction methods were used.      Findings:  There is no evidence of hemorrhage. There is no mass effect or midline shift.  Age-related illusional changes are visualized.    There is no extra-axial collections.    Ventricles are normal in size and configuration for patient's stated age.    Encephalomalacia in the superior left cerebral hemisphere is visualized.    Calvarium and skull base appear intact. Visualized sinuses show no air fluid levels. The mastoid air cells are clear. Visualized orbits are unremarkable.        Impression:      Impression:  No acute intracranial process evident.    Age-related illusional changes.    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      Electronically Signed: Mike Prado MD    2/16/2024 10:20 PM EST    Workstation ID: [id number]          reviewed    ECG/EMG Results (most recent)       None          reviewed    Results for orders placed during the hospital encounter of 10/17/22    Duplex hemodialysis access CAR    Interpretation Summary    Patent right brachiocephalic fistula with overall adequate flow volumes.  Diameter and depth both appear appropriate for dialysis.  Stenosis seen at the arterial anastomosis.          Microbiology Results (last 10 days)       ** No results found for the last 240 hours. **            Assessment & Plan     Weakness       Weakness and fall  -Patient sent from facility to ED last night due to fall  -Creatinine 1.75 on admission and 2.26 today, better than baseline  -CBC showed hemoglobin 8.4 at discharge which is near his baseline  -Chest x-ray showed cardiomegaly with vague bilateral opacities.  Patient is a dialysis patient and has a history of CHF  -CT cervical spine showed no evidence of acute fracture or traumatic subluxation of the cervical spine  -CT head showed no acute intracranial process  -CT abdomen pelvis showed no acute intra-abdominal or intrapelvic process.  Mild anasarca noted but no drainable collection identified  -Patient had an extensive workup at [facility].  Records reviewed by provider and patient found to be at his baseline.  Patient  discharged on 2/15/2024.  Had multiple consults at [facility] including psychiatry, cardiology, nephrology, hematology, GI.  He has multiple comorbidities but appears to be on his baseline    Heart failure with reduced ejection fraction with history of A-fib and CAD    Lab Results   Component Value Date    PROBNP 44,003.0 (H) 02/17/2024     02/17/2024   -Baseline BNP according to [facility][1]'s records is 53,000  -Recent echocardiogram showed EF 50-55%  -Chest x-ray:cardiomegaly with vague bilateral opacities which is similar from chest x-ray at [facility] from 2/90/23. Patient is room air and stable  -Continue Bumex, metoprolol, Coumadin and Plavix  -Monitor I's and O's and daily weights  -2 g sodium diet   -Telemetry  -Pharmacy consulted to dose patient's Coumadin.  INR is slightly under therapeutic today.  Recommendations to continue patient at his current regimen of 3 mg today.  Per [facility][1], and patient had elevated INR recently.    CKD (Stage 4) with hypertension  BP Readings from Last 1 Encounters:   02/17/24 97/56     Lab Results   Component Value Date    CREATININE 2.26 (H) 02/17/2024    BUN 15 02/17/2024    BCR 6.6 (L) 02/17/2024    EGFR 26.7 (L) 02/17/2024   -Per [facility][1]'s records patient received dialysis on Wednesday.  Per  patient has dialysis chair arranged at at the facility  -Continue hydralazine, Bumex and clonidine  -Avoid nephrotoxic medication IV dye unless urgently needed  -Monitor BMP and I's and O's while admitted     GERD  -Recent EGD a [facility] showed hiatal hernia and multiple erosions and ulcerations  -Continue PPI    Recent pneumonia  -Treated at [facility][1] and currently finishing course of doxycycline    I discussed the patients findings and my recommendations with patient and nursing staff.     Discharge Diagnosis:      Weakness      Hospital Course  Patient is a 89 y.o. male presented with fall as noted in HPI above.  Patient sent from a facility to ED last night due to  fall.  CT cervical spine showed no evidence of acute fracture and CT head showed no evidence of acute intracranial process.  Patient was kept in the observation unit for further monitoring and investigation as patient is disoriented and unable to provide any history.  RN obtained recent records from [facility] and provider reviewed all records, labs, imaging and dc summary. Patient had a very extensive recent workup done at [facility]. He has multiple comorbidities including hypertension, CKD stage IV, A-fib, CHF with reduced EF and GERD.  His current labs are better than his baseline including proBNP 44,000 today with baseline of 53,000.  GFR 26.7 with baseline of 11.  Patient already has a dialysis chair arranged at nursing home.  He is currently room air, hemodynamically stable and at his baseline.  Chest x-ray showed cardiomegaly which is unchanged from previous chest x-ray from [facility]. At this time, patient is stable and at his baseline for discharge to [facility]. Patient is currently not alert to person, time, place, situation which appears to be his baseline.  Family is not at bedside.   RN to call report to nursing staff. Return to ED if any concerning signs/symptoms.     Past Medical History:     Past Medical History:   Diagnosis Date    A-fib     Asthma     Chronic renal disease     COPD (chronic obstructive pulmonary disease)     Coronary heart disease     CABG    CVA (cerebral vascular accident)     recent CVA -- Patient and Family report this is not accurate    Gout     Hypertension        Past Surgical History:     Past Surgical History:   Procedure Laterality Date    ARTERIOVENOUS FISTULA/SHUNT SURGERY Right 2/11/2022    Procedure: RIGHT ARM BRACHIAL CEPHALIC ARTERIAL VENOUS FISTULA;  Surgeon: Drew Gunter MD;  Location: [facility] MAIN OR;  Service: Vascular;  Laterality: Right;    CARDIAC CATHETERIZATION  08/2016    F    CORONARY ARTERY BYPASS GRAFT      CABG X3, reoperation,  2-16; CAB and     OTHER SURGICAL HISTORY  2016    loop recorder implant        Social History:   Social History     Socioeconomic History    Marital status:    Tobacco Use    Smoking status: Never    Smokeless tobacco: Never   Vaping Use    Vaping Use: Never used   Substance and Sexual Activity    Alcohol use: No    Drug use: No    Sexual activity: Defer       Procedures Performed         Consults:   Consults       No orders found for last 30 day(s).            Condition on Discharge:     Stable    Discharge Disposition  Skilled Nursing Facility ([place] - External)    Discharge Medications     Discharge Medications        Continue These Medications        Instructions Start Date   acetaminophen 325 MG tablet  Commonly known as: TYLENOL   650 mg, Oral, Every 4 Hours PRN      allopurinol 100 MG tablet  Commonly known as: ZYLOPRIM   100 mg, Oral, Daily      atorvastatin 20 MG tablet  Commonly known as: LIPITOR   20 mg, Oral, Nightly      bumetanide 1 MG tablet  Commonly known as: BUMEX   1 mg, Oral, Daily      cloNIDine 0.1 MG tablet  Commonly known as: CATAPRES   0.1 mg, Oral, Every 6 Hours      clopidogrel 75 MG tablet  Commonly known as: PLAVIX   75 mg, Oral, Daily, Must be seen for more refills      diphenhydrAMINE 25 mg capsule  Commonly known as: BENADRYL   25 mg, Oral, Daily PRN      docusate sodium 100 MG capsule  Commonly known as: COLACE   200 mg, Oral, Every Morning      doxycycline 100 MG capsule  Commonly known as: VIBRAMYCIN   100 mg, Oral, 2 Times Daily      famotidine 20 MG tablet  Commonly known as: PEPCID   20 mg, Oral, 2 Times Daily      folic acid 1 MG tablet  Commonly known as: FOLVITE   1 mg, Oral, Daily      hydrALAZINE 50 MG tablet  Commonly known as: APRESOLINE   50 mg, Oral, Daily      ipratropium-albuterol 0.5-2.5 mg/3 ml nebulizer  Commonly known as: DUO-NEB   3 mL, Nebulization, Every 4 Hours PRN      levothyroxine 50 MCG tablet  Commonly known as: SYNTHROID,  LEVOTHROID   50 mcg, Oral, Nightly      lidocaine 4 % cream  Commonly known as: LMX   1 Application, Topical, 3 Times Weekly, Mon, Wed, Fri      metoprolol tartrate 25 MG tablet  Commonly known as: LOPRESSOR   37.5 mg, Oral, 2 Times Daily      midodrine 10 MG tablet  Commonly known as: PROAMATINE   10 mg, Oral, 3 Times Daily Before Meals      naloxone 4 MG/0.1ML nasal spray  Commonly known as: NARCAN   1 spray, Nasal, As Needed      tamsulosin 0.4 MG capsule 24 hr capsule  Commonly known as: FLOMAX   1 capsule, Oral, Nightly      vitamin B-12 1000 MCG tablet  Commonly known as: CYANOCOBALAMIN   500 mcg, Oral, Daily      warfarin 3 MG tablet  Commonly known as: COUMADIN   3 mg, Oral, Daily Warfarin               Discharge Diet:   Diet Instructions       Diet: Cardiac Diets; Healthy Heart (2-3 Na+); Regular Texture (IDDSI 7); Thin (IDDSI 0)      Discharge Diet: Cardiac Diets    Cardiac Diet: Healthy Heart (2-3 Na+)    Texture: Regular Texture (IDDSI 7)    Fluid Consistency: Thin (IDDSI 0)    Diet: Renal Diets; Low Sodium (2-3g), Low Potassium, Low Phosphorus; Regular Texture (IDDSI 7); Thin (IDDSI 0)      Discharge Diet: Renal Diets    Renal Diet:  Low Sodium (2-3g)  Low Potassium  Low Phosphorus       Texture: Regular Texture (IDDSI 7)    Fluid Consistency: Thin (IDDSI 0)            Activity at Discharge:     Follow-up Appointments  Future Appointments   Date Time Provider Department Center   4/3/2024 11:00 AM Lucia Aggarwal APRN [facility]     Additional Instructions for the Follow-ups that You Need to Schedule       Discharge Follow-up with PCP   As directed       Currently Documented PCP:    Pedro Purvis MD    PCP Phone Number:    [phone]     Follow Up Details: 5-7 days        Discharge Follow-up with PCP   As directed       Currently Documented PCP:    Pedro Purvis MD    PCP Phone Number:    [phone]     Follow Up Details: 5-7 days        Discharge Follow-up with Specified  Provider: Cardiology   As directed      To: Cardiology   Follow Up Details: as needed        Discharge Follow-up with Specified Provider: Cardiology   As directed      To: Cardiology   Follow Up Details: as needed        Discharge Follow-up with Specified Provider: Nephrology; 1 Week   As directed      To: Nephrology   Follow Up: 1 Week        Discharge Follow-up with Specified Provider: Nephrology; 1 Week   As directed      To: Nephrology   Follow Up: 1 Week                Test Results Pending at Discharge  Pending Labs       Order Current Status    CANDIDA [facility]IS SCREEN - Swab, Axilla Right, Axilla Left and Groin In process             Risk for Readmission (LACE) Score: 7 (2/17/2024  6:00 AM)      Greater than 30 minutes spent in discharge activities for this patient    Signature:Electronically signed by AMISHA Cox, 02/17/24, 11:18 AM EST.

## 2024-02-17 NOTE — THERAPY EVALUATION
Patient Name: Barry Calhoun Sr.  : 1932    MRN: [id number]                              Today's Date: 2024       Admit Date: 2024    Visit Dx:     ICD-10-CM ICD-9-CM   1. Fall, initial encounter  W19.XXXA E888.9   2. Contusion of multiple sites of trunk, initial encounter  S20.20XA 922.8   3. Weakness  R53.1 780.79   4. 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    Anemia due to chronic kidney disease stage III    Atrial fibrillation    Monitoring of monoclonal antibody treatment for malignancy    Gross hematuria, chronic    Healthcare maintenance    Essential hypertension    Moderate to severe mitral regurgitation    Acute renal failure    Asthma    Cerebrovascular accident (CVA)    Chronic diastolic heart failure    Chronic obstructive pulmonary disease    Coronary heart disease    Nonsustained paroxysmal ventricular tachycardia    Palpitations    Status post placement of implantable loop recorder    Stomach cancer    Weakness     Past Medical History:   Diagnosis Date    A-fib     Asthma     Chronic renal disease     COPD (chronic obstructive pulmonary disease)     Coronary heart disease     CABG    CVA (cerebral vascular accident)     recent CVA -- Patient and Family report this is not accurate    Gout     Hypertension      Past Surgical History:   Procedure Laterality Date    ARTERIOVENOUS FISTULA/SHUNT SURGERY Right 2022    Procedure: RIGHT ARM BRACHIAL CEPHALIC ARTERIAL VENOUS FISTULA;  Surgeon: Drew Gunter MD;  Location: [facility] MAIN OR;  Service: Vascular;  Laterality: Right;    CARDIAC CATHETERIZATION  2016    [facility]    CORONARY ARTERY BYPASS GRAFT      CABG X3, reoperation, 09; CAB and     OTHER SURGICAL HISTORY  2016    loop recorder  implant       General Information       [facility] Name 02/17/24 1143          Physical Therapy Time and Intention    Document Type evaluation  -     Mode of Treatment physical therapy  -       Row Name 02/17/24 1143          General Information    Prior Level of Function --  Pt only A/O to self; unsure of previous level of function. RN reports pt had a fall from his wheelchair.  -     Existing Precautions/Restrictions fall;oxygen therapy device and L/min  -     Barriers to Rehab medically complex;cognitive status  -       Row Name 02/17/24 1143          Living Environment    People in Home facility resident;other (see comments)  Sushant King  -       Row Name 02/17/24 1143          Cognition    Orientation Status (Cognition) oriented to;person;disoriented to;place;situation;time;verbal cues/prompts needed for orientation  -       Row Name 02/17/24 1143          Safety Issues, Functional Mobility    Impairments Affecting Function (Mobility) balance;endurance/activity tolerance;strength;grasp;cognition  -               User Key  (r) = Recorded By, (t) = Taken By, (c) = Cosigned By      Initials Name Provider Type    NANY Daisy Garcia, PT Physical Therapist                   Mobility       Row Name 02/17/24 1146          Bed Mobility    Bed Mobility supine-sit;sit-supine;scooting/bridging  -     Scooting/Bridging [place] (Bed Mobility) maximum assist (25% patient effort);2 person assist;verbal cues  -     Supine-Sit [place] (Bed Mobility) maximum assist (25% patient effort);verbal cues  -     Sit-Supine [place] (Bed Mobility) maximum assist (25% patient effort);verbal cues  -     Assistive Device (Bed Mobility) bed rails;head of bed elevated  -     Comment, (Bed Mobility) increased time to complete; Ax2 to scoot to HOB and reposition; BP once returned to bed 97/56 (64)  -       Row Name 02/17/24 1146          Bed-Chair Transfer    Bed-Chair [place] (Transfers) maximum assist  (25% patient effort)  -     Comment, (Bed-Chair Transfer) SPS from the EOB<>bedside chair; BP seated in chair 95/52 (62), 93/50 (64), 83/47 (55)  -       Row Name 02/17/24 1146          Sit-Stand Transfer    Sit-Stand [place] (Transfers) verbal cues;maximum assist (25% patient effort)  -     Comment, (Sit-Stand Transfer) STS from EOB  -[facility] Name 02/17/24 1146          Gait/Stairs (Locomotion)    Distance in Feet (Gait) Pt limited by fatigue and hypotension; likely utilizes wheelchair for mobility  -               User Key  (r) = Recorded By, (t) = Taken By, (c) = Cosigned By      Initials Name Provider Type    Daisy Anderson PT Physical Therapist                   Obj/Interventions       Row Name 02/17/24 1150          Range of Motion Comprehensive    Comment, General Range of Motion Limited B knee extension; decreased hip abduction, but otherwise WFL  -[facility] Name 02/17/24 1150          Strength Comprehensive (MMT)    General Manual Muscle Testing (MMT) Assessment lower extremity strength deficits identified  -     Comment, General Manual Muscle Testing (MMT) Assessment Difficulty assessing due to confusion though 3-/5, functionally tested.  -       Row Name 02/17/24 1150          Balance    Balance Assessment sitting static balance;standing static balance  -     Static Sitting Balance minimal assist;contact guard  -     Static Standing Balance maximum assist  -               User Key  (r) = Recorded By, (t) = Taken By, (c) = Cosigned By      Initials Name Provider Type    Daisy Anderson, RADHA Physical Therapist                   Goals/Plan       Row Name 02/17/24 1154          Bed Mobility Goal 1 (PT)    Activity/Assistive Device (Bed Mobility Goal 1, PT) bed mobility activities, all  -     [place] Level/Cues Needed (Bed Mobility Goal 1, PT) minimum assist (75% or more patient effort)  -     Time Frame (Bed Mobility Goal 1, PT) long term goal (LTG);2 weeks  -        Row Name 02/17/24 1154          Transfer Goal 1 (PT)    Activity/Assistive Device (Transfer Goal 1, PT) sit-to-stand/stand-to-sit;bed-to-chair/chair-to-bed  -     [place] Level/Cues Needed (Transfer Goal 1, PT) minimum assist (75% or more patient effort)  -     Time Frame (Transfer Goal 1, PT) long term goal (LTG);2 weeks  -       Row Name 02/17/24 1154          Problem Specific Goal 1 (PT)    Problem Specific Goal 1 (PT) Pt will stand using RW and maintain static standing balance with mod Ax1 to improve independence and safety with transfers.  -     Time Frame (Problem Specific Goal 1, PT) long-term goal (LTG);2 weeks  -       Row Name 02/17/24 1158          Therapy Assessment/Plan (PT)    Planned Therapy Interventions (PT) balance training;bed mobility training;home exercise program;patient/family education;strengthening;neuromuscular re-education;transfer training  -               User Key  (r) = Recorded By, (t) = Taken By, (c) = Cosigned By      Initials Name Provider Type    Daisy Anderson, PT Physical Therapist                   Clinical Impression       Row Name 02/17/24 1151          Pain    Pretreatment Pain Rating 0/10 - no pain  -NANY     Posttreatment Pain Rating 0/10 - no pain  -NANY     Pre/Posttreatment Pain Comment pt denies pain  -NANY       Row Name 02/17/24 1151          Plan of Care Review    Plan of Care Reviewed With patient  -     Outcome Evaluation Pt is a 89 y/o male who presents to [facility] from [facility] where he had a fall from his wheelchair. PMH significant for a-fib, CVA, h/o non hodgkin's lymphoma, HTN, and LEONARD. 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 CT abdomen may be related to contusion, showing soft tissue swelling within the L flank. CT c-spine (-). Pt is only A/O to himself. At this time he requires max A for supine>sit with cues, max A for SPS from EOB to the bedside chair. BP 81/45 after supine>sit. Pt denies any symptoms  from hypotension. Pt was transferred to the bedside chair after BP improved though BP remained low in the chair and pt was transferred back to bed. Pt requires cues and increased time for transfers. He will need SNF at discharge.  -NANY       Row Name 02/17/24 1151          Therapy Assessment/Plan (PT)    Rehab Potential (PT) good, to achieve stated therapy goals  -     Criteria for Skilled Interventions Met (PT) yes;meets criteria  -NANY     Therapy Frequency (PT) 3 times/wk  -NANY     Predicted Duration of Therapy Intervention (PT) Until d/c  -NANY       Row Name 02/17/24 1151          Vital Signs    Intra Systolic BP Rehab 83  -NANY     Intra Treatment Diastolic BP 47  -NANY     Post Systolic BP Rehab 97  -NANY     Post Treatment Diastolic BP 56  -NANY     Pre SpO2 (%) 94  -NANY     O2 Delivery Pre Treatment room air  -NANY     Intra SpO2 (%) 90  -NANY     O2 Delivery Intra Treatment room air  -NANY     Post SpO2 (%) 91  -NANY     O2 Delivery Post Treatment room air  -       Row Name 02/17/24 1151          Positioning and Restraints    Pre-Treatment Position in bed  -NANY     Post Treatment Position bed  -NANY     In Bed notified nsg;fowlers;call light within reach;encouraged to call for assist;exit alarm on;with other staff  -               User Key  (r) = Recorded By, (t) = Taken By, (c) = Cosigned By      Initials Name Provider Type    Daisy Anderson, PT Physical Therapist                   Outcome Measures       Row Name 02/17/24 1155 02/17/24 0154       How much help from another person do you currently need...    Turning from your back to your side while in flat bed without using bedrails? 2  -NANY 2  -TM    Moving from lying on back to sitting on the side of a flat bed without bedrails? 2  -NANY 2  -TM    Moving to and from a bed to a chair (including a wheelchair)? 2  -NANY 1  -TM    Standing up from a chair using your arms (e.g., wheelchair, bedside chair)? 2  -NANY 1  -TM    Climbing 3-5 steps with a railing? 1  -NANY 1  -TM    To  walk in hospital room? 1  - 1  -TM    AM-PAC 6 Clicks Score (PT) 10  - 8  -TM    Highest Level of Mobility Goal 4 --> Transfer to chair/commode  - 3 --> Sit at edge of bed  -      Row Name 02/17/24 1155          Functional Assessment    Outcome Measure Options AM-PAC 6 Clicks Basic Mobility (PT)  -               User Key  (r) = Recorded By, (t) = Taken By, (c) = Cosigned By      Initials Name Provider Type    NANY Daisy Garcia, RADHA Physical Therapist    TM Alberto Jones, RN Registered Nurse                                 Physical Therapy Education       Title: PT OT SLP Therapies (In Progress)       Topic: Physical Therapy (In Progress)       Point: Mobility training (In Progress)       Learning Progress Summary             Patient Acceptance, E, NR by  at 2/17/2024 1155                         Point: Home exercise program (Not Started)       Learner Progress:  Not documented in this visit.              Point: Body mechanics (In Progress)       Learning Progress Summary             Patient Acceptance, E, NR by  at 2/17/2024 1155                         Point: Precautions (In Progress)       Learning Progress Summary             Patient Acceptance, E, NR by  at 2/17/2024 1155                                         User Key       Initials Effective Dates Name Provider Type [facility] 08/23/21 -  Daisy Garcia, RADHA Physical Therapist PT                  PT Recommendation and Plan  Planned Therapy Interventions (PT): balance training, bed mobility training, home exercise program, patient/family education, strengthening, neuromuscular re-education, transfer training  Plan of Care Reviewed With: patient  Outcome Evaluation: Pt is a 89 y/o male who presents to [facility] from [facility] where he had a fall from his wheelchair. PMH significant for a-fib, CVA, h/o non hodgkin's lymphoma, HTN, and LEONARD. 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 CT abdomen may be related to  contusion, showing soft tissue swelling within the L flank. CT c-spine (-). Pt is only A/O to himself. At this time he requires max A for supine>sit with cues, max A for SPS from EOB to the bedside chair. BP 81/45 after supine>sit. Pt denies any symptoms from hypotension. Pt was transferred to the bedside chair after BP improved though BP remained low in the chair and pt was transferred back to bed. Pt requires cues and increased time for transfers. He will need SNF at discharge.     Time Calculation:         PT Charges       Row Name 02/17/24 1156             Time Calculation    Start Time 0841  -NANY      Stop Time 0938  -NANY      Time Calculation (min) 57 min  -NANY      PT Received On 02/17/24  -NANY      PT - Next Appointment 02/19/24  -NANY      PT Goal Re-Cert Due Date 03/02/24  -NANY         Time Calculation- PT    Total Timed Code Minutes- PT 0 minute(s)  -NANY                User Key  (r) = Recorded By, (t) = Taken By, (c) = Cosigned By      Initials Name Provider Type    NANY Daisy Garcia, PT Physical Therapist                  Therapy Charges for Today       Code Description Service Date Service Provider Modifiers Qty    99747032102 HC PT EVAL MOD COMPLEXITY 4 2/17/2024 Daisy Garcia, PT GP 1            PT G-Codes  Outcome Measure Options: AM-PAC 6 Clicks Basic Mobility (PT)  AM-PAC 6 Clicks Score (PT): 10  PT Discharge Summary  Anticipated Discharge Disposition (PT): skilled nursing facility    Daisy Garcia PT  2/17/2024

## 2024-02-17 NOTE — CASE MANAGEMENT/SOCIAL WORK
"Physicians Statement of Medical Necessity for  Ambulance Transportation    GENERAL INFORMATION     Name: Barry Calhoun Sr.  YOB: 1932  Medicare #: [id number]   Transport Date: 2/17/24 (Valid for round trips this date, or for scheduled repetitive trips for 60 days from the date signed below.)  Origin: [facility] [place] [address]  Destination: [address] [place] [address]  Is the Patient's stay covered under Medicare Part A (PPS/DRG?)Yes  Closest appropriate facility? Yes  If this a hosp-hosp transfer? No  Is this a hospice patient? No    MEDICAL NECESSITY QUESTIONAIRE    Ambulance Transportation is medically necessary only if other means of transportation are contraindicated or would be potentially harmful to the patient.  To meet this requirement, the patient must be either \"bed confined\" or suffer from a condition such that transport by means other than an ambulance is contraindicated by the patient's condition.  The following questions must be answered by the healthcare professional signing below for this form to be valid:     1) Describe the MEDICAL CONDITION (physical and/or mental) of this patient AT THE TIME OF AMBULANCE TRANSPORT that requires the patient to be transported in an ambulance, and why transport by other means is contraindicated by the patient's condition: 2L cont 02, weakness, CVA, confusion- alert to self only, max assist to sit up, hypotension noted when moving from supine to sitting.  Past Medical History:   Diagnosis Date    A-fib     Asthma     Chronic renal disease     COPD (chronic obstructive pulmonary disease)     Coronary heart disease     CABG    CVA (cerebral vascular accident)     recent CVA -- Patient and Family report this is not accurate    Gout     Hypertension       Past Surgical History:   Procedure Laterality Date    ARTERIOVENOUS FISTULA/SHUNT SURGERY Right 2/11/2022    Procedure: RIGHT ARM BRACHIAL CEPHALIC ARTERIAL " "VENOUS FISTULA;  Surgeon: Drew Gunter MD;  Location: [facility] MAIN OR;  Service: Vascular;  Laterality: Right;    CARDIAC CATHETERIZATION  2016    [facility]    CORONARY ARTERY BYPASS GRAFT      CABG X3, reoperation, 09; CAB and     OTHER SURGICAL HISTORY  2016    loop recorder implant       2) Is this patient \"bed confined\" as defined below?Yes   To be \"bed confined\" the patient must satisfy all three of the following criteria:  (1) unable to get up from bed without assistance; AND (2) unable to ambulate;  AND (3) unable to sit in a chair or wheelchair.  3) Can this patient safely be transported by car or wheelchair van (I.e., may safely sit during transport, without an attendant or monitoring?)No   4. In addition to completing questions 1-3 above, please check any of the following conditions that apply*:          *Note: supporting documentation for any boxes checked must be maintained in the patient's medical records Patient is confused, Requires oxygen - unable to self administer, and Unable to tolerate seated position for time needed to transport      SIGNATURE OF PHYSICIAN OR OTHER AUTHORIZED HEALTHCARE PROFESSIONAL    I certify that the above information is true and correct based on my evaluation of this patient, and represent that the patient requires transport by ambulance and that other forms of transport are contraindicated.  I understand that this information will be used by the Centers for Medicare and Medicaid Services (CMS) to support the determiniation of medical necessity for ambulance services, and I represent that I have personal knowledge of the patient's condition at the time of transport.    x   If this box is checked, I also certify that the patient is physically or mentally incapable of signing the ambulance service's claim form and that the institution with which I am affiliated has furnished care, services or assistance to the patient.  My signature below is made on " behalf of the patient pursuant to 42 .36(b)(4). In accordance with 42 .37, the specific reason(s) that the patient is physically or mentally incapable of signing the claim for is as follows: confusion at baseline. Alert to self only.    Signature of Physician or Healthcare Professional     Zoe Wheatley [facility] Date/Time:   1/17/24 7277     (For Scheduled repetitive transport, this form is not valid for transports performed more than 60 days after this date).                                                                                                                                            --------------------------------------------------------------------------------------------  Printed Name and Credentials of Physician or Authorized Healthcare Professional     *Form must be signed by patient's attending physician for scheduled, repetitive transports,.  For non-repetitive ambulance transports, if unable to obtain the signature of the attending physician, any of the following may sign (please select below):     Physician  Clinical Nurse Specialist  Registered Nurse     Physician Assistant  Discharge Planner  Licensed Practical Nurse     Nurse Practitioner x

## 2024-02-17 NOTE — PLAN OF CARE
Goal Outcome Evaluation:        Pt is a 89 y/o male who presents to [facility] from [facility] where he had a fall from his wheelchair. PMH significant for a-fib, CVA, h/o non hodgkin's lymphoma, HTN, and LEONARD. 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 CT abdomen may be related to contusion, showing soft tissue swelling within the L flank. CT c-spine (-). Pt is only A/O to himself. At this time he requires max A for supine>sit with cues, max A for SPS from EOB to the bedside chair. BP 81/45 after supine>sit. Pt denies any symptoms from hypotension. Pt was transferred to the bedside chair after BP improved though BP remained low in the chair and pt was transferred back to bed. Pt requires cues and increased time for transfers. He will need SNF at discharge.

## 2024-02-17 NOTE — PROGRESS NOTES
"Pharmacy dosing service  Anticoagulant  Warfarin     Subjective:    Barry Calhoun Sr. is a 89 y.o.male being continued on warfarin for Atrial Fibrillation.    INR Goal: 2 - 3  Home medication?: warfarin 3 mg PO daily  Bridge Therapy Present?:  No  Interacting Medications Evaluation (New/Present/Discontinued): Doxycycline   Additional Contributing Factors:       Assessment/Plan:    INR therapeutic. Will continue home regimen of warfarin 3 mg daily.     Continue to monitor and adjust based on INR.         Date 2/17           INR 1.62           Dose 3 mg               Objective:  [Ht: 175.3 cm (69\"); Wt: 55 kg (121 lb 4.1 oz); BMI: Body mass index is 17.91 kg/m².]    Lab Results   Component Value Date    ALBUMIN 4.2 02/16/2024     Lab Results   Component Value Date    INR 1.62 (L) 02/16/2024    INR 2.24 12/20/2021    INR 1.32 (L) 12/11/2021    PROTIME 17.0 (L) 02/16/2024    PROTIME 23.4 12/20/2021    PROTIME 14.4 (L) 12/11/2021     Lab Results   Component Value Date    HGB 9.8 (L) 02/16/2024    HGB 8.2 (L) 02/11/2022    HGB 9.1 (L) 02/09/2022     Lab Results   Component Value Date    HCT 30.1 (L) 02/16/2024    HCT 25.4 (L) 02/11/2022    HCT 28.3 (L) 02/09/2022       Felicitas Milton, [facility]  02/17/24 08:40 EST    "

## 2024-02-17 NOTE — PLAN OF CARE
Goal Outcome Evaluation:  Plan of Care Reviewed With: other (see comments)        Progress: no change  Outcome Evaluation: Googled pts address to find out what facility pt came from. Called [facility] and spoke to Maryellen, answered what admission questions we were able to go over. Maryellen stated he had been at the facility less then 24 hrs and was at [facility] prior to admission. pt placed in contact precautions. PT NOT aware of situation, place, time, self. Unable to answer questions .

## 2024-02-18 NOTE — CASE MANAGEMENT/SOCIAL WORK
Case Management Discharge Note      Final Note: [facility] SN[facility]          Destination Coordination complete.      Service Provider Selected Services Address Phone Fax Patient Preferred    [facility] [place] [facility] [address] [place] [address] [place] [id number] [phone] --               Transportation Services  Ambulance: [facility]    Final Discharge Disposition Code: 03 - skilled nursing facility (SNF)

## 2024-02-22 LAB — BACTERIA ISLT: NORMAL

## 2024-03-27 ENCOUNTER — HOSPITAL ENCOUNTER (INPATIENT)
Facility: HOSPITAL | Age: 89
LOS: 5 days | Discharge: NURSING, REHAB OR LONG-TERM CARE | End: 2024-04-02
Attending: EMERGENCY MEDICINE | Admitting: STUDENT IN AN ORGANIZED HEALTH CARE EDUCATION/TRAINING PROGRAM
Payer: MEDICARE

## 2024-03-27 ENCOUNTER — APPOINTMENT (OUTPATIENT)
Dept: CT IMAGING | Facility: HOSPITAL | Age: 89
End: 2024-03-27
Payer: MEDICARE

## 2024-03-27 DIAGNOSIS — N18.6 END-STAGE RENAL DISEASE ON HEMODIALYSIS: ICD-10-CM

## 2024-03-27 DIAGNOSIS — R55 SYNCOPE, UNSPECIFIED SYNCOPE TYPE: Primary | ICD-10-CM

## 2024-03-27 DIAGNOSIS — D69.6 THROMBOCYTOPENIA: ICD-10-CM

## 2024-03-27 DIAGNOSIS — Z99.2 END-STAGE RENAL DISEASE ON HEMODIALYSIS: ICD-10-CM

## 2024-03-27 PROBLEM — R40.20 LOSS OF CONSCIOUSNESS: Status: ACTIVE | Noted: 2024-03-27

## 2024-03-27 LAB
ANION GAP SERPL CALCULATED.3IONS-SCNC: 7 MMOL/L (ref 5–15)
ANISOCYTOSIS BLD QL: NORMAL
BASOPHILS # BLD AUTO: 0.02 10*3/MM3 (ref 0–0.2)
BASOPHILS NFR BLD AUTO: 0.4 % (ref 0–1.5)
BUN SERPL-MCNC: 17 MG/DL (ref 8–23)
BUN/CREAT SERPL: 4.8 (ref 7–25)
CALCIUM SPEC-SCNC: 8.5 MG/DL (ref 8.2–9.6)
CHLORIDE SERPL-SCNC: 100 MMOL/L (ref 98–107)
CO2 SERPL-SCNC: 32 MMOL/L (ref 22–29)
CREAT SERPL-MCNC: 3.51 MG/DL (ref 0.76–1.27)
DEPRECATED RDW RBC AUTO: 62.2 FL (ref 37–54)
EGFRCR SERPLBLD CKD-EPI 2021: 15.7 ML/MIN/1.73
EOSINOPHIL # BLD AUTO: 0.13 10*3/MM3 (ref 0–0.4)
EOSINOPHIL NFR BLD AUTO: 2.6 % (ref 0.3–6.2)
ERYTHROCYTE [DISTWIDTH] IN BLOOD BY AUTOMATED COUNT: 15.9 % (ref 12.3–15.4)
GLUCOSE SERPL-MCNC: 89 MG/DL (ref 65–99)
HCT VFR BLD AUTO: 28.4 % (ref 37.5–51)
HGB BLD-MCNC: 8.7 G/DL (ref 13–17.7)
HOLD SPECIMEN: NORMAL
IMM GRANULOCYTES # BLD AUTO: 0.02 10*3/MM3 (ref 0–0.05)
IMM GRANULOCYTES NFR BLD AUTO: 0.4 % (ref 0–0.5)
INR PPP: 2.94 (ref 2–3)
LYMPHOCYTES # BLD AUTO: 0.93 10*3/MM3 (ref 0.7–3.1)
LYMPHOCYTES NFR BLD AUTO: 18.7 % (ref 19.6–45.3)
MACROCYTES BLD QL SMEAR: NORMAL
MCH RBC QN AUTO: 32.3 PG (ref 26.6–33)
MCHC RBC AUTO-ENTMCNC: 30.6 G/DL (ref 31.5–35.7)
MCV RBC AUTO: 105.6 FL (ref 79–97)
MONOCYTES # BLD AUTO: 0.63 10*3/MM3 (ref 0.1–0.9)
MONOCYTES NFR BLD AUTO: 12.7 % (ref 5–12)
NEUTROPHILS NFR BLD AUTO: 3.25 10*3/MM3 (ref 1.7–7)
NEUTROPHILS NFR BLD AUTO: 65.2 % (ref 42.7–76)
NRBC BLD AUTO-RTO: 0.4 /100 WBC (ref 0–0.2)
PLATELET # BLD AUTO: 44 10*3/MM3 (ref 140–450)
PMV BLD AUTO: 11 FL (ref 6–12)
POTASSIUM SERPL-SCNC: 4.3 MMOL/L (ref 3.5–5.2)
PROTHROMBIN TIME: 29.6 SECONDS (ref 19.4–28.5)
RBC # BLD AUTO: 2.69 10*6/MM3 (ref 4.14–5.8)
SMALL PLATELETS BLD QL SMEAR: NORMAL
SODIUM SERPL-SCNC: 139 MMOL/L (ref 136–145)
WBC MORPH BLD: NORMAL
WBC NRBC COR # BLD AUTO: 4.98 10*3/MM3 (ref 3.4–10.8)

## 2024-03-27 PROCEDURE — 85007 BL SMEAR W/DIFF WBC COUNT: CPT | Performed by: EMERGENCY MEDICINE

## 2024-03-27 PROCEDURE — 85610 PROTHROMBIN TIME: CPT | Performed by: EMERGENCY MEDICINE

## 2024-03-27 PROCEDURE — G0378 HOSPITAL OBSERVATION PER HR: HCPCS

## 2024-03-27 PROCEDURE — 99285 EMERGENCY DEPT VISIT HI MDM: CPT

## 2024-03-27 PROCEDURE — 87340 HEPATITIS B SURFACE AG IA: CPT | Performed by: INTERNAL MEDICINE

## 2024-03-27 PROCEDURE — 85025 COMPLETE CBC W/AUTO DIFF WBC: CPT | Performed by: EMERGENCY MEDICINE

## 2024-03-27 PROCEDURE — 25810000003 LACTATED RINGERS PER 1000 ML: Performed by: EMERGENCY MEDICINE

## 2024-03-27 PROCEDURE — 93005 ELECTROCARDIOGRAM TRACING: CPT | Performed by: EMERGENCY MEDICINE

## 2024-03-27 PROCEDURE — 70450 CT HEAD/BRAIN W/O DYE: CPT

## 2024-03-27 PROCEDURE — 25810000003 SODIUM CHLORIDE 0.9 % SOLUTION: Performed by: EMERGENCY MEDICINE

## 2024-03-27 PROCEDURE — 80048 BASIC METABOLIC PNL TOTAL CA: CPT | Performed by: EMERGENCY MEDICINE

## 2024-03-27 RX ORDER — BISACODYL 10 MG
10 SUPPOSITORY, RECTAL RECTAL DAILY PRN
Status: DISCONTINUED | OUTPATIENT
Start: 2024-03-27 | End: 2024-04-02 | Stop reason: HOSPADM

## 2024-03-27 RX ORDER — AMOXICILLIN 250 MG
2 CAPSULE ORAL 2 TIMES DAILY PRN
Status: DISCONTINUED | OUTPATIENT
Start: 2024-03-27 | End: 2024-04-02 | Stop reason: HOSPADM

## 2024-03-27 RX ORDER — POLYETHYLENE GLYCOL 3350 17 G/17G
17 POWDER, FOR SOLUTION ORAL DAILY PRN
Status: DISCONTINUED | OUTPATIENT
Start: 2024-03-27 | End: 2024-04-02 | Stop reason: HOSPADM

## 2024-03-27 RX ORDER — SODIUM CHLORIDE 0.9 % (FLUSH) 0.9 %
10 SYRINGE (ML) INJECTION EVERY 12 HOURS SCHEDULED
Status: DISCONTINUED | OUTPATIENT
Start: 2024-03-27 | End: 2024-04-02 | Stop reason: HOSPADM

## 2024-03-27 RX ORDER — SODIUM CHLORIDE 9 MG/ML
40 INJECTION, SOLUTION INTRAVENOUS AS NEEDED
Status: DISCONTINUED | OUTPATIENT
Start: 2024-03-27 | End: 2024-04-02 | Stop reason: HOSPADM

## 2024-03-27 RX ORDER — SODIUM CHLORIDE, SODIUM LACTATE, POTASSIUM CHLORIDE, CALCIUM CHLORIDE 600; 310; 30; 20 MG/100ML; MG/100ML; MG/100ML; MG/100ML
100 INJECTION, SOLUTION INTRAVENOUS CONTINUOUS
Status: DISCONTINUED | OUTPATIENT
Start: 2024-03-27 | End: 2024-03-27

## 2024-03-27 RX ORDER — BISACODYL 5 MG/1
5 TABLET, DELAYED RELEASE ORAL DAILY PRN
Status: DISCONTINUED | OUTPATIENT
Start: 2024-03-27 | End: 2024-04-02 | Stop reason: HOSPADM

## 2024-03-27 RX ORDER — NITROGLYCERIN 0.4 MG/1
0.4 TABLET SUBLINGUAL
Status: DISCONTINUED | OUTPATIENT
Start: 2024-03-27 | End: 2024-04-02 | Stop reason: HOSPADM

## 2024-03-27 RX ORDER — ACETAMINOPHEN 325 MG/1
650 TABLET ORAL EVERY 4 HOURS PRN
Status: DISCONTINUED | OUTPATIENT
Start: 2024-03-27 | End: 2024-04-02 | Stop reason: HOSPADM

## 2024-03-27 RX ORDER — ACETAMINOPHEN 650 MG/1
650 SUPPOSITORY RECTAL EVERY 4 HOURS PRN
Status: DISCONTINUED | OUTPATIENT
Start: 2024-03-27 | End: 2024-04-02 | Stop reason: HOSPADM

## 2024-03-27 RX ORDER — SODIUM CHLORIDE 0.9 % (FLUSH) 0.9 %
10 SYRINGE (ML) INJECTION AS NEEDED
Status: DISCONTINUED | OUTPATIENT
Start: 2024-03-27 | End: 2024-04-02 | Stop reason: HOSPADM

## 2024-03-27 RX ADMIN — SODIUM CHLORIDE, POTASSIUM CHLORIDE, SODIUM LACTATE AND CALCIUM CHLORIDE 100 ML/HR: 600; 310; 30; 20 INJECTION, SOLUTION INTRAVENOUS at 20:55

## 2024-03-27 RX ADMIN — SODIUM CHLORIDE 250 ML: 9 INJECTION, SOLUTION INTRAVENOUS at 21:04

## 2024-03-27 RX ADMIN — Medication 10 ML: at 21:30

## 2024-03-27 NOTE — Clinical Note
Level of Care: Telemetry [5]   Diagnosis: Loss of consciousness [080238]   Admitting Physician: GABRIEL VELASCO [[id number]]   Attending Physician: GABRIEL VELASCO [[id number]]

## 2024-03-27 NOTE — LETTER
EMS Transport Request  For use at [facility], [place], [place], [place], and [place] only   Patient Name: Barry Calhoun Sr. : 1932   Weight:69.3 kg (152 lb 12.5 oz) Pick-up Location: 2115 BLS/ALS: BLS/ALS: BLS   Insurance: MEDICARE Auth End Date: N/A   Pre-Cert #: N/A D/C Summary complete: Yes   Destination: Other [place]   Contact Precautions: None   Equipment (O2, Fluids, etc.): O2, settings 2L   Arrive By Date/Time: 24 1500 Stretcher/WC: Stretcher   CM Requesting: Louise Oliva RN     Office Phone: [phone]  Office Cell: [phone]   Ext: [phone]   Notes/Medical Necessity: 2L O2, unable to self-administer, confused, history CVA, dementia, high falls risk, max assist x2, unable to tolerate seated position for duration of transport, deep tissue pressure injury right heel.     ______________________________________________________________________    *Only 2 patient bags OR 1 carry-on size bag are permitted.  Wheelchairs and walkers CANNOT transported with the patient. Acknowledge: Yes

## 2024-03-28 PROBLEM — R55 SYNCOPE: Status: ACTIVE | Noted: 2024-03-28

## 2024-03-28 LAB
DEPRECATED RDW RBC AUTO: 59.6 FL (ref 37–54)
ERYTHROCYTE [DISTWIDTH] IN BLOOD BY AUTOMATED COUNT: 15.7 % (ref 12.3–15.4)
FERRITIN SERPL-MCNC: 1964 NG/ML (ref 30–400)
HBV SURFACE AG SERPL QL IA: NORMAL
HCT VFR BLD AUTO: 25.7 % (ref 37.5–51)
HGB BLD-MCNC: 8 G/DL (ref 13–17.7)
INR PPP: 3.36 (ref 2–3)
MCH RBC QN AUTO: 32.4 PG (ref 26.6–33)
MCHC RBC AUTO-ENTMCNC: 31.1 G/DL (ref 31.5–35.7)
MCV RBC AUTO: 104 FL (ref 79–97)
NT-PROBNP SERPL-MCNC: ABNORMAL PG/ML (ref 0–1800)
PLATELET # BLD AUTO: 45 10*3/MM3 (ref 140–450)
PMV BLD AUTO: 11.1 FL (ref 6–12)
PROTHROMBIN TIME: 33.5 SECONDS (ref 19.4–28.5)
QT INTERVAL: 445 MS
QTC INTERVAL: 486 MS
RBC # BLD AUTO: 2.47 10*6/MM3 (ref 4.14–5.8)
WBC NRBC COR # BLD AUTO: 3.31 10*3/MM3 (ref 3.4–10.8)

## 2024-03-28 PROCEDURE — 25010000002 NA FERRIC GLUC CPLX PER 12.5 MG: Performed by: INTERNAL MEDICINE

## 2024-03-28 PROCEDURE — 25010000002 ZIPRASIDONE MESYLATE PER 10 MG: Performed by: STUDENT IN AN ORGANIZED HEALTH CARE EDUCATION/TRAINING PROGRAM

## 2024-03-28 PROCEDURE — 97162 PT EVAL MOD COMPLEX 30 MIN: CPT

## 2024-03-28 PROCEDURE — 85027 COMPLETE CBC AUTOMATED: CPT | Performed by: INTERNAL MEDICINE

## 2024-03-28 PROCEDURE — 5A1D70Z PERFORMANCE OF URINARY FILTRATION, INTERMITTENT, LESS THAN 6 HOURS PER DAY: ICD-10-PCS | Performed by: INTERNAL MEDICINE

## 2024-03-28 PROCEDURE — 82728 ASSAY OF FERRITIN: CPT | Performed by: INTERNAL MEDICINE

## 2024-03-28 PROCEDURE — P9047 ALBUMIN (HUMAN), 25%, 50ML: HCPCS | Performed by: INTERNAL MEDICINE

## 2024-03-28 PROCEDURE — 83880 ASSAY OF NATRIURETIC PEPTIDE: CPT | Performed by: NURSE PRACTITIONER

## 2024-03-28 PROCEDURE — 85610 PROTHROMBIN TIME: CPT | Performed by: INTERNAL MEDICINE

## 2024-03-28 PROCEDURE — 97166 OT EVAL MOD COMPLEX 45 MIN: CPT

## 2024-03-28 PROCEDURE — 25010000002 ALBUMIN HUMAN 25% PER 50 ML: Performed by: INTERNAL MEDICINE

## 2024-03-28 PROCEDURE — 25010000002 EPOETIN ALFA-EPBX 10000 UNIT/ML SOLUTION: Performed by: INTERNAL MEDICINE

## 2024-03-28 RX ORDER — WARFARIN SODIUM 4 MG/1
TABLET ORAL SEE ADMIN INSTRUCTIONS
COMMUNITY

## 2024-03-28 RX ORDER — MIDODRINE HYDROCHLORIDE 5 MG/1
10 TABLET ORAL
Status: DISCONTINUED | OUTPATIENT
Start: 2024-03-28 | End: 2024-04-02 | Stop reason: HOSPADM

## 2024-03-28 RX ORDER — IPRATROPIUM BROMIDE AND ALBUTEROL SULFATE 2.5; .5 MG/3ML; MG/3ML
3 SOLUTION RESPIRATORY (INHALATION) EVERY 4 HOURS PRN
Status: DISCONTINUED | OUTPATIENT
Start: 2024-03-28 | End: 2024-03-30

## 2024-03-28 RX ORDER — ALLOPURINOL 100 MG/1
100 TABLET ORAL DAILY
Status: DISCONTINUED | OUTPATIENT
Start: 2024-03-28 | End: 2024-04-02 | Stop reason: HOSPADM

## 2024-03-28 RX ORDER — WARFARIN SODIUM 3 MG/1
3 TABLET ORAL
Status: DISCONTINUED | OUTPATIENT
Start: 2024-03-28 | End: 2024-03-28

## 2024-03-28 RX ORDER — MIDODRINE HYDROCHLORIDE 5 MG/1
10 TABLET ORAL
Status: DISCONTINUED | OUTPATIENT
Start: 2024-03-29 | End: 2024-03-28 | Stop reason: SDUPTHER

## 2024-03-28 RX ORDER — LEVOTHYROXINE SODIUM 0.05 MG/1
50 TABLET ORAL NIGHTLY
Status: DISCONTINUED | OUTPATIENT
Start: 2024-03-28 | End: 2024-04-02 | Stop reason: HOSPADM

## 2024-03-28 RX ORDER — DOCUSATE SODIUM 100 MG/1
200 CAPSULE, LIQUID FILLED ORAL EVERY MORNING
Status: DISCONTINUED | OUTPATIENT
Start: 2024-03-29 | End: 2024-04-02 | Stop reason: HOSPADM

## 2024-03-28 RX ORDER — ALBUMIN (HUMAN) 12.5 G/50ML
25 SOLUTION INTRAVENOUS ONCE
Status: COMPLETED | OUTPATIENT
Start: 2024-03-28 | End: 2024-03-28

## 2024-03-28 RX ORDER — ACETAMINOPHEN 325 MG/1
650 TABLET ORAL EVERY 4 HOURS PRN
Status: DISCONTINUED | OUTPATIENT
Start: 2024-03-28 | End: 2024-03-28 | Stop reason: SDUPTHER

## 2024-03-28 RX ORDER — FAMOTIDINE 20 MG/1
20 TABLET, FILM COATED ORAL 2 TIMES DAILY
Status: DISCONTINUED | OUTPATIENT
Start: 2024-03-28 | End: 2024-03-29

## 2024-03-28 RX ORDER — TAMSULOSIN HYDROCHLORIDE 0.4 MG/1
0.4 CAPSULE ORAL NIGHTLY
Status: DISCONTINUED | OUTPATIENT
Start: 2024-03-28 | End: 2024-04-02 | Stop reason: HOSPADM

## 2024-03-28 RX ORDER — ATORVASTATIN CALCIUM 20 MG/1
20 TABLET, FILM COATED ORAL NIGHTLY
Status: DISCONTINUED | OUTPATIENT
Start: 2024-03-28 | End: 2024-04-02 | Stop reason: HOSPADM

## 2024-03-28 RX ORDER — FOLIC ACID 1 MG/1
1 TABLET ORAL DAILY
Status: DISCONTINUED | OUTPATIENT
Start: 2024-03-28 | End: 2024-04-02 | Stop reason: HOSPADM

## 2024-03-28 RX ORDER — ALBUMIN (HUMAN) 12.5 G/50ML
12.5 SOLUTION INTRAVENOUS AS NEEDED
Status: DISCONTINUED | OUTPATIENT
Start: 2024-03-28 | End: 2024-03-29

## 2024-03-28 RX ADMIN — ALLOPURINOL 100 MG: 100 TABLET ORAL at 20:56

## 2024-03-28 RX ADMIN — FAMOTIDINE 20 MG: 20 TABLET, FILM COATED ORAL at 20:56

## 2024-03-28 RX ADMIN — SODIUM CHLORIDE 125 MG: 9 INJECTION, SOLUTION INTRAVENOUS at 12:15

## 2024-03-28 RX ADMIN — TAMSULOSIN HYDROCHLORIDE 0.4 MG: 0.4 CAPSULE ORAL at 20:56

## 2024-03-28 RX ADMIN — ZIPRASIDONE MESYLATE 10 MG: 20 INJECTION, POWDER, LYOPHILIZED, FOR SOLUTION INTRAMUSCULAR at 22:49

## 2024-03-28 RX ADMIN — ATORVASTATIN CALCIUM 20 MG: 20 TABLET, FILM COATED ORAL at 20:56

## 2024-03-28 RX ADMIN — LEVOTHYROXINE SODIUM 50 MCG: 0.05 TABLET ORAL at 20:56

## 2024-03-28 RX ADMIN — FOLIC ACID 1 MG: 1 TABLET ORAL at 20:56

## 2024-03-28 RX ADMIN — ALBUMIN (HUMAN) 25 G: 0.25 INJECTION, SOLUTION INTRAVENOUS at 10:00

## 2024-03-28 RX ADMIN — EPOETIN ALFA-EPBX 10000 UNITS: 10000 INJECTION, SOLUTION INTRAVENOUS; SUBCUTANEOUS at 11:32

## 2024-03-28 NOTE — NURSING NOTE
Dialysis Treatment    Treatment: HD  Access: AVF  BVP: 63 liters   UF: 0 mL    VSS. No distress present. Tolerated well.    Report given to Andree FIELDS RN .

## 2024-03-28 NOTE — CASE MANAGEMENT/SOCIAL WORK
Discharge Planning Assessment   Simone     Patient Name: Barry Calhoun Sr.  MRN: [id number]  Today's Date: 3/28/2024    Admit Date: 3/27/2024    Plan: PT recommending SNF/POA lennys . Referrals sent to [facility] to follow. ADIEL TORRES. Current HD at [facility] on [address]   Discharge Needs Assessment       Row Name 03/28/24 1540       Living Environment    People in Home significant other    Current Living Arrangements home    Potentially Unsafe Housing Conditions none    In the past 12 months has the electric, gas, oil, or water company threatened to shut off services in your home? Pt Unable    Primary Care Provided by spouse/significant other;other (see comments)  grandson    Provides Primary Care For no one, unable/limited ability to care for self    Family Caregiver if Needed significant other;grandchild(john), adult    Family Caregiver Names Grandson Vernon; S/O Vesta    Quality of Family Relationships supportive;involved    Able to Return to Prior Arrangements yes       Resource/Environmental Concerns    Resource/Environmental Concerns none    Transportation Concerns none       Transportation Needs    In the past 12 months, has lack of transportation kept you from medical appointments or from getting medications? no  Per grandson    In the past 12 months, has lack of transportation kept you from meetings, work, or from getting things needed for daily living? No  per grandson       Food Insecurity    Within the past 12 months, you worried that your food would run out before you got the money to buy more. Pt Unable    Within the past 12 months, the food you bought just didn't last and you didn't have money to get more. Pt Unable       Transition Planning    Patient/Family Anticipates Transition to home with help/services;home with family;inpatient rehabilitation facility    Patient/Family Anticipated Services at Transition rehabilitation services;home health care;skilled nursing     Transportation Anticipated family or friend will provide       Discharge Needs Assessment    Equipment Currently Used at Home walker, rolling;wheelchair    Concerns to be Addressed discharge planning    Anticipated Changes Related to Illness inability to care for self    Equipment Needed After Discharge none    Outpatient/Agency/Support Group Needs skilled nursing facility;homecare agency    Discharge Facility/Level of Care Needs nursing facility, skilled;home with home health    Provided Post Acute Provider List? Yes    Post Acute Provider List Nursing Home;Home Health    Provided Post Acute Provider Quality & Resource List? Yes    Post Acute Provider Quality and Resource List Home Health;Nursing Home    Delivered To Support Person    Support Person Grandchoco Junior Lj    Method of Delivery Telephone    Patient's Choice of Community Agency(s) Vernon denies preference of home health agency; If he does end up going to rehab, they want referral to [facility]    Current Discharge Risk chronically ill;dependent with mobility/activities of daily living;cognitively impaired                   Discharge Plan       Row Name 03/28/24 4861       Plan    Plan Comments Pt from home. PT recommending SNF. Pt not oriented. I spoke with his grandson/POA Vernon. He reports patient lives with s/o Vesta. She along with Vernon provide his personal cares. Vernon reports pt is assist out of chair x1 person and walks with rolling walker and assistance of 1person. He assists Pealie in bathing patient. He confirms patient's pcp and pharmacy. He can't confirm,but is unaware of any concerns obtaining food/medications/transportation.He states patient has transport assist to [facility],but is unsure which transport company is used.  discussed PT recommendations for SNF, but Vernon states family wants patient to return home with Vesta,home health, and family assistance.He states someone is always with patient.He denies  preference of agency. He states he would not want patient to return to [facility], and per [place] hathaway, they wouldn't be able to take him back anyway. Vernon said he would want referral to [facility]. Preliminary referral sent in the event family decides they want pt to go to SNF and message left for liaison. PASRR QFR. Referral sent to [facility] and message left message for liaison. Response pending      Row Name 03/28/24 7805       Plan    Plan PT recommending SNF/POA wants HH. Referrals sent to [facility] and [facility] SN[facility] to follow. PASRR QFR. Current HD at [facility] on [address]    Patient/Family in Agreement with Plan other (see comments)  PT recommending SNF/POA wants patient to return home with family and HH                  Continued Care and Services - Admitted Since 3/27/2024       Destination       Service Provider Request Status Selected Services Address Phone Fax Patient Preferred    [facility] Pending - Request Sent N/A [address] [facility] [place] [id number] [phone] --              Home Medical Care       Service Provider Request Status Selected Services Address Phone Fax Patient Preferred    [facility] [place] Accepted N/A [address] [place] [address] [phone] --                  Selected Continued Care - Prior Encounters Includes continued care and service providers with selected services from prior encounters from 12/28/2023 to 3/28/2024      Discharged on 2/17/2024 Admission date: 2/16/2024 - Discharge disposition: Skilled Nursing Facility (DC - External)      Destination       Service Provider Selected Services Address Phone Fax Patient Preferred    [facility] [place] [facility] [address] [place] [address] [phone] --                             Demographic Summary       Row Name 03/28/24 1537       General  Information    Admission Type inpatient    Arrived From home    Required Notices Provided Important Message from Medicare    Referral Source admission list;high risk screening    Reason for Consult discharge planning       Contact Information    Permission Granted to Share Info With facility                    Functional Status       Row Name 03/28/24 1537       Functional Status    Usual Activity Tolerance fair       Functional Status, IADL    Medications assistive person    Meal Preparation completely dependent    Housekeeping completely dependent    Laundry completely dependent    Shopping completely dependent       Mental Status Summary    Mental Status Comments dementia                     Patient Forms       Row Name 03/28/24 1550       Patient Forms    Important Message from Medicare (IMM) Delivered  IMM 3/28/24 per     Delivered to Support person  Vernon Calhoun- IMM reviewed with Vernon. He verbalized understanding. Copy left at patient's bedside    Method of delivery Telephone  [phone]                  Zuleika Lehman RN, [facility]  Office: [phone]  Fax: [phone]  [email or web address]      Phone communication or documentation only - no physical contact with patient or family.    Zuleika Lehman RN

## 2024-03-28 NOTE — CONSULTS
NEPHROLOGY CONSULTATION-----[facility]    [facility]  [phone]  So Mclean MD    Patient Care Team:  Pedro Purvis MD as PCP - General (Family Medicine)  Pedro Purvis MD as PCP - Family Medicine  Vinay, MD Pino as Consulting Physician (Nephrology)    CC/REASON FOR CONSULTATION: ESRD    PHYSICIAN REQUESTING CONSULTATION:     History of Present Illness    HPI    Patient is a 89 y.o. AAM, who is my outpatient HD patient, whom I was asked to see in consultation for evaluation and management of ESRD. Patient was admitted after presenting to ER with c/o loss of consciousness and fall at home.  Last outpatient HD was Tuesday. No NSAIDs or recent IV dye exposure. No known h/o hepatitis, TB, rheumatic fever, jaundice, SLE, bleeding/bruising disorders.  No edema or fluid retention.  +Compliance with home meds.  No herbal med use. Patient very weak and malaised and with low BP    Review of Systems   Constitutional:  Positive for activity change, appetite change and fatigue. Negative for chills, diaphoresis, fever and unexpected weight change.   HENT:  Negative for congestion, dental problem, drooling, ear discharge, ear pain, facial swelling, hearing loss, mouth sores, nosebleeds, postnasal drip, rhinorrhea, sinus pressure, sinus pain, sneezing, sore throat, tinnitus, trouble swallowing and voice change.    Eyes:  Negative for photophobia, pain, discharge, redness, itching and visual disturbance.   Respiratory:  Negative for apnea, cough, choking, chest tightness, shortness of breath, wheezing and stridor.    Cardiovascular:  Negative for chest pain, palpitations and leg swelling.   Gastrointestinal:  Negative for abdominal distention, abdominal pain, anal bleeding, blood in stool, constipation, diarrhea, nausea, rectal pain and vomiting.   Endocrine: Negative for cold intolerance, heat intolerance, polydipsia,  polyphagia and polyuria.   Genitourinary:  Negative for decreased urine volume, difficulty urinating, dysuria, enuresis, flank pain, frequency, genital sores, hematuria and urgency.   Musculoskeletal:  Positive for arthralgias. Negative for back pain, gait problem, joint swelling, myalgias, neck pain and neck stiffness.   Skin:  Negative for color change, pallor, rash and wound.   Allergic/Immunologic: Negative for environmental allergies, food allergies and immunocompromised state.   Neurological:  Positive for weakness. Negative for dizziness, tremors, seizures, syncope, facial asymmetry, speech difficulty, light-headedness, numbness and headaches.   Hematological:  Negative for adenopathy. Does not bruise/bleed easily.   Psychiatric/Behavioral:  Positive for confusion. Negative for agitation, behavioral problems, decreased concentration, dysphoric mood, hallucinations, self-injury, sleep disturbance and suicidal ideas. The patient is not nervous/anxious and is not hyperactive.           Past Medical History:   Diagnosis Date    A-fib     Asthma     Chronic renal disease     COPD (chronic obstructive pulmonary disease)     Coronary heart disease     CABG    CVA (cerebral vascular accident)     recent CVA -- Patient and Family report this is not accurate    Gout     Hypertension        Past Surgical History:   Procedure Laterality Date    ARTERIOVENOUS FISTULA/SHUNT SURGERY Right 2022    Procedure: RIGHT ARM BRACHIAL CEPHALIC ARTERIAL VENOUS FISTULA;  Surgeon: Drew Gunter MD;  [facility] MA[facility] OR;  Service: Vascular;  Laterality: Right;    CARDIAC CATHETERIZATION  2016    [facility]    CORONARY ARTERY BYPASS GRAFT      CABG X3, reoperation, 09; CAB and     OTHER SURGICAL HISTORY  2016    loop recorder implant        Family History   Problem Relation Age of Onset    Stroke Other        Social History     Tobacco Use    Smoking status: Never    Smokeless tobacco: Never   Vaping Use  "   Vaping status: Never Used   Substance Use Topics    Alcohol use: No    Drug use: No       Home Meds: (Not in a hospital admission)      Scheduled Meds:  sodium chloride, 10 mL, Intravenous, Q12H        Continuous Infusions:  Pharmacy to dose warfarin,         PRN Meds:    acetaminophen **OR** acetaminophen    senna-docusate sodium **AND** polyethylene glycol **AND** bisacodyl **AND** bisacodyl    nitroglycerin    Pharmacy to dose warfarin    sodium chloride    sodium chloride    Allergies:  Patient has no known allergies.    OBJECTIVE    Vital Signs  Temp:  [96 °F (35.6 °C)-96.3 °F (35.7 °C)] 96 °F (35.6 °C)  Heart Rate:  [63-80] 73  Resp:  [16-18] 16  BP: ()/(53-94) 95/60    No intake/output data recorded.  No intake/output data recorded.    Physical Exam:  General Appearance: alert, appears stated age and cooperative  Head: normocephalic, without obvious abnormality and atraumatic. +DRY OP  Eyes: conjunctivae and sclerae normal and no icterus  Neck: supple and no JVD  Lungs: clear to auscultation and respirations regular  Heart: IRREG IRREG +CARLTON  Chest Wall: no abnormalities observed  Abdomen: normal bowel sounds and soft, nontender  Extremities: moves extremities well, no edema, no cyanosis. +DJD  Skin: no bleeding, bruising or rash  Neurologic: Alert, and oriented x 2 but very lethargic.      Results Review:    I reviewed the patient's new clinical results.    WBC WBC   Date Value Ref Range Status   03/27/2024 4.98 3.40 - 10.80 10*3/mm3 Final      HGB Hemoglobin   Date Value Ref Range Status   03/27/2024 8.7 (L) 13.0 - 17.7 g/dL Final      HCT Hematocrit   Date Value Ref Range Status   03/27/2024 28.4 (L) 37.5 - 51.0 % Final      Platelets No results found for: \"LABPLAT\"   MCV MCV   Date Value Ref Range Status   03/27/2024 105.6 (H) 79.0 - 97.0 fL Final          Sodium Sodium   Date Value Ref Range Status   03/27/2024 139 136 - 145 mmol/L Final      Potassium Potassium   Date Value Ref Range Status " "  03/27/2024 4.3 3.5 - 5.2 mmol/L Final      Chloride Chloride   Date Value Ref Range Status   03/27/2024 100 98 - 107 mmol/L Final      CO2 CO2   Date Value Ref Range Status   03/27/2024 32.0 (H) 22.0 - 29.0 mmol/L Final      BUN BUN   Date Value Ref Range Status   03/27/2024 17 8 - 23 mg/dL Final      Creatinine Creatinine   Date Value Ref Range Status   03/27/2024 3.51 (H) 0.76 - 1.27 mg/dL Final      Calcium Calcium   Date Value Ref Range Status   03/27/2024 8.5 8.2 - 9.6 mg/dL Final      PO4 No results found for: \"CAPO4\"   Albumin No results found for: \"ALBUMIN\"   Magnesium No results found for: \"MG\"   Uric Acid No results found for: \"URICACID\"       Imaging Results (Last 72 Hours)       Procedure Component Value Units Date/Time    CT Head Without Contrast [[id number]] Collected: 03/27/24 1914     Updated: 03/27/24 1919    Narrative:      CT HEAD WO CONTRAST    Date of Exam: 3/27/2024 6:56 PM EDT    Indication: fall.    Comparison: 2/16/2024    Technique: Axial CT images were obtained of the head without contrast administration.  Coronal reconstructions were performed.  Automated exposure control and iterative reconstruction methods were used.      Findings:  No large territory infarct.    There is no evidence of hemorrhage.  No mass effect, edema or midline shift    Mild periventricular and subcortical white matter hypodensities, nonspecific but most likely represents chronic small vessel ischemic changes.    No extra-axial fluid collection.      Prominent ventricular system secondary to chronic parenchymal volume loss.      Status post bilateral lens extraction. Otherwise the orbits are unremarkable.   Small bilateral mastoid effusions, right greater than left. The paranasal sinuses are clear.    The visualized soft tissues are unremarkable.  No acute osseous abnormality.      Impression:      Impression:  No acute intracranial abnormality.      Electronically Signed: Manolo Roa DO    3/27/2024 7:17 PM " EDT    Workstation ID: [id number]              Results for orders placed during the hospital encounter of 02/16/24    XR Chest 1 View    Narrative  XR CHEST 1 VW    Date of Exam: 2/17/2024 9:55 AM EST    Indication: hx of CHF, increased BNP    Comparison: 2/11/2024    Findings:  Patient is status post median sternotomy and CABG. Cardiac silhouette is enlarged. Vague perihilar opacities may represent edema or infiltrates. No significant pleural effusion or pneumothorax is seen. No acute osseous lesion is seen. Right chest wall  Port-A-Cath terminates overlying the superior cavoatrial junction. Loop recorder projects over the left thorax. Metallic stent projects over the left upper thorax.    Impression  Impression:  Cardiomegaly with vague bilateral perihilar opacities which may represent edema or infiltrates.    Electronically Signed: Jose Be MD  2/17/2024 10:11 AM EST  Workstation ID: [id number]      Results for orders placed during the hospital encounter of 02/09/22    XR Chest PA & Lateral    Narrative  DATE OF EXAM:  2/9/2022 11:04 AM    PROCEDURE:  XR CHEST PA AND LATERAL-    INDICATIONS:  preop    COMPARISON:  Chest x-ray 3/17/2016, chest CT 9/9/2021    TECHNIQUE:  Two radiologic views of the chest , PA and lateral were obtained.    FINDINGS:  There is a left IJ dialysis catheter and right IJ port. A subcutaneous  cardiac event monitor is present. There is been coronary bypass. There  is cardiomegaly. The pulmonary vasculature is within normal limits.  There are coarse linear markings in the mid and lower lung zones, with  round and ovoid lucencies suspected to be bronchiectasis. There is  eventration of anterior right hemidiaphragm. Costophrenic angles are  sharp    Impression  Findings compatible with fibrotic changes and/or atelectasis in the mid  and lower lung zones bilaterally with probable bronchiectasis. This is a  new finding since the most recent chest x-ray from 2016 is felt to have  progressed  since the 9/9/2021 chest CT    Electronically Signed By-Wesley Mcallister On:2/9/2022 11:15 AM  This report was finalized on [id number] by  Wesley Mcallister, .      Results for orders placed in visit on 04/16/21    SCANNED - IMAGING        Results for orders placed during the hospital encounter of 10/17/22    Duplex hemodialysis access CAR    Interpretation Summary    Patent right brachiocephalic fistula with overall adequate flow volumes.  Diameter and depth both appear appropriate for dialysis.  Stenosis seen at the arterial anastomosis.      ASSESSMENT / PLAN      Loss of consciousness    Anemia due to chronic kidney disease stage III    Atrial fibrillation    Essential hypertension    Chronic diastolic heart failure    Chronic obstructive pulmonary disease    Coronary heart disease    ESRD (end stage renal disease)    Syncope and collapse    Thrombocytopenia    ESRD--------HD today per usual [facility] outpatient schedule    2. HYPOTENSION------IV NS bolus, IV Albumin, Midodrine and follow    3. S/P SYNCOPE/LOC-------? Secondary to hypotension    4. ATRIAL FIBRILLATION-------Rate controlled and anticoagulated    5. ANEMIA OF ESRD------EPO/Venofer with HD as needed    6. OA/DJD    7. DECONDITIONING--------PT/OT/Rehab as needed    8. THROMBOCYTOPENIA--------No heparin    9. MILD METABOLIC ALKALOSIS------No UF with HD. Clinically prerenal. Giving back IVFs    10. H/O DIASTOLIC CHF------Currently clinically volume depleted. No UF with HD and as stated above giving back little IVFs    11. COPD-------Respiratory status stable. Oxygen, nebs, pulmonary toilet      I discussed the patient's findings and my recommendations with patient and nursing staff    Will follow along closely. Thank you for allowing us to see this patient in renal consultation.    [facility]  [phone]  MD So Pedraza MD  03/28/24  07:59 EDT

## 2024-03-28 NOTE — ED PROVIDER NOTES
Subjective   History of Present Illness  89-year-old male presents after was found the floor night.  He apparently was getting out of his chair had syncopal episode or fell.  He was reported to have had positive loss of consciousness.  History obtained from family.  Patient does have dementia.  He reports no complaints of pain.  Review of Systems    Past Medical History:   Diagnosis Date    A-fib     Asthma     Chronic renal disease     COPD (chronic obstructive pulmonary disease)     Coronary heart disease     CABG    CVA (cerebral vascular accident)     recent CVA -- Patient and Family report this is not accurate    Gout     Hypertension        No Known Allergies    Past Surgical History:   Procedure Laterality Date    ARTERIOVENOUS FISTULA/SHUNT SURGERY Right 2022    Procedure: RIGHT ARM BRACHIAL CEPHALIC ARTERIAL VENOUS FISTULA;  Surgeon: Drew Gunter MD;  Location: [facility] MAIN OR;  Service: Vascular;  Laterality: Right;    CARDIAC CATHETERIZATION  2016    [facility]    CORONARY ARTERY BYPASS GRAFT      CABG X3, reoperation, 09; CAB and     OTHER SURGICAL HISTORY  2016    loop recorder implant        Family History   Problem Relation Age of Onset    Stroke Other        Social History     Socioeconomic History    Marital status:    Tobacco Use    Smoking status: Never    Smokeless tobacco: Never   Vaping Use    Vaping status: Never Used   Substance and Sexual Activity    Alcohol use: No    Drug use: No    Sexual activity: Defer     Prior to Admission medications    Medication Sig Start Date End Date Taking? Authorizing Provider   acetaminophen (TYLENOL) 325 MG tablet Take 2 tablets by mouth Every 4 (Four) Hours As Needed for Mild Pain.    Jos Linda MD   allopurinol (ZYLOPRIM) 100 MG tablet Take 1 tablet by mouth Daily. 20   Stacie Vargas MD   atorvastatin (LIPITOR) 20 MG tablet Take 1 tablet by mouth Every Night.    Jos Linda MD    bumetanide (BUMEX) 1 MG tablet Take 1 tablet by mouth Daily.    Jos Linda MD   CloNIDine (CATAPRES) 0.1 MG tablet Take 1 tablet by mouth Every 6 (Six) Hours.    Jos Linda MD   clopidogrel (PLAVIX) 75 MG tablet Take 1 tablet by mouth Daily. Must be seen for more refills 8/3/23   Simeon Foley MD   diphenhydrAMINE (BENADRYL) 25 mg capsule Take 1 capsule by mouth Daily As Needed for Itching.    Jos Linda MD   docusate sodium (COLACE) 100 MG capsule Take 2 capsules by mouth Every Morning.    Jos Linda MD   famotidine (PEPCID) 20 MG tablet Take 1 tablet by mouth 2 (Two) Times a Day.    Jos Linda MD   folic acid (FOLVITE) 1 MG tablet Take 1 tablet by mouth Daily.    Jos Linda MD   hydrALAZINE (APRESOLINE) 50 MG tablet Take 1 tablet by mouth Daily.    Jos Linda MD   ipratropium-albuterol (DUO-NEB) 0.5-2.5 mg/3 ml nebulizer Take 3 mL by nebulization Every 4 (Four) Hours As Needed for Wheezing.    Jos Linda MD   levothyroxine (SYNTHROID, LEVOTHROID) 50 MCG tablet Take 1 tablet by mouth Every Night.    Jos Linda MD   lidocaine (LMX) 4 % cream Apply 1 Application topically to the appropriate area as directed 3 (Three) Times a Week. Mon, Wed, Fri    Jos Linda MD   metoprolol tartrate (LOPRESSOR) 25 MG tablet Take 1.5 tablets by mouth 2 (Two) Times a Day.    Jos Linda MD   midodrine (PROAMATINE) 10 MG tablet Take 1 tablet by mouth 3 (Three) Times a Day Before Meals.    Jos Linda MD   naloxone (NARCAN) 4 MG/0.1ML nasal spray 1 spray into the nostril(s) as directed by provider As Needed for Opioid Reversal.    Jos Linda MD   tamsulosin (FLOMAX) 0.4 MG capsule 24 hr capsule Take 1 capsule by mouth Every Night.    Jos Linda MD   vitamin B-12 (CYANOCOBALAMIN) 1000 MCG tablet Take 0.5 tablets by mouth Daily.    Jos Linda MD   warfarin (COUMADIN) 3 MG  tablet Take 1 tablet by mouth Daily.    Provider, MD Jos           Objective   Physical Exam  89-year-old male awake alert.  He has noted to have confusion which is baseline.  He denies pain.  Pupils equal round react light.  Scalp without hematoma is appreciated.  No cervical spine tenderness chest clear equal breath sounds.  Cardiovascular regular rate and rhythm abdomen soft nontender.  He has no complaints of hip tenderness.  He has difficulty raising arms but this is nothing new.  It appears to have trouble with the shoulder.  He has no complaints of difficulty with raising legs.  Neurologic exam without obvious focal findings.  Procedures           ED Course      Results for orders placed or performed during the hospital encounter of 03/27/24   Basic Metabolic Panel    Specimen: Blood   Result Value Ref Range    Glucose 89 65 - 99 mg/dL    BUN 17 8 - 23 mg/dL    Creatinine 3.51 (H) 0.76 - 1.27 mg/dL    Sodium 139 136 - 145 mmol/L    Potassium 4.3 3.5 - 5.2 mmol/L    Chloride 100 98 - 107 mmol/L    CO2 32.0 (H) 22.0 - 29.0 mmol/L    Calcium 8.5 8.2 - 9.6 mg/dL    BUN/Creatinine Ratio 4.8 (L) 7.0 - 25.0    Anion Gap 7.0 5.0 - 15.0 mmol/L    eGFR 15.7 (L) >60.0 mL/min/1.73   Protime-INR    Specimen: Blood   Result Value Ref Range    Protime 29.6 (H) 19.4 - 28.5 Seconds    INR 2.94 2.00 - 3.00   CBC Auto Differential    Specimen: Blood   Result Value Ref Range    WBC 4.98 3.40 - 10.80 10*3/mm3    RBC 2.69 (L) 4.14 - 5.80 10*6/mm3    Hemoglobin 8.7 (L) 13.0 - 17.7 g/dL    Hematocrit 28.4 (L) 37.5 - 51.0 %    .6 (H) 79.0 - 97.0 fL    MCH 32.3 26.6 - 33.0 pg    MCHC 30.6 (L) 31.5 - 35.7 g/dL    RDW 15.9 (H) 12.3 - 15.4 %    RDW-SD 62.2 (H) 37.0 - 54.0 fl    MPV 11.0 6.0 - 12.0 fL    Platelets 44 (C) 140 - 450 10*3/mm3    Neutrophil % 65.2 42.7 - 76.0 %    Lymphocyte % 18.7 (L) 19.6 - 45.3 %    Monocyte % 12.7 (H) 5.0 - 12.0 %    Eosinophil % 2.6 0.3 - 6.2 %    Basophil % 0.4 0.0 - 1.5 %    Immature  Grans % 0.4 0.0 - 0.5 %    Neutrophils, Absolute 3.25 1.70 - 7.00 10*3/mm3    Lymphocytes, Absolute 0.93 0.70 - 3.10 10*3/mm3    Monocytes, Absolute 0.63 0.10 - 0.90 10*3/mm3    Eosinophils, Absolute 0.13 0.00 - 0.40 10*3/mm3    Basophils, Absolute 0.02 0.00 - 0.20 10*3/mm3    Immature Grans, Absolute 0.02 0.00 - 0.05 10*3/mm3    nRBC 0.4 (H) 0.0 - 0.2 /100 WBC   Scan Slide    Specimen: Blood   Result Value Ref Range    Anisocytosis Slight/1+ None Seen    Macrocytes Slight/1+ None Seen    WBC Morphology Normal Normal    Platelet Estimate Decreased Normal   ECG 12 Lead Syncope   Result Value Ref Range    QT Interval 445 ms    QTC Interval 486 ms   [facility] - [facility]   Result Value Ref Range    Extra Tube Hold for add-ons.      CT Head Without Contrast    Result Date: 3/27/2024  Impression: No acute intracranial abnormality. Electronically Signed: Manolo Roa DO  3/27/2024 7:17 PM EDT  Workstation ID: [id number]   Medications   sodium chloride 0.9 % flush 10 mL (10 mL Intravenous Given 3/27/24 2130)   sodium chloride 0.9 % flush 10 mL (has no administration in time range)   sodium chloride 0.9 % infusion 40 mL (has no administration in time range)   nitroglycerin (NITROSTAT) SL tablet 0.4 mg (has no administration in time range)   acetaminophen (TYLENOL) tablet 650 mg (has no administration in time range)     Or   acetaminophen (TYLENOL) suppository 650 mg (has no administration in time range)   sennosides-docusate (PERICOLACE) 8.6-50 MG per tablet 2 tablet (has no administration in time range)     And   polyethylene glycol (MIRALAX) packet 17 g (has no administration in time range)     And   bisacodyl (DULCOLAX) EC tablet 5 mg (has no administration in time range)     And   bisacodyl (DULCOLAX) suppository 10 mg (has no administration in time range)   sodium chloride 0.9 % bolus 250 mL (250 mL Intravenous New Bag 3/27/24 2104)     BP 93/64   Pulse 76   Temp 96.3 °F (35.7 °C) (Rectal)   Resp 18   Ht 175.3 cm  "(69\")   Wt 74.7 kg (164 lb 10.9 oz)   SpO2 92%   BMI 24.32 kg/m²                                          Medical Decision Making  Problems Addressed:  End-stage renal disease on hemodialysis: complicated acute illness or injury  Syncope, unspecified syncope type: complicated acute illness or injury  Thrombocytopenia: complicated acute illness or injury    Amount and/or Complexity of Data Reviewed  Labs: ordered.  Radiology: ordered.  ECG/medicine tests: ordered.    Risk  Prescription drug management.  Decision regarding hospitalization.    Chart review: Patient had admission last month for fall with multiple contusions.  Comorbidity: As per past history   Differential: Fall, closed head injury, syncope, hypotension  My EKG interpretation: Atrial fibrillation with controlled ventricular response nonspecific ST-T wave abnormality.  Rate of 72.  Compared to previous no significant change  Lab: INR 2.94 CBC normal white count hemoglobin 8.7 platelet count of 44 basic metabolic panel reveals BUN of 17 with creatinine 3.51 potassium 4.3  My Radiology review and interpretation: CT scan of head shows no acute intracranial abnormality noted.There is mild periventricular and subcortical white matter hypodensities consistent with chronic small vessel disease.  The ventricles are prominent secondary to chronic parenchymal volume loss.  Discussion/treatment: Patient IV placed was given 250 cc saline bolus.  Review of his chart Ambar is at baseline.  He has had some thrombocytopenia previously but not to the level detected today.  Patient CODE STATUS was discussed and report he is to have DNR status.  Patient was discussed with the nurse practitioner for the hospitalist.  Will be admitted to their service for observation.  Patient was evaluated using appropriate PPE      Final diagnoses:   Syncope, unspecified syncope type   Thrombocytopenia   End-stage renal disease on hemodialysis       ED Disposition  ED Disposition       " ED Disposition   Decision to Admit    Condition   --    Comment   Level of Care: Telemetry [5]   Diagnosis: Loss of consciousness [[id number]]   Admitting Physician: GABRIEL VELASCO [[id number]]   Attending Physician: GABRIEL VELASCO [[id number]]                 No follow-up provider specified.       Medication List      No changes were made to your prescriptions during this visit.            Tom Roth MD  03/27/24 4486

## 2024-03-28 NOTE — H&P
[facility] Medicine Services  History & Physical    Patient Name: Barry Calhoun Sr.  : 1932  MRN: [id number]  Primary Care Physician:  Pedro Purvis MD  Date of admission: 3/27/2024  Date and Time of Service: 3/27/2024 at 2313    Subjective      Chief Complaint: Fall     History of Present Illness: Barry Calhoun Sr. is a 89 y.o. male with a PMH of Asthma, non-Hodgkin's lymphoma, COPD, CAD, S/P CABG, CVA, and CKD  who presented to [facility] on 3/27/2024 from his extended care facility where they report he fell and loss consciousness. Mr. Calhoun is unable to provide any meaningful medical history due to underling dementia. Unfortunately his family has left for the evening and unavailable at bedside to provide any history. All information has been obtain from the ER provider and review of Mr. Calhoun's medical records.      Upon exam he is pleasantly confused, stating he is being seen due to a pot hole not being repaired.He does follow commands,  His lungs are diminished in bilateral bases, His heart rate and rhythm is regular, abdomen is soft, nondistended, nontender with audible bowel sounds throughout..    Initial evaluation in the emergency room is hemodynamically stable with blood pressure soft at 93/54, heart rate 63, respiratory rate 18, oxygen saturation 94% on room air, he is afebrile with a Tmax of 96.3  Creatinine is 3.51, BUN 17 this is post HD today.  He is anemic with hemoglobin of 8.7, hematocrit 24 which is improved from previous 8.4 and 26.5 on ,  Thrombocytopenia his platelets are 44 today which is decreased from his baseline that is near 100.  CT scan of his head is unremarkable for any acute intracranial processes  EKG interpreted as atrial fibrillation with a rate of 72    Mr. Calhoun will be admitted for further evaluation and treatment of fall/ syncope and other acute and or chronic conditions as needed,     Review of Systems   Unable to perform ROS:  Dementia       Personal History     Past Medical History:   Diagnosis Date    A-fib     Asthma     Chronic renal disease     COPD (chronic obstructive pulmonary disease)     Coronary heart disease     CABG    CVA (cerebral vascular accident)     recent CVA -- Patient and Family report this is not accurate    Gout     Hypertension        Past Surgical History:   Procedure Laterality Date    ARTERIOVENOUS FISTULA/SHUNT SURGERY Right 2022    Procedure: RIGHT ARM BRACHIAL CEPHALIC ARTERIAL VENOUS FISTULA;  Surgeon: Drew Gunter MD;  Location: [facility] MAIN OR;  Service: Vascular;  Laterality: Right;    CARDIAC CATHETERIZATION  2016    formerly [facility]    CORONARY ARTERY BYPASS GRAFT      CABG X3, reoperation, 09; CAB and     OTHER SURGICAL HISTORY  2016    loop recorder implant        Family History: family history includes Stroke in an other family member. Otherwise pertinent FHx was reviewed and not pertinent to current issue.    Social History:  reports that he has never smoked. He has never used smokeless tobacco. He reports that he does not drink alcohol and does not use drugs.    Home Medications:  Prior to Admission Medications       Prescriptions Last Dose Informant Patient Reported? Taking?    acetaminophen (TYLENOL) 325 MG tablet   Yes No    Take 2 tablets by mouth Every 4 (Four) Hours As Needed for Mild Pain.    allopurinol (ZYLOPRIM) 100 MG tablet   No No    Take 1 tablet by mouth Daily.    atorvastatin (LIPITOR) 20 MG tablet   Yes No    Take 1 tablet by mouth Every Night.    bumetanide (BUMEX) 1 MG tablet   Yes No    Take 1 tablet by mouth Daily.    CloNIDine (CATAPRES) 0.1 MG tablet   Yes No    Take 1 tablet by mouth Every 6 (Six) Hours.    clopidogrel (PLAVIX) 75 MG tablet   No No    Take 1 tablet by mouth Daily. Must be seen for more refills    diphenhydrAMINE (BENADRYL) 25 mg capsule   Yes No    Take 1 capsule by mouth Daily As Needed for Itching.    docusate sodium (COLACE) 100 MG  capsule  Self Yes No    Take 2 capsules by mouth Every Morning.    famotidine (PEPCID) 20 MG tablet   Yes No    Take 1 tablet by mouth 2 (Two) Times a Day.    folic acid (FOLVITE) 1 MG tablet   Yes No    Take 1 tablet by mouth Daily.    hydrALAZINE (APRESOLINE) 50 MG tablet   Yes No    Take 1 tablet by mouth Daily.    ipratropium-albuterol (DUO-NEB) 0.5-2.5 mg/3 ml nebulizer   Yes No    Take 3 mL by nebulization Every 4 (Four) Hours As Needed for Wheezing.    levothyroxine (SYNTHROID, LEVOTHROID) 50 MCG tablet   Yes No    Take 1 tablet by mouth Every Night.    lidocaine (LMX) 4 % cream   Yes No    Apply 1 Application topically to the appropriate area as directed 3 (Three) Times a Week. Mon, Wed, Fri    metoprolol tartrate (LOPRESSOR) 25 MG tablet   Yes No    Take 1.5 tablets by mouth 2 (Two) Times a Day.    midodrine (PROAMATINE) 10 MG tablet   Yes No    Take 1 tablet by mouth 3 (Three) Times a Day Before Meals.    naloxone (NARCAN) 4 MG/0.1ML nasal spray   Yes No    1 spray into the nostril(s) as directed by provider As Needed for Opioid Reversal.    tamsulosin (FLOMAX) 0.4 MG capsule 24 hr capsule   Yes No    Take 1 capsule by mouth Every Night.    vitamin B-12 (CYANOCOBALAMIN) 1000 MCG tablet   Yes No    Take 0.5 tablets by mouth Daily.    warfarin (COUMADIN) 3 MG tablet   Yes No    Take 1 tablet by mouth Daily.              Allergies:  No Known Allergies    Objective      Vitals:   Temp:  [96.3 °F (35.7 °C)] 96.3 °F (35.7 °C)  Heart Rate:  [63-80] 76  Resp:  [18] 18  BP: ()/(53-67) 93/64  Body mass index is 24.32 kg/m².  Physical Exam  Vitals and nursing note reviewed.   Constitutional:       General: He is awake.   HENT:      Head: Atraumatic.      Mouth/Throat:      Mouth: Mucous membranes are dry.   Eyes:      Conjunctiva/sclera: Conjunctivae normal.   Cardiovascular:      Rate and Rhythm: Normal rate. Rhythm irregular.      Pulses:           Radial pulses are 2+ on the right side and 2+ on the left  side.        Dorsalis pedis pulses are 2+ on the right side and 2+ on the left side.        Posterior tibial pulses are 2+ on the right side and 2+ on the left side.      Heart sounds: Murmur heard.      Arteriovenous access: Right arteriovenous access is present.  Pulmonary:      Effort: Pulmonary effort is normal.      Breath sounds: Examination of the right-lower field reveals decreased breath sounds. Examination of the left-lower field reveals decreased breath sounds.   Abdominal:      General: Bowel sounds are normal.      Palpations: Abdomen is soft.   Musculoskeletal:         General: Normal range of motion.      Right lower leg: No edema.      Left lower leg: No edema.   Skin:     General: Skin is warm and dry.      Capillary Refill: Capillary refill takes less than 2 seconds.   Neurological:      General: No focal deficit present.      Mental Status: He is alert. Mental status is at baseline. He is disoriented.   Psychiatric:         Behavior: Behavior is uncooperative.         Cognition and Memory: Cognition is impaired. Memory is impaired.         Diagnostic Data:  Lab Results (last 24 hours)       Procedure Component Value Units Date/Time    Extra Tubes [[id number]] Collected: 03/27/24 1848    Specimen: Blood, Venous Line Updated: 03/27/24 2000    Narrative:      The following orders were created for panel order Extra Tubes.  Procedure                               Abnormality         Status                     ---------                               -----------         ------                     Gold Top - [facility][[id number]]                                   Final result                 Please view results for these tests on the individual orders.    [facility] - [facility] [[id number]] Collected: 03/27/24 1848    Specimen: Blood Updated: 03/27/24 2000     Extra Tube Hold for add-ons.     Comment: Auto resulted.       CBC & Differential [[id number]]  (Abnormal) Collected: 03/27/24 1928    Specimen: Blood Updated:  03/27/24 1956    Narrative:      The following orders were created for panel order CBC & Differential.  Procedure                               Abnormality         Status                     ---------                               -----------         ------                     CBC Auto Differential[[id number]]        Abnormal            Final result               Scan Slide[[id number]]                                       Final result                 Please view results for these tests on the individual orders.    CBC Auto Differential [[id number]]  (Abnormal) Collected: 03/27/24 1928    Specimen: Blood Updated: 03/27/24 1956     WBC 4.98 10*3/mm3      RBC 2.69 10*6/mm3      Hemoglobin 8.7 g/dL      Hematocrit 28.4 %      .6 fL      MCH 32.3 pg      MCHC 30.6 g/dL      RDW 15.9 %      RDW-SD 62.2 fl      MPV 11.0 fL      Platelets 44 10*3/mm3      Neutrophil % 65.2 %      Lymphocyte % 18.7 %      Monocyte % 12.7 %      Eosinophil % 2.6 %      Basophil % 0.4 %      Immature Grans % 0.4 %      Neutrophils, Absolute 3.25 10*3/mm3      Lymphocytes, Absolute 0.93 10*3/mm3      Monocytes, Absolute 0.63 10*3/mm3      Eosinophils, Absolute 0.13 10*3/mm3      Basophils, Absolute 0.02 10*3/mm3      Immature Grans, Absolute 0.02 10*3/mm3      nRBC 0.4 /100 WBC     Scan Slide [[id number]] Collected: 03/27/24 1928    Specimen: Blood Updated: 03/27/24 1956     Anisocytosis Slight/1+     Macrocytes Slight/1+     WBC Morphology Normal     Platelet Estimate Decreased    Protime-INR [[id number]]  (Abnormal) Collected: 03/27/24 1848    Specimen: Blood Updated: 03/27/24 1922     Protime 29.6 Seconds      INR 2.94    Basic Metabolic Panel [[id number]]  (Abnormal) Collected: 03/27/24 1848    Specimen: Blood Updated: 03/27/24 1920     Glucose 89 mg/dL      BUN 17 mg/dL      Creatinine 3.51 mg/dL      Sodium 139 mmol/L      Potassium 4.3 mmol/L      Chloride 100 mmol/L      CO2 32.0 mmol/L      Calcium 8.5 mg/dL      BUN/Creatinine Ratio  4.8     Anion Gap 7.0 mmol/L      eGFR 15.7 mL/min/1.73     Narrative:      GFR Normal >60  Chronic Kidney Disease <60  Kidney Failure <15    The GFR formula is only valid for adults with stable renal function between ages 18 and 70.             Imaging Results (Last 24 Hours)       Procedure Component Value Units Date/Time    CT Head Without Contrast [[id number]] Collected: 03/27/24 1914     Updated: 03/27/24 1919    Narrative:      CT HEAD WO CONTRAST    Date of Exam: 3/27/2024 6:56 PM EDT    Indication: fall.    Comparison: 2/16/2024    Technique: Axial CT images were obtained of the head without contrast administration.  Coronal reconstructions were performed.  Automated exposure control and iterative reconstruction methods were used.      Findings:  No large territory infarct.    There is no evidence of hemorrhage.  No mass effect, edema or midline shift    Mild periventricular and subcortical white matter hypodensities, nonspecific but most likely represents chronic small vessel ischemic changes.    No extra-axial fluid collection.      Prominent ventricular system secondary to chronic parenchymal volume loss.      Status post bilateral lens extraction. Otherwise the orbits are unremarkable.   Small bilateral mastoid effusions, right greater than left. The paranasal sinuses are clear.    The visualized soft tissues are unremarkable.  No acute osseous abnormality.      Impression:      Impression:  No acute intracranial abnormality.      Electronically Signed: Manolo Roa DO    3/27/2024 7:17 PM EDT    Workstation ID: [id number]              Assessment & Plan        Active and Resolved Problems  Active Hospital Problems    Diagnosis  POA    **Loss of consciousness [R40.20]  Yes    ESRD (end stage renal disease) [N18.6]  Yes    Coronary heart disease [I25.10]  Yes    Chronic obstructive pulmonary disease [J44.9]  Yes    Moderate to severe mitral regurgitation [I34.0]  Yes    Essential hypertension [I10]  Yes     Pancytopenia [D61.818]  Yes    Atrial fibrillation [I48.91]  Yes    Anemia due to chronic kidney disease stage III [N18.9, D63.1]  Yes    Chronic diastolic heart failure [I50.32]  Yes      Resolved Hospital Problems   No resolved problems to display.     Loss of consciousness  Syncope and collapse   Patient is amnesic to the event  Witnessed at SNF while getting up out of his chair  CT scan of head is unremarkable   MRI in AM   Echocardiogram at [facility] on 02/13/2024- LVEF 50-55%  Admit to telemetry unit for continuous cardiac monitoring and pulse oximetry  Check orthostatic blood pressures    Thrombocytopenia  Plt 44  ON home plavix will for history of cva and MI will hold for now   Monitor fo s/s of bleeding   Consider Hematology/Oncology consult     ESRD (end stage renal disease)  Anemia due to chronic kidney disease stage III  HD M-W-F  Last HD treatment today  Monitor I's and O's  Monitor daily weights  Avoid nephrotoxic medications, hypovolemia, hypotension, hypertension  Renally dose medications when possible    Atrial fibrillation  Chronic   RC metoprolol  AC warfarin  Monitor PTINR      Chronic diastolic heart failure  Chronic last ECHO LVEF 50-55% 02/16   Daily Weights   Monitor I&O's   Continue home Bumex pending home medication list verification  He does not appear to be in volume overload     Coronary heart disease  Chronic   History of CABG  Free of chest pain at time of admission  Will need to hold home Plavix with thrombocytopenia  Continue home regimen pending home medication list verification and clinical appropriateness    Chronic obstructive pulmonary disease  Chronic  Not in acute exacerbation  Not oxygen dependent at baseline  Baseline PFTs unknown  As needed DuoNebs for shortness of breath/wheezing            DVT prophylaxis:  Mechanical DVT prophylaxis orders are present.          CODE STATUS:    Medical Intervention Limits: NO intubation (DNI)  Code Status (Patient has no pulse and  is not breathing): No CPR (Do Not Attempt to Resuscitate)  Medical Interventions (Patient has pulse or is breathing): Limited Support      Admission Status:  I believe this patient meets observation  status.    Expected Length of Stay: 1-2 days    PDMP and Medication Dispenses via Sidebar reviewed and consistent with patient reported medications.    I discussed the patient's findings and my recommendations with patient.      Signature:     This document has been electronically signed by MAISHA Johnson on March 27, 2024 23:13 EDT   [facility]

## 2024-03-28 NOTE — PLAN OF CARE
Goal Outcome Evaluation:  Plan of Care Reviewed With: patient     Outcome Evaluation: Barry Calhoun is a 89 y.o. male with a PMH of asthma, non-Hodgkin's lymphoma, COPD, CAD, S/P CABG, CVA, and CKD  who presented to [facility] on 3/27/2024 from [facility] where they report he fell and loss consciousness. CT head was negative. MRI brain pending. Pt Alert to person only. This PT called [facility] to get pt PLOF and pt typically requires max assist of 2 for transfers; pt is non-ambulatory and requires total assist for feeding. This date, pt is restless and lethargic. Pt has grossly 50% limitation in AAROM BLE. Pt requiring dependence x 2 for bed mobility. At 14:30, this PT learned that pt actually had discharged to home with family from [facility]. With this new information, PT is now recommending Skilled Nursing Facility and PT will follow pt.      Anticipated Discharge Disposition (PT): skilled nursing facility

## 2024-03-28 NOTE — PROGRESS NOTES
"Pharmacy dosing service  Anticoagulant  Warfarin     Subjective:    Barry Calhoun Sr. is a 89 y.o.male being continued on warfarin for Atrial Fibrillation.    INR Goal: 2 - 3  Home medication?:  on hold 2/t supratherapeutic INR  Bridge Therapy Present?:  No  Interacting Medications Evaluation (New/Present/Discontinued): N/A  Additional Contributing Factors: thrombocytopenia      Assessment/Plan:    Holding 2/t supratherapeutic INR.     Continue to monitor and adjust based on INR.         Date 3/28           INR 3.36           Dose hold               Objective:  [Ht: 175.3 cm (69\"); Wt: 74.7 kg (164 lb 10.9 oz); BMI: Body mass index is 24.32 kg/m².]    Lab Results   Component Value Date    ALBUMIN 4.2 02/16/2024     Lab Results   Component Value Date    INR 3.36 (H) 03/28/2024    INR 2.94 03/27/2024    INR 1.51 (L) 02/17/2024    PROTIME 33.5 (H) 03/28/2024    PROTIME 29.6 (H) 03/27/2024    PROTIME 16.0 (L) 02/17/2024     Lab Results   Component Value Date    HGB 8.0 (L) 03/28/2024    HGB 8.7 (L) 03/27/2024    HGB 8.4 (L) 02/17/2024     Lab Results   Component Value Date    HCT 25.7 (L) 03/28/2024    HCT 28.4 (L) 03/27/2024    HCT 26.5 (L) 02/17/2024       Stefany Esquivel, PharmD  03/28/24 11:50 EDT     "

## 2024-03-28 NOTE — THERAPY EVALUATION
Patient Name: Barry Calhoun Sr.  : 1932    MRN: [id number]                              Today's Date: 3/28/2024       Admit Date: 3/27/2024    Visit Dx:     ICD-10-CM ICD-9-CM   1. Syncope, unspecified syncope type  R55 780.2   2. Thrombocytopenia  D69.6 287.5   3. 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    Anemia due to chronic kidney disease stage III    Atrial fibrillation    Monitoring of monoclonal antibody treatment for malignancy    Gross hematuria, chronic    Healthcare maintenance    Essential hypertension    Moderate to severe mitral regurgitation    Acute renal failure    Asthma    Cerebrovascular accident (CVA)    Chronic diastolic heart failure    Chronic obstructive pulmonary disease    Coronary heart disease    Nonsustained paroxysmal ventricular tachycardia    Palpitations    Status post placement of implantable loop recorder    Stomach cancer    Weakness    Loss of consciousness    ESRD (end stage renal disease)    Syncope and collapse    Thrombocytopenia    Syncope     Past Medical History:   Diagnosis Date    A-fib     Asthma     Chronic renal disease     COPD (chronic obstructive pulmonary disease)     Coronary heart disease     CABG    CVA (cerebral vascular accident)     recent CVA -- Patient and Family report this is not accurate    Gout     Hypertension      Past Surgical History:   Procedure Laterality Date    ARTERIOVENOUS FISTULA/SHUNT SURGERY Right 2022    Procedure: RIGHT ARM BRACHIAL CEPHALIC ARTERIAL VENOUS FISTULA;  Surgeon: Drew Gunter MD;  Location: [facility] MAIN OR;  Service: Vascular;  Laterality: Right;    CARDIAC CATHETERIZATION  2016    [facility]    CORONARY ARTERY BYPASS GRAFT      CABG X3, reoperation, 09;  CAB and     OTHER SURGICAL HISTORY  2016    loop recorder implant       General Information       Row Name 24 1529          OT Time and Intention    Document Type evaluation  -SR     Mode of Treatment occupational therapy  -SR       Row Name 24 1529          Occupational Profile    Reason for Services/Referral (Occupational Profile) Pt is a 89 y/o M admitted to [facility] 3/27/24 after being found down at [facility], loss of consciousness. CT head (-) acute. MRI brain pending. PMHx significant for dementia, anemia d/t CKDIII, A.fib, HTN, thrombocytopenia, non-Hodgkin's lymphoma, hx cancer of the stomach, and hx CVA. Per chart and patient. baseline pt resides in [facility], though per CM pt has been residing at home the past 6 days.  [facility] plans to take patient home with 24 hour care.  Per CM, colten states he typically is able to get up using a walker and gets assist for dressing and bathing.  -SR       Row Name 24 1529          Living Environment    People in Home facility resident  -SR       Row Name 24 1529          Cognition    Orientation Status (Cognition) oriented to;person;disoriented to;place;situation;time;unable/difficult to assess;other (see comments)  -SR       Row Name 24 1529          Safety Issues, Functional Mobility    Impairments Affecting Function (Mobility) range of motion (ROM);strength;balance  -SR               User Key  (r) = Recorded By, (t) = Taken By, (c) = Cosigned By      Initials Name Provider Type    SR Ana Guzmán OT Occupational Therapist                     Mobility/ADL's       Row Name 24 1529          Bed Mobility    Bed Mobility bed mobility (all) activities  -SR     All Activities, [place] (Bed Mobility) dependent (less than 25% patient effort)  -SR       Row Name 24 1529          Activities of Daily Living    BADL Assessment/Intervention upper body dressing  -SR       Row Name 24 1529          Upper Body  Dressing Assessment/Training    [place] (Upper Body Dressing) dependent (less than 25% patient effort)  -SR               User Key  (r) = Recorded By, (t) = Taken By, (c) = Cosigned By      Initials Name Provider Type    SR Ana Guzmán OT Occupational Therapist                   Obj/Interventions       Row Name 03/28/24 1531          Range of Motion Comprehensive    Comment, General Range of Motion Pt has limited PROM in BUE  -SR       Row Name 03/28/24 1531          Strength Comprehensive (MMT)    Comment, General Manual Muscle Testing (MMT) Assessment BUE 3-/5  -SR               User Key  (r) = Recorded By, (t) = Taken By, (c) = Cosigned By      Initials Name Provider Type    SR Ana Guzmán, OT Occupational Therapist                   Goals/Plan       Row Name 03/28/24 1537          Transfer Goal 1 (OT)    Activity/Assistive Device (Transfer Goal 1, OT) transfers, all  -SR     [place]/Cues Needed (Transfer Goal 1, OT) moderate assist (50-74% patient effort)  -SR     Time Frame (Transfer Goal 1, OT) 2 weeks  -SR       Row Name 03/28/24 1537          Grooming Goal 1 (OT)    Activity/Device (Grooming Goal 1, OT) grooming skills, all  -SR     [place] (Grooming Goal 1, OT) moderate assist (50-74% patient effort)  -SR     Time Frame (Grooming Goal 1, OT) 2 weeks  -SR       Row Name 03/28/24 1537          Therapy Assessment/Plan (OT)    Planned Therapy Interventions (OT) activity tolerance training;BADL retraining;IADL retraining;functional balance retraining;neuromuscular control/coordination retraining;ROM/therapeutic exercise;transfer/mobility retraining;strengthening exercise;occupation/activity based interventions  -SR               User Key  (r) = Recorded By, (t) = Taken By, (c) = Cosigned By      Initials Name Provider Type    SR Ana Guzmán, OT Occupational Therapist                   Clinical Impression       Row Name 03/28/24 1532          Pain  Scale: FACES Pre/Post-Treatment    Pain: FACES Scale, Pretreatment 2-->hurts little bit  -SR     Posttreatment Pain Rating 2-->hurts little bit  -SR       Row Name 03/28/24 1532          Plan of Care Review    Outcome Evaluation Pt is a 89 y/o M admitted to [facility] 3/27/24 after being found down at [facility], loss of consciousness. CT head (-) acute. MRI brain pending. PMHx significant for dementia, anemia d/t CKDIII, A.fib, HTN, thrombocytopenia, non-Hodgkin's lymphoma, hx cancer of the stomach, and hx CVA. Per chart and patient. baseline pt resides in [facility], though per  pt has been residing at home the past 6 days.  Grandson plans to take patient home with 24 hour care.  Per CM, grandson states he typically is able to get up using a walker and gets assist for dressing and bathing.  Pt had body temp of 96 and is currently on warming blanket.  He is confused and requires total assist for dressing and max assist to wipe face with towel.  He was dependent to be pulled up in bed.  Recommend SNF at discharge, though family plans to take patient home.  Will continue to follow to monitor progress.  -SR       Row Name 03/28/24 1532          Therapy Assessment/Plan (OT)    Rehab Potential (OT) good, to achieve stated therapy goals  -SR     Criteria for Skilled Therapeutic Interventions Met (OT) yes  -SR     Therapy Frequency (OT) 5 times/wk  -SR     Predicted Duration of Therapy Intervention (OT) Until discharge  -SR       Row Name 03/28/24 1532          Therapy Plan Review/Discharge Plan (OT)    Anticipated Discharge Disposition (OT) skilled nursing facility  -SR       Row Name 03/28/24 1532          Positioning and Restraints    Pre-Treatment Position in bed  -SR     Post Treatment Position bed  -SR     In Bed call light within reach  -SR               User Key  (r) = Recorded By, (t) = Taken By, (c) = Cosigned By      Initials Name Provider Type    Ana García, OT Occupational Therapist                   Outcome  Measures       Row Name 03/28/24 0954          How much help from another person do you currently need...    Turning from your back to your side while in flat bed without using bedrails? 1  -BR     Moving from lying on back to sitting on the side of a flat bed without bedrails? 1  -BR     Moving to and from a bed to a chair (including a wheelchair)? 1  -BR     Standing up from a chair using your arms (e.g., wheelchair, bedside chair)? 1  -BR     Climbing 3-5 steps with a railing? 1  -BR     To walk in hospital room? 1  -BR     AM-PAC 6 Clicks Score (PT) 6  -BR     Highest Level of Mobility Goal 2 --> Bed activities/dependent transfer  -BR       Row Name 03/28/24 0954          Functional Assessment    Outcome Measure Options AM-PAC 6 Clicks Basic Mobility (PT)  -BR               User Key  (r) = Recorded By, (t) = Taken By, (c) = Cosigned By      Initials Name Provider Type    Shelby Lockwood PT Physical Therapist                    Occupational Therapy Education       Title: PT OT SLP Therapies (In Progress)       Topic: Occupational Therapy (In Progress)       Point: ADL training (In Progress)       Description:   Instruct learner(s) on proper safety adaptation and remediation techniques during self care or transfers.   Instruct in proper use of assistive devices.                  Learning Progress Summary             Patient Acceptance, E,TB, NR by SR at 3/28/2024 1212                         Point: Home exercise program (Not Started)       Description:   Instruct learner(s) on appropriate technique for monitoring, assisting and/or progressing therapeutic exercises/activities.                  Learner Progress:  Not documented in this visit.              Point: Precautions (Not Started)       Description:   Instruct learner(s) on prescribed precautions during self-care and functional transfers.                  Learner Progress:  Not documented in this visit.              Point: Body mechanics (In Progress)        Description:   Instruct learner(s) on proper positioning and spine alignment during self-care, functional mobility activities and/or exercises.                  Learning Progress Summary             Patient Acceptance, E,TB, NR by SR at 3/28/2024 1538                                         User Key       Initials Effective Dates Name Provider Type Discipline     06/16/21 -  Ana Guzmán OT Occupational Therapist OT                  OT Recommendation and Plan  Planned Therapy Interventions (OT): activity tolerance training, BADL retraining, IADL retraining, functional balance retraining, neuromuscular control/coordination retraining, ROM/therapeutic exercise, transfer/mobility retraining, strengthening exercise, occupation/activity based interventions  Therapy Frequency (OT): 5 times/wk  Plan of Care Review  Outcome Evaluation: Pt is a 89 y/o M admitted to [facility] 3/27/24 after being found down at [facility], loss of consciousness. CT head (-) acute. MRI brain pending. PMHx significant for dementia, anemia d/t CKDIII, A.fib, HTN, thrombocytopenia, non-Hodgkin's lymphoma, hx cancer of the stomach, and hx CVA. Per chart and patient. baseline pt resides in [facility], though per CM pt has been residing at home the past 6 days.  Grandson plans to take patient home with 24 hour care.  Per CM, colten states he typically is able to get up using a walker and gets assist for dressing and bathing.  Pt had body temp of 96 and is currently on warming blanket.  He is confused and requires total assist for dressing and max assist to wipe face with towel.  He was dependent to be pulled up in bed.  Recommend SNF at discharge, though family plans to take patient home.  Will continue to follow to monitor progress.     Time Calculation:         Time Calculation- OT       Row Name 03/28/24 1538             Time Calculation- OT    OT Start Time 0855  -SR      OT Stop Time 0915  -SR      OT Time Calculation (min) 20 min  -SR      Total  Timed Code Minutes- OT 20 minute(s)  -SR      OT Received On 03/28/24  -SR      OT - Next Appointment 03/29/24  -SR      OT Goal Re-Cert Due Date 04/11/24  -SR                User Key  (r) = Recorded By, (t) = Taken By, (c) = Cosigned By      Initials Name Provider Type    SR Ana Guzmán, OT Occupational Therapist                  Therapy Charges for Today       Code Description Service Date Service Provider Modifiers Qty    51621640821 HC OT EVAL MOD COMPLEXITY 4 3/28/2024 Ana Guzmán OT GO 1                 Ana Guzmán OT  3/28/2024

## 2024-03-28 NOTE — PROGRESS NOTES
[facility] MEDICINE SERVICE  DAILY PROGRESS NOTE    NAME: Barry Calhoun Sr.  : 1932  MRN: [id number]      LOS: 0 days     PROVIDER OF SERVICE: MAISHA Rashid    Chief Complaint: Loss of consciousness    Subjective:     Interval History:  History taken from: patient  Patient Complaints: None at this time   Patient Denies: Headache, dizziness, chest pain, chills    Review of Systems:   Review of Systems   Constitutional:  Negative for chills and fever.   Respiratory:  Negative for chest tightness and shortness of breath.    Neurological:  Negative for dizziness and headaches.   Psychiatric/Behavioral:  Positive for confusion.        Objective:     Vital Signs  Temp:  [95.9 °F (35.5 °C)-96.5 °F (35.8 °C)] 95.9 °F (35.5 °C)  Heart Rate:  [63-80] 76  Resp:  [14-18] 16  BP: ()/(48-94) 102/48   Body mass index is 24.32 kg/m².    Physical Exam  Physical Exam  Constitutional:       Appearance: Normal appearance.   HENT:      Head: Normocephalic and atraumatic.      Nose: Nose normal.      Mouth/Throat:      Mouth: Mucous membranes are moist.   Eyes:      Extraocular Movements: Extraocular movements intact.      Conjunctiva/sclera: Conjunctivae normal.      Pupils: Pupils are equal, round, and reactive to light.   Cardiovascular:      Rate and Rhythm: Normal rate and regular rhythm.      Pulses: Normal pulses.      Heart sounds: Normal heart sounds.   Pulmonary:      Effort: Pulmonary effort is normal.      Breath sounds: Normal breath sounds.   Abdominal:      General: Abdomen is flat. Bowel sounds are normal.      Palpations: Abdomen is soft.   Musculoskeletal:         General: Normal range of motion.      Cervical back: Normal range of motion.   Skin:     General: Skin is warm and dry.   Neurological:      General: No focal deficit present.      Mental Status: He is alert. He is disoriented.   Psychiatric:         Mood and Affect: Mood normal.         Behavior: Behavior normal.          Thought Content: Thought content normal.         Judgment: Judgment normal.         Scheduled Meds   ferric gluconate, 125 mg, Intravenous, Once in Dialysis  midodrine, 10 mg, Oral, TID AC  sodium chloride, 250 mL, Intravenous, Once  sodium chloride, 10 mL, Intravenous, Q12H       PRN Meds     acetaminophen **OR** acetaminophen    albumin human    senna-docusate sodium **AND** polyethylene glycol **AND** bisacodyl **AND** bisacodyl    epoetin choco/choco-epbx    nitroglycerin    Pharmacy to dose warfarin    sodium chloride    sodium chloride   Infusions  Pharmacy to dose warfarin,           Diagnostic Data    Results from last 7 days   Lab Units 03/28/24  1018 03/27/24  1928 03/27/24  1848   WBC 10*3/mm3 3.31*   < >  --    HEMOGLOBIN g/dL 8.0*   < >  --    HEMATOCRIT % 25.7*   < >  --    PLATELETS 10*3/mm3 45*   < >  --    GLUCOSE mg/dL  --   --  89   CREATININE mg/dL  --   --  3.51*   BUN mg/dL  --   --  17   SODIUM mmol/L  --   --  139   POTASSIUM mmol/L  --   --  4.3   ANION GAP mmol/L  --   --  7.0    < > = values in this interval not displayed.       CT Head Without Contrast    Result Date: 3/27/2024  Impression: No acute intracranial abnormality. Electronically Signed: Manolo Roa DO  3/27/2024 7:17 PM EDT  Workstation ID: [id number]       I reviewed the patient's new clinical results.    Assessment/Plan:     Active and Resolved Problems  Active Hospital Problems    Diagnosis  POA    **Loss of consciousness [R40.20]  Yes    ESRD (end stage renal disease) [N18.6]  Yes    Syncope and collapse [R55]  Yes    Thrombocytopenia [D69.6]  Yes    Coronary heart disease [I25.10]  Yes    Chronic obstructive pulmonary disease [J44.9]  Yes    Essential hypertension [I10]  Yes    Atrial fibrillation [I48.91]  Yes    Anemia due to chronic kidney disease stage III [N18.9, D63.1]  Yes    Chronic diastolic heart failure [I50.32]  Yes      Resolved Hospital Problems   No resolved problems to display.       Loss of  consciousness  Syncope and collapse   -Patient is amnesic to the event  -Witnessed at SNF while getting up out of his chair  -CT scan of head is unremarkable   -MRI in AM   -Echocardiogram at [facility] on 02/13/2024- LVEF 50-55%    Thrombocytopenia  -Plt 44  -ON home plavix will for history of cva and MI, continue to hold   -Monitor fo s/s of bleeding        ESRD (end stage renal disease)  -Anemia due to chronic kidney disease stage III  -HD M-W-F  -HD treatment today and yesterday   -Monitor I's and O's  -Monitor daily weights  -Avoid nephrotoxic medications, hypovolemia, hypotension, hypertension  -Renally dose medications when possible     Chronic Atrial fibrillation  -Metoprolol held for hypotension   -Continue warfarin  -Monitor PTINR        Chronic diastolic heart failure  Chronic last ECHO LVEF 50-55% 02/16   -Daily Weights   -Monitor I&O's   -Continue home Bumex pending home medication list verification  -He does not appear to be in volume overload      Coronary heart disease  -Chronic   -History of CABG  -Free of chest pain at time of admission  -Will need to hold home Plavix with thrombocytopenia       Chronic obstructive pulmonary disease  -Not in acute exacerbation  -Not oxygen dependent at baseline  -Baseline PFTs unknown  -As needed DuoNebs for shortness of breath/wheezing  -On room air today        DVT prophylaxis:  Medical DVT prophylaxis orders are signed and held. Medical and mechanical DVT prophylaxis orders are present.         Code status is   Code Status and Medical Interventions:   Ordered at: 03/27/24 2114     Medical Intervention Limits:    NO intubation (DNI)     Code Status (Patient has no pulse and is not breathing):    No CPR (Do Not Attempt to Resuscitate)     Medical Interventions (Patient has pulse or is breathing):    Limited Support       Plan for disposition:Return to facility in 1 day if cleared by nephrology     Time: 30 minutes    Signature: Electronically signed by Esther  MAISHA Alvarenga, 03/28/24, 11:31 EDT.  [facility] Hospitalist Team       Addendum:    I saw and examined the patient in addition to the CHIKA.  I agree with assessment and plan with the following addendum:    General Appearance: Patient is demented at baseline and minimally verbal  Head:  Normocephalic, without obvious abnormality, atraumatic  Eyes:  PERRL, conjunctiva/corneas clear, EOM's intact, fundi benign, both eyes  Ears:  Normal TM's and external ear canals, both ears  Nose: Nares normal, septum midline, mucosa normal, no drainage or sinus tenderness  Throat: Lips, mucosa, and tongue normal; teeth and gums normal  Neck: Supple, symmetrical, trachea midline, no adenopathy, thyroid: not enlarged, symmetric, no tenderness/mass/nodules, no carotid bruit or JVD  Lungs:   Clear to auscultation bilaterally, respirations unlabored  Heart: Regular rate and rhythm, S1, S2 normal, no murmur, rub or gallop  Abdomen:  Soft, non-tender, bowel sounds active all four quadrants,  no masses, no organomegaly  Extremities: Extremities normal, atraumatic, no cyanosis or edema  Pulses: 2+ and symmetric  Skin: Skin color, texture, turgor normal, no rashes or lesions  Neurologic: Unable to assess    Patient presented with syncopal episode.  Most likely this is related to orthostatic hypotension.  I will hold his antihypertensive medications and continue oral midodrine scheduled 3 times daily.  Plan is for dialysis today.  He has received IV fluids and IV albumin.  MRI of the brain was ordered to rule out CVA and is pending at this time, although I doubt he has any intracranial pathology that caused his syncopal episode.  If his BP remains stable, he can be discharged back to his long-term care facility tomorrow.      MD Luci CamachoJIMMY Hospitalist Team  03/28/24  11:54 EDT

## 2024-03-28 NOTE — ED NOTES
"Pt pulled out his IV and does not want us to stick him. He states \"I don\"t want anymore sticks or pokes or medications,\" nurses tried to reorient the patient and give education, pt refused.  "

## 2024-03-28 NOTE — DISCHARGE PLACEMENT REQUEST
"Barry Calhoun Sr. (89 y.o. Male)       Date of Birth   07/12/1932    Social Security Number       Address   [address] [place] [facility]    Home Phone   [phone]    MRN   [id number]       Pentecostalism   None    Marital Status                               Admission Date   3/27/24    Admission Type   Emergency    Admitting Provider   Michael Villafana DO    Attending Provider   Joseph Gutierrez MD    Department, Room/Bed   [facility] EMERGENCY DEPARTMENT, EDH1/1       Discharge Date       Discharge Disposition       Discharge Destination                                 Attending Provider: Joseph Gutierrez MD    Allergies: No Known Allergies    Isolation: None   Infection: None   Code Status: No CPR    Ht: 175.3 cm (69\")   Wt: 74.7 kg (164 lb 10.9 oz)    Admission Cmt: None   Principal Problem: Loss of consciousness [R40.20]                   Active Insurance as of 3/27/2024       Primary Coverage       Payor Plan Insurance Group Employer/Plan Group    MEDICARE MEDICARE A & B        Payor Plan Address Payor Plan Phone Number Payor Plan Fax Number Effective Dates    [address] [phone]  2/1/1980 - None Entered    [facility] [id number]         Subscriber Name Subscriber Birth Date Member ID       BARRY CALHOUN SR. 7/12/1932 [id number]               Secondary Coverage       Payor Plan Insurance Group Employer/Plan Group    [place]        Payor Plan Address Payor Plan Phone Number Payor Plan Fax Number Effective Dates    [address]   7/8/2019 - None Entered    [place] [address]         Subscriber Name Subscriber Birth Date Member ID       BARRY CALHOUN SR. 7/12/1932 [id number]                     Emergency Contacts        (Rel.) Home Phone Work Phone Mobile Phone    KARLA JENNINGS (POA) (Significant Other) [phone] -- [phone]    ZEINAB Calhoun (POA) (Grandchild) -- -- [phone]    DAYRON RANDALLBARRY DASH (Son) -- -- [phone]    " Shaila Dupont (Daughter) -- -- [phone]    Anish Calhoun (Son) -- -- [phone]

## 2024-03-28 NOTE — PLAN OF CARE
Goal Outcome Evaluation:              Outcome Evaluation: Pt is a 89 y/o M admitted to [facility] 3/27/24 after being found down at [facility], loss of consciousness. CT head (-) acute. MRI brain pending. PMHx significant for dementia, anemia d/t CKDIII, A.fib, HTN, thrombocytopenia, non-Hodgkin's lymphoma, hx cancer of the stomach, and hx CVA. Per chart and patient. baseline pt resides in [facility], though per  pt has been residing at home the past 6 days.  Grandson plans to take patient home with 24 hour care.  Per CM, grandson states he typically is able to get up using a walker and gets assist for dressing and bathing.  Pt had body temp of 96 and is currently on warming blanket.  He is confused and requires total assist for dressing and max assist to wipe face with towel.  He was dependent to be pulled up in bed.  Recommend SNF at discharge, though family plans to take patient home.  Will continue to follow to monitor progress.      Anticipated Discharge Disposition (OT): skilled nursing facility

## 2024-03-28 NOTE — ED NOTES
patient confused, at basline, oriented to name and birthday only. Patient refusing morning labs, and continues to remove tele wires. Attempted to re-orient patient, but patient still confused and refusing labs.

## 2024-03-29 ENCOUNTER — APPOINTMENT (OUTPATIENT)
Dept: GENERAL RADIOLOGY | Facility: HOSPITAL | Age: 89
End: 2024-03-29
Payer: MEDICARE

## 2024-03-29 PROBLEM — Z79.01 CHRONIC ANTICOAGULATION: Chronic | Status: ACTIVE | Noted: 2024-03-29

## 2024-03-29 LAB
ABO GROUP BLD: NORMAL
ALBUMIN SERPL-MCNC: 3.5 G/DL (ref 3.5–5.2)
ALBUMIN SERPL-MCNC: 3.5 G/DL (ref 3.5–5.2)
ALBUMIN/GLOB SERPL: 1.5 G/DL
ALP SERPL-CCNC: 97 U/L (ref 39–117)
ALP SERPL-CCNC: 97 U/L (ref 39–117)
ALT SERPL W P-5'-P-CCNC: 13 U/L (ref 1–41)
ALT SERPL W P-5'-P-CCNC: 13 U/L (ref 1–41)
ANION GAP SERPL CALCULATED.3IONS-SCNC: 11 MMOL/L (ref 5–15)
ANION GAP SERPL CALCULATED.3IONS-SCNC: 8 MMOL/L (ref 5–15)
AST SERPL-CCNC: 27 U/L (ref 1–40)
AST SERPL-CCNC: 27 U/L (ref 1–40)
BASOPHILS # BLD AUTO: 0.02 10*3/MM3 (ref 0–0.2)
BASOPHILS NFR BLD AUTO: 0.5 % (ref 0–1.5)
BILIRUB CONJ SERPL-MCNC: 0.3 MG/DL (ref 0–0.3)
BILIRUB INDIRECT SERPL-MCNC: 0.4 MG/DL
BILIRUB SERPL-MCNC: 0.7 MG/DL (ref 0–1.2)
BILIRUB SERPL-MCNC: 0.7 MG/DL (ref 0–1.2)
BLD GP AB SCN SERPL QL: NEGATIVE
BUN SERPL-MCNC: 17 MG/DL (ref 8–23)
BUN SERPL-MCNC: 18 MG/DL (ref 8–23)
BUN/CREAT SERPL: 5.7 (ref 7–25)
BUN/CREAT SERPL: 6 (ref 7–25)
CALCIUM SPEC-SCNC: 8.8 MG/DL (ref 8.2–9.6)
CALCIUM SPEC-SCNC: 9 MG/DL (ref 8.2–9.6)
CHLORIDE SERPL-SCNC: 101 MMOL/L (ref 98–107)
CHLORIDE SERPL-SCNC: 101 MMOL/L (ref 98–107)
CO2 SERPL-SCNC: 28 MMOL/L (ref 22–29)
CO2 SERPL-SCNC: 31 MMOL/L (ref 22–29)
CREAT SERPL-MCNC: 2.98 MG/DL (ref 0.76–1.27)
CREAT SERPL-MCNC: 3 MG/DL (ref 0.76–1.27)
D-LACTATE SERPL-SCNC: 1.6 MMOL/L (ref 0.5–2)
DACRYOCYTES BLD QL SMEAR: NORMAL
DEPRECATED RDW RBC AUTO: 60.1 FL (ref 37–54)
DEPRECATED RDW RBC AUTO: 62.2 FL (ref 37–54)
EGFRCR SERPLBLD CKD-EPI 2021: 19 ML/MIN/1.73
EGFRCR SERPLBLD CKD-EPI 2021: 19.2 ML/MIN/1.73
ELLIPTOCYTES BLD QL SMEAR: NORMAL
EOSINOPHIL # BLD AUTO: 0.08 10*3/MM3 (ref 0–0.4)
EOSINOPHIL NFR BLD AUTO: 1.9 % (ref 0.3–6.2)
ERYTHROCYTE [DISTWIDTH] IN BLOOD BY AUTOMATED COUNT: 15.6 % (ref 12.3–15.4)
ERYTHROCYTE [DISTWIDTH] IN BLOOD BY AUTOMATED COUNT: 15.7 % (ref 12.3–15.4)
GEN 5 2HR TROPONIN T REFLEX: 287 NG/L
GIANT PLATELETS: NORMAL
GLOBULIN UR ELPH-MCNC: 2.4 GM/DL
GLUCOSE BLDC GLUCOMTR-MCNC: 137 MG/DL (ref 70–105)
GLUCOSE BLDC GLUCOMTR-MCNC: 62 MG/DL (ref 70–105)
GLUCOSE BLDC GLUCOMTR-MCNC: 71 MG/DL (ref 70–105)
GLUCOSE BLDC GLUCOMTR-MCNC: 79 MG/DL (ref 70–105)
GLUCOSE BLDC GLUCOMTR-MCNC: 81 MG/DL (ref 70–105)
GLUCOSE SERPL-MCNC: 49 MG/DL (ref 65–99)
GLUCOSE SERPL-MCNC: 70 MG/DL (ref 65–99)
HCT VFR BLD AUTO: 30.2 % (ref 37.5–51)
HCT VFR BLD AUTO: 30.5 % (ref 37.5–51)
HGB BLD-MCNC: 9.3 G/DL (ref 13–17.7)
HGB BLD-MCNC: 9.3 G/DL (ref 13–17.7)
IMM GRANULOCYTES # BLD AUTO: 0.01 10*3/MM3 (ref 0–0.05)
IMM GRANULOCYTES NFR BLD AUTO: 0.2 % (ref 0–0.5)
INR PPP: 4.17 (ref 2–3)
LARGE PLATELETS: NORMAL
LYMPHOCYTES # BLD AUTO: 0.75 10*3/MM3 (ref 0.7–3.1)
LYMPHOCYTES NFR BLD AUTO: 18.2 % (ref 19.6–45.3)
MACROCYTES BLD QL SMEAR: NORMAL
MAGNESIUM SERPL-MCNC: 1.9 MG/DL (ref 1.7–2.3)
MCH RBC QN AUTO: 32.4 PG (ref 26.6–33)
MCH RBC QN AUTO: 32.5 PG (ref 26.6–33)
MCHC RBC AUTO-ENTMCNC: 30.5 G/DL (ref 31.5–35.7)
MCHC RBC AUTO-ENTMCNC: 30.8 G/DL (ref 31.5–35.7)
MCV RBC AUTO: 105.6 FL (ref 79–97)
MCV RBC AUTO: 106.3 FL (ref 79–97)
MONOCYTES # BLD AUTO: 0.54 10*3/MM3 (ref 0.1–0.9)
MONOCYTES NFR BLD AUTO: 13.1 % (ref 5–12)
NEUTROPHILS NFR BLD AUTO: 2.72 10*3/MM3 (ref 1.7–7)
NEUTROPHILS NFR BLD AUTO: 66.1 % (ref 42.7–76)
NRBC BLD AUTO-RTO: 0 /100 WBC (ref 0–0.2)
PHOSPHATE SERPL-MCNC: 3.4 MG/DL (ref 2.5–4.5)
PLATELET # BLD AUTO: 53 10*3/MM3 (ref 140–450)
PLATELET # BLD AUTO: 56 10*3/MM3 (ref 140–450)
PMV BLD AUTO: 10.4 FL (ref 6–12)
PMV BLD AUTO: 11.1 FL (ref 6–12)
POIKILOCYTOSIS BLD QL SMEAR: NORMAL
POLYCHROMASIA BLD QL SMEAR: NORMAL
POTASSIUM SERPL-SCNC: 4.4 MMOL/L (ref 3.5–5.2)
POTASSIUM SERPL-SCNC: 4.4 MMOL/L (ref 3.5–5.2)
PROT SERPL-MCNC: 5.9 G/DL (ref 6–8.5)
PROT SERPL-MCNC: 5.9 G/DL (ref 6–8.5)
PROTHROMBIN TIME: 40.9 SECONDS (ref 19.4–28.5)
RBC # BLD AUTO: 2.86 10*6/MM3 (ref 4.14–5.8)
RBC # BLD AUTO: 2.87 10*6/MM3 (ref 4.14–5.8)
RH BLD: POSITIVE
SODIUM SERPL-SCNC: 140 MMOL/L (ref 136–145)
SODIUM SERPL-SCNC: 140 MMOL/L (ref 136–145)
STOMATOCYTES BLD QL SMEAR: NORMAL
T&S EXPIRATION DATE: NORMAL
TOXIC GRANULATION: NORMAL
TROPONIN T DELTA: -1 NG/L
TROPONIN T SERPL HS-MCNC: 288 NG/L
WBC NRBC COR # BLD AUTO: 4.12 10*3/MM3 (ref 3.4–10.8)
WBC NRBC COR # BLD AUTO: 4.53 10*3/MM3 (ref 3.4–10.8)

## 2024-03-29 PROCEDURE — 85610 PROTHROMBIN TIME: CPT | Performed by: INTERNAL MEDICINE

## 2024-03-29 PROCEDURE — 84100 ASSAY OF PHOSPHORUS: CPT | Performed by: INTERNAL MEDICINE

## 2024-03-29 PROCEDURE — 86850 RBC ANTIBODY SCREEN: CPT | Performed by: NURSE PRACTITIONER

## 2024-03-29 PROCEDURE — 25010000002 HALOPERIDOL LACTATE PER 5 MG

## 2024-03-29 PROCEDURE — 85027 COMPLETE CBC AUTOMATED: CPT | Performed by: INTERNAL MEDICINE

## 2024-03-29 PROCEDURE — 83735 ASSAY OF MAGNESIUM: CPT | Performed by: INTERNAL MEDICINE

## 2024-03-29 PROCEDURE — 99222 1ST HOSP IP/OBS MODERATE 55: CPT | Performed by: SURGERY

## 2024-03-29 PROCEDURE — 83605 ASSAY OF LACTIC ACID: CPT | Performed by: INTERNAL MEDICINE

## 2024-03-29 PROCEDURE — 82948 REAGENT STRIP/BLOOD GLUCOSE: CPT

## 2024-03-29 PROCEDURE — 87040 BLOOD CULTURE FOR BACTERIA: CPT | Performed by: INTERNAL MEDICINE

## 2024-03-29 PROCEDURE — 85007 BL SMEAR W/DIFF WBC COUNT: CPT | Performed by: INTERNAL MEDICINE

## 2024-03-29 PROCEDURE — 85025 COMPLETE CBC W/AUTO DIFF WBC: CPT | Performed by: INTERNAL MEDICINE

## 2024-03-29 PROCEDURE — 84484 ASSAY OF TROPONIN QUANT: CPT | Performed by: NURSE PRACTITIONER

## 2024-03-29 PROCEDURE — 86900 BLOOD TYPING SEROLOGIC ABO: CPT | Performed by: NURSE PRACTITIONER

## 2024-03-29 PROCEDURE — 86901 BLOOD TYPING SEROLOGIC RH(D): CPT | Performed by: NURSE PRACTITIONER

## 2024-03-29 PROCEDURE — 86900 BLOOD TYPING SEROLOGIC ABO: CPT

## 2024-03-29 PROCEDURE — 82248 BILIRUBIN DIRECT: CPT | Performed by: NURSE PRACTITIONER

## 2024-03-29 PROCEDURE — 99222 1ST HOSP IP/OBS MODERATE 55: CPT

## 2024-03-29 PROCEDURE — 71045 X-RAY EXAM CHEST 1 VIEW: CPT

## 2024-03-29 PROCEDURE — 82948 REAGENT STRIP/BLOOD GLUCOSE: CPT | Performed by: INTERNAL MEDICINE

## 2024-03-29 PROCEDURE — 86901 BLOOD TYPING SEROLOGIC RH(D): CPT

## 2024-03-29 PROCEDURE — 25010000002 OLANZAPINE 10 MG RECONSTITUTED SOLUTION 1 EACH VIAL

## 2024-03-29 PROCEDURE — 87147 CULTURE TYPE IMMUNOLOGIC: CPT | Performed by: INTERNAL MEDICINE

## 2024-03-29 PROCEDURE — 80053 COMPREHEN METABOLIC PANEL: CPT | Performed by: NURSE PRACTITIONER

## 2024-03-29 PROCEDURE — 87154 CUL TYP ID BLD PTHGN 6+ TRGT: CPT | Performed by: INTERNAL MEDICINE

## 2024-03-29 PROCEDURE — 99222 1ST HOSP IP/OBS MODERATE 55: CPT | Performed by: INTERNAL MEDICINE

## 2024-03-29 RX ORDER — DEXTROSE MONOHYDRATE 25 G/50ML
25 INJECTION, SOLUTION INTRAVENOUS
Status: DISCONTINUED | OUTPATIENT
Start: 2024-03-29 | End: 2024-04-02 | Stop reason: HOSPADM

## 2024-03-29 RX ORDER — OLANZAPINE 10 MG/2ML
2.5 INJECTION, POWDER, LYOPHILIZED, FOR SOLUTION INTRAMUSCULAR ONCE AS NEEDED
Status: COMPLETED | OUTPATIENT
Start: 2024-03-29 | End: 2024-03-29

## 2024-03-29 RX ORDER — FAMOTIDINE 20 MG/1
10 TABLET, FILM COATED ORAL DAILY
Status: DISCONTINUED | OUTPATIENT
Start: 2024-03-30 | End: 2024-04-02 | Stop reason: HOSPADM

## 2024-03-29 RX ORDER — NICOTINE POLACRILEX 4 MG
15 LOZENGE BUCCAL
Status: DISCONTINUED | OUTPATIENT
Start: 2024-03-29 | End: 2024-04-02 | Stop reason: HOSPADM

## 2024-03-29 RX ORDER — DEXTROSE MONOHYDRATE 25 G/50ML
25 INJECTION, SOLUTION INTRAVENOUS
Status: DISCONTINUED | OUTPATIENT
Start: 2024-03-29 | End: 2024-03-29 | Stop reason: SDUPTHER

## 2024-03-29 RX ORDER — METOPROLOL SUCCINATE 25 MG/1
12.5 TABLET, EXTENDED RELEASE ORAL
Status: DISCONTINUED | OUTPATIENT
Start: 2024-03-29 | End: 2024-04-02 | Stop reason: HOSPADM

## 2024-03-29 RX ORDER — RISPERIDONE 0.5 MG/1
0.5 TABLET, ORALLY DISINTEGRATING ORAL 2 TIMES DAILY PRN
Status: DISCONTINUED | OUTPATIENT
Start: 2024-03-29 | End: 2024-03-29

## 2024-03-29 RX ORDER — NICOTINE POLACRILEX 4 MG
15 LOZENGE BUCCAL
Status: DISCONTINUED | OUTPATIENT
Start: 2024-03-29 | End: 2024-03-29 | Stop reason: SDUPTHER

## 2024-03-29 RX ORDER — DONEPEZIL HYDROCHLORIDE 5 MG/1
10 TABLET, FILM COATED ORAL NIGHTLY
Status: DISCONTINUED | OUTPATIENT
Start: 2024-03-29 | End: 2024-04-02 | Stop reason: HOSPADM

## 2024-03-29 RX ORDER — MIDAZOLAM HYDROCHLORIDE 1 MG/ML
1 INJECTION INTRAMUSCULAR; INTRAVENOUS EVERY 4 HOURS PRN
Status: DISCONTINUED | OUTPATIENT
Start: 2024-03-29 | End: 2024-03-29

## 2024-03-29 RX ORDER — IBUPROFEN 600 MG/1
1 TABLET ORAL
Status: DISCONTINUED | OUTPATIENT
Start: 2024-03-29 | End: 2024-04-02 | Stop reason: HOSPADM

## 2024-03-29 RX ORDER — HALOPERIDOL 5 MG/ML
5 INJECTION INTRAMUSCULAR EVERY 6 HOURS PRN
Status: DISCONTINUED | OUTPATIENT
Start: 2024-03-29 | End: 2024-04-02 | Stop reason: HOSPADM

## 2024-03-29 RX ORDER — RISPERIDONE 0.5 MG/1
2 TABLET, ORALLY DISINTEGRATING ORAL NIGHTLY
Status: DISCONTINUED | OUTPATIENT
Start: 2024-03-29 | End: 2024-03-29

## 2024-03-29 RX ORDER — RISPERIDONE 0.5 MG/1
0.25 TABLET, ORALLY DISINTEGRATING ORAL 2 TIMES DAILY PRN
Status: DISCONTINUED | OUTPATIENT
Start: 2024-03-29 | End: 2024-03-29

## 2024-03-29 RX ADMIN — ALLOPURINOL 100 MG: 100 TABLET ORAL at 08:47

## 2024-03-29 RX ADMIN — DOCUSATE SODIUM 200 MG: 100 CAPSULE, LIQUID FILLED ORAL at 08:47

## 2024-03-29 RX ADMIN — DEXTROSE MONOHYDRATE 25 G: 25 INJECTION, SOLUTION INTRAVENOUS at 23:04

## 2024-03-29 RX ADMIN — Medication 10 ML: at 22:38

## 2024-03-29 RX ADMIN — Medication 10 ML: at 10:47

## 2024-03-29 RX ADMIN — MIDODRINE HYDROCHLORIDE 10 MG: 5 TABLET ORAL at 10:49

## 2024-03-29 RX ADMIN — FAMOTIDINE 20 MG: 20 TABLET, FILM COATED ORAL at 08:47

## 2024-03-29 RX ADMIN — OLANZAPINE 2.5 MG: 10 INJECTION, POWDER, FOR SOLUTION INTRAMUSCULAR at 14:36

## 2024-03-29 RX ADMIN — HALOPERIDOL LACTATE 5 MG: 5 INJECTION, SOLUTION INTRAMUSCULAR at 23:07

## 2024-03-29 RX ADMIN — MIDODRINE HYDROCHLORIDE 10 MG: 5 TABLET ORAL at 17:14

## 2024-03-29 RX ADMIN — MIDODRINE HYDROCHLORIDE 10 MG: 5 TABLET ORAL at 08:47

## 2024-03-29 RX ADMIN — RISPERIDONE 0.25 MG: 0.5 TABLET, ORALLY DISINTEGRATING ORAL at 14:27

## 2024-03-29 RX ADMIN — FOLIC ACID 1 MG: 1 TABLET ORAL at 08:47

## 2024-03-29 NOTE — CONSULTS
[facility] CARDIOLOGY CONSULT  [facility]        Subjective:     Encounter Date:2024      Patient ID: Barry Calhoun Sr. is a 89 y.o. male.    Chief Complaint: Syncope      HPI:  Barry Calhoun Sr. is a 89 y.o. male known to Dr. Foley with a past cardiac history of permanent atrial fibrillation (anticoagulated with Coumadin), CAD s/p redo 3v CABG in  and PCI in , and nonsustained VT s/p EP study with no inducible arrhythmia.  He had a loop recorder in .  Patient was lost to follow-up after .  He is reported with a 2D echo at [facility] 2024 with an EF of 50 to 55%.  PMH includes ESRD (on dialysis), CVA, COPD, non-Hodgkin's lymphoma, gout, and dementia.  Patient presented with syncope and cardiology was consulted for further evaluation and management.    Patient is currently oriented to person only with a sitter at the bedside.  He is unable to provide meaningful history or tell me if he has any chest pain.  There is no family at the bedside.  Primary nurse tells me that it was reported to her that patient lives at home with spouse and witnessed getting out of a chair in which he fell and had lost consciousness for about 30 minutes.      Past Medical History:   Diagnosis Date    A-fib     Asthma     Chronic renal disease     COPD (chronic obstructive pulmonary disease)     Coronary heart disease     CABG    CVA (cerebral vascular accident)     recent CVA -- Patient and Family report this is not accurate    Gout     Hypertension          Past Surgical History:   Procedure Laterality Date    ARTERIOVENOUS FISTULA/SHUNT SURGERY Right 2022    Procedure: RIGHT ARM BRACHIAL CEPHALIC ARTERIAL VENOUS FISTULA;  Surgeon: Drew Gunter MD;  Location: [facility] OR;  Service: Vascular;  Laterality: Right;    CARDIAC CATHETERIZATION  2016    [facility]    CORONARY ARTERY BYPASS GRAFT      CABG X3, reoperation, 09; CAB and     OTHER SURGICAL HISTORY   "05/02/2016    loop recorder implant          Social History     Socioeconomic History    Marital status:    Tobacco Use    Smoking status: Never    Smokeless tobacco: Never   Vaping Use    Vaping status: Never Used   Substance and Sexual Activity    Alcohol use: No    Drug use: No    Sexual activity: Defer       Family History   Problem Relation Age of Onset    Stroke Other          No Known Allergies    Current Medications:   Scheduled Meds:allopurinol, 100 mg, Oral, Daily  atorvastatin, 20 mg, Oral, Nightly  docusate sodium, 200 mg, Oral, QAM  famotidine, 20 mg, Oral, BID  folic acid, 1 mg, Oral, Daily  levothyroxine, 50 mcg, Oral, Nightly  metoprolol succinate XL, 12.5 mg, Oral, Q24H  midodrine, 10 mg, Oral, TID AC  sodium chloride, 250 mL, Intravenous, Once  sodium chloride, 10 mL, Intravenous, Q12H  tamsulosin, 0.4 mg, Oral, Nightly      Continuous Infusions:Pharmacy to dose warfarin,         ROS  UTO d/t confusion.       Objective:         /61   Pulse 71   Temp 97.4 °F (36.3 °C) (Oral)   Resp 13   Ht 175.3 cm (69\")   Wt 73.5 kg (162 lb 0.6 oz)   SpO2 95%   BMI 23.93 kg/m²       General: Frail appearing, in NAD.  Neuro: Confused. No gross deficits.  HEENT: Sclerae clear, no xanthelasmas.  CV: S1S2, RRR. No murmurs or gallops.  Resp: Breathing is unlabored. Lungs coarse.  GI: BS+. Abdomen soft and NTTP.  Ext: Pedal pulses are palpable. Extremities are with 1-2+ pitting BLE edema, right > left.  MS: moves all extremities, generalized weakness.  Skin: warm, dry.  Psych: calm and cooperative.            Lab Review:     Results from last 7 days   Lab Units 03/29/24  0729 03/27/24  1848   SODIUM mmol/L 140 139   POTASSIUM mmol/L 4.4 4.3   CHLORIDE mmol/L 101 100   CO2 mmol/L 31.0* 32.0*   BUN mg/dL 17 17   CREATININE mg/dL 2.98* 3.51*   GLUCOSE mg/dL 49* 89   CALCIUM mg/dL 8.8 8.5     Results from last 7 days   Lab Units 03/29/24  0729   HSTROP T ng/L 288*     Results from last 7 days   Lab Units " "03/29/24  0729 03/28/24  1018   WBC 10*3/mm3 4.53 3.31*   HEMOGLOBIN g/dL 9.3* 8.0*   HEMATOCRIT % 30.5* 25.7*   PLATELETS 10*3/mm3 53* 45*     Results from last 7 days   Lab Units 03/29/24  0729 03/28/24  1018   INR  4.17* 3.36*     Results from last 7 days   Lab Units 03/29/24  0729   MAGNESIUM mg/dL 1.9           Invalid input(s): \"LDLCALC\"  Results from last 7 days   Lab Units 03/28/24  1018   PROBNP pg/mL 54,423.0*           Recent Radiology:  Imaging Results (Most Recent)       Procedure Component Value Units Date/Time    CT Head Without Contrast [[id number]] Collected: 03/27/24 1914     Updated: 03/27/24 1919    Narrative:      CT HEAD WO CONTRAST    Date of Exam: 3/27/2024 6:56 PM EDT    Indication: fall.    Comparison: 2/16/2024    Technique: Axial CT images were obtained of the head without contrast administration.  Coronal reconstructions were performed.  Automated exposure control and iterative reconstruction methods were used.      Findings:  No large territory infarct.    There is no evidence of hemorrhage.  No mass effect, edema or midline shift    Mild periventricular and subcortical white matter hypodensities, nonspecific but most likely represents chronic small vessel ischemic changes.    No extra-axial fluid collection.      Prominent ventricular system secondary to chronic parenchymal volume loss.      Status post bilateral lens extraction. Otherwise the orbits are unremarkable.   Small bilateral mastoid effusions, right greater than left. The paranasal sinuses are clear.    The visualized soft tissues are unremarkable.  No acute osseous abnormality.      Impression:      Impression:  No acute intracranial abnormality.      Electronically Signed: Manolo Roa DO    3/27/2024 7:17 PM EDT    Workstation ID: [id number]              ECHOCARDIOGRAM:              Assessment:         Active Hospital Problems    Diagnosis  POA    **Loss of consciousness [R40.20]  Yes    Syncope [R55]  Yes    ESRD (end " stage renal disease) [N18.6]  Yes    Syncope and collapse [R55]  Yes    Thrombocytopenia [D69.6]  Yes    Coronary heart disease [I25.10]  Yes    Chronic obstructive pulmonary disease [J44.9]  Yes    Essential hypertension [I10]  Yes    Atrial fibrillation [I48.91]  Yes    Anemia due to chronic kidney disease stage III [N18.9, D63.1]  Yes    Chronic diastolic heart failure [I50.32]  Yes     1) Syncope  -High-sensitivity troponin 288, 287 in setting renal dysfunction / HF  -EKG shows ST depression in the precordial leads (this appears similar to prior study)  -CT of the head negative for acute findings    2) Acute on chronic diastolic heart failure  - BNP 94390  - He is reported with a 2D echo at [facility] 2/2024 with an EF of 50 to 55%.     3) NSVT  - s/p remote EP study with no inducible arrhythmia.  - s/p remote loop recorder in 2016 (battery is most certainly end of life)  - K 4.4, Mg 1.9    4) permanent atrial fibrillation   - anticoagulated with Coumadin; INR 2.94 on admit --> supratherapeutic    5) CAD s/p redo 3v CABG in 2009 and PCI in 2016    6) ESRD   - on dialysis  - nephrology following    7) CVA    8) COPD    9) non-Hodgkin's lymphoma    10) gout    11) dementia           Plan:   Follow serial cardiac enzymes.  Tele shows 9 beat NSVT.  Had loop recorder but if still present, battery is most certainly end of life by now.  Continue on beta blocker as BP tolerates.  Check 2D echo.  UTO orthostatic v/s at this time.  Patient cannot stand.  Nephrology managing volume.  Further recommendations per attending cardiologist.          Electronically signed by MAISHA Tian, 03/29/24, 11:35 AM EDT.       Patient seen and examined.  Patient is a poor historian and has advanced dementia.  Patient was found unconscious for lasting for nearly 30 minutes      Physical Exam    General:      well developed, well nourished, in no acute distress.    Head:      normocephalic and atraumatic.    Eyes:      PERRL/EOM  intact, conjunctivae and sclerae clear without nystagmus.    Neck:      no  thyromegaly, trachea central with normal respiratory effort  Lungs:      clear bilaterally to auscultation.    Heart:       IRregular rate and rhythm, S1, S2 without murmurs, rubs, or gallops  Skin:      intact without lesions or rashes.    Psych:      alert and cooperative; normal mood and affect; normal attention span and concentration.          Elderly patient with advanced dementia and recommend medical management only.  Patient is a poor historian  Suspect intermittent severe hypoglycemia causing loss of consciousness from this patient.  I will recommend only medical management on this patient  Nonspecific troponin elevation with end-stage renal disease  Consider comfort care  Resume warfarin when INR is within range      Electronically signed by Simeon Foley MD, 03/29/24, 3:50 PM EDT.

## 2024-03-29 NOTE — PLAN OF CARE
"Goal Outcome Evaluation:      Patient has been restless and agitated throughout whole shift. Patient had 2 episodes of V-Tach, medication was order patient refused. Patient refused to let me put in a new IV and refused getting his lab work. The last half of the shift, I have been siting with patient along with NA and a med sitter to keep patient in the bed. Patient keeps attempting to get out of the bed stating \" I need to go\" Patient keeps putting legs over the rail and using inappropriate language because I will not let him get out the bed and walk \" down the steps.\" There will be a day time in person sitter for patient tomorrow.                                         "

## 2024-03-29 NOTE — CASE MANAGEMENT/SOCIAL WORK
Continued Stay Note  [facility]     Patient Name: Barry Calhoun Sr.  MRN: [id number]  Today's Date: 3/29/2024    Admit Date: 3/27/2024    Plan: Anticipate routine home with family and [facility] (accepted, order per MD), vs. SNF choices pending, No precert required, PASRR approved. Current OP HD [facility].   Discharge Plan       Row Name 03/29/24 1347       Plan    Plan Anticipate routine home with family and [facility] (accepted, order per MD), vs. SNF choices pending, No precert required, PASRR approved. Current OP HD [facility].    Patient/Family in Agreement with Plan yes    Plan Comments CM notified by [place] liaison that no male beds available at this time. CM contacted patient's grandson/POA, Vernon, by phone. Family will review SNF list for additional choices.                    Expected Discharge Date and Time       Expected Discharge Date Expected Discharge Time    Apr 1, 2024           Phone communication or documentation only - no physical contact with patient or family.     RAFFI FernandoN, RN    [address] [address]    Office: [phone]  Fax: [phone]

## 2024-03-29 NOTE — PROGRESS NOTES
"Pharmacy dosing service  Anticoagulant  Warfarin     Subjective:    Barry Calhoun Sr. is a 89 y.o.male being continued on warfarin for Atrial Fibrillation.    INR Goal: 2 - 3  Home medication?: Yes, 4mg daily except 3mg on MWF  Bridge Therapy Present?:  No  Interacting Medications Evaluation (New/Present/Discontinued): N/A  Additional Contributing Factors: thrombocytopenia      Assessment/Plan:    Holding again today due to supratherapeutic INR. Will plan to resume warfarin when INR is no longer supratherapeutic.     Continue to monitor and adjust based on INR.         Date 3/28 3/29          INR 3.36 4.17          Dose hold hold              Objective:  [Ht: 175.3 cm (69\"); Wt: 73.5 kg (162 lb 0.6 oz); BMI: Body mass index is 23.93 kg/m².]    Lab Results   Component Value Date    ALBUMIN 4.2 02/16/2024     Lab Results   Component Value Date    INR 4.17 (H) 03/29/2024    INR 3.36 (H) 03/28/2024    INR 2.94 03/27/2024    PROTIME 40.9 (H) 03/29/2024    PROTIME 33.5 (H) 03/28/2024    PROTIME 29.6 (H) 03/27/2024     Lab Results   Component Value Date    HGB 9.3 (L) 03/29/2024    HGB 8.0 (L) 03/28/2024    HGB 8.7 (L) 03/27/2024     Lab Results   Component Value Date    HCT 30.5 (L) 03/29/2024    HCT 25.7 (L) 03/28/2024    HCT 28.4 (L) 03/27/2024       Victoriano Burrell [facility]  03/29/24 10:21 EDT       "

## 2024-03-29 NOTE — PLAN OF CARE
Goal Outcome Evaluation:              Outcome Evaluation: pt remains confused. more agitated this afternoon and became agressive. pt placed in 4 point restraints.

## 2024-03-29 NOTE — SIGNIFICANT NOTE
#NSVT    - Patient with two runs of NSVT per tele    - asymptomatic    - he has been in rate controlled A. Fib since admission but off home lopressor 2/2 hypotension    - check mag and phos    - echo    - start low dose Toprol to see if he will tolerate, monitor BP    - cardiology consulted    Electronically signed by Michael Villafana DO, 03/29/24, 3:58 AM EDT.

## 2024-03-29 NOTE — PROGRESS NOTES
"NEPHROLOGY PROGRESS NOTE------[facility]    [facility]  [phone]  So Mclean MD      Patient Care Team:  Pedro Purvis MD as PCP - General (Family Medicine)  Pedro Purvis MD as PCP - Family Medicine  Vinay, MD Pino as Consulting Physician (Nephrology)      Provider:  So Mclean MD  Patient Name: Barry Calhoun Sr.  :  1932    SUBJECTIVE:    F/U ESRD    Awake and alert this AM. No SOB, CP. S/P HD yesterday    Medication:  allopurinol, 100 mg, Oral, Daily  atorvastatin, 20 mg, Oral, Nightly  docusate sodium, 200 mg, Oral, QAM  famotidine, 20 mg, Oral, BID  folic acid, 1 mg, Oral, Daily  levothyroxine, 50 mcg, Oral, Nightly  metoprolol succinate XL, 12.5 mg, Oral, Q24H  midodrine, 10 mg, Oral, TID AC  sodium chloride, 250 mL, Intravenous, Once  sodium chloride, 10 mL, Intravenous, Q12H  tamsulosin, 0.4 mg, Oral, Nightly      Pharmacy to dose warfarin,         OBJECTIVE    Vital Sign Min/Max for last 24 hours  Temp  Min: 95.9 °F (35.5 °C)  Max: 97.8 °F (36.6 °C)   BP  Min: 88/56  Max: 117/71   Pulse  Min: 68  Max: 84   Resp  Min: 15  Max: 20   SpO2  Min: 94 %  Max: 99 %   No data recorded   Weight  Min: 73.5 kg (162 lb 0.6 oz)  Max: 73.6 kg (162 lb 4.1 oz)     Flowsheet Rows      Flowsheet Row First Filed Value   Admission Height 175.3 cm (69\") Documented at 2024   Admission Weight 74.7 kg (164 lb 10.9 oz) Documented at 2024            No intake/output data recorded.  No intake/output data recorded.    Physical Exam:  General Appearance: alert, appears stated age and cooperative  Head: normocephalic, without obvious abnormality and atraumatic.    Eyes: conjunctivae and sclerae normal and no icterus  Neck: supple and no JVD  Lungs: clear to auscultation and respirations regular  Heart: IRREG IRREG +CARLTON  Chest Wall: no abnormalities observed  Abdomen: normal bowel sounds and " "soft, nontender  Extremities: moves extremities well, no edema, no cyanosis. +DJD  Skin: no bleeding, bruising or rash  Neurologic: Alert, and oriented x 2-3    Labs:    WBC WBC   Date Value Ref Range Status   03/28/2024 3.31 (L) 3.40 - 10.80 10*3/mm3 Final   03/27/2024 4.98 3.40 - 10.80 10*3/mm3 Final      HGB Hemoglobin   Date Value Ref Range Status   03/28/2024 8.0 (L) 13.0 - 17.7 g/dL Final   03/27/2024 8.7 (L) 13.0 - 17.7 g/dL Final      HCT Hematocrit   Date Value Ref Range Status   03/28/2024 25.7 (L) 37.5 - 51.0 % Final   03/27/2024 28.4 (L) 37.5 - 51.0 % Final      Platelets No results found for: \"LABPLAT\"   MCV MCV   Date Value Ref Range Status   03/28/2024 104.0 (H) 79.0 - 97.0 fL Final   03/27/2024 105.6 (H) 79.0 - 97.0 fL Final          Sodium Sodium   Date Value Ref Range Status   03/29/2024 140 136 - 145 mmol/L Final   03/27/2024 139 136 - 145 mmol/L Final      Potassium Potassium   Date Value Ref Range Status   03/29/2024 4.4 3.5 - 5.2 mmol/L Final   03/27/2024 4.3 3.5 - 5.2 mmol/L Final      Chloride Chloride   Date Value Ref Range Status   03/29/2024 101 98 - 107 mmol/L Final   03/27/2024 100 98 - 107 mmol/L Final      CO2 CO2   Date Value Ref Range Status   03/29/2024 31.0 (H) 22.0 - 29.0 mmol/L Final   03/27/2024 32.0 (H) 22.0 - 29.0 mmol/L Final      BUN BUN   Date Value Ref Range Status   03/29/2024 17 8 - 23 mg/dL Final   03/27/2024 17 8 - 23 mg/dL Final      Creatinine Creatinine   Date Value Ref Range Status   03/29/2024 2.98 (H) 0.76 - 1.27 mg/dL Final   03/27/2024 3.51 (H) 0.76 - 1.27 mg/dL Final      Calcium Calcium   Date Value Ref Range Status   03/29/2024 8.8 8.2 - 9.6 mg/dL Final   03/27/2024 8.5 8.2 - 9.6 mg/dL Final      PO4 No components found for: \"PO4\"   Albumin No results found for: \"ALBUMIN\"   Magnesium Magnesium   Date Value Ref Range Status   03/29/2024 1.9 1.7 - 2.3 mg/dL Final      Uric Acid No components found for: \"URIC ACID\"     Imaging Results (Last 72 Hours)       " Procedure Component Value Units Date/Time    CT Head Without Contrast [[id number]] Collected: 03/27/24 1914     Updated: 03/27/24 1919    Narrative:      CT HEAD WO CONTRAST    Date of Exam: 3/27/2024 6:56 PM EDT    Indication: fall.    Comparison: 2/16/2024    Technique: Axial CT images were obtained of the head without contrast administration.  Coronal reconstructions were performed.  Automated exposure control and iterative reconstruction methods were used.      Findings:  No large territory infarct.    There is no evidence of hemorrhage.  No mass effect, edema or midline shift    Mild periventricular and subcortical white matter hypodensities, nonspecific but most likely represents chronic small vessel ischemic changes.    No extra-axial fluid collection.      Prominent ventricular system secondary to chronic parenchymal volume loss.      Status post bilateral lens extraction. Otherwise the orbits are unremarkable.   Small bilateral mastoid effusions, right greater than left. The paranasal sinuses are clear.    The visualized soft tissues are unremarkable.  No acute osseous abnormality.      Impression:      Impression:  No acute intracranial abnormality.      Electronically Signed: Manolo Roa DO    3/27/2024 7:17 PM EDT    Workstation ID: [id number]            Results for orders placed during the hospital encounter of 02/16/24    XR Chest 1 View    Narrative  XR CHEST 1 VW    Date of Exam: 2/17/2024 9:55 AM EST    Indication: hx of CHF, increased BNP    Comparison: 2/11/2024    Findings:  Patient is status post median sternotomy and CABG. Cardiac silhouette is enlarged. Vague perihilar opacities may represent edema or infiltrates. No significant pleural effusion or pneumothorax is seen. No acute osseous lesion is seen. Right chest wall  Port-A-Cath terminates overlying the superior cavoatrial junction. Loop recorder projects over the left thorax. Metallic stent projects over the left upper  thorax.    Impression  Impression:  Cardiomegaly with vague bilateral perihilar opacities which may represent edema or infiltrates.    Electronically Signed: Jose Be MD  2/17/2024 10:11 AM EST  Workstation ID: [id number]      Results for orders placed during the hospital encounter of 02/09/22    XR Chest PA & Lateral    Narrative  DATE OF EXAM:  2/9/2022 11:04 AM    PROCEDURE:  XR CHEST PA AND LATERAL-    INDICATIONS:  preop    COMPARISON:  Chest x-ray 3/17/2016, chest CT 9/9/2021    TECHNIQUE:  Two radiologic views of the chest , PA and lateral were obtained.    FINDINGS:  There is a left IJ dialysis catheter and right IJ port. A subcutaneous  cardiac event monitor is present. There is been coronary bypass. There  is cardiomegaly. The pulmonary vasculature is within normal limits.  There are coarse linear markings in the mid and lower lung zones, with  round and ovoid lucencies suspected to be bronchiectasis. There is  eventration of anterior right hemidiaphragm. Costophrenic angles are  sharp    Impression  Findings compatible with fibrotic changes and/or atelectasis in the mid  and lower lung zones bilaterally with probable bronchiectasis. This is a  new finding since the most recent chest x-ray from 2016 is felt to have  progressed since the 9/9/2021 chest CT    Electronically Signed By-Wesley Mcallister On:2/9/2022 11:15 AM  This report was finalized on [id number] by  Wesley Mcallister, .      Results for orders placed in visit on 04/16/21    SCANNED - IMAGING      Results for orders placed during the hospital encounter of 10/17/22    Duplex hemodialysis access CAR    Interpretation Summary    Patent right brachiocephalic fistula with overall adequate flow volumes.  Diameter and depth both appear appropriate for dialysis.  Stenosis seen at the arterial anastomosis.        ASSESSMENT / PLAN      Loss of consciousness    Anemia due to chronic kidney disease stage III    Atrial fibrillation    Essential  hypertension    Chronic diastolic heart failure    Chronic obstructive pulmonary disease    Coronary heart disease    ESRD (end stage renal disease)    Syncope and collapse    Thrombocytopenia    Syncope    ESRD--------HD tomorrow per usual [facility]Th[facility]Sa outpatient schedule     2. HYPOTENSION------Better post IV NS bolus, IV Albumin, Midodrine and follow     3. S/P SYNCOPE/LOC-------? Secondary to hypotension     4. ATRIAL FIBRILLATION-------Rate controlled and anticoagulated     5. ANEMIA OF ESRD------EPO/Venofer with HD as needed     6. OA/DJD     7. DECONDITIONING--------PT/OT/Rehab as needed     8. THROMBOCYTOPENIA--------No heparin     9. MILD METABOLIC ALKALOSIS------Better     10. H/O DIASTOLIC CHF------Currently clinically volume deplete ok. BNP is clinically inaccurate     11. COPD-------Respiratory status stable. Oxygen, nebs, pulmonary toilet        So Mclean MD  [facility]  [phone]  03/29/24  08:18 EDT
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
## 2024-03-29 NOTE — PROGRESS NOTES
[facility] MEDICINE SERVICE  DAILY PROGRESS NOTE    NAME: Barry Calhoun Sr.  : 1932  MRN: [id number]      LOS: 1 day     PROVIDER OF SERVICE: Suhail Zacarias MD    Chief Complaint: Loss of consciousness    Subjective:     Interval History:  History taken from: patient  Patient Complaints: None at this time     3/29/24 patient seen in bed no acute distress, still kind of agitated.  Vital signs stable, discussed with RN, started Ativan as needed.  Patient Denies: Headache, dizziness, chest pain, chills    Review of Systems:   Review of Systems   Constitutional:  Negative for chills and fever.   Respiratory:  Negative for chest tightness and shortness of breath.    Neurological:  Negative for dizziness and headaches.   Psychiatric/Behavioral:  Positive for confusion.        Objective:     Vital Signs  Temp:  [97.4 °F (36.3 °C)-97.8 °F (36.6 °C)] 97.4 °F (36.3 °C)  Heart Rate:  [68-84] 71  Resp:  [13-20] 13  BP: ()/(55-71) 106/61  Flow (L/min):  [3] 3   Body mass index is 23.93 kg/m².      Physical Exam  Constitutional:       Appearance: Normal appearance.   HENT:      Head: Normocephalic and atraumatic.      Nose: Nose normal.      Mouth/Throat:      Mouth: Mucous membranes are moist.   Eyes:      Extraocular Movements: Extraocular movements intact.      Conjunctiva/sclera: Conjunctivae normal.      Pupils: Pupils are equal, round, and reactive to light.   Cardiovascular:      Rate and Rhythm: Normal rate and regular rhythm.      Pulses: Normal pulses.      Heart sounds: Normal heart sounds.   Pulmonary:      Effort: Pulmonary effort is normal.      Breath sounds: Normal breath sounds.   Abdominal:      General: Abdomen is flat. Bowel sounds are normal.      Palpations: Abdomen is soft.   Musculoskeletal:         General: Normal range of motion.      Cervical back: Normal range of motion.   Skin:     General: Skin is warm and dry.   Neurological:      General: No focal deficit present.       Mental Status: He is alert. He is disoriented.   Psychiatric:         Mood and Affect: Mood normal.         Behavior: Behavior normal.         Thought Content: Thought content normal.         Judgment: Judgment normal.         Scheduled Meds   allopurinol, 100 mg, Oral, Daily  atorvastatin, 20 mg, Oral, Nightly  docusate sodium, 200 mg, Oral, QAM  [START ON 3/30/2024] famotidine, 10 mg, Oral, Daily  folic acid, 1 mg, Oral, Daily  levothyroxine, 50 mcg, Oral, Nightly  metoprolol succinate XL, 12.5 mg, Oral, Q24H  midodrine, 10 mg, Oral, TID AC  sodium chloride, 250 mL, Intravenous, Once  sodium chloride, 10 mL, Intravenous, Q12H  tamsulosin, 0.4 mg, Oral, Nightly       PRN Meds     acetaminophen **OR** acetaminophen    senna-docusate sodium **AND** polyethylene glycol **AND** bisacodyl **AND** bisacodyl    dextrose    dextrose    glucagon (human recombinant)    ipratropium-albuterol    midazolam    nitroglycerin    Pharmacy to dose warfarin    sodium chloride    sodium chloride    ziprasidone (GEODON) 10 mg in sterile water (preservative free) 0.5 mL injection   Infusions  Pharmacy to dose warfarin,           Diagnostic Data    Results from last 7 days   Lab Units 03/29/24  0729   WBC 10*3/mm3 4.53   HEMOGLOBIN g/dL 9.3*   HEMATOCRIT % 30.5*   PLATELETS 10*3/mm3 53*   GLUCOSE mg/dL 49*   CREATININE mg/dL 2.98*   BUN mg/dL 17   SODIUM mmol/L 140   POTASSIUM mmol/L 4.4   ANION GAP mmol/L 8.0       CT Head Without Contrast    Result Date: 3/27/2024  Impression: No acute intracranial abnormality. Electronically Signed: Manolo Roa DO  3/27/2024 7:17 PM EDT  Workstation ID: [id number]       I reviewed the patient's new clinical results.    Assessment/Plan:     Active and Resolved Problems  Active Hospital Problems    Diagnosis  POA    **Loss of consciousness [R40.20]  Yes    Syncope [R55]  Yes    ESRD (end stage renal disease) [N18.6]  Yes    Syncope and collapse [R55]  Yes    Thrombocytopenia [D69.6]  Yes     Coronary heart disease [I25.10]  Yes    Chronic obstructive pulmonary disease [J44.9]  Yes    Essential hypertension [I10]  Yes    Atrial fibrillation [I48.91]  Yes    Anemia due to chronic kidney disease stage III [N18.9, D63.1]  Yes    Chronic diastolic heart failure [I50.32]  Yes      Resolved Hospital Problems   No resolved problems to display.       Loss of consciousness  Syncope and collapse   -Patient is amnesic to the event  -Witnessed at [facility] while getting up out of his chair  -CT scan of head is unremarkable   -MRI in AM   -Echocardiogram at [facility][1] on 02/13/2024- LVEF 50-55%    Thrombocytopenia  -Plt 44  -ON home plavix will for history of cva and MI, continue to hold   -Monitor fo s/s of bleeding        ESRD (end stage renal disease)  -Anemia due to chronic kidney disease stage III  -HD M-W-F  -HD treatment today and yesterday   -Monitor I's and O's  -Monitor daily weights  -Avoid nephrotoxic medications, hypovolemia, hypotension, hypertension  -Renally dose medications when possible     Chronic Atrial fibrillation  -Metoprolol held for hypotension   -Continue warfarin  -Monitor PTINR        Chronic diastolic heart failure  Chronic last ECHO LVEF 50-55% 02/16   -Daily Weights   -Monitor I&O's   -Continue home Bumex pending home medication list verification  -He does not appear to be in volume overload      Coronary heart disease  -Chronic   -History of CABG  -Free of chest pain at time of admission  -Will need to hold home Plavix with thrombocytopenia       Chronic obstructive pulmonary disease  -Not in acute exacerbation  -Not oxygen dependent at baseline  -Baseline PFTs unknown  -As needed DuoNebs for shortness of breath/wheezing  -On room air today        DVT prophylaxis:  Medical and mechanical DVT prophylaxis orders are present.         Code status is   Code Status and Medical Interventions:   Ordered at: 03/27/24 2114     Medical Intervention Limits:    NO intubation (DNI)     Code Status  (Patient has no pulse and is not breathing):    No CPR (Do Not Attempt to Resuscitate)     Medical Interventions (Patient has pulse or is breathing):    Limited Support       Plan for disposition:Return to facility in 1 day if cleared by nephrology     Time: 30 minutes    Signature: Electronically signed by Suhail Zacarias MD, 03/29/24, 12:35 EDT.  [facility] Hospitalist Team

## 2024-03-29 NOTE — CASE MANAGEMENT/SOCIAL WORK
Continued Stay Note  [facility]     Patient Name: Barry Calhoun Sr.  MRN: [id number]  Today's Date: 3/29/2024    Admit Date: 3/27/2024    Plan: Anticipate routine home with family and [facility] (accepted, order per MD) vs. [facility] referral pending acceptance, No precert required, PASRR approved. Current OP HD [facility].   Discharge Plan       Row Name 03/29/24 1038       Plan    Plan Anticipate routine home with family and [facility] (accepted, order per MD) vs. [facility] referral pending acceptance, No precert required, PASRR approved. Current OP HD [facility].    Plan Comments CM provided update to [facility] liaison, referral pending acceptance. Barrier to D/C: sitter at bedside, HD tomorrow, cardiology consult.                      Expected Discharge Date and Time       Expected Discharge Date Expected Discharge Time    Mar 31, 2024           Phone communication or documentation only - no physical contact with patient or family.     RAFFI FernandoN, RN    [address] [address]    Office: [phone]  Fax: [phone]
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
## 2024-03-29 NOTE — CONSULTS
"  Referring Provider:     Suhail Zacarias MD     Reason for Consultation: Agitation    Chief complaint : Patient unable to voice complaints secondary to confusion    Subjective .     History of present illness:  The patient is a 89 y.o. male who was admitted secondary to fall.   PMH of Asthma, non-Hodgkin's lymphoma, COPD, CAD, S/P CABG, CVA, and CKD   Psychiatry consulted for agitation  Per nursing patient has history of dementia as well    Patient confused and disoriented  Responding to name  Restless agitated  Nursing at bedside reports patient has been aggressive and consistently agitated  Geodon ineffective  Versed ineffective  Unable to obtain psychiatric history  No family at bedside      The following portions of the patient's history were reviewed and updated as appropriate: allergies, current medications, past family history, past medical history, past social history, past surgical history and problem list.    History    Objective     Vital Signs   /60 (BP Location: Left arm, Patient Position: Lying)   Pulse 71   Temp 97 °F (36.1 °C) (Axillary)   Resp 20   Ht 175.3 cm (69\")   Wt 73.5 kg (162 lb 0.6 oz)   SpO2 95%   BMI 23.93 kg/m²       MENTAL STATUS EXAM   General Appearance:  Cleanly groomed and dressed  Eye Contact:  Closed  Attitude:  Uncooperative and aggressive  Motor Activity:  Fighting and restless  Speech:  Loud  Mood and affect:  Anxious and irritable  Thought Process:  Other  Other Comment:  Unable to assess  Associations/ Thought Content:  Other  Other Comment:  Unable to assess  Suicidal Ideations:  Other  Other Comment:  Unable to assess  Homicidal Ideation:  Other  Other Comment:  Unable to assess  Sensorium:  Confused  Orientation:  Person  Insight:  Limited  Judgement:  Limited  Reliability:  Poor  Impulse Control:  Impaired         Assessment & Plan       Loss of consciousness    Anemia due to chronic kidney disease stage III    Atrial fibrillation    Essential " hypertension    Chronic diastolic heart failure    Chronic obstructive pulmonary disease    Coronary heart disease    ESRD (end stage renal disease)    Syncope and collapse    Thrombocytopenia    Syncope       Assessment: Dementia with behavioral disturbances  1. Syncope, unspecified syncope type    2. Thrombocytopenia    3. End-stage renal disease on hemodialysis      Treatment Plan: Patient presents agitated confused aggressive with staff.  Cannot be redirected, or distracted.  ODT. risperidone was attempted, unable to give due to agitation.  One-time dose of olanzapine 2.5 mg IM for acute agitation: not to be given within 2 hours of benzodiazapine   Likely to start Depakote 125 mg nightly, obtain hepatic panel first  Monitor Qtc 486 on 3/27/2024  Morning EKG ordered     Patient is at high risk for delirium  Minimize care and excess noise at night  Encourage normal sleep patterns, sleeping at night and up in chair during the day  Open blinds on windows if possible during the day  Encourage PO intake especially fluids if not contraindicated   Encourage family participation in care   Reorient pt often   Monitor labs  Encourage PT/OT as appropriate   Visual and hearing aids for patients with these impairments     We will follow      I discussed the patients findings and my recommendations with patient and nursing staff    I have reviewed and approved the behavioral health treatment plans and problem list. Yes  Thank you for the consult   Referring MD has access to consult report and progress notes in EMR     Sydni Riojas DNP, APRN  03/29/24  14:34 EDT

## 2024-03-29 NOTE — NURSING NOTE
Lab called that glucose was 49. Treated with two cups of orange juice. Rechecked at 0745 and glucose was 71.

## 2024-03-29 NOTE — NURSING NOTE
Two nurse skin assessment completed upon admission to PCU. Majority of admission assessment completed - voicemail left w/family, awaiting call back to complete remainder of assessment r/t pt AMS and inability to respond appropriately.

## 2024-03-29 NOTE — NURSING NOTE
"Patient came to the floor with no IV access due to pulling it out in the ED. Patient does have dementia. Patient did take 2100 medications with no issues. I asked him if I could put another IV in and he refused he said no. At 2221 reached out to attending on call for Dr. Gutierrez for medication for anxiety, patient was restless and trying to get out of bed with a sitter in the room. After sitter left at 2300 and med sitter was brought to the room, and patient still restless and trying to get out of the bed he keeps stating \" I have to go\" patient had 2 episodes of V-Tach and reach out to provider and an order was placed for low dose Toprol and labs to be done. Patient refused lab draws as well. I went to give patient medication and he refused, he stated \" nope I already took medication\" I tried to redirect the patient and give education as to why medication is needed patient still refused stating   \"My heart is fine\"  "

## 2024-03-29 NOTE — SIGNIFICANT NOTE
#Delirium    - patient with delirium 2/2 medical condition complicated by dementia    - Sitter at bedside but leaving soon    - Patient has been pulling lines and tubes, grabbing at sitter, and anxious    - concern that patient is danger to self and others    - start Geodone 10mg IM BID PRN agitation, monitor for toxicity    Electronically signed by Michael Villafana DO, 03/28/24, 10:30 PM EDT.

## 2024-03-30 ENCOUNTER — APPOINTMENT (OUTPATIENT)
Dept: CARDIOLOGY | Facility: HOSPITAL | Age: 89
End: 2024-03-30
Payer: MEDICARE

## 2024-03-30 LAB
ANION GAP SERPL CALCULATED.3IONS-SCNC: 6 MMOL/L (ref 5–15)
BUN SERPL-MCNC: 20 MG/DL (ref 8–23)
BUN/CREAT SERPL: 5.8 (ref 7–25)
CALCIUM SPEC-SCNC: 8.6 MG/DL (ref 8.2–9.6)
CHLORIDE SERPL-SCNC: 98 MMOL/L (ref 98–107)
CO2 SERPL-SCNC: 32 MMOL/L (ref 22–29)
CREAT SERPL-MCNC: 3.45 MG/DL (ref 0.76–1.27)
DEPRECATED RDW RBC AUTO: 59.6 FL (ref 37–54)
EGFRCR SERPLBLD CKD-EPI 2021: 16.1 ML/MIN/1.73
ERYTHROCYTE [DISTWIDTH] IN BLOOD BY AUTOMATED COUNT: 15.5 % (ref 12.3–15.4)
GLUCOSE BLDC GLUCOMTR-MCNC: 117 MG/DL (ref 70–105)
GLUCOSE BLDC GLUCOMTR-MCNC: 76 MG/DL (ref 70–105)
GLUCOSE BLDC GLUCOMTR-MCNC: 85 MG/DL (ref 70–105)
GLUCOSE BLDC GLUCOMTR-MCNC: 92 MG/DL (ref 70–105)
GLUCOSE SERPL-MCNC: 106 MG/DL (ref 65–99)
HCT VFR BLD AUTO: 27.3 % (ref 37.5–51)
HGB BLD-MCNC: 8.3 G/DL (ref 13–17.7)
INR PPP: 4.92 (ref 2–3)
MAGNESIUM SERPL-MCNC: 1.9 MG/DL (ref 1.7–2.3)
MCH RBC QN AUTO: 31.8 PG (ref 26.6–33)
MCHC RBC AUTO-ENTMCNC: 30.4 G/DL (ref 31.5–35.7)
MCV RBC AUTO: 104.6 FL (ref 79–97)
PHOSPHATE SERPL-MCNC: 3.8 MG/DL (ref 2.5–4.5)
PLATELET # BLD AUTO: 56 10*3/MM3 (ref 140–450)
PMV BLD AUTO: 11.1 FL (ref 6–12)
POTASSIUM SERPL-SCNC: 4.3 MMOL/L (ref 3.5–5.2)
PROTHROMBIN TIME: 47.7 SECONDS (ref 19.4–28.5)
QT INTERVAL: 408 MS
QTC INTERVAL: 465 MS
RBC # BLD AUTO: 2.61 10*6/MM3 (ref 4.14–5.8)
SODIUM SERPL-SCNC: 136 MMOL/L (ref 136–145)
WBC NRBC COR # BLD AUTO: 3.76 10*3/MM3 (ref 3.4–10.8)

## 2024-03-30 PROCEDURE — 82948 REAGENT STRIP/BLOOD GLUCOSE: CPT

## 2024-03-30 PROCEDURE — 93010 ELECTROCARDIOGRAM REPORT: CPT | Performed by: INTERNAL MEDICINE

## 2024-03-30 PROCEDURE — 94761 N-INVAS EAR/PLS OXIMETRY MLT: CPT

## 2024-03-30 PROCEDURE — 93005 ELECTROCARDIOGRAM TRACING: CPT

## 2024-03-30 PROCEDURE — 25010000002 NA FERRIC GLUC CPLX PER 12.5 MG: Performed by: INTERNAL MEDICINE

## 2024-03-30 PROCEDURE — 82948 REAGENT STRIP/BLOOD GLUCOSE: CPT | Performed by: INTERNAL MEDICINE

## 2024-03-30 PROCEDURE — 99233 SBSQ HOSP IP/OBS HIGH 50: CPT | Performed by: STUDENT IN AN ORGANIZED HEALTH CARE EDUCATION/TRAINING PROGRAM

## 2024-03-30 PROCEDURE — 94799 UNLISTED PULMONARY SVC/PX: CPT

## 2024-03-30 PROCEDURE — 94640 AIRWAY INHALATION TREATMENT: CPT

## 2024-03-30 PROCEDURE — 99231 SBSQ HOSP IP/OBS SF/LOW 25: CPT

## 2024-03-30 PROCEDURE — 94664 DEMO&/EVAL PT USE INHALER: CPT

## 2024-03-30 PROCEDURE — 25010000002 ALBUMIN HUMAN 25% PER 50 ML: Performed by: INTERNAL MEDICINE

## 2024-03-30 PROCEDURE — P9047 ALBUMIN (HUMAN), 25%, 50ML: HCPCS | Performed by: INTERNAL MEDICINE

## 2024-03-30 PROCEDURE — 25010000002 EPOETIN ALFA-EPBX 10000 UNIT/ML SOLUTION: Performed by: INTERNAL MEDICINE

## 2024-03-30 RX ORDER — GUAIFENESIN 600 MG/1
600 TABLET, EXTENDED RELEASE ORAL EVERY 12 HOURS SCHEDULED
Status: DISCONTINUED | OUTPATIENT
Start: 2024-03-30 | End: 2024-04-01

## 2024-03-30 RX ORDER — IPRATROPIUM BROMIDE AND ALBUTEROL SULFATE 2.5; .5 MG/3ML; MG/3ML
3 SOLUTION RESPIRATORY (INHALATION)
Status: DISCONTINUED | OUTPATIENT
Start: 2024-03-30 | End: 2024-04-02

## 2024-03-30 RX ORDER — ALBUMIN (HUMAN) 12.5 G/50ML
12.5 SOLUTION INTRAVENOUS AS NEEDED
Status: COMPLETED | OUTPATIENT
Start: 2024-03-30 | End: 2024-03-30

## 2024-03-30 RX ADMIN — IPRATROPIUM BROMIDE AND ALBUTEROL SULFATE 3 ML: .5; 3 SOLUTION RESPIRATORY (INHALATION) at 12:07

## 2024-03-30 RX ADMIN — GUAIFENESIN 600 MG: 600 TABLET, EXTENDED RELEASE ORAL at 16:03

## 2024-03-30 RX ADMIN — LEVOTHYROXINE SODIUM 50 MCG: 0.05 TABLET ORAL at 21:21

## 2024-03-30 RX ADMIN — MIDODRINE HYDROCHLORIDE 10 MG: 5 TABLET ORAL at 18:27

## 2024-03-30 RX ADMIN — ALLOPURINOL 100 MG: 100 TABLET ORAL at 16:03

## 2024-03-30 RX ADMIN — MIDODRINE HYDROCHLORIDE 10 MG: 5 TABLET ORAL at 13:17

## 2024-03-30 RX ADMIN — ALBUMIN (HUMAN) 12.5 G: 0.25 INJECTION, SOLUTION INTRAVENOUS at 13:08

## 2024-03-30 RX ADMIN — IPRATROPIUM BROMIDE AND ALBUTEROL SULFATE 3 ML: .5; 3 SOLUTION RESPIRATORY (INHALATION) at 20:16

## 2024-03-30 RX ADMIN — SODIUM CHLORIDE 125 MG: 9 INJECTION, SOLUTION INTRAVENOUS at 13:08

## 2024-03-30 RX ADMIN — ALBUMIN (HUMAN) 12.5 G: 0.25 INJECTION, SOLUTION INTRAVENOUS at 12:35

## 2024-03-30 RX ADMIN — DEXTROSE MONOHYDRATE 25 G: 25 INJECTION, SOLUTION INTRAVENOUS at 07:27

## 2024-03-30 RX ADMIN — FOLIC ACID 1 MG: 1 TABLET ORAL at 16:03

## 2024-03-30 RX ADMIN — EPOETIN ALFA-EPBX 10000 UNITS: 10000 INJECTION, SOLUTION INTRAVENOUS; SUBCUTANEOUS at 13:59

## 2024-03-30 RX ADMIN — DOCUSATE SODIUM 200 MG: 100 CAPSULE, LIQUID FILLED ORAL at 16:03

## 2024-03-30 RX ADMIN — DONEPEZIL HYDROCHLORIDE 10 MG: 5 TABLET, FILM COATED ORAL at 21:21

## 2024-03-30 RX ADMIN — IPRATROPIUM BROMIDE AND ALBUTEROL SULFATE 3 ML: .5; 3 SOLUTION RESPIRATORY (INHALATION) at 16:37

## 2024-03-30 RX ADMIN — Medication 10 ML: at 21:21

## 2024-03-30 RX ADMIN — Medication 10 ML: at 16:05

## 2024-03-30 RX ADMIN — ALBUMIN (HUMAN) 12.5 G: 0.25 INJECTION, SOLUTION INTRAVENOUS at 11:20

## 2024-03-30 RX ADMIN — FAMOTIDINE 10 MG: 20 TABLET, FILM COATED ORAL at 16:03

## 2024-03-30 RX ADMIN — ALBUMIN (HUMAN) 12.5 G: 0.25 INJECTION, SOLUTION INTRAVENOUS at 10:08

## 2024-03-30 RX ADMIN — ATORVASTATIN CALCIUM 20 MG: 20 TABLET, FILM COATED ORAL at 21:21

## 2024-03-30 RX ADMIN — MIDODRINE HYDROCHLORIDE 10 MG: 5 TABLET ORAL at 09:49

## 2024-03-30 NOTE — PLAN OF CARE
Goal Outcome Evaluation:           Progress: no change  Outcome Evaluation: Pt remains confused, less agitated than overnight, now on 2 pt restraints. Blood sugar low this am, treated by this nurse. This nurse noted pt is not on renal diet. Spoke to joanne Houston to leave regular diet. Restraint order has been renewed, called POA requested for this nurse to call Anish Oconnell unable to answer. Will try again. Pt b/p has been low, but has been treated with po Midodrine and prn Albumin during dialysis. Sitter at bedside, bed alarm in place, call light within reach.

## 2024-03-30 NOTE — PROGRESS NOTES
Name: Barry Calhoun Sr. ADMIT: 3/27/2024   : 1932  PCP: Pedro Purvis MD    MRN: [id number] LOS: 2 days   AGE/SEX: 89 y.o. male  ROOM:  [facility]      CC: Consult for right arm swelling.   Interval History: Patient responds to stimulation this morning but struggling to speak.  Subjective   Subjective     Review of Systems  Objective   Objective     Vitals:   Temp:  [95.7 °F (35.4 °C)-100.2 °F (37.9 °C)] 100.2 °F (37.9 °C)  Heart Rate:  [71-82] 78  Resp:  [14-26] 22  BP: ()/(42-67) 115/57    No intake/output data recorded.    Scheduled Meds:     allopurinol, 100 mg, Oral, Daily  atorvastatin, 20 mg, Oral, Nightly  docusate sodium, 200 mg, Oral, QAM  donepezil, 10 mg, Oral, Nightly  epoetin choco/choco-epbx, 10,000 Units, Intravenous, Once  famotidine, 10 mg, Oral, Daily  ferric gluconate, 125 mg, Intravenous, Once in Dialysis  folic acid, 1 mg, Oral, Daily  guaiFENesin, 600 mg, Oral, Q12H  ipratropium-albuterol, 3 mL, Nebulization, 4x Daily - RT  levothyroxine, 50 mcg, Oral, Nightly  metoprolol succinate XL, 12.5 mg, Oral, Q24H  midodrine, 10 mg, Oral, TID AC  sodium chloride, 250 mL, Intravenous, Once  sodium chloride, 10 mL, Intravenous, Q12H  tamsulosin, 0.4 mg, Oral, Nightly  [Held by provider] valproate sodium, 125 mg, Intravenous, Nightly      IV Meds:   Pharmacy to dose warfarin,         Physical Exam  Vascular: R arm edema noted. Palpable R radial pulse  R arm fistula with thrill.     Data Reviewed:  CBC    Results from last 7 days   Lab Units 24  2345 24  1812 24  0729 24  1018 24  1928   WBC 10*3/mm3 3.76 4.12 4.53 3.31* 4.98   HEMOGLOBIN g/dL 8.3* 9.3* 9.3* 8.0* 8.7*   PLATELETS 10*3/mm3 56* 56* 53* 45* 44*     BMP   Results from last 7 days   Lab Units 24  2345 24  1505 24  0729 24  1848   SODIUM mmol/L 136 140 140 139   POTASSIUM mmol/L 4.3 4.4 4.4 4.3   CHLORIDE mmol/L 98 101 101 100   CO2 mmol/L 32.0* 28.0 31.0* 32.0*   BUN  "mg/dL 20 18 17 17   CREATININE mg/dL 3.45* 3.00* 2.98* 3.51*   GLUCOSE mg/dL 106* 70 49* 89   MAGNESIUM mg/dL 1.9  --  1.9  --    PHOSPHORUS mg/dL 3.8  --  3.4  --      Cr Clearance Estimated Creatinine Clearance: 14.3 mL/min (A) (by C-G formula based on SCr of 3.45 mg/dL (H)).  Coag   Results from last 7 days   Lab Units 03/29/24  2345 03/29/24  0729 03/28/24  1018 03/27/24  1848   INR  4.92* 4.17* 3.36* 2.94     HbA1C No results found for: \"HGBA1C\"  Blood Glucose   Glucose   Date/Time Value Ref Range Status   03/30/2024 0754 117 (H) 70 - 105 mg/dL Final     Comment:     Serial Number: [id number]:  [id number]   03/29/2024 2328 137 (H) 70 - 105 mg/dL Final     Comment:     Serial Number: [id number]:  [id number]   03/29/2024 2223 62 (L) 70 - 105 mg/dL Final     Comment:     Serial Number: [id number]:  [id number]   03/29/2024 1621 71 70 - 105 mg/dL Final     Comment:     Serial Number: [id number]:  [id number]   03/29/2024 1109 81 70 - 105 mg/dL Final     Comment:     Serial Number: [id number]:  [id number]   03/29/2024 0841 79 70 - 105 mg/dL Final     Comment:     Serial Number: [id number]:  [id number]     Infection     CMP   Results from last 7 days   Lab Units 03/29/24  2345 03/29/24  1505 03/29/24  0729 03/27/24  1848   SODIUM mmol/L 136 140 140 139   POTASSIUM mmol/L 4.3 4.4 4.4 4.3   CHLORIDE mmol/L 98 101 101 100   CO2 mmol/L 32.0* 28.0 31.0* 32.0*   BUN mg/dL 20 18 17 17   CREATININE mg/dL 3.45* 3.00* 2.98* 3.51*   GLUCOSE mg/dL 106* 70 49* 89   ALBUMIN g/dL  --  3.5  3.5  --   --    BILIRUBIN mg/dL  --  0.7  0.7  --   --    ALK PHOS U/L  --  97  97  --   --    AST (SGOT) U/L  --  27  27  --   --    ALT (SGPT) U/L  --  13  13  --   --      ABG      UA      SHERRY  No results found for: \"POCMETH\", \"POCAMPHET\", \"POCBARBITUR\", \"POCBENZO\", \"POCCOCAINE\", \"POCOPIATES\", \"POCOXYCODO\", \"POCPHENCYC\", \"POCPROPOXY\", \"POCTHC\", \"POCTRICYC\"  Lysis Labs   Results from last 7 days   Lab " Units 03/29/24  2345 03/29/24  1812 03/29/24  1505 03/29/24  0729 03/28/24  1018 03/27/24  1928 03/27/24  1848   INR  4.92*  --   --  4.17* 3.36*  --  2.94   HEMOGLOBIN g/dL 8.3* 9.3*  --  9.3* 8.0* 8.7*  --    PLATELETS 10*3/mm3 56* 56*  --  53* 45* 44*  --    CREATININE mg/dL 3.45*  --  3.00* 2.98*  --   --  3.51*     Radiology(recent) XR Chest 1 View    Result Date: 3/29/2024  Impression: 1. Left midlung and right infrahilar airspace disease appears unchanged from 2/28/2024. No new airspace disease is identified. 2. Stable cardiomegaly. Electronically Signed: Mary Page MD  3/29/2024 4:40 PM EDT  Workstation ID: [id number]     Most notable findings include: INR higher at 4.92. Hgb 9.3    Active Hospital Problems:   Active Hospital Problems    Diagnosis  POA    **Loss of consciousness [R40.20]  Yes    Chronic anticoagulation [Z79.01]  Not Applicable    Syncope [R55]  Yes    ESRD (end stage renal disease) [N18.6]  Yes    Syncope and collapse [R55]  Yes    Thrombocytopenia [D69.6]  Yes    Coronary heart disease [I25.10]  Yes    Chronic obstructive pulmonary disease [J44.9]  Yes    Essential hypertension [I10]  Yes    Atrial fibrillation [I48.91]  Yes    Anemia due to chronic kidney disease stage III [N18.9, D63.1]  Yes    Chronic diastolic heart failure [I50.32]  Yes      Resolved Hospital Problems   No resolved problems to display.      Assessment & Plan     Assessment / Plan     89 y.o. male with right upper extremity edema ipsilateral to brachiocephalic AV fistula.  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  Would be a candidate for right arm fistulogram if he survives this acute illness.  Okay to continue using right arm fistula for dialysis.  We will check back in on him on Monday.  Anticipate if he improves then will get right arm fistulogram prior to discharge.    Rob Cohen II, MD  03/30/24  10:52 EDT  Office Number [phone]

## 2024-03-30 NOTE — CASE MANAGEMENT/SOCIAL WORK
Continued Stay Note   Simone     Patient Name: Barry Calhoun Sr.  MRN: [id number]  Today's Date: 3/30/2024    Admit Date: 3/27/2024    Plan: Referral to [facility], pending acceptance. No precert required. PASRR approved. Accepted with Becca FERNANDEZ (order per MD). Current OP HD [facility].   Discharge Plan       Row Name 03/30/24 1756       Plan    Plan Referral to [facility], pending acceptance. No precert required. PASRR approved. Accepted with Becca FERNANDEZ (order per MD). Current OP HD [facility].    Plan Comments Spoke with pt SO via phone. She would like referral to [facility]. She didn't have any other choices at this time. Referral placed in Epic, liaison notified via text.                      Expected Discharge Date and Time       Expected Discharge Date Expected Discharge Time    Apr 1, 2024               Chetna Webb RN

## 2024-03-30 NOTE — PROGRESS NOTES
"NEPHROLOGY PROGRESS NOTE------[facility]    [facility]  [phone]  So Mclean MD      Patient Care Team:  Pedro Purvis MD as PCP - General (Family Medicine)  Pedro Purvis MD as PCP - Family Medicine  Vinay, MD Pino as Consulting Physician (Nephrology)      Provider:  So Mclean MD  Patient Name: Barry Calhoun Sr.  :  1932    SUBJECTIVE:    F/U ESRD    SEEN AND EXAMINED DURING HD TX THIS AM. Some edema and SOB.     Medication:  allopurinol, 100 mg, Oral, Daily  atorvastatin, 20 mg, Oral, Nightly  docusate sodium, 200 mg, Oral, QAM  donepezil, 10 mg, Oral, Nightly  famotidine, 10 mg, Oral, Daily  folic acid, 1 mg, Oral, Daily  guaiFENesin, 600 mg, Oral, Q12H  ipratropium-albuterol, 3 mL, Nebulization, 4x Daily - RT  levothyroxine, 50 mcg, Oral, Nightly  metoprolol succinate XL, 12.5 mg, Oral, Q24H  midodrine, 10 mg, Oral, TID AC  sodium chloride, 250 mL, Intravenous, Once  sodium chloride, 10 mL, Intravenous, Q12H  tamsulosin, 0.4 mg, Oral, Nightly  [Held by provider] valproate sodium, 125 mg, Intravenous, Nightly      Pharmacy to dose warfarin,         OBJECTIVE    Vital Sign Min/Max for last 24 hours  Temp  Min: 95.7 °F (35.4 °C)  Max: 100.2 °F (37.9 °C)   BP  Min: 102/52  Max: 116/67   Pulse  Min: 69  Max: 82   Resp  Min: 13  Max: 26   SpO2  Min: 91 %  Max: 100 %   No data recorded   Weight  Min: 72.5 kg (159 lb 13.3 oz)  Max: 72.5 kg (159 lb 13.3 oz)     Flowsheet Rows      Flowsheet Row First Filed Value   Admission Height 175.3 cm (69\") Documented at 2024   Admission Weight 74.7 kg (164 lb 10.9 oz) Documented at 2024 2792            No intake/output data recorded.  I/O last 3 completed shifts:  In: 120 [P.O.:120]  Out: -     Physical Exam:  General Appearance: alert, appears stated age and cooperative  Head: normocephalic, without obvious abnormality and atraumatic.  " "  Eyes: conjunctivae and sclerae normal and no icterus  Neck: supple and +MILD JVD  Lungs:+FINE BIBASILAR CRACKLES  Heart: IRREG IRREG +CARLTON  Chest Wall: no abnormalities observed  Abdomen: normal bowel sounds and soft, nontender  Extremities: moves extremities well, +RUE EDEMA WHERE NEW AVF IS, no cyanosis. +DJD  Skin: no bleeding, bruising or rash  Neurologic: Alert, and oriented x 2-3    Labs:    WBC WBC   Date Value Ref Range Status   03/29/2024 3.76 3.40 - 10.80 10*3/mm3 Final   03/29/2024 4.12 3.40 - 10.80 10*3/mm3 Final   03/29/2024 4.53 3.40 - 10.80 10*3/mm3 Final   03/28/2024 3.31 (L) 3.40 - 10.80 10*3/mm3 Final   03/27/2024 4.98 3.40 - 10.80 10*3/mm3 Final      HGB Hemoglobin   Date Value Ref Range Status   03/29/2024 8.3 (L) 13.0 - 17.7 g/dL Final   03/29/2024 9.3 (L) 13.0 - 17.7 g/dL Final   03/29/2024 9.3 (L) 13.0 - 17.7 g/dL Final   03/28/2024 8.0 (L) 13.0 - 17.7 g/dL Final   03/27/2024 8.7 (L) 13.0 - 17.7 g/dL Final      HCT Hematocrit   Date Value Ref Range Status   03/29/2024 27.3 (L) 37.5 - 51.0 % Final   03/29/2024 30.2 (L) 37.5 - 51.0 % Final   03/29/2024 30.5 (L) 37.5 - 51.0 % Final   03/28/2024 25.7 (L) 37.5 - 51.0 % Final   03/27/2024 28.4 (L) 37.5 - 51.0 % Final      Platelets No results found for: \"LABPLAT\"   MCV MCV   Date Value Ref Range Status   03/29/2024 104.6 (H) 79.0 - 97.0 fL Final   03/29/2024 105.6 (H) 79.0 - 97.0 fL Final   03/29/2024 106.3 (H) 79.0 - 97.0 fL Final   03/28/2024 104.0 (H) 79.0 - 97.0 fL Final   03/27/2024 105.6 (H) 79.0 - 97.0 fL Final          Sodium Sodium   Date Value Ref Range Status   03/29/2024 136 136 - 145 mmol/L Final   03/29/2024 140 136 - 145 mmol/L Final   03/29/2024 140 136 - 145 mmol/L Final   03/27/2024 139 136 - 145 mmol/L Final      Potassium Potassium   Date Value Ref Range Status   03/29/2024 4.3 3.5 - 5.2 mmol/L Final   03/29/2024 4.4 3.5 - 5.2 mmol/L Final   03/29/2024 4.4 3.5 - 5.2 mmol/L Final   03/27/2024 4.3 3.5 - 5.2 mmol/L Final    " "  Chloride Chloride   Date Value Ref Range Status   03/29/2024 98 98 - 107 mmol/L Final   03/29/2024 101 98 - 107 mmol/L Final   03/29/2024 101 98 - 107 mmol/L Final   03/27/2024 100 98 - 107 mmol/L Final      CO2 CO2   Date Value Ref Range Status   03/29/2024 32.0 (H) 22.0 - 29.0 mmol/L Final   03/29/2024 28.0 22.0 - 29.0 mmol/L Final   03/29/2024 31.0 (H) 22.0 - 29.0 mmol/L Final   03/27/2024 32.0 (H) 22.0 - 29.0 mmol/L Final      BUN BUN   Date Value Ref Range Status   03/29/2024 20 8 - 23 mg/dL Final   03/29/2024 18 8 - 23 mg/dL Final   03/29/2024 17 8 - 23 mg/dL Final   03/27/2024 17 8 - 23 mg/dL Final      Creatinine Creatinine   Date Value Ref Range Status   03/29/2024 3.45 (H) 0.76 - 1.27 mg/dL Final   03/29/2024 3.00 (H) 0.76 - 1.27 mg/dL Final   03/29/2024 2.98 (H) 0.76 - 1.27 mg/dL Final   03/27/2024 3.51 (H) 0.76 - 1.27 mg/dL Final      Calcium Calcium   Date Value Ref Range Status   03/29/2024 8.6 8.2 - 9.6 mg/dL Final   03/29/2024 9.0 8.2 - 9.6 mg/dL Final   03/29/2024 8.8 8.2 - 9.6 mg/dL Final   03/27/2024 8.5 8.2 - 9.6 mg/dL Final      PO4 No components found for: \"PO4\"   Albumin Albumin   Date Value Ref Range Status   03/29/2024 3.5 3.5 - 5.2 g/dL Final   03/29/2024 3.5 3.5 - 5.2 g/dL Final      Magnesium Magnesium   Date Value Ref Range Status   03/29/2024 1.9 1.7 - 2.3 mg/dL Final   03/29/2024 1.9 1.7 - 2.3 mg/dL Final      Uric Acid No components found for: \"URIC ACID\"     Imaging Results (Last 72 Hours)       Procedure Component Value Units Date/Time    XR Chest 1 View [[id number]] Collected: 03/29/24 1639     Updated: 03/29/24 1642    Narrative:      XR CHEST 1 VW    Date of Exam: 3/29/2024 4:37 PM EDT    Indication: wheezing    Comparison: AP portable chest 2/17/2014.    Findings:  Stable right hemidiaphragm elevation. Left midlung and right infrahilar airspace disease is redemonstrated and is not thought to be significantly changed. There is stable moderate generalized cardiac enlargement with " median sternotomy and CABG. Loop   recorder projects over the left lower mediastinum. Right chest wall jeremías catheter tip terminates at the lower SVC level. Right subclavian stent is noted. No acute osseous abnormality is identified.      Impression:      Impression:    1. Left midlung and right infrahilar airspace disease appears unchanged from 2/28/2024. No new airspace disease is identified.  2. Stable cardiomegaly.      Electronically Signed: Mary Page MD    3/29/2024 4:40 PM EDT    Workstation ID: [id number]    CT Head Without Contrast [[id number]] Collected: 03/27/24 1914     Updated: 03/27/24 1919    Narrative:      CT HEAD WO CONTRAST    Date of Exam: 3/27/2024 6:56 PM EDT    Indication: fall.    Comparison: 2/16/2024    Technique: Axial CT images were obtained of the head without contrast administration.  Coronal reconstructions were performed.  Automated exposure control and iterative reconstruction methods were used.      Findings:  No large territory infarct.    There is no evidence of hemorrhage.  No mass effect, edema or midline shift    Mild periventricular and subcortical white matter hypodensities, nonspecific but most likely represents chronic small vessel ischemic changes.    No extra-axial fluid collection.      Prominent ventricular system secondary to chronic parenchymal volume loss.      Status post bilateral lens extraction. Otherwise the orbits are unremarkable.   Small bilateral mastoid effusions, right greater than left. The paranasal sinuses are clear.    The visualized soft tissues are unremarkable.  No acute osseous abnormality.      Impression:      Impression:  No acute intracranial abnormality.      Electronically Signed: Manolo Roa DO    3/27/2024 7:17 PM EDT    Workstation ID: [id number]            Results for orders placed during the hospital encounter of 03/27/24    XR Chest 1 View    Narrative  XR CHEST 1 VW    Date of Exam: 3/29/2024 4:37 PM EDT    Indication:  wheezing    Comparison: AP portable chest 2/17/2014.    Findings:  Stable right hemidiaphragm elevation. Left midlung and right infrahilar airspace disease is redemonstrated and is not thought to be significantly changed. There is stable moderate generalized cardiac enlargement with median sternotomy and CABG. Loop  recorder projects over the left lower mediastinum. Right chest wall jeremías catheter tip terminates at the lower SVC level. Right subclavian stent is noted. No acute osseous abnormality is identified.    Impression  Impression:    1. Left midlung and right infrahilar airspace disease appears unchanged from 2/28/2024. No new airspace disease is identified.  2. Stable cardiomegaly.      Electronically Signed: Mary Page MD  3/29/2024 4:40 PM EDT  Workstation ID: [id number]      Results for orders placed during the hospital encounter of 02/16/24    XR Chest 1 View    Narrative  XR CHEST 1 VW    Date of Exam: 2/17/2024 9:55 AM EST    Indication: hx of CHF, increased BNP    Comparison: 2/11/2024    Findings:  Patient is status post median sternotomy and CABG. Cardiac silhouette is enlarged. Vague perihilar opacities may represent edema or infiltrates. No significant pleural effusion or pneumothorax is seen. No acute osseous lesion is seen. Right chest wall  Port-A-Cath terminates overlying the superior cavoatrial junction. Loop recorder projects over the left thorax. Metallic stent projects over the left upper thorax.    Impression  Impression:  Cardiomegaly with vague bilateral perihilar opacities which may represent edema or infiltrates.    Electronically Signed: Jose Be MD  2/17/2024 10:11 AM EST  Workstation ID: [id number]      Results for orders placed during the hospital encounter of 02/09/22    XR Chest PA & Lateral    Narrative  DATE OF EXAM:  2/9/2022 11:04 AM    PROCEDURE:  XR CHEST PA AND LATERAL-    INDICATIONS:  preop    COMPARISON:  Chest x-ray 3/17/2016, chest CT  9/9/2021    TECHNIQUE:  Two radiologic views of the chest , PA and lateral were obtained.    FINDINGS:  There is a left IJ dialysis catheter and right IJ port. A subcutaneous  cardiac event monitor is present. There is been coronary bypass. There  is cardiomegaly. The pulmonary vasculature is within normal limits.  There are coarse linear markings in the mid and lower lung zones, with  round and ovoid lucencies suspected to be bronchiectasis. There is  eventration of anterior right hemidiaphragm. Costophrenic angles are  sharp    Impression  Findings compatible with fibrotic changes and/or atelectasis in the mid  and lower lung zones bilaterally with probable bronchiectasis. This is a  new finding since the most recent chest x-ray from 2016 is felt to have  progressed since the 9/9/2021 chest CT    Electronically Signed By-Wesley Mcallister On:2/9/2022 11:15 AM  This report was finalized on [id number] by  Wesley Mcallister, .      Results for orders placed during the hospital encounter of 10/17/22    Duplex hemodialysis access CAR    Interpretation Summary    Patent right brachiocephalic fistula with overall adequate flow volumes.  Diameter and depth both appear appropriate for dialysis.  Stenosis seen at the arterial anastomosis.        ASSESSMENT / PLAN      Loss of consciousness    Anemia due to chronic kidney disease stage III    Atrial fibrillation    Essential hypertension    Chronic diastolic heart failure    Chronic obstructive pulmonary disease    Coronary heart disease    ESRD (end stage renal disease)    Syncope and collapse    Thrombocytopenia    Syncope    Chronic anticoagulation    ESRD--------HD today per usual [facility] outpatient schedule. Vascular surgery following for RUE edema where new AVF is     2. HYPOTENSION------Better. Continue Midodrine     3. S/P SYNCOPE/LOC-------? Secondary to hypotension     4. ATRIAL FIBRILLATION-------Rate controlled and anticoagulated     5. ANEMIA OF  ESRD------EPO/Venofer with HD as needed     6. OA/DJD     7. DECONDITIONING--------PT/OT/Rehab as needed     8. THROMBOCYTOPENIA--------No heparin     9. MILD METABOLIC ALKALOSIS------Better     10. DIASTOLIC CHF/VOLUME OVERLOAD-------3 kg UF with HD as needed     11. COPD------ Oxygen, nebs, pulmonary toilet        So Mclean MD  [facility]  [phone]  03/30/24  08:58 EDT

## 2024-03-30 NOTE — PLAN OF CARE
Goal Outcome Evaluation:      Patient has been in 4 pont restraints throughout the shift. Patient did get agitated when trying to give medications and refused to take any thing by mouth. Patient tried to chew on the straw. Patient BS was below 70 and IV medication was ordered and given due to patient refusing to take anything. Patient was given medication for restless and has been sleeping most of the shift after that.

## 2024-03-30 NOTE — NURSING NOTE
Pt blood sugar too low to read, this nurse treated with iv dextrose as pt is not wanting to eat, drink or take any meds. Blood sugar improved. Pt temp 100.2 axillary and b/p is 71/42, pt again not wanting to take anything po for this nurse to be able to give Midodrine. During assessment pt lungs sound very congested and pt is unable to cough up anything, HOB elevated. MD notified.

## 2024-03-30 NOTE — PROGRESS NOTES
"Pharmacy dosing service  Anticoagulant  Warfarin     Subjective:    Barry Calhoun Sr. is a 89 y.o.male being continued on warfarin for Atrial Fibrillation.    INR Goal: 2 - 3  Home medication?: Yes, 4mg daily except 3mg on MWF  Bridge Therapy Present?:  No  Interacting Medications Evaluation (New/Present/Discontinued): N/A  Additional Contributing Factors: thrombocytopenia      Assessment/Plan:    Will hold again today due to supratherapeutic INR. Will plan to resume warfarin when INR is no longer supratherapeutic.     Continue to monitor and adjust based on INR.         Date 3/28 3/29 3/30         INR 3.36 4.17 4.92         Dose hold hold hold             Objective:  [Ht: 175.3 cm (69\"); Wt: 72.5 kg (159 lb 13.3 oz); BMI: Body mass index is 23.6 kg/m².]    Lab Results   Component Value Date    ALBUMIN 3.5 03/29/2024    ALBUMIN 3.5 03/29/2024     Lab Results   Component Value Date    INR 4.92 (H) 03/29/2024    INR 4.17 (H) 03/29/2024    INR 3.36 (H) 03/28/2024    PROTIME 47.7 (H) 03/29/2024    PROTIME 40.9 (H) 03/29/2024    PROTIME 33.5 (H) 03/28/2024     Lab Results   Component Value Date    HGB 8.3 (L) 03/29/2024    HGB 9.3 (L) 03/29/2024    HGB 9.3 (L) 03/29/2024     Lab Results   Component Value Date    HCT 27.3 (L) 03/29/2024    HCT 30.2 (L) 03/29/2024    HCT 30.5 (L) 03/29/2024       Victoriano Burrell [facility]  03/30/24 13:12 EDT         "

## 2024-03-30 NOTE — PROGRESS NOTES
[facility] MEDICINE SERVICE  DAILY PROGRESS NOTE    NAME: Barry Calhoun Sr.  : 1932  MRN: [id number]      LOS: 2 days     PROVIDER OF SERVICE: Suhail Zacarias MD    Chief Complaint: Loss of consciousness    Subjective:     Interval History:  History taken from: patient        Subjective       Chief Complaint: Fall      History of Present Illness: Barry Calhoun Sr. is a 89 y.o. male with a PMH of Asthma, non-Hodgkin's lymphoma, COPD, CAD, S/P CABG, CVA, and CKD  who presented to [facility] on 3/27/2024 from his extended care facility where they report he fell and loss consciousness. Mr. Calhoun is unable to provide any meaningful medical history due to underling dementia. Unfortunately his family has left for the evening and unavailable at bedside to provide any history. All information has been obtain from the ER provider and review of Mr. Calhoun's medical records.       Upon exam he is pleasantly confused, stating he is being seen due to a pot hole not being repaired.He does follow commands,  His lungs are diminished in bilateral bases, His heart rate and rhythm is regular, abdomen is soft, nondistended, nontender with audible bowel sounds throughout..     Initial evaluation in the emergency room is hemodynamically stable with blood pressure soft at 93/54, heart rate 63, respiratory rate 18, oxygen saturation 94% on room air, he is afebrile with a Tmax of 96.3  Creatinine is 3.51, BUN 17 this is post HD today.  He is anemic with hemoglobin of 8.7, hematocrit 24 which is improved from previous 8.4 and 26.5 on ,  Thrombocytopenia his platelets are 44 today which is decreased from his baseline that is near 100.  CT scan of his head is unremarkable for any acute intracranial processes  EKG interpreted as atrial fibrillation with a rate of 72     Mr. Calhoun will be admitted for further evaluation and treatment of fall/ syncope and other acute and or chronic conditions as needed,       3/29/24 patient seen in bed no acute distress, still kind of agitated.  Vital signs stable, discussed with RN, started Ativan as needed. Patient Denies: Headache, dizziness, chest pain, chills  3/30/24 seen in bed NAD, agitation had to be restrained, DW RN receiving HD    Review of Systems: Unable to perform ROS: Dementia   Review of Systems   Constitutional:  Negative for chills and fever.   Respiratory:  Negative for chest tightness and shortness of breath.    Neurological:  Negative for dizziness and headaches.   Psychiatric/Behavioral:  Positive for confusion.        Objective:     Vital Signs  Temp:  [95.7 °F (35.4 °C)-100.2 °F (37.9 °C)] 100.2 °F (37.9 °C)  Heart Rate:  [71-89] 81  Resp:  [14-26] 23  BP: ()/(42-67) 94/61  Flow (L/min):  [2-3] 2   Body mass index is 23.6 kg/m².      Physical Exam  Constitutional:       Appearance: Normal appearance.   HENT:      Head: Normocephalic and atraumatic.      Nose: Nose normal.      Mouth/Throat:      Mouth: Mucous membranes are moist.   Eyes:      Extraocular Movements: Extraocular movements intact.      Conjunctiva/sclera: Conjunctivae normal.      Pupils: Pupils are equal, round, and reactive to light.   Cardiovascular:      Rate and Rhythm: Normal rate and regular rhythm.      Pulses: Normal pulses.      Heart sounds: Normal heart sounds.   Pulmonary:      Effort: Pulmonary effort is normal.      Breath sounds: Normal breath sounds.   Abdominal:      General: Abdomen is flat. Bowel sounds are normal.      Palpations: Abdomen is soft.   Musculoskeletal:         General: Normal range of motion.      Cervical back: Normal range of motion.   Skin:     General: Skin is warm and dry.   Neurological:      General: No focal deficit present.      Mental Status: He is alert. He is disoriented.   Psychiatric:         Mood and Affect: Mood normal.         Behavior: Behavior normal.         Thought Content: Thought content normal.         Judgment: Judgment normal.          Scheduled Meds   allopurinol, 100 mg, Oral, Daily  atorvastatin, 20 mg, Oral, Nightly  docusate sodium, 200 mg, Oral, QAM  donepezil, 10 mg, Oral, Nightly  epoetin choco/choco-epbx, 10,000 Units, Intravenous, Once  famotidine, 10 mg, Oral, Daily  ferric gluconate, 125 mg, Intravenous, Once in Dialysis  folic acid, 1 mg, Oral, Daily  guaiFENesin, 600 mg, Oral, Q12H  ipratropium-albuterol, 3 mL, Nebulization, 4x Daily - RT  levothyroxine, 50 mcg, Oral, Nightly  metoprolol succinate XL, 12.5 mg, Oral, Q24H  midodrine, 10 mg, Oral, TID AC  sodium chloride, 250 mL, Intravenous, Once  sodium chloride, 10 mL, Intravenous, Q12H  tamsulosin, 0.4 mg, Oral, Nightly  [Held by provider] valproate sodium, 125 mg, Intravenous, Nightly       PRN Meds     acetaminophen **OR** acetaminophen    albumin human    senna-docusate sodium **AND** polyethylene glycol **AND** bisacodyl **AND** bisacodyl    dextrose    dextrose    glucagon (human recombinant)    haloperidol lactate    nitroglycerin    Pharmacy to dose warfarin    sodium chloride    sodium chloride   Infusions  Pharmacy to dose warfarin,           Diagnostic Data    Results from last 7 days   Lab Units 03/29/24  2345 03/29/24  1812 03/29/24  1505   WBC 10*3/mm3 3.76   < >  --    HEMOGLOBIN g/dL 8.3*   < >  --    HEMATOCRIT % 27.3*   < >  --    PLATELETS 10*3/mm3 56*   < >  --    GLUCOSE mg/dL 106*  --  70   CREATININE mg/dL 3.45*  --  3.00*   BUN mg/dL 20  --  18   SODIUM mmol/L 136  --  140   POTASSIUM mmol/L 4.3  --  4.4   AST (SGOT) U/L  --   --  27  27   ALT (SGPT) U/L  --   --  13  13   ALK PHOS U/L  --   --  97  97   BILIRUBIN mg/dL  --   --  0.7  0.7   ANION GAP mmol/L 6.0  --  11.0    < > = values in this interval not displayed.       XR Chest 1 View    Result Date: 3/29/2024  Impression: 1. Left midlung and right infrahilar airspace disease appears unchanged from 2/28/2024. No new airspace disease is identified. 2. Stable cardiomegaly. Electronically Signed:  Mary Page MD  3/29/2024 4:40 PM EDT  Workstation ID: [id number]       I reviewed the patient's new clinical results.    Assessment/Plan:     Active and Resolved Problems  Active Hospital Problems    Diagnosis  POA    **Loss of consciousness [R40.20]  Yes    Chronic anticoagulation [Z79.01]  Not Applicable    Syncope [R55]  Yes    ESRD (end stage renal disease) [N18.6]  Yes    Syncope and collapse [R55]  Yes    Thrombocytopenia [D69.6]  Yes    Coronary heart disease [I25.10]  Yes    Chronic obstructive pulmonary disease [J44.9]  Yes    Essential hypertension [I10]  Yes    Atrial fibrillation [I48.91]  Yes    Anemia due to chronic kidney disease stage III [N18.9, D63.1]  Yes    Chronic diastolic heart failure [I50.32]  Yes      Resolved Hospital Problems   No resolved problems to display.       Loss of consciousness  Syncope and collapse   -Patient is amnesic to the event  -Witnessed at SNF while getting up out of his chair  -CT scan of head is unremarkable   -MRI in AM   -Echocardiogram at [facility] on 02/13/2024- LVEF 50-55%    Thrombocytopenia  -Plt 44  -ON home plavix will for history of cva and MI, continue to hold   -Monitor fo s/s of bleeding        ESRD (end stage renal disease)  -Anemia due to chronic kidney disease stage III  -HD M-W-F  -HD treatment today and yesterday   -Monitor I's and O's  -Monitor daily weights  -Avoid nephrotoxic medications, hypovolemia, hypotension, hypertension  -Renally dose medications when possible     Chronic Atrial fibrillation  -Metoprolol held for hypotension   -Continue warfarin  -Monitor PTINR        Chronic diastolic heart failure  Chronic last ECHO LVEF 50-55% 02/16   -Daily Weights   -Monitor I&O's   -Continue home Bumex pending home medication list verification  -He does not appear to be in volume overload      Coronary heart disease  -Chronic   -History of CABG  -Free of chest pain at time of admission  -Will need to hold home Plavix with thrombocytopenia        Chronic obstructive pulmonary disease  -Not in acute exacerbation  -Not oxygen dependent at baseline  -Baseline PFTs unknown  -As needed DuoNebs for shortness of breath/wheezing  -On room air today        DVT prophylaxis:  Medical and mechanical DVT prophylaxis orders are present.         Code status is   Code Status and Medical Interventions:   Ordered at: 03/27/24 2114     Medical Intervention Limits:    NO intubation (DNI)     Code Status (Patient has no pulse and is not breathing):    No CPR (Do Not Attempt to Resuscitate)     Medical Interventions (Patient has pulse or is breathing):    Limited Support       Plan for disposition:Return to facility in 1 day if cleared by nephrology     Time: 30 minutes    Signature: Electronically signed by Suhail Zacarias MD, 03/30/24, 12:41 EDT.  [facility] Hospitalist Team

## 2024-03-30 NOTE — PROGRESS NOTES
"    Chief complaint Pt unable to voice complaint due to confusion and fatigue     Subjective .     History of present illness:  The patient is a 89 y.o. male who was admitted secondary to fall.   PMH of Asthma, non-Hodgkin's lymphoma, COPD, CAD, S/P CABG, CVA, and CKD   Psychiatry consulted for agitation  Per nursing patient has history of dementia as well     Patient initially confused and disoriented  Responding to name  Restless agitated  Nursing at bedside reports patient has been aggressive and consistently agitated  Geodon ineffective  Versed ineffective  Unable to obtain psychiatric history  No family at bedside       Today   Pt is somnolent   Responds to name, opens eyes briefly   Getting set up for dialysis   No events overnight reported by RN   No family at bedside       The following portions of the patient's history were reviewed and updated as appropriate: allergies, current medications, past family history, past medical history, past social history, past surgical history and problem list.    History    Objective     Vital Signs   /56   Pulse 81   Temp 100.2 °F (37.9 °C)   Resp 20   Ht 175.3 cm (69\")   Wt 72.5 kg (159 lb 13.3 oz)   SpO2 94%   BMI 23.60 kg/m²         MENTAL STATUS EXAM   General Appearance:  Cleanly groomed and dressed  Eye Contact:  Closed  Motor Activity:  Other  Other Comment:  No psychomotor agitation observed  Speech:  Soft spoken  Mood and affect:  Other  Other Comment:  Juana  Thought Process:  Other  Other Comment:  Juana  Sensorium:  Lethargic  Orientation:  Other  Other Comment:  Juana , pt repsonds to name         Assessment & Plan       Loss of consciousness    Anemia due to chronic kidney disease stage III    Atrial fibrillation    Essential hypertension    Chronic diastolic heart failure    Chronic obstructive pulmonary disease    Coronary heart disease    ESRD (end stage renal disease)    Syncope and collapse    Thrombocytopenia    Syncope    Chronic " anticoagulation       Assessment:  Dementia with behavioral disturbances   1. Syncope, unspecified syncope type    2. Thrombocytopenia    3. End-stage renal disease on hemodialysis      Treatment Plan: Patient presents agitated confused aggressive with staff.  Hx of dementia with behavioral disturbance   Today pt is somnolent , receiving dialysis   No acute symptoms at this time   Qtc 465 on 3/30/2024  Will follow prn      Patient is at high risk for delirium  Minimize care and excess noise at night  Encourage normal sleep patterns, sleeping at night and up in chair during the day  Open blinds on windows if possible during the day  Encourage PO intake especially fluids if not contraindicated   Encourage family participation in care   Reorient pt often   Monitor labs  Encourage PT/OT as appropriate   Visual and hearing aids for patients with these impairments        Treatment Plan discussed with: Patient  I discussed the patients findings and my recommendations with nursing staff    I have reviewed and approved the behavioral health treatment plans and problem list. Yes  Thank you for the consult   Referring MD has access to consult report and progress notes in EMR     Sydni Riojas DNP, APRN  03/30/24  12:28 EDT

## 2024-03-31 ENCOUNTER — APPOINTMENT (OUTPATIENT)
Dept: CARDIOLOGY | Facility: HOSPITAL | Age: 89
End: 2024-03-31
Payer: MEDICARE

## 2024-03-31 LAB
ANION GAP SERPL CALCULATED.3IONS-SCNC: 5 MMOL/L (ref 5–15)
BACTERIA BLD CULT: NORMAL
BH CV ECHO MEAS - ACS: 2.5 CM
BH CV ECHO MEAS - AO MAX PG: 7.3 MMHG
BH CV ECHO MEAS - AO MEAN PG: 4 MMHG
BH CV ECHO MEAS - AO ROOT DIAM: 3.3 CM
BH CV ECHO MEAS - AO V2 MAX: 135 CM/SEC
BH CV ECHO MEAS - AO V2 VTI: 30.1 CM
BH CV ECHO MEAS - AVA(I,D): 1.75 CM2
BH CV ECHO MEAS - EDV(CUBED): 74.1 ML
BH CV ECHO MEAS - EDV(MOD-SP4): 102 ML
BH CV ECHO MEAS - EF(MOD-BP): 68 %
BH CV ECHO MEAS - EF(MOD-SP4): 67.5 %
BH CV ECHO MEAS - ESV(MOD-SP4): 33.1 ML
BH CV ECHO MEAS - FS: 33.3 %
BH CV ECHO MEAS - IVS/LVPW: 1 CM
BH CV ECHO MEAS - IVSD: 1.3 CM
BH CV ECHO MEAS - LA DIMENSION: 5.2 CM
BH CV ECHO MEAS - LAT PEAK E' VEL: 4.8 CM/SEC
BH CV ECHO MEAS - LV DIASTOLIC VOL/BSA (35-75): 55.5 CM2
BH CV ECHO MEAS - LV MASS(C)D: 200.6 GRAMS
BH CV ECHO MEAS - LV MAX PG: 4.1 MMHG
BH CV ECHO MEAS - LV MEAN PG: 2 MMHG
BH CV ECHO MEAS - LV SYSTOLIC VOL/BSA (12-30): 18 CM2
BH CV ECHO MEAS - LV V1 MAX: 101 CM/SEC
BH CV ECHO MEAS - LV V1 VTI: 18.6 CM
BH CV ECHO MEAS - LVIDD: 4.2 CM
BH CV ECHO MEAS - LVIDS: 2.8 CM
BH CV ECHO MEAS - LVOT AREA: 2.8 CM2
BH CV ECHO MEAS - LVOT DIAM: 1.9 CM
BH CV ECHO MEAS - LVPWD: 1.3 CM
BH CV ECHO MEAS - MED PEAK E' VEL: 2.7 CM/SEC
BH CV ECHO MEAS - MR MAX PG: 89.1 MMHG
BH CV ECHO MEAS - MR MAX VEL: 472 CM/SEC
BH CV ECHO MEAS - MR MEAN PG: 63 MMHG
BH CV ECHO MEAS - MR MEAN VEL: 384 CM/SEC
BH CV ECHO MEAS - MR VTI: 154 CM
BH CV ECHO MEAS - MV A DUR: 0.12 SEC
BH CV ECHO MEAS - MV A MAX VEL: 40.9 CM/SEC
BH CV ECHO MEAS - MV DEC SLOPE: 1017 CM/SEC2
BH CV ECHO MEAS - MV DEC TIME: 0.14 SEC
BH CV ECHO MEAS - MV E MAX VEL: 91.2 CM/SEC
BH CV ECHO MEAS - MV E/A: 2.23
BH CV ECHO MEAS - MV MAX PG: 4 MMHG
BH CV ECHO MEAS - MV MEAN PG: 1 MMHG
BH CV ECHO MEAS - MV P1/2T: 31.4 MSEC
BH CV ECHO MEAS - MV V2 VTI: 17.5 CM
BH CV ECHO MEAS - MVA(P1/2T): 7 CM2
BH CV ECHO MEAS - MVA(VTI): 3 CM2
BH CV ECHO MEAS - PA V2 MAX: 102 CM/SEC
BH CV ECHO MEAS - PULM A REVS DUR: 0.1 SEC
BH CV ECHO MEAS - PULM A REVS VEL: 30.4 CM/SEC
BH CV ECHO MEAS - PULM DIAS VEL: 23.9 CM/SEC
BH CV ECHO MEAS - PULM S/D: 0.95
BH CV ECHO MEAS - PULM SYS VEL: 22.7 CM/SEC
BH CV ECHO MEAS - RAP SYSTOLE: 15 MMHG
BH CV ECHO MEAS - RV MAX PG: 2.25 MMHG
BH CV ECHO MEAS - RV V1 MAX: 75 CM/SEC
BH CV ECHO MEAS - RV V1 VTI: 11.9 CM
BH CV ECHO MEAS - RVSP: 71.9 MMHG
BH CV ECHO MEAS - SI(MOD-SP4): 37.5 ML/M2
BH CV ECHO MEAS - SV(LVOT): 52.7 ML
BH CV ECHO MEAS - SV(MOD-SP4): 68.9 ML
BH CV ECHO MEAS - TR MAX PG: 56.9 MMHG
BH CV ECHO MEAS - TR MAX VEL: 377 CM/SEC
BH CV ECHO MEASUREMENTS AVERAGE E/E' RATIO: 24.32
BH CV XLRA - RV BASE: 3.8 CM
BH CV XLRA - RV LENGTH: 7.4 CM
BH CV XLRA - RV MID: 2.5 CM
BH CV XLRA - TDI S': 3.4 CM/SEC
BOTTLE TYPE: NORMAL
BUN SERPL-MCNC: 13 MG/DL (ref 8–23)
BUN/CREAT SERPL: 4.5 (ref 7–25)
CALCIUM SPEC-SCNC: 9.4 MG/DL (ref 8.2–9.6)
CHLORIDE SERPL-SCNC: 101 MMOL/L (ref 98–107)
CO2 SERPL-SCNC: 34 MMOL/L (ref 22–29)
CREAT SERPL-MCNC: 2.92 MG/DL (ref 0.76–1.27)
DEPRECATED RDW RBC AUTO: 59.4 FL (ref 37–54)
EGFRCR SERPLBLD CKD-EPI 2021: 19.6 ML/MIN/1.73
ERYTHROCYTE [DISTWIDTH] IN BLOOD BY AUTOMATED COUNT: 15.6 % (ref 12.3–15.4)
GLUCOSE BLDC GLUCOMTR-MCNC: 119 MG/DL (ref 70–105)
GLUCOSE BLDC GLUCOMTR-MCNC: 124 MG/DL (ref 70–105)
GLUCOSE BLDC GLUCOMTR-MCNC: 144 MG/DL (ref 70–105)
GLUCOSE BLDC GLUCOMTR-MCNC: 150 MG/DL (ref 70–105)
GLUCOSE BLDC GLUCOMTR-MCNC: 79 MG/DL (ref 70–105)
GLUCOSE SERPL-MCNC: 86 MG/DL (ref 65–99)
HCT VFR BLD AUTO: 27.4 % (ref 37.5–51)
HGB BLD-MCNC: 8.5 G/DL (ref 13–17.7)
INR PPP: 5.18 (ref 2–3)
LEFT ATRIUM VOLUME INDEX: 81.5 ML/M2
MCH RBC QN AUTO: 32.4 PG (ref 26.6–33)
MCHC RBC AUTO-ENTMCNC: 31 G/DL (ref 31.5–35.7)
MCV RBC AUTO: 104.6 FL (ref 79–97)
PLATELET # BLD AUTO: 64 10*3/MM3 (ref 140–450)
PMV BLD AUTO: 11.2 FL (ref 6–12)
POTASSIUM SERPL-SCNC: 5 MMOL/L (ref 3.5–5.2)
PROTHROMBIN TIME: 50 SECONDS (ref 19.4–28.5)
RBC # BLD AUTO: 2.62 10*6/MM3 (ref 4.14–5.8)
SINUS: 3.1 CM
SODIUM SERPL-SCNC: 140 MMOL/L (ref 136–145)
STJ: 3.4 CM
WBC NRBC COR # BLD AUTO: 6.45 10*3/MM3 (ref 3.4–10.8)

## 2024-03-31 PROCEDURE — 80048 BASIC METABOLIC PNL TOTAL CA: CPT | Performed by: INTERNAL MEDICINE

## 2024-03-31 PROCEDURE — 25010000002 VANCOMYCIN HCL 1.25 G RECONSTITUTED SOLUTION 1 EACH VIAL: Performed by: INTERNAL MEDICINE

## 2024-03-31 PROCEDURE — 82948 REAGENT STRIP/BLOOD GLUCOSE: CPT

## 2024-03-31 PROCEDURE — 82948 REAGENT STRIP/BLOOD GLUCOSE: CPT | Performed by: INTERNAL MEDICINE

## 2024-03-31 PROCEDURE — 93306 TTE W/DOPPLER COMPLETE: CPT

## 2024-03-31 PROCEDURE — 94761 N-INVAS EAR/PLS OXIMETRY MLT: CPT

## 2024-03-31 PROCEDURE — 94799 UNLISTED PULMONARY SVC/PX: CPT

## 2024-03-31 PROCEDURE — 85610 PROTHROMBIN TIME: CPT | Performed by: INTERNAL MEDICINE

## 2024-03-31 PROCEDURE — 85027 COMPLETE CBC AUTOMATED: CPT | Performed by: INTERNAL MEDICINE

## 2024-03-31 PROCEDURE — 25810000003 SODIUM CHLORIDE 0.9 % SOLUTION 250 ML FLEX CONT: Performed by: INTERNAL MEDICINE

## 2024-03-31 PROCEDURE — 94664 DEMO&/EVAL PT USE INHALER: CPT

## 2024-03-31 PROCEDURE — 93306 TTE W/DOPPLER COMPLETE: CPT | Performed by: INTERNAL MEDICINE

## 2024-03-31 PROCEDURE — 87040 BLOOD CULTURE FOR BACTERIA: CPT | Performed by: INTERNAL MEDICINE

## 2024-03-31 RX ORDER — GUAIFENESIN 200 MG/10ML
200 LIQUID ORAL EVERY 4 HOURS PRN
Status: DISCONTINUED | OUTPATIENT
Start: 2024-03-31 | End: 2024-04-02 | Stop reason: HOSPADM

## 2024-03-31 RX ADMIN — GUAIFENESIN 600 MG: 600 TABLET, EXTENDED RELEASE ORAL at 09:21

## 2024-03-31 RX ADMIN — DOCUSATE SODIUM 200 MG: 100 CAPSULE, LIQUID FILLED ORAL at 09:21

## 2024-03-31 RX ADMIN — Medication 10 ML: at 09:22

## 2024-03-31 RX ADMIN — DONEPEZIL HYDROCHLORIDE 10 MG: 5 TABLET, FILM COATED ORAL at 21:19

## 2024-03-31 RX ADMIN — LEVOTHYROXINE SODIUM 50 MCG: 0.05 TABLET ORAL at 21:19

## 2024-03-31 RX ADMIN — IPRATROPIUM BROMIDE AND ALBUTEROL SULFATE 3 ML: .5; 3 SOLUTION RESPIRATORY (INHALATION) at 19:43

## 2024-03-31 RX ADMIN — IPRATROPIUM BROMIDE AND ALBUTEROL SULFATE 3 ML: .5; 3 SOLUTION RESPIRATORY (INHALATION) at 15:24

## 2024-03-31 RX ADMIN — MIDODRINE HYDROCHLORIDE 10 MG: 5 TABLET ORAL at 09:21

## 2024-03-31 RX ADMIN — IPRATROPIUM BROMIDE AND ALBUTEROL SULFATE 3 ML: .5; 3 SOLUTION RESPIRATORY (INHALATION) at 07:04

## 2024-03-31 RX ADMIN — IPRATROPIUM BROMIDE AND ALBUTEROL SULFATE 3 ML: .5; 3 SOLUTION RESPIRATORY (INHALATION) at 11:03

## 2024-03-31 RX ADMIN — Medication 10 ML: at 20:28

## 2024-03-31 RX ADMIN — MIDODRINE HYDROCHLORIDE 10 MG: 5 TABLET ORAL at 13:03

## 2024-03-31 RX ADMIN — MIDODRINE HYDROCHLORIDE 10 MG: 5 TABLET ORAL at 17:47

## 2024-03-31 RX ADMIN — TAMSULOSIN HYDROCHLORIDE 0.4 MG: 0.4 CAPSULE ORAL at 21:28

## 2024-03-31 RX ADMIN — ALLOPURINOL 100 MG: 100 TABLET ORAL at 09:22

## 2024-03-31 RX ADMIN — VANCOMYCIN HYDROCHLORIDE 1250 MG: 1.25 INJECTION, POWDER, LYOPHILIZED, FOR SOLUTION INTRAVENOUS at 20:28

## 2024-03-31 RX ADMIN — FAMOTIDINE 10 MG: 20 TABLET, FILM COATED ORAL at 09:21

## 2024-03-31 RX ADMIN — ATORVASTATIN CALCIUM 20 MG: 20 TABLET, FILM COATED ORAL at 21:19

## 2024-03-31 RX ADMIN — FOLIC ACID 1 MG: 1 TABLET ORAL at 09:22

## 2024-03-31 NOTE — PROGRESS NOTES
"Pharmacy Antimicrobial Dosing Service    Subjective:  Barry Calhoun Sr. is a 89 y.o.male admitted with loss of conciousness. Pharmacy has been consulted to dose Vancomycin for possible bacteremia.    PMH: Port placed, ESRD on HD TRSa      Assessment/Plan    1. Day #1 Vancomycin: Pulse dosing d/t HD status. Will give vancomycin 1250 mg (~18 mg/kg ABW) IV tonight as soon as blood culture is drawn. Will obtain random level with AM labs 4/2 prior to next HD session. Plan to redose when level is < 20 mcg/mL.    Will continue to monitor drug levels, renal function, culture and sensitivities, and patient clinical status.       Objective:  Relevant clinical data and objective history reviewed:  175.3 cm (69\")   68.9 kg (152 lb)   Ideal body weight: 70.7 kg (155 lb 13.8 oz)  Body mass index is 22.45 kg/m².        Results from last 7 days   Lab Units 03/31/24  0407 03/29/24  2345 03/29/24  1505   CREATININE mg/dL 2.92* 3.45* 3.00*     Estimated Creatinine Clearance: 16.1 mL/min (A) (by C-G formula based on SCr of 2.92 mg/dL (H)).  I/O last 3 completed shifts:  In: 660 [P.O.:360; IV Piggyback:300]  Out: 2800     Results from last 7 days   Lab Units 03/31/24  0407 03/29/24  2345 03/29/24  1812   WBC 10*3/mm3 6.45 3.76 4.12     Temperature    03/31/24 0502 03/31/24 0900 03/31/24 1301   Temp: 99.7 °F (37.6 °C) 98.5 °F (36.9 °C) 97.5 °F (36.4 °C)     Baseline culture/source/susceptibility:  Microbiology Results (last 10 days)       Procedure Component Value - Date/Time    Blood Culture - Blood, Arm, Left [[id number]]  (Normal) Collected: 03/29/24 1812    Lab Status: Preliminary result Specimen: Blood from Arm, Left Updated: 03/30/24 1831     Blood Culture No growth at 24 hours    Narrative:      Less than seven (7) mL's of blood was collected.  Insufficient quantity may yield false negative results.    Blood Culture - Blood, Hand, Left [[id number]]  (Abnormal) Collected: 03/29/24 1812    Lab Status: Preliminary result Specimen: " Blood from Hand, Left Updated: 03/31/24 0452     Blood Culture Abnormal Stain     Gram Stain Anaerobic Bottle Gram positive cocci in clusters    Narrative:      Less than seven (7) mL's of blood was collected.  Insufficient quantity may yield false negative results.    Blood Culture ID, PCR - Blood, Hand, Left [[id number]] Collected: 03/29/24 1812    Lab Status: Final result Specimen: Blood from Hand, Left Updated: 03/31/24 0452     BCID, PCR Negative by BCID PCR. Culture to Follow.     BOTTLE TYPE Anaerobic Bottle            Chantel Mart PharmD  03/31/24 16:20 EDT

## 2024-03-31 NOTE — NURSING NOTE
During morning assessment, this nurse found an open area near pt rectum, looks more like shearing, pt has had 1-2 incontinent episodes between yesterday and today. Mepilex applied to coccyx, pt continues to be turned q 2 hours with purple wedge. This nurse also noted a blanchable area to pt right heel. Mepilex applied and heel boots in place. MD, charge nurse notified. Wound care consult placed.

## 2024-03-31 NOTE — CONSULTS
LOS: 3 days   Patient Care Team:  Pedro Purvis MD as PCP - General (Family Medicine)  Pedro Purvis MD as PCP - Family Medicine  Pino Mclean MD as Consulting Physician (Nephrology)        Subjective       Attending MD : Suhail Zacarias MD    Patient Complaints: SOB Fall         History of Present Illness  : 89 y.o. male with a PMH of Asthma, non-Hodgkin's lymphoma, COPD, CAD, S/P CABG, CVA, and CKD  who presented to [facility] on 3/27/2024 from his extended care facility where they report he fell and loss consciousness. Mr. Calhoun is unable to provide any meaningful medical history due to underling dementia. Unfortunately his family has left for the evening and unavailable at bedside to provide any history. All information has been obtain from the ER provider and review of Mr. Calhoun's medical records.       Upon exam he is pleasantly confused, stating he is being seen due to a pot hole not being repaired.He does follow commands,  His lungs are diminished in bilateral bases, His heart rate and rhythm is regular, abdomen is soft, nondistended, nontender with audible bowel sounds throughout.     Patient Denies:  NV    PMH :   Loss of consciousness    Anemia due to chronic kidney disease stage III    Atrial fibrillation    Essential hypertension    Chronic diastolic heart failure    Chronic obstructive pulmonary disease    Coronary heart disease    ESRD (end stage renal disease)    Syncope and collapse    Thrombocytopenia    Syncope    Chronic anticoagulation      Review of Systems:    weak    Objective     Vital Signs  Temp:  [97.5 °F (36.4 °C)-99.7 °F (37.6 °C)] 97.5 °F (36.4 °C)  Heart Rate:  [72-84] 75  Resp:  [16-22] 17  BP: ()/(49-58) 100/53    Physical Exam:     General Appearance:  Alert, cooperative, in no acute distress   Head:  Normocephalic, without obvious abnormality, atraumatic   Eyes:  Lids and lashes normal, conjunctivae and sclerae normal, no  icterus, no pallor, corneas clear, PERRLA   Ears:  Ears appear intact with no abnormalities noted   Throat:  No oral lesions, no thrush, oral mucosa moist   Neck:  No adenopathy, supple, trachea midline, no thyromegaly, no carotid bruit, no JVD   Back:  No kyphosis present, no scoliosis present, no skin lesions, erythema or scars, no tenderness to percussion or palpation, range of motion normal   Lungs:  Clear to auscultation, respirations regular, even and unlabored    Heart:  Regular rhythm and normal rate, normal S1 and S2, no murmur, no gallop, no rub, no click   Chest Wall:  No abnormalities observed   Abdomen:  Normal bowel sounds, no masses, no organomegaly, soft non-tender, non-distended, no guarding, no rebound tenderness   Rectal:  Deferred   Extremities:  Moves all extremities well, no edema, no cyanosis, no redness   Pulses:  Pulses palpable and equal bilaterally   Skin:  No bleeding, bruising or rash   Lymph nodes:  No palpable adenopathy   Neurologic:  Cranial nerves 2 - 12 grossly intact, sensation intact, DTR present and equal bilaterally                                                                                  Results Review:    Lab Results (last 72 hours)       Procedure Component Value Units Date/Time    POC Glucose Finger 4x Daily Before Meals & at Bedtime [[id number]]  (Abnormal) Collected: 03/31/24 1114    Specimen: Blood from Finger Updated: 03/31/24 1116     Glucose 124 mg/dL      Comment: Serial Number: [id number]:  [id number]       POC Glucose Once [[id number]]  (Normal) Collected: 03/31/24 0806    Specimen: Blood Updated: 03/31/24 0807     Glucose 79 mg/dL      Comment: Serial Number: [id number]:  [id number]       CBC (No Diff) [[id number]]  (Abnormal) Collected: 03/31/24 0407    Specimen: Blood Updated: 03/31/24 0459     WBC 6.45 10*3/mm3      RBC 2.62 10*6/mm3      Hemoglobin 8.5 g/dL      Hematocrit 27.4 %      .6 fL      MCH 32.4 pg      MCHC 31.0 g/dL      RDW  15.6 %      RDW-SD 59.4 fl      MPV 11.2 fL      Platelets 64 10*3/mm3      Comment: Result checked         Blood Culture - Blood, Hand, Left [[id number]]  (Abnormal) Collected: 03/29/24 1812    Specimen: Blood from Hand, Left Updated: 03/31/24 0452     Blood Culture Abnormal Stain     Gram Stain Anaerobic Bottle Gram positive cocci in clusters    Narrative:      Less than seven (7) mL's of blood was collected.  Insufficient quantity may yield false negative results.    Blood Culture ID, PCR - Blood, Hand, Left [[id number]] Collected: 03/29/24 1812    Specimen: Blood from Hand, Left Updated: 03/31/24 0452     BCID, PCR Negative by BCID PCR. Culture to Follow.     BOTTLE TYPE Anaerobic Bottle    Basic Metabolic Panel [[id number]]  (Abnormal) Collected: 03/31/24 0407    Specimen: Blood Updated: 03/31/24 0449     Glucose 86 mg/dL      BUN 13 mg/dL      Creatinine 2.92 mg/dL      Sodium 140 mmol/L      Potassium 5.0 mmol/L      Comment: Slight hemolysis detected by analyzer. Result may be falsely elevated.        Chloride 101 mmol/L      CO2 34.0 mmol/L      Calcium 9.4 mg/dL      BUN/Creatinine Ratio 4.5     Anion Gap 5.0 mmol/L      eGFR 19.6 mL/min/1.73     Narrative:      GFR Normal >60  Chronic Kidney Disease <60  Kidney Failure <15    The GFR formula is only valid for adults with stable renal function between ages 18 and 70.    Protime-INR [[id number]]  (Abnormal) Collected: 03/31/24 0407    Specimen: Blood Updated: 03/31/24 0438     Protime 50.0 Seconds      INR 5.18    POC Glucose Once [[id number]]  (Normal) Collected: 03/30/24 2022    Specimen: Blood Updated: 03/30/24 2024     Glucose 92 mg/dL      Comment: Serial Number: [id number]:  [id number]       Blood Culture - Blood, Arm, Left [[id number]]  (Normal) Collected: 03/29/24 1812    Specimen: Blood from Arm, Left Updated: 03/30/24 1831     Blood Culture No growth at 24 hours    Narrative:      Less than seven (7) mL's of blood was collected.  Insufficient  quantity may yield false negative results.    POC Glucose Once [[id number]]  (Normal) Collected: 03/30/24 1649    Specimen: Blood Updated: 03/30/24 1651     Glucose 85 mg/dL      Comment: Serial Number: [id number]:  [id number]       POC Glucose Finger 4x Daily Before Meals & at Bedtime [[id number]]  (Normal) Collected: 03/30/24 1124    Specimen: Blood from Finger Updated: 03/30/24 1126     Glucose 76 mg/dL      Comment: Serial Number: [id number]:  [id number]       POC Glucose Once [[id number]]  (Abnormal) Collected: 03/30/24 0754    Specimen: Blood Updated: 03/30/24 0757     Glucose 117 mg/dL      Comment: Serial Number: [id number]:  [id number]       CBC (No Diff) [[id number]]  (Abnormal) Collected: 03/29/24 2345    Specimen: Blood from Arm, Left Updated: 03/30/24 0104     WBC 3.76 10*3/mm3      RBC 2.61 10*6/mm3      Hemoglobin 8.3 g/dL      Hematocrit 27.3 %      .6 fL      MCH 31.8 pg      MCHC 30.4 g/dL      RDW 15.5 %      RDW-SD 59.6 fl      MPV 11.1 fL      Platelets 56 10*3/mm3     Basic Metabolic Panel [[id number]]  (Abnormal) Collected: 03/29/24 2345    Specimen: Blood from Arm, Left Updated: 03/30/24 0043     Glucose 106 mg/dL      BUN 20 mg/dL      Creatinine 3.45 mg/dL      Sodium 136 mmol/L      Potassium 4.3 mmol/L      Chloride 98 mmol/L      CO2 32.0 mmol/L      Calcium 8.6 mg/dL      BUN/Creatinine Ratio 5.8     Anion Gap 6.0 mmol/L      eGFR 16.1 mL/min/1.73     Narrative:      GFR Normal >60  Chronic Kidney Disease <60  Kidney Failure <15    The GFR formula is only valid for adults with stable renal function between ages 18 and 70.    Magnesium [[id number]]  (Normal) Collected: 03/29/24 2345    Specimen: Blood from Arm, Left Updated: 03/30/24 0043     Magnesium 1.9 mg/dL     Phosphorus [[id number]]  (Normal) Collected: 03/29/24 2345    Specimen: Blood from Arm, Left Updated: 03/30/24 0030     Phosphorus 3.8 mg/dL     Protime-INR [[id number]]  (Abnormal) Collected: 03/29/24 6607     Specimen: Blood from Arm, Left Updated: 03/30/24 0021     Protime 47.7 Seconds      INR 4.92    POC Glucose Once [[id number]]  (Abnormal) Collected: 03/29/24 2328    Specimen: Blood Updated: 03/29/24 2329     Glucose 137 mg/dL      Comment: Serial Number: [id number]:  [id number]       POC Glucose Finger 4x Daily Before Meals & at Bedtime [[id number]]  (Abnormal) Collected: 03/29/24 2223    Specimen: Blood from Finger Updated: 03/29/24 2225     Glucose 62 mg/dL      Comment: Serial Number: [id number]:  [id number]       Hepatic Function Panel [[id number]]  (Abnormal) Collected: 03/29/24 1505    Specimen: Blood Updated: 03/29/24 1915     Total Protein 5.9 g/dL      Albumin 3.5 g/dL      ALT (SGPT) 13 U/L      AST (SGOT) 27 U/L      Alkaline Phosphatase 97 U/L      Total Bilirubin 0.7 mg/dL      Bilirubin, Direct 0.3 mg/dL      Bilirubin, Indirect 0.4 mg/dL     Comprehensive Metabolic Panel [[id number]]  (Abnormal) Collected: 03/29/24 1505    Specimen: Blood Updated: 03/29/24 1914     Glucose 70 mg/dL      BUN 18 mg/dL      Creatinine 3.00 mg/dL      Sodium 140 mmol/L      Potassium 4.4 mmol/L      Chloride 101 mmol/L      CO2 28.0 mmol/L      Calcium 9.0 mg/dL      Total Protein 5.9 g/dL      Albumin 3.5 g/dL      ALT (SGPT) 13 U/L      AST (SGOT) 27 U/L      Alkaline Phosphatase 97 U/L      Total Bilirubin 0.7 mg/dL      Globulin 2.4 gm/dL      A/G Ratio 1.5 g/dL      BUN/Creatinine Ratio 6.0     Anion Gap 11.0 mmol/L      eGFR 19.0 mL/min/1.73     Narrative:      GFR Normal >60  Chronic Kidney Disease <60  Kidney Failure <15    The GFR formula is only valid for adults with stable renal function between ages 18 and 70.    Lactic Acid, Plasma [[id number]]  (Normal) Collected: 03/29/24 1812    Specimen: Blood Updated: 03/29/24 1904     Lactate 1.6 mmol/L     CBC & Differential [[id number]]  (Abnormal) Collected: 03/29/24 1812    Specimen: Blood Updated: 03/29/24 1853    Narrative:      The following orders were  created for panel order CBC & Differential.  Procedure                               Abnormality         Status                     ---------                               -----------         ------                     CBC Auto Differential[[id number]]        Abnormal            Final result               Scan Slide[[id number]]                                       Final result                 Please view results for these tests on the individual orders.    CBC Auto Differential [[id number]]  (Abnormal) Collected: 03/29/24 1812    Specimen: Blood Updated: 03/29/24 1853     WBC 4.12 10*3/mm3      RBC 2.86 10*6/mm3      Hemoglobin 9.3 g/dL      Hematocrit 30.2 %      .6 fL      MCH 32.5 pg      MCHC 30.8 g/dL      RDW 15.6 %      RDW-SD 60.1 fl      MPV 11.1 fL      Platelets 56 10*3/mm3      Neutrophil % 66.1 %      Lymphocyte % 18.2 %      Monocyte % 13.1 %      Eosinophil % 1.9 %      Basophil % 0.5 %      Immature Grans % 0.2 %      Neutrophils, Absolute 2.72 10*3/mm3      Lymphocytes, Absolute 0.75 10*3/mm3      Monocytes, Absolute 0.54 10*3/mm3      Eosinophils, Absolute 0.08 10*3/mm3      Basophils, Absolute 0.02 10*3/mm3      Immature Grans, Absolute 0.01 10*3/mm3      nRBC 0.0 /100 WBC     Scan Slide [[id number]] Collected: 03/29/24 1812    Specimen: Blood Updated: 03/29/24 1853     Dacrocytes Slight/1+     Elliptocytes Slight/1+     Macrocytes Slight/1+     Poikilocytes Slight/1+     Polychromasia Slight/1+     Stomatocytes Slight/1+     Toxic Granulation Slight/1+     Large Platelets Slight/1+     Giant Platelets Slight/1+    POC Glucose Once [[id number]]  (Normal) Collected: 03/29/24 1621    Specimen: Blood Updated: 03/29/24 1622     Glucose 71 mg/dL      Comment: Serial Number: [id number]:  [id number]       High Sensitivity Troponin T 2Hr [[id number]]  (Abnormal) Collected: 03/29/24 1046    Specimen: Blood Updated: 03/29/24 1120     HS Troponin T 287 ng/L      Troponin T Delta -1 ng/L      Narrative:      High Sensitive Troponin T Reference Range:  <14.0 ng/L- Negative Female for AMI  <22.0 ng/L- Negative Male for AMI  >=14 - Abnormal Female indicating possible myocardial injury.  >=22 - Abnormal Male indicating possible myocardial injury.   Clinicians would have to utilize clinical acumen, EKG, Troponin, and serial changes to determine if it is an Acute Myocardial Infarction or myocardial injury due to an underlying chronic condition.         POC Glucose Finger 4x Daily Before Meals & at Bedtime [[id number]]  (Normal) Collected: 03/29/24 1109    Specimen: Blood from Finger Updated: 03/29/24 1111     Glucose 81 mg/dL      Comment: Serial Number: [id number]:  [id number]       High Sensitivity Troponin T [[id number]]  (Abnormal) Collected: 03/29/24 0729    Specimen: Blood Updated: 03/29/24 0950     HS Troponin T 288 ng/L     Narrative:      High Sensitive Troponin T Reference Range:  <14.0 ng/L- Negative Female for AMI  <22.0 ng/L- Negative Male for AMI  >=14 - Abnormal Female indicating possible myocardial injury.  >=22 - Abnormal Male indicating possible myocardial injury.   Clinicians would have to utilize clinical acumen, EKG, Troponin, and serial changes to determine if it is an Acute Myocardial Infarction or myocardial injury due to an underlying chronic condition.         POC Glucose Once [[id number]]  (Normal) Collected: 03/29/24 0841    Specimen: Blood Updated: 03/29/24 0844     Glucose 79 mg/dL      Comment: Serial Number: [id number]:  [id number]       CBC (No Diff) [[id number]]  (Abnormal) Collected: 03/29/24 0729    Specimen: Blood Updated: 03/29/24 0822     WBC 4.53 10*3/mm3      RBC 2.87 10*6/mm3      Hemoglobin 9.3 g/dL      Hematocrit 30.5 %      .3 fL      MCH 32.4 pg      MCHC 30.5 g/dL      RDW 15.7 %      RDW-SD 62.2 fl      MPV 10.4 fL      Platelets 53 10*3/mm3     Magnesium [[id number]]  (Normal) Collected: 03/29/24 0729    Specimen: Blood Updated: 03/29/24 0810      Magnesium 1.9 mg/dL     Basic Metabolic Panel [[id number]]  (Abnormal) Collected: 03/29/24 0729    Specimen: Blood Updated: 03/29/24 0810     Glucose 49 mg/dL      BUN 17 mg/dL      Creatinine 2.98 mg/dL      Sodium 140 mmol/L      Potassium 4.4 mmol/L      Chloride 101 mmol/L      CO2 31.0 mmol/L      Calcium 8.8 mg/dL      BUN/Creatinine Ratio 5.7     Anion Gap 8.0 mmol/L      eGFR 19.2 mL/min/1.73     Narrative:      GFR Normal >60  Chronic Kidney Disease <60  Kidney Failure <15    The GFR formula is only valid for adults with stable renal function between ages 18 and 70.    Protime-INR [[id number]]  (Abnormal) Collected: 03/29/24 0729    Specimen: Blood Updated: 03/29/24 0803     Protime 40.9 Seconds      INR 4.17    Phosphorus [[id number]]  (Normal) Collected: 03/29/24 0729    Specimen: Blood Updated: 03/29/24 0803     Phosphorus 3.4 mg/dL                 Imaging Results (Last 72 Hours)       Procedure Component Value Units Date/Time    XR Chest 1 View [[id number]] Collected: 03/29/24 1639     Updated: 03/29/24 1642    Narrative:      XR CHEST 1 VW    Date of Exam: 3/29/2024 4:37 PM EDT    Indication: wheezing    Comparison: AP portable chest 2/17/2014.    Findings:  Stable right hemidiaphragm elevation. Left midlung and right infrahilar airspace disease is redemonstrated and is not thought to be significantly changed. There is stable moderate generalized cardiac enlargement with median sternotomy and CABG. Loop   recorder projects over the left lower mediastinum. Right chest wall jeremías catheter tip terminates at the lower SVC level. Right subclavian stent is noted. No acute osseous abnormality is identified.      Impression:      Impression:    1. Left midlung and right infrahilar airspace disease appears unchanged from 2/28/2024. No new airspace disease is identified.  2. Stable cardiomegaly.      Electronically Signed: Mary Page MD    3/29/2024 4:40 PM EDT    Workstation ID: [id number]              Medication  "Review:      Hospital Medications (active)         Dose Frequency Start End    acetaminophen (TYLENOL) suppository 650 mg 650 mg Every 4 Hours PRN 3/27/2024 --    Admin Instructions: If given for fever, use fever parameter: fever greater than 100.4 °F  Based on patient request - if ordered for moderate or severe pain, provider allows for administration of a medication prescribed for a lower pain scale.    Do not exceed 4 grams of acetaminophen in a 24 hr period. Max dose of 2gm for AST/ALT greater than 120 units/L.    If given for pain, use the following pain scale:   Mild Pain = Pain Score of 1-3, CPOT 1-2  Moderate Pain = Pain Score of 4-6, CPOT 3-4  Severe Pain = Pain Score of 7-10, CPOT 5-8    Route: Rectal    Linked Group 1: Placed in \"Or\" Linked Group        acetaminophen (TYLENOL) tablet 650 mg 650 mg Every 4 Hours PRN 3/27/2024 --    Admin Instructions: If given for fever, use fever parameter: fever greater than 100.4 °F  Based on patient request - if ordered for moderate or severe pain, provider allows for administration of a medication prescribed for a lower pain scale.    Do not exceed 4 grams of acetaminophen in a 24 hr period. Max dose of 2gm for AST/ALT greater than 120 units/L.    If given for pain, use the following pain scale:   Mild Pain = Pain Score of 1-3, CPOT 1-2  Moderate Pain = Pain Score of 4-6, CPOT 3-4  Severe Pain = Pain Score of 7-10, CPOT 5-8    Route: Oral    Linked Group 1: Placed in \"Or\" Linked Group        allopurinol (ZYLOPRIM) tablet 100 mg 100 mg Daily 3/28/2024 --    Admin Instructions: ([facility]) Take with food if GI upset occurs.    Route: Oral    atorvastatin (LIPITOR) tablet 20 mg 20 mg Nightly 3/28/2024 --    Admin Instructions: Avoid grapefruit juice.    Route: Oral    bisacodyl (DULCOLAX) EC tablet 5 mg 5 mg Daily PRN 3/27/2024 --    Admin Instructions: Use if no bowel movement after 12 hours.  Swallow whole. Do not crush, split, or chew tablet.    Route: Oral    Linked Group " "2: Placed in \"And\" Linked Group        bisacodyl (DULCOLAX) suppository 10 mg 10 mg Daily PRN 3/27/2024 --    Admin Instructions: Use if no bowel movement after 12 hours.  Hold for diarrhea    Route: Rectal    Linked Group 2: Placed in \"And\" Linked Group        dextrose (D50W) (25 g/50 mL) IV injection 25 g 25 g Every 15 Minutes PRN 3/29/2024 --    Route: Intravenous    dextrose (GLUTOSE) oral gel 15 g 15 g Every 15 Minutes PRN 3/29/2024 --    Route: Oral    docusate sodium (COLACE) capsule 200 mg 200 mg Every Morning 3/29/2024 --    Admin Instructions: Swallow whole.  Do not open, crush, or chew capsule.    Route: Oral    donepezil (ARICEPT) tablet 10 mg 10 mg Nightly 3/29/2024 --    Route: Oral    famotidine (PEPCID) tablet 10 mg 10 mg Daily 3/30/2024 --    Route: Oral    folic acid (FOLVITE) tablet 1 mg 1 mg Daily 3/28/2024 --    Route: Oral    glucagon (GLUCAGEN) injection 1 mg 1 mg Every 15 Minutes PRN 3/29/2024 --    Admin Instructions: Reconstitute powder for injection by adding 1 mL of -supplied sterile diluent or sterile water for injection to a vial containing 1 mg of the drug, to provide solutions containing 1 mg/mL. Shake vial gently to dissolve.    Route: Subcutaneous    guaiFENesin (MUCINEX) 12 hr tablet 600 mg 600 mg Every 12 Hours Scheduled 3/30/2024 --    Admin Instructions: Caution: Look alike/sound alike drug alert               Do not crush, split, or chew.    Route: Oral    haloperidol lactate (HALDOL) injection 5 mg 5 mg Every 6 Hours PRN 3/29/2024 --    Admin Instructions: For IV Push, administer no faster than 5 mg / minute.    Route: Intravenous    ipratropium-albuterol (DUO-NEB) nebulizer solution 3 mL 3 mL 4 Times Daily - RT 3/30/2024 --    Admin Instructions: Include Respiratory Treatment Education    Route: Nebulization    levothyroxine (SYNTHROID, LEVOTHROID) tablet 50 mcg 50 mcg Nightly 3/28/2024 --    Admin Instructions: Take on empty stomach.    Route: Oral    metoprolol " "succinate XL (TOPROL-XL) 24 hr tablet 12.5 mg 12.5 mg Every 24 Hours Scheduled 3/29/2024 --    Admin Instructions: Hold for SBP less than 100, DBP less than 60, or heart rate less than 50    Do not crush or chew the capsules or tablets. The drug may not work as designed if the capsule or tablet is crushed or chewed. Swallow whole.  Do not crush or chew.    Route: Oral    midodrine (PROAMATINE) tablet 10 mg 10 mg 3 Times Daily Before Meals 3/28/2024 --    Route: Oral    nitroglycerin (NITROSTAT) SL tablet 0.4 mg 0.4 mg Every 5 Minutes PRN 3/27/2024 --    Admin Instructions: If Pain Unrelieved After 3 Doses Notify MD  May administer up to 3 doses per episode.    Route: Sublingual    Pharmacy to dose warfarin  Continuous PRN 3/28/2024 --    Admin Instructions:   Group 2 (Pink) Hazardous Drug - Reproductive Risk Only - See Handling Guide    Route: Does not apply    polyethylene glycol (MIRALAX) packet 17 g 17 g Daily PRN 3/27/2024 --    Admin Instructions: Use if no bowel movement after 12 hours. Mix in 6-8 ounces of water.  Use 4-8 ounces of water, tea, or juice for each 17 gram dose.    Route: Oral    Linked Group 2: Placed in \"And\" Linked Group        sennosides-docusate (PERICOLACE) 8.6-50 MG per tablet 2 tablet 2 tablet 2 Times Daily PRN 3/27/2024 --    Admin Instructions: Start bowel management regimen if patient has not had a bowel movement after 12 hours.    Route: Oral    Linked Group 2: Placed in \"And\" Linked Group        sodium chloride 0.9 % bolus 250 mL 250 mL Once 3/28/2024 --    Route: Intravenous    sodium chloride 0.9 % flush 10 mL 10 mL Every 12 Hours Scheduled 3/27/2024 --    Route: Intravenous    sodium chloride 0.9 % flush 10 mL 10 mL As Needed 3/27/2024 --    Route: Intravenous    sodium chloride 0.9 % infusion 40 mL 40 mL As Needed 3/27/2024 --    Admin Instructions: Following administration of an IV intermittent medication, flush line with 40mL NS at 100mL/hr.    Route: Intravenous    tamsulosin " (FLOMAX) 24 hr capsule 0.4 mg 0.4 mg Nightly 3/28/2024 --    Admin Instructions: Do not crush or chew the capsules or tablets. The drug may not work as designed if the capsule or tablet is crushed or chewed. Swallow whole.    Route: Oral    valproate (DEPACON) 125 mg in dextrose (D5W) 5 % 100 mL IVPB ([Held by provider] since 3/29/2024  2:43 PM) 125 mg Nightly 3/29/2024 --    Admin Instructions: Group 2 (Pink) Hazardous Drug - Reproductive Risk Only - See Handling Guide    Route: Intravenous            Assessment & Plan         Loss of consciousness    Anemia due to chronic kidney disease stage III    Atrial fibrillation    Essential hypertension    Chronic diastolic heart failure    Chronic obstructive pulmonary disease    Coronary heart disease    ESRD (end stage renal disease)    Syncope and collapse    Thrombocytopenia    Syncope    Chronic anticoagulation  brachiocephalic AV fistula   + BC SA   PNA  Mediport     Plan :    Repeat BC  IV vanc once  Will follow  Thank you     Gayle Borrego MD  03/31/24  14:52 EDT

## 2024-03-31 NOTE — PLAN OF CARE
Goal Outcome Evaluation:           Progress: improving  Outcome Evaluation: Pt has been asleep off and on today. Pt more alert, able to answer commands and ate all of this breakfast/lunch. Sitter was discontinued as pt has not showed signs of agitation, has not had behavioral issues and has been compliant with turns and daily care. Spoke to one of pt daughter's Shaila, she would like for pt to d/c to LTC, this nurse told pt daughter that she would have to speak to ZEINAB and or Alexandra as they are the pt designated POA. This nurse has tried to contact POA's over the weekend, spoke with ZEINAB and immediately requested i call Anish, this nurse called Anish who is pt son, and call would not go through, not sure if it is the right number. Daughter to call pt Grandson ZEINAB and follow up on plan of care. Pt has been turned per facility protocol, Mepilex applied to coccyx, wedge and heel boots in place. This nurse collected blood cultures and pt will start vanc per ID. Bed alarm in place, call light within reach.

## 2024-03-31 NOTE — PROGRESS NOTES
[facility] MEDICINE SERVICE  DAILY PROGRESS NOTE    NAME: Barry Calhoun Sr.  : 1932  MRN: [id number]      LOS: 3 days     PROVIDER OF SERVICE: Suhail Zacarias MD    Chief Complaint: Loss of consciousness    Subjective:     Interval History:  History taken from: patient        Subjective       Chief Complaint: Fall      History of Present Illness: Barry Calhoun Sr. is a 89 y.o. male with a PMH of Asthma, non-Hodgkin's lymphoma, COPD, CAD, S/P CABG, CVA, and CKD  who presented to [facility] on 3/27/2024 from his extended care facility where they report he fell and loss consciousness. Mr. Calhoun is unable to provide any meaningful medical history due to underling dementia. Unfortunately his family has left for the evening and unavailable at bedside to provide any history. All information has been obtain from the ER provider and review of Mr. Calhoun's medical records.       Upon exam he is pleasantly confused, stating he is being seen due to a pot hole not being repaired.He does follow commands,  His lungs are diminished in bilateral bases, His heart rate and rhythm is regular, abdomen is soft, nondistended, nontender with audible bowel sounds throughout..     Initial evaluation in the emergency room is hemodynamically stable with blood pressure soft at 93/54, heart rate 63, respiratory rate 18, oxygen saturation 94% on room air, he is afebrile with a Tmax of 96.3  Creatinine is 3.51, BUN 17 this is post HD today.  He is anemic with hemoglobin of 8.7, hematocrit 24 which is improved from previous 8.4 and 26.5 on ,  Thrombocytopenia his platelets are 44 today which is decreased from his baseline that is near 100.  CT scan of his head is unremarkable for any acute intracranial processes  EKG interpreted as atrial fibrillation with a rate of 72     Mr. Calhoun will be admitted for further evaluation and treatment of fall/ syncope and other acute and or chronic conditions as needed,       3/29/24 patient seen in bed no acute distress, still kind of agitated.  Vital signs stable, discussed with RN, started Ativan as needed. Patient Denies: Headache, dizziness, chest pain, chills  3/30/24 seen in bed NAD, agitation had to be restrained, DW RN receiving HD  3/31/2024 patient seen and examined in bed no acute distress no new complaints, discussed with RN, positive blood culture, temperature 100.2.  Consulted ID.    Review of Systems: Unable to perform ROS: Dementia   Review of Systems   Constitutional:  Negative for chills and fever.   Respiratory:  Negative for chest tightness and shortness of breath.    Neurological:  Negative for dizziness and headaches.   Psychiatric/Behavioral:  Positive for confusion.        Objective:     Vital Signs  Temp:  [97.9 °F (36.6 °C)-100.2 °F (37.9 °C)] 98.5 °F (36.9 °C)  Heart Rate:  [75-89] 79  Resp:  [14-23] 20  BP: ()/(28-71) 97/54  Flow (L/min):  [3] 3   Body mass index is 22.53 kg/m².      Physical Exam  Constitutional:       Appearance: Normal appearance.   HENT:      Head: Normocephalic and atraumatic.      Nose: Nose normal.      Mouth/Throat:      Mouth: Mucous membranes are moist.   Eyes:      Extraocular Movements: Extraocular movements intact.      Conjunctiva/sclera: Conjunctivae normal.      Pupils: Pupils are equal, round, and reactive to light.   Cardiovascular:      Rate and Rhythm: Normal rate and regular rhythm.      Pulses: Normal pulses.      Heart sounds: Normal heart sounds.   Pulmonary:      Effort: Pulmonary effort is normal.      Breath sounds: Normal breath sounds.   Abdominal:      General: Abdomen is flat. Bowel sounds are normal.      Palpations: Abdomen is soft.   Musculoskeletal:         General: Normal range of motion.      Cervical back: Normal range of motion.   Skin:     General: Skin is warm and dry.   Neurological:      General: No focal deficit present.      Mental Status: He is alert. He is disoriented.   Psychiatric:          Mood and Affect: Mood normal.         Behavior: Behavior normal.         Thought Content: Thought content normal.         Judgment: Judgment normal.         Scheduled Meds   allopurinol, 100 mg, Oral, Daily  atorvastatin, 20 mg, Oral, Nightly  docusate sodium, 200 mg, Oral, QAM  donepezil, 10 mg, Oral, Nightly  famotidine, 10 mg, Oral, Daily  folic acid, 1 mg, Oral, Daily  guaiFENesin, 600 mg, Oral, Q12H  ipratropium-albuterol, 3 mL, Nebulization, 4x Daily - RT  levothyroxine, 50 mcg, Oral, Nightly  metoprolol succinate XL, 12.5 mg, Oral, Q24H  midodrine, 10 mg, Oral, TID AC  sodium chloride, 250 mL, Intravenous, Once  sodium chloride, 10 mL, Intravenous, Q12H  tamsulosin, 0.4 mg, Oral, Nightly  [Held by provider] valproate sodium, 125 mg, Intravenous, Nightly       PRN Meds     acetaminophen **OR** acetaminophen    senna-docusate sodium **AND** polyethylene glycol **AND** bisacodyl **AND** bisacodyl    dextrose    dextrose    glucagon (human recombinant)    haloperidol lactate    nitroglycerin    Pharmacy to dose warfarin    sodium chloride    sodium chloride   Infusions  Pharmacy to dose warfarin,           Diagnostic Data    Results from last 7 days   Lab Units 03/31/24  0407 03/29/24  1812 03/29/24  1505   WBC 10*3/mm3 6.45   < >  --    HEMOGLOBIN g/dL 8.5*   < >  --    HEMATOCRIT % 27.4*   < >  --    PLATELETS 10*3/mm3 64*   < >  --    GLUCOSE mg/dL 86   < > 70   CREATININE mg/dL 2.92*   < > 3.00*   BUN mg/dL 13   < > 18   SODIUM mmol/L 140   < > 140   POTASSIUM mmol/L 5.0   < > 4.4   AST (SGOT) U/L  --   --  27  27   ALT (SGPT) U/L  --   --  13  13   ALK PHOS U/L  --   --  97  97   BILIRUBIN mg/dL  --   --  0.7  0.7   ANION GAP mmol/L 5.0   < > 11.0    < > = values in this interval not displayed.       XR Chest 1 View    Result Date: 3/29/2024  Impression: 1. Left midlung and right infrahilar airspace disease appears unchanged from 2/28/2024. No new airspace disease is identified. 2. Stable  cardiomegaly. Electronically Signed: Mary Page MD  3/29/2024 4:40 PM EDT  Workstation ID: [id number]       I reviewed the patient's new clinical results.    Assessment/Plan:     Active and Resolved Problems  Active Hospital Problems    Diagnosis  POA    **Loss of consciousness [R40.20]  Yes    Chronic anticoagulation [Z79.01]  Not Applicable    Syncope [R55]  Yes    ESRD (end stage renal disease) [N18.6]  Yes    Syncope and collapse [R55]  Yes    Thrombocytopenia [D69.6]  Yes    Coronary heart disease [I25.10]  Yes    Chronic obstructive pulmonary disease [J44.9]  Yes    Essential hypertension [I10]  Yes    Atrial fibrillation [I48.91]  Yes    Anemia due to chronic kidney disease stage III [N18.9, D63.1]  Yes    Chronic diastolic heart failure [I50.32]  Yes      Resolved Hospital Problems   No resolved problems to display.       Loss of consciousness  Syncope and collapse   -Patient is amnesic to the event  -Witnessed at SNF while getting up out of his chair  -CT scan of head is unremarkable   -MRI in AM   -Echocardiogram at [facility] on 02/13/2024- LVEF 50-55%  3/31/24-Left ventricular ejection fraction appears to be 61 - 65%.    Left ventricular diastolic function is consistent with (grade II w/high LAP) pseudonormalization.    Left atrial volume is severely increased.    Abnormal mitral valve structure consistent with dilated annulus.    Moderate to severe mitral valve regurgitation is present.    Moderate to severe tricuspid valve regurgitation is present.    Estimated right ventricular systolic pressure from tricuspid regurgitation is markedly elevated (>55 mmHg).    Consider transesophageal echocardiogram to better assess severity of regurgitant lesions if clinically indicated.  -per cardiology>Suspect intermittent severe hypoglycemia causing loss of consciousness from this patient.  I will recommend only medical management on this patient  Nonspecific troponin elevation with end-stage renal disease      Thrombocytopenia-Plt 44-64  -ON home plavix will for history of cva and MI, continue to hold   -Monitor fo s/s of bleeding     ESRD (end stage renal disease)  -Anemia due to chronic kidney disease stage III  -HD M-W-F  -HD treatment today and yesterday   -Monitor I's and O's  -Monitor daily weights  -Avoid nephrotoxic medications, hypovolemia, hypotension, hypertension  -Renally dose medications when possible     Chronic Atrial fibrillation  -Metoprolol held for hypotension   -hold warfarin  -Monitor PTINR     Chronic diastolic heart failure  Chronic last ECHO LVEF 50-55% 02/16   -Daily Weights   -Monitor I&O's   -Continue home Bumex pending home medication list verification  -He does not appear to be in volume overload      Coronary heart disease-Chronic   -History of CABG  -Free of chest pain at time of admission  -Will need to hold home Plavix with thrombocytopenia     Chronic obstructive pulmonary disease-Not in acute exacerbation  -Not oxygen dependent at baseline  -Baseline PFTs unknown  -As needed DuoNebs for shortness of breath/wheezing  -On room air today      right upper extremity edema ipsilateral to brachiocephalic AV fistula.    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  Would be a candidate for right arm fistulogram if he survives this acute illness.  Okay to continue using right arm fistula for dialysis.  We will check back in on him on Monday.  Anticipate if he improves then will get right arm fistulogram prior to discharge.    Positive close blood culture, no blood WBC is normal.  Possible contamination.  Consulted ID.    DVT prophylaxis:  Medical and mechanical DVT prophylaxis orders are present.      Code status is   Code Status and Medical Interventions:   Ordered at: 03/27/24 2114     Medical Intervention Limits:    NO intubation (DNI)     Code Status (Patient has no pulse and is not breathing):    No CPR (Do Not Attempt to Resuscitate)     Medical Interventions  (Patient has pulse or is breathing):    Limited Support       Plan for disposition:Return to facility in 1 day if cleared by nephrology     Time: 30 minutes    Signature: Electronically signed by Suhail Zacarias MD, 03/31/24, 09:58 EDT.  [facility]ist Team

## 2024-03-31 NOTE — PLAN OF CARE
Goal Outcome Evaluation:           Progress: no change  Outcome Evaluation: Has remained confused; has slept much of the night; has sitter at bedside for 11p-7a shift; remains calm at this time.

## 2024-03-31 NOTE — SIGNIFICANT NOTE
#Bacteremia vs Contamination    - 1/2 blood cultures positive for Sergio spp    - suspect contamination    - continue to monitor results    Electronically signed by Michael Villafana DO, 03/31/24, 4:59 AM EDT.

## 2024-03-31 NOTE — PROGRESS NOTES
"Pharmacy dosing service  Anticoagulant  Warfarin     Subjective:    Barry Calhoun Sr. is a 89 y.o.male being continued on warfarin for Atrial Fibrillation.    INR Goal: 2 - 3  Home medication?: Yes, 4mg daily except 3mg on MWF  Bridge Therapy Present?:  No  Interacting Medications Evaluation (New/Present/Discontinued): N/A  Additional Contributing Factors: thrombocytopenia      Assessment/Plan:    Will hold again today due to supratherapeutic INR. Will plan to resume warfarin when INR is no longer supratherapeutic.     Continue to monitor and adjust based on INR.         Date 3/28 3/29 3/30 3/31        INR 3.36 4.17 4.92 5.18        Dose HOLD HOLD HOLD HOLD            Objective:  [Ht: 175.3 cm (69\"); Wt: 69.2 kg (152 lb 8.9 oz); BMI: Body mass index is 22.53 kg/m².]    Lab Results   Component Value Date    ALBUMIN 3.5 03/29/2024    ALBUMIN 3.5 03/29/2024     Lab Results   Component Value Date    INR 5.18 (C) 03/31/2024    INR 4.92 (H) 03/29/2024    INR 4.17 (H) 03/29/2024    PROTIME 50.0 (H) 03/31/2024    PROTIME 47.7 (H) 03/29/2024    PROTIME 40.9 (H) 03/29/2024     Lab Results   Component Value Date    HGB 8.5 (L) 03/31/2024    HGB 8.3 (L) 03/29/2024    HGB 9.3 (L) 03/29/2024     Lab Results   Component Value Date    HCT 27.4 (L) 03/31/2024    HCT 27.3 (L) 03/29/2024    HCT 30.2 (L) 03/29/2024       Chantel Mart, PharmD  03/31/24 08:43 EDT   "

## 2024-03-31 NOTE — PROGRESS NOTES
"NEPHROLOGY PROGRESS NOTE------[facility]    [facility]  [phone]  So Mclean MD      Patient Care Team:  Pedro Purvis MD as PCP - General (Family Medicine)  Pedro Purvis MD as PCP - Family Medicine  Vinay, MD Pino as Consulting Physician (Nephrology)      Provider:  So Mclean MD  Patient Name: Barry Calhoun Sr.  :  1932    SUBJECTIVE:    F/U ESRD    Awake and responding to questions. No SOB, CP, dysuria.     Medication:  allopurinol, 100 mg, Oral, Daily  atorvastatin, 20 mg, Oral, Nightly  docusate sodium, 200 mg, Oral, QAM  donepezil, 10 mg, Oral, Nightly  famotidine, 10 mg, Oral, Daily  folic acid, 1 mg, Oral, Daily  guaiFENesin, 600 mg, Oral, Q12H  ipratropium-albuterol, 3 mL, Nebulization, 4x Daily - RT  levothyroxine, 50 mcg, Oral, Nightly  metoprolol succinate XL, 12.5 mg, Oral, Q24H  midodrine, 10 mg, Oral, TID AC  sodium chloride, 250 mL, Intravenous, Once  sodium chloride, 10 mL, Intravenous, Q12H  tamsulosin, 0.4 mg, Oral, Nightly  [Held by provider] valproate sodium, 125 mg, Intravenous, Nightly      Pharmacy to dose warfarin,         OBJECTIVE    Vital Sign Min/Max for last 24 hours  Temp  Min: 97.9 °F (36.6 °C)  Max: 100.2 °F (37.9 °C)   BP  Min: 79/28  Max: 124/56   Pulse  Min: 75  Max: 89   Resp  Min: 14  Max: 23   SpO2  Min: 90 %  Max: 100 %   No data recorded   Weight  Min: 69.2 kg (152 lb 8.9 oz)  Max: 69.2 kg (152 lb 8.9 oz)     Flowsheet Rows      Flowsheet Row First Filed Value   Admission Height 175.3 cm (69\") Documented at 2024 175   Admission Weight 74.7 kg (164 lb 10.9 oz) Documented at 2024 1755            No intake/output data recorded.  I/O last 3 completed shifts:  In: 660 [P.O.:360; IV Piggyback:300]  Out: 2800     Physical Exam:  General Appearance: alert, appears stated age and cooperative  Head: normocephalic, without obvious abnormality and " "atraumatic.    Eyes: conjunctivae and sclerae normal and no icterus  Neck: supple and +MILD JVD  Lungs:+FINE BIBASILAR CRACKLES  Heart: IRREG IRREG +CARLTON  Chest Wall: no abnormalities observed  Abdomen: normal bowel sounds and soft, nontender  Extremities: moves extremities well, +RUE EDEMA WHERE NEW AVF IS, no cyanosis. +DJD  Skin: no bleeding, bruising or rash  Neurologic: Alert, and oriented x 2-3    Labs:    WBC WBC   Date Value Ref Range Status   03/31/2024 6.45 3.40 - 10.80 10*3/mm3 Final   03/29/2024 3.76 3.40 - 10.80 10*3/mm3 Final   03/29/2024 4.12 3.40 - 10.80 10*3/mm3 Final   03/29/2024 4.53 3.40 - 10.80 10*3/mm3 Final   03/28/2024 3.31 (L) 3.40 - 10.80 10*3/mm3 Final      HGB Hemoglobin   Date Value Ref Range Status   03/31/2024 8.5 (L) 13.0 - 17.7 g/dL Final   03/29/2024 8.3 (L) 13.0 - 17.7 g/dL Final   03/29/2024 9.3 (L) 13.0 - 17.7 g/dL Final   03/29/2024 9.3 (L) 13.0 - 17.7 g/dL Final   03/28/2024 8.0 (L) 13.0 - 17.7 g/dL Final      HCT Hematocrit   Date Value Ref Range Status   03/31/2024 27.4 (L) 37.5 - 51.0 % Final   03/29/2024 27.3 (L) 37.5 - 51.0 % Final   03/29/2024 30.2 (L) 37.5 - 51.0 % Final   03/29/2024 30.5 (L) 37.5 - 51.0 % Final   03/28/2024 25.7 (L) 37.5 - 51.0 % Final      Platelets No results found for: \"LABPLAT\"   MCV MCV   Date Value Ref Range Status   03/31/2024 104.6 (H) 79.0 - 97.0 fL Final   03/29/2024 104.6 (H) 79.0 - 97.0 fL Final   03/29/2024 105.6 (H) 79.0 - 97.0 fL Final   03/29/2024 106.3 (H) 79.0 - 97.0 fL Final   03/28/2024 104.0 (H) 79.0 - 97.0 fL Final          Sodium Sodium   Date Value Ref Range Status   03/31/2024 140 136 - 145 mmol/L Final   03/29/2024 136 136 - 145 mmol/L Final   03/29/2024 140 136 - 145 mmol/L Final   03/29/2024 140 136 - 145 mmol/L Final      Potassium Potassium   Date Value Ref Range Status   03/31/2024 5.0 3.5 - 5.2 mmol/L Final     Comment:     Slight hemolysis detected by analyzer. Result may be falsely elevated.   03/29/2024 4.3 3.5 - 5.2 " "mmol/L Final   03/29/2024 4.4 3.5 - 5.2 mmol/L Final   03/29/2024 4.4 3.5 - 5.2 mmol/L Final      Chloride Chloride   Date Value Ref Range Status   03/31/2024 101 98 - 107 mmol/L Final   03/29/2024 98 98 - 107 mmol/L Final   03/29/2024 101 98 - 107 mmol/L Final   03/29/2024 101 98 - 107 mmol/L Final      CO2 CO2   Date Value Ref Range Status   03/31/2024 34.0 (H) 22.0 - 29.0 mmol/L Final   03/29/2024 32.0 (H) 22.0 - 29.0 mmol/L Final   03/29/2024 28.0 22.0 - 29.0 mmol/L Final   03/29/2024 31.0 (H) 22.0 - 29.0 mmol/L Final      BUN BUN   Date Value Ref Range Status   03/31/2024 13 8 - 23 mg/dL Final   03/29/2024 20 8 - 23 mg/dL Final   03/29/2024 18 8 - 23 mg/dL Final   03/29/2024 17 8 - 23 mg/dL Final      Creatinine Creatinine   Date Value Ref Range Status   03/31/2024 2.92 (H) 0.76 - 1.27 mg/dL Final   03/29/2024 3.45 (H) 0.76 - 1.27 mg/dL Final   03/29/2024 3.00 (H) 0.76 - 1.27 mg/dL Final   03/29/2024 2.98 (H) 0.76 - 1.27 mg/dL Final      Calcium Calcium   Date Value Ref Range Status   03/31/2024 9.4 8.2 - 9.6 mg/dL Final   03/29/2024 8.6 8.2 - 9.6 mg/dL Final   03/29/2024 9.0 8.2 - 9.6 mg/dL Final   03/29/2024 8.8 8.2 - 9.6 mg/dL Final      PO4 No components found for: \"PO4\"   Albumin Albumin   Date Value Ref Range Status   03/29/2024 3.5 3.5 - 5.2 g/dL Final   03/29/2024 3.5 3.5 - 5.2 g/dL Final      Magnesium Magnesium   Date Value Ref Range Status   03/29/2024 1.9 1.7 - 2.3 mg/dL Final   03/29/2024 1.9 1.7 - 2.3 mg/dL Final      Uric Acid No components found for: \"URIC ACID\"     Imaging Results (Last 72 Hours)       Procedure Component Value Units Date/Time    XR Chest 1 View [[id number]] Collected: 03/29/24 1639     Updated: 03/29/24 1642    Narrative:      XR CHEST 1 VW    Date of Exam: 3/29/2024 4:37 PM EDT    Indication: wheezing    Comparison: AP portable chest 2/17/2014.    Findings:  Stable right hemidiaphragm elevation. Left midlung and right infrahilar airspace disease is redemonstrated and is not " thought to be significantly changed. There is stable moderate generalized cardiac enlargement with median sternotomy and CABG. Loop   recorder projects over the left lower mediastinum. Right chest wall jeremías catheter tip terminates at the lower SVC level. Right subclavian stent is noted. No acute osseous abnormality is identified.      Impression:      Impression:    1. Left midlung and right infrahilar airspace disease appears unchanged from 2/28/2024. No new airspace disease is identified.  2. Stable cardiomegaly.      Electronically Signed: Mary Page MD    3/29/2024 4:40 PM EDT    Workstation ID: [id number]            Results for orders placed during the hospital encounter of 03/27/24    XR Chest 1 View    Narrative  XR CHEST 1 VW    Date of Exam: 3/29/2024 4:37 PM EDT    Indication: wheezing    Comparison: AP portable chest 2/17/2014.    Findings:  Stable right hemidiaphragm elevation. Left midlung and right infrahilar airspace disease is redemonstrated and is not thought to be significantly changed. There is stable moderate generalized cardiac enlargement with median sternotomy and CABG. Loop  recorder projects over the left lower mediastinum. Right chest wall jeremías catheter tip terminates at the lower SVC level. Right subclavian stent is noted. No acute osseous abnormality is identified.    Impression  Impression:    1. Left midlung and right infrahilar airspace disease appears unchanged from 2/28/2024. No new airspace disease is identified.  2. Stable cardiomegaly.      Electronically Signed: Mary Page MD  3/29/2024 4:40 PM EDT  Workstation ID: [id number]      Results for orders placed during the hospital encounter of 02/16/24    XR Chest 1 View    Narrative  XR CHEST 1 VW    Date of Exam: 2/17/2024 9:55 AM EST    Indication: hx of CHF, increased BNP    Comparison: 2/11/2024    Findings:  Patient is status post median sternotomy and CABG. Cardiac silhouette is enlarged. Vague perihilar opacities may  represent edema or infiltrates. No significant pleural effusion or pneumothorax is seen. No acute osseous lesion is seen. Right chest wall  Port-A-Cath terminates overlying the superior cavoatrial junction. Loop recorder projects over the left thorax. Metallic stent projects over the left upper thorax.    Impression  Impression:  Cardiomegaly with vague bilateral perihilar opacities which may represent edema or infiltrates.    Electronically Signed: Jose Be MD  2/17/2024 10:11 AM EST  Workstation ID: [id number]      Results for orders placed during the hospital encounter of 02/09/22    XR Chest PA & Lateral    Narrative  DATE OF EXAM:  2/9/2022 11:04 AM    PROCEDURE:  XR CHEST PA AND LATERAL-    INDICATIONS:  preop    COMPARISON:  Chest x-ray 3/17/2016, chest CT 9/9/2021    TECHNIQUE:  Two radiologic views of the chest , PA and lateral were obtained.    FINDINGS:  There is a left IJ dialysis catheter and right IJ port. A subcutaneous  cardiac event monitor is present. There is been coronary bypass. There  is cardiomegaly. The pulmonary vasculature is within normal limits.  There are coarse linear markings in the mid and lower lung zones, with  round and ovoid lucencies suspected to be bronchiectasis. There is  eventration of anterior right hemidiaphragm. Costophrenic angles are  sharp    Impression  Findings compatible with fibrotic changes and/or atelectasis in the mid  and lower lung zones bilaterally with probable bronchiectasis. This is a  new finding since the most recent chest x-ray from 2016 is felt to have  progressed since the 9/9/2021 chest CT    Electronically Signed By-Wesley Mcallister On:2/9/2022 11:15 AM  This report was finalized on [id number] by  Wesley Mcallister, .      Results for orders placed during the hospital encounter of 10/17/22    Duplex hemodialysis access CAR    Interpretation Summary    Patent right brachiocephalic fistula with overall adequate flow volumes.  Diameter and depth  both appear appropriate for dialysis.  Stenosis seen at the arterial anastomosis.        ASSESSMENT / PLAN      Loss of consciousness    Anemia due to chronic kidney disease stage III    Atrial fibrillation    Essential hypertension    Chronic diastolic heart failure    Chronic obstructive pulmonary disease    Coronary heart disease    ESRD (end stage renal disease)    Syncope and collapse    Thrombocytopenia    Syncope    Chronic anticoagulation    ESRD--------HD done yesterday per usual [facility] outpatient schedule. Vascular surgery following for RUE edema where new AVF is. Next HD on Tuesday, unless needed sooner     2. HYPOTENSION------Better. Continue Midodrine     3. S/P SYNCOPE/LOC-------? Secondary to hypotension     4. ATRIAL FIBRILLATION-------Rate controlled and anticoagulated     5. ANEMIA OF ESRD------EPO/Venofer with HD as needed     6. OA/DJD     7. DECONDITIONING--------PT/OT/Rehab as needed     8. THROMBOCYTOPENIA--------No heparin     9. MILD METABOLIC ALKALOSIS------Better     10. DIASTOLIC CHF/VOLUME OVERLOAD-------3 kg UF with HD as needed     11. COPD------ Oxygen, nebs, pulmonary toilet      So Mclean MD  [facility]  [phone]  03/31/24  09:43 EDT

## 2024-03-31 NOTE — NURSING NOTE
"During evening med pass, pt refused to take crushed Midodrine. This nurse placed it in a little bit of chocolate ice cream, pt would not open his mouth. This nurse placed small amount of ice cream to lips, pt was able to lick his lips and eventually took the rest of this medication. After medication it was time for evening turn prior to shift change. This nurse with assistance of NA explained to pt that we needed to turn him as we are trying to prevent sores from developing, pt shook head stating \"no\", this nurse reiterated again that we needed to turn, pt did not say anything. This nurse alerted pt again, that we were going to turn him to the right so we could put the purple wedge for his bottom, pt stated \"Get away\", as aide and this nurse were moving pt to the side. Pt began clenching left fist and began smacking the wedge which was near aide, pt did not hit aide and we were able to place wedge, and we covered pt back up. Nightshift notified. Pt has had 2 small episodes of vtach which is not the first time this has happened to pt, Dr. Butler notified earlier today, NNO.   "

## 2024-04-01 LAB
ANION GAP SERPL CALCULATED.3IONS-SCNC: 7 MMOL/L (ref 5–15)
BACTERIA SPEC AEROBE CULT: ABNORMAL
BUN SERPL-MCNC: 18 MG/DL (ref 8–23)
BUN/CREAT SERPL: 4.9 (ref 7–25)
CALCIUM SPEC-SCNC: 9 MG/DL (ref 8.2–9.6)
CHLORIDE SERPL-SCNC: 103 MMOL/L (ref 98–107)
CO2 SERPL-SCNC: 29 MMOL/L (ref 22–29)
CREAT SERPL-MCNC: 3.7 MG/DL (ref 0.76–1.27)
DEPRECATED RDW RBC AUTO: 61.6 FL (ref 37–54)
EGFRCR SERPLBLD CKD-EPI 2021: 14.8 ML/MIN/1.73
ERYTHROCYTE [DISTWIDTH] IN BLOOD BY AUTOMATED COUNT: 15.9 % (ref 12.3–15.4)
GLUCOSE BLDC GLUCOMTR-MCNC: 102 MG/DL (ref 70–105)
GLUCOSE BLDC GLUCOMTR-MCNC: 111 MG/DL (ref 70–105)
GLUCOSE BLDC GLUCOMTR-MCNC: 79 MG/DL (ref 70–105)
GLUCOSE BLDC GLUCOMTR-MCNC: 87 MG/DL (ref 70–105)
GLUCOSE SERPL-MCNC: 109 MG/DL (ref 65–99)
GRAM STN SPEC: ABNORMAL
HCT VFR BLD AUTO: 27.8 % (ref 37.5–51)
HGB BLD-MCNC: 8.4 G/DL (ref 13–17.7)
INR PPP: 4.46 (ref 2–3)
ISOLATED FROM: ABNORMAL
MAGNESIUM SERPL-MCNC: 1.9 MG/DL (ref 1.7–2.3)
MCH RBC QN AUTO: 32.1 PG (ref 26.6–33)
MCHC RBC AUTO-ENTMCNC: 30.2 G/DL (ref 31.5–35.7)
MCV RBC AUTO: 106.1 FL (ref 79–97)
PHOSPHATE SERPL-MCNC: 3.9 MG/DL (ref 2.5–4.5)
PLATELET # BLD AUTO: 71 10*3/MM3 (ref 140–450)
PMV BLD AUTO: 10.6 FL (ref 6–12)
POTASSIUM SERPL-SCNC: 4.1 MMOL/L (ref 3.5–5.2)
PROTHROMBIN TIME: 43.5 SECONDS (ref 19.4–28.5)
RBC # BLD AUTO: 2.62 10*6/MM3 (ref 4.14–5.8)
SODIUM SERPL-SCNC: 139 MMOL/L (ref 136–145)
WBC NRBC COR # BLD AUTO: 6.14 10*3/MM3 (ref 3.4–10.8)

## 2024-04-01 PROCEDURE — 97535 SELF CARE MNGMENT TRAINING: CPT | Performed by: OCCUPATIONAL THERAPIST

## 2024-04-01 PROCEDURE — 99497 ADVNCD CARE PLAN 30 MIN: CPT | Performed by: NURSE PRACTITIONER

## 2024-04-01 PROCEDURE — 97112 NEUROMUSCULAR REEDUCATION: CPT

## 2024-04-01 PROCEDURE — 82948 REAGENT STRIP/BLOOD GLUCOSE: CPT

## 2024-04-01 PROCEDURE — 85027 COMPLETE CBC AUTOMATED: CPT | Performed by: INTERNAL MEDICINE

## 2024-04-01 PROCEDURE — 83735 ASSAY OF MAGNESIUM: CPT | Performed by: INTERNAL MEDICINE

## 2024-04-01 PROCEDURE — 97110 THERAPEUTIC EXERCISES: CPT | Performed by: OCCUPATIONAL THERAPIST

## 2024-04-01 PROCEDURE — 97530 THERAPEUTIC ACTIVITIES: CPT | Performed by: OCCUPATIONAL THERAPIST

## 2024-04-01 PROCEDURE — 94799 UNLISTED PULMONARY SVC/PX: CPT

## 2024-04-01 PROCEDURE — 82948 REAGENT STRIP/BLOOD GLUCOSE: CPT | Performed by: INTERNAL MEDICINE

## 2024-04-01 PROCEDURE — 99221 1ST HOSP IP/OBS SF/LOW 40: CPT | Performed by: NURSE PRACTITIONER

## 2024-04-01 PROCEDURE — 97530 THERAPEUTIC ACTIVITIES: CPT

## 2024-04-01 PROCEDURE — 97110 THERAPEUTIC EXERCISES: CPT

## 2024-04-01 PROCEDURE — 94664 DEMO&/EVAL PT USE INHALER: CPT

## 2024-04-01 PROCEDURE — 97112 NEUROMUSCULAR REEDUCATION: CPT | Performed by: OCCUPATIONAL THERAPIST

## 2024-04-01 PROCEDURE — 85610 PROTHROMBIN TIME: CPT | Performed by: INTERNAL MEDICINE

## 2024-04-01 PROCEDURE — 80048 BASIC METABOLIC PNL TOTAL CA: CPT | Performed by: INTERNAL MEDICINE

## 2024-04-01 PROCEDURE — 84100 ASSAY OF PHOSPHORUS: CPT | Performed by: INTERNAL MEDICINE

## 2024-04-01 RX ADMIN — GUAIFENESIN 200 MG: 200 SOLUTION ORAL at 00:01

## 2024-04-01 RX ADMIN — ATORVASTATIN CALCIUM 20 MG: 20 TABLET, FILM COATED ORAL at 22:29

## 2024-04-01 RX ADMIN — IPRATROPIUM BROMIDE AND ALBUTEROL SULFATE 3 ML: .5; 3 SOLUTION RESPIRATORY (INHALATION) at 15:30

## 2024-04-01 RX ADMIN — Medication 10 ML: at 22:28

## 2024-04-01 RX ADMIN — IPRATROPIUM BROMIDE AND ALBUTEROL SULFATE 3 ML: .5; 3 SOLUTION RESPIRATORY (INHALATION) at 06:54

## 2024-04-01 RX ADMIN — TAMSULOSIN HYDROCHLORIDE 0.4 MG: 0.4 CAPSULE ORAL at 22:29

## 2024-04-01 RX ADMIN — DONEPEZIL HYDROCHLORIDE 10 MG: 5 TABLET, FILM COATED ORAL at 22:28

## 2024-04-01 RX ADMIN — LEVOTHYROXINE SODIUM 50 MCG: 0.05 TABLET ORAL at 22:29

## 2024-04-01 RX ADMIN — IPRATROPIUM BROMIDE AND ALBUTEROL SULFATE 3 ML: .5; 3 SOLUTION RESPIRATORY (INHALATION) at 19:24

## 2024-04-01 NOTE — PLAN OF CARE
"Goal Outcome Evaluation: Patient slept off and on most of the day. Patient was calm but refusing all medications most of the morning. In the afternoon patient became more irritable and continued to refuse medications. Hospitalist aware patient refusing medications. Patients family came for a few hours during lunch and was able to get patient to eat lunch but patient refused breakfast and lunch. Patient was turned some today but has mostly refused to be repositioned. PT and OT were able to work with patient today. Patient has been yelling and telling staff \"Get out\" for the past hour or so. Bed in lowest position. Call light whenever reach. Bed alarm on.   Problem: Adult Inpatient Plan of Care  Goal: Plan of Care Review  Outcome: Ongoing, Progressing  Goal: Patient-Specific Goal (Individualized)  Outcome: Ongoing, Progressing  Goal: Absence of Hospital-Acquired Illness or Injury  Outcome: Ongoing, Progressing  Intervention: Identify and Manage Fall Risk  Recent Flowsheet Documentation  Taken 4/1/2024 0800 by Meghan Angulo RN  Safety Promotion/Fall Prevention:   safety round/check completed   nonskid shoes/slippers when out of bed   fall prevention program maintained   clutter free environment maintained   assistive device/personal items within reach   activity supervised  Intervention: Prevent Skin Injury  Recent Flowsheet Documentation  Taken 4/1/2024 1700 by Meghan Angulo RN  Body Position: patient/family refused  Taken 4/1/2024 0800 by Meghan Angulo RN  Body Position:   turned   right  Skin Protection:   adhesive use limited   tubing/devices free from skin contact  Intervention: Prevent and Manage VTE (Venous Thromboembolism) Risk  Recent Flowsheet Documentation  Taken 4/1/2024 1700 by Meghan Angulo RN  Activity Management: patient refuses activity  Taken 4/1/2024 0800 by Meghan Angulo RN  Activity Management: activity encouraged  Range of Motion: active ROM (range of motion) encouraged  Goal: " Optimal Comfort and Wellbeing  Outcome: Ongoing, Progressing  Intervention: Provide Person-Centered Care  Recent Flowsheet Documentation  Taken 4/1/2024 1700 by Meghan Angulo RN  Trust Relationship/Rapport:   care explained   choices provided   thoughts/feelings acknowledged  Taken 4/1/2024 1205 by Meghan Angulo RN  Trust Relationship/Rapport: thoughts/feelings acknowledged  Taken 4/1/2024 0800 by Meghan Angulo RN  Trust Relationship/Rapport:   care explained   choices provided   reassurance provided  Goal: Readiness for Transition of Care  Outcome: Ongoing, Progressing     Problem: Skin Injury Risk Increased  Goal: Skin Health and Integrity  Outcome: Ongoing, Progressing  Intervention: Optimize Skin Protection  Recent Flowsheet Documentation  Taken 4/1/2024 0800 by Meghan Angulo RN  Pressure Reduction Techniques:   weight shift assistance provided   positioned off wounds  Head of Bed (HOB) Positioning: HOB elevated  Pressure Reduction Devices: heel offloading device utilized  Skin Protection:   adhesive use limited   tubing/devices free from skin contact     Problem: Asthma Comorbidity  Goal: Maintenance of Asthma Control  Outcome: Ongoing, Progressing  Intervention: Maintain Asthma Symptom Control  Recent Flowsheet Documentation  Taken 4/1/2024 0800 by Meghan Angulo RN  Medication Review/Management: medications reviewed     Problem: Behavioral Health Comorbidity  Goal: Maintenance of Behavioral Health Symptom Control  Outcome: Ongoing, Progressing  Intervention: Maintain Behavioral Health Symptom Control  Recent Flowsheet Documentation  Taken 4/1/2024 0800 by Meghan Angulo RN  Medication Review/Management: medications reviewed     Problem: COPD (Chronic Obstructive Pulmonary Disease) Comorbidity  Goal: Maintenance of COPD Symptom Control  Outcome: Ongoing, Progressing  Intervention: Maintain COPD-Symptom Control  Recent Flowsheet Documentation  Taken 4/1/2024 0800 by Meghan Angulo  RN  Supportive Measures: active listening utilized  Medication Review/Management: medications reviewed     Problem: Diabetes Comorbidity  Goal: Blood Glucose Level Within Targeted Range  Outcome: Ongoing, Progressing  Intervention: Monitor and Manage Glycemia  Recent Flowsheet Documentation  Taken 4/1/2024 0800 by Meghan Angulo RN  Glycemic Management: blood glucose monitored     Problem: Heart Failure Comorbidity  Goal: Maintenance of Heart Failure Symptom Control  Outcome: Ongoing, Progressing  Intervention: Maintain Heart Failure-Management  Recent Flowsheet Documentation  Taken 4/1/2024 0800 by Meghan Angulo RN  Medication Review/Management: medications reviewed     Problem: Hypertension Comorbidity  Goal: Blood Pressure in Desired Range  Outcome: Ongoing, Progressing  Intervention: Maintain Blood Pressure Management  Recent Flowsheet Documentation  Taken 4/1/2024 0800 by Meghan Angulo RN  Medication Review/Management: medications reviewed     Problem: Obstructive Sleep Apnea Risk or Actual Comorbidity Management  Goal: Unobstructed Breathing During Sleep  Outcome: Ongoing, Progressing     Problem: Osteoarthritis Comorbidity  Goal: Maintenance of Osteoarthritis Symptom Control  Outcome: Ongoing, Progressing  Intervention: Maintain Osteoarthritis Symptom Control  Recent Flowsheet Documentation  Taken 4/1/2024 1700 by Meghan Angulo RN  Activity Management: patient refuses activity  Taken 4/1/2024 0800 by Meghan Angulo RN  Activity Management: activity encouraged  Medication Review/Management: medications reviewed     Problem: Pain Chronic (Persistent) (Comorbidity Management)  Goal: Acceptable Pain Control and Functional Ability  Outcome: Ongoing, Progressing  Intervention: Manage Persistent Pain  Recent Flowsheet Documentation  Taken 4/1/2024 0800 by Meghan Angulo RN  Sleep/Rest Enhancement:   awakenings minimized   consistent schedule promoted   regular sleep/rest pattern  promoted  Medication Review/Management: medications reviewed  Intervention: Optimize Psychosocial Wellbeing  Recent Flowsheet Documentation  Taken 4/1/2024 1700 by Meghan Angulo RN  Family/Support System Care: self-care encouraged  Taken 4/1/2024 1205 by Meghan Angulo RN  Diversional Activities: television  Family/Support System Care:   presence promoted   self-care encouraged   support provided  Taken 4/1/2024 0800 by Meghan Angulo RN  Supportive Measures: active listening utilized  Diversional Activities: television  Family/Support System Care:   presence promoted   self-care encouraged   support provided     Problem: Seizure Disorder Comorbidity  Goal: Maintenance of Seizure Control  Outcome: Ongoing, Progressing     Problem: Fall Injury Risk  Goal: Absence of Fall and Fall-Related Injury  Outcome: Ongoing, Progressing  Intervention: Identify and Manage Contributors  Recent Flowsheet Documentation  Taken 4/1/2024 0800 by Meghan Angulo RN  Medication Review/Management: medications reviewed  Intervention: Promote Injury-Free Environment  Recent Flowsheet Documentation  Taken 4/1/2024 0800 by Meghan Angulo RN  Safety Promotion/Fall Prevention:   safety round/check completed   nonskid shoes/slippers when out of bed   fall prevention program maintained   clutter free environment maintained   assistive device/personal items within reach   activity supervised     Problem: Adjustment to Illness (Sepsis/Septic Shock)  Goal: Optimal Coping  Outcome: Ongoing, Progressing  Intervention: Optimize Psychosocial Adjustment to Illness  Recent Flowsheet Documentation  Taken 4/1/2024 1700 by Meghan Angulo RN  Family/Support System Care: self-care encouraged  Taken 4/1/2024 1205 by Meghan Angulo RN  Family/Support System Care:   presence promoted   self-care encouraged   support provided  Taken 4/1/2024 0800 by Meghan Angulo RN  Supportive Measures: active listening utilized  Family/Support System  Care:   presence promoted   self-care encouraged   support provided     Problem: Bleeding (Sepsis/Septic Shock)  Goal: Absence of Bleeding  Outcome: Ongoing, Progressing     Problem: Glycemic Control Impaired (Sepsis/Septic Shock)  Goal: Blood Glucose Level Within Desired Range  Outcome: Ongoing, Progressing  Intervention: Optimize Glycemic Control  Recent Flowsheet Documentation  Taken 4/1/2024 0800 by Meghan Angulo RN  Glycemic Management: blood glucose monitored     Problem: Infection Progression (Sepsis/Septic Shock)  Goal: Absence of Infection Signs and Symptoms  Outcome: Ongoing, Progressing  Intervention: Promote Recovery  Recent Flowsheet Documentation  Taken 4/1/2024 1700 by Meghan Angulo, RN  Activity Management: patient refuses activity  Taken 4/1/2024 0800 by Meghan Angulo RN  Activity Management: activity encouraged  Sleep/Rest Enhancement:   awakenings minimized   consistent schedule promoted   regular sleep/rest pattern promoted     Problem: Nutrition Impaired (Sepsis/Septic Shock)  Goal: Optimal Nutrition Intake  Outcome: Ongoing, Progressing

## 2024-04-01 NOTE — CASE MANAGEMENT/SOCIAL WORK
Continued Stay Note  DEISY Simone     Patient Name: Barry Calhoun Sr.  MRN: [id number]  Today's Date: 4/1/2024    Admit Date: 3/27/2024    Plan: [facility] accepted. No precert required, PASRR approved. [facility] following (accepted, order per MD). From home, current OP HD [facility].   Discharge Plan       Row Name 04/01/24 1516       Plan    Plan [facility] accepted. No precert required, PASRR approved. [facility] following (accepted, order per MD). From home, current OP HD [facility].    Plan Comments Received update from [facility] liaison, that they can accept and bed is available. Pharmacy updated for facility. MD and RN notified in unit rounds.                Louise Oliva RN     Office Phone: [phone]  Office Cell: [phone]

## 2024-04-01 NOTE — PLAN OF CARE
"Assessment: Barry Calhoun Sr. presents with functional mobility impairments which indicate the need for skilled intervention. Tolerating session today without incident. Pt initially very lethargic and difficult to arouse this date, but agreeable to work with PT staff. Still requiring max A x 2 for bed mobility, but shows improvement with sitting balance and activity tolerance this date. Part of session was geared toward pt working on midline orientation and postural correction to perturbations. Extensive verbal and tactile cues provided during session to work on increasing DANNA at EOB as well as appropriate muscle contractions during exercise completion. He required mod A initially for sitting balance, improving to CGA with cuing and education. Poor DF/PF AROM with exercise completion, seemingly due to poor strength rather than AROM deficit. Per [facility] record, pt normally requiring max A x 2 for transfers, non-ambulatory, and total assist for feeding. PT will continue to follow during stay to progress activity tolerance and progress into transfers.    Plan/Recommendations:   If medically appropriate, Moderate Intensity Therapy recommended post-acute care. This is recommended as therapy feels the patient would require 3-4 days per week and wouldn't tolerate \"3 hour daily\" rehab intensity. SNF would be the preferred choice. If the patient does not agree to SNF, arrange HH or OP depending on home bound status. If patient is medically complex, consider LTACH. Pt requires no DME at discharge.     Pt desires Skilled Rehab placement at discharge. Pt cooperative; agreeable to therapeutic recommendations and plan of care.   "

## 2024-04-01 NOTE — PLAN OF CARE
"Goal Outcome Evaluation:         Assessment: Barry Calhoun Sr. presents with ADL impairments affecting function including balance, cognition, coordination, endurance / activity tolerance, grasp, range of motion (ROM), and strength. Pt demo improved bed mobility & tolerating sitting EOB. Pt participating with exercises sitting EOB & with BADLs with assist. Demonstrated functioning below baseline abilities indicate the need for continued skilled intervention while inpatient. Tolerating session today without incident. Will continue to follow and progress as tolerated.     Plan/Recommendations:   Moderate Intensity Therapy recommended post-acute care. This is recommended as therapy feels the patient would require 3-4 days per week and wouldn't tolerate \"3 hour daily\" rehab intensity. SNF would be the preferred choice. If the patient does not agree to SNF, arrange HH or OP depending on home bound status. If patient is medically complex, consider LTACH.. Pt requires no DME at discharge.     Pt & grandson desire Skilled Rehab placement at discharge with eventual goal of going to grandson's home for him to care for him. Pt cooperative; agreeable to therapeutic recommendations and plan of care.                                "

## 2024-04-01 NOTE — THERAPY TREATMENT NOTE
Subjective: Pt agreeable to therapeutic plan of care. Pt very lethargic this date, difficult to arouse, but agreeable to work with PT staff this date.    Objective:     Bed mobility - Max-A and Assist x 2 to complete supine<>sit EOB. Draw sheet utilized as well as verbal cues for hand placement on bed rails and rolling mechanics. Once sitting EOB, pt completed several seated balance exercises working on midline orientation and postural correction to pertubation. Verbal and tactile cues utilized for increasing DANNA and BUE support.  Transfers - N/A or Not attempted.  Ambulation - N/A or Not attempted.    Per [facility] record, pt normally requiring max A x 2 for transfers, non-ambulatory, and total assist for feeding.     Therapeutic Exercise - 10 Reps B LE AROM lying supine and unsupported sitting / EOB. Pt completed 1x10 of each of the following exercises lying supine in bed: Ankle Pumps and Heel Slides. Pt completed 1x10 of each of the following exercises sitting EOB: LAQ, Seated Marching, and Ankle Pumps.    Vitals: WNL    Pain: 0 VAS   Location: N/A  Intervention for pain: N/A    Education: Provided education on the importance of mobility in the acute care setting, Verbal/Tactile Cues, ADL training, and Energy conservation strategies    Assessment: Barry Calhoun Sr. presents with functional mobility impairments which indicate the need for skilled intervention. Tolerating session today without incident. Pt initially very lethargic and difficult to arouse this date, but agreeable to work with PT staff. Still requiring max A x 2 for bed mobility, but shows improvement with sitting balance and activity tolerance this date. Part of session was geared toward pt working on midline orientation and postural correction to perturbations. Extensive verbal and tactile cues provided during session to work on increasing DANNA at EOB as well as appropriate muscle contractions during exercise completion. He required  "mod A initially for sitting balance, improving to CGA with cuing and education. Poor DF/PF AROM with exercise completion, seemingly due to poor strength rather than AROM deficit. Per [facility] record, pt normally requiring max A x 2 for transfers, non-ambulatory, and total assist for feeding. PT will continue to follow during stay to progress activity tolerance and progress into transfers.    Plan/Recommendations:   If medically appropriate, Moderate Intensity Therapy recommended post-acute care. This is recommended as therapy feels the patient would require 3-4 days per week and wouldn't tolerate \"3 hour daily\" rehab intensity. SNF would be the preferred choice. If the patient does not agree to SNF, arrange HH or OP depending on home bound status. If patient is medically complex, consider LTACH. Pt requires no DME at discharge.     Pt desires Skilled Rehab placement at discharge. Pt cooperative; agreeable to therapeutic recommendations and plan of care.         Basic Mobility 6-click:  Rollin = Total, A lot = 2, A little = 3; 4 = None  Supine>Sit:   1 = Total, A lot = 2, A little = 3; 4 = None   Sit>Stand with arms:  1 = Total, A lot = 2, A little = 3; 4 = None  Bed>Chair:   1 = Total, A lot = 2, A little = 3; 4 = None  Ambulate in room:  1 = Total, A lot = 2, A little = 3; 4 = None  3-5 Steps with railin = Total, A lot = 2, A little = 3; 4 = None  Score: 8    Modified [place]: N/A = No pre-op stroke/TIA    Post-Tx Position: Supine with HOB Elevated, Alarms activated, and Call light and personal items within reach  PPE: gloves    "

## 2024-04-01 NOTE — NURSING NOTE
89-year-old male who presents to the hospital from his ECF where he was found to follow on follow-up loss of consciousness.  Patient is awake and alert however he does not have some confusion.  Consult was received to assess the patient's sacral buttocks area.  Patient is stiff, difficult to turn in position sacral area.  Patient does have some incontinence but there is nothing open at this time there is an area that appears to be an old area but it is not open.  Would recommend to continue with zinc oxide-based barrier creams.  He is currently on a [employer] surface which is appropriate.  He does have soft midline offloading boots in place and he does have a deep tissue pressure injury to the right heel.  There is an area of nonblanchable ecchymosis.  There was a silicone foam border dressing in place.  I did remove this will recommend leaving it open to air and to continue with soft midline offloading boots.

## 2024-04-01 NOTE — CONSULTS
Palliative Care Consultation    Patient Name: Barry Calhoun Sr.  : 1932  MRN: [id number]  Allergies: Patient has no known allergies.    Requesting clinician:  Rell   Reason for consult: Consultation for clarification of goals of care and code status.      Patient Code Status:   Code Status and Medical Interventions:   Ordered at: 24     Medical Intervention Limits:    NO intubation (DNI)     Code Status (Patient has no pulse and is not breathing):    No CPR (Do Not Attempt to Resuscitate)     Medical Interventions (Patient has pulse or is breathing):    Limited Support           Chief Complaint:    Found on the floor     History of Present Illness    Barry Calhoun Sr. is a 89 y.o. male who presented to [facility] ED on 3/27 with reports of being found on the floor. Per family he was apparently getting out of his chair when he either fell or had a syncopal episode. He reported loss of consciousness. Patient minimally able to participate in HPI due to baseline dementia. No reported pain, nausea, vomiting, diarrhea, or chest pain.     In ED: Glucose 89, Creatinine 3.51, Creatinine 4.8, Protime 29.6, WBC 4.98, Hgb 8.7, Platelets 44    CT head with no acute abnormalities.     Nephrology consulted for management of ESRD and dialysis.     Patient with reported episodes of NSVT. Cardiology consulted. Repeat echo ordered.     Psych consulted for behavioral issues.     Patient cultures 1/2 +. On IV antibiotics. ID consulted.      Palliative consult for goals of care discussion in an acutely ill patient with confusion.     VITAL SIGNS:   Temp:  [97 °F (36.1 °C)-97.5 °F (36.4 °C)] 97.4 °F (36.3 °C)  Heart Rate:  [69-84] 75  Resp:  [15-21] 18  BP: ()/(48-66) 101/66       PMH: Asthma, COPD, CKD, CAD, CVA, Gout, HTN    Past Surgical History:   Procedure Laterality Date    ARTERIOVENOUS FISTULA/SHUNT SURGERY Right 2022    Procedure: RIGHT ARM BRACHIAL CEPHALIC ARTERIAL VENOUS FISTULA;  Surgeon:  Drew Gunter MD;  Location: [facility] MAIN OR;  Service: Vascular;  Laterality: Right;    CARDIAC CATHETERIZATION  2016    [facility]    CORONARY ARTERY BYPASS GRAFT      CABG X3, reoperation, 09; CAB and     OTHER SURGICAL HISTORY  2016    loop recorder implant        Family History   Problem Relation Age of Onset    Stroke Other        Social History     Tobacco Use    Smoking status: Never     Passive exposure: Never    Smokeless tobacco: Never   Vaping Use    Vaping status: Never Used   Substance Use Topics    Alcohol use: No    Drug use: No           LABS:    Results from last 7 days   Lab Units 24  0059   WBC 10*3/mm3 6.14   HEMOGLOBIN g/dL 8.4*   HEMATOCRIT % 27.8*   PLATELETS 10*3/mm3 71*     Results from last 7 days   Lab Units 24  0059   SODIUM mmol/L 139   POTASSIUM mmol/L 4.1   CHLORIDE mmol/L 103   CO2 mmol/L 29.0   BUN mg/dL 18   CREATININE mg/dL 3.70*   GLUCOSE mg/dL 109*   CALCIUM mg/dL 9.0     Results from last 7 days   Lab Units 24  0059 24  2345 24  1505   SODIUM mmol/L 139   < > 140   POTASSIUM mmol/L 4.1   < > 4.4   CHLORIDE mmol/L 103   < > 101   CO2 mmol/L 29.0   < > 28.0   BUN mg/dL 18   < > 18   CREATININE mg/dL 3.70*   < > 3.00*   CALCIUM mg/dL 9.0   < > 9.0   BILIRUBIN mg/dL  --   --  0.7  0.7   ALK PHOS U/L  --   --  97  97   ALT (SGPT) U/L  --   --  13  13   AST (SGOT) U/L  --   --  27  27   GLUCOSE mg/dL 109*   < > 70    < > = values in this interval not displayed.         IMAGING STUDIES:  No radiology results for the last day      I reviewed the patient's new clinical results including labs, imaging, and vitals.        Scheduled Meds:  allopurinol, 100 mg, Oral, Daily  atorvastatin, 20 mg, Oral, Nightly  docusate sodium, 200 mg, Oral, QAM  donepezil, 10 mg, Oral, Nightly  famotidine, 10 mg, Oral, Daily  folic acid, 1 mg, Oral, Daily  ipratropium-albuterol, 3 mL, Nebulization, 4x Daily - RT  levothyroxine, 50 mcg, Oral,  Nightly  metoprolol succinate XL, 12.5 mg, Oral, Q24H  midodrine, 10 mg, Oral, TID AC  sodium chloride, 250 mL, Intravenous, Once  sodium chloride, 10 mL, Intravenous, Q12H  tamsulosin, 0.4 mg, Oral, Nightly  [Held by provider] valproate sodium, 125 mg, Intravenous, Nightly      Continuous Infusions:  Pharmacy to dose vancomycin,   Pharmacy to dose warfarin,         I have reviewed patient's current medication list.     Review of Systems   Constitutional:  Positive for fatigue.   Respiratory:  Positive for cough and shortness of breath.    Neurological:  Positive for weakness.   All other systems reviewed and are negative.        Physical Exam  Vitals and nursing note reviewed.   Constitutional:       Appearance: He is ill-appearing.   HENT:      Head: Normocephalic and atraumatic.      Nose: Nose normal.      Comments: Nasal canula      Mouth/Throat:      Mouth: Mucous membranes are dry.   Eyes:      Conjunctiva/sclera: Conjunctivae normal.   Cardiovascular:      Rate and Rhythm: Normal rate.      Pulses: Normal pulses.   Pulmonary:      Effort: Pulmonary effort is normal.   Abdominal:      Palpations: Abdomen is soft.   Musculoskeletal:         General: Normal range of motion.      Cervical back: Normal range of motion.   Skin:     General: Skin is dry.      Comments: Swelling to right arm   Neurological:      General: No focal deficit present.      Mental Status: He is alert. He is disoriented.             PROBLEM LIST:    Loss of consciousness    Anemia due to chronic kidney disease stage III    Atrial fibrillation    Essential hypertension    Chronic diastolic heart failure    Chronic obstructive pulmonary disease    Coronary heart disease    ESRD (end stage renal disease)    Syncope and collapse    Thrombocytopenia    Syncope    Chronic anticoagulation          ASSESSMENT/PLAN:    Syncope: with collapse. CT head with no acute abnormalities. Recent echo with normal LV. MRI ordered.     Thrombocytopenia:  Platelets 44k. No current workup in progress at this time.     ESRD: on dialysis. Nephrology consulted for management.     NSVT: multiple runs. Cardiology consulted. Echo ordered.     Afib/CAD/CVA/Gout: chronic conditions per primary.     These illnesses and their management contribute to the need for a palliative consult and advanced care planning.      ADVANCED CARE PLANNIN/1 Met with patient and grandson at bedside. Patient is awake on and off through out conversation, but confused. We discussed his current clinical status and overall goals of care. Grandson tells me that he just became the patients POA last month and that he is not fully aware of the patients medical history. We discussed his current clinical issues and long term goals. Grandson tells me that he is not comfortable making decisions on the patients behalf without having other family involved in the discussion. We talked about continuation of aggressive treatment with continued dialysis, testing, and workup vs comfort care and home with hospice, with no additional dialysis. Grandson accepting of information but not ready to make any decisions. He does affirm patients DNR/DNI status. We discussed his  POA documentation which mentions relief of pain and no means to artificially prolong life. We will continue to follow. This information was shared with nursing.       Advanced Directives: Patient has advance directive, copy in chart  Health Care Directive on file: No  Health Care Surrogate:      Palliative Performance Scale Score:    Comments:           Decisional Capacity: no  Patient's understanding of illness: unknown  Patient goals of care:  DNR/DNI      Thank you for this consult and allowing us to participate in patient's plan of care. Palliative Care Team will continue to follow patient.       I spent 50 minutes reviewing providers documentation, medication records, assessing and examining patient, discussing with family, answering  questions, providing guidance about a plan of care, and coordinating care with other healthcare members. More than 50% of time spent face to face discussing disease education, current clinical status, and medication management.     I spent an additional 19 minutes on advanced care planning, goals of care, and code status discussion.  I obtained consent for ACP discussion.     Natalie Bernstein, APRN  4/1/2024

## 2024-04-01 NOTE — PROGRESS NOTES
[facility] MEDICINE SERVICE  DAILY PROGRESS NOTE    NAME: Barry Calhoun Sr.  : 1932  MRN: [id number]      LOS: 4 days     PROVIDER OF SERVICE: Suhail Zacarias MD    Chief Complaint: Loss of consciousness    Subjective:     Interval History:  History taken from: patient        Subjective       Chief Complaint: Fall      History of Present Illness: Barry Calhoun Sr. is a 89 y.o. male with a PMH of Asthma, non-Hodgkin's lymphoma, COPD, CAD, S/P CABG, CVA, and CKD  who presented to [facility] on 3/27/2024 from his extended care facility where they report he fell and loss consciousness. Mr. Calhoun is unable to provide any meaningful medical history due to underling dementia. Unfortunately his family has left for the evening and unavailable at bedside to provide any history. All information has been obtain from the ER provider and review of Mr. Calhoun's medical records.       Upon exam he is pleasantly confused, stating he is being seen due to a pot hole not being repaired.He does follow commands,  His lungs are diminished in bilateral bases, His heart rate and rhythm is regular, abdomen is soft, nondistended, nontender with audible bowel sounds throughout..     Initial evaluation in the emergency room is hemodynamically stable with blood pressure soft at 93/54, heart rate 63, respiratory rate 18, oxygen saturation 94% on room air, he is afebrile with a Tmax of 96.3  Creatinine is 3.51, BUN 17 this is post HD today.  He is anemic with hemoglobin of 8.7, hematocrit 24 which is improved from previous 8.4 and 26.5 on ,  Thrombocytopenia his platelets are 44 today which is decreased from his baseline that is near 100.  CT scan of his head is unremarkable for any acute intracranial processes  EKG interpreted as atrial fibrillation with a rate of 72     Mr. Calhoun will be admitted for further evaluation and treatment of fall/ syncope and other acute and or chronic conditions as needed,    3/29/24 patient seen in bed no acute distress, still kind of agitated.  Vital signs stable, discussed with RN, started Ativan as needed. Patient Denies: Headache, dizziness, chest pain, chills  3/30/24 seen in bed NAD, agitation had to be restrained, DW RN receiving HD  3/31/2024 patient seen and examined in bed no acute distress no new complaints, discussed with RN, positive blood culture, temperature 100.2.  Consulted ID.  4/1/2024 patient seen and examined in bed no acute distress, he is more calm today.  Vital signs stable, discussed with RN,    Review of Systems: Unable to perform ROS: Dementia   Review of Systems   Constitutional:  Negative for chills and fever.   Respiratory:  Negative for chest tightness and shortness of breath.    Neurological:  Negative for dizziness and headaches.   Psychiatric/Behavioral:  Positive for confusion.        Objective:     Vital Signs  Temp:  [97 °F (36.1 °C)-97.5 °F (36.4 °C)] 97.4 °F (36.3 °C)  Heart Rate:  [69-84] 75  Resp:  [15-21] 18  BP: ()/(48-66) 101/66  Flow (L/min):  [2] 2   Body mass index is 22.46 kg/m².      Physical Exam  Constitutional:       Appearance: Normal appearance.   HENT:      Head: Normocephalic and atraumatic.      Nose: Nose normal.      Mouth/Throat:      Mouth: Mucous membranes are moist.   Eyes:      Extraocular Movements: Extraocular movements intact.      Conjunctiva/sclera: Conjunctivae normal.      Pupils: Pupils are equal, round, and reactive to light.   Cardiovascular:      Rate and Rhythm: Normal rate and regular rhythm.      Pulses: Normal pulses.      Heart sounds: Normal heart sounds.   Pulmonary:      Effort: Pulmonary effort is normal.      Breath sounds: Normal breath sounds.   Abdominal:      General: Abdomen is flat. Bowel sounds are normal.      Palpations: Abdomen is soft.   Musculoskeletal:         General: Normal range of motion.      Cervical back: Normal range of motion.   Skin:     General: Skin is warm and dry.    Neurological:      General: No focal deficit present.      Mental Status: He is alert. He is disoriented.   Psychiatric:         Mood and Affect: Mood normal.         Behavior: Behavior normal.         Thought Content: Thought content normal.         Judgment: Judgment normal.         Scheduled Meds   allopurinol, 100 mg, Oral, Daily  atorvastatin, 20 mg, Oral, Nightly  docusate sodium, 200 mg, Oral, QAM  donepezil, 10 mg, Oral, Nightly  famotidine, 10 mg, Oral, Daily  folic acid, 1 mg, Oral, Daily  ipratropium-albuterol, 3 mL, Nebulization, 4x Daily - RT  levothyroxine, 50 mcg, Oral, Nightly  metoprolol succinate XL, 12.5 mg, Oral, Q24H  midodrine, 10 mg, Oral, TID AC  sodium chloride, 250 mL, Intravenous, Once  sodium chloride, 10 mL, Intravenous, Q12H  tamsulosin, 0.4 mg, Oral, Nightly  [Held by provider] valproate sodium, 125 mg, Intravenous, Nightly       PRN Meds     acetaminophen **OR** acetaminophen    senna-docusate sodium **AND** polyethylene glycol **AND** bisacodyl **AND** bisacodyl    dextrose    dextrose    glucagon (human recombinant)    guaifenesin    haloperidol lactate    nitroglycerin    Pharmacy to dose vancomycin    Pharmacy to dose warfarin    sodium chloride    sodium chloride    Vancomycin Pharmacy Intermittent/Pulse Dosing   Infusions  Pharmacy to dose vancomycin,   Pharmacy to dose warfarin,           Diagnostic Data    Results from last 7 days   Lab Units 04/01/24  0059 03/29/24  1812 03/29/24  1505   WBC 10*3/mm3 6.14   < >  --    HEMOGLOBIN g/dL 8.4*   < >  --    HEMATOCRIT % 27.8*   < >  --    PLATELETS 10*3/mm3 71*   < >  --    GLUCOSE mg/dL 109*   < > 70   CREATININE mg/dL 3.70*   < > 3.00*   BUN mg/dL 18   < > 18   SODIUM mmol/L 139   < > 140   POTASSIUM mmol/L 4.1   < > 4.4   AST (SGOT) U/L  --   --  27  27   ALT (SGPT) U/L  --   --  13  13   ALK PHOS U/L  --   --  97  97   BILIRUBIN mg/dL  --   --  0.7  0.7   ANION GAP mmol/L 7.0   < > 11.0    < > = values in this interval  not displayed.       No radiology results for the last day      I reviewed the patient's new clinical results.    Assessment/Plan:     Active and Resolved Problems  Active Hospital Problems    Diagnosis  POA    **Loss of consciousness [R40.20]  Yes    Chronic anticoagulation [Z79.01]  Not Applicable    Syncope [R55]  Yes    ESRD (end stage renal disease) [N18.6]  Yes    Syncope and collapse [R55]  Yes    Thrombocytopenia [D69.6]  Yes    Coronary heart disease [I25.10]  Yes    Chronic obstructive pulmonary disease [J44.9]  Yes    Essential hypertension [I10]  Yes    Atrial fibrillation [I48.91]  Yes    Anemia due to chronic kidney disease stage III [N18.9, D63.1]  Yes    Chronic diastolic heart failure [I50.32]  Yes      Resolved Hospital Problems   No resolved problems to display.     Loss of consciousness  Syncope and collapse   -Patient is amnesic to the event  -Witnessed at SNF while getting up out of his chair  -CT scan of head is unremarkable   -MRI-doubt this patient is able to cooperate for an MRI  -Echocardiogram at [facility] on 02/13/2024- LVEF 50-55%  3/31/24-Left ventricular ejection fraction appears to be 61 - 65%.    Left ventricular diastolic function is consistent with (grade II w/high LAP) pseudonormalization.    Left atrial volume is severely increased.    Abnormal mitral valve structure consistent with dilated annulus.    Moderate to severe mitral valve regurgitation is present.    Moderate to severe tricuspid valve regurgitation is present.    Estimated right ventricular systolic pressure from tricuspid regurgitation is markedly elevated (>55 mmHg).    Consider transesophageal echocardiogram to better assess severity of regurgitant lesions if clinically indicated.  -per cardiology>Suspect intermittent severe hypoglycemia causing loss of consciousness from this patient.  I will recommend only medical management on this patient  Nonspecific troponin elevation with end-stage renal disease      Thrombocytopenia-Plt 44-64-71  -ON home plavix will for history of cva and MI, continue to hold   -Monitor fo s/s of bleeding     ESRD (end stage renal disease)  -Anemia due to chronic kidney disease stage III  -HD M-W-F  -HD treatment today and yesterday   -Monitor I's and O's  -Monitor daily weights  -Avoid nephrotoxic medications, hypovolemia, hypotension, hypertension  -Renally dose medications when possible     Chronic Atrial fibrillation  -Metoprolol held for hypotension   -hold warfarin  -Monitor PTINR     Chronic diastolic heart failure  Chronic last ECHO LVEF 50-55% 02/16   -Daily Weights   -Monitor I&O's   -Continue home Bumex pending home medication list verification  -He does not appear to be in volume overload      Coronary heart disease-Chronic   -History of CABG  -Free of chest pain at time of admission  -Will need to hold home Plavix with thrombocytopenia     Chronic obstructive pulmonary disease-Not in acute exacerbation  -Not oxygen dependent at baseline  -Baseline PFTs unknown  -As needed DuoNebs for shortness of breath/wheezing  -On room air today      right upper extremity edema ipsilateral to brachiocephalic AV fistula.    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  Would be a candidate for right arm fistulogram if he survives this acute illness.  Okay to continue using right arm fistula for dialysis.  We will check back in on him on Monday.  Anticipate if he improves then will get right arm fistulogram prior to discharge.    Positive close blood culture, no blood WBC is normal.  Possible contamination.  Consulted ID.    DVT prophylaxis:  Medical and mechanical DVT prophylaxis orders are present.      Code status is limited support, palliative care consulted  Code Status and Medical Interventions:   Ordered at: 03/27/24 2114     Medical Intervention Limits:    NO intubation (DNI)     Code Status (Patient has no pulse and is not breathing):    No CPR (Do Not Attempt  to Resuscitate)     Medical Interventions (Patient has pulse or is breathing):    Limited Support       Plan for disposition:Return to facility in 1 day if cleared by nephrology     Time: 30 minutes    Signature: Electronically signed by Suhail Zacarias MD, 04/01/24, 11:34 EDT.  [facility] Hospitalist Team

## 2024-04-01 NOTE — THERAPY TREATMENT NOTE
Subjective: Pt agreeable to therapeutic plan of care. Rosendo came in longterm through tx. He stated pt had previously been living at home with his significant other &  ambulating in home with SBA using RW. He states the pt sometimes required assist for sit>stand out of recliner & assist for bathing. He states the significant other is older & reports she can't take care of the pt anymore. Rosendo is hoping to eventually take pt home.     Cognition: oriented to Person and Place only. States he never follows the time or date.   Pt able to follow 1 step directives to participate with therapy. Pt alert & pleasant     Objective:     Bed Mobility: min A for sup>sit; mod A for sit>supine & max A X2 for scooting up in bed.    Functional Transfers: N/A or Not attempted.     Balance: static sitting EOB = SBA, dynamic balance = min A.   Functional Ambulation: N/A or Not attempted.    Feeding: Max-A.   ADL Position:  valerio's position in bed  ADL Comments: Pt requiring hand over hand assist to initiate with pt able to carry through intermittently     Lower Body Dressing: Max-A for donning/doffing socks.   ADL Position: edge of bed sitting  ADL Comments:     Therapeutic Exercise - 10 Reps B UE and B LE AAROM unsupported sitting / EOB    Vitals: WNL    Pain: 0 V  Education: Provided education on the importance of mobility in the acute care setting, Verbal/Tactile Cues, ADL training, and Transfer Training      Assessment: Barry Calhoun Sr. presents with ADL impairments affecting function including balance, cognition, coordination, endurance / activity tolerance, grasp, range of motion (ROM), and strength. Pt demo improved bed mobility & tolerating sitting EOB. Pt participating with exercises sitting  EOB & with BADLs with assist. Demonstrated functioning below baseline abilities indicate the need for continued skilled intervention while inpatient. Tolerating session today without incident. Will continue to follow and progress as  "tolerated.     Plan/Recommendations:   Moderate Intensity Therapy recommended post-acute care. This is recommended as therapy feels the patient would require 3-4 days per week and wouldn't tolerate \"3 hour daily\" rehab intensity. SNF would be the preferred choice. If the patient does not agree to SNF, arrange HH or OP depending on home bound status. If patient is medically complex, consider LTACH.. Pt requires no DME at discharge.     Pt & grandson desire Skilled Rehab placement at discharge with eventual goal of going to grandson's home for him to care for him. Pt cooperative; agreeable to therapeutic recommendations and plan of care.     Modified [place]: N/A = No pre-op stroke/TIA    Post-Tx Position: Supine with HOB Elevated, Alarms activated, and Call light and personal items within reach, grandson in room feeding pt.   PPE: gloves    "

## 2024-04-01 NOTE — PLAN OF CARE
Goal Outcome Evaluation:   Patient at the start of shift was restless and pulling on his cords. Patient did have a bowel movement during the shift. Patient has been asleep on and off throughout the shift. We have been turning patient to prevent sores on bottom. Patient took evening medication with apple sauce with no problem. This nurse did get the order for Mucinex changed to liquid form due to patient wanting to chew the medication when inserted into his mouth. Patient did have one episode of V-tach during the shift. Patient has not been aggressive or agitated during the shift just some restlessness at times. Patient son did come to visit for a bit and got patient to eat a small amount of food but stated that he did start coughing.

## 2024-04-01 NOTE — PROGRESS NOTES
"NEPHROLOGY PROGRESS NOTE------[facility]    [facility]  [phone]  So Mclean MD      Patient Care Team:  Pedro Purvis MD as PCP - General (Family Medicine)  Pedro Purvis MD as PCP - Family Medicine  Vinay, MD Pino as Consulting Physician (Nephrology)      Provider:  So Mclean MD  Patient Name: Barry Calhoun Sr.  :  1932    SUBJECTIVE:    F/U ESRD    Resting comfortably. No SOB, CP, palpitations, cramping.     Medication:  allopurinol, 100 mg, Oral, Daily  atorvastatin, 20 mg, Oral, Nightly  docusate sodium, 200 mg, Oral, QAM  donepezil, 10 mg, Oral, Nightly  famotidine, 10 mg, Oral, Daily  folic acid, 1 mg, Oral, Daily  ipratropium-albuterol, 3 mL, Nebulization, 4x Daily - RT  levothyroxine, 50 mcg, Oral, Nightly  metoprolol succinate XL, 12.5 mg, Oral, Q24H  midodrine, 10 mg, Oral, TID AC  sodium chloride, 250 mL, Intravenous, Once  sodium chloride, 10 mL, Intravenous, Q12H  tamsulosin, 0.4 mg, Oral, Nightly  [Held by provider] valproate sodium, 125 mg, Intravenous, Nightly      Pharmacy to dose vancomycin,   Pharmacy to dose warfarin,         OBJECTIVE    Vital Sign Min/Max for last 24 hours  Temp  Min: 97 °F (36.1 °C)  Max: 98.5 °F (36.9 °C)   BP  Min: 96/50  Max: 103/52   Pulse  Min: 69  Max: 84   Resp  Min: 15  Max: 21   SpO2  Min: 90 %  Max: 100 %   No data recorded   Weight  Min: 68.9 kg (152 lb)  Max: 69 kg (152 lb 1.9 oz)     Flowsheet Rows      Flowsheet Row First Filed Value   Admission Height 175.3 cm (69\") Documented at 2024   Admission Weight 74.7 kg (164 lb 10.9 oz) Documented at 2024            No intake/output data recorded.  I/O last 3 completed shifts:  In: 720 [P.O.:720]  Out: -     Physical Exam:  General Appearance: alert, appears stated age and cooperative  Head: normocephalic, without obvious abnormality and atraumatic.    Eyes: conjunctivae and " "sclerae normal and no icterus  Neck: supple and NO GROSS JVD NOW  Lungs:+FEW SCATTERED RHONCHI  Heart: IRREG IRREG +CARLTON  Chest Wall: no abnormalities observed  Abdomen: normal bowel sounds and soft, nontender  Extremities: moves extremities well, +RUE EDEMA WHERE NEW AVF IS, no cyanosis. +DJD  Skin: no bleeding, bruising or rash  Neurologic: Alert, and oriented x 2-3    Labs:    WBC WBC   Date Value Ref Range Status   04/01/2024 6.14 3.40 - 10.80 10*3/mm3 Final   03/31/2024 6.45 3.40 - 10.80 10*3/mm3 Final   03/29/2024 3.76 3.40 - 10.80 10*3/mm3 Final   03/29/2024 4.12 3.40 - 10.80 10*3/mm3 Final      HGB Hemoglobin   Date Value Ref Range Status   04/01/2024 8.4 (L) 13.0 - 17.7 g/dL Final   03/31/2024 8.5 (L) 13.0 - 17.7 g/dL Final   03/29/2024 8.3 (L) 13.0 - 17.7 g/dL Final   03/29/2024 9.3 (L) 13.0 - 17.7 g/dL Final      HCT Hematocrit   Date Value Ref Range Status   04/01/2024 27.8 (L) 37.5 - 51.0 % Final   03/31/2024 27.4 (L) 37.5 - 51.0 % Final   03/29/2024 27.3 (L) 37.5 - 51.0 % Final   03/29/2024 30.2 (L) 37.5 - 51.0 % Final      Platelets No results found for: \"LABPLAT\"   MCV MCV   Date Value Ref Range Status   04/01/2024 106.1 (H) 79.0 - 97.0 fL Final   03/31/2024 104.6 (H) 79.0 - 97.0 fL Final   03/29/2024 104.6 (H) 79.0 - 97.0 fL Final   03/29/2024 105.6 (H) 79.0 - 97.0 fL Final          Sodium Sodium   Date Value Ref Range Status   04/01/2024 139 136 - 145 mmol/L Final   03/31/2024 140 136 - 145 mmol/L Final   03/29/2024 136 136 - 145 mmol/L Final   03/29/2024 140 136 - 145 mmol/L Final      Potassium Potassium   Date Value Ref Range Status   04/01/2024 4.1 3.5 - 5.2 mmol/L Final   03/31/2024 5.0 3.5 - 5.2 mmol/L Final     Comment:     Slight hemolysis detected by analyzer. Result may be falsely elevated.   03/29/2024 4.3 3.5 - 5.2 mmol/L Final   03/29/2024 4.4 3.5 - 5.2 mmol/L Final      Chloride Chloride   Date Value Ref Range Status   04/01/2024 103 98 - 107 mmol/L Final   03/31/2024 101 98 - 107 " "mmol/L Final   03/29/2024 98 98 - 107 mmol/L Final   03/29/2024 101 98 - 107 mmol/L Final      CO2 CO2   Date Value Ref Range Status   04/01/2024 29.0 22.0 - 29.0 mmol/L Final   03/31/2024 34.0 (H) 22.0 - 29.0 mmol/L Final   03/29/2024 32.0 (H) 22.0 - 29.0 mmol/L Final   03/29/2024 28.0 22.0 - 29.0 mmol/L Final      BUN BUN   Date Value Ref Range Status   04/01/2024 18 8 - 23 mg/dL Final   03/31/2024 13 8 - 23 mg/dL Final   03/29/2024 20 8 - 23 mg/dL Final   03/29/2024 18 8 - 23 mg/dL Final      Creatinine Creatinine   Date Value Ref Range Status   04/01/2024 3.70 (H) 0.76 - 1.27 mg/dL Final   03/31/2024 2.92 (H) 0.76 - 1.27 mg/dL Final   03/29/2024 3.45 (H) 0.76 - 1.27 mg/dL Final   03/29/2024 3.00 (H) 0.76 - 1.27 mg/dL Final      Calcium Calcium   Date Value Ref Range Status   04/01/2024 9.0 8.2 - 9.6 mg/dL Final   03/31/2024 9.4 8.2 - 9.6 mg/dL Final   03/29/2024 8.6 8.2 - 9.6 mg/dL Final   03/29/2024 9.0 8.2 - 9.6 mg/dL Final      PO4 No components found for: \"PO4\"   Albumin Albumin   Date Value Ref Range Status   03/29/2024 3.5 3.5 - 5.2 g/dL Final   03/29/2024 3.5 3.5 - 5.2 g/dL Final      Magnesium Magnesium   Date Value Ref Range Status   04/01/2024 1.9 1.7 - 2.3 mg/dL Final   03/29/2024 1.9 1.7 - 2.3 mg/dL Final      Uric Acid No components found for: \"URIC ACID\"     Imaging Results (Last 72 Hours)       Procedure Component Value Units Date/Time    XR Chest 1 View [[id number]] Collected: 03/29/24 1639     Updated: 03/29/24 1642    Narrative:      XR CHEST 1 VW    Date of Exam: 3/29/2024 4:37 PM EDT    Indication: wheezing    Comparison: AP portable chest 2/17/2014.    Findings:  Stable right hemidiaphragm elevation. Left midlung and right infrahilar airspace disease is redemonstrated and is not thought to be significantly changed. There is stable moderate generalized cardiac enlargement with median sternotomy and CABG. Loop   recorder projects over the left lower mediastinum. Right chest wall jeremías catheter " tip terminates at the lower SVC level. Right subclavian stent is noted. No acute osseous abnormality is identified.      Impression:      Impression:    1. Left midlung and right infrahilar airspace disease appears unchanged from 2/28/2024. No new airspace disease is identified.  2. Stable cardiomegaly.      Electronically Signed: Mary Page MD    3/29/2024 4:40 PM EDT    Workstation ID: [id number]            Results for orders placed during the hospital encounter of 03/27/24    XR Chest 1 View    Narrative  XR CHEST 1 VW    Date of Exam: 3/29/2024 4:37 PM EDT    Indication: wheezing    Comparison: AP portable chest 2/17/2014.    Findings:  Stable right hemidiaphragm elevation. Left midlung and right infrahilar airspace disease is redemonstrated and is not thought to be significantly changed. There is stable moderate generalized cardiac enlargement with median sternotomy and CABG. Loop  recorder projects over the left lower mediastinum. Right chest wall jeremías catheter tip terminates at the lower SVC level. Right subclavian stent is noted. No acute osseous abnormality is identified.    Impression  Impression:    1. Left midlung and right infrahilar airspace disease appears unchanged from 2/28/2024. No new airspace disease is identified.  2. Stable cardiomegaly.      Electronically Signed: Mary Page MD  3/29/2024 4:40 PM EDT  Workstation ID: [id number]      Results for orders placed during the hospital encounter of 02/16/24    XR Chest 1 View    Narrative  XR CHEST 1 VW    Date of Exam: 2/17/2024 9:55 AM EST    Indication: hx of CHF, increased BNP    Comparison: 2/11/2024    Findings:  Patient is status post median sternotomy and CABG. Cardiac silhouette is enlarged. Vague perihilar opacities may represent edema or infiltrates. No significant pleural effusion or pneumothorax is seen. No acute osseous lesion is seen. Right chest wall  Port-A-Cath terminates overlying the superior cavoatrial junction. Loop recorder  projects over the left thorax. Metallic stent projects over the left upper thorax.    Impression  Impression:  Cardiomegaly with vague bilateral perihilar opacities which may represent edema or infiltrates.    Electronically Signed: Jose Be MD  2/17/2024 10:11 AM EST  Workstation ID: [id number]      Results for orders placed during the hospital encounter of 02/09/22    XR Chest PA & Lateral    Narrative  DATE OF EXAM:  2/9/2022 11:04 AM    PROCEDURE:  XR CHEST PA AND LATERAL-    INDICATIONS:  preop    COMPARISON:  Chest x-ray 3/17/2016, chest CT 9/9/2021    TECHNIQUE:  Two radiologic views of the chest , PA and lateral were obtained.    FINDINGS:  There is a left IJ dialysis catheter and right IJ port. A subcutaneous  cardiac event monitor is present. There is been coronary bypass. There  is cardiomegaly. The pulmonary vasculature is within normal limits.  There are coarse linear markings in the mid and lower lung zones, with  round and ovoid lucencies suspected to be bronchiectasis. There is  eventration of anterior right hemidiaphragm. Costophrenic angles are  sharp    Impression  Findings compatible with fibrotic changes and/or atelectasis in the mid  and lower lung zones bilaterally with probable bronchiectasis. This is a  new finding since the most recent chest x-ray from 2016 is felt to have  progressed since the 9/9/2021 chest CT    Electronically Signed By-Wesley Mcallister On:2/9/2022 11:15 AM  This report was finalized on [id number] by  Wesley Mcallister, .      Results for orders placed during the hospital encounter of 10/17/22    Duplex hemodialysis access CAR    Interpretation Summary    Patent right brachiocephalic fistula with overall adequate flow volumes.  Diameter and depth both appear appropriate for dialysis.  Stenosis seen at the arterial anastomosis.        ASSESSMENT / PLAN      Loss of consciousness    Anemia due to chronic kidney disease stage III    Atrial fibrillation    Essential  hypertension    Chronic diastolic heart failure    Chronic obstructive pulmonary disease    Coronary heart disease    ESRD (end stage renal disease)    Syncope and collapse    Thrombocytopenia    Syncope    Chronic anticoagulation    ESRD--------HD  tomorrow per usual [facility] outpatient schedule. Vascular surgery following for RUE edema where new AVF is.       2. HYPOTENSION------Better. Continue Midodrine     3. S/P SYNCOPE/LOC-------? Secondary to hypotension     4. ATRIAL FIBRILLATION-------Rate controlled and anticoagulated     5. ANEMIA OF ESRD------EPO/Venofer with HD as needed     6. OA/DJD     7. DECONDITIONING--------PT/OT/Rehab as needed     8. THROMBOCYTOPENIA--------No heparin     9. MILD METABOLIC ALKALOSIS------Better     10. DIASTOLIC CHF/VOLUME OVERLOAD-------Increase UF with HD as BP toleratess     11. COPD------ Oxygen, nebs, pulmonary toilet      So Mclean MD  [facility]  [phone]  04/01/24  07:34 EDT

## 2024-04-01 NOTE — NURSING NOTE
When repositioning that patient I noticed a new area on the right calf that was red and non blanchable not painful to touch with some swelling. Patient is bedfast and a turn. Pulses where checked with a doppler and pulses where good and on call provider was made aware of it. This nurse documented the assessment in the chart as well pre provider.

## 2024-04-01 NOTE — PROGRESS NOTES
LOS: 4 days   Patient Care Team:  Pedro Purvis MD as PCP - General (Family Medicine)  Pedro Purvis MD as PCP - Family Medicine  Pino Mclean MD as Consulting Physician (Nephrology)        Subjective     Interval History:     Patient Complaints:   Patient Denies:  NV       Review of Systems:    weak    Objective     Vital Signs  Temp:  [97 °F (36.1 °C)-97.5 °F (36.4 °C)] 97.4 °F (36.3 °C)  Heart Rate:  [69-84] 75  Resp:  [15-21] 18  BP: ()/(48-66) 101/66    Physical Exam:     General Appearance:  Alert, cooperative, in no acute distress   Head:  Normocephalic, without obvious abnormality, atraumatic   Eyes:  Lids and lashes normal, conjunctivae and sclerae normal, no icterus, no pallor, corneas clear, PERRLA   Ears:  Ears appear intact with no abnormalities noted   Throat:  No oral lesions, no thrush, oral mucosa moist   Neck:  No adenopathy, supple, trachea midline, no thyromegaly, no carotid bruit, no JVD   Back:  No kyphosis present, no scoliosis present, no skin lesions, erythema or scars, no tenderness to percussion or palpation, range of motion normal   Lungs:  Clear to auscultation, respirations regular, even and unlabored    Heart:  Regular rhythm and normal rate, normal S1 and S2, no murmur, no gallop, no rub, no click   Chest Wall:  No abnormalities observed   Abdomen:  Normal bowel sounds, no masses, no organomegaly, soft non-tender, non-distended, no guarding, no rebound tenderness   Rectal:  Deferred   Extremities:  Moves all extremities well, no edema, no cyanosis, no redness   Pulses:  Pulses palpable and equal bilaterally   Skin:  No bleeding, bruising or rash   Lymph nodes:  No palpable adenopathy   Neurologic:  Cranial nerves 2 - 12 grossly intact, sensation intact, DTR present and equal bilaterally                                                                                  Results Review:      Lab Results (last 72 hours)       Procedure Component Value  Units Date/Time    POC Glucose Finger 4x Daily Before Meals & at Bedtime [[id number]]  (Normal) Collected: 04/01/24 1129    Specimen: Blood from Finger Updated: 04/01/24 1130     Glucose 79 mg/dL      Comment: Serial Number: [id number]:  [id number]       POC Glucose Finger 4x Daily Before Meals & at Bedtime [[id number]]  (Normal) Collected: 04/01/24 0752    Specimen: Blood from Finger Updated: 04/01/24 0754     Glucose 87 mg/dL      Comment: Serial Number: [id number]:  [id number]       Blood Culture - Blood, Hand, Left [[id number]]  (Abnormal) Collected: 03/29/24 1812    Specimen: Blood from Hand, Left Updated: 04/01/24 0726     Blood Culture Staphylococcus, coagulase negative     Isolated from Anaerobic Bottle     Gram Stain Anaerobic Bottle Gram positive cocci in clusters    Narrative:      Probable contaminant requires clinical correlation, susceptibility not performed unless requested by physician.    Less than seven (7) mL's of blood was collected.  Insufficient quantity may yield false negative results.    Basic Metabolic Panel [[id number]]  (Abnormal) Collected: 04/01/24 0059    Specimen: Blood from Arm, Left Updated: 04/01/24 0145     Glucose 109 mg/dL      BUN 18 mg/dL      Creatinine 3.70 mg/dL      Sodium 139 mmol/L      Potassium 4.1 mmol/L      Chloride 103 mmol/L      CO2 29.0 mmol/L      Calcium 9.0 mg/dL      BUN/Creatinine Ratio 4.9     Anion Gap 7.0 mmol/L      Comment: Unable to calculate Anion Gap.        eGFR 14.8 mL/min/1.73      Comment: <15 Indicative of kidney failure       Narrative:      GFR Normal >60  Chronic Kidney Disease <60  Kidney Failure <15    The GFR formula is only valid for adults with stable renal function between ages 18 and 70.    CBC (No Diff) [[id number]]  (Abnormal) Collected: 04/01/24 0059    Specimen: Blood from Arm, Left Updated: 04/01/24 0142     WBC 6.14 10*3/mm3      RBC 2.62 10*6/mm3      Hemoglobin 8.4 g/dL      Hematocrit 27.8 %      .1 fL      MCH  32.1 pg      MCHC 30.2 g/dL      RDW 15.9 %      RDW-SD 61.6 fl      MPV 10.6 fL      Platelets 71 10*3/mm3      Comment: Result checked         Magnesium [[id number]]  (Normal) Collected: 04/01/24 0059    Specimen: Blood from Arm, Left Updated: 04/01/24 0139     Magnesium 1.9 mg/dL     Phosphorus [[id number]]  (Normal) Collected: 04/01/24 0059    Specimen: Blood from Arm, Left Updated: 04/01/24 0134     Phosphorus 3.9 mg/dL     Protime-INR [[id number]]  (Abnormal) Collected: 04/01/24 0059    Specimen: Blood from Arm, Left Updated: 04/01/24 0120     Protime 43.5 Seconds      INR 4.46    POC Glucose Once [[id number]]  (Abnormal) Collected: 03/31/24 2204    Specimen: Blood Updated: 03/31/24 2206     Glucose 119 mg/dL      Comment: Serial Number: [id number]:  [id number]       POC Glucose Once [[id number]]  (Abnormal) Collected: 03/31/24 2054    Specimen: Blood Updated: 03/31/24 2056     Glucose 144 mg/dL      Comment: Serial Number: [id number]:  [id number]       Blood Culture - Blood, Arm, Left [[id number]]  (Normal) Collected: 03/29/24 1812    Specimen: Blood from Arm, Left Updated: 03/31/24 1831     Blood Culture No growth at 2 days    Narrative:      Less than seven (7) mL's of blood was collected.  Insufficient quantity may yield false negative results.    POC Glucose Once [[id number]]  (Abnormal) Collected: 03/31/24 1755    Specimen: Blood Updated: 03/31/24 1757     Glucose 150 mg/dL      Comment: Serial Number: [id number]:  [id number]       Blood Culture - Blood, Hand, Left [[id number]] Collected: 03/31/24 1632    Specimen: Blood from Hand, Left Updated: 03/31/24 1637    POC Glucose Finger 4x Daily Before Meals & at Bedtime [[id number]]  (Abnormal) Collected: 03/31/24 1114    Specimen: Blood from Finger Updated: 03/31/24 1116     Glucose 124 mg/dL      Comment: Serial Number: [id number]:  [id number]       POC Glucose Once [[id number]]  (Normal) Collected: 03/31/24 0806    Specimen: Blood Updated:  03/31/24 0807     Glucose 79 mg/dL      Comment: Serial Number: [id number]:  [id number]       CBC (No Diff) [[id number]]  (Abnormal) Collected: 03/31/24 0407    Specimen: Blood Updated: 03/31/24 0459     WBC 6.45 10*3/mm3      RBC 2.62 10*6/mm3      Hemoglobin 8.5 g/dL      Hematocrit 27.4 %      .6 fL      MCH 32.4 pg      MCHC 31.0 g/dL      RDW 15.6 %      RDW-SD 59.4 fl      MPV 11.2 fL      Platelets 64 10*3/mm3      Comment: Result checked         Blood Culture ID, PCR - Blood, Hand, Left [[id number]] Collected: 03/29/24 1812    Specimen: Blood from Hand, Left Updated: 03/31/24 0452     BCID, PCR Negative by BCID PCR. Culture to Follow.     BOTTLE TYPE Anaerobic Bottle    Basic Metabolic Panel [[id number]]  (Abnormal) Collected: 03/31/24 0407    Specimen: Blood Updated: 03/31/24 0449     Glucose 86 mg/dL      BUN 13 mg/dL      Creatinine 2.92 mg/dL      Sodium 140 mmol/L      Potassium 5.0 mmol/L      Comment: Slight hemolysis detected by analyzer. Result may be falsely elevated.        Chloride 101 mmol/L      CO2 34.0 mmol/L      Calcium 9.4 mg/dL      BUN/Creatinine Ratio 4.5     Anion Gap 5.0 mmol/L      eGFR 19.6 mL/min/1.73     Narrative:      GFR Normal >60  Chronic Kidney Disease <60  Kidney Failure <15    The GFR formula is only valid for adults with stable renal function between ages 18 and 70.    Protime-INR [[id number]]  (Abnormal) Collected: 03/31/24 0407    Specimen: Blood Updated: 03/31/24 0438     Protime 50.0 Seconds      INR 5.18    POC Glucose Once [[id number]]  (Normal) Collected: 03/30/24 2022    Specimen: Blood Updated: 03/30/24 2024     Glucose 92 mg/dL      Comment: Serial Number: [id number]:  [id number]       POC Glucose Once [[id number]]  (Normal) Collected: 03/30/24 1649    Specimen: Blood Updated: 03/30/24 1651     Glucose 85 mg/dL      Comment: Serial Number: [id number]:  [id number]       POC Glucose Finger 4x Daily Before Meals & at Bedtime [[id number]]  (Normal)  Collected: 03/30/24 1124    Specimen: Blood from Finger Updated: 03/30/24 1126     Glucose 76 mg/dL      Comment: Serial Number: [id number]:  [id number]       POC Glucose Once [[id number]]  (Abnormal) Collected: 03/30/24 0754    Specimen: Blood Updated: 03/30/24 0757     Glucose 117 mg/dL      Comment: Serial Number: [id number]:  [id number]       CBC (No Diff) [[id number]]  (Abnormal) Collected: 03/29/24 2345    Specimen: Blood from Arm, Left Updated: 03/30/24 0104     WBC 3.76 10*3/mm3      RBC 2.61 10*6/mm3      Hemoglobin 8.3 g/dL      Hematocrit 27.3 %      .6 fL      MCH 31.8 pg      MCHC 30.4 g/dL      RDW 15.5 %      RDW-SD 59.6 fl      MPV 11.1 fL      Platelets 56 10*3/mm3     Basic Metabolic Panel [[id number]]  (Abnormal) Collected: 03/29/24 2345    Specimen: Blood from Arm, Left Updated: 03/30/24 0043     Glucose 106 mg/dL      BUN 20 mg/dL      Creatinine 3.45 mg/dL      Sodium 136 mmol/L      Potassium 4.3 mmol/L      Chloride 98 mmol/L      CO2 32.0 mmol/L      Calcium 8.6 mg/dL      BUN/Creatinine Ratio 5.8     Anion Gap 6.0 mmol/L      eGFR 16.1 mL/min/1.73     Narrative:      GFR Normal >60  Chronic Kidney Disease <60  Kidney Failure <15    The GFR formula is only valid for adults with stable renal function between ages 18 and 70.    Magnesium [[id number]]  (Normal) Collected: 03/29/24 2345    Specimen: Blood from Arm, Left Updated: 03/30/24 0043     Magnesium 1.9 mg/dL     Phosphorus [[id number]]  (Normal) Collected: 03/29/24 2345    Specimen: Blood from Arm, Left Updated: 03/30/24 0030     Phosphorus 3.8 mg/dL     Protime-INR [[id number]]  (Abnormal) Collected: 03/29/24 2345    Specimen: Blood from Arm, Left Updated: 03/30/24 0021     Protime 47.7 Seconds      INR 4.92    POC Glucose Once [[id number]]  (Abnormal) Collected: 03/29/24 2328    Specimen: Blood Updated: 03/29/24 2329     Glucose 137 mg/dL      Comment: Serial Number: [id number]:  [id number]       POC Glucose Finger 4x  Daily Before Meals & at Bedtime [[id number]]  (Abnormal) Collected: 03/29/24 2223    Specimen: Blood from Finger Updated: 03/29/24 2225     Glucose 62 mg/dL      Comment: Serial Number: [id number]:  [id number]       Hepatic Function Panel [[id number]]  (Abnormal) Collected: 03/29/24 1505    Specimen: Blood Updated: 03/29/24 1915     Total Protein 5.9 g/dL      Albumin 3.5 g/dL      ALT (SGPT) 13 U/L      AST (SGOT) 27 U/L      Alkaline Phosphatase 97 U/L      Total Bilirubin 0.7 mg/dL      Bilirubin, Direct 0.3 mg/dL      Bilirubin, Indirect 0.4 mg/dL     Comprehensive Metabolic Panel [[id number]]  (Abnormal) Collected: 03/29/24 1505    Specimen: Blood Updated: 03/29/24 1914     Glucose 70 mg/dL      BUN 18 mg/dL      Creatinine 3.00 mg/dL      Sodium 140 mmol/L      Potassium 4.4 mmol/L      Chloride 101 mmol/L      CO2 28.0 mmol/L      Calcium 9.0 mg/dL      Total Protein 5.9 g/dL      Albumin 3.5 g/dL      ALT (SGPT) 13 U/L      AST (SGOT) 27 U/L      Alkaline Phosphatase 97 U/L      Total Bilirubin 0.7 mg/dL      Globulin 2.4 gm/dL      A/G Ratio 1.5 g/dL      BUN/Creatinine Ratio 6.0     Anion Gap 11.0 mmol/L      eGFR 19.0 mL/min/1.73     Narrative:      GFR Normal >60  Chronic Kidney Disease <60  Kidney Failure <15    The GFR formula is only valid for adults with stable renal function between ages 18 and 70.    Lactic Acid, Plasma [[id number]]  (Normal) Collected: 03/29/24 1812    Specimen: Blood Updated: 03/29/24 1904     Lactate 1.6 mmol/L     CBC & Differential [[id number]]  (Abnormal) Collected: 03/29/24 1812    Specimen: Blood Updated: 03/29/24 1853    Narrative:      The following orders were created for panel order CBC & Differential.  Procedure                               Abnormality         Status                     ---------                               -----------         ------                     CBC Auto Differential[[id number]]        Abnormal            Final result               Scan  Slide[[id number]]                                       Final result                 Please view results for these tests on the individual orders.    CBC Auto Differential [[id number]]  (Abnormal) Collected: 03/29/24 1812    Specimen: Blood Updated: 03/29/24 1853     WBC 4.12 10*3/mm3      RBC 2.86 10*6/mm3      Hemoglobin 9.3 g/dL      Hematocrit 30.2 %      .6 fL      MCH 32.5 pg      MCHC 30.8 g/dL      RDW 15.6 %      RDW-SD 60.1 fl      MPV 11.1 fL      Platelets 56 10*3/mm3      Neutrophil % 66.1 %      Lymphocyte % 18.2 %      Monocyte % 13.1 %      Eosinophil % 1.9 %      Basophil % 0.5 %      Immature Grans % 0.2 %      Neutrophils, Absolute 2.72 10*3/mm3      Lymphocytes, Absolute 0.75 10*3/mm3      Monocytes, Absolute 0.54 10*3/mm3      Eosinophils, Absolute 0.08 10*3/mm3      Basophils, Absolute 0.02 10*3/mm3      Immature Grans, Absolute 0.01 10*3/mm3      nRBC 0.0 /100 WBC     Scan Slide [[id number]] Collected: 03/29/24 1812    Specimen: Blood Updated: 03/29/24 1853     Dacrocytes Slight/1+     Elliptocytes Slight/1+     Macrocytes Slight/1+     Poikilocytes Slight/1+     Polychromasia Slight/1+     Stomatocytes Slight/1+     Toxic Granulation Slight/1+     Large Platelets Slight/1+     Giant Platelets Slight/1+    POC Glucose Once [[id number]]  (Normal) Collected: 03/29/24 1621    Specimen: Blood Updated: 03/29/24 1622     Glucose 71 mg/dL      Comment: Serial Number: [id number]:  [id number]               Imaging Results (Last 72 Hours)       Procedure Component Value Units Date/Time    XR Chest 1 View [[id number]] Collected: 03/29/24 1639     Updated: 03/29/24 1642    Narrative:      XR CHEST 1 VW    Date of Exam: 3/29/2024 4:37 PM EDT    Indication: wheezing    Comparison: AP portable chest 2/17/2014.    Findings:  Stable right hemidiaphragm elevation. Left midlung and right infrahilar airspace disease is redemonstrated and is not thought to be significantly changed. There is stable moderate  "generalized cardiac enlargement with median sternotomy and CABG. Loop   recorder projects over the left lower mediastinum. Right chest wall jeremías catheter tip terminates at the lower SVC level. Right subclavian stent is noted. No acute osseous abnormality is identified.      Impression:      Impression:    1. Left midlung and right infrahilar airspace disease appears unchanged from 2/28/2024. No new airspace disease is identified.  2. Stable cardiomegaly.      Electronically Signed: Mary Page MD    3/29/2024 4:40 PM EDT    Workstation ID: [id number]              Medication Review:     Hospital Medications (active)         Dose Frequency Start End    acetaminophen (TYLENOL) suppository 650 mg 650 mg Every 4 Hours PRN 3/27/2024 --    Admin Instructions: If given for fever, use fever parameter: fever greater than 100.4 °F  Based on patient request - if ordered for moderate or severe pain, provider allows for administration of a medication prescribed for a lower pain scale.    Do not exceed 4 grams of acetaminophen in a 24 hr period. Max dose of 2gm for AST/ALT greater than 120 units/L.    If given for pain, use the following pain scale:   Mild Pain = Pain Score of 1-3, CPOT 1-2  Moderate Pain = Pain Score of 4-6, CPOT 3-4  Severe Pain = Pain Score of 7-10, CPOT 5-8    Route: Rectal    Linked Group 1: Placed in \"Or\" Linked Group        acetaminophen (TYLENOL) tablet 650 mg 650 mg Every 4 Hours PRN 3/27/2024 --    Admin Instructions: If given for fever, use fever parameter: fever greater than 100.4 °F  Based on patient request - if ordered for moderate or severe pain, provider allows for administration of a medication prescribed for a lower pain scale.    Do not exceed 4 grams of acetaminophen in a 24 hr period. Max dose of 2gm for AST/ALT greater than 120 units/L.    If given for pain, use the following pain scale:   Mild Pain = Pain Score of 1-3, CPOT 1-2  Moderate Pain = Pain Score of 4-6, CPOT 3-4  Severe Pain = " "Pain Score of 7-10, CPOT 5-8    Route: Oral    Linked Group 1: Placed in \"Or\" Linked Group        allopurinol (ZYLOPRIM) tablet 100 mg 100 mg Daily 3/28/2024 --    Admin Instructions: ([facility]) Take with food if GI upset occurs.    Route: Oral    atorvastatin (LIPITOR) tablet 20 mg 20 mg Nightly 3/28/2024 --    Admin Instructions: Avoid grapefruit juice.    Route: Oral    bisacodyl (DULCOLAX) EC tablet 5 mg 5 mg Daily PRN 3/27/2024 --    Admin Instructions: Use if no bowel movement after 12 hours.  Swallow whole. Do not crush, split, or chew tablet.    Route: Oral    Linked Group 2: Placed in \"And\" Linked Group        bisacodyl (DULCOLAX) suppository 10 mg 10 mg Daily PRN 3/27/2024 --    Admin Instructions: Use if no bowel movement after 12 hours.  Hold for diarrhea    Route: Rectal    Linked Group 2: Placed in \"And\" Linked Group        dextrose (D50W) (25 g/50 mL) IV injection 25 g 25 g Every 15 Minutes PRN 3/29/2024 --    Route: Intravenous    dextrose (GLUTOSE) oral gel 15 g 15 g Every 15 Minutes PRN 3/29/2024 --    Route: Oral    docusate sodium (COLACE) capsule 200 mg 200 mg Every Morning 3/29/2024 --    Admin Instructions: Swallow whole.  Do not open, crush, or chew capsule.    Route: Oral    donepezil (ARICEPT) tablet 10 mg 10 mg Nightly 3/29/2024 --    Route: Oral    famotidine (PEPCID) tablet 10 mg 10 mg Daily 3/30/2024 --    Route: Oral    folic acid (FOLVITE) tablet 1 mg 1 mg Daily 3/28/2024 --    Route: Oral    glucagon (GLUCAGEN) injection 1 mg 1 mg Every 15 Minutes PRN 3/29/2024 --    Admin Instructions: Reconstitute powder for injection by adding 1 mL of -supplied sterile diluent or sterile water for injection to a vial containing 1 mg of the drug, to provide solutions containing 1 mg/mL. Shake vial gently to dissolve.    Route: Subcutaneous    guaifenesin (ROBITUSSIN) 100 MG/5ML liquid 200 mg 200 mg Every 4 Hours PRN 3/31/2024 --    Admin Instructions: Caution: Look alike/sound alike drug " "alert    Route: Oral    haloperidol lactate (HALDOL) injection 5 mg 5 mg Every 6 Hours PRN 3/29/2024 --    Admin Instructions: For IV Push, administer no faster than 5 mg / minute.    Route: Intravenous    ipratropium-albuterol (DUO-NEB) nebulizer solution 3 mL 3 mL 4 Times Daily - RT 3/30/2024 --    Admin Instructions: Include Respiratory Treatment Education    Route: Nebulization    levothyroxine (SYNTHROID, LEVOTHROID) tablet 50 mcg 50 mcg Nightly 3/28/2024 --    Admin Instructions: Take on empty stomach.    Route: Oral    metoprolol succinate XL (TOPROL-XL) 24 hr tablet 12.5 mg 12.5 mg Every 24 Hours Scheduled 3/29/2024 --    Admin Instructions: Hold for SBP less than 100, DBP less than 60, or heart rate less than 50    Do not crush or chew the capsules or tablets. The drug may not work as designed if the capsule or tablet is crushed or chewed. Swallow whole.  Do not crush or chew.    Route: Oral    midodrine (PROAMATINE) tablet 10 mg 10 mg 3 Times Daily Before Meals 3/28/2024 --    Route: Oral    nitroglycerin (NITROSTAT) SL tablet 0.4 mg 0.4 mg Every 5 Minutes PRN 3/27/2024 --    Admin Instructions: If Pain Unrelieved After 3 Doses Notify MD  May administer up to 3 doses per episode.    Route: Sublingual    Pharmacy to dose vancomycin  Continuous PRN 3/31/2024 4/14/2024    Route: Does not apply    Pharmacy to dose warfarin  Continuous PRN 3/28/2024 --    Admin Instructions:   Group 2 (Pink) Hazardous Drug - Reproductive Risk Only - See Handling Guide    Route: Does not apply    polyethylene glycol (MIRALAX) packet 17 g 17 g Daily PRN 3/27/2024 --    Admin Instructions: Use if no bowel movement after 12 hours. Mix in 6-8 ounces of water.  Use 4-8 ounces of water, tea, or juice for each 17 gram dose.    Route: Oral    Linked Group 2: Placed in \"And\" Linked Group        sennosides-docusate (PERICOLACE) 8.6-50 MG per tablet 2 tablet 2 tablet 2 Times Daily PRN 3/27/2024 --    Admin Instructions: Start bowel " "management regimen if patient has not had a bowel movement after 12 hours.    Route: Oral    Linked Group 2: Placed in \"And\" Linked Group        sodium chloride 0.9 % bolus 250 mL 250 mL Once 3/28/2024 --    Route: Intravenous    sodium chloride 0.9 % flush 10 mL 10 mL Every 12 Hours Scheduled 3/27/2024 --    Route: Intravenous    sodium chloride 0.9 % flush 10 mL 10 mL As Needed 3/27/2024 --    Route: Intravenous    sodium chloride 0.9 % infusion 40 mL 40 mL As Needed 3/27/2024 --    Admin Instructions: Following administration of an IV intermittent medication, flush line with 40mL NS at 100mL/hr.    Route: Intravenous    tamsulosin (FLOMAX) 24 hr capsule 0.4 mg 0.4 mg Nightly 3/28/2024 --    Admin Instructions: Do not crush or chew the capsules or tablets. The drug may not work as designed if the capsule or tablet is crushed or chewed. Swallow whole.    Route: Oral    valproate (DEPACON) 125 mg in dextrose (D5W) 5 % 100 mL IVPB ([Held by provider] since 3/29/2024  2:43 PM) 125 mg Nightly 3/29/2024 --    Admin Instructions: Group 2 (Pink) Hazardous Drug - Reproductive Risk Only - See Handling Guide    Route: Intravenous    Vancomycin Pharmacy Intermittent/Pulse Dosing  As Needed 3/31/2024 4/14/2024    Admin Instructions: Patient is on intermittent or pulse Vancomycin dosing. This is an informational \"Pharmacy Dosing\" note only.    Route: Does not apply          allopurinol, 100 mg, Oral, Daily  atorvastatin, 20 mg, Oral, Nightly  docusate sodium, 200 mg, Oral, QAM  donepezil, 10 mg, Oral, Nightly  famotidine, 10 mg, Oral, Daily  folic acid, 1 mg, Oral, Daily  ipratropium-albuterol, 3 mL, Nebulization, 4x Daily - RT  levothyroxine, 50 mcg, Oral, Nightly  metoprolol succinate XL, 12.5 mg, Oral, Q24H  midodrine, 10 mg, Oral, TID AC  sodium chloride, 250 mL, Intravenous, Once  sodium chloride, 10 mL, Intravenous, Q12H  tamsulosin, 0.4 mg, Oral, Nightly  [Held by provider] valproate sodium, 125 mg, Intravenous, " Nightly        Assessment & Plan         Loss of consciousness    Anemia due to chronic kidney disease stage III    Atrial fibrillation    Essential hypertension    Chronic diastolic heart failure    Chronic obstructive pulmonary disease    Coronary heart disease    ESRD (end stage renal disease)    Syncope and collapse    Thrombocytopenia    Syncope    Chronic anticoagulation  brachiocephalic AV fistula   + BC SA   PNA  Mediport      Plan :     Repeat BC  IV vanc   Will follow  Thank you                 Gayle Borrego MD  04/01/24  12:49 EDT

## 2024-04-02 VITALS
BODY MASS INDEX: 22.63 KG/M2 | RESPIRATION RATE: 12 BRPM | HEART RATE: 79 BPM | OXYGEN SATURATION: 99 % | TEMPERATURE: 98.2 F | DIASTOLIC BLOOD PRESSURE: 48 MMHG | HEIGHT: 69 IN | WEIGHT: 152.78 LBS | SYSTOLIC BLOOD PRESSURE: 95 MMHG

## 2024-04-02 PROBLEM — I50.33 ACUTE ON CHRONIC HEART FAILURE WITH PRESERVED EJECTION FRACTION (HFPEF): Status: ACTIVE | Noted: 2024-04-02

## 2024-04-02 LAB
ANION GAP SERPL CALCULATED.3IONS-SCNC: 7 MMOL/L (ref 5–15)
BUN SERPL-MCNC: 24 MG/DL (ref 8–23)
BUN/CREAT SERPL: 5.1 (ref 7–25)
CALCIUM SPEC-SCNC: 9.7 MG/DL (ref 8.2–9.6)
CHLORIDE SERPL-SCNC: 104 MMOL/L (ref 98–107)
CO2 SERPL-SCNC: 30 MMOL/L (ref 22–29)
CREAT SERPL-MCNC: 4.7 MG/DL (ref 0.76–1.27)
DEPRECATED RDW RBC AUTO: 60.7 FL (ref 37–54)
EGFRCR SERPLBLD CKD-EPI 2021: 11.1 ML/MIN/1.73
ERYTHROCYTE [DISTWIDTH] IN BLOOD BY AUTOMATED COUNT: 15.9 % (ref 12.3–15.4)
FOLATE SERPL-MCNC: 12.3 NG/ML (ref 4.78–24.2)
GLUCOSE BLDC GLUCOMTR-MCNC: 141 MG/DL (ref 70–105)
GLUCOSE BLDC GLUCOMTR-MCNC: 69 MG/DL (ref 70–105)
GLUCOSE BLDC GLUCOMTR-MCNC: 84 MG/DL (ref 70–105)
GLUCOSE BLDC GLUCOMTR-MCNC: 98 MG/DL (ref 70–105)
GLUCOSE SERPL-MCNC: 87 MG/DL (ref 65–99)
HCT VFR BLD AUTO: 28.9 % (ref 37.5–51)
HGB BLD-MCNC: 8.9 G/DL (ref 13–17.7)
INR PPP: 2.29 (ref 2–3)
MAGNESIUM SERPL-MCNC: 2 MG/DL (ref 1.7–2.3)
MCH RBC QN AUTO: 32.4 PG (ref 26.6–33)
MCHC RBC AUTO-ENTMCNC: 30.8 G/DL (ref 31.5–35.7)
MCV RBC AUTO: 105.1 FL (ref 79–97)
PHOSPHATE SERPL-MCNC: 4.3 MG/DL (ref 2.5–4.5)
PLATELET # BLD AUTO: 87 10*3/MM3 (ref 140–450)
PMV BLD AUTO: 11.1 FL (ref 6–12)
POTASSIUM SERPL-SCNC: 4.4 MMOL/L (ref 3.5–5.2)
PROTHROMBIN TIME: 23.5 SECONDS (ref 19.4–28.5)
RBC # BLD AUTO: 2.75 10*6/MM3 (ref 4.14–5.8)
SODIUM SERPL-SCNC: 141 MMOL/L (ref 136–145)
VANCOMYCIN SERPL-MCNC: 10 MCG/ML (ref 5–40)
VIT B12 BLD-MCNC: >2000 PG/ML (ref 211–946)
WBC NRBC COR # BLD AUTO: 7.63 10*3/MM3 (ref 3.4–10.8)

## 2024-04-02 PROCEDURE — 80048 BASIC METABOLIC PNL TOTAL CA: CPT | Performed by: INTERNAL MEDICINE

## 2024-04-02 PROCEDURE — 82948 REAGENT STRIP/BLOOD GLUCOSE: CPT | Performed by: INTERNAL MEDICINE

## 2024-04-02 PROCEDURE — 85027 COMPLETE CBC AUTOMATED: CPT | Performed by: INTERNAL MEDICINE

## 2024-04-02 PROCEDURE — 25010000002 EPOETIN ALFA-EPBX 10000 UNIT/ML SOLUTION: Performed by: INTERNAL MEDICINE

## 2024-04-02 PROCEDURE — 80202 ASSAY OF VANCOMYCIN: CPT | Performed by: INTERNAL MEDICINE

## 2024-04-02 PROCEDURE — 25010000002 HALOPERIDOL LACTATE PER 5 MG

## 2024-04-02 PROCEDURE — 82948 REAGENT STRIP/BLOOD GLUCOSE: CPT

## 2024-04-02 PROCEDURE — 25010000002 NA FERRIC GLUC CPLX PER 12.5 MG: Performed by: INTERNAL MEDICINE

## 2024-04-02 PROCEDURE — P9047 ALBUMIN (HUMAN), 25%, 50ML: HCPCS | Performed by: INTERNAL MEDICINE

## 2024-04-02 PROCEDURE — 85610 PROTHROMBIN TIME: CPT | Performed by: INTERNAL MEDICINE

## 2024-04-02 PROCEDURE — 83735 ASSAY OF MAGNESIUM: CPT | Performed by: INTERNAL MEDICINE

## 2024-04-02 PROCEDURE — 25010000002 ALBUMIN HUMAN 25% PER 50 ML: Performed by: INTERNAL MEDICINE

## 2024-04-02 PROCEDURE — 82607 VITAMIN B-12: CPT | Performed by: INTERNAL MEDICINE

## 2024-04-02 PROCEDURE — 99232 SBSQ HOSP IP/OBS MODERATE 35: CPT | Performed by: STUDENT IN AN ORGANIZED HEALTH CARE EDUCATION/TRAINING PROGRAM

## 2024-04-02 PROCEDURE — 84100 ASSAY OF PHOSPHORUS: CPT | Performed by: INTERNAL MEDICINE

## 2024-04-02 PROCEDURE — 82746 ASSAY OF FOLIC ACID SERUM: CPT | Performed by: INTERNAL MEDICINE

## 2024-04-02 RX ORDER — DONEPEZIL HYDROCHLORIDE 10 MG/1
10 TABLET, FILM COATED ORAL NIGHTLY
Qty: 30 TABLET | Refills: 0 | Status: SHIPPED | OUTPATIENT
Start: 2024-04-02

## 2024-04-02 RX ORDER — IPRATROPIUM BROMIDE AND ALBUTEROL SULFATE 2.5; .5 MG/3ML; MG/3ML
3 SOLUTION RESPIRATORY (INHALATION) EVERY 6 HOURS PRN
Status: DISCONTINUED | OUTPATIENT
Start: 2024-04-02 | End: 2024-04-02 | Stop reason: HOSPADM

## 2024-04-02 RX ORDER — METOPROLOL SUCCINATE 25 MG/1
12.5 TABLET, EXTENDED RELEASE ORAL
Qty: 30 TABLET | Refills: 0 | Status: SHIPPED | OUTPATIENT
Start: 2024-04-03

## 2024-04-02 RX ORDER — NITROGLYCERIN 0.4 MG/1
0.4 TABLET SUBLINGUAL
Qty: 30 TABLET | Refills: 12 | Status: SHIPPED | OUTPATIENT
Start: 2024-04-02

## 2024-04-02 RX ORDER — ALBUMIN (HUMAN) 12.5 G/50ML
12.5 SOLUTION INTRAVENOUS AS NEEDED
Status: DISCONTINUED | OUTPATIENT
Start: 2024-04-02 | End: 2024-04-02 | Stop reason: HOSPADM

## 2024-04-02 RX ORDER — WARFARIN SODIUM 5 MG/1
5 TABLET ORAL
Status: COMPLETED | OUTPATIENT
Start: 2024-04-02 | End: 2024-04-02

## 2024-04-02 RX ORDER — AMOXICILLIN 250 MG
2 CAPSULE ORAL 2 TIMES DAILY PRN
Qty: 30 TABLET | Refills: 0 | Status: SHIPPED | OUTPATIENT
Start: 2024-04-02

## 2024-04-02 RX ADMIN — MIDODRINE HYDROCHLORIDE 10 MG: 5 TABLET ORAL at 17:07

## 2024-04-02 RX ADMIN — Medication 10 ML: at 10:13

## 2024-04-02 RX ADMIN — HALOPERIDOL LACTATE 5 MG: 5 INJECTION, SOLUTION INTRAMUSCULAR at 03:00

## 2024-04-02 RX ADMIN — MIDODRINE HYDROCHLORIDE 10 MG: 5 TABLET ORAL at 07:25

## 2024-04-02 RX ADMIN — DEXTROSE MONOHYDRATE 25 G: 25 INJECTION, SOLUTION INTRAVENOUS at 11:34

## 2024-04-02 RX ADMIN — ALBUMIN (HUMAN) 12.5 G: 0.25 INJECTION, SOLUTION INTRAVENOUS at 10:33

## 2024-04-02 RX ADMIN — ALBUMIN (HUMAN) 12.5 G: 0.25 INJECTION, SOLUTION INTRAVENOUS at 09:40

## 2024-04-02 RX ADMIN — WARFARIN SODIUM 5 MG: 5 TABLET ORAL at 17:08

## 2024-04-02 RX ADMIN — ALBUMIN (HUMAN) 12.5 G: 0.25 INJECTION, SOLUTION INTRAVENOUS at 07:59

## 2024-04-02 RX ADMIN — SODIUM CHLORIDE 125 MG: 9 INJECTION, SOLUTION INTRAVENOUS at 07:45

## 2024-04-02 RX ADMIN — EPOETIN ALFA-EPBX 10000 UNITS: 10000 INJECTION, SOLUTION INTRAVENOUS; SUBCUTANEOUS at 07:46

## 2024-04-02 NOTE — SIGNIFICANT NOTE
04/02/24 1512   OTHER   Discipline occupational therapist   Rehab Time/Intention   Session Not Performed other (see comments)  (Pt tentatively discharging this date. Discharge summary is written. Will f/u tomorrow if d/c plans change.)   Therapy Assessment/Plan (PT)   Criteria for Skilled Interventions Met (PT) yes   Recommendation   OT - Next Appointment 04/03/24

## 2024-04-02 NOTE — CASE MANAGEMENT/SOCIAL WORK
Continued Stay Note  DEISY Murilloyd     Patient Name: Barry Calhoun Sr.  MRN: [id number]  Today's Date: 4/2/2024    Admit Date: 3/27/2024    Plan: [facility] accepted. No precert required, PASRR approved. Becca FERNANDEZ following (accepted, order per MD). From home, current OP HD [facility].   Discharge Plan       Row Name 04/02/24 1430       Plan    Patient/Family in Agreement with Plan yes    Plan Comments CM updated [facility] liaison Nancie SALAZAR that patient received HD and per MD in unit rounds, ready for d/c today, 4/2. Liaison confirmed she would let building know. CM contacted patient’s grandson/POA, Vernon to discuss dc plan, IMM letter, and anticipated transportation for EMS. [facility] arranged via ad-hoc. Medical necessity form completed and information packet provided to RN for EMS. Confirmed with Fartun MEDRANO from [facility] that run was received.     Final Discharge Disposition Code 03 - skilled nursing facility (SNF)    Final Note [facility]             Expected Discharge Date and Time       Expected Discharge Date Expected Discharge Time    Apr 2, 2024      Case Management Discharge Note  Final Note: [facility]  Selected Continued Care - Admitted Since 3/27/2024       Destination Coordination complete.      Service Provider Selected Services Address Phone Fax Patient Preferred    [facility] - [facility] [address] [place] [address] [phone] --           Transportation Services  Ambulance: [facility]  Final Discharge Disposition Code: 03 - skilled nursing facility (SNF)     Louise Oliva RN     Office Phone: [phone]  Office Cell: [phone]

## 2024-04-02 NOTE — PLAN OF CARE
Goal Outcome Evaluation:         Goals of care met, patient discharging to [facility]. Report called to Preeti. Ambulance transport arranged. Vesta Colbret, significant other, notified by phone. IV site discontinued. Stable upon transfer.

## 2024-04-02 NOTE — PROGRESS NOTES
"Pharmacy dosing service  Anticoagulant  Warfarin     Subjective:    Barry Calhoun Sr. is a 89 y.o.male being continued on warfarin for Atrial Fibrillation.    INR Goal: 2 - 3  Home medication?: Yes, 4mg daily except 3mg on MWF  Bridge Therapy Present?:  No  Interacting Medications Evaluation (New/Present/Discontinued): N/A  Additional Contributing Factors: thrombocytopenia      Assessment/Plan:    INR back in goal range today. Will give warfarin 5 mg tonight and continue dosing based on trend.     Continue to monitor and adjust based on INR.         Date 3/28 3/29 3/30 3/31 4/1 4/2      INR 3.36 4.17 4.92 5.18 4.46 2.29      Dose HOLD HOLD HOLD HOLD HOLD 5 mg          Objective:  [Ht: 175.3 cm (69\"); Wt: 69.3 kg (152 lb 12.5 oz); BMI: Body mass index is 22.56 kg/m².]    Lab Results   Component Value Date    ALBUMIN 3.5 03/29/2024    ALBUMIN 3.5 03/29/2024     Lab Results   Component Value Date    INR 2.29 04/02/2024    INR 4.46 (H) 04/01/2024    INR 5.18 (C) 03/31/2024    PROTIME 23.5 04/02/2024    PROTIME 43.5 (H) 04/01/2024    PROTIME 50.0 (H) 03/31/2024     Lab Results   Component Value Date    HGB 8.9 (L) 04/02/2024    HGB 8.4 (L) 04/01/2024    HGB 8.5 (L) 03/31/2024     Lab Results   Component Value Date    HCT 28.9 (L) 04/02/2024    HCT 27.8 (L) 04/01/2024    HCT 27.4 (L) 03/31/2024       Keshia Quigley, PharmJIMMY  04/02/24 08:03 EDT   "

## 2024-04-02 NOTE — PROGRESS NOTES
LOS: 5 days   Patient Care Team:  Pedro Pruvis MD as PCP - General (Family Medicine)  Pedro Purvis MD as PCP - Family Medicine  Pino Mclean MD as Consulting Physician (Nephrology)        Subjective     Interval History:     Patient Complaints:   Patient Denies:  NV       Review of Systems:    weak    Objective     Vital Signs  Temp:  [95.8 °F (35.4 °C)-97.6 °F (36.4 °C)] 97.6 °F (36.4 °C)  Heart Rate:  [66-94] 73  Resp:  [13-22] 18  BP: ()/(49-94) 100/55    Physical Exam:     General Appearance:  Alert, cooperative, in no acute distress   Head:  Normocephalic, without obvious abnormality, atraumatic   Eyes:  Lids and lashes normal, conjunctivae and sclerae normal, no icterus, no pallor, corneas clear, PERRLA   Ears:  Ears appear intact with no abnormalities noted   Throat:  No oral lesions, no thrush, oral mucosa moist   Neck:  No adenopathy, supple, trachea midline, no thyromegaly, no carotid bruit, no JVD   Back:  No kyphosis present, no scoliosis present, no skin lesions, erythema or scars, no tenderness to percussion or palpation, range of motion normal   Lungs:  Clear to auscultation, respirations regular, even and unlabored    Heart:  Regular rhythm and normal rate, normal S1 and S2, no murmur, no gallop, no rub, no click   Chest Wall:  No abnormalities observed   Abdomen:  Normal bowel sounds, no masses, no organomegaly, soft non-tender, non-distended, no guarding, no rebound tenderness   Rectal:  Deferred   Extremities:  Moves all extremities well, no edema, no cyanosis, no redness   Pulses:  Pulses palpable and equal bilaterally   Skin:  No bleeding, bruising or rash   Lymph nodes:  No palpable adenopathy   Neurologic:  Cranial nerves 2 - 12 grossly intact, sensation intact, DTR present and equal bilaterally                                                                                  Results Review:      Lab Results (last 72 hours)       Procedure Component  Value Units Date/Time    POC Glucose Finger 4x Daily Before Meals & at Bedtime [[id number]]  (Normal) Collected: 04/01/24 1129    Specimen: Blood from Finger Updated: 04/01/24 1130     Glucose 79 mg/dL      Comment: Serial Number: [id number]:  [id number]       POC Glucose Finger 4x Daily Before Meals & at Bedtime [[id number]]  (Normal) Collected: 04/01/24 0752    Specimen: Blood from Finger Updated: 04/01/24 0754     Glucose 87 mg/dL      Comment: Serial Number: [id number]:  [id number]       Blood Culture - Blood, Hand, Left [[id number]]  (Abnormal) Collected: 03/29/24 1812    Specimen: Blood from Hand, Left Updated: 04/01/24 0726     Blood Culture Staphylococcus, coagulase negative     Isolated from Anaerobic Bottle     Gram Stain Anaerobic Bottle Gram positive cocci in clusters    Narrative:      Probable contaminant requires clinical correlation, susceptibility not performed unless requested by physician.    Less than seven (7) mL's of blood was collected.  Insufficient quantity may yield false negative results.    Basic Metabolic Panel [[id number]]  (Abnormal) Collected: 04/01/24 0059    Specimen: Blood from Arm, Left Updated: 04/01/24 0145     Glucose 109 mg/dL      BUN 18 mg/dL      Creatinine 3.70 mg/dL      Sodium 139 mmol/L      Potassium 4.1 mmol/L      Chloride 103 mmol/L      CO2 29.0 mmol/L      Calcium 9.0 mg/dL      BUN/Creatinine Ratio 4.9     Anion Gap 7.0 mmol/L      Comment: Unable to calculate Anion Gap.        eGFR 14.8 mL/min/1.73      Comment: <15 Indicative of kidney failure       Narrative:      GFR Normal >60  Chronic Kidney Disease <60  Kidney Failure <15    The GFR formula is only valid for adults with stable renal function between ages 18 and 70.    CBC (No Diff) [[id number]]  (Abnormal) Collected: 04/01/24 0059    Specimen: Blood from Arm, Left Updated: 04/01/24 0142     WBC 6.14 10*3/mm3      RBC 2.62 10*6/mm3      Hemoglobin 8.4 g/dL      Hematocrit 27.8 %      .1 fL       MCH 32.1 pg      MCHC 30.2 g/dL      RDW 15.9 %      RDW-SD 61.6 fl      MPV 10.6 fL      Platelets 71 10*3/mm3      Comment: Result checked         Magnesium [[id number]]  (Normal) Collected: 04/01/24 0059    Specimen: Blood from Arm, Left Updated: 04/01/24 0139     Magnesium 1.9 mg/dL     Phosphorus [[id number]]  (Normal) Collected: 04/01/24 0059    Specimen: Blood from Arm, Left Updated: 04/01/24 0134     Phosphorus 3.9 mg/dL     Protime-INR [[id number]]  (Abnormal) Collected: 04/01/24 0059    Specimen: Blood from Arm, Left Updated: 04/01/24 0120     Protime 43.5 Seconds      INR 4.46    POC Glucose Once [[id number]]  (Abnormal) Collected: 03/31/24 2204    Specimen: Blood Updated: 03/31/24 2206     Glucose 119 mg/dL      Comment: Serial Number: [id number]:  [id number]       POC Glucose Once [[id number]]  (Abnormal) Collected: 03/31/24 2054    Specimen: Blood Updated: 03/31/24 2056     Glucose 144 mg/dL      Comment: Serial Number: [id number]:  [id number]       Blood Culture - Blood, Arm, Left [[id number]]  (Normal) Collected: 03/29/24 1812    Specimen: Blood from Arm, Left Updated: 03/31/24 1831     Blood Culture No growth at 2 days    Narrative:      Less than seven (7) mL's of blood was collected.  Insufficient quantity may yield false negative results.    POC Glucose Once [[id number]]  (Abnormal) Collected: 03/31/24 1755    Specimen: Blood Updated: 03/31/24 1757     Glucose 150 mg/dL      Comment: Serial Number: [id number]:  [id number]       Blood Culture - Blood, Hand, Left [[id number]] Collected: 03/31/24 1632    Specimen: Blood from Hand, Left Updated: 03/31/24 1637    POC Glucose Finger 4x Daily Before Meals & at Bedtime [[id number]]  (Abnormal) Collected: 03/31/24 1114    Specimen: Blood from Finger Updated: 03/31/24 1116     Glucose 124 mg/dL      Comment: Serial Number: [id number]:  [id number]       POC Glucose Once [[id number]]  (Normal) Collected: 03/31/24 0806    Specimen: Blood  Updated: 03/31/24 0807     Glucose 79 mg/dL      Comment: Serial Number: [id number]:  [id number]       CBC (No Diff) [[id number]]  (Abnormal) Collected: 03/31/24 0407    Specimen: Blood Updated: 03/31/24 0459     WBC 6.45 10*3/mm3      RBC 2.62 10*6/mm3      Hemoglobin 8.5 g/dL      Hematocrit 27.4 %      .6 fL      MCH 32.4 pg      MCHC 31.0 g/dL      RDW 15.6 %      RDW-SD 59.4 fl      MPV 11.2 fL      Platelets 64 10*3/mm3      Comment: Result checked         Blood Culture ID, PCR - Blood, Hand, Left [[id number]] Collected: 03/29/24 1812    Specimen: Blood from Hand, Left Updated: 03/31/24 0452     BCID, PCR Negative by BCID PCR. Culture to Follow.     BOTTLE TYPE Anaerobic Bottle    Basic Metabolic Panel [[id number]]  (Abnormal) Collected: 03/31/24 0407    Specimen: Blood Updated: 03/31/24 0449     Glucose 86 mg/dL      BUN 13 mg/dL      Creatinine 2.92 mg/dL      Sodium 140 mmol/L      Potassium 5.0 mmol/L      Comment: Slight hemolysis detected by analyzer. Result may be falsely elevated.        Chloride 101 mmol/L      CO2 34.0 mmol/L      Calcium 9.4 mg/dL      BUN/Creatinine Ratio 4.5     Anion Gap 5.0 mmol/L      eGFR 19.6 mL/min/1.73     Narrative:      GFR Normal >60  Chronic Kidney Disease <60  Kidney Failure <15    The GFR formula is only valid for adults with stable renal function between ages 18 and 70.    Protime-INR [[id number]]  (Abnormal) Collected: 03/31/24 0407    Specimen: Blood Updated: 03/31/24 0438     Protime 50.0 Seconds      INR 5.18    POC Glucose Once [[id number]]  (Normal) Collected: 03/30/24 2022    Specimen: Blood Updated: 03/30/24 2024     Glucose 92 mg/dL      Comment: Serial Number: [id number]:  [id number]       POC Glucose Once [[id number]]  (Normal) Collected: 03/30/24 1649    Specimen: Blood Updated: 03/30/24 1651     Glucose 85 mg/dL      Comment: Serial Number: [id number]:  [id number]       POC Glucose Finger 4x Daily Before Meals & at Bedtime [[id number]]   (Normal) Collected: 03/30/24 1124    Specimen: Blood from Finger Updated: 03/30/24 1126     Glucose 76 mg/dL      Comment: Serial Number: [id number]:  [id number]       POC Glucose Once [[id number]]  (Abnormal) Collected: 03/30/24 0754    Specimen: Blood Updated: 03/30/24 0757     Glucose 117 mg/dL      Comment: Serial Number: [id number]:  [id number]       CBC (No Diff) [[id number]]  (Abnormal) Collected: 03/29/24 2345    Specimen: Blood from Arm, Left Updated: 03/30/24 0104     WBC 3.76 10*3/mm3      RBC 2.61 10*6/mm3      Hemoglobin 8.3 g/dL      Hematocrit 27.3 %      .6 fL      MCH 31.8 pg      MCHC 30.4 g/dL      RDW 15.5 %      RDW-SD 59.6 fl      MPV 11.1 fL      Platelets 56 10*3/mm3     Basic Metabolic Panel [[id number]]  (Abnormal) Collected: 03/29/24 2345    Specimen: Blood from Arm, Left Updated: 03/30/24 0043     Glucose 106 mg/dL      BUN 20 mg/dL      Creatinine 3.45 mg/dL      Sodium 136 mmol/L      Potassium 4.3 mmol/L      Chloride 98 mmol/L      CO2 32.0 mmol/L      Calcium 8.6 mg/dL      BUN/Creatinine Ratio 5.8     Anion Gap 6.0 mmol/L      eGFR 16.1 mL/min/1.73     Narrative:      GFR Normal >60  Chronic Kidney Disease <60  Kidney Failure <15    The GFR formula is only valid for adults with stable renal function between ages 18 and 70.    Magnesium [[id number]]  (Normal) Collected: 03/29/24 2345    Specimen: Blood from Arm, Left Updated: 03/30/24 0043     Magnesium 1.9 mg/dL     Phosphorus [[id number]]  (Normal) Collected: 03/29/24 2345    Specimen: Blood from Arm, Left Updated: 03/30/24 0030     Phosphorus 3.8 mg/dL     Protime-INR [[id number]]  (Abnormal) Collected: 03/29/24 2345    Specimen: Blood from Arm, Left Updated: 03/30/24 0021     Protime 47.7 Seconds      INR 4.92    POC Glucose Once [[id number]]  (Abnormal) Collected: 03/29/24 2328    Specimen: Blood Updated: 03/29/24 2329     Glucose 137 mg/dL      Comment: Serial Number: [id number]:  [id number]       POC Glucose  Finger 4x Daily Before Meals & at Bedtime [[id number]]  (Abnormal) Collected: 03/29/24 2223    Specimen: Blood from Finger Updated: 03/29/24 2225     Glucose 62 mg/dL      Comment: Serial Number: [id number]:  [id number]       Hepatic Function Panel [[id number]]  (Abnormal) Collected: 03/29/24 1505    Specimen: Blood Updated: 03/29/24 1915     Total Protein 5.9 g/dL      Albumin 3.5 g/dL      ALT (SGPT) 13 U/L      AST (SGOT) 27 U/L      Alkaline Phosphatase 97 U/L      Total Bilirubin 0.7 mg/dL      Bilirubin, Direct 0.3 mg/dL      Bilirubin, Indirect 0.4 mg/dL     Comprehensive Metabolic Panel [[id number]]  (Abnormal) Collected: 03/29/24 1505    Specimen: Blood Updated: 03/29/24 1914     Glucose 70 mg/dL      BUN 18 mg/dL      Creatinine 3.00 mg/dL      Sodium 140 mmol/L      Potassium 4.4 mmol/L      Chloride 101 mmol/L      CO2 28.0 mmol/L      Calcium 9.0 mg/dL      Total Protein 5.9 g/dL      Albumin 3.5 g/dL      ALT (SGPT) 13 U/L      AST (SGOT) 27 U/L      Alkaline Phosphatase 97 U/L      Total Bilirubin 0.7 mg/dL      Globulin 2.4 gm/dL      A/G Ratio 1.5 g/dL      BUN/Creatinine Ratio 6.0     Anion Gap 11.0 mmol/L      eGFR 19.0 mL/min/1.73     Narrative:      GFR Normal >60  Chronic Kidney Disease <60  Kidney Failure <15    The GFR formula is only valid for adults with stable renal function between ages 18 and 70.    Lactic Acid, Plasma [[id number]]  (Normal) Collected: 03/29/24 1812    Specimen: Blood Updated: 03/29/24 1904     Lactate 1.6 mmol/L     CBC & Differential [[id number]]  (Abnormal) Collected: 03/29/24 1812    Specimen: Blood Updated: 03/29/24 1853    Narrative:      The following orders were created for panel order CBC & Differential.  Procedure                               Abnormality         Status                     ---------                               -----------         ------                     CBC Auto Differential[[id number]]        Abnormal            Final result                Scan Slide[[id number]]                                       Final result                 Please view results for these tests on the individual orders.    CBC Auto Differential [[id number]]  (Abnormal) Collected: 03/29/24 1812    Specimen: Blood Updated: 03/29/24 1853     WBC 4.12 10*3/mm3      RBC 2.86 10*6/mm3      Hemoglobin 9.3 g/dL      Hematocrit 30.2 %      .6 fL      MCH 32.5 pg      MCHC 30.8 g/dL      RDW 15.6 %      RDW-SD 60.1 fl      MPV 11.1 fL      Platelets 56 10*3/mm3      Neutrophil % 66.1 %      Lymphocyte % 18.2 %      Monocyte % 13.1 %      Eosinophil % 1.9 %      Basophil % 0.5 %      Immature Grans % 0.2 %      Neutrophils, Absolute 2.72 10*3/mm3      Lymphocytes, Absolute 0.75 10*3/mm3      Monocytes, Absolute 0.54 10*3/mm3      Eosinophils, Absolute 0.08 10*3/mm3      Basophils, Absolute 0.02 10*3/mm3      Immature Grans, Absolute 0.01 10*3/mm3      nRBC 0.0 /100 WBC     Scan Slide [[id number]] Collected: 03/29/24 1812    Specimen: Blood Updated: 03/29/24 1853     Dacrocytes Slight/1+     Elliptocytes Slight/1+     Macrocytes Slight/1+     Poikilocytes Slight/1+     Polychromasia Slight/1+     Stomatocytes Slight/1+     Toxic Granulation Slight/1+     Large Platelets Slight/1+     Giant Platelets Slight/1+    POC Glucose Once [[id number]]  (Normal) Collected: 03/29/24 1621    Specimen: Blood Updated: 03/29/24 1622     Glucose 71 mg/dL      Comment: Serial Number: [id number]:  [id number]               Imaging Results (Last 72 Hours)       ** No results found for the last 72 hours. **              Medication Review:     Hospital Medications (active)         Dose Frequency Start End    acetaminophen (TYLENOL) suppository 650 mg 650 mg Every 4 Hours PRN 3/27/2024 --    Admin Instructions: If given for fever, use fever parameter: fever greater than 100.4 °F  Based on patient request - if ordered for moderate or severe pain, provider allows for administration of a medication prescribed  "for a lower pain scale.    Do not exceed 4 grams of acetaminophen in a 24 hr period. Max dose of 2gm for AST/ALT greater than 120 units/L.    If given for pain, use the following pain scale:   Mild Pain = Pain Score of 1-3, CPOT 1-2  Moderate Pain = Pain Score of 4-6, CPOT 3-4  Severe Pain = Pain Score of 7-10, CPOT 5-8    Route: Rectal    Linked Group 1: Placed in \"Or\" Linked Group        acetaminophen (TYLENOL) tablet 650 mg 650 mg Every 4 Hours PRN 3/27/2024 --    Admin Instructions: If given for fever, use fever parameter: fever greater than 100.4 °F  Based on patient request - if ordered for moderate or severe pain, provider allows for administration of a medication prescribed for a lower pain scale.    Do not exceed 4 grams of acetaminophen in a 24 hr period. Max dose of 2gm for AST/ALT greater than 120 units/L.    If given for pain, use the following pain scale:   Mild Pain = Pain Score of 1-3, CPOT 1-2  Moderate Pain = Pain Score of 4-6, CPOT 3-4  Severe Pain = Pain Score of 7-10, CPOT 5-8    Route: Oral    Linked Group 1: Placed in \"Or\" Linked Group        allopurinol (ZYLOPRIM) tablet 100 mg 100 mg Daily 3/28/2024 --    Admin Instructions: ([facility]) Take with food if GI upset occurs.    Route: Oral    atorvastatin (LIPITOR) tablet 20 mg 20 mg Nightly 3/28/2024 --    Admin Instructions: Avoid grapefruit juice.    Route: Oral    bisacodyl (DULCOLAX) EC tablet 5 mg 5 mg Daily PRN 3/27/2024 --    Admin Instructions: Use if no bowel movement after 12 hours.  Swallow whole. Do not crush, split, or chew tablet.    Route: Oral    Linked Group 2: Placed in \"And\" Linked Group        bisacodyl (DULCOLAX) suppository 10 mg 10 mg Daily PRN 3/27/2024 --    Admin Instructions: Use if no bowel movement after 12 hours.  Hold for diarrhea    Route: Rectal    Linked Group 2: Placed in \"And\" Linked Group        dextrose (D50W) (25 g/50 mL) IV injection 25 g 25 g Every 15 Minutes PRN 3/29/2024 --    Route: Intravenous    dextrose " (GLUTOSE) oral gel 15 g 15 g Every 15 Minutes PRN 3/29/2024 --    Route: Oral    docusate sodium (COLACE) capsule 200 mg 200 mg Every Morning 3/29/2024 --    Admin Instructions: Swallow whole.  Do not open, crush, or chew capsule.    Route: Oral    donepezil (ARICEPT) tablet 10 mg 10 mg Nightly 3/29/2024 --    Route: Oral    famotidine (PEPCID) tablet 10 mg 10 mg Daily 3/30/2024 --    Route: Oral    folic acid (FOLVITE) tablet 1 mg 1 mg Daily 3/28/2024 --    Route: Oral    glucagon (GLUCAGEN) injection 1 mg 1 mg Every 15 Minutes PRN 3/29/2024 --    Admin Instructions: Reconstitute powder for injection by adding 1 mL of -supplied sterile diluent or sterile water for injection to a vial containing 1 mg of the drug, to provide solutions containing 1 mg/mL. Shake vial gently to dissolve.    Route: Subcutaneous    guaifenesin (ROBITUSSIN) 100 MG/5ML liquid 200 mg 200 mg Every 4 Hours PRN 3/31/2024 --    Admin Instructions: Caution: Look alike/sound alike drug alert    Route: Oral    haloperidol lactate (HALDOL) injection 5 mg 5 mg Every 6 Hours PRN 3/29/2024 --    Admin Instructions: For IV Push, administer no faster than 5 mg / minute.    Route: Intravenous    ipratropium-albuterol (DUO-NEB) nebulizer solution 3 mL 3 mL 4 Times Daily - RT 3/30/2024 --    Admin Instructions: Include Respiratory Treatment Education    Route: Nebulization    levothyroxine (SYNTHROID, LEVOTHROID) tablet 50 mcg 50 mcg Nightly 3/28/2024 --    Admin Instructions: Take on empty stomach.    Route: Oral    metoprolol succinate XL (TOPROL-XL) 24 hr tablet 12.5 mg 12.5 mg Every 24 Hours Scheduled 3/29/2024 --    Admin Instructions: Hold for SBP less than 100, DBP less than 60, or heart rate less than 50    Do not crush or chew the capsules or tablets. The drug may not work as designed if the capsule or tablet is crushed or chewed. Swallow whole.  Do not crush or chew.    Route: Oral    midodrine (PROAMATINE) tablet 10 mg 10 mg 3 Times  "Daily Before Meals 3/28/2024 --    Route: Oral    nitroglycerin (NITROSTAT) SL tablet 0.4 mg 0.4 mg Every 5 Minutes PRN 3/27/2024 --    Admin Instructions: If Pain Unrelieved After 3 Doses Notify MD  May administer up to 3 doses per episode.    Route: Sublingual    Pharmacy to dose vancomycin  Continuous PRN 3/31/2024 4/14/2024    Route: Does not apply    Pharmacy to dose warfarin  Continuous PRN 3/28/2024 --    Admin Instructions:   Group 2 (Pink) Hazardous Drug - Reproductive Risk Only - See Handling Guide    Route: Does not apply    polyethylene glycol (MIRALAX) packet 17 g 17 g Daily PRN 3/27/2024 --    Admin Instructions: Use if no bowel movement after 12 hours. Mix in 6-8 ounces of water.  Use 4-8 ounces of water, tea, or juice for each 17 gram dose.    Route: Oral    Linked Group 2: Placed in \"And\" Linked Group        sennosides-docusate (PERICOLACE) 8.6-50 MG per tablet 2 tablet 2 tablet 2 Times Daily PRN 3/27/2024 --    Admin Instructions: Start bowel management regimen if patient has not had a bowel movement after 12 hours.    Route: Oral    Linked Group 2: Placed in \"And\" Linked Group        sodium chloride 0.9 % bolus 250 mL 250 mL Once 3/28/2024 --    Route: Intravenous    sodium chloride 0.9 % flush 10 mL 10 mL Every 12 Hours Scheduled 3/27/2024 --    Route: Intravenous    sodium chloride 0.9 % flush 10 mL 10 mL As Needed 3/27/2024 --    Route: Intravenous    sodium chloride 0.9 % infusion 40 mL 40 mL As Needed 3/27/2024 --    Admin Instructions: Following administration of an IV intermittent medication, flush line with 40mL NS at 100mL/hr.    Route: Intravenous    tamsulosin (FLOMAX) 24 hr capsule 0.4 mg 0.4 mg Nightly 3/28/2024 --    Admin Instructions: Do not crush or chew the capsules or tablets. The drug may not work as designed if the capsule or tablet is crushed or chewed. Swallow whole.    Route: Oral    valproate (DEPACON) 125 mg in dextrose (D5W) 5 % 100 mL IVPB ([Held by provider] since " "3/29/2024  2:43 PM) 125 mg Nightly 3/29/2024 --    Admin Instructions: Group 2 (Pink) Hazardous Drug - Reproductive Risk Only - See Handling Guide    Route: Intravenous    Vancomycin Pharmacy Intermittent/Pulse Dosing  As Needed 3/31/2024 4/14/2024    Admin Instructions: Patient is on intermittent or pulse Vancomycin dosing. This is an informational \"Pharmacy Dosing\" note only.    Route: Does not apply          allopurinol, 100 mg, Oral, Daily  atorvastatin, 20 mg, Oral, Nightly  docusate sodium, 200 mg, Oral, QAM  donepezil, 10 mg, Oral, Nightly  famotidine, 10 mg, Oral, Daily  folic acid, 1 mg, Oral, Daily  ipratropium-albuterol, 3 mL, Nebulization, 4x Daily - RT  levothyroxine, 50 mcg, Oral, Nightly  metoprolol succinate XL, 12.5 mg, Oral, Q24H  midodrine, 10 mg, Oral, TID AC  sodium chloride, 250 mL, Intravenous, Once  sodium chloride, 10 mL, Intravenous, Q12H  tamsulosin, 0.4 mg, Oral, Nightly  [Held by provider] valproate sodium, 125 mg, Intravenous, Nightly  warfarin, 5 mg, Oral, Once        Assessment & Plan         Loss of consciousness    Anemia due to chronic kidney disease stage III    Atrial fibrillation    Essential hypertension    Chronic diastolic heart failure    Chronic obstructive pulmonary disease    Coronary heart disease    ESRD (end stage renal disease)    Syncope and collapse    Thrombocytopenia    Syncope    Chronic anticoagulation    Acute on chronic heart failure with preserved ejection fraction (HFpEF)  brachiocephalic AV fistula   + BC SA   PNA  Mediport      Plan :     Repeat BC-     Will follow  Thank you                 Gayle Borrego MD  04/02/24  13:22 EDT        "

## 2024-04-02 NOTE — PROGRESS NOTES
Name: Barry Calhoun Sr. ADMIT: 3/27/2024   : 1932  PCP: Pedro Purvis MD    MRN: [id number] LOS: 5 days   AGE/SEX: 89 y.o. male  ROOM:  [facility]      CC: Consult for right arm swelling.   Interval History: Patient considerably more alert today.  He is on dialysis in his room at the time of my evaluation.    Subjective   Subjective     Review of Systems  Objective   Objective     Vitals:   Temp:  [95.8 °F (35.4 °C)-97.6 °F (36.4 °C)] 95.8 °F (35.4 °C)  Heart Rate:  [66-94] 82  Resp:  [13-22] 17  BP: ()/(49-94) 90/49    No intake/output data recorded.    Scheduled Meds:     allopurinol, 100 mg, Oral, Daily  atorvastatin, 20 mg, Oral, Nightly  docusate sodium, 200 mg, Oral, QAM  donepezil, 10 mg, Oral, Nightly  famotidine, 10 mg, Oral, Daily  ferric gluconate, 125 mg, Intravenous, Once in Dialysis  folic acid, 1 mg, Oral, Daily  ipratropium-albuterol, 3 mL, Nebulization, 4x Daily - RT  levothyroxine, 50 mcg, Oral, Nightly  metoprolol succinate XL, 12.5 mg, Oral, Q24H  midodrine, 10 mg, Oral, TID AC  sodium chloride, 250 mL, Intravenous, Once  sodium chloride, 10 mL, Intravenous, Q12H  tamsulosin, 0.4 mg, Oral, Nightly  [Held by provider] valproate sodium, 125 mg, Intravenous, Nightly  warfarin, 5 mg, Oral, Once      IV Meds:   Pharmacy to dose vancomycin,   Pharmacy to dose warfarin,         Physical Exam  Vascular: R arm edema noted. Palpable R radial pulse  R arm fistula with thrill.     Data Reviewed:  CBC    Results from last 7 days   Lab Units 24  0535 24  0059 24  0407 24  2345 24  1812 24  0729 24  1018   WBC 10*3/mm3 7.63 6.14 6.45 3.76 4.12 4.53 3.31*   HEMOGLOBIN g/dL 8.9* 8.4* 8.5* 8.3* 9.3* 9.3* 8.0*   PLATELETS 10*3/mm3 87* 71* 64* 56* 56* 53* 45*     BMP   Results from last 7 days   Lab Units 24  0535 24  0059 24  0407 24  2345 24  1505 24  0729 24  1848   SODIUM mmol/L 141 139 140 136 140 140  "139   POTASSIUM mmol/L 4.4 4.1 5.0 4.3 4.4 4.4 4.3   CHLORIDE mmol/L 104 103 101 98 101 101 100   CO2 mmol/L 30.0* 29.0 34.0* 32.0* 28.0 31.0* 32.0*   BUN mg/dL 24* 18 13 20 18 17 17   CREATININE mg/dL 4.70* 3.70* 2.92* 3.45* 3.00* 2.98* 3.51*   GLUCOSE mg/dL 87 109* 86 106* 70 49* 89   MAGNESIUM mg/dL 2.0 1.9  --  1.9  --  1.9  --    PHOSPHORUS mg/dL 4.3 3.9  --  3.8  --  3.4  --      Cr Clearance Estimated Creatinine Clearance: 10 mL/min (A) (by C-G formula based on SCr of 4.7 mg/dL (H)).  Coag   Results from last 7 days   Lab Units 04/02/24  0535 04/01/24  0059 03/31/24  0407 03/29/24  2345 03/29/24  0729 03/28/24  1018 03/27/24  1848   INR  2.29 4.46* 5.18* 4.92* 4.17* 3.36* 2.94     HbA1C No results found for: \"HGBA1C\"  Blood Glucose   Glucose   Date/Time Value Ref Range Status   04/02/2024 0705 84 70 - 105 mg/dL Final     Comment:     Serial Number: [id number]:  [id number]   04/01/2024 2018 102 70 - 105 mg/dL Final     Comment:     Serial Number: [id number]:  [id number]   04/01/2024 1639 111 (H) 70 - 105 mg/dL Final     Comment:     Serial Number: [id number]:  [id number]   04/01/2024 1129 79 70 - 105 mg/dL Final     Comment:     Serial Number: [id number]:  [id number]   04/01/2024 0752 87 70 - 105 mg/dL Final     Comment:     Serial Number: [id number]:  [id number]   03/31/2024 2204 119 (H) 70 - 105 mg/dL Final     Comment:     Serial Number: [id number]:  [id number]   03/31/2024 2054 144 (H) 70 - 105 mg/dL Final     Comment:     Serial Number: [id number]:  [id number]   03/31/2024 1755 150 (H) 70 - 105 mg/dL Final     Comment:     Serial Number: [id number]:  [id number]     Infection   Results from last 7 days   Lab Units 03/31/24  1632 03/29/24  1812   BLOODCX  No growth at 24 hours No growth at 3 days  Staphylococcus, coagulase negative*   BCIDPCR   --  Negative by BCID PCR. Culture to Follow.     CMP   Results from last 7 days   Lab Units 04/02/24  0535 " "04/01/24  0059 03/31/24  0407 03/29/24  2345 03/29/24  1505 03/29/24  0729 03/27/24  1848   SODIUM mmol/L 141 139 140 136 140 140 139   POTASSIUM mmol/L 4.4 4.1 5.0 4.3 4.4 4.4 4.3   CHLORIDE mmol/L 104 103 101 98 101 101 100   CO2 mmol/L 30.0* 29.0 34.0* 32.0* 28.0 31.0* 32.0*   BUN mg/dL 24* 18 13 20 18 17 17   CREATININE mg/dL 4.70* 3.70* 2.92* 3.45* 3.00* 2.98* 3.51*   GLUCOSE mg/dL 87 109* 86 106* 70 49* 89   ALBUMIN g/dL  --   --   --   --  3.5  3.5  --   --    BILIRUBIN mg/dL  --   --   --   --  0.7  0.7  --   --    ALK PHOS U/L  --   --   --   --  97  97  --   --    AST (SGOT) U/L  --   --   --   --  27  27  --   --    ALT (SGPT) U/L  --   --   --   --  13  13  --   --      ABG      UA      SHERRY  No results found for: \"POCMETH\", \"POCAMPHET\", \"POCBARBITUR\", \"POCBENZO\", \"POCCOCAINE\", \"POCOPIATES\", \"POCOXYCODO\", \"POCPHENCYC\", \"POCPROPOXY\", \"POCTHC\", \"POCTRICYC\"  Lysis Labs   Results from last 7 days   Lab Units 04/02/24  0535 04/01/24  0059 03/31/24  0407 03/29/24  2345 03/29/24  1812 03/29/24  1505 03/29/24  0729 03/28/24  1018 03/27/24  1928 03/27/24  1848   INR  2.29 4.46* 5.18* 4.92*  --   --  4.17* 3.36*  --  2.94   HEMOGLOBIN g/dL 8.9* 8.4* 8.5* 8.3* 9.3*  --  9.3* 8.0*   < >  --    PLATELETS 10*3/mm3 87* 71* 64* 56* 56*  --  53* 45*   < >  --    CREATININE mg/dL 4.70* 3.70* 2.92* 3.45*  --  3.00* 2.98*  --   --  3.51*    < > = values in this interval not displayed.     Radiology(recent) No radiology results for the last day    Most notable findings include: INR 2.3. Hgb 8.9    Active Hospital Problems:   Active Hospital Problems    Diagnosis  POA    **Loss of consciousness [R40.20]  Yes    Chronic anticoagulation [Z79.01]  Not Applicable    Syncope [R55]  Yes    ESRD (end stage renal disease) [N18.6]  Yes    Syncope and collapse [R55]  Yes    Thrombocytopenia [D69.6]  Yes    Coronary heart disease [I25.10]  Yes    Chronic obstructive pulmonary disease [J44.9]  Yes    Essential hypertension [I10]  Yes "    Atrial fibrillation [I48.91]  Yes    Anemia due to chronic kidney disease stage III [N18.9, D63.1]  Yes    Chronic diastolic heart failure [I50.32]  Yes      Resolved Hospital Problems   No resolved problems to display.      Assessment & Plan     Assessment / Plan     89 y.o. male with right upper extremity edema ipsilateral to brachiocephalic AV fistula.  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  Would be a candidate for right arm fistulogram if he survives this acute illness.  Okay to continue using right arm fistula for dialysis.  Seems to be improving slowly but steadily  INR still over 2.  Will follow along and plan for fistulogram prior to discharge.    Rob Cohen II, MD  04/02/24  08:29 EDT  Office Number [phone]

## 2024-04-02 NOTE — PROGRESS NOTES
"NEPHROLOGY PROGRESS NOTE------[facility]    [facility]  [phone]  So Mclean MD      Patient Care Team:  Pedro Purvis MD as PCP - General (Family Medicine)  Pedro Purvis MD as PCP - Family Medicine  Vinay, MD Pino as Consulting Physician (Nephrology)      Provider:  So Mclean MD  Patient Name: Barry Calhoun Sr.  :  1932    SUBJECTIVE:    F/U ESRD    No complaints. No SOB, CP, palpitations, cramping.  Seen and examined during HD treatment today    Medication:  allopurinol, 100 mg, Oral, Daily  atorvastatin, 20 mg, Oral, Nightly  docusate sodium, 200 mg, Oral, QAM  donepezil, 10 mg, Oral, Nightly  epoetin choco/choco-epbx, 10,000 Units, Intravenous, Once  famotidine, 10 mg, Oral, Daily  ferric gluconate, 125 mg, Intravenous, Once in Dialysis  folic acid, 1 mg, Oral, Daily  ipratropium-albuterol, 3 mL, Nebulization, 4x Daily - RT  levothyroxine, 50 mcg, Oral, Nightly  metoprolol succinate XL, 12.5 mg, Oral, Q24H  midodrine, 10 mg, Oral, TID AC  sodium chloride, 250 mL, Intravenous, Once  sodium chloride, 10 mL, Intravenous, Q12H  tamsulosin, 0.4 mg, Oral, Nightly  [Held by provider] valproate sodium, 125 mg, Intravenous, Nightly      Pharmacy to dose vancomycin,   Pharmacy to dose warfarin,         OBJECTIVE    Vital Sign Min/Max for last 24 hours  Temp  Min: 97 °F (36.1 °C)  Max: 97.6 °F (36.4 °C)   BP  Min: 92/59  Max: 139/78   Pulse  Min: 66  Max: 94   Resp  Min: 13  Max: 22   SpO2  Min: 97 %  Max: 100 %   No data recorded   Weight  Min: 69.3 kg (152 lb 12.5 oz)  Max: 69.3 kg (152 lb 12.5 oz)     Flowsheet Rows      Flowsheet Row First Filed Value   Admission Height 175.3 cm (69\") Documented at 2024 175   Admission Weight 74.7 kg (164 lb 10.9 oz) Documented at 2024 175            No intake/output data recorded.  I/O last 3 completed shifts:  In: 600 [P.O.:600]  Out: -     Physical " "Exam:  General Appearance: alert, appears stated age and cooperative  Head: normocephalic, without obvious abnormality and atraumatic.    Eyes: conjunctivae and sclerae normal and no icterus  Neck: supple and NO GROSS JVD NOW  Lungs:+FEW SCATTERED RHONCHI  Heart: IRREG IRREG +CARLTON  Chest Wall: no abnormalities observed  Abdomen: normal bowel sounds and soft, nontender  Extremities: moves extremities well, +RUE EDEMA WHERE NEW AVF IS, no cyanosis. +DJD  Skin: no bleeding, bruising or rash  Neurologic: Alert, and oriented x 2-3    Labs:    WBC WBC   Date Value Ref Range Status   04/02/2024 7.63 3.40 - 10.80 10*3/mm3 Final   04/01/2024 6.14 3.40 - 10.80 10*3/mm3 Final   03/31/2024 6.45 3.40 - 10.80 10*3/mm3 Final      HGB Hemoglobin   Date Value Ref Range Status   04/02/2024 8.9 (L) 13.0 - 17.7 g/dL Final   04/01/2024 8.4 (L) 13.0 - 17.7 g/dL Final   03/31/2024 8.5 (L) 13.0 - 17.7 g/dL Final      HCT Hematocrit   Date Value Ref Range Status   04/02/2024 28.9 (L) 37.5 - 51.0 % Final   04/01/2024 27.8 (L) 37.5 - 51.0 % Final   03/31/2024 27.4 (L) 37.5 - 51.0 % Final      Platelets No results found for: \"LABPLAT\"   MCV MCV   Date Value Ref Range Status   04/02/2024 105.1 (H) 79.0 - 97.0 fL Final   04/01/2024 106.1 (H) 79.0 - 97.0 fL Final   03/31/2024 104.6 (H) 79.0 - 97.0 fL Final          Sodium Sodium   Date Value Ref Range Status   04/02/2024 141 136 - 145 mmol/L Final   04/01/2024 139 136 - 145 mmol/L Final   03/31/2024 140 136 - 145 mmol/L Final      Potassium Potassium   Date Value Ref Range Status   04/02/2024 4.4 3.5 - 5.2 mmol/L Final   04/01/2024 4.1 3.5 - 5.2 mmol/L Final   03/31/2024 5.0 3.5 - 5.2 mmol/L Final     Comment:     Slight hemolysis detected by analyzer. Result may be falsely elevated.      Chloride Chloride   Date Value Ref Range Status   04/02/2024 104 98 - 107 mmol/L Final   04/01/2024 103 98 - 107 mmol/L Final   03/31/2024 101 98 - 107 mmol/L Final      CO2 CO2   Date Value Ref Range Status " "  04/02/2024 30.0 (H) 22.0 - 29.0 mmol/L Final   04/01/2024 29.0 22.0 - 29.0 mmol/L Final   03/31/2024 34.0 (H) 22.0 - 29.0 mmol/L Final      BUN BUN   Date Value Ref Range Status   04/02/2024 24 (H) 8 - 23 mg/dL Final   04/01/2024 18 8 - 23 mg/dL Final   03/31/2024 13 8 - 23 mg/dL Final      Creatinine Creatinine   Date Value Ref Range Status   04/02/2024 4.70 (H) 0.76 - 1.27 mg/dL Final   04/01/2024 3.70 (H) 0.76 - 1.27 mg/dL Final   03/31/2024 2.92 (H) 0.76 - 1.27 mg/dL Final      Calcium Calcium   Date Value Ref Range Status   04/02/2024 9.7 (H) 8.2 - 9.6 mg/dL Final   04/01/2024 9.0 8.2 - 9.6 mg/dL Final   03/31/2024 9.4 8.2 - 9.6 mg/dL Final      PO4 No components found for: \"PO4\"   Albumin No results found for: \"ALBUMIN\"     Magnesium Magnesium   Date Value Ref Range Status   04/02/2024 2.0 1.7 - 2.3 mg/dL Final   04/01/2024 1.9 1.7 - 2.3 mg/dL Final      Uric Acid No components found for: \"URIC ACID\"     Imaging Results (Last 72 Hours)       ** No results found for the last 72 hours. **            Results for orders placed during the hospital encounter of 03/27/24    XR Chest 1 View    Narrative  XR CHEST 1 VW    Date of Exam: 3/29/2024 4:37 PM EDT    Indication: wheezing    Comparison: AP portable chest 2/17/2014.    Findings:  Stable right hemidiaphragm elevation. Left midlung and right infrahilar airspace disease is redemonstrated and is not thought to be significantly changed. There is stable moderate generalized cardiac enlargement with median sternotomy and CABG. Loop  recorder projects over the left lower mediastinum. Right chest wall jeremías catheter tip terminates at the lower SVC level. Right subclavian stent is noted. No acute osseous abnormality is identified.    Impression  Impression:    1. Left midlung and right infrahilar airspace disease appears unchanged from 2/28/2024. No new airspace disease is identified.  2. Stable cardiomegaly.      Electronically Signed: Mary Page MD  3/29/2024 " 4:40 PM EDT  Workstation ID: [id number]      Results for orders placed during the hospital encounter of 02/16/24    XR Chest 1 View    Narrative  XR CHEST 1 VW    Date of Exam: 2/17/2024 9:55 AM EST    Indication: hx of CHF, increased BNP    Comparison: 2/11/2024    Findings:  Patient is status post median sternotomy and CABG. Cardiac silhouette is enlarged. Vague perihilar opacities may represent edema or infiltrates. No significant pleural effusion or pneumothorax is seen. No acute osseous lesion is seen. Right chest wall  Port-A-Cath terminates overlying the superior cavoatrial junction. Loop recorder projects over the left thorax. Metallic stent projects over the left upper thorax.    Impression  Impression:  Cardiomegaly with vague bilateral perihilar opacities which may represent edema or infiltrates.    Electronically Signed: Jose Be MD  2/17/2024 10:11 AM EST  Workstation ID: [id number]      Results for orders placed during the hospital encounter of 02/09/22    XR Chest PA & Lateral    Narrative  DATE OF EXAM:  2/9/2022 11:04 AM    PROCEDURE:  XR CHEST PA AND LATERAL-    INDICATIONS:  preop    COMPARISON:  Chest x-ray 3/17/2016, chest CT 9/9/2021    TECHNIQUE:  Two radiologic views of the chest , PA and lateral were obtained.    FINDINGS:  There is a left IJ dialysis catheter and right IJ port. A subcutaneous  cardiac event monitor is present. There is been coronary bypass. There  is cardiomegaly. The pulmonary vasculature is within normal limits.  There are coarse linear markings in the mid and lower lung zones, with  round and ovoid lucencies suspected to be bronchiectasis. There is  eventration of anterior right hemidiaphragm. Costophrenic angles are  sharp    Impression  Findings compatible with fibrotic changes and/or atelectasis in the mid  and lower lung zones bilaterally with probable bronchiectasis. This is a  new finding since the most recent chest x-ray from 2016 is felt to have  progressed  since the 9/9/2021 chest CT    Electronically Signed By-Wesley Mcallister On:2/9/2022 11:15 AM  This report was finalized on [id number] by  Wesley Mcallister, .      Results for orders placed during the hospital encounter of 10/17/22    Duplex hemodialysis access CAR    Interpretation Summary    Patent right brachiocephalic fistula with overall adequate flow volumes.  Diameter and depth both appear appropriate for dialysis.  Stenosis seen at the arterial anastomosis.        ASSESSMENT / PLAN      Loss of consciousness    Anemia due to chronic kidney disease stage III    Atrial fibrillation    Essential hypertension    Chronic diastolic heart failure    Chronic obstructive pulmonary disease    Coronary heart disease    ESRD (end stage renal disease)    Syncope and collapse    Thrombocytopenia    Syncope    Chronic anticoagulation    ESRD--------Seen and examined during HD  today per usual [facility] outpatient schedule. Vascular surgery following for RUE edema where new AVF is.       2. HYPOTENSION------Better. Continue Midodrine     3. S/P SYNCOPE/LOC-------? Secondary to hypotension     4. ATRIAL FIBRILLATION-------Rate controlled and anticoagulated     5. ANEMIA OF ESRD------EPO/Venofer with HD as needed     6. OA/DJD     7. DECONDITIONING--------PT/OT/Rehab as needed     8. THROMBOCYTOPENIA--------No heparin     9. MILD METABOLIC ALKALOSIS------Better     10. DIASTOLIC CHF/VOLUME OVERLOAD-------Increase UF with HD as BP toleratess     11. COPD------ Oxygen, nebs, pulmonary toilet      So Mclean MD  [facility]  [phone]  04/02/24  07:40 EDT

## 2024-04-02 NOTE — PLAN OF CARE
Goal Outcome Evaluation:         Patient remains confused but more pleasantly confused. Patient is off restraints and is not acting aggressive toward staff and/or others. Patient has orders for dialysis today.Turned q2h and received bath mid shift. Patient appears awake and has no questions or concerns at this time.

## 2024-04-02 NOTE — CASE MANAGEMENT/SOCIAL WORK
"Physicians Statement of Medical Necessity for  Ambulance Transportation    GENERAL INFORMATION     Name: Barry Calhoun Sr.  YOB: 1932  Medicare #: [id number]   Transport Date: 4/2/24 (Valid for round trips this date, or for scheduled repetitive trips for 60 days from the date signed below.)  Origin: [facility]  Destination: [place]  Is the Patient's stay covered under Medicare Part A (PPS/DRG?)Yes  Closest appropriate facility? Yes  If this a hosp-hosp transfer? No  Is this a hospice patient? No    MEDICAL NECESSITY QUESTIONAIRE    Ambulance Transportation is medically necessary only if other means of transportation are contraindicated or would be potentially harmful to the patient.  To meet this requirement, the patient must be either \"bed confined\" or suffer from a condition such that transport by means other than an ambulance is contraindicated by the patient's condition.  The following questions must be answered by the healthcare professional signing below for this form to be valid:     1) Describe the MEDICAL CONDITION (physical and/or mental) of this patient AT THE TIME OF AMBULANCE TRANSPORT that requires the patient to be transported in an ambulance, and why transport by other means is contraindicated by the patient's condition: 2L O2, unable to self-administer, confused, history CVA, dementia, high falls risk, max assist x2, unable to tolerate seated position for duration of transport, deep tissue pressure injury right heel.  Past Medical History:   Diagnosis Date    A-fib     Asthma     Chronic renal disease     COPD (chronic obstructive pulmonary disease)     Coronary heart disease     CABG    CVA (cerebral vascular accident)     recent CVA -- Patient and Family report this is not accurate    Gout     Hypertension       Past Surgical History:   Procedure Laterality Date    ARTERIOVENOUS FISTULA/SHUNT SURGERY Right 2/11/2022    Procedure: RIGHT ARM BRACHIAL CEPHALIC " "ARTERIAL VENOUS FISTULA;  Surgeon: Drew Gunter MD;  Location: [facility] MAIN OR;  Service: Vascular;  Laterality: Right;    CARDIAC CATHETERIZATION  2016    BHF    CORONARY ARTERY BYPASS GRAFT      CABG X3, reoperation, 09; CAB and     OTHER SURGICAL HISTORY  2016    loop recorder implant       2) Is this patient \"bed confined\" as defined below?Yes   To be \"bed confined\" the patient must satisfy all three of the following criteria:  (1) unable to get up from bed without assistance; AND (2) unable to ambulate;  AND (3) unable to sit in a chair or wheelchair.  3) Can this patient safely be transported by car or wheelchair van (I.e., may safely sit during transport, without an attendant or monitoring?)No   4. In addition to completing questions 1-3 above, please check any of the following conditions that apply*:          *Note: supporting documentation for any boxes checked must be maintained in the patient's medical records Patient is confused, Requires oxygen - unable to self administer, Unable to tolerate seated position for time needed to transport, and Unable to sit in a chair or wheelchair due to decubitus ulcers or other wounds      SIGNATURE OF PHYSICIAN OR OTHER AUTHORIZED HEALTHCARE PROFESSIONAL    I certify that the above information is true and correct based on my evaluation of this patient, and represent that the patient requires transport by ambulance and that other forms of transport are contraindicated.  I understand that this information will be used by the Centers for Medicare and Medicaid Services (CMS) to support the determiniation of medical necessity for ambulance services, and I represent that I have personal knowledge of the patient's condition at the time of transportRiri Oliva RN   If this box is checked, I also certify that the patient is physically or mentally incapable of signing the ambulance service's claim form and that the institution with which I am " affiliated has furnished care, services or assistance to the patient.  My signature below is made on behalf of the patient pursuant to 42 .36(b)(4). In accordance with 42 .37, the specific reason(s) that the patient is physically or mentally incapable of signing the claim for is as follows: Dr. Zacarias    Signature of Physician or Healthcare Professional  Date/Time:   4/2/24     (For Scheduled repetitive transport, this form is not valid for transports performed more than 60 days after this date).                                                                                                                                            --------------------------------------------------------------------------------------------  Printed Name and Credentials of Physician or Authorized Healthcare Professional     *Form must be signed by patient's attending physician for scheduled, repetitive transports,.  For non-repetitive ambulance transports, if unable to obtain the signature of the attending physician, any of the following may sign (please select below):     Physician  Clinical Nurse Specialist  Registered Nurse     Physician Assistant  Discharge Planner  Licensed Practical Nurse     Nurse Practitioner   X

## 2024-04-02 NOTE — PROGRESS NOTES
[facility] MEDICINE SERVICE  DAILY PROGRESS NOTE    NAME: Barry Calhoun Sr.  : 1932  MRN: [id number]      LOS: 5 days     PROVIDER OF SERVICE: Suhail Zacarias MD    Chief Complaint: Loss of consciousness    Subjective:     Interval History:  History taken from: patient        Subjective       Chief Complaint: Fall      History of Present Illness: Barry Calhoun Sr. is a 89 y.o. male with a PMH of Asthma, non-Hodgkin's lymphoma, COPD, CAD, S/P CABG, CVA, and CKD  who presented to [facility] on 3/27/2024 from his extended care facility where they report he fell and loss consciousness. Mr. Calohun is unable to provide any meaningful medical history due to underling dementia. Unfortunately his family has left for the evening and unavailable at bedside to provide any history. All information has been obtain from the ER provider and review of Mr. aClhoun's medical records.       Upon exam he is pleasantly confused, stating he is being seen due to a pot hole not being repaired.He does follow commands,  His lungs are diminished in bilateral bases, His heart rate and rhythm is regular, abdomen is soft, nondistended, nontender with audible bowel sounds throughout..     Initial evaluation in the emergency room is hemodynamically stable with blood pressure soft at 93/54, heart rate 63, respiratory rate 18, oxygen saturation 94% on room air, he is afebrile with a Tmax of 96.3  Creatinine is 3.51, BUN 17 this is post HD today.  He is anemic with hemoglobin of 8.7, hematocrit 24 which is improved from previous 8.4 and 26.5 on ,  Thrombocytopenia his platelets are 44 today which is decreased from his baseline that is near 100.  CT scan of his head is unremarkable for any acute intracranial processes  EKG interpreted as atrial fibrillation with a rate of 72     Mr. Calhoun will be admitted for further evaluation and treatment of fall/ syncope and other acute and or chronic conditions as needed,    3/29/24 patient seen in bed no acute distress, still kind of agitated.  Vital signs stable, discussed with RN, started Ativan as needed. Patient Denies: Headache, dizziness, chest pain, chills  3/30/24 seen in bed NAD, agitation had to be restrained, DW RN receiving HD  3/31/2024 patient seen and examined in bed no acute distress no new complaints, discussed with RN, positive blood culture, temperature 100.2.  Consulted ID.  4/1/2024 patient seen and examined in bed no acute distress, he is more calm today.  Vital signs stable, discussed with RN,  4/2/2024 patient seen and examined in bed no acute distress, vital signs stable, discussed with RN, received hemodialysis.  Will discontinue vancomycin.    Review of Systems: Unable to perform ROS: Dementia   Review of Systems   Constitutional:  Negative for chills and fever.   Respiratory:  Negative for chest tightness and shortness of breath.    Neurological:  Negative for dizziness and headaches.   Psychiatric/Behavioral:  Positive for confusion.        Objective:     Vital Signs  Temp:  [95.8 °F (35.4 °C)-97.6 °F (36.4 °C)] 97.3 °F (36.3 °C)  Heart Rate:  [66-94] 79  Resp:  [13-22] 16  BP: ()/(49-94) 121/63  Flow (L/min):  [2] 2   Body mass index is 22.56 kg/m².      Physical Exam  Constitutional:       Appearance: Normal appearance.   HENT:      Head: Normocephalic and atraumatic.      Nose: Nose normal.      Mouth/Throat:      Mouth: Mucous membranes are moist.   Eyes:      Extraocular Movements: Extraocular movements intact.      Conjunctiva/sclera: Conjunctivae normal.      Pupils: Pupils are equal, round, and reactive to light.   Cardiovascular:      Rate and Rhythm: Normal rate and regular rhythm.      Pulses: Normal pulses.      Heart sounds: Normal heart sounds.   Pulmonary:      Effort: Pulmonary effort is normal.      Breath sounds: Normal breath sounds.   Abdominal:      General: Abdomen is flat. Bowel sounds are normal.      Palpations: Abdomen is  soft.   Musculoskeletal:         General: Normal range of motion.      Cervical back: Normal range of motion.   Skin:     General: Skin is warm and dry.   Neurological:      General: No focal deficit present.      Mental Status: He is alert. He is disoriented.   Psychiatric:         Mood and Affect: Mood normal.         Behavior: Behavior normal.         Thought Content: Thought content normal.         Judgment: Judgment normal.         Scheduled Meds   allopurinol, 100 mg, Oral, Daily  atorvastatin, 20 mg, Oral, Nightly  docusate sodium, 200 mg, Oral, QAM  donepezil, 10 mg, Oral, Nightly  famotidine, 10 mg, Oral, Daily  folic acid, 1 mg, Oral, Daily  ipratropium-albuterol, 3 mL, Nebulization, 4x Daily - RT  levothyroxine, 50 mcg, Oral, Nightly  metoprolol succinate XL, 12.5 mg, Oral, Q24H  midodrine, 10 mg, Oral, TID AC  sodium chloride, 250 mL, Intravenous, Once  sodium chloride, 10 mL, Intravenous, Q12H  tamsulosin, 0.4 mg, Oral, Nightly  [Held by provider] valproate sodium, 125 mg, Intravenous, Nightly  warfarin, 5 mg, Oral, Once       PRN Meds     acetaminophen **OR** acetaminophen    albumin human    senna-docusate sodium **AND** polyethylene glycol **AND** bisacodyl **AND** bisacodyl    dextrose    dextrose    glucagon (human recombinant)    guaifenesin    haloperidol lactate    nitroglycerin    Pharmacy to dose warfarin    sodium chloride    sodium chloride   Infusions  Pharmacy to dose warfarin,           Diagnostic Data    Results from last 7 days   Lab Units 04/02/24  0535 03/29/24  1812 03/29/24  1505   WBC 10*3/mm3 7.63   < >  --    HEMOGLOBIN g/dL 8.9*   < >  --    HEMATOCRIT % 28.9*   < >  --    PLATELETS 10*3/mm3 87*   < >  --    GLUCOSE mg/dL 87   < > 70   CREATININE mg/dL 4.70*   < > 3.00*   BUN mg/dL 24*   < > 18   SODIUM mmol/L 141   < > 140   POTASSIUM mmol/L 4.4   < > 4.4   AST (SGOT) U/L  --   --  27  27   ALT (SGPT) U/L  --   --  13  13   ALK PHOS U/L  --   --  97  97   BILIRUBIN mg/dL  --    --  0.7  0.7   ANION GAP mmol/L 7.0   < > 11.0    < > = values in this interval not displayed.       No radiology results for the last day      I reviewed the patient's new clinical results.    Assessment/Plan:     Active and Resolved Problems  Active Hospital Problems    Diagnosis  POA    **Loss of consciousness [R40.20]  Yes    Chronic anticoagulation [Z79.01]  Not Applicable    Syncope [R55]  Yes    ESRD (end stage renal disease) [N18.6]  Yes    Syncope and collapse [R55]  Yes    Thrombocytopenia [D69.6]  Yes    Coronary heart disease [I25.10]  Yes    Chronic obstructive pulmonary disease [J44.9]  Yes    Essential hypertension [I10]  Yes    Atrial fibrillation [I48.91]  Yes    Anemia due to chronic kidney disease stage III [N18.9, D63.1]  Yes    Chronic diastolic heart failure [I50.32]  Yes      Resolved Hospital Problems   No resolved problems to display.     Loss of consciousness  Syncope and collapse   -Patient is amnesic to the event  -Witnessed at SNF while getting up out of his chair  -CT scan of head is unremarkable   -MRI-doubt this patient is able to cooperate for an MRI  -Echocardiogram at [facility] on 02/13/2024- LVEF 50-55%  3/31/24-Left ventricular ejection fraction appears to be 61 - 65%.    Left ventricular diastolic function is consistent with (grade II w/high LAP) pseudonormalization.    Left atrial volume is severely increased.    Abnormal mitral valve structure consistent with dilated annulus.    Moderate to severe mitral valve regurgitation is present.    Moderate to severe tricuspid valve regurgitation is present.    Estimated right ventricular systolic pressure from tricuspid regurgitation is markedly elevated (>55 mmHg).    Consider transesophageal echocardiogram to better assess severity of regurgitant lesions if clinically indicated.  -per cardiology>Suspect intermittent severe hypoglycemia causing loss of consciousness from this patient.   recommend only medical management on  this patient  Nonspecific troponin elevation with end-stage renal disease     Thrombocytopenia-Plt 44-64-71  -ON home plavix will for history of cva and MI, continue to hold   -Monitor fo s/s of bleeding     ESRD (end stage renal disease)  -Anemia due to chronic kidney disease stage III  -HD M-W-F  -HD treatment today and yesterday   -Monitor I's and O's  -Monitor daily weights  -Avoid nephrotoxic medications, hypovolemia, hypotension, hypertension  -Renally dose medications when possible     Chronic Atrial fibrillation  -Metoprolol held for hypotension   -on warfarin  -Monitor PTINR-2.2     Chronic diastolic heart failure  Chronic last ECHO LVEF 50-55% 02/16   -Daily Weights   -Monitor I&O's   -Continue home Bumex pending home medication list verification  -He does not appear to be in volume overload      Coronary heart disease-Chronic -History of CABG  -Free of chest pain at time of admission  -Will need to hold home Plavix with thrombocytopenia     Chronic obstructive pulmonary disease-Not in acute exacerbation  -Not oxygen dependent at baseline  -Baseline PFTs unknown  -As needed DuoNebs for shortness of breath/wheezing  -On 2lts today      right upper extremity edema ipsilateral to brachiocephalic AV fistula.    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  Would be a candidate for right arm fistulogram if he survives this acute illness.  Okay to continue using right arm fistula for dialysis.  We will check back in on him on Monday.  Anticipate if he improves then will get right arm fistulogram prior to discharge.    Positive close blood culture, no blood WBC is normal.  Possible contamination.  Consulted ID.    DVT prophylaxis:  Medical and mechanical DVT prophylaxis orders are present.      Code status is limited support, palliative care consulted  Code Status and Medical Interventions:   Ordered at: 03/27/24 2114     Medical Intervention Limits:    NO intubation (DNI)     Code  Status (Patient has no pulse and is not breathing):    No CPR (Do Not Attempt to Resuscitate)     Medical Interventions (Patient has pulse or is breathing):    Limited Support       Plan for disposition:Return to facility in 1 day if cleared by nephrology     Time: 30 minutes    Signature: Electronically signed by Suhail Zacarias MD, 04/02/24, 12:04 EDT.  [facility] Hospitalist Team

## 2024-04-02 NOTE — DISCHARGE SUMMARY
[facility]  Discharge Summary    Date of Service: 24  Patient Name: Barry Calhoun Sr.  : 1932  MRN: [id number]    Date of Admission: 3/27/2024  Discharge Diagnosis: Loss of consciousness  Syncope and collapse   Date of Discharge:    Primary Care Physician: Pedro Purvis MD      Presenting Problem:   Loss of consciousness [R40.20]  Syncope [R55]  Thrombocytopenia [D69.6]  End-stage renal disease on hemodialysis [N18.6, Z99.2]  Syncope, unspecified syncope type [R55]    Active and Resolved Hospital Problems:  Active Hospital Problems    Diagnosis POA    **Loss of consciousness [R40.20] Yes    Acute on chronic heart failure with preserved ejection fraction (HFpEF) [I50.33] Yes    Chronic anticoagulation [Z79.01] Not Applicable    Syncope [R55] Yes    ESRD (end stage renal disease) [N18.6] Yes    Syncope and collapse [R55] Yes    Thrombocytopenia [D69.6] Yes    Coronary heart disease [I25.10] Yes    Chronic obstructive pulmonary disease [J44.9] Yes    Essential hypertension [I10] Yes    Atrial fibrillation [I48.91] Yes    Anemia due to chronic kidney disease stage III [N18.9, D63.1] Yes    Chronic diastolic heart failure [I50.32] Yes      Resolved Hospital Problems   No resolved problems to display.     Loss of consciousness  Syncope and collapse   -Patient is amnesic to the event  -Witnessed at SNF while getting up out of his chair  -CT scan of head is unremarkable   -MRI-doubt this patient is able to cooperate for an MRI  -Echocardiogram at [facility] on 2024- LVEF 50-55%  3/31/24-Left ventricular ejection fraction appears to be 61 - 65%.    Left ventricular diastolic function is consistent with (grade II w/high LAP) pseudonormalization.    Left atrial volume is severely increased.    Abnormal mitral valve structure consistent with dilated annulus.    Moderate to severe mitral valve regurgitation is present.    Moderate to severe tricuspid valve  regurgitation is present.    Estimated right ventricular systolic pressure from tricuspid regurgitation is markedly elevated (>55 mmHg).    Consider transesophageal echocardiogram to better assess severity of regurgitant lesions if clinically indicated.  -per cardiology>Suspect intermittent severe hypoglycemia causing loss of consciousness from this patient.   recommend only medical management on this patient  Nonspecific troponin elevation with end-stage renal disease     Thrombocytopenia-Plt 44-64-71  -ON home plavix will for history of cva and MI, continue to hold   -Monitor fo s/s of bleeding      ESRD (end stage renal disease)  -Anemia due to chronic kidney disease stage III  -HD M-W-F  -HD treatment today and yesterday   -Monitor I's and O's  -Monitor daily weights  -Avoid nephrotoxic medications, hypovolemia, hypotension, hypertension  -Renally dose medications when possible     Chronic Atrial fibrillation  -Metoprolol held for hypotension   -on warfarin  -Monitor PTINR-2.2     Chronic diastolic heart failure  Chronic last ECHO LVEF 50-55% 02/16   -Daily Weights   -Monitor I&O's   -Continue home Bumex pending home medication list verification  -He does not appear to be in volume overload      Coronary heart disease-Chronic -History of CABG  -Free of chest pain at time of admission  -Will need to hold home Plavix with thrombocytopenia     Chronic obstructive pulmonary disease-Not in acute exacerbation  -Not oxygen dependent at baseline  -Baseline PFTs unknown  -As needed DuoNebs for shortness of breath/wheezing  -On 2lts today      right upper extremity edema ipsilateral to brachiocephalic AV fistula.    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  Would be a candidate for right arm fistulogram if he survives this acute illness.  Okay to continue using right arm fistula for dialysis.  We will check back in on him on Monday.  Anticipate if he improves then will get right arm  fistulogram prior to discharge.     Positive close blood culture, no blood WBC is normal.  Possible contamination.  Consulted ID.       Hospital Course     History of Present Illness: Barry Calhoun Sr. is a 89 y.o. male with a PMH of Asthma, non-Hodgkin's lymphoma, COPD, CAD, S/P CABG, CVA, and CKD  who presented to [facility] on 3/27/2024 from his extended care facility where they report he fell and loss consciousness. Mr. Calhoun is unable to provide any meaningful medical history due to underling dementia. Unfortunately his family has left for the evening and unavailable at bedside to provide any history. All information has been obtain from the ER provider and review of Mr. Calhoun's medical records.       Upon exam he is pleasantly confused, stating he is being seen due to a pot hole not being repaired.He does follow commands,  His lungs are diminished in bilateral bases, His heart rate and rhythm is regular, abdomen is soft, nondistended, nontender with audible bowel sounds throughout..     Initial evaluation in the emergency room is hemodynamically stable with blood pressure soft at 93/54, heart rate 63, respiratory rate 18, oxygen saturation 94% on room air, he is afebrile with a Tmax of 96.3  Creatinine is 3.51, BUN 17 this is post HD today.  He is anemic with hemoglobin of 8.7, hematocrit 24 which is improved from previous 8.4 and 26.5 on February 17,  Thrombocytopenia his platelets are 44 today which is decreased from his baseline that is near 100.  CT scan of his head is unremarkable for any acute intracranial processes  EKG interpreted as atrial fibrillation with a rate of 72     Mr. Calhoun will be admitted for further evaluation and treatment of fall/ syncope and other acute and or chronic conditions as needed,   3/29/24 patient seen in bed no acute distress, still kind of agitated.  Vital signs stable, discussed with RN, started Ativan as needed. Patient Denies: Headache, dizziness, chest pain,  chills  3/30/24 seen in bed NAD, agitation had to be restrained, DW RN receiving HD  3/31/2024 patient seen and examined in bed no acute distress no new complaints, discussed with RN, positive blood culture, temperature 100.2.  Consulted ID.  4/1/2024 patient seen and examined in bed no acute distress, he is more calm today.  Vital signs stable, discussed with RN,  4/2/2024 patient seen and examined in bed no acute distress, vital signs stable, discussed with RN, received hemodialysis.  Will discontinue vancomycin.  Discharge patient back to nursing home.  Condition at discharge stable.    DISCHARGE Follow Up Recommendations for labs and diagnostics: Follow-up with PCP in a week      Reasons For Change In Medications and Indications for New Medications:  START taking:  donepezil (ARICEPT)   metoprolol succinate XL (TOPROL-XL)   Start taking on: April 3, 2024  nitroglycerin (NITROSTAT)   sennosides-docusate (PERICOLACE)    STOP taking:  bumetanide 1 MG tablet (BUMEX)   cloNIDine 0.1 MG tablet (CATAPRES)   clopidogrel 75 MG tablet (PLAVIX)   diphenhydrAMINE 25 mg capsule (BENADRYL)   hydrALAZINE 25 MG tablet (APRESOLINE)   metoprolol tartrate 25 MG tablet (LOPRESSO    Day of Discharge     Vital Signs:  Temp:  [95.8 °F (35.4 °C)-97.6 °F (36.4 °C)] 97.3 °F (36.3 °C)  Heart Rate:  [66-94] 79  Resp:  [13-22] 16  BP: ()/(49-94) 121/63  Flow (L/min):  [2] 2      Physical Exam  Appearance: Normal appearance.   HENT:      Head: Normocephalic and atraumatic.      Nose: Nose normal.      Mouth/Throat:      Mouth: Mucous membranes are moist.   Eyes:      Extraocular Movements: Extraocular movements intact.      Conjunctiva/sclera: Conjunctivae normal.      Pupils: Pupils are equal, round, and reactive to light.   Cardiovascular:      Rate and Rhythm: Normal rate and regular rhythm.      Pulses: Normal pulses.      Heart sounds: Normal heart sounds.   Pulmonary:      Effort: Pulmonary effort is normal.      Breath sounds:  Normal breath sounds.   Abdominal:      General: Abdomen is flat. Bowel sounds are normal.      Palpations: Abdomen is soft.   Musculoskeletal:         General: Normal range of motion.      Cervical back: Normal range of motion.   Skin:     General: Skin is warm and dry.   Neurological:      General: No focal deficit present.      Mental Status: He is alert. He is disoriented.   Psychiatric:         Mood and Affect: Mood normal.         Behavior: Behavior normal.         Thought Content: Thought content normal.         Judgment: Judgment normal.       Pertinent  and/or Most Recent Results     LAB RESULTS:      Lab 04/02/24  0535 04/01/24  0059 03/31/24  0407 03/29/24  2345 03/29/24  1812 03/29/24  0729 03/28/24  1018 03/27/24  1928   WBC 7.63 6.14 6.45 3.76 4.12 4.53   < > 4.98   HEMOGLOBIN 8.9* 8.4* 8.5* 8.3* 9.3* 9.3*   < > 8.7*   HEMATOCRIT 28.9* 27.8* 27.4* 27.3* 30.2* 30.5*   < > 28.4*   PLATELETS 87* 71* 64* 56* 56* 53*   < > 44*   NEUTROS ABS  --   --   --   --  2.72  --   --  3.25   IMMATURE GRANS (ABS)  --   --   --   --  0.01  --   --  0.02   LYMPHS ABS  --   --   --   --  0.75  --   --  0.93   MONOS ABS  --   --   --   --  0.54  --   --  0.63   EOS ABS  --   --   --   --  0.08  --   --  0.13   .1* 106.1* 104.6* 104.6* 105.6* 106.3*   < > 105.6*   LACTATE  --   --   --   --  1.6  --   --   --    PROTIME 23.5 43.5* 50.0* 47.7*  --  40.9*   < >  --     < > = values in this interval not displayed.         Lab 04/02/24  0535 04/01/24  0059 03/31/24  0407 03/29/24  2345 03/29/24  1505 03/29/24  0729   SODIUM 141 139 140 136 140 140   POTASSIUM 4.4 4.1 5.0 4.3 4.4 4.4   CHLORIDE 104 103 101 98 101 101   CO2 30.0* 29.0 34.0* 32.0* 28.0 31.0*   ANION GAP 7.0 7.0 5.0 6.0 11.0 8.0   BUN 24* 18 13 20 18 17   CREATININE 4.70* 3.70* 2.92* 3.45* 3.00* 2.98*   EGFR 11.1* 14.8* 19.6* 16.1* 19.0* 19.2*   GLUCOSE 87 109* 86 106* 70 49*   CALCIUM 9.7* 9.0 9.4 8.6 9.0 8.8   MAGNESIUM 2.0 1.9  --  1.9  --  1.9    PHOSPHORUS 4.3 3.9  --  3.8  --  3.4         Lab 03/29/24  1505   TOTAL PROTEIN 5.9*  5.9*   ALBUMIN 3.5  3.5   GLOBULIN 2.4   ALT (SGPT) 13  13   AST (SGOT) 27  27   BILIRUBIN 0.7  0.7   INDIRECT BILIRUBIN 0.4   BILIRUBIN DIRECT 0.3   ALK PHOS 97  97         Lab 04/02/24  0535 04/01/24  0059 03/31/24  0407 03/29/24  2345 03/29/24  1046 03/29/24  0729 03/28/24  1018   PROBNP  --   --   --   --   --   --  54,423.0*   HSTROP T  --   --   --   --  287* 288*  --    PROTIME 23.5 43.5* 50.0* 47.7*  --  40.9* 33.5*   INR 2.29 4.46* 5.18* 4.92*  --  4.17* 3.36*             Lab 04/02/24  0535 03/29/24  0729 03/28/24  1018   FERRITIN  --   --  1,964.00*   FOLATE 12.30  --   --    VITAMIN B 12 >2,000*  --   --    ABO TYPING  --  B  --    RH TYPING  --  Positive  --    ANTIBODY SCREEN  --  Negative  --          Brief Urine Lab Results       None          Microbiology Results (last 10 days)       Procedure Component Value - Date/Time    Blood Culture - Blood, Hand, Left [[id number]]  (Normal) Collected: 03/31/24 1632    Lab Status: Preliminary result Specimen: Blood from Hand, Left Updated: 04/01/24 1645     Blood Culture No growth at 24 hours    Blood Culture - Blood, Arm, Left [[id number]]  (Normal) Collected: 03/29/24 1812    Lab Status: Preliminary result Specimen: Blood from Arm, Left Updated: 04/01/24 1831     Blood Culture No growth at 3 days    Narrative:      Less than seven (7) mL's of blood was collected.  Insufficient quantity may yield false negative results.    Blood Culture - Blood, Hand, Left [[id number]]  (Abnormal) Collected: 03/29/24 1812    Lab Status: Final result Specimen: Blood from Hand, Left Updated: 04/01/24 0726     Blood Culture Staphylococcus, coagulase negative     Isolated from Anaerobic Bottle     Gram Stain Anaerobic Bottle Gram positive cocci in clusters    Narrative:      Probable contaminant requires clinical correlation, susceptibility not performed unless requested by physician.    Less  than seven (7) mL's of blood was collected.  Insufficient quantity may yield false negative results.    Blood Culture ID, PCR - Blood, Hand, Left [[id number]] Collected: 03/29/24 1812    Lab Status: Final result Specimen: Blood from Hand, Left Updated: 03/31/24 0452     BCID, PCR Negative by BCID PCR. Culture to Follow.     BOTTLE TYPE Anaerobic Bottle            XR Chest 1 View    Result Date: 3/29/2024  Impression: Impression: 1. Left midlung and right infrahilar airspace disease appears unchanged from 2/28/2024. No new airspace disease is identified. 2. Stable cardiomegaly. Electronically Signed: Mary Page MD  3/29/2024 4:40 PM EDT  Workstation ID: [id number]    CT Head Without Contrast    Result Date: 3/27/2024  Impression: Impression: No acute intracranial abnormality. Electronically Signed: Manolo Roa DO  3/27/2024 7:17 PM EDT  Workstation ID: [id number]     Results for orders placed during the hospital encounter of 10/17/22    Duplex hemodialysis access CAR    Interpretation Summary    Patent right brachiocephalic fistula with overall adequate flow volumes.  Diameter and depth both appear appropriate for dialysis.  Stenosis seen at the arterial anastomosis.      Results for orders placed during the hospital encounter of 10/17/22    Duplex hemodialysis access CAR    Interpretation Summary    Patent right brachiocephalic fistula with overall adequate flow volumes.  Diameter and depth both appear appropriate for dialysis.  Stenosis seen at the arterial anastomosis.      Results for orders placed during the hospital encounter of 03/27/24    Adult Transthoracic Echo Complete W/ Cont if Necessary Per Protocol    Interpretation Summary    Left ventricular ejection fraction appears to be 61 - 65%.    Left ventricular diastolic function is consistent with (grade II w/high LAP) pseudonormalization.    Left atrial volume is severely increased.    Abnormal mitral valve structure consistent with dilated annulus.     Moderate to severe mitral valve regurgitation is present.    Moderate to severe tricuspid valve regurgitation is present.    Estimated right ventricular systolic pressure from tricuspid regurgitation is markedly elevated (>55 mmHg).    Consider transesophageal echocardiogram to better assess severity of regurgitant lesions if clinically indicated.      Labs Pending at Discharge:  Pending Labs       Order Current Status    Blood Culture - Blood, Arm, Left Preliminary result    Blood Culture - Blood, Hand, Left Preliminary result            Procedures Performed           Consults:   Consults       Date and Time Order Name Status Description    3/29/2024  4:50 PM Inpatient Psychiatrist Consult      3/29/2024  4:19 PM Inpatient Vascular Surgery Consult Completed     3/29/2024 12:43 PM Inpatient Psychiatrist Consult Completed     3/29/2024  3:57 AM Inpatient Cardiology Consult Completed     3/27/2024  9:14 PM Inpatient Nephrology Consult Completed     3/27/2024  8:43 PM Hospitalist (on-call MD unless specified)              Assessment & Plan  Assessment / Plan      89 y.o. male with right upper extremity edema ipsilateral to brachiocephalic AV fistula.  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  Would be a candidate for right arm fistulogram if he survives this acute illness.  Okay to continue using right arm fistula for dialysis.  Seems to be improving slowly but steadily  INR still over 2.  Will follow along and plan for fistulogram prior to discharge.     Rob Cohen II, MD  04/02/24  08:29 EDT  Office Number [phone]        ASSESSMENT / PLAN       Loss of consciousness    Anemia due to chronic kidney disease stage III    Atrial fibrillation    Essential hypertension    Chronic diastolic heart failure    Chronic obstructive pulmonary disease    Coronary heart disease    ESRD (end stage renal disease)    Syncope and collapse    Thrombocytopenia    Syncope    Chronic anticoagulation      ESRD--------Seen and examined during HD  today per usual [facility] outpatient schedule. Vascular surgery following for RUE edema where new AVF is.       2. HYPOTENSION------Better. Continue Midodrine     3. S/P SYNCOPE/LOC-------? Secondary to hypotension     4. ATRIAL FIBRILLATION-------Rate controlled and anticoagulated     5. ANEMIA OF ESRD------EPO/Venofer with HD as needed     6. OA/DJD     7. DECONDITIONING--------PT/OT/Rehab as needed     8. THROMBOCYTOPENIA--------No heparin     9. MILD METABOLIC ALKALOSIS------Better     10. DIASTOLIC CHF/VOLUME OVERLOAD-------Increase UF with HD as BP toleratess     11. COPD------ Oxygen, nebs, pulmonary toilet        So Mclean MD  [facility]  [phone]  04/02/24  07:40 EDT    Discharge Details        Discharge Medications        New Medications        Instructions Start Date   donepezil 10 MG tablet  Commonly known as: ARICEPT   10 mg, Oral, Nightly      metoprolol succinate XL 25 MG 24 hr tablet  Commonly known as: TOPROL-XL   12.5 mg, Oral, Every 24 Hours Scheduled   Start Date: April 3, 2024     nitroglycerin 0.4 MG SL tablet  Commonly known as: NITROSTAT   0.4 mg, Sublingual, Every 5 Minutes PRN, Take no more than 3 doses in 15 minutes.      sennosides-docusate 8.6-50 MG per tablet  Commonly known as: PERICOLACE   2 tablets, Oral, 2 Times Daily PRN             Continue These Medications        Instructions Start Date   acetaminophen 325 MG tablet  Commonly known as: TYLENOL   650 mg, Oral, Every 4 Hours PRN      allopurinol 100 MG tablet  Commonly known as: ZYLOPRIM   100 mg, Oral, Daily      atorvastatin 20 MG tablet  Commonly known as: LIPITOR   20 mg, Oral, Nightly      docusate sodium 100 MG capsule  Commonly known as: COLACE   200 mg, Oral, Every Morning      famotidine 20 MG tablet  Commonly known as: PEPCID   20 mg, Oral, 2 Times Daily      folic acid 1 MG tablet  Commonly known as: FOLVITE   1 mg, Oral, Daily       ipratropium-albuterol 0.5-2.5 mg/3 ml nebulizer  Commonly known as: DUO-NEB   3 mL, Nebulization, Every 4 Hours PRN      levothyroxine 75 MCG tablet  Commonly known as: SYNTHROID, LEVOTHROID   75 mcg, Oral, Nightly      lidocaine 4 % cream  Commonly known as: LMX   1 Application, Topical, 3 Times Weekly, Mon, Wed, Fri      midodrine 10 MG tablet  Commonly known as: PROAMATINE   10 mg, Oral, 3 Times Daily Before Meals      naloxone 4 MG/0.1ML nasal spray  Commonly known as: NARCAN   1 spray, Nasal, As Needed      tamsulosin 0.4 MG capsule 24 hr capsule  Commonly known as: FLOMAX   1 capsule, Oral, Nightly      vitamin B-12 1000 MCG tablet  Commonly known as: CYANOCOBALAMIN   500 mcg, Oral, Daily      warfarin 3 MG tablet  Commonly known as: COUMADIN   Take one tablet by mouth on Monday, Wednesday, and Friday      warfarin 4 MG tablet  Commonly known as: COUMADIN   See Admin Instructions, Take one tablet by mouth on Tuesday, Thursday, Saturday, and Sunday             Stop These Medications      bumetanide 1 MG tablet  Commonly known as: BUMEX     cloNIDine 0.1 MG tablet  Commonly known as: CATAPRES     clopidogrel 75 MG tablet  Commonly known as: PLAVIX     diphenhydrAMINE 25 mg capsule  Commonly known as: BENADRYL     hydrALAZINE 25 MG tablet  Commonly known as: APRESOLINE     metoprolol tartrate 25 MG tablet  Commonly known as: LOPRESSOR              No Known Allergies      Discharge Disposition:   Skilled Nursing Facility (DC - External)    Diet:  Hospital:  Diet Order   Procedures    Diet: Regular/House; Fluid Consistency: Thin (IDDSI 0)         Discharge Activity:   Activity Instructions       Gradually Increase Activity Until at Pre-Hospitalization Level                CODE STATUS:  Code Status and Medical Interventions:   Ordered at: 03/27/24 2114     Medical Intervention Limits:    NO intubation (DNI)     Code Status (Patient has no pulse and is not breathing):    No CPR (Do Not Attempt to Resuscitate)      Medical Interventions (Patient has pulse or is breathing):    Limited Support         Future Appointments   Date Time Provider Department Center   4/3/2024 11:00 AM Lucia Aggarwal APRN [facility]       Additional Instructions for the Follow-ups that You Need to Schedule       Discharge Follow-up with PCP   As directed       Currently Documented PCP:    Pedro Purvis MD    PCP Phone Number:    [phone]     Follow Up Details: 1week                Time spent on Discharge including face to face service:  >30 minutes    Signature: Electronically signed by Suhail Zacarias MD, 04/02/24, 12:10 EDT.  [facility] Hospitalist Team

## 2024-04-03 LAB — BACTERIA SPEC AEROBE CULT: NORMAL

## 2024-04-05 LAB — BACTERIA SPEC AEROBE CULT: NORMAL

## 2024-04-18 ENCOUNTER — APPOINTMENT (OUTPATIENT)
Dept: GENERAL RADIOLOGY | Facility: HOSPITAL | Age: 89
End: 2024-04-18
Payer: MEDICARE

## 2024-04-18 ENCOUNTER — HOSPITAL ENCOUNTER (INPATIENT)
Facility: HOSPITAL | Age: 89
LOS: 7 days | Discharge: HOSPICE | End: 2024-04-25
Attending: INTERNAL MEDICINE | Admitting: INTERNAL MEDICINE
Payer: MEDICARE

## 2024-04-18 ENCOUNTER — APPOINTMENT (OUTPATIENT)
Dept: CARDIOLOGY | Facility: HOSPITAL | Age: 89
End: 2024-04-18
Payer: MEDICARE

## 2024-04-18 DIAGNOSIS — I47.29 NONSUSTAINED VENTRICULAR TACHYCARDIA: ICD-10-CM

## 2024-04-18 DIAGNOSIS — N18.6 STAGE 5 CHRONIC KIDNEY DISEASE ON CHRONIC DIALYSIS: ICD-10-CM

## 2024-04-18 DIAGNOSIS — I82.811 VENOUS EMBOLISM AND THROMBOSIS OF SUPERFICIAL VESSELS OF RIGHT LOWER EXTREMITY: ICD-10-CM

## 2024-04-18 DIAGNOSIS — Z99.2 STAGE 5 CHRONIC KIDNEY DISEASE ON CHRONIC DIALYSIS: ICD-10-CM

## 2024-04-18 DIAGNOSIS — I95.9 HYPOTENSION, UNSPECIFIED HYPOTENSION TYPE: Primary | ICD-10-CM

## 2024-04-18 DIAGNOSIS — L03.115 CELLULITIS OF RIGHT LOWER EXTREMITY: ICD-10-CM

## 2024-04-18 LAB
ACANTHOCYTES BLD QL SMEAR: NORMAL
ALBUMIN SERPL-MCNC: 3.3 G/DL (ref 3.5–5.2)
ALBUMIN/GLOB SERPL: 1.2 G/DL
ALP SERPL-CCNC: 82 U/L (ref 39–117)
ALT SERPL W P-5'-P-CCNC: 12 U/L (ref 1–41)
ANION GAP SERPL CALCULATED.3IONS-SCNC: 12 MMOL/L (ref 5–15)
ANION GAP SERPL CALCULATED.3IONS-SCNC: 7 MMOL/L (ref 5–15)
ANISOCYTOSIS BLD QL: NORMAL
AST SERPL-CCNC: 36 U/L (ref 1–40)
B PARAPERT DNA SPEC QL NAA+PROBE: NOT DETECTED
B PERT DNA SPEC QL NAA+PROBE: NOT DETECTED
BASOPHILS # BLD AUTO: 0.01 10*3/MM3 (ref 0–0.2)
BASOPHILS NFR BLD AUTO: 0.2 % (ref 0–1.5)
BH CV LOW VAS RIGHT LESSER SAPH VESSEL: 1
BH CV LOWER VASCULAR RIGHT COMMON FEMORAL AUGMENT: NORMAL
BH CV LOWER VASCULAR RIGHT COMMON FEMORAL COMPETENT: NORMAL
BH CV LOWER VASCULAR RIGHT COMMON FEMORAL COMPRESS: NORMAL
BH CV LOWER VASCULAR RIGHT COMMON FEMORAL PHASIC: NORMAL
BH CV LOWER VASCULAR RIGHT COMMON FEMORAL SPONT: NORMAL
BH CV LOWER VASCULAR RIGHT DISTAL FEMORAL COMPRESS: NORMAL
BH CV LOWER VASCULAR RIGHT GASTRONEMIUS COMPRESS: NORMAL
BH CV LOWER VASCULAR RIGHT GREATER SAPH AK COMPRESS: NORMAL
BH CV LOWER VASCULAR RIGHT GREATER SAPH BK COMPRESS: NORMAL
BH CV LOWER VASCULAR RIGHT LESSER SAPH COMPRESS: NORMAL
BH CV LOWER VASCULAR RIGHT LESSER SAPH THROMBUS: NORMAL
BH CV LOWER VASCULAR RIGHT MID FEMORAL AUGMENT: NORMAL
BH CV LOWER VASCULAR RIGHT MID FEMORAL COMPETENT: NORMAL
BH CV LOWER VASCULAR RIGHT MID FEMORAL COMPRESS: NORMAL
BH CV LOWER VASCULAR RIGHT MID FEMORAL PHASIC: NORMAL
BH CV LOWER VASCULAR RIGHT MID FEMORAL SPONT: NORMAL
BH CV LOWER VASCULAR RIGHT PERONEAL COMPRESS: NORMAL
BH CV LOWER VASCULAR RIGHT POPLITEAL AUGMENT: NORMAL
BH CV LOWER VASCULAR RIGHT POPLITEAL COMPETENT: NORMAL
BH CV LOWER VASCULAR RIGHT POPLITEAL COMPRESS: NORMAL
BH CV LOWER VASCULAR RIGHT POPLITEAL PHASIC: NORMAL
BH CV LOWER VASCULAR RIGHT POPLITEAL SPONT: NORMAL
BH CV LOWER VASCULAR RIGHT POSTERIOR TIBIAL COMPRESS: NORMAL
BH CV LOWER VASCULAR RIGHT PROXIMAL FEMORAL COMPRESS: NORMAL
BH CV LOWER VASCULAR RIGHT SAPHENOFEMORAL JUNCTION COMPRESS: NORMAL
BH CV VAS PRELIMINARY FINDINGS SCRIPTING: 1
BILIRUB SERPL-MCNC: 0.5 MG/DL (ref 0–1.2)
BUN SERPL-MCNC: 32 MG/DL (ref 8–23)
BUN SERPL-MCNC: 32 MG/DL (ref 8–23)
BUN/CREAT SERPL: 7.4 (ref 7–25)
BUN/CREAT SERPL: 7.8 (ref 7–25)
C PNEUM DNA NPH QL NAA+NON-PROBE: NOT DETECTED
CALCIUM SPEC-SCNC: 8.9 MG/DL (ref 8.2–9.6)
CALCIUM SPEC-SCNC: 9.1 MG/DL (ref 8.2–9.6)
CHLORIDE SERPL-SCNC: 102 MMOL/L (ref 98–107)
CHLORIDE SERPL-SCNC: 105 MMOL/L (ref 98–107)
CO2 SERPL-SCNC: 29 MMOL/L (ref 22–29)
CO2 SERPL-SCNC: 31 MMOL/L (ref 22–29)
CREAT SERPL-MCNC: 4.09 MG/DL (ref 0.76–1.27)
CREAT SERPL-MCNC: 4.34 MG/DL (ref 0.76–1.27)
CRP SERPL-MCNC: 8.55 MG/DL (ref 0–0.5)
D-LACTATE SERPL-SCNC: 1.4 MMOL/L (ref 0.5–2)
DEPRECATED RDW RBC AUTO: 67.5 FL (ref 37–54)
EGFRCR SERPLBLD CKD-EPI 2021: 12.2 ML/MIN/1.73
EGFRCR SERPLBLD CKD-EPI 2021: 13.1 ML/MIN/1.73
EOSINOPHIL # BLD AUTO: 0.04 10*3/MM3 (ref 0–0.4)
EOSINOPHIL NFR BLD AUTO: 1 % (ref 0.3–6.2)
ERYTHROCYTE [DISTWIDTH] IN BLOOD BY AUTOMATED COUNT: 17.5 % (ref 12.3–15.4)
ERYTHROCYTE [SEDIMENTATION RATE] IN BLOOD: 13 MM/HR (ref 0–20)
FLUAV SUBTYP SPEC NAA+PROBE: NOT DETECTED
FLUBV RNA ISLT QL NAA+PROBE: NOT DETECTED
GEN 5 2HR TROPONIN T REFLEX: 226 NG/L
GLOBULIN UR ELPH-MCNC: 2.7 GM/DL
GLUCOSE SERPL-MCNC: 89 MG/DL (ref 65–99)
GLUCOSE SERPL-MCNC: 90 MG/DL (ref 65–99)
HADV DNA SPEC NAA+PROBE: NOT DETECTED
HCOV 229E RNA SPEC QL NAA+PROBE: NOT DETECTED
HCOV HKU1 RNA SPEC QL NAA+PROBE: NOT DETECTED
HCOV NL63 RNA SPEC QL NAA+PROBE: NOT DETECTED
HCOV OC43 RNA SPEC QL NAA+PROBE: NOT DETECTED
HCT VFR BLD AUTO: 30.1 % (ref 37.5–51)
HGB BLD-MCNC: 9.4 G/DL (ref 13–17.7)
HMPV RNA NPH QL NAA+NON-PROBE: NOT DETECTED
HPIV1 RNA ISLT QL NAA+PROBE: NOT DETECTED
HPIV2 RNA SPEC QL NAA+PROBE: NOT DETECTED
HPIV3 RNA NPH QL NAA+PROBE: NOT DETECTED
HPIV4 P GENE NPH QL NAA+PROBE: NOT DETECTED
IMM GRANULOCYTES # BLD AUTO: 0.01 10*3/MM3 (ref 0–0.05)
IMM GRANULOCYTES NFR BLD AUTO: 0.2 % (ref 0–0.5)
INR PPP: 2.74 (ref 2–3)
LYMPHOCYTES # BLD AUTO: 0.82 10*3/MM3 (ref 0.7–3.1)
LYMPHOCYTES NFR BLD AUTO: 19.5 % (ref 19.6–45.3)
M PNEUMO IGG SER IA-ACNC: NOT DETECTED
MAGNESIUM SERPL-MCNC: 2.3 MG/DL (ref 1.7–2.3)
MCH RBC QN AUTO: 32.5 PG (ref 26.6–33)
MCHC RBC AUTO-ENTMCNC: 31.2 G/DL (ref 31.5–35.7)
MCV RBC AUTO: 104.2 FL (ref 79–97)
MONOCYTES # BLD AUTO: 0.44 10*3/MM3 (ref 0.1–0.9)
MONOCYTES NFR BLD AUTO: 10.5 % (ref 5–12)
NEUTROPHILS NFR BLD AUTO: 2.88 10*3/MM3 (ref 1.7–7)
NEUTROPHILS NFR BLD AUTO: 68.6 % (ref 42.7–76)
NEUTS VAC BLD QL SMEAR: NORMAL
NRBC BLD AUTO-RTO: 0 /100 WBC (ref 0–0.2)
PLATELET # BLD AUTO: 46 10*3/MM3 (ref 140–450)
PMV BLD AUTO: 11.2 FL (ref 6–12)
POIKILOCYTOSIS BLD QL SMEAR: NORMAL
POTASSIUM SERPL-SCNC: 4 MMOL/L (ref 3.5–5.2)
POTASSIUM SERPL-SCNC: 4 MMOL/L (ref 3.5–5.2)
PROT SERPL-MCNC: 6 G/DL (ref 6–8.5)
PROTHROMBIN TIME: 27.7 SECONDS (ref 19.4–28.5)
RBC # BLD AUTO: 2.89 10*6/MM3 (ref 4.14–5.8)
RHINOVIRUS RNA SPEC NAA+PROBE: NOT DETECTED
RSV RNA NPH QL NAA+NON-PROBE: NOT DETECTED
SARS-COV-2 RNA NPH QL NAA+NON-PROBE: NOT DETECTED
SMALL PLATELETS BLD QL SMEAR: NORMAL
SODIUM SERPL-SCNC: 140 MMOL/L (ref 136–145)
SODIUM SERPL-SCNC: 146 MMOL/L (ref 136–145)
TOXIC GRANULATION: NORMAL
TROPONIN T DELTA: 1 NG/L
TROPONIN T SERPL HS-MCNC: 225 NG/L
WBC NRBC COR # BLD AUTO: 4.2 10*3/MM3 (ref 3.4–10.8)

## 2024-04-18 PROCEDURE — 93971 EXTREMITY STUDY: CPT

## 2024-04-18 PROCEDURE — 85007 BL SMEAR W/DIFF WBC COUNT: CPT | Performed by: PHYSICIAN ASSISTANT

## 2024-04-18 PROCEDURE — 36415 COLL VENOUS BLD VENIPUNCTURE: CPT

## 2024-04-18 PROCEDURE — 25010000002 CEFTRIAXONE PER 250 MG: Performed by: PHYSICIAN ASSISTANT

## 2024-04-18 PROCEDURE — 86140 C-REACTIVE PROTEIN: CPT | Performed by: PHYSICIAN ASSISTANT

## 2024-04-18 PROCEDURE — 94799 UNLISTED PULMONARY SVC/PX: CPT

## 2024-04-18 PROCEDURE — 99221 1ST HOSP IP/OBS SF/LOW 40: CPT | Performed by: NURSE PRACTITIONER

## 2024-04-18 PROCEDURE — 80053 COMPREHEN METABOLIC PANEL: CPT | Performed by: PHYSICIAN ASSISTANT

## 2024-04-18 PROCEDURE — 85610 PROTHROMBIN TIME: CPT | Performed by: PHYSICIAN ASSISTANT

## 2024-04-18 PROCEDURE — 83605 ASSAY OF LACTIC ACID: CPT | Performed by: INTERNAL MEDICINE

## 2024-04-18 PROCEDURE — 87040 BLOOD CULTURE FOR BACTERIA: CPT | Performed by: INTERNAL MEDICINE

## 2024-04-18 PROCEDURE — 5A1D70Z PERFORMANCE OF URINARY FILTRATION, INTERMITTENT, LESS THAN 6 HOURS PER DAY: ICD-10-PCS | Performed by: INTERNAL MEDICINE

## 2024-04-18 PROCEDURE — 85025 COMPLETE CBC W/AUTO DIFF WBC: CPT | Performed by: PHYSICIAN ASSISTANT

## 2024-04-18 PROCEDURE — 99285 EMERGENCY DEPT VISIT HI MDM: CPT

## 2024-04-18 PROCEDURE — 85652 RBC SED RATE AUTOMATED: CPT | Performed by: PHYSICIAN ASSISTANT

## 2024-04-18 PROCEDURE — 83735 ASSAY OF MAGNESIUM: CPT | Performed by: PHYSICIAN ASSISTANT

## 2024-04-18 PROCEDURE — 0202U NFCT DS 22 TRGT SARS-COV-2: CPT | Performed by: PHYSICIAN ASSISTANT

## 2024-04-18 PROCEDURE — 71045 X-RAY EXAM CHEST 1 VIEW: CPT

## 2024-04-18 PROCEDURE — 93971 EXTREMITY STUDY: CPT | Performed by: SURGERY

## 2024-04-18 PROCEDURE — 93005 ELECTROCARDIOGRAM TRACING: CPT

## 2024-04-18 PROCEDURE — 84484 ASSAY OF TROPONIN QUANT: CPT | Performed by: PHYSICIAN ASSISTANT

## 2024-04-18 PROCEDURE — 94640 AIRWAY INHALATION TREATMENT: CPT

## 2024-04-18 RX ORDER — LEVOTHYROXINE SODIUM 0.07 MG/1
75 TABLET ORAL
Status: DISCONTINUED | OUTPATIENT
Start: 2024-04-19 | End: 2024-04-25 | Stop reason: HOSPADM

## 2024-04-18 RX ORDER — SODIUM CHLORIDE 0.9 % (FLUSH) 0.9 %
10 SYRINGE (ML) INJECTION AS NEEDED
Status: DISCONTINUED | OUTPATIENT
Start: 2024-04-18 | End: 2024-04-25 | Stop reason: HOSPADM

## 2024-04-18 RX ORDER — ACETAMINOPHEN 325 MG/1
650 TABLET ORAL EVERY 4 HOURS PRN
Status: DISCONTINUED | OUTPATIENT
Start: 2024-04-18 | End: 2024-04-25 | Stop reason: HOSPADM

## 2024-04-18 RX ORDER — SODIUM CHLORIDE 9 MG/ML
40 INJECTION, SOLUTION INTRAVENOUS AS NEEDED
Status: DISCONTINUED | OUTPATIENT
Start: 2024-04-18 | End: 2024-04-25 | Stop reason: HOSPADM

## 2024-04-18 RX ORDER — WARFARIN SODIUM 4 MG/1
4 TABLET ORAL
Status: DISCONTINUED | OUTPATIENT
Start: 2024-04-18 | End: 2024-04-24

## 2024-04-18 RX ORDER — NITROGLYCERIN 0.4 MG/1
0.4 TABLET SUBLINGUAL
Status: DISCONTINUED | OUTPATIENT
Start: 2024-04-18 | End: 2024-04-25 | Stop reason: HOSPADM

## 2024-04-18 RX ORDER — ALBUMIN (HUMAN) 12.5 G/50ML
12.5 SOLUTION INTRAVENOUS AS NEEDED
Status: COMPLETED | OUTPATIENT
Start: 2024-04-18 | End: 2024-04-19

## 2024-04-18 RX ORDER — IPRATROPIUM BROMIDE AND ALBUTEROL SULFATE 2.5; .5 MG/3ML; MG/3ML
3 SOLUTION RESPIRATORY (INHALATION)
Status: DISCONTINUED | OUTPATIENT
Start: 2024-04-18 | End: 2024-04-25 | Stop reason: HOSPADM

## 2024-04-18 RX ORDER — GUAIFENESIN, PSEUDOEPHEDRINE HYDROCHLORIDE 600; 60 MG/1; MG/1
1 TABLET, EXTENDED RELEASE ORAL EVERY 12 HOURS
COMMUNITY
End: 2024-04-25 | Stop reason: HOSPADM

## 2024-04-18 RX ORDER — BISACODYL 5 MG/1
5 TABLET, DELAYED RELEASE ORAL DAILY PRN
Status: DISCONTINUED | OUTPATIENT
Start: 2024-04-18 | End: 2024-04-25 | Stop reason: HOSPADM

## 2024-04-18 RX ORDER — METOPROLOL SUCCINATE 25 MG/1
12.5 TABLET, EXTENDED RELEASE ORAL
Status: DISCONTINUED | OUTPATIENT
Start: 2024-04-18 | End: 2024-04-25 | Stop reason: HOSPADM

## 2024-04-18 RX ORDER — POLYETHYLENE GLYCOL 3350 17 G/17G
17 POWDER, FOR SOLUTION ORAL DAILY PRN
Status: DISCONTINUED | OUTPATIENT
Start: 2024-04-18 | End: 2024-04-25 | Stop reason: HOSPADM

## 2024-04-18 RX ORDER — AMOXICILLIN 250 MG
2 CAPSULE ORAL 2 TIMES DAILY
Status: DISCONTINUED | OUTPATIENT
Start: 2024-04-18 | End: 2024-04-25 | Stop reason: HOSPADM

## 2024-04-18 RX ORDER — LORAZEPAM 0.5 MG/1
0.5 TABLET ORAL EVERY 8 HOURS PRN
COMMUNITY
Start: 2024-04-10 | End: 2024-04-25 | Stop reason: HOSPADM

## 2024-04-18 RX ORDER — LIDOCAINE AND PRILOCAINE 25; 25 MG/G; MG/G
CREAM TOPICAL ONCE
Status: DISCONTINUED | OUTPATIENT
Start: 2024-04-18 | End: 2024-04-23

## 2024-04-18 RX ORDER — BISACODYL 10 MG
10 SUPPOSITORY, RECTAL RECTAL DAILY PRN
Status: DISCONTINUED | OUTPATIENT
Start: 2024-04-18 | End: 2024-04-25 | Stop reason: HOSPADM

## 2024-04-18 RX ORDER — AZITHROMYCIN 250 MG/1
250 TABLET, FILM COATED ORAL DAILY
COMMUNITY
Start: 2024-04-17 | End: 2024-04-25 | Stop reason: HOSPADM

## 2024-04-18 RX ORDER — SODIUM CHLORIDE 0.9 % (FLUSH) 0.9 %
10 SYRINGE (ML) INJECTION EVERY 12 HOURS SCHEDULED
Status: DISCONTINUED | OUTPATIENT
Start: 2024-04-18 | End: 2024-04-25 | Stop reason: HOSPADM

## 2024-04-18 RX ORDER — MIDODRINE HYDROCHLORIDE 5 MG/1
10 TABLET ORAL
Status: DISCONTINUED | OUTPATIENT
Start: 2024-04-18 | End: 2024-04-20

## 2024-04-18 RX ADMIN — Medication 10 ML: at 20:45

## 2024-04-18 RX ADMIN — WARFARIN SODIUM 4 MG: 4 TABLET ORAL at 19:25

## 2024-04-18 RX ADMIN — IPRATROPIUM BROMIDE AND ALBUTEROL SULFATE 3 ML: .5; 3 SOLUTION RESPIRATORY (INHALATION) at 19:12

## 2024-04-18 RX ADMIN — CEFTRIAXONE 1000 MG: 1 INJECTION, POWDER, FOR SOLUTION INTRAMUSCULAR; INTRAVENOUS at 12:06

## 2024-04-18 RX ADMIN — IPRATROPIUM BROMIDE AND ALBUTEROL SULFATE 3 ML: .5; 3 SOLUTION RESPIRATORY (INHALATION) at 16:01

## 2024-04-18 RX ADMIN — MIDODRINE HYDROCHLORIDE 10 MG: 5 TABLET ORAL at 21:52

## 2024-04-18 NOTE — LETTER
EMS Transport Request  For use at [facility], [place], [place], [place], and [place] only   Patient Name: Barry Calhoun Sr. : 1932   Weight:72.6 kg (160 lb 0.9 oz) Pick-up Location: [address]01 BLS/ALS: BLS/ALS: BLS   Insurance: MEDICARE Auth End Date:    Pre-Cert #: D/C Summary complete:    Destination: [address] [place] [address]  3 steps, main level, no ramp, someone will be there when the patient gets to this address.   Contact Precautions: None   Equipment (O2, Fluids, etc.): None   Arrive By Date/Time: 24 after 3pm  Stretcher/WC: stretcher   CM Requesting: Ana Parkinson RN Ext: [phone]   Notes/Medical Necessity: hospice care, bedbound     ______________________________________________________________________    *Only 2 patient bags OR 1 carry-on size bag are permitted.  Wheelchairs and walkers CANNOT transported with the patient. Acknowledge: Yes

## 2024-04-18 NOTE — H&P
[facility] Medicine Services  History & Physical    Patient Name: Barry Calhoun Sr.  : 1932  MRN: [id number]  Primary Care Physician:  Pedro Purvis MD  Date of admission: 2024  Date and Time of Service: 2024 at 1: 10 PM     Subjective      Chief Complaint: Hypertension    History of Present Illness: Barry Calhoun Sr. is a 89 y.o. male with a CMH of ESRD, A-fib on Coumadin, CABG, CVA, hypothrombocytopenia, COPD, lymphoma, gout, dementia who presented to [facility] on 2024 with hypotension.  The patient arrived from hemodialysis due to hypotension.  The patient is nonverbal and history could not be taken.  The patient was evaluated by cardiology.  Cardiology and nephrology will decide on further management.  The patient will get dialysis in the hospital.  The patient was found to have warm right leg suggestive of cellulitis      Review of Systems  Ten point ROS: reviewed negative, unless as noted in HPI.    Personal History     Past Medical History:   Diagnosis Date    A-fib     Asthma     Chronic renal disease     COPD (chronic obstructive pulmonary disease)     Coronary heart disease     CABG    CVA (cerebral vascular accident)     recent CVA -- Patient and Family report this is not accurate    Gout     Hypertension        Past Surgical History:   Procedure Laterality Date    ARTERIOVENOUS FISTULA/SHUNT SURGERY Right 2022    Procedure: RIGHT ARM BRACHIAL CEPHALIC ARTERIAL VENOUS FISTULA;  Surgeon: Drew Gunter MD;  Location: [facility];  Service: Vascular;  Laterality: Right;    CARDIAC CATHETERIZATION  2016    [facility]    CORONARY ARTERY BYPASS GRAFT      CABG X3, reoperation, 09; CAB and     OTHER SURGICAL HISTORY  2016    loop recorder implant        Family History: family history includes Stroke in an other family member. Otherwise pertinent FHx was reviewed and not pertinent to current issue.    Social History:  reports that  he has never smoked. He has never been exposed to tobacco smoke. He has never used smokeless tobacco. He reports that he does not drink alcohol and does not use drugs.    Home Medications:  Prior to Admission Medications       Prescriptions Last Dose Informant Patient Reported? Taking?    acetaminophen (TYLENOL) 325 MG tablet   Yes No    Take 2 tablets by mouth Every 4 (Four) Hours As Needed for Mild Pain.    allopurinol (ZYLOPRIM) 100 MG tablet   No No    Take 1 tablet by mouth Daily.    atorvastatin (LIPITOR) 20 MG tablet   Yes No    Take 1 tablet by mouth Every Night.    docusate sodium (COLACE) 100 MG capsule  Self Yes No    Take 2 capsules by mouth Every Morning.    donepezil (ARICEPT) 10 MG tablet   No No    Take 1 tablet by mouth Every Night.    famotidine (PEPCID) 20 MG tablet   Yes No    Take 1 tablet by mouth 2 (Two) Times a Day.    folic acid (FOLVITE) 1 MG tablet   Yes No    Take 1 tablet by mouth Daily.    ipratropium-albuterol (DUO-NEB) 0.5-2.5 mg/3 ml nebulizer   Yes No    Take 3 mL by nebulization Every 4 (Four) Hours As Needed for Wheezing.    levothyroxine (SYNTHROID, LEVOTHROID) 75 MCG tablet   Yes No    Take 1 tablet by mouth Every Night.    lidocaine (LMX) 4 % cream   Yes No    Apply 1 Application topically to the appropriate area as directed 3 (Three) Times a Week. Mon, Wed, Fri    metoprolol succinate XL (TOPROL-XL) 25 MG 24 hr tablet   No No    Take 0.5 tablets by mouth Daily.    midodrine (PROAMATINE) 10 MG tablet   Yes No    Take 1 tablet by mouth 3 (Three) Times a Day Before Meals.    naloxone (NARCAN) 4 MG/0.1ML nasal spray   Yes No    1 spray into the nostril(s) as directed by provider As Needed for Opioid Reversal.    nitroglycerin (NITROSTAT) 0.4 MG SL tablet   No No    Place 1 tablet under the tongue Every 5 (Five) Minutes As Needed for Chest Pain (Only if SBP Greater Than 100). Take no more than 3 doses in 15 minutes.    sennosides-docusate (PERICOLACE) 8.6-50 MG per tablet   No No     Take 2 tablets by mouth 2 (Two) Times a Day As Needed for Constipation.    tamsulosin (FLOMAX) 0.4 MG capsule 24 hr capsule   Yes No    Take 1 capsule by mouth Every Night.    vitamin B-12 (CYANOCOBALAMIN) 1000 MCG tablet   Yes No    Take 0.5 tablets by mouth Daily.    warfarin (COUMADIN) 3 MG tablet   Yes No    Take one tablet by mouth on Monday, Wednesday, and Friday    warfarin (COUMADIN) 4 MG tablet   Yes No    See Admin Instructions. Take one tablet by mouth on Tuesday, Thursday, Saturday, and Sunday              Allergies:  No Known Allergies    Objective      Vitals:   Temp:  [97 °F (36.1 °C)] 97 °F (36.1 °C)  Heart Rate:  [48-73] 51  Resp:  [14] 14  BP: (97-99)/(48-61) 99/61  Body mass index is 22.79 kg/m².  Physical Exam    General: No acute distress  Cardiovascular:  normal rate, , normal S1, S2, no murmurs.    Peripheral edema +1  Respiratory:  dimished breath sounds,  no wheezes, rales or rhonchi  Gastrointestinal: Soft, nontender, nondistended  Neurologic: Nonverbal.  No movement disorder noted.  Mental status: Alert, nonverbal  Appropriate Affect, No agitation.     Diagnostic Data:  Lab Results (last 24 hours)       Procedure Component Value Units Date/Time    High Sensitivity Troponin T 2Hr [[id number]] Collected: 04/18/24 1254    Specimen: Blood Updated: 04/18/24 1257    C-reactive Protein [[id number]]  (Abnormal) Collected: 04/18/24 1051    Specimen: Blood from Arm, Left Updated: 04/18/24 1256     C-Reactive Protein 8.55 mg/dL     Respiratory Panel PCR w/COVID-19(SARS-CoV-2) MAGALYS/AI/LINDY/PAD/COR/JULIANNE In-House, NP Swab in UTM/VTM, 2 HR TAT - Swab, Nasopharynx [[id number]]  (Normal) Collected: 04/18/24 1103    Specimen: Swab from Nasopharynx Updated: 04/18/24 1206     ADENOVIRUS, PCR Not Detected     Coronavirus 229E Not Detected     Coronavirus HKU1 Not Detected     Coronavirus NL63 Not Detected     Coronavirus OC43 Not Detected     COVID19 Not Detected     Human Metapneumovirus Not Detected     Human  Rhinovirus/Enterovirus Not Detected     Influenza A PCR Not Detected     Influenza B PCR Not Detected     Parainfluenza Virus 1 Not Detected     Parainfluenza Virus 2 Not Detected     Parainfluenza Virus 3 Not Detected     Parainfluenza Virus 4 Not Detected     RSV, PCR Not Detected     Bordetella pertussis pcr Not Detected     Bordetella parapertussis PCR Not Detected     Chlamydophila pneumoniae PCR Not Detected     Mycoplasma pneumo by PCR Not Detected    Narrative:      In the setting of a positive respiratory panel with a viral infection PLUS a negative procalcitonin without other underlying concern for bacterial infection, consider observing off antibiotics or discontinuation of antibiotics and continue supportive care. If the respiratory panel is positive for atypical bacterial infection (Bordetella pertussis, Chlamydophila pneumoniae, or Mycoplasma pneumoniae), consider antibiotic de-escalation to target atypical bacterial infection.    High Sensitivity Troponin T [[id number]]  (Abnormal) Collected: 04/18/24 1051    Specimen: Blood from Arm, Left Updated: 04/18/24 1202     HS Troponin T 225 ng/L      Comment: Specimen hemolyzed.  Results may be falsely decreased.       Narrative:      High Sensitive Troponin T Reference Range:  <14.0 ng/L- Negative Female for AMI  <22.0 ng/L- Negative Male for AMI  >=14 - Abnormal Female indicating possible myocardial injury.  >=22 - Abnormal Male indicating possible myocardial injury.   Clinicians would have to utilize clinical acumen, EKG, Troponin, and serial changes to determine if it is an Acute Myocardial Infarction or myocardial injury due to an underlying chronic condition.         Magnesium [[id number]]  (Normal) Collected: 04/18/24 1051    Specimen: Blood from Arm, Left Updated: 04/18/24 1200     Magnesium 2.3 mg/dL     Sedimentation Rate [[id number]]  (Normal) Collected: 04/18/24 1051    Specimen: Blood from Arm, Left Updated: 04/18/24 1132     Sed Rate 13 mm/hr      Comprehensive Metabolic Panel [[id number]]  (Abnormal) Collected: 04/18/24 1051    Specimen: Blood from Arm, Left Updated: 04/18/24 1121     Glucose 89 mg/dL      BUN 32 mg/dL      Creatinine 4.34 mg/dL      Sodium 140 mmol/L      Potassium 4.0 mmol/L      Comment: Specimen hemolyzed.  Result may be falsely elevated.        Chloride 102 mmol/L      CO2 31.0 mmol/L      Calcium 8.9 mg/dL      Total Protein 6.0 g/dL      Albumin 3.3 g/dL      ALT (SGPT) 12 U/L      Comment: Specimen hemolyzed.  Result may  be falsely elevated.        AST (SGOT) 36 U/L      Comment: Specimen hemolyzed.  Result may be falsely elevated.        Alkaline Phosphatase 82 U/L      Total Bilirubin 0.5 mg/dL      Globulin 2.7 gm/dL      A/G Ratio 1.2 g/dL      BUN/Creatinine Ratio 7.4     Anion Gap 7.0 mmol/L      eGFR 12.2 mL/min/1.73      Comment: <15 Indicative of kidney failure       Narrative:      GFR Normal >60  Chronic Kidney Disease <60  Kidney Failure <15    The GFR formula is only valid for adults with stable renal function between ages 18 and 70.    CBC & Differential [[id number]]  (Abnormal) Collected: 04/18/24 1051    Specimen: Blood from Arm, Left Updated: 04/18/24 1119    Narrative:      The following orders were created for panel order CBC & Differential.  Procedure                               Abnormality         Status                     ---------                               -----------         ------                     CBC Auto Differential[[id number]]        Abnormal            Final result               Scan Slide[[id number]]                                       Final result                 Please view results for these tests on the individual orders.    CBC Auto Differential [[id number]]  (Abnormal) Collected: 04/18/24 1051    Specimen: Blood from Arm, Left Updated: 04/18/24 1119     WBC 4.20 10*3/mm3      RBC 2.89 10*6/mm3      Hemoglobin 9.4 g/dL      Hematocrit 30.1 %      .2 fL      MCH 32.5 pg      MCHC 31.2  g/dL      RDW 17.5 %      RDW-SD 67.5 fl      MPV 11.2 fL      Platelets 46 10*3/mm3      Neutrophil % 68.6 %      Lymphocyte % 19.5 %      Monocyte % 10.5 %      Eosinophil % 1.0 %      Basophil % 0.2 %      Immature Grans % 0.2 %      Neutrophils, Absolute 2.88 10*3/mm3      Lymphocytes, Absolute 0.82 10*3/mm3      Monocytes, Absolute 0.44 10*3/mm3      Eosinophils, Absolute 0.04 10*3/mm3      Basophils, Absolute 0.01 10*3/mm3      Immature Grans, Absolute 0.01 10*3/mm3      nRBC 0.0 /100 WBC     Scan Slide [[id number]] Collected: 04/18/24 1051    Specimen: Blood from Arm, Left Updated: 04/18/24 1119     Acanthocytes Slight/1+     Anisocytosis Slight/1+     Poikilocytes Slight/1+     Toxic Granulation Slight/1+     Vacuolated Neutrophils Slight/1+     Platelet Estimate Decreased    Protime-INR [[id number]]  (Normal) Collected: 04/18/24 1051    Specimen: Blood from Arm, Left Updated: 04/18/24 1107     Protime 27.7 Seconds      INR 2.74             Imaging Results (Last 24 Hours)       Procedure Component Value Units Date/Time    XR Chest 1 View [[id number]] Collected: 04/18/24 1147     Updated: 04/18/24 1154    Narrative:      XR CHEST 1 VW    Date of Exam: 4/18/2024 11:24 AM EDT    Indication: hypotension    Comparison: X-ray dated 3/29/2024    Findings:  The trachea is midline. There is cardiomegaly with pulmonary vascular congestion. Diffuse bilateral reticular and airspace opacities suspicious for pulmonary edema. There are surgical changes in the heart and mediastinum. There is a right intrajugular   chest port in place      Impression:      Impression:  Cardiomegaly with pulmonary vascular congestion and pulmonary edema/CHF pattern      Electronically Signed: Diogenes Landaverde MD    4/18/2024 11:52 AM EDT    Workstation ID: [id number]              Assessment & Plan        This is a 89 y.o. male with:    Active and Resolved Problems  Active Hospital Problems    Diagnosis  POA    **Hypotension [I95.9]  Yes       Resolved Hospital Problems   No resolved problems to display.         Hypotension, run of vtack, elevated troponin  Blood pressure improved at the time of my evaluation  Pending cardiology recommendations    Right leg cellulitis  The patient was started on ceftriaxone in the ED continue    Chronic A-fib  Continue warfarin, pharmacy to dose    ESRD  The patient will get hemodialysis in the hospital    HTN  continue to monitor  continue home medication    COPD not in exacerbation  Oxygen as needed  As needed DuoNebs    Home medication review and verification was not completed at this time.  Please verify home medication        thrombocytopenia, chronic    DVT prophylaxis:  No DVT prophylaxis order currently exists.        The patient desires to be as follows:    CODE STATUS:    Level Of Support Discussed With: Health Care Surrogate  Code Status (Patient has no pulse and is not breathing): CPR (Attempt to Resuscitate)  Medical Interventions (Patient has pulse or is breathing): Full Support        Admission Status:  I believe this patient meets inpatient status.    Expected Length of Stay: 2 to 3 days    PDMP and Medication Dispenses via Sidebar reviewed and consistent with patient reported medications.    I discussed the patient's findings and my recommendations with nursing staff.      Signature:     This document has been electronically signed by Marai Elena Briseno MD on April 18, 2024 13:10 EDT   [facility] Hospitalist Team

## 2024-04-18 NOTE — ED PROVIDER NOTES
"Subjective   History of Present Illness  Pt is a 89yo BM presenting to the ED via EMS from dialysis for hypotension.  History is somewhat limited due to patient's history of dementia however it was reported to EMS the facility states he was in his \"spunky as he normally is\" today.  Patient shakes his head yes and no to some questions he denies any pain including chest pain, headache, or abdominal pain.  No reports of fever vomiting or diarrhea.  Patient's right leg was noted to be red and warm along the distal aspect unsure if this is new or old.    History provided by:  EMS personnel  History limited by:  Dementia      Review of Systems   Unable to perform ROS: Dementia       Past Medical History:   Diagnosis Date    A-fib     Asthma     Chronic renal disease     COPD (chronic obstructive pulmonary disease)     Coronary heart disease     CABG    CVA (cerebral vascular accident)     recent CVA -- Patient and Family report this is not accurate    Gout     Hypertension        No Known Allergies    Past Surgical History:   Procedure Laterality Date    ARTERIOVENOUS FISTULA/SHUNT SURGERY Right 2022    Procedure: RIGHT ARM BRACHIAL CEPHALIC ARTERIAL VENOUS FISTULA;  Surgeon: Drew Gunter MD;  Location: [facility] OR;  Service: Vascular;  Laterality: Right;    CARDIAC CATHETERIZATION  2016    [facility]    CORONARY ARTERY BYPASS GRAFT      CABG X3, reoperation, 09; CAB and     OTHER SURGICAL HISTORY  2016    loop recorder implant        Family History   Problem Relation Age of Onset    Stroke Other        Social History     Socioeconomic History    Marital status:    Tobacco Use    Smoking status: Never     Passive exposure: Never    Smokeless tobacco: Never   Vaping Use    Vaping status: Never Used   Substance and Sexual Activity    Alcohol use: No    Drug use: No    Sexual activity: Defer           Objective   Physical Exam  Vitals and nursing note reviewed.   Constitutional:       " "General: He is not in acute distress.     Appearance: Normal appearance. He is well-developed. He is not ill-appearing, toxic-appearing or diaphoretic.   HENT:      Head: Normocephalic and atraumatic.      Mouth/Throat:      Mouth: Mucous membranes are moist.      Pharynx: Oropharynx is clear.   Eyes:      Extraocular Movements: Extraocular movements intact.      Pupils: Pupils are equal, round, and reactive to light.   Cardiovascular:      Rate and Rhythm: Normal rate and regular rhythm.      Pulses: Normal pulses.      Heart sounds: No murmur heard.     No friction rub. No gallop.   Pulmonary:      Effort: Pulmonary effort is normal. No tachypnea, accessory muscle usage or respiratory distress.      Breath sounds: Normal breath sounds. No stridor. No decreased breath sounds, wheezing, rhonchi or rales.   Chest:      Chest wall: No mass, deformity, tenderness or crepitus.   Abdominal:      Palpations: Abdomen is soft.      Tenderness: There is no abdominal tenderness. There is no guarding or rebound.   Musculoskeletal:        Legs:    Skin:     General: Skin is warm.      Capillary Refill: Capillary refill takes less than 2 seconds.      Findings: No rash.   Neurological:      Mental Status: He is alert and oriented to person, place, and time.   Psychiatric:         Mood and Affect: Mood normal.         Behavior: Behavior normal.         Procedures           ED Course  ED Course as of 04/18/24 1322   Thu Apr 18, 2024   1128 Patient had a 15 beat run of V. tach captured on monitor this was discussed with attending recommended cardiology consult. [AA]      ED Course User Index  [AA] Odilon Mtz PA      BP 99/61   Pulse 51   Temp 97 °F (36.1 °C)   Resp 14   Ht 175.3 cm (69\")   Wt 70 kg (154 lb 5.2 oz)   SpO2 95%   BMI 22.79 kg/m²   Medications   sodium chloride 0.9 % flush 10 mL (has no administration in time range)   sodium chloride 0.9 % flush 10 mL (has no administration in time range)   sodium " chloride 0.9 % flush 10 mL (has no administration in time range)   sodium chloride 0.9 % infusion 40 mL (has no administration in time range)   nitroglycerin (NITROSTAT) SL tablet 0.4 mg (has no administration in time range)   Potassium Replacement - Follow Nurse / BPA Driven Protocol (has no administration in time range)   Magnesium Standard Dose Replacement - Follow Nurse / BPA Driven Protocol (has no administration in time range)   Phosphorus Replacement - Follow Nurse / BPA Driven Protocol (has no administration in time range)   Calcium Replacement - Follow Nurse / BPA Driven Protocol (has no administration in time range)   sennosides-docusate (PERICOLACE) 8.6-50 MG per tablet 2 tablet (has no administration in time range)     And   polyethylene glycol (MIRALAX) packet 17 g (has no administration in time range)     And   bisacodyl (DULCOLAX) EC tablet 5 mg (has no administration in time range)     And   bisacodyl (DULCOLAX) suppository 10 mg (has no administration in time range)   acetaminophen (TYLENOL) tablet 650 mg (has no administration in time range)   cefTRIAXone (ROCEPHIN) 1,000 mg in sodium chloride 0.9 % 100 mL MBP (1,000 mg Intravenous New Bag 4/18/24 1206)     Labs Reviewed   COMPREHENSIVE METABOLIC PANEL - Abnormal; Notable for the following components:       Result Value    BUN 32 (*)     Creatinine 4.34 (*)     CO2 31.0 (*)     Albumin 3.3 (*)     eGFR 12.2 (*)     All other components within normal limits    Narrative:     GFR Normal >60  Chronic Kidney Disease <60  Kidney Failure <15    The GFR formula is only valid for adults with stable renal function between ages 18 and 70.   CBC WITH AUTO DIFFERENTIAL - Abnormal; Notable for the following components:    RBC 2.89 (*)     Hemoglobin 9.4 (*)     Hematocrit 30.1 (*)     .2 (*)     MCHC 31.2 (*)     RDW 17.5 (*)     RDW-SD 67.5 (*)     Platelets 46 (*)     Lymphocyte % 19.5 (*)     All other components within normal limits   TROPONIN -  Abnormal; Notable for the following components:    HS Troponin T 225 (*)     All other components within normal limits    Narrative:     High Sensitive Troponin T Reference Range:  <14.0 ng/L- Negative Female for AMI  <22.0 ng/L- Negative Male for AMI  >=14 - Abnormal Female indicating possible myocardial injury.  >=22 - Abnormal Male indicating possible myocardial injury.   Clinicians would have to utilize clinical acumen, EKG, Troponin, and serial changes to determine if it is an Acute Myocardial Infarction or myocardial injury due to an underlying chronic condition.        C-REACTIVE PROTEIN - Abnormal; Notable for the following components:    C-Reactive Protein 8.55 (*)     All other components within normal limits   RESPIRATORY PANEL PCR W/ COVID-19 (SARS-COV-2), NP SWAB IN UTM/VTP, 2 HR TAT - Normal    Narrative:     In the setting of a positive respiratory panel with a viral infection PLUS a negative procalcitonin without other underlying concern for bacterial infection, consider observing off antibiotics or discontinuation of antibiotics and continue supportive care. If the respiratory panel is positive for atypical bacterial infection (Bordetella pertussis, Chlamydophila pneumoniae, or Mycoplasma pneumoniae), consider antibiotic de-escalation to target atypical bacterial infection.   PROTIME-INR - Normal   MAGNESIUM - Normal   SEDIMENTATION RATE - Normal   SCAN SLIDE   HIGH SENSITIVITIY TROPONIN T 2HR   BASIC METABOLIC PANEL   CBC AND DIFFERENTIAL    Narrative:     The following orders were created for panel order CBC & Differential.  Procedure                               Abnormality         Status                     ---------                               -----------         ------                     CBC Auto Differential[[id number]]        Abnormal            Final result               Scan Slide[[id number]]                                       Final result                 Please view results for these  tests on the individual orders.     XR Chest 1 View   Final Result   Impression:   Cardiomegaly with pulmonary vascular congestion and pulmonary edema/CHF pattern         Electronically Signed: Diogenes Landaverde MD     4/18/2024 11:52 AM EDT     Workstation ID: [id number]                                               Medical Decision Making  Chart Review: Discharge summary reviewed from 4/2/2024 admitted for syncopal episode thrombocytopenia    Differentials: Arrhythmia, electrolyte abnormality, cellulitis, DVT, ACS      ;this list is not all inclusive and does not constitute the entirety of considered causes  EKG: Interpreted by myself and Dr. Funez shows atrial flutter rate 55 with RBBB, [place] T wave abnormalities in lateral leads prolonged QT interval previous EKG reviewed from 3/30/2024.  -Of note patient also had a 15 beat run of V. tach that was also interpreted by Dr. Funez   Labs: as above   Radiology: Reviewed by myself and independently interpreted by Dr Funez shows pulmonary vascular congestion and cardiomegaly correlate with radiologist interpretation as below  XR Chest 1 View   Final Result    Impression:    Cardiomegaly with pulmonary vascular congestion and pulmonary edema/CHF pattern            Electronically Signed: Diogenes Landaverde MD      4/18/2024 11:52 AM EDT      Workstation ID: [id number]     Disposition/Treatment:  Appropriate PPE was worn during exam and throughout all encounters with the patient.  When the ED IV was placed and labs were obtained was initially seen myself in anthony bed reassessed in the ED room presented from dialysis with reports of hypotension unsure of how low his blood pressure got at the facility.  While in the ED had a 15 beat run of V. tach this was discussed with Camille Ellis NP for patient's cardiologist Dr. Foley.  Nephrology was also consulted as he did miss dialysis today and Dr. Henry did see the patient while in the ED.  EKG showed a flutter he does  have a history of A-fib.  On physical exam he did have some concerning findings for cellulitis of his right leg he was given Rocephin while in the ED labs and imaging were also obtained.  Duplex ultrasound showed acute right SVT of the SSV verbal feedback from vascular technician Liane    CBC showed a white count of 4.2 hemoglobin 9.4 hematocrit 30.1 platelets 46 patient does have history of thrombocytopenia 2 weeks ago platelets were 87 hemoglobin was 8.9.  Metabolic panel showed glucose 89 BUN 32 creatinine 4.34 potassium 4 did miss dialysis today.  Sed rate normal CRP 8.55 magnesium normal.  Initial troponin 225 patient denies chest pain and on chart review chronically elevated troponin 2 weeks ago troponin was 287.  Respiratory panel was negative.  Chest x-ray showed cardiomegaly and some pulmonary vascular congestion likely secondary to not getting dialysis today.  He denies any reports of dyspnea.  Patient will be admitted for further dialysis and cardiac monitoring.  Findings were discussed with the patient who was in agreement with plan.  Spoke to Dr. Briseno who agreed for admission with hospitalist group    Amount and/or Complexity of Data Reviewed  Labs: ordered.    Risk  Prescription drug management.        Final diagnoses:   Hypotension, unspecified hypotension type   Stage 5 chronic kidney disease on chronic dialysis   Venous embolism and thrombosis of superficial vessels of right lower extremity   Cellulitis of right lower extremity   Nonsustained ventricular tachycardia       ED Disposition  ED Disposition       ED Disposition   Decision to Admit    Condition   --    Comment   Level of Care: Telemetry [5]   Admitting Physician: KENDRA BRISENO [[id number]]   Attending Physician: KENDRA BRISENO [[id number]]                 No follow-up provider specified.       Medication List      No changes were made to your prescriptions during this visit.            Odilon Mtz PA  04/18/24 9635

## 2024-04-18 NOTE — CONSULTS
Cardiology Consult Note      REQUESTING PHYSICIAN    No att. providers found    PATIENT IDENTIFICATION  Name: Barry Calhoun Sr.  Age: 89 y.o.  Sex: male  :  1932  MRN: [id number]             REASON FOR CONSULTATION:  89 y.o. male known to Dr. Foley with a past cardiac history of permanent atrial fibrillation (anticoagulated with Coumadin), CAD s/p redo 3v CABG in  and PCI in , and nonsustained VT s/p EP study with no inducible arrhythmia.  He had a loop recorder in .  Patient was lost to follow-up after .  PMH includes ESRD (on dialysis), CVA, COPD, non-Hodgkin's lymphoma, gout, and dementia.    Patient was evaluated at this facility 3/27/2024 for syncope and collapse while getting out of his chair.  TTE at that time showed normal LV systolic function with EF 61-65%, grade 2 DD, severe LAE, mod-severe MR/TR, severely elevated RVSP > 55 mmHg.      CC:  Hypotension    HISTORY OF PRESENT ILLNESS:   Patient is not verbally responsive, mumbling and not following any commands.  He is awake.  The patient is unable to answer any questions and there is no family at bedside.  He was brought to the emergency department for further evaluation of hypotension.  He did not undergo dialysis today.  His rhythm is atrial fibrillation at 50 bpm.  Pressure 90s.  Patient is noted to have mild swelling and erythema to right lower extremity.  He grimaced when ultrasound tech placed the probe on his leg.      REVIEW OF SYSTEMS:  Review of systems could not be obtained due to   patient nonverbal.    OBJECTIVE   High-sensitivity troponin 225  Creatinine 4.34  Potassium 4  Platelets 46  Hemoglobin 9.4    ASSESSMENT  Hypotension  Permanent atrial fibrillation-controlled  Coagulopathy secondary to Coumadin  Heart failure with preserved ejection fraction secondary to valvular heart disease  Coronary artery disease status post CABG and redo  History of nonsustained VT  End-stage renal disease on  "hemodialysis    PLAN  Nephrologist also in the room to see patient.  Possible dialysis today.  Patient has been started on IV antibiotics for possible lower extremity cellulitis.  Very frail patient, nonverbal.  Unsure of his recent baseline.  Recent TTE with normal LV function severe valvular dysfunction.  Fluid volume management per nephrologist/dialysis.  Would recommend conservative cardiovascular management for this frail patient.        Vital Signs  Visit Vitals  BP 99/61   Pulse 51   Temp 97 °F (36.1 °C)   Resp 14   Ht 175.3 cm (69\")   Wt 70 kg (154 lb 5.2 oz)   SpO2 95%   BMI 22.79 kg/m²     Oxygen Therapy  SpO2: 95 %  Device (Oxygen Therapy): room air  Flowsheet Rows      Flowsheet Row First Filed Value   Admission Height 175.3 cm (69\") Documented at 2024 1013   Admission Weight 70 kg (154 lb 5.2 oz) Documented at 2024 1013          Intake & Output (last 3 days)       None          Lines, Drains & Airways       Active LDAs       Name Placement date Placement time Site Days    Peripheral IV 24 1015 Anterior;Distal;Left;Upper Arm 24  1015  Arm  less than 1    Single Lumen Implantable Port Right Chest --  --  Chest  --                    MEDICAL HISTORY    Past Medical History:   Diagnosis Date    A-fib     Asthma     Chronic renal disease     COPD (chronic obstructive pulmonary disease)     Coronary heart disease     CABG    CVA (cerebral vascular accident)     recent CVA -- Patient and Family report this is not accurate    Gout     Hypertension         SURGICAL HISTORY    Past Surgical History:   Procedure Laterality Date    ARTERIOVENOUS FISTULA/SHUNT SURGERY Right 2022    Procedure: RIGHT ARM BRACHIAL CEPHALIC ARTERIAL VENOUS FISTULA;  Surgeon: Drew Gunter MD;  Location: [facility] MAIN OR;  Service: Vascular;  Laterality: Right;    CARDIAC CATHETERIZATION  2016    [facility]    CORONARY ARTERY BYPASS GRAFT      CABG X3, reoperation, 09; CAB and     OTHER SURGICAL " "HISTORY  05/02/2016    loop recorder implant         FAMILY HISTORY    Family History   Problem Relation Age of Onset    Stroke Other        SOCIAL HISTORY    Social History     Tobacco Use    Smoking status: Never     Passive exposure: Never    Smokeless tobacco: Never   Substance Use Topics    Alcohol use: No        ALLERGIES    No Known Allergies           BP 99/61   Pulse 51   Temp 97 °F (36.1 °C)   Resp 14   Ht 175.3 cm (69\")   Wt 70 kg (154 lb 5.2 oz)   SpO2 95%   BMI 22.79 kg/m²   Intake/Output last 3 shifts:  No intake/output data recorded.  Intake/Output this shift:  No intake/output data recorded.    PHYSICAL EXAM:    General: Awake, nonverbal, patient did not his head to nephrologist, mumbling  Head:  Normocephalic, atraumatic, mucous membranes dry  Eyes:  Corneas injected mildly  Neck:  Supple,  no adenopathy; no JVD or bruit  Lungs: Scattered crackles in bases  Chest wall: Symmetric  Heart::  Irregularly irregular with 2/6 HSM  Abdomen: Soft, nondistended, bowel sounds active  Extremities: Clubbing, right lower extremity erythemic just below knee, warm to touch  Pulses: 1+ and symmetric all extremities  Skin:  As above  Neuro/psych: Nonverbal, does not follow commands      Scheduled Meds:      cefTRIAXone, 1,000 mg, Intravenous, Once        Continuous Infusions:         PRN Meds:      [COMPLETED] Insert Peripheral IV **AND** sodium chloride        Results Review:     I reviewed the patient's new clinical results.    CBC    Results from last 7 days   Lab Units 04/18/24  1051   WBC 10*3/mm3 4.20   HEMOGLOBIN g/dL 9.4*   PLATELETS 10*3/mm3 46*     Cr Clearance Estimated Creatinine Clearance: 11 mL/min (A) (by C-G formula based on SCr of 4.34 mg/dL (H)).  Coag   Results from last 7 days   Lab Units 04/18/24  1051   INR  2.74     HbA1C No results found for: \"HGBA1C\"  Blood Glucose No results found for: \"POCGLU\"  Infection     CMP   Results from last 7 days   Lab Units 04/18/24  1051   SODIUM mmol/L " "140   POTASSIUM mmol/L 4.0   CHLORIDE mmol/L 102   CO2 mmol/L 31.0*   BUN mg/dL 32*   CREATININE mg/dL 4.34*   GLUCOSE mg/dL 89   ALBUMIN g/dL 3.3*   BILIRUBIN mg/dL 0.5   ALK PHOS U/L 82   AST (SGOT) U/L 36   ALT (SGPT) U/L 12     ABG      UA      SHERRY  No results found for: \"POCMETH\", \"POCAMPHET\", \"POCBARBITUR\", \"POCBENZO\", \"POCCOCAINE\", \"POCOPIATES\", \"POCOXYCODO\", \"POCPHENCYC\", \"POCPROPOXY\", \"POCTHC\", \"POCTRICYC\"  Lysis Labs   Results from last 7 days   Lab Units 04/18/24  1051   INR  2.74   HEMOGLOBIN g/dL 9.4*   PLATELETS 10*3/mm3 46*   CREATININE mg/dL 4.34*     Radiology(recent) XR Chest 1 View    Result Date: 4/18/2024  Impression: Cardiomegaly with pulmonary vascular congestion and pulmonary edema/CHF pattern Electronically Signed: Diogenes Landaverde MD  4/18/2024 11:52 AM EDT  Workstation ID: [id number]       Results from last 7 days   Lab Units 04/18/24  1051   HSTROP T ng/L 225*       X-rays, labs reviewed personally by physician.    ECG/EMG Results (most recent)       Procedure Component Value Units Date/Time    ECG 12 Lead Other; hypotension [[id number]] Collected: 04/18/24 1042     Updated: 04/18/24 1044     QT Interval 555 ms      QTC Interval 533 ms     Narrative:      HEART RATE= 55  bpm  RR Interval= 1084  ms  UT Interval=   ms  P Horizontal Axis=   deg  P Front Axis=   deg  QRSD Interval= 131  ms  QT Interval= 555  ms  QTcB= 533  ms  QRS Axis= -36  deg  T Wave Axis= 145  deg  - ABNORMAL ECG -  Atrial flutter  Right bundle branch block  Left anterior fascicular block  Nonspecific T abnormalities, lateral leads  Prolonged QT interval  When compared with ECG of 30-Mar-2024 5:50:01,  Significant change in rhythm: previously atrial fibrillation  Significant repolarization change  Electronically Signed By:   Date and Time of Study: 2024-04-18 10:42:48              Medication Review:   I have reviewed the patient's current medication list  Scheduled Meds:cefTRIAXone, 1,000 mg, Intravenous, Once      Continuous " "Infusions:   PRN Meds:.  [COMPLETED] Insert Peripheral IV **AND** sodium chloride    Imaging:  Imaging Results (Last 72 Hours)       Procedure Component Value Units Date/Time    XR Chest 1 View [[id number]] Collected: 04/18/24 1147     Updated: 04/18/24 1154    Narrative:      XR CHEST 1 VW    Date of Exam: 4/18/2024 11:24 AM EDT    Indication: hypotension    Comparison: X-ray dated 3/29/2024    Findings:  The trachea is midline. There is cardiomegaly with pulmonary vascular congestion. Diffuse bilateral reticular and airspace opacities suspicious for pulmonary edema. There are surgical changes in the heart and mediastinum. There is a right intrajugular   chest port in place      Impression:      Impression:  Cardiomegaly with pulmonary vascular congestion and pulmonary edema/CHF pattern      Electronically Signed: Diogenes Landaverde MD    4/18/2024 11:52 AM EDT    Workstation ID: [id number]              MAISHA Lipscomb  04/18/24  12:14 EDT       EMR Dragon/Transcription:   \"Dictated utilizing Dragon dictation\".                 Electronically signed by MAISHA Lipscomb, 04/18/24, 12:14 PM EDT.  Copied text in this note has been reviewed by me and is accurate as of 04/18/24.    "

## 2024-04-18 NOTE — CONSULTS
NEPHROLOGY CONSULTATION-----[facility]    [facility]  [phone]  Derrick Henry MD    Patient Care Team:  Pedro Purvis MD as PCP - General (Family Medicine)  Pedro Purvis MD as PCP - Family Medicine  Pino Mclean MD as Consulting Physician (Nephrology)    CC/REASON FOR CONSULTATION: ESRD    PHYSICIAN REQUESTING CONSULTATION: Dr Briseno    History of Present Illness  89 year old AAM with PMHx ESRD on HD TTS, HTN, CAD presented directly from dialysis clinic with low BP and AMS. He did not have HD today. In ER he seemed confused, but answering a few questions. BP in the 80s. No chest pain or SOA.     Review of Systems   As noted above, otherwise limited due to patient clinical condition.    Past Medical History:   Diagnosis Date    A-fib     Asthma     Chronic renal disease     COPD (chronic obstructive pulmonary disease)     Coronary heart disease     CABG    CVA (cerebral vascular accident)     recent CVA -- Patient and Family report this is not accurate    Gout     Hypertension        Past Surgical History:   Procedure Laterality Date    ARTERIOVENOUS FISTULA/SHUNT SURGERY Right 2022    Procedure: RIGHT ARM BRACHIAL CEPHALIC ARTERIAL VENOUS FISTULA;  Surgeon: Drew Gunter MD;  Location: [facility] OR;  Service: Vascular;  Laterality: Right;    CARDIAC CATHETERIZATION  2016    [facility]    CORONARY ARTERY BYPASS GRAFT      CABG X3, reoperation, 09; CAB and     OTHER SURGICAL HISTORY  2016    loop recorder implant        Family History   Problem Relation Age of Onset    Stroke Other        Social History     Tobacco Use    Smoking status: Never     Passive exposure: Never    Smokeless tobacco: Never   Vaping Use    Vaping status: Never Used   Substance Use Topics    Alcohol use: No    Drug use: No       Home Meds: (Not in a hospital admission)    Prior to Admission medications    Medication Sig  Start Date End Date Taking? Authorizing Provider   acetaminophen (TYLENOL) 325 MG tablet Take 2 tablets by mouth Every 4 (Four) Hours As Needed for Mild Pain.    Jos Linda MD   allopurinol (ZYLOPRIM) 100 MG tablet Take 1 tablet by mouth Daily. 8/25/20   Stacie Vargas MD   atorvastatin (LIPITOR) 20 MG tablet Take 1 tablet by mouth Every Night.    Jos Linda MD   azithromycin (ZITHROMAX) 250 MG tablet Take 1 tablet by mouth Daily. For PNA for 4 days 4/17/24 4/21/24  Jos Linda MD   docusate sodium (COLACE) 100 MG capsule Take 2 capsules by mouth Every Morning.    Jos Linda MD   donepezil (ARICEPT) 10 MG tablet Take 1 tablet by mouth Every Night. 4/2/24   Suhail Zacarias MD   famotidine (PEPCID) 20 MG tablet Take 1 tablet by mouth 2 (Two) Times a Day.    Jos Linda MD   folic acid (FOLVITE) 1 MG tablet Take 1 tablet by mouth Daily.    Jos Linda MD   ipratropium-albuterol (DUO-NEB) 0.5-2.5 mg/3 ml nebulizer Take 3 mL by nebulization Every 4 (Four) Hours As Needed for Wheezing.    Jos Linda MD   levothyroxine (SYNTHROID, LEVOTHROID) 75 MCG tablet Take 1 tablet by mouth Every Night.    Jos Linda MD   lidocaine (LMX) 4 % cream Apply 1 Application topically to the appropriate area as directed 3 (Three) Times a Week. Mon, Wed, Fri    Jos Linda MD   LORazepam (ATIVAN) 0.5 MG tablet Take 1 tablet by mouth Every 8 (Eight) Hours As Needed for Anxiety. For anxiety for 14 days 4/10/24 4/24/24  Jos Linda MD   naloxone (NARCAN) 4 MG/0.1ML nasal spray 1 spray into the nostril(s) as directed by provider As Needed for Opioid Reversal.    Jos Linda MD   nitroglycerin (NITROSTAT) 0.4 MG SL tablet Place 1 tablet under the tongue Every 5 (Five) Minutes As Needed for Chest Pain (Only if SBP Greater Than 100). Take no more than 3 doses in 15 minutes. 4/2/24   Suhail Zacarias MD    pseudoephedrine-guaifenesin (MUCINEX D)  MG per 12 hr tablet Take 1 tablet by mouth Every 12 (Twelve) Hours.    Jos Linda MD   sennosides-docusate (PERICOLACE) 8.6-50 MG per tablet Take 2 tablets by mouth 2 (Two) Times a Day As Needed for Constipation. 4/2/24   Suhail Zacarias MD   tamsulosin (FLOMAX) 0.4 MG capsule 24 hr capsule Take 1 capsule by mouth Every Night.    Jos Linda MD   vitamin B-12 (CYANOCOBALAMIN) 1000 MCG tablet Take 0.5 tablets by mouth Daily.    Jos Linda MD   warfarin (COUMADIN) 4 MG tablet Take 1 tablet by mouth Daily.    Jos Linda MD   metoprolol succinate XL (TOPROL-XL) 25 MG 24 hr tablet Take 0.5 tablets by mouth Daily. 4/3/24 4/18/24  Suhail Zacarias MD   midodrine (PROAMATINE) 10 MG tablet Take 1 tablet by mouth 3 (Three) Times a Day Before Meals.  4/18/24  Jos Linda MD   warfarin (COUMADIN) 3 MG tablet Take one tablet by mouth on Monday, Wednesday, and Friday 4/18/24  Jos Linda MD        Scheduled Meds:  cefTRIAXone, 1,000 mg, Intravenous, Once        Continuous Infusions:       PRN Meds:    [COMPLETED] Insert Peripheral IV **AND** sodium chloride    Allergies:  Patient has no known allergies.    OBJECTIVE    Vital Signs  Temp:  [97 °F (36.1 °C)] 97 °F (36.1 °C)  Heart Rate:  [48-73] 51  Resp:  [14] 14  BP: (97-99)/(48-61) 99/61    No intake/output data recorded.  No intake/output data recorded.    Physical Exam:  General Appearance: NAD. Somewhat lethargic  Head: normocephalic, without obvious abnormality and atraumatic  Eyes: conjunctivae and sclerae normal and no icterus  Neck: supple and no JVD  Lungs: clear to auscultation and respirations regular  Heart: regular rhythm & normal rate and normal S1, S2  Chest Wall: no abnormalities observed  Abdomen: normal bowel sounds and soft, nontender  Extremities: moves extremities well, trace edema, no cyanosis  Skin: right LE erythema  Neurologic:  "pleasantly confused    Results Review:    I reviewed the patient's new clinical results.    WBC WBC   Date Value Ref Range Status   04/18/2024 4.20 3.40 - 10.80 10*3/mm3 Final      HGB Hemoglobin   Date Value Ref Range Status   04/18/2024 9.4 (L) 13.0 - 17.7 g/dL Final      HCT Hematocrit   Date Value Ref Range Status   04/18/2024 30.1 (L) 37.5 - 51.0 % Final      Platelets No results found for: \"LABPLAT\"   MCV MCV   Date Value Ref Range Status   04/18/2024 104.2 (H) 79.0 - 97.0 fL Final          Sodium Sodium   Date Value Ref Range Status   04/18/2024 140 136 - 145 mmol/L Final      Potassium Potassium   Date Value Ref Range Status   04/18/2024 4.0 3.5 - 5.2 mmol/L Final     Comment:     Specimen hemolyzed.  Result may be falsely elevated.      Chloride Chloride   Date Value Ref Range Status   04/18/2024 102 98 - 107 mmol/L Final      CO2 CO2   Date Value Ref Range Status   04/18/2024 31.0 (H) 22.0 - 29.0 mmol/L Final      BUN BUN   Date Value Ref Range Status   04/18/2024 32 (H) 8 - 23 mg/dL Final      Creatinine Creatinine   Date Value Ref Range Status   04/18/2024 4.34 (H) 0.76 - 1.27 mg/dL Final      Calcium Calcium   Date Value Ref Range Status   04/18/2024 8.9 8.2 - 9.6 mg/dL Final      PO4 No results found for: \"CAPO4\"   Albumin Albumin   Date Value Ref Range Status   04/18/2024 3.3 (L) 3.5 - 5.2 g/dL Final      Magnesium No results found for: \"MG\"   Uric Acid No results found for: \"URICACID\"       Imaging Results (Last 72 Hours)       Procedure Component Value Units Date/Time    XR Chest 1 View [[id number]] Resulted: 04/18/24 1132     Updated: 04/18/24 1132              Results for orders placed during the hospital encounter of 03/27/24    XR Chest 1 View    Narrative  XR CHEST 1 VW    Date of Exam: 3/29/2024 4:37 PM EDT    Indication: wheezing    Comparison: AP portable chest 2/17/2014.    Findings:  Stable right hemidiaphragm elevation. Left midlung and right infrahilar airspace disease is redemonstrated " and is not thought to be significantly changed. There is stable moderate generalized cardiac enlargement with median sternotomy and CABG. Loop  recorder projects over the left lower mediastinum. Right chest wall jeremías catheter tip terminates at the lower SVC level. Right subclavian stent is noted. No acute osseous abnormality is identified.    Impression  Impression:    1. Left midlung and right infrahilar airspace disease appears unchanged from 2/28/2024. No new airspace disease is identified.  2. Stable cardiomegaly.      Electronically Signed: Mary Page MD  3/29/2024 4:40 PM EDT  Workstation ID: [id number]      Results for orders placed during the hospital encounter of 02/16/24    XR Chest 1 View    Narrative  XR CHEST 1 VW    Date of Exam: 2/17/2024 9:55 AM EST    Indication: hx of CHF, increased BNP    Comparison: 2/11/2024    Findings:  Patient is status post median sternotomy and CABG. Cardiac silhouette is enlarged. Vague perihilar opacities may represent edema or infiltrates. No significant pleural effusion or pneumothorax is seen. No acute osseous lesion is seen. Right chest wall  Port-A-Cath terminates overlying the superior cavoatrial junction. Loop recorder projects over the left thorax. Metallic stent projects over the left upper thorax.    Impression  Impression:  Cardiomegaly with vague bilateral perihilar opacities which may represent edema or infiltrates.    Electronically Signed: Jose Be MD  2/17/2024 10:11 AM EST  Workstation ID: [id number]      Results for orders placed during the hospital encounter of 02/09/22    XR Chest PA & Lateral    Narrative  DATE OF EXAM:  2/9/2022 11:04 AM    PROCEDURE:  XR CHEST PA AND LATERAL-    INDICATIONS:  preop    COMPARISON:  Chest x-ray 3/17/2016, chest CT 9/9/2021    TECHNIQUE:  Two radiologic views of the chest , PA and lateral were obtained.    FINDINGS:  There is a left IJ dialysis catheter and right IJ port. A subcutaneous  cardiac event monitor  is present. There is been coronary bypass. There  is cardiomegaly. The pulmonary vasculature is within normal limits.  There are coarse linear markings in the mid and lower lung zones, with  round and ovoid lucencies suspected to be bronchiectasis. There is  eventration of anterior right hemidiaphragm. Costophrenic angles are  sharp    Impression  Findings compatible with fibrotic changes and/or atelectasis in the mid  and lower lung zones bilaterally with probable bronchiectasis. This is a  new finding since the most recent chest x-ray from 2016 is felt to have  progressed since the 9/9/2021 chest CT    Electronically Signed By-Wesley Mcallister On:2/9/2022 11:15 AM  This report was finalized on [id number] by  Wesley Mcallister, .        Results for orders placed during the hospital encounter of 10/17/22    Duplex hemodialysis access CAR    Interpretation Summary    Patent right brachiocephalic fistula with overall adequate flow volumes.  Diameter and depth both appear appropriate for dialysis.  Stenosis seen at the arterial anastomosis.      ASSESSMENT / PLAN      * No active hospital problems. *    ESRD--HD TTS. Followed by Dr Mclean  Chronic hypotension--on midodrine  A fib--anti coagulated  COPD  Lymphoma  Thrombocytopenia  ? RLE cellulitis  V tach run--cards consulted. In ER.  Volume/Lytes oK, given hypotension, will hold HD till tomorrow  Resume midodrine 10 mg TID  ATBx per primary team.        I discussed the patient's findings and my recommendations with patient, nursing staff, and consulting provider    Will follow along closely. Thank you for allowing us to see this patient in renal consultation.    [facility]  [phone]  MD Derrick Lucero MD  04/18/24  11:43 EDT

## 2024-04-18 NOTE — PROGRESS NOTES
"Pharmacy dosing service  Anticoagulant  Warfarin     Subjective:    Barry Calhoun Sr. is a 89 y.o.male being continued on warfarin for Atrial Fibrillation / Flutter.    INR Goal: 2 - 3  Home medication?: warfarin 4 mg PO daily  Bridge Therapy Present?:  No  Interacting Medications Evaluation (New/Present/Discontinued): Ceftriaxone (may increase INR)  Additional Contributing Factors: ESRD, CVA, lymphoma      Assessment/Plan:    INR is therapeutic today. Will continue home regimen.     Continue to monitor and adjust based on INR.         Date 4/18           INR 2.74           Dose 4 mg               Objective:  [Ht: 175.3 cm (69\"); Wt: 70 kg (154 lb 5.2 oz); BMI: Body mass index is 22.79 kg/m².]    Lab Results   Component Value Date    ALBUMIN 3.3 (L) 04/18/2024     Lab Results   Component Value Date    INR 2.74 04/18/2024    INR 2.29 04/02/2024    INR 4.46 (H) 04/01/2024    PROTIME 27.7 04/18/2024    PROTIME 23.5 04/02/2024    PROTIME 43.5 (H) 04/01/2024     Lab Results   Component Value Date    HGB 9.4 (L) 04/18/2024    HGB 8.9 (L) 04/02/2024    HGB 8.4 (L) 04/01/2024     Lab Results   Component Value Date    HCT 30.1 (L) 04/18/2024    HCT 28.9 (L) 04/02/2024    HCT 27.8 (L) 04/01/2024       Doyle Davalos, PharmJIMMY  04/18/24 17:18 EDT     "

## 2024-04-18 NOTE — Clinical Note
Level of Care: Progressive Care [20]   Diagnosis: Hypotension [938608]   Admitting Physician: KENDRA HAMMONDS [[id number]]   Attending Physician: KENDRA HAMMONDS [[id number]]   Certification: I Certify That Inpatient Hospital Services Are Medically Necessary For Greater Than 2 Midnights

## 2024-04-19 ENCOUNTER — APPOINTMENT (OUTPATIENT)
Dept: NEPHROLOGY | Facility: HOSPITAL | Age: 89
End: 2024-04-19
Payer: MEDICARE

## 2024-04-19 LAB
ACANTHOCYTES BLD QL SMEAR: NORMAL
ALBUMIN SERPL-MCNC: 3.5 G/DL (ref 3.5–5.2)
ANION GAP SERPL CALCULATED.3IONS-SCNC: 10 MMOL/L (ref 5–15)
ANION GAP SERPL CALCULATED.3IONS-SCNC: 12 MMOL/L (ref 5–15)
ANISOCYTOSIS BLD QL: NORMAL
BASOPHILS # BLD AUTO: 0.01 10*3/MM3 (ref 0–0.2)
BASOPHILS NFR BLD AUTO: 0.2 % (ref 0–1.5)
BUN SERPL-MCNC: 16 MG/DL (ref 8–23)
BUN SERPL-MCNC: 36 MG/DL (ref 8–23)
BUN/CREAT SERPL: 7.2 (ref 7–25)
BUN/CREAT SERPL: 7.8 (ref 7–25)
CALCIUM SPEC-SCNC: 8.9 MG/DL (ref 8.2–9.6)
CALCIUM SPEC-SCNC: 9.2 MG/DL (ref 8.2–9.6)
CHLORIDE SERPL-SCNC: 100 MMOL/L (ref 98–107)
CHLORIDE SERPL-SCNC: 103 MMOL/L (ref 98–107)
CO2 SERPL-SCNC: 30 MMOL/L (ref 22–29)
CO2 SERPL-SCNC: 30 MMOL/L (ref 22–29)
CREAT SERPL-MCNC: 2.23 MG/DL (ref 0.76–1.27)
CREAT SERPL-MCNC: 4.63 MG/DL (ref 0.76–1.27)
DEPRECATED RDW RBC AUTO: 66 FL (ref 37–54)
EGFRCR SERPLBLD CKD-EPI 2021: 11.3 ML/MIN/1.73
EGFRCR SERPLBLD CKD-EPI 2021: 27.1 ML/MIN/1.73
EOSINOPHIL # BLD AUTO: 0.06 10*3/MM3 (ref 0–0.4)
EOSINOPHIL NFR BLD AUTO: 1.1 % (ref 0.3–6.2)
ERYTHROCYTE [DISTWIDTH] IN BLOOD BY AUTOMATED COUNT: 17.5 % (ref 12.3–15.4)
GLUCOSE SERPL-MCNC: 74 MG/DL (ref 65–99)
GLUCOSE SERPL-MCNC: 92 MG/DL (ref 65–99)
HCT VFR BLD AUTO: 30.2 % (ref 37.5–51)
HGB BLD-MCNC: 9.6 G/DL (ref 13–17.7)
HYPOCHROMIA BLD QL: NORMAL
IMM GRANULOCYTES # BLD AUTO: 0.02 10*3/MM3 (ref 0–0.05)
IMM GRANULOCYTES NFR BLD AUTO: 0.4 % (ref 0–0.5)
INR PPP: 2.85 (ref 2–3)
LYMPHOCYTES # BLD AUTO: 0.61 10*3/MM3 (ref 0.7–3.1)
LYMPHOCYTES NFR BLD AUTO: 10.8 % (ref 19.6–45.3)
MACROCYTES BLD QL SMEAR: NORMAL
MAGNESIUM SERPL-MCNC: 1.9 MG/DL (ref 1.7–2.3)
MCH RBC QN AUTO: 32.5 PG (ref 26.6–33)
MCHC RBC AUTO-ENTMCNC: 31.8 G/DL (ref 31.5–35.7)
MCV RBC AUTO: 102.4 FL (ref 79–97)
MONOCYTES # BLD AUTO: 0.31 10*3/MM3 (ref 0.1–0.9)
MONOCYTES NFR BLD AUTO: 5.5 % (ref 5–12)
NEUTROPHILS NFR BLD AUTO: 4.65 10*3/MM3 (ref 1.7–7)
NEUTROPHILS NFR BLD AUTO: 82 % (ref 42.7–76)
NRBC BLD AUTO-RTO: 0 /100 WBC (ref 0–0.2)
PHOSPHATE SERPL-MCNC: 4.8 MG/DL (ref 2.5–4.5)
PLATELET # BLD AUTO: 47 10*3/MM3 (ref 140–450)
PMV BLD AUTO: 11.9 FL (ref 6–12)
POIKILOCYTOSIS BLD QL SMEAR: NORMAL
POLYCHROMASIA BLD QL SMEAR: NORMAL
POTASSIUM SERPL-SCNC: 3.5 MMOL/L (ref 3.5–5.2)
POTASSIUM SERPL-SCNC: 3.9 MMOL/L (ref 3.5–5.2)
PROTHROMBIN TIME: 28.7 SECONDS (ref 19.4–28.5)
QT INTERVAL: 555 MS
QTC INTERVAL: 533 MS
RBC # BLD AUTO: 2.95 10*6/MM3 (ref 4.14–5.8)
SMALL PLATELETS BLD QL SMEAR: NORMAL
SODIUM SERPL-SCNC: 142 MMOL/L (ref 136–145)
SODIUM SERPL-SCNC: 143 MMOL/L (ref 136–145)
WBC MORPH BLD: NORMAL
WBC NRBC COR # BLD AUTO: 5.66 10*3/MM3 (ref 3.4–10.8)

## 2024-04-19 PROCEDURE — P9047 ALBUMIN (HUMAN), 25%, 50ML: HCPCS | Performed by: INTERNAL MEDICINE

## 2024-04-19 PROCEDURE — 25010000002 ALBUMIN HUMAN 25% PER 50 ML: Performed by: INTERNAL MEDICINE

## 2024-04-19 PROCEDURE — 85610 PROTHROMBIN TIME: CPT | Performed by: INTERNAL MEDICINE

## 2024-04-19 PROCEDURE — 25010000002 CEFTRIAXONE PER 250 MG: Performed by: INTERNAL MEDICINE

## 2024-04-19 PROCEDURE — 25010000002 POTASSIUM CHLORIDE 10 MEQ/100ML SOLUTION: Performed by: INTERNAL MEDICINE

## 2024-04-19 PROCEDURE — 83735 ASSAY OF MAGNESIUM: CPT | Performed by: INTERNAL MEDICINE

## 2024-04-19 PROCEDURE — 94664 DEMO&/EVAL PT USE INHALER: CPT

## 2024-04-19 PROCEDURE — 94799 UNLISTED PULMONARY SVC/PX: CPT

## 2024-04-19 PROCEDURE — 99232 SBSQ HOSP IP/OBS MODERATE 35: CPT | Performed by: NURSE PRACTITIONER

## 2024-04-19 PROCEDURE — 80048 BASIC METABOLIC PNL TOTAL CA: CPT | Performed by: INTERNAL MEDICINE

## 2024-04-19 PROCEDURE — 25010000002 ZIPRASIDONE MESYLATE PER 10 MG: Performed by: INTERNAL MEDICINE

## 2024-04-19 PROCEDURE — 85025 COMPLETE CBC W/AUTO DIFF WBC: CPT | Performed by: INTERNAL MEDICINE

## 2024-04-19 PROCEDURE — 80069 RENAL FUNCTION PANEL: CPT | Performed by: INTERNAL MEDICINE

## 2024-04-19 PROCEDURE — 94761 N-INVAS EAR/PLS OXIMETRY MLT: CPT

## 2024-04-19 PROCEDURE — 25010000002 MAGNESIUM SULFATE 2 GM/50ML SOLUTION: Performed by: INTERNAL MEDICINE

## 2024-04-19 PROCEDURE — 85007 BL SMEAR W/DIFF WBC COUNT: CPT | Performed by: INTERNAL MEDICINE

## 2024-04-19 PROCEDURE — 25810000003 SODIUM CHLORIDE 0.9 % SOLUTION: Performed by: INTERNAL MEDICINE

## 2024-04-19 RX ORDER — POTASSIUM CHLORIDE 20 MEQ/1
40 TABLET, EXTENDED RELEASE ORAL ONCE
Status: DISCONTINUED | OUTPATIENT
Start: 2024-04-19 | End: 2024-04-25 | Stop reason: HOSPADM

## 2024-04-19 RX ORDER — QUETIAPINE FUMARATE 25 MG/1
25 TABLET, FILM COATED ORAL NIGHTLY
Status: DISCONTINUED | OUTPATIENT
Start: 2024-04-19 | End: 2024-04-25 | Stop reason: HOSPADM

## 2024-04-19 RX ORDER — LORAZEPAM 2 MG/ML
0.5 INJECTION INTRAMUSCULAR EVERY 8 HOURS PRN
Status: ACTIVE | OUTPATIENT
Start: 2024-04-19 | End: 2024-04-20

## 2024-04-19 RX ORDER — MAGNESIUM SULFATE HEPTAHYDRATE 40 MG/ML
2 INJECTION, SOLUTION INTRAVENOUS ONCE
Status: COMPLETED | OUTPATIENT
Start: 2024-04-19 | End: 2024-04-19

## 2024-04-19 RX ORDER — POTASSIUM CHLORIDE 7.45 MG/ML
10 INJECTION INTRAVENOUS
Status: COMPLETED | OUTPATIENT
Start: 2024-04-19 | End: 2024-04-19

## 2024-04-19 RX ADMIN — CEFTRIAXONE 1000 MG: 1 INJECTION, POWDER, FOR SOLUTION INTRAMUSCULAR; INTRAVENOUS at 13:20

## 2024-04-19 RX ADMIN — MIDODRINE HYDROCHLORIDE 10 MG: 5 TABLET ORAL at 08:02

## 2024-04-19 RX ADMIN — IPRATROPIUM BROMIDE AND ALBUTEROL SULFATE 3 ML: .5; 3 SOLUTION RESPIRATORY (INHALATION) at 07:08

## 2024-04-19 RX ADMIN — POTASSIUM CHLORIDE 10 MEQ: 7.46 INJECTION, SOLUTION INTRAVENOUS at 19:10

## 2024-04-19 RX ADMIN — ALBUMIN (HUMAN) 12.5 G: 0.25 INJECTION, SOLUTION INTRAVENOUS at 09:30

## 2024-04-19 RX ADMIN — SODIUM CHLORIDE 250 ML: 9 INJECTION, SOLUTION INTRAVENOUS at 17:05

## 2024-04-19 RX ADMIN — MAGNESIUM SULFATE HEPTAHYDRATE 2 G: 40 INJECTION, SOLUTION INTRAVENOUS at 22:49

## 2024-04-19 RX ADMIN — IPRATROPIUM BROMIDE AND ALBUTEROL SULFATE 3 ML: .5; 3 SOLUTION RESPIRATORY (INHALATION) at 15:05

## 2024-04-19 RX ADMIN — MIDODRINE HYDROCHLORIDE 10 MG: 5 TABLET ORAL at 11:39

## 2024-04-19 RX ADMIN — ALBUMIN (HUMAN) 12.5 G: 0.25 INJECTION, SOLUTION INTRAVENOUS at 10:38

## 2024-04-19 RX ADMIN — IPRATROPIUM BROMIDE AND ALBUTEROL SULFATE 3 ML: .5; 3 SOLUTION RESPIRATORY (INHALATION) at 20:46

## 2024-04-19 RX ADMIN — ZIPRASIDONE MESYLATE 20 MG: 20 INJECTION, POWDER, LYOPHILIZED, FOR SOLUTION INTRAMUSCULAR at 16:09

## 2024-04-19 RX ADMIN — Medication 10 ML: at 22:49

## 2024-04-19 RX ADMIN — ALBUMIN (HUMAN) 12.5 G: 0.25 INJECTION, SOLUTION INTRAVENOUS at 11:29

## 2024-04-19 RX ADMIN — POTASSIUM CHLORIDE 10 MEQ: 7.46 INJECTION, SOLUTION INTRAVENOUS at 17:56

## 2024-04-19 RX ADMIN — ALBUMIN (HUMAN) 12.5 G: 0.25 INJECTION, SOLUTION INTRAVENOUS at 08:39

## 2024-04-19 RX ADMIN — LEVOTHYROXINE SODIUM 75 MCG: 0.07 TABLET ORAL at 05:48

## 2024-04-19 RX ADMIN — DOCUSATE SODIUM AND SENNOSIDES 2 TABLET: 8.6; 5 TABLET, FILM COATED ORAL at 08:05

## 2024-04-19 NOTE — DISCHARGE PLACEMENT REQUEST
"Barry Calhoun Sr. (89 y.o. Male)       Date of Birth   07/12/1932    Social Security Number       Address   [address] [facility] [place] [address]    Home Phone   [phone]    MRN   [id number]       Worship   None    Marital Status                               Admission Date   4/18/24    Admission Type   Emergency    Admitting Provider   Maria Elena Briseno MD    Attending Provider   Joseph Gutierrez MD    Department, Room/Bed   [facility], 2101/1       Discharge Date       Discharge Disposition       Discharge Destination                                 Attending Provider: Joseph Gutierrez MD    Allergies: No Known Allergies    Isolation: None   Infection: None   Code Status: CPR    Ht: 175.3 cm (69\")   Wt: 70.5 kg (155 lb 6.8 oz)    Admission Cmt: None   Principal Problem: Hypotension [I95.9]                   Active Insurance as of 4/18/2024       Primary Coverage       Payor Plan Insurance Group Employer/Plan Group    MEDICARE MEDICARE A & B        Payor Plan Address Payor Plan Phone Number Payor Plan Fax Number Effective Dates    [address] [phone]  2/1/1980 - None Entered    [facility] [id number]         Subscriber Name Subscriber Birth Date Member ID       BARRY CALHOUN SR. 7/12/1932 [id number]               Secondary Coverage       Payor Plan Insurance Group Employer/Plan Group    [place]        Payor Plan Address Payor Plan Phone Number Payor Plan Fax Number Effective Dates    [address]   7/8/2019 - None Entered    [place] [address]         Subscriber Name Subscriber Birth Date Member ID       BARRY CALHOUN SR. 7/12/1932 [id number]                     Emergency Contacts        (Rel.) Home Phone Work Phone Mobile Phone    KARLA JENNINGS (POA) (Significant Other) [phone] -- [phone]    ZEINAB Calhoun/Vernon (POA) (Grandchild) -- -- [phone]    DAYRON RANDALLBARRY DASH (Son) -- -- [phone]    Shaila Dupont (Daughter) -- " -- [phone]    Anish Calohun (Son) -- -- [phone]                 History & Physical        Maria Elena Briseno MD at 24 1310              [facility] Medicine Services  History & Physical    Patient Name: Barry Calhoun Sr.  : 1932  MRN: [id number]  Primary Care Physician:  Pedro Purvis MD  Date of admission: 2024  Date and Time of Service: 2024 at 1: 10 PM     Subjective      Chief Complaint: Hypertension    History of Present Illness: Barry Calhoun Sr. is a 89 y.o. male with a CMH of ESRD, A-fib on Coumadin, CABG, CVA, hypothrombocytopenia, COPD, lymphoma, gout, dementia who [facility] with hypotension.  The patient arrived from hemodialysis due to hypotension.  The patient is nonverbal and history could not be taken.  The patient was evaluated by cardiology.  Cardiology and nephrology will decide on further management.  The patient will get dialysis in the hospital.  The patient was found to have warm right leg suggestive of cellulitis      Review of Systems  Ten point ROS: reviewed negative, unless as noted in HPI.    Personal History     Past Medical History:   Diagnosis Date    A-fib     Asthma     Chronic renal disease     COPD (chronic obstructive pulmonary disease)     Coronary heart disease     CABG    CVA (cerebral vascular accident)     recent CVA -- Patient and Family report this is not accurate    Gout     Hypertension        Past Surgical History:   Procedure Laterality Date    ARTERIOVENOUS FISTULA/SHUNT SURGERY Right 2022    Procedure: RIGHT ARM BRACHIAL CEPHALIC ARTERIAL VENOUS FISTULA;  Surgeon: Drew Gunter MD;  Location: [facility];  Service: Vascular;  Laterality: Right;    CARDIAC CATHETERIZATION  2016    [facility]    CORONARY ARTERY BYPASS GRAFT      CABG X3, reoperation, 09; CAB and     OTHER SURGICAL HISTORY  2016    loop recorder implant        Family History: family history  includes Stroke in an other family member. Otherwise pertinent FHx was reviewed and not pertinent to current issue.    Social History:  reports that he has never smoked. He has never been exposed to tobacco smoke. He has never used smokeless tobacco. He reports that he does not drink alcohol and does not use drugs.    Home Medications:  Prior to Admission Medications       Prescriptions Last Dose Informant Patient Reported? Taking?    acetaminophen (TYLENOL) 325 MG tablet   Yes No    Take 2 tablets by mouth Every 4 (Four) Hours As Needed for Mild Pain.    allopurinol (ZYLOPRIM) 100 MG tablet   No No    Take 1 tablet by mouth Daily.    atorvastatin (LIPITOR) 20 MG tablet   Yes No    Take 1 tablet by mouth Every Night.    docusate sodium (COLACE) 100 MG capsule  Self Yes No    Take 2 capsules by mouth Every Morning.    donepezil (ARICEPT) 10 MG tablet   No No    Take 1 tablet by mouth Every Night.    famotidine (PEPCID) 20 MG tablet   Yes No    Take 1 tablet by mouth 2 (Two) Times a Day.    folic acid (FOLVITE) 1 MG tablet   Yes No    Take 1 tablet by mouth Daily.    ipratropium-albuterol (DUO-NEB) 0.5-2.5 mg/3 ml nebulizer   Yes No    Take 3 mL by nebulization Every 4 (Four) Hours As Needed for Wheezing.    levothyroxine (SYNTHROID, LEVOTHROID) 75 MCG tablet   Yes No    Take 1 tablet by mouth Every Night.    lidocaine (LMX) 4 % cream   Yes No    Apply 1 Application topically to the appropriate area as directed 3 (Three) Times a Week. Mon, Wed, Fri    metoprolol succinate XL (TOPROL-XL) 25 MG 24 hr tablet   No No    Take 0.5 tablets by mouth Daily.    midodrine (PROAMATINE) 10 MG tablet   Yes No    Take 1 tablet by mouth 3 (Three) Times a Day Before Meals.    naloxone (NARCAN) 4 MG/0.1ML nasal spray   Yes No    1 spray into the nostril(s) as directed by provider As Needed for Opioid Reversal.    nitroglycerin (NITROSTAT) 0.4 MG SL tablet   No No    Place 1 tablet under the tongue Every 5 (Five) Minutes As Needed for  Chest Pain (Only if SBP Greater Than 100). Take no more than 3 doses in 15 minutes.    sennosides-docusate (PERICOLACE) 8.6-50 MG per tablet   No No    Take 2 tablets by mouth 2 (Two) Times a Day As Needed for Constipation.    tamsulosin (FLOMAX) 0.4 MG capsule 24 hr capsule   Yes No    Take 1 capsule by mouth Every Night.    vitamin B-12 (CYANOCOBALAMIN) 1000 MCG tablet   Yes No    Take 0.5 tablets by mouth Daily.    warfarin (COUMADIN) 3 MG tablet   Yes No    Take one tablet by mouth on Monday, Wednesday, and Friday    warfarin (COUMADIN) 4 MG tablet   Yes No    See Admin Instructions. Take one tablet by mouth on Tuesday, Thursday, Saturday, and Sunday              Allergies:  No Known Allergies    Objective      Vitals:   Temp:  [97 °F (36.1 °C)] 97 °F (36.1 °C)  Heart Rate:  [48-73] 51  Resp:  [14] 14  BP: (97-99)/(48-61) 99/61  Body mass index is 22.79 kg/m².  Physical Exam    General: No acute distress  Cardiovascular:  normal rate, , normal S1, S2, no murmurs.    Peripheral edema +1  Respiratory:  dimished breath sounds,  no wheezes, rales or rhonchi  Gastrointestinal: Soft, nontender, nondistended  Neurologic: Nonverbal.  No movement disorder noted.  Mental status: Alert, nonverbal  Appropriate Affect, No agitation.     Diagnostic Data:  Lab Results (last 24 hours)       Procedure Component Value Units Date/Time    High Sensitivity Troponin T 2Hr [[id number]] Collected: 04/18/24 1254    Specimen: Blood Updated: 04/18/24 1257    C-reactive Protein [[id number]]  (Abnormal) Collected: 04/18/24 1051    Specimen: Blood from Arm, Left Updated: 04/18/24 1256     C-Reactive Protein 8.55 mg/dL     Respiratory Panel PCR w/COVID-19(SARS-CoV-2) MAGALYS/AI/LINDY/PAD/COR/JULIANNE In-House, NP Swab in UTM/VTM, 2 HR TAT - Swab, Nasopharynx [[id number]]  (Normal) Collected: 04/18/24 1103    Specimen: Swab from Nasopharynx Updated: 04/18/24 1206     ADENOVIRUS, PCR Not Detected     Coronavirus 229E Not Detected     Coronavirus HKU1 Not  Detected     Coronavirus NL63 Not Detected     Coronavirus OC43 Not Detected     COVID19 Not Detected     Human Metapneumovirus Not Detected     Human Rhinovirus/Enterovirus Not Detected     Influenza A PCR Not Detected     Influenza B PCR Not Detected     Parainfluenza Virus 1 Not Detected     Parainfluenza Virus 2 Not Detected     Parainfluenza Virus 3 Not Detected     Parainfluenza Virus 4 Not Detected     RSV, PCR Not Detected     Bordetella pertussis pcr Not Detected     Bordetella parapertussis PCR Not Detected     Chlamydophila pneumoniae PCR Not Detected     Mycoplasma pneumo by PCR Not Detected    Narrative:      In the setting of a positive respiratory panel with a viral infection PLUS a negative procalcitonin without other underlying concern for bacterial infection, consider observing off antibiotics or discontinuation of antibiotics and continue supportive care. If the respiratory panel is positive for atypical bacterial infection (Bordetella pertussis, Chlamydophila pneumoniae, or Mycoplasma pneumoniae), consider antibiotic de-escalation to target atypical bacterial infection.    High Sensitivity Troponin T [[id number]]  (Abnormal) Collected: 04/18/24 1051    Specimen: Blood from Arm, Left Updated: 04/18/24 1202     HS Troponin T 225 ng/L      Comment: Specimen hemolyzed.  Results may be falsely decreased.       Narrative:      High Sensitive Troponin T Reference Range:  <14.0 ng/L- Negative Female for AMI  <22.0 ng/L- Negative Male for AMI  >=14 - Abnormal Female indicating possible myocardial injury.  >=22 - Abnormal Male indicating possible myocardial injury.   Clinicians would have to utilize clinical acumen, EKG, Troponin, and serial changes to determine if it is an Acute Myocardial Infarction or myocardial injury due to an underlying chronic condition.         Magnesium [[id number]]  (Normal) Collected: 04/18/24 1051    Specimen: Blood from Arm, Left Updated: 04/18/24 1200     Magnesium 2.3 mg/dL      Sedimentation Rate [[id number]]  (Normal) Collected: 04/18/24 1051    Specimen: Blood from Arm, Left Updated: 04/18/24 1132     Sed Rate 13 mm/hr     Comprehensive Metabolic Panel [[id number]]  (Abnormal) Collected: 04/18/24 1051    Specimen: Blood from Arm, Left Updated: 04/18/24 1121     Glucose 89 mg/dL      BUN 32 mg/dL      Creatinine 4.34 mg/dL      Sodium 140 mmol/L      Potassium 4.0 mmol/L      Comment: Specimen hemolyzed.  Result may be falsely elevated.        Chloride 102 mmol/L      CO2 31.0 mmol/L      Calcium 8.9 mg/dL      Total Protein 6.0 g/dL      Albumin 3.3 g/dL      ALT (SGPT) 12 U/L      Comment: Specimen hemolyzed.  Result may  be falsely elevated.        AST (SGOT) 36 U/L      Comment: Specimen hemolyzed.  Result may be falsely elevated.        Alkaline Phosphatase 82 U/L      Total Bilirubin 0.5 mg/dL      Globulin 2.7 gm/dL      A/G Ratio 1.2 g/dL      BUN/Creatinine Ratio 7.4     Anion Gap 7.0 mmol/L      eGFR 12.2 mL/min/1.73      Comment: <15 Indicative of kidney failure       Narrative:      GFR Normal >60  Chronic Kidney Disease <60  Kidney Failure <15    The GFR formula is only valid for adults with stable renal function between ages 18 and 70.    CBC & Differential [[id number]]  (Abnormal) Collected: 04/18/24 1051    Specimen: Blood from Arm, Left Updated: 04/18/24 1119    Narrative:      The following orders were created for panel order CBC & Differential.  Procedure                               Abnormality         Status                     ---------                               -----------         ------                     CBC Auto Differential[[id number]]        Abnormal            Final result               Scan Slide[[id number]]                                       Final result                 Please view results for these tests on the individual orders.    CBC Auto Differential [[id number]]  (Abnormal) Collected: 04/18/24 1051    Specimen: Blood from Arm, Left Updated:  04/18/24 1119     WBC 4.20 10*3/mm3      RBC 2.89 10*6/mm3      Hemoglobin 9.4 g/dL      Hematocrit 30.1 %      .2 fL      MCH 32.5 pg      MCHC 31.2 g/dL      RDW 17.5 %      RDW-SD 67.5 fl      MPV 11.2 fL      Platelets 46 10*3/mm3      Neutrophil % 68.6 %      Lymphocyte % 19.5 %      Monocyte % 10.5 %      Eosinophil % 1.0 %      Basophil % 0.2 %      Immature Grans % 0.2 %      Neutrophils, Absolute 2.88 10*3/mm3      Lymphocytes, Absolute 0.82 10*3/mm3      Monocytes, Absolute 0.44 10*3/mm3      Eosinophils, Absolute 0.04 10*3/mm3      Basophils, Absolute 0.01 10*3/mm3      Immature Grans, Absolute 0.01 10*3/mm3      nRBC 0.0 /100 WBC     Scan Slide [[id number]] Collected: 04/18/24 1051    Specimen: Blood from Arm, Left Updated: 04/18/24 1119     Acanthocytes Slight/1+     Anisocytosis Slight/1+     Poikilocytes Slight/1+     Toxic Granulation Slight/1+     Vacuolated Neutrophils Slight/1+     Platelet Estimate Decreased    Protime-INR [[id number]]  (Normal) Collected: 04/18/24 1051    Specimen: Blood from Arm, Left Updated: 04/18/24 1107     Protime 27.7 Seconds      INR 2.74             Imaging Results (Last 24 Hours)       Procedure Component Value Units Date/Time    XR Chest 1 View [[id number]] Collected: 04/18/24 1147     Updated: 04/18/24 1154    Narrative:      XR CHEST 1 VW    Date of Exam: 4/18/2024 11:24 AM EDT    Indication: hypotension    Comparison: X-ray dated 3/29/2024    Findings:  The trachea is midline. There is cardiomegaly with pulmonary vascular congestion. Diffuse bilateral reticular and airspace opacities suspicious for pulmonary edema. There are surgical changes in the heart and mediastinum. There is a right intrajugular   chest port in place      Impression:      Impression:  Cardiomegaly with pulmonary vascular congestion and pulmonary edema/CHF pattern      Electronically Signed: Diogenes Landaverde MD    4/18/2024 11:52 AM EDT    Workstation ID: [id number]              Assessment &  Plan        This is a 89 y.o. male with:    Active and Resolved Problems  Active Hospital Problems    Diagnosis  POA    **Hypotension [I95.9]  Yes      Resolved Hospital Problems   No resolved problems to display.         Hypotension, run of vtack, elevated troponin  Blood pressure improved at the time of my evaluation  Pending cardiology recommendations    Right leg cellulitis  The patient was started on ceftriaxone in the ED continue    Chronic A-fib  Continue warfarin, pharmacy to dose    ESRD  The patient will get hemodialysis in the hospital    HTN  continue to monitor  continue home medication    COPD not in exacerbation  Oxygen as needed  As needed DuoNebs    Home medication review and verification was not completed at this time.  Please verify home medication        thrombocytopenia, chronic    DVT prophylaxis:  No DVT prophylaxis order currently exists.        The patient desires to be as follows:    CODE STATUS:    Level Of Support Discussed With: Health Care Surrogate  Code Status (Patient has no pulse and is not breathing): CPR (Attempt to Resuscitate)  Medical Interventions (Patient has pulse or is breathing): Full Support        Admission Status:  I believe this patient meets inpatient status.    Expected Length of Stay: 2 to 3 days    PDMP and Medication Dispenses via Sidebar reviewed and consistent with patient reported medications.    I discussed the patient's findings and my recommendations with nursing staff.      Signature:     This document has been electronically signed by Maria Elena Briseno MD on April 18, 2024 13:10 EDT   [facility] Hospitalist Team    Electronically signed by Maria Elena Briseno MD at 04/18/24 1424       Physician Progress Notes (last 24 hours)  Notes from 04/18/24 0856 through 04/19/24 0856   No notes of this type exist for this encounter.       Medical Student Notes (last 24 hours)  Notes from 04/18/24 0856 through 04/19/24 0856   No notes of this type exist for this  encounter.       Physical Therapy Notes (last 24 hours)  Notes from 04/18/24 0856 through 04/19/24 0856   No notes exist for this encounter.       Occupational Therapy Notes (last 24 hours)  Notes from 04/18/24 0856 through 04/19/24 0856   No notes exist for this encounter.

## 2024-04-19 NOTE — PROGRESS NOTES
[facility] MEDICINE SERVICE  DAILY PROGRESS NOTE    NAME: Barry Calhoun Sr.  : 1932  MRN: [id number]      LOS: 1 day     PROVIDER OF SERVICE: Joseph Gutierrez MD    Chief Complaint: Hypotension    Subjective:     Interval History: Patient is doing well today.  Appears to be at his baseline.  Does have some mild cough with congestion.    Review of Systems:   Review of Systems    Objective:     Vital Signs  Temp:  [97 °F (36.1 °C)-97.2 °F (36.2 °C)] 97.1 °F (36.2 °C)  Heart Rate:  [44-62] 62  Resp:  [16-26] 16  BP: ()/(44-91) 112/63   Body mass index is 22.95 kg/m².    Physical Exam  Physical Exam  General Appearance:  Alert, cooperative, no distress, appears stated age  Head:  Normocephalic, without obvious abnormality, atraumatic  Eyes:  PERRL, conjunctiva/corneas clear, EOM's intact, fundi benign, both eyes  Ears:  Normal TM's and external ear canals, both ears  Nose: Nares normal, septum midline, mucosa normal, no drainage or sinus tenderness  Throat: Lips, mucosa, and tongue normal; teeth and gums normal  Neck: Supple, symmetrical, trachea midline, no adenopathy, thyroid: not enlarged, symmetric, no tenderness/mass/nodules, no carotid bruit or JVD  Lungs:   Mild right lung rhonchi  Heart:  Regular rate and rhythm, S1, S2 normal, no murmur, rub or gallop  Abdomen:  Soft, non-tender, bowel sounds active all four quadrants,  no masses, no organomegaly  Extremities: Extremities normal, atraumatic, no cyanosis or edema  Pulses: 2+ and symmetric  Skin: Skin color, texture, turgor normal, no rashes or lesions  Neurologic: Normal      Scheduled Meds   cefTRIAXone, 1,000 mg, Intravenous, Q24H  ipratropium-albuterol, 3 mL, Nebulization, 4x Daily - RT  levothyroxine, 75 mcg, Oral, Q AM  lidocaine-prilocaine, , Topical, Once  [Held by provider] metoprolol succinate XL, 12.5 mg, Oral, Q24H  midodrine, 10 mg, Oral, TID AC  senna-docusate sodium, 2 tablet, Oral, BID  sodium chloride, 10 mL, Intravenous,  Q12H  warfarin, 4 mg, Oral, Daily       PRN Meds     acetaminophen    senna-docusate sodium **AND** polyethylene glycol **AND** bisacodyl **AND** bisacodyl    Calcium Replacement - Follow Nurse / BPA Driven Protocol    LORazepam    Magnesium Standard Dose Replacement - Follow Nurse / BPA Driven Protocol    nitroglycerin    Pharmacy to dose warfarin    Phosphorus Replacement - Follow Nurse / BPA Driven Protocol    Potassium Replacement - Follow Nurse / BPA Driven Protocol    [COMPLETED] Insert Peripheral IV **AND** sodium chloride    sodium chloride    sodium chloride   Infusions  Pharmacy to dose warfarin,           Diagnostic Data    Results from last 7 days   Lab Units 04/19/24  0816 04/18/24  1540 04/18/24  1051   WBC 10*3/mm3 5.66  --  4.20   HEMOGLOBIN g/dL 9.6*  --  9.4*   HEMATOCRIT % 30.2*  --  30.1*   PLATELETS 10*3/mm3 47*  --  46*   GLUCOSE mg/dL 92   < > 89   CREATININE mg/dL 4.63*   < > 4.34*   BUN mg/dL 36*   < > 32*   SODIUM mmol/L 143   < > 140   POTASSIUM mmol/L 3.9   < > 4.0   AST (SGOT) U/L  --   --  36   ALT (SGPT) U/L  --   --  12   ALK PHOS U/L  --   --  82   BILIRUBIN mg/dL  --   --  0.5   ANION GAP mmol/L 10.0   < > 7.0    < > = values in this interval not displayed.       XR Chest 1 View    Result Date: 4/18/2024  Impression: Cardiomegaly with pulmonary vascular congestion and pulmonary edema/CHF pattern Electronically Signed: Diogenes Landaverde MD  4/18/2024 11:52 AM EDT  Workstation ID: [id number]       I reviewed the patient's new clinical results.    Assessment/Plan:     Active and Resolved Problems  Active Hospital Problems    Diagnosis  POA    **Hypotension [I95.9]  Yes      Resolved Hospital Problems   No resolved problems to display.       Acute on chronic hypotension  -Patient has history of chronic hypotension on midodrine  -Slight acute worsening of hypertension may be related to underlying infectious process  -Continue midodrine  -Hold off on any fluid boluses as patient is a dialysis  patient and appears slightly volume overloaded  -Treatment for cellulitis with IV antibiotics    ESRD on HD  -Continue HD per nephrology  -Receiving HD treatment today    Right lower extremity cellulitis  -Patient does have some mild erythema and edema with warm and tender to touch  -Started on IV Rocephin  -Transition to oral antibiotics in the next 24 hours    Chronic A-fib  -Continue warfarin despite thrombocytopenia    Chronic thrombocytopenia  -Platelet count lower than baseline at 47 today  -May be related to underlying infectious process  -No evidence of active bleeding and therefore no indication for platelet transfusion    COPD  -Continue inhalers        DVT prophylaxis:  Medical DVT prophylaxis orders are present.         Code status is   Code Status and Medical Interventions:   Ordered at: 04/18/24 1310     Level Of Support Discussed With:    Health Care Surrogate     Code Status (Patient has no pulse and is not breathing):    CPR (Attempt to Resuscitate)     Medical Interventions (Patient has pulse or is breathing):    Full Support       Plan for disposition: Awaiting palliative care evaluation.  Patient's prognosis is overall guarded.    Time: 30 minutes    Signature: Electronically signed by Joseph Gutierrez MD, 04/19/24, 12:53 EDT.  [facility] Hospitalist Team

## 2024-04-19 NOTE — CASE MANAGEMENT/SOCIAL WORK
Discharge Planning Assessment   Simnoe     Patient Name: Barry Calhoun Sr.  MRN: [id number]  Today's Date: 4/19/2024    Admit Date: 4/18/2024    Plan: Return to [facility], [facility],  No precert or PASRR needed. OP HD [facility] MWF.  Anticipate EMS transport. [facility] hospice following. [facility] referral placed.   Discharge Needs Assessment       Row Name 04/19/24 4333       Living Environment    People in Home significant other;facility resident  Originally from home with SO, but has been at [facility], skilled    Current Living Arrangements home    Potentially Unsafe Housing Conditions none    In the past 12 months has the electric, gas, oil, or water company threatened to shut off services in your home? No    Primary Care Provided by self    Provides Primary Care For no one    Family Caregiver Names Grandson Vernon; S/O Vesta    Quality of Family Relationships helpful;involved;supportive    Able to Return to Prior Arrangements yes       Resource/Environmental Concerns    Resource/Environmental Concerns none    Transportation Concerns none       Transportation Needs    In the past 12 months, has lack of transportation kept you from medical appointments or from getting medications? no    In the past 12 months, has lack of transportation kept you from meetings, work, or from getting things needed for daily living? No       Food Insecurity    Within the past 12 months, you worried that your food would run out before you got the money to buy more. Never true    Within the past 12 months, the food you bought just didn't last and you didn't have money to get more. Never true       Transition Planning    Patient/Family Anticipates Transition to home with family;long-term care facility    Patient/Family Anticipated Services at Transition none    Transportation Anticipated health plan transportation       Discharge Needs Assessment    Readmission Within the Last 30 Days no previous admission in  last 30 days    Current Outpatient/Agency/Support Group outpatient hemodialysis;skilled nursing facility    Concerns to be Addressed discharge planning    Anticipated Changes Related to Illness none    Equipment Needed After Discharge none                   Discharge Plan       Row Name 04/19/24 8174       Plan    Plan Return to [place], skilled,  No precert or PASRR needed. OP HD [facility] M[facility].  Anticipate EMS transport. [facility] hospice following. [facility] referral placed.    Patient/Family in Agreement with Plan yes    Plan Comments Spoke with Alejandrina/ ROJELIO Junior over phone. Patient lives at home with SO at baseline, with family support.  Discharged last admit to [facility], with OP HD [facility].  Patient was at HD when became lethargic, BP dropped, was sent to ER.  Spoke with Facility who requested palliative consult for goals of care discussion.  Discussed with RN and MD, order placed.  Later notified that palliative placed [facility] hospice referral for EOB.   [facility] spoke with family at bedside, they will continue discussion  Monday, as they are wanting to continue HD at this time. Plans to return to [place] at this time, but Vernon is questioning going back to [facility].   Discussed, sent referral in [facility] for .   Agreeable to returning to [place], but would like to verify if could go back to .  Barriers to discharge: IV abx. HD today.  SBP 80's-90's. V tach.  Plt 47.  Nephro and cardio consulted.                  Continued Care and Services - Admitted Since 4/18/2024       Destination       Service Provider Request Status Selected Services Address Phone Fax Patient Preferred    TRANSITIONAL CARE AND REHAB - [facility] Accepted N/A [address] [facility] [place] [address] [phone] --    [facility] [place] Pending - Request Sent N/A [address] [facility] [place] [facility] [place] [address] [phone] --               Home Medical Care       Service Provider Request Status Selected Services Address Phone Fax Patient Preferred    [facility] Pending - Request Sent N/A [address] [place] [address] [phone] -- --                  Selected Continued Care - Prior Encounters Includes continued care and service providers with selected services from prior encounters from 1/19/2024 to 4/19/2024      Discharged on 4/2/2024 Admission date: 3/27/2024 - Discharge disposition: Skilled Nursing Facility (DC - External)      Destination       Service Provider Selected Services Address Phone Fax Patient Preferred    TRANSITIONAL CARE AND REHAB - [facility] [address] [place] [address] [phone] --                      Discharged on 2/17/2024 Admission date: 2/16/2024 - Discharge disposition: Skilled Nursing Facility (DC - External)      Destination       Service Provider Selected Services Address Phone Fax Patient Preferred    [facility] [place] [facility] [address] [facility] [place] [address] [phone] --                          Expected Discharge Date and Time       Expected Discharge Date Expected Discharge Time    Apr 22, 2024            Demographic Summary       Row Name 04/19/24 1741       General Information    Admission Type inpatient    Arrived From emergency department    Required Notices Provided Important Message from Medicare    Referral Source admission list    Reason for Consult discharge planning    Preferred Language English                   Functional Status       Row Name 04/19/24 1741       Functional Status    Usual Activity Tolerance fair    Current Activity Tolerance fair       Functional Status, IADL    Medications completely dependent    Meal Preparation completely dependent    Housekeeping completely dependent    Laundry completely dependent    Shopping completely dependent       Mental Status     General Appearance WDL WDL       Mental Status Summary    Recent Changes in Mental Status/Cognitive Functioning no changes             Carly Elliott RN    Office Phone [phone]  Office Cell [phone]        Case Management Readmission Assessment Note    Case Management Readmission Assessment (all recorded)       Readmission Interview       Row Name 04/19/24 1749             Readmission Indications    Is this hospitalization related to the prior hospital diagnosis? No      What was the reason you were admitted? hypotension and lethargy at OP HD        Row Name 04/19/24 1749             Recommendation for rehospitalization    Did you speak with your physician prior to coming to the hospital No  OP HD called 911 due to lethargy at hypotension at OP HD      Did you seek care elsewhere prior to coming to the hospital? No        Row Name 04/19/24 1749             Follow up appointment    Do you have a PCP? Yes  saw facility provider        [facility] Name 04/19/24 1749             Medications    Did you have newly prescribed medications at discharge? Yes      Did you understand the reasons for your medications at discharge and how to take them? Yes      Did you understand the side effects of your medications? Yes      Are you taking all of you prescribed medications? Yes        [facility] Name 04/19/24 1749             Discharge Instructions    Did you understand your discharge instructions? Yes      Did your family/caregiver hear your instructions? Yes      Were you given a number of someone to call if you had questions or concerns? Yes        Row Name 04/19/24 1749             Index discharge location/services    Where did you go upon discharge? Skilled Nursing Facility        [facility] Name 04/19/24 1749             Discharge Readiness    On a scale of 1-5 (5 being well prepared), how ready were you for discharge 4      Recommendation based on interview Goals of care discussion/advanced care planning   palliative consult, referral to [facility] hospice for EOB

## 2024-04-19 NOTE — PLAN OF CARE
Goal Outcome Evaluation:  Pt had dialysis today. He was given albumin and midodrine for low BP. Palliative care consulted. Pt still in four point restraints. He has been agitated, and yelling throughout the day. He is refusing his PO medications. MD aware. Gave pt geodon and his BP was 80/38, MD ordered 250mL NS bolus. O2 was 84% at room air, put him on 1L NC, now at 97%. Potassium was 3.5, pt won't take PO meds so order switched to IV. Restraints still needed, pt assessed q2hrs. Care ongoing.

## 2024-04-19 NOTE — CONSULTS
Palliative Care Social Work Progress Note    Code Status:full code    Goals of Care: Full Treatment    Narrative: Palliative care  contacted Vernon SERRATO and left a VM. Call placed to patients secondary POMITCHELL Lopez to discuss goals of care. Patient is a resident of [facility] and facility requested hospice education due to patients condition. [place] is agreeable to explanation of benefits from hospice. Referral placed to [place] from [facility] for EOS.      Plan:[facility] hospice referral          Kenyatta Porter

## 2024-04-19 NOTE — NURSING NOTE
Pt is currently not A&O. Pt is either unwilling or unable to answer questions. Pt is uncooperative & being combative, hitting & kicking staff. No family present at bedside.

## 2024-04-19 NOTE — PROGRESS NOTES
"Pharmacy dosing service  Anticoagulant  Warfarin     Subjective:    Barry Calhoun Sr. is a 89 y.o.male being continued on warfarin for Atrial Fibrillation / Flutter.    INR Goal: 2 - 3  Home medication?: warfarin 4 mg PO daily  Bridge Therapy Present?:  No  Interacting Medications Evaluation (New/Present/Discontinued): Ceftriaxone (may increase INR)  Additional Contributing Factors: ESRD, CVA, lymphoma      Assessment/Plan:    INR is therapeutic today. Will continue home regimen.     Continue to monitor and adjust based on INR.         Date 4/18 4/19          INR 2.74 2.85          Dose 4 mg 4 mg              Objective:  [Ht: 175.3 cm (69\"); Wt: 70.5 kg (155 lb 6.8 oz); BMI: Body mass index is 22.95 kg/m².]    Lab Results   Component Value Date    ALBUMIN 3.5 04/19/2024     Lab Results   Component Value Date    INR 2.85 04/19/2024    INR 2.74 04/18/2024    INR 2.29 04/02/2024    PROTIME 28.7 (H) 04/19/2024    PROTIME 27.7 04/18/2024    PROTIME 23.5 04/02/2024     Lab Results   Component Value Date    HGB 9.6 (L) 04/19/2024    HGB 9.4 (L) 04/18/2024    HGB 8.9 (L) 04/02/2024     Lab Results   Component Value Date    HCT 30.2 (L) 04/19/2024    HCT 30.1 (L) 04/18/2024    HCT 28.9 (L) 04/02/2024       Victoriano Burrell [facility]  04/19/24 13:55 EDT       "

## 2024-04-19 NOTE — NURSING NOTE
WOCN note:    89 yr old male admitted 4/18/24 with hypotension. Patient has a hx of ESRD with HD, aftib, CABG, CVA, COPD and dementia. WOCN consult received for pressure injuries to bilateral heels noted upon admission.     Patient presents in 4 point restraints. Patient is calm and being fed breakfast currently. He is receiving hemodialysis at the bedside. He is noted to have a deep tissue pressure injury to the right lateral heel measuring approximately 2x2cm and what appears to be a newly healed or resolving DTI to the left heel.   The heels are floated with pillows and there are foam off-loading boots at the bedside. The tech at the bedside reported that patient was agitated and kept kicking the boots off. He will reapply when the restraints are discontinued. Recommend to continue pressure injury prevention measures and we will follow as needed.

## 2024-04-19 NOTE — PROGRESS NOTES
"            Cardiology Progress Note-EP      Patient Care Team:  Pedro Purvis MD as PCP - General (Family Medicine)  Pedro Purvis MD as PCP - Family Medicine  Pino Mclean MD as Consulting Physician (Nephrology)    PATIENT IDENTIFICATION  Name: Barry Calhoun Sr.  Age: 89 y.o.  Sex: male  :  1932  MRN: [id number]             Length of stay:   LOS: 1 day     REASON FOR FOLLOW-UP  Hypotension  Permanent atrial fibrillation      INTERVAL HISTORY  Patient seen and examined, chart and labs reviewed.  He is sitting up in bed undergoing dialysis.  He is a little more alert today, able to answer questions but difficult to understand at times.  He stated that he \"feels pretty good today\".  Rhythm is sinus with intermittent isolated PVCs.  Blood pressure remains low in the upper 80s to 90s systolic.  He has a very congested and weak cough.  He has had intermittent confusion with sitter at bedside.      SUBJECTIVE    Denies chest discomfort  Denies shortness of breath      REVIEW OF SYSTEMS:  Review of systems could not be obtained due to   very lethargic and difficult to understand    OBJECTIVE   Creatinine 4.63 (4.09)  Platelets 47/hemoglobin 9.6    ASSESSMENT  Hypotension  Permanent atrial fibrillation-controlled  Coagulopathy secondary to Coumadin  Heart failure with preserved ejection fraction secondary to valvular heart disease  Coronary artery disease status post CABG and redo  History of nonsustained VT  End-stage renal disease on hemodialysis      RECOMMENDATIONS  Undergoing dialysis.  Spoke with dialysis nurse-using caution with fluid removal due to hypotension.  Intermittent albumin.  Patient remains very hypotensive, weak with congested cough.  He is on midodrine 10 mg 3 times daily to improve blood pressure.  Wound care following.  Palliative consult placed to define goals.  BB held due to hypotension.  Heart rate currently controlled.  Prognosis appears very " "guarded.          Vital Signs  Visit Vitals  BP (!) 89/49 (BP Location: Left arm, Patient Position: Lying)   Pulse 58   Temp 97.1 °F (36.2 °C) (Axillary)   Resp 24   Ht 175.3 cm (69\")   Wt 70.5 kg (155 lb 6.8 oz)   SpO2 96%   BMI 22.95 kg/m²     Oxygen Therapy  SpO2: 96 %  Pulse Oximetry Type: Continuous  Device (Oxygen Therapy): room air  Flowsheet Rows      Flowsheet Row First Filed Value   Admission Height 175.3 cm (69\") Documented at 04/18/2024 1013   Admission Weight 70 kg (154 lb 5.2 oz) Documented at 04/18/2024 1013          Intake & Output (last 3 days)         04/16 0701  04/17 0700 04/17 0701  04/18 0700 04/18 0701  04/19 0700 04/19 0701  04/20 0700    P.O.   100     IV Piggyback   100     Total Intake(mL/kg)   200 (2.8)     Net   +200             Urine Unmeasured Occurrence   1 x           Lines, Drains & Airways       Active LDAs       Name Placement date Placement time Site Days    Single Lumen Implantable Port Right Chest --  --  Chest  --                           BP (!) 89/49 (BP Location: Left arm, Patient Position: Lying)   Pulse 58   Temp 97.1 °F (36.2 °C) (Axillary)   Resp 24   Ht 175.3 cm (69\")   Wt 70.5 kg (155 lb 6.8 oz)   SpO2 96%   BMI 22.95 kg/m²   Intake/Output last 3 shifts:  I/O last 3 completed shifts:  In: 200 [P.O.:100; IV Piggyback:100]  Out: -   Intake/Output this shift:  No intake/output data recorded.    PHYSICAL EXAM:    General: Awake and more alert today.  Not tachypneic but has very congested cough.  Mumbles frequently but able to answer simple questions.  Head:  Normocephalic, atraumatic, mucous membranes moist  Eyes:  Conjunctivae/corneas clear, EOM's intact     Neck:  Supple,  no adenopathy; no JVD or bruit  Lungs: Very coarse bilaterally  Chest wall: No tenderness  Heart::  Regular rate and rhythm, S1 and S2 normal, no murmur, rub or gallop  Abdomen: Soft, nontender, nondistended, bowel sounds active  Extremities: No cyanosis, clubbing, or edema   Pulses: 2+ and " "symmetric all extremities  Skin:  Right lower extremity and anterior erythema  Neuro/psych: Awake but very weak and groggy, answers simple questions    Scheduled Meds:      cefTRIAXone, 1,000 mg, Intravenous, Q24H  ipratropium-albuterol, 3 mL, Nebulization, 4x Daily - RT  levothyroxine, 75 mcg, Oral, Q AM  lidocaine-prilocaine, , Topical, Once  [Held by provider] metoprolol succinate XL, 12.5 mg, Oral, Q24H  midodrine, 10 mg, Oral, TID AC  senna-docusate sodium, 2 tablet, Oral, BID  sodium chloride, 10 mL, Intravenous, Q12H  warfarin, 4 mg, Oral, Daily        Continuous Infusions:    Pharmacy to dose warfarin,         PRN Meds:      acetaminophen    albumin human    senna-docusate sodium **AND** polyethylene glycol **AND** bisacodyl **AND** bisacodyl    Calcium Replacement - Follow Nurse / BPA Driven Protocol    LORazepam    Magnesium Standard Dose Replacement - Follow Nurse / BPA Driven Protocol    nitroglycerin    Pharmacy to dose warfarin    Phosphorus Replacement - Follow Nurse / BPA Driven Protocol    Potassium Replacement - Follow Nurse / BPA Driven Protocol    [COMPLETED] Insert Peripheral IV **AND** sodium chloride    sodium chloride    sodium chloride        Results Review:     I reviewed the patient's new clinical results.    CBC    Results from last 7 days   Lab Units 04/19/24  0816 04/18/24  1051   WBC 10*3/mm3 5.66 4.20   HEMOGLOBIN g/dL 9.6* 9.4*   PLATELETS 10*3/mm3 47* 46*     Cr Clearance Estimated Creatinine Clearance: 10.4 mL/min (A) (by C-G formula based on SCr of 4.63 mg/dL (H)).  Coag   Results from last 7 days   Lab Units 04/19/24  0816 04/18/24  1051   INR  2.85 2.74     HbA1C No results found for: \"HGBA1C\"  Blood Glucose No results found for: \"POCGLU\"  Infection     CMP   Results from last 7 days   Lab Units 04/19/24  0816 04/18/24  1540 04/18/24  1051   SODIUM mmol/L 143 146* 140   POTASSIUM mmol/L 3.9 4.0 4.0   CHLORIDE mmol/L 103 105 102   CO2 mmol/L 30.0* 29.0 31.0*   BUN mg/dL 36* 32* " "32*   CREATININE mg/dL 4.63* 4.09* 4.34*   GLUCOSE mg/dL 92 90 89   ALBUMIN g/dL 3.5  --  3.3*   BILIRUBIN mg/dL  --   --  0.5   ALK PHOS U/L  --   --  82   AST (SGOT) U/L  --   --  36   ALT (SGPT) U/L  --   --  12     ABG      UA      SHERRY  No results found for: \"POCMETH\", \"POCAMPHET\", \"POCBARBITUR\", \"POCBENZO\", \"POCCOCAINE\", \"POCOPIATES\", \"POCOXYCODO\", \"POCPHENCYC\", \"POCPROPOXY\", \"POCTHC\", \"POCTRICYC\"  Lysis Labs   Results from last 7 days   Lab Units 04/19/24  0816 04/18/24  1540 04/18/24  1051   INR  2.85  --  2.74   HEMOGLOBIN g/dL 9.6*  --  9.4*   PLATELETS 10*3/mm3 47*  --  46*   CREATININE mg/dL 4.63* 4.09* 4.34*     Radiology(recent) XR Chest 1 View    Result Date: 4/18/2024  Impression: Cardiomegaly with pulmonary vascular congestion and pulmonary edema/CHF pattern Electronically Signed: Diogenes Landaverde MD  4/18/2024 11:52 AM EDT  Workstation ID: [id number]       Results from last 7 days   Lab Units 04/18/24  1254   HSTROP T ng/L 226*       X-rays, labs reviewed personally by physician.    ECG/EMG Results (most recent)       Procedure Component Value Units Date/Time    Telemetry Scan [[id number]] Resulted: 04/18/24     Updated: 04/19/24 0632    ECG 12 Lead Other; hypotension [[id number]] Collected: 04/18/24 1042     Updated: 04/19/24 0719     QT Interval 555 ms      QTC Interval 533 ms     Narrative:      HEART RATE= 55  bpm  RR Interval= 1084  ms  [place] Interval=   ms  P Horizontal Axis=   deg  P Front Axis=   deg  QRSD Interval= 131  ms  QT Interval= 555  ms  QTcB= 533  ms  QRS Axis= -36  deg  T Wave Axis= 145  deg  - ABNORMAL ECG -  Atrial flutter  Right bundle branch block  Left anterior fascicular block  Nonspecific T abnormalities, lateral leads  Prolonged QT interval  When compared with ECG of 30-Mar-2024 5:50:01,  Significant change in rhythm: previously atrial fibrillation  Significant repolarization change  Electronically Signed By: He Funez ([facility]) 19-Apr-2024 07:18:54  Date and Time of Study: " 2024-04-18 10:42:48              Medication Review:   I have reviewed the patient's current medication list  Scheduled Meds:cefTRIAXone, 1,000 mg, Intravenous, Q24H  ipratropium-albuterol, 3 mL, Nebulization, 4x Daily - RT  levothyroxine, 75 mcg, Oral, Q AM  lidocaine-prilocaine, , Topical, Once  [Held by provider] metoprolol succinate XL, 12.5 mg, Oral, Q24H  midodrine, 10 mg, Oral, TID AC  senna-docusate sodium, 2 tablet, Oral, BID  sodium chloride, 10 mL, Intravenous, Q12H  warfarin, 4 mg, Oral, Daily      Continuous Infusions:Pharmacy to dose warfarin,       PRN Meds:.  acetaminophen    albumin human    senna-docusate sodium **AND** polyethylene glycol **AND** bisacodyl **AND** bisacodyl    Calcium Replacement - Follow Nurse / BPA Driven Protocol    LORazepam    Magnesium Standard Dose Replacement - Follow Nurse / BPA Driven Protocol    nitroglycerin    Pharmacy to dose warfarin    Phosphorus Replacement - Follow Nurse / BPA Driven Protocol    Potassium Replacement - Follow Nurse / BPA Driven Protocol    [COMPLETED] Insert Peripheral IV **AND** sodium chloride    sodium chloride    sodium chloride    Imaging:  Imaging Results (Last 72 Hours)       Procedure Component Value Units Date/Time    XR Chest 1 View [[id number]] Collected: 04/18/24 1147     Updated: 04/18/24 1154    Narrative:      XR CHEST 1 VW    Date of Exam: 4/18/2024 11:24 AM EDT    Indication: hypotension    Comparison: X-ray dated 3/29/2024    Findings:  The trachea is midline. There is cardiomegaly with pulmonary vascular congestion. Diffuse bilateral reticular and airspace opacities suspicious for pulmonary edema. There are surgical changes in the heart and mediastinum. There is a right intrajugular   chest port in place      Impression:      Impression:  Cardiomegaly with pulmonary vascular congestion and pulmonary edema/CHF pattern      Electronically Signed: Diogenes Landaverde MD    4/18/2024 11:52 AM EDT    Workstation ID: [id number]       "        MAISHA Lipscomb  04/19/24  10:50 EDT      EMR Dragon/Transcription:   \"Dictated utilizing Dragon dictation\".        Electronically signed by MAISHA Lipscomb, 04/19/24, 10:23 AM EDT.  Copied text in this note has been reviewed by me and is accurate as of 04/19/24.    "

## 2024-04-19 NOTE — PROGRESS NOTES
"NEPHROLOGY PROGRESS NOTE------[facility]    [facility]  [phone]  Derrick Henry MD      Patient Care Team:  Pedro Purvis MD as PCP - General (Family Medicine)  Pedro Purvis MD as PCP - Family Medicine  Pino Mclean MD as Consulting Physician (Nephrology)      Provider:  Derrick Henry MD  Patient Name: Barry Calhoun Sr.  :  1932    SUBJECTIVE:  Follow-up ESRD  No chest pain.  Somewhat confused  Seen and examined on dialysis  Blood pressure soft in the 90s    Medication:  cefTRIAXone, 1,000 mg, Intravenous, Q24H  ipratropium-albuterol, 3 mL, Nebulization, 4x Daily - RT  levothyroxine, 75 mcg, Oral, Q AM  lidocaine-prilocaine, , Topical, Once  [Held by provider] metoprolol succinate XL, 12.5 mg, Oral, Q24H  midodrine, 10 mg, Oral, TID AC  senna-docusate sodium, 2 tablet, Oral, BID  sodium chloride, 10 mL, Intravenous, Q12H  warfarin, 4 mg, Oral, Daily      Pharmacy to dose warfarin,         OBJECTIVE    Vital Sign Min/Max for last 24 hours  Temp  Min: 97 °F (36.1 °C)  Max: 97.2 °F (36.2 °C)   BP  Min: 64/44  Max: 124/91   Pulse  Min: 44  Max: 110   Resp  Min: 14  Max: 23   SpO2  Min: 79 %  Max: 100 %   No data recorded   Weight  Min: 70 kg (154 lb 5.2 oz)  Max: 70.5 kg (155 lb 6.8 oz)     Flowsheet Rows      Flowsheet Row First Filed Value   Admission Height 175.3 cm (69\") Documented at 2024 1013   Admission Weight 70 kg (154 lb 5.2 oz) Documented at 2024 1013            No intake/output data recorded.  I/O last 3 completed shifts:  In: 200 [P.O.:100; IV Piggyback:100]  Out: -     Physical Exam:  General Appearance: No acute distress  Head: normocephalic, without obvious abnormality and atraumatic  Eyes: conjunctivae and sclerae normal and no icterus  Neck: supple and no JVD  Lungs: clear to auscultation and respirations regular  Heart: regular rhythm & normal rate and normal S1, S2  Chest: Wall no " "abnormalities observed  Abdomen: normal bowel sounds and soft, nontender  Extremities: moves extremities well, trace edema  Skin: Lower extremity erythema  Neurologic: Alert,    Labs:    WBC WBC   Date Value Ref Range Status   04/18/2024 4.20 3.40 - 10.80 10*3/mm3 Final      HGB Hemoglobin   Date Value Ref Range Status   04/18/2024 9.4 (L) 13.0 - 17.7 g/dL Final      HCT Hematocrit   Date Value Ref Range Status   04/18/2024 30.1 (L) 37.5 - 51.0 % Final      Platelets No results found for: \"LABPLAT\"   MCV MCV   Date Value Ref Range Status   04/18/2024 104.2 (H) 79.0 - 97.0 fL Final          Sodium Sodium   Date Value Ref Range Status   04/18/2024 146 (H) 136 - 145 mmol/L Final   04/18/2024 140 136 - 145 mmol/L Final      Potassium Potassium   Date Value Ref Range Status   04/18/2024 4.0 3.5 - 5.2 mmol/L Final   04/18/2024 4.0 3.5 - 5.2 mmol/L Final     Comment:     Specimen hemolyzed.  Result may be falsely elevated.      Chloride Chloride   Date Value Ref Range Status   04/18/2024 105 98 - 107 mmol/L Final   04/18/2024 102 98 - 107 mmol/L Final      CO2 CO2   Date Value Ref Range Status   04/18/2024 29.0 22.0 - 29.0 mmol/L Final   04/18/2024 31.0 (H) 22.0 - 29.0 mmol/L Final      BUN BUN   Date Value Ref Range Status   04/18/2024 32 (H) 8 - 23 mg/dL Final   04/18/2024 32 (H) 8 - 23 mg/dL Final      Creatinine Creatinine   Date Value Ref Range Status   04/18/2024 4.09 (H) 0.76 - 1.27 mg/dL Final   04/18/2024 4.34 (H) 0.76 - 1.27 mg/dL Final      Calcium Calcium   Date Value Ref Range Status   04/18/2024 9.1 8.2 - 9.6 mg/dL Final   04/18/2024 8.9 8.2 - 9.6 mg/dL Final      PO4 No components found for: \"PO4\"   Albumin Albumin   Date Value Ref Range Status   04/18/2024 3.3 (L) 3.5 - 5.2 g/dL Final      Magnesium Magnesium   Date Value Ref Range Status   04/18/2024 2.3 1.7 - 2.3 mg/dL Final      Uric Acid No components found for: \"URIC ACID\"     Imaging Results (Last 72 Hours)       Procedure Component Value Units " Date/Time    XR Chest 1 View [[id number]] Collected: 04/18/24 1147     Updated: 04/18/24 1154    Narrative:      XR CHEST 1 VW    Date of Exam: 4/18/2024 11:24 AM EDT    Indication: hypotension    Comparison: X-ray dated 3/29/2024    Findings:  The trachea is midline. There is cardiomegaly with pulmonary vascular congestion. Diffuse bilateral reticular and airspace opacities suspicious for pulmonary edema. There are surgical changes in the heart and mediastinum. There is a right intrajugular   chest port in place      Impression:      Impression:  Cardiomegaly with pulmonary vascular congestion and pulmonary edema/CHF pattern      Electronically Signed: Diogenes Landaverde MD    4/18/2024 11:52 AM EDT    Workstation ID: [id number]            Results for orders placed during the hospital encounter of 04/18/24    XR Chest 1 View    Narrative  XR CHEST 1 VW    Date of Exam: 4/18/2024 11:24 AM EDT    Indication: hypotension    Comparison: X-ray dated 3/29/2024    Findings:  The trachea is midline. There is cardiomegaly with pulmonary vascular congestion. Diffuse bilateral reticular and airspace opacities suspicious for pulmonary edema. There are surgical changes in the heart and mediastinum. There is a right intrajugular  chest port in place    Impression  Impression:  Cardiomegaly with pulmonary vascular congestion and pulmonary edema/CHF pattern      Electronically Signed: Diogenes Landaverde MD  4/18/2024 11:52 AM EDT  Workstation ID: [id number]      Results for orders placed during the hospital encounter of 03/27/24    XR Chest 1 View    Narrative  XR CHEST 1 VW    Date of Exam: 3/29/2024 4:37 PM EDT    Indication: wheezing    Comparison: AP portable chest 2/17/2014.    Findings:  Stable right hemidiaphragm elevation. Left midlung and right infrahilar airspace disease is redemonstrated and is not thought to be significantly changed. There is stable moderate generalized cardiac enlargement with median sternotomy and CABG.  Loop  recorder projects over the left lower mediastinum. Right chest wall jeremías catheter tip terminates at the lower SVC level. Right subclavian stent is noted. No acute osseous abnormality is identified.    Impression  Impression:    1. Left midlung and right infrahilar airspace disease appears unchanged from 2/28/2024. No new airspace disease is identified.  2. Stable cardiomegaly.      Electronically Signed: Mary Page MD  3/29/2024 4:40 PM EDT  Workstation ID: [id number]      Results for orders placed during the hospital encounter of 02/16/24    XR Chest 1 View    Narrative  XR CHEST 1 VW    Date of Exam: 2/17/2024 9:55 AM EST    Indication: hx of CHF, increased BNP    Comparison: 2/11/2024    Findings:  Patient is status post median sternotomy and CABG. Cardiac silhouette is enlarged. Vague perihilar opacities may represent edema or infiltrates. No significant pleural effusion or pneumothorax is seen. No acute osseous lesion is seen. Right chest wall  Port-A-Cath terminates overlying the superior cavoatrial junction. Loop recorder projects over the left thorax. Metallic stent projects over the left upper thorax.    Impression  Impression:  Cardiomegaly with vague bilateral perihilar opacities which may represent edema or infiltrates.    Electronically Signed: Jose Be MD  2/17/2024 10:11 AM EST  Workstation ID: [id number]      Results for orders placed during the hospital encounter of 04/18/24    Duplex venous lower extremity right    Interpretation Summary    Acute right lower extremity superficial thrombophlebitis noted in the small saphenous.    All other right sided veins appeared normal.    Incidentally identified occluded right SFA.    The quality of the study is limited due to an uncooperative patient and patient positioning.        ASSESSMENT / PLAN      Hypotension    ESRD--HD TTS. Followed by Dr Mclean  Chronic hypotension--on midodrine  A fib--anti  coagulated  COPD  Lymphoma  Thrombocytopenia  ? RLE cellulitis  V tach run--cards consulted.    HD today  ATBx per primary team.  Midodrine for hypotension        Derrick Henry MD  [facility]  [phone]  04/19/24  07:23 EDT

## 2024-04-19 NOTE — PLAN OF CARE
Goal Outcome Evaluation:   Patient was brought up from the ED by this nurse and nurse aid. Patient is here for hypotension. Patient has dementia  and only alert to himself. Patient is on room air. Patient became very combative when brought up to floor. Patient started trying to kick this nurse along with nurse aid. Patient also tried to hit this nurse and nurse aid. Patient starting pulling on wires and IV line and yelling and screaming. Patient was placed in 4 point restraints. Since patient was placed in restraints patient has been come more relaxed and not aggressive or restless. Patient POA is aware of placement of restraints.

## 2024-04-19 NOTE — SIGNIFICANT NOTE
Pt is combative, uncooperative, and in restraints. Not appropriate for PT today. Will f/u tomorrow.

## 2024-04-20 ENCOUNTER — APPOINTMENT (OUTPATIENT)
Dept: GENERAL RADIOLOGY | Facility: HOSPITAL | Age: 89
End: 2024-04-20
Payer: MEDICARE

## 2024-04-20 LAB
ALBUMIN SERPL-MCNC: 4 G/DL (ref 3.5–5.2)
ANION GAP SERPL CALCULATED.3IONS-SCNC: 11 MMOL/L (ref 5–15)
ANISOCYTOSIS BLD QL: NORMAL
BASOPHILS # BLD AUTO: 0.01 10*3/MM3 (ref 0–0.2)
BASOPHILS NFR BLD AUTO: 0.2 % (ref 0–1.5)
BUN SERPL-MCNC: 20 MG/DL (ref 8–23)
BUN/CREAT SERPL: 6.8 (ref 7–25)
CALCIUM SPEC-SCNC: 8.8 MG/DL (ref 8.2–9.6)
CHLORIDE SERPL-SCNC: 100 MMOL/L (ref 98–107)
CO2 SERPL-SCNC: 30 MMOL/L (ref 22–29)
CREAT SERPL-MCNC: 2.95 MG/DL (ref 0.76–1.27)
DEPRECATED RDW RBC AUTO: 65.1 FL (ref 37–54)
EGFRCR SERPLBLD CKD-EPI 2021: 19.4 ML/MIN/1.73
ELLIPTOCYTES BLD QL SMEAR: NORMAL
EOSINOPHIL # BLD AUTO: 0.03 10*3/MM3 (ref 0–0.4)
EOSINOPHIL NFR BLD AUTO: 0.7 % (ref 0.3–6.2)
ERYTHROCYTE [DISTWIDTH] IN BLOOD BY AUTOMATED COUNT: 17.4 % (ref 12.3–15.4)
GLUCOSE BLDC GLUCOMTR-MCNC: 148 MG/DL (ref 70–105)
GLUCOSE BLDC GLUCOMTR-MCNC: 49 MG/DL (ref 70–105)
GLUCOSE BLDC GLUCOMTR-MCNC: 58 MG/DL (ref 70–105)
GLUCOSE BLDC GLUCOMTR-MCNC: 75 MG/DL (ref 70–105)
GLUCOSE BLDC GLUCOMTR-MCNC: 79 MG/DL (ref 70–105)
GLUCOSE BLDC GLUCOMTR-MCNC: 80 MG/DL (ref 70–105)
GLUCOSE BLDC GLUCOMTR-MCNC: 84 MG/DL (ref 70–105)
GLUCOSE SERPL-MCNC: 55 MG/DL (ref 65–99)
HCT VFR BLD AUTO: 28.5 % (ref 37.5–51)
HGB BLD-MCNC: 8.9 G/DL (ref 13–17.7)
IMM GRANULOCYTES # BLD AUTO: 0.01 10*3/MM3 (ref 0–0.05)
IMM GRANULOCYTES NFR BLD AUTO: 0.2 % (ref 0–0.5)
INR PPP: 2.86 (ref 2–3)
LARGE PLATELETS: NORMAL
LYMPHOCYTES # BLD AUTO: 0.76 10*3/MM3 (ref 0.7–3.1)
LYMPHOCYTES NFR BLD AUTO: 16.7 % (ref 19.6–45.3)
MAGNESIUM SERPL-MCNC: 2.2 MG/DL (ref 1.7–2.3)
MCH RBC QN AUTO: 32 PG (ref 26.6–33)
MCHC RBC AUTO-ENTMCNC: 31.2 G/DL (ref 31.5–35.7)
MCV RBC AUTO: 102.5 FL (ref 79–97)
MONOCYTES # BLD AUTO: 0.34 10*3/MM3 (ref 0.1–0.9)
MONOCYTES NFR BLD AUTO: 7.5 % (ref 5–12)
NEUTROPHILS NFR BLD AUTO: 3.4 10*3/MM3 (ref 1.7–7)
NEUTROPHILS NFR BLD AUTO: 74.7 % (ref 42.7–76)
NRBC BLD AUTO-RTO: 0.4 /100 WBC (ref 0–0.2)
PHOSPHATE SERPL-MCNC: 3.1 MG/DL (ref 2.5–4.5)
PLATELET # BLD AUTO: 47 10*3/MM3 (ref 140–450)
PMV BLD AUTO: 12.2 FL (ref 6–12)
POIKILOCYTOSIS BLD QL SMEAR: NORMAL
POTASSIUM SERPL-SCNC: 4.7 MMOL/L (ref 3.5–5.2)
PROTHROMBIN TIME: 28.8 SECONDS (ref 19.4–28.5)
RBC # BLD AUTO: 2.78 10*6/MM3 (ref 4.14–5.8)
SMALL PLATELETS BLD QL SMEAR: NORMAL
SODIUM SERPL-SCNC: 141 MMOL/L (ref 136–145)
STOMATOCYTES BLD QL SMEAR: NORMAL
WBC MORPH BLD: NORMAL
WBC NRBC COR # BLD AUTO: 4.55 10*3/MM3 (ref 3.4–10.8)

## 2024-04-20 PROCEDURE — 85007 BL SMEAR W/DIFF WBC COUNT: CPT | Performed by: INTERNAL MEDICINE

## 2024-04-20 PROCEDURE — 25010000002 GLYCOPYRROLATE 0.2 MG/ML SOLUTION: Performed by: INTERNAL MEDICINE

## 2024-04-20 PROCEDURE — 85610 PROTHROMBIN TIME: CPT | Performed by: INTERNAL MEDICINE

## 2024-04-20 PROCEDURE — 94664 DEMO&/EVAL PT USE INHALER: CPT

## 2024-04-20 PROCEDURE — 25010000002 PIPERACILLIN SOD-TAZOBACTAM PER 1 G: Performed by: NURSE PRACTITIONER

## 2024-04-20 PROCEDURE — 94799 UNLISTED PULMONARY SVC/PX: CPT

## 2024-04-20 PROCEDURE — P9047 ALBUMIN (HUMAN), 25%, 50ML: HCPCS | Performed by: NURSE PRACTITIONER

## 2024-04-20 PROCEDURE — 25810000003 SODIUM CHLORIDE 0.9 % SOLUTION: Performed by: INTERNAL MEDICINE

## 2024-04-20 PROCEDURE — 71045 X-RAY EXAM CHEST 1 VIEW: CPT

## 2024-04-20 PROCEDURE — 25010000002 ALBUMIN HUMAN 25% PER 50 ML: Performed by: NURSE PRACTITIONER

## 2024-04-20 PROCEDURE — 94761 N-INVAS EAR/PLS OXIMETRY MLT: CPT

## 2024-04-20 PROCEDURE — 83735 ASSAY OF MAGNESIUM: CPT | Performed by: INTERNAL MEDICINE

## 2024-04-20 PROCEDURE — 85025 COMPLETE CBC W/AUTO DIFF WBC: CPT | Performed by: INTERNAL MEDICINE

## 2024-04-20 PROCEDURE — 99232 SBSQ HOSP IP/OBS MODERATE 35: CPT | Performed by: INTERNAL MEDICINE

## 2024-04-20 PROCEDURE — 82948 REAGENT STRIP/BLOOD GLUCOSE: CPT

## 2024-04-20 PROCEDURE — 80069 RENAL FUNCTION PANEL: CPT | Performed by: INTERNAL MEDICINE

## 2024-04-20 PROCEDURE — 25010000002 LORAZEPAM PER 2 MG: Performed by: NURSE PRACTITIONER

## 2024-04-20 PROCEDURE — 0 DEXTROSE 5 % SOLUTION: Performed by: INTERNAL MEDICINE

## 2024-04-20 RX ORDER — LIDOCAINE AND PRILOCAINE 25; 25 MG/G; MG/G
CREAM TOPICAL ONCE
Status: DISCONTINUED | OUTPATIENT
Start: 2024-04-20 | End: 2024-04-23

## 2024-04-20 RX ORDER — ALBUMIN (HUMAN) 12.5 G/50ML
25 SOLUTION INTRAVENOUS ONCE
Status: COMPLETED | OUTPATIENT
Start: 2024-04-20 | End: 2024-04-20

## 2024-04-20 RX ORDER — MIDODRINE HYDROCHLORIDE 5 MG/1
5 TABLET ORAL
Status: DISCONTINUED | OUTPATIENT
Start: 2024-04-20 | End: 2024-04-20

## 2024-04-20 RX ORDER — DEXTROSE MONOHYDRATE 25 G/50ML
50 INJECTION, SOLUTION INTRAVENOUS
Status: DISCONTINUED | OUTPATIENT
Start: 2024-04-20 | End: 2024-04-25 | Stop reason: HOSPADM

## 2024-04-20 RX ORDER — MIDODRINE HYDROCHLORIDE 5 MG/1
10 TABLET ORAL
Status: DISCONTINUED | OUTPATIENT
Start: 2024-04-20 | End: 2024-04-25 | Stop reason: HOSPADM

## 2024-04-20 RX ORDER — ALBUMIN (HUMAN) 12.5 G/50ML
12.5 SOLUTION INTRAVENOUS AS NEEDED
Status: DISPENSED | OUTPATIENT
Start: 2024-04-20 | End: 2024-04-21

## 2024-04-20 RX ORDER — LORAZEPAM 2 MG/ML
0.5 INJECTION INTRAMUSCULAR EVERY 8 HOURS PRN
Status: DISPENSED | OUTPATIENT
Start: 2024-04-20 | End: 2024-04-21

## 2024-04-20 RX ORDER — GLYCOPYRROLATE 0.2 MG/ML
0.2 INJECTION INTRAMUSCULAR; INTRAVENOUS
Status: DISCONTINUED | OUTPATIENT
Start: 2024-04-20 | End: 2024-04-25 | Stop reason: HOSPADM

## 2024-04-20 RX ORDER — GLYCOPYRROLATE 1 MG/1
1 TABLET ORAL 3 TIMES DAILY
Status: DISCONTINUED | OUTPATIENT
Start: 2024-04-20 | End: 2024-04-20

## 2024-04-20 RX ORDER — DEXTROSE MONOHYDRATE 50 MG/ML
50 INJECTION, SOLUTION INTRAVENOUS CONTINUOUS
Status: DISPENSED | OUTPATIENT
Start: 2024-04-20 | End: 2024-04-21

## 2024-04-20 RX ADMIN — DEXTROSE MONOHYDRATE 50 ML: 25 INJECTION, SOLUTION INTRAVENOUS at 12:36

## 2024-04-20 RX ADMIN — GLYCOPYRROLATE 0.2 MG: 0.2 INJECTION INTRAMUSCULAR; INTRAVENOUS at 17:20

## 2024-04-20 RX ADMIN — SODIUM CHLORIDE 500 ML: 0.9 INJECTION, SOLUTION INTRAVENOUS at 15:30

## 2024-04-20 RX ADMIN — IPRATROPIUM BROMIDE AND ALBUTEROL SULFATE 3 ML: .5; 3 SOLUTION RESPIRATORY (INHALATION) at 14:48

## 2024-04-20 RX ADMIN — DEXTROSE MONOHYDRATE 50 ML: 25 INJECTION, SOLUTION INTRAVENOUS at 05:27

## 2024-04-20 RX ADMIN — IPRATROPIUM BROMIDE AND ALBUTEROL SULFATE 3 ML: .5; 3 SOLUTION RESPIRATORY (INHALATION) at 11:46

## 2024-04-20 RX ADMIN — GLYCOPYRROLATE 0.2 MG: 0.2 INJECTION INTRAMUSCULAR; INTRAVENOUS at 11:35

## 2024-04-20 RX ADMIN — Medication 10 ML: at 20:05

## 2024-04-20 RX ADMIN — DEXTROSE MONOHYDRATE 50 ML/HR: 50 INJECTION, SOLUTION INTRAVENOUS at 13:00

## 2024-04-20 RX ADMIN — PIPERACILLIN AND TAZOBACTAM 3.38 G: 3; .375 INJECTION, POWDER, FOR SOLUTION INTRAVENOUS at 06:12

## 2024-04-20 RX ADMIN — PIPERACILLIN AND TAZOBACTAM 3.38 G: 3; .375 INJECTION, POWDER, FOR SOLUTION INTRAVENOUS at 19:58

## 2024-04-20 RX ADMIN — LORAZEPAM 0.5 MG: 2 INJECTION INTRAMUSCULAR; INTRAVENOUS at 13:15

## 2024-04-20 RX ADMIN — ALBUMIN (HUMAN) 25 G: 0.25 INJECTION, SOLUTION INTRAVENOUS at 02:15

## 2024-04-20 RX ADMIN — GLYCOPYRROLATE 0.2 MG: 0.2 INJECTION INTRAMUSCULAR; INTRAVENOUS at 13:42

## 2024-04-20 RX ADMIN — IPRATROPIUM BROMIDE AND ALBUTEROL SULFATE 3 ML: .5; 3 SOLUTION RESPIRATORY (INHALATION) at 06:47

## 2024-04-20 RX ADMIN — IPRATROPIUM BROMIDE AND ALBUTEROL SULFATE 3 ML: .5; 3 SOLUTION RESPIRATORY (INHALATION) at 18:28

## 2024-04-20 RX ADMIN — LORAZEPAM 0.5 MG: 2 INJECTION INTRAMUSCULAR; INTRAVENOUS at 06:39

## 2024-04-20 RX ADMIN — SODIUM CHLORIDE 500 ML: 9 INJECTION, SOLUTION INTRAVENOUS at 13:00

## 2024-04-20 NOTE — PROGRESS NOTES
"Pharmacy dosing service  Anticoagulant  Warfarin     Subjective:    Barry Calhoun Sr. is a 89 y.o.male being continued on warfarin for Atrial Fibrillation / Flutter.    INR Goal: 2 - 3  Home medication?: warfarin 4 mg PO daily  Bridge Therapy Present?:  No  Interacting Medications Evaluation (New/Present/Discontinued): Zosyn (may increase INR)  Additional Contributing Factors: ESRD, CVA, lymphoma      Assessment/Plan:    INR is therapeutic today. Will continue home regimen.     Continue to monitor and adjust based on INR.         Date 4/18 4/19 4/20         INR 2.74 2.85 2.86         Dose 4 mg 4 mg 4 mg             Objective:  [Ht: 175.3 cm (69\"); Wt: 68.1 kg (150 lb 2.1 oz); BMI: Body mass index is 22.17 kg/m².]    Lab Results   Component Value Date    ALBUMIN 4.0 04/20/2024     Lab Results   Component Value Date    INR 2.86 04/20/2024    INR 2.85 04/19/2024    INR 2.74 04/18/2024    PROTIME 28.8 (H) 04/20/2024    PROTIME 28.7 (H) 04/19/2024    PROTIME 27.7 04/18/2024     Lab Results   Component Value Date    HGB 8.9 (L) 04/20/2024    HGB 9.6 (L) 04/19/2024    HGB 9.4 (L) 04/18/2024     Lab Results   Component Value Date    HCT 28.5 (L) 04/20/2024    HCT 30.2 (L) 04/19/2024    HCT 30.1 (L) 04/18/2024       Victoriano Burrell [facility]  04/20/24 08:17 EDT         "

## 2024-04-20 NOTE — PROGRESS NOTES
[facility] MEDICINE SERVICE  DAILY PROGRESS NOTE    NAME: Barry Calhoun Sr.  : 1932  MRN: [id number]      LOS: 2 days     PROVIDER OF SERVICE: Suhail Zacarias MD    Chief Complaint: Hypotension    Subjective:       Subjective       Chief Complaint: Hypertension     History of Present Illness: Barry Calhoun Sr. is a 89 y.o. male with a CMH of ESRD, A-fib on Coumadin, CABG, CVA, hypothrombocytopenia, COPD, lymphoma, gout, dementia who presented to [facility] on 2024 with hypotension.  The patient arrived from hemodialysis due to hypotension.  The patient is nonverbal and history could not be taken.  The patient was evaluated by cardiology.  Cardiology and nephrology will decide on further management.  The patient will get dialysis in the hospital.  The patient was found to have warm right leg suggestive of cellulitis      Interval History:  24- Patient is doing well today.  Appears to be at his baseline.  Does have some mild cough with congestion.  24 seen in bed DON VARELA RN BG 55, no able to take po, family had been refusing midodrine.  Patient fluid bolus 500 cc  Start D5 50 cc an hour.  BP 73/31, monitor BNP last 54,423.       Review of Systems  Ten point ROS: reviewed negative, unless as noted in HPI.  Objective:     Vital Signs  Temp:  [93.8 °F (34.3 °C)-100.9 °F (38.3 °C)] 98.2 °F (36.8 °C)  Heart Rate:  [63-84] 82  Resp:  [15-24] 19  BP: ()/(29-77) 73/31  Flow (L/min):  [1] 1   Body mass index is 22.17 kg/m².    Physical Exam  General Appearance:  Alert, cooperative, no distress, appears stated age  Head:  Normocephalic, without obvious abnormality, atraumatic  Eyes:  PERRL, conjunctiva/corneas clear, EOM's intact, fundi benign, both eyes  Ears:  Normal TM's and external ear canals, both ears  Nose: Nares normal, septum midline, mucosa normal, no drainage or sinus tenderness  Throat: Lips, mucosa, and tongue normal; teeth and gums normal  Neck: Supple,  symmetrical, trachea midline, no adenopathy, thyroid: not enlarged, symmetric, no tenderness/mass/nodules, no carotid bruit or JVD  Lungs:   Mild right lung rhonchi  Heart:  Regular rate and rhythm, S1, S2 normal, no murmur, rub or gallop  Abdomen:  Soft, non-tender, bowel sounds active all four quadrants,  no masses, no organomegaly  Extremities: Extremities normal, atraumatic, no cyanosis or edema  Pulses: 2+ and symmetric  Skin: Skin color, texture, turgor normal, no rashes or lesions  Neurologic: Normal      Scheduled Meds   ipratropium-albuterol, 3 mL, Nebulization, 4x Daily - RT  levothyroxine, 75 mcg, Oral, Q AM  lidocaine-prilocaine, , Topical, Once  [Held by provider] metoprolol succinate XL, 12.5 mg, Oral, Q24H  midodrine, 10 mg, Oral, TID AC  piperacillin-tazobactam, 3.375 g, Intravenous, Q12H  potassium chloride, 40 mEq, Oral, Once  QUEtiapine, 25 mg, Oral, Nightly  senna-docusate sodium, 2 tablet, Oral, BID  sodium chloride, 500 mL, Intravenous, Once  sodium chloride, 10 mL, Intravenous, Q12H  warfarin, 4 mg, Oral, Daily       PRN Meds     acetaminophen    senna-docusate sodium **AND** polyethylene glycol **AND** bisacodyl **AND** bisacodyl    Calcium Replacement - Follow Nurse / BPA Driven Protocol    dextrose    glycopyrrolate    LORazepam    Magnesium Standard Dose Replacement - Follow Nurse / BPA Driven Protocol    nitroglycerin    Pharmacy to dose warfarin    Phosphorus Replacement - Follow Nurse / BPA Driven Protocol    Potassium Replacement - Follow Nurse / BPA Driven Protocol    [COMPLETED] Insert Peripheral IV **AND** sodium chloride    sodium chloride    sodium chloride   Infusions  dextrose, 50 mL/hr  Pharmacy to dose warfarin,           Diagnostic Data    Results from last 7 days   Lab Units 04/20/24  0431 04/18/24  1540 04/18/24  1051   WBC 10*3/mm3 4.55   < > 4.20   HEMOGLOBIN g/dL 8.9*   < > 9.4*   HEMATOCRIT % 28.5*   < > 30.1*   PLATELETS 10*3/mm3 47*   < > 46*   GLUCOSE mg/dL 55*   < >  89   CREATININE mg/dL 2.95*   < > 4.34*   BUN mg/dL 20   < > 32*   SODIUM mmol/L 141   < > 140   POTASSIUM mmol/L 4.7   < > 4.0   AST (SGOT) U/L  --   --  36   ALT (SGPT) U/L  --   --  12   ALK PHOS U/L  --   --  82   BILIRUBIN mg/dL  --   --  0.5   ANION GAP mmol/L 11.0   < > 7.0    < > = values in this interval not displayed.       XR Chest 1 View    Result Date: 4/20/2024  Impression: Stable mild patchy airspace disease present within the left midlung which may be related to atelectasis versus pneumonia. Stable right basilar atelectasis and elevation of the right hemidiaphragm. Stable cardiomegaly. Electronically Signed: Deepa Healy MD  4/20/2024 5:11 AM EDT  Workstation ID: [id number]       I reviewed the patient's new clinical results.    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       Acute on chronic hypotension  -Patient has history of chronic hypotension on midodrine  -Slight acute worsening of hypertension may be related to underlying infectious process  -Refusing midodrine  -x1 fluid boluses   -patient is a dialysis patient and appears slightly volume overloaded  -Treatment for cellulitis with IV antibiotics    ESRD on HD  -Continue HD per nephrology  -Receiving HD treatment  today    Right lower extremity cellulitis  -Patient does have some mild erythema and edema with warm and tender to touch  -Started on IV Rocephin  -Transition to oral antibiotics in the next 24 hours    Chronic A-fib  -Continue warfarin despite thrombocytopenia    Chronic thrombocytopenia  -Platelet count lower than baseline at 47 today  -May be related to underlying infectious process  -No evidence of active bleeding and therefore no indication for platelet transfusion    COPD  -Continue inhalers      DVT prophylaxis:  Medical DVT prophylaxis orders are present.       Code status is   Code Status and Medical Interventions:   Ordered at: 04/20/24 0039     Medical Intervention Limits:    NO intubation (DNI)     Level Of Support Discussed With:    Next of Kin (If No Surrogate)     Code Status (Patient has no pulse and is not breathing):    No CPR (Do Not Attempt to Resuscitate)     Medical Interventions (Patient has pulse or is breathing):    Limited Support       Plan for disposition: Awaiting palliative care evaluation.  Patient's prognosis is overall guarded.    Time: 30 minutes    Signature: Electronically signed by Suhail Zacarias MD, 04/20/24, 12:42 EDT.  [facility]

## 2024-04-20 NOTE — PLAN OF CARE
Notified of change in status    Nurse called to report pt core body temp 94.5. Warming blanket placed. Pt having multiple runs of V-tach with the greatest being a 30 beat run.BP also running low. Was informed that pt had received Geodon around 4pm for agitation. This evening a sitter is at bedside. Restraints have been removed. Pt only opens eye and and will moan but otherwise not responsive.    Call was placed to patients grandchoco/POA ( [facility]/[place]) as Palliative care and chart showed pt as full code and wanted to discuss plan of care.    Rosendo reports that in the past, pt has never tolerated Midodrine. Side affects have been confusion and agitation and medication had been removed at previous facility.  Was asked to discontinue midodrine.    I discussed with decrease in responsiveness, hypothermia, irregular heart rate and low BP that could be sign of the body shutting down.   Rosendo/POA reported that code status should have been changed to a DNR/DNI     Had long conversation about goals and pt status. Family plans to speak with Hospice.   Change code status  Remove midodrine  Will continue current care and monitor     Addendum  Pt with elevated temp  CXR obtained: Stable mild patchy airspace disease present within the left midlung which may be related to atelectasis versus pneumonia. Stable right basilar atelectasis and elevation of the right hemidiaphragm. Stable cardiomegaly.   Pt on HD and reportedly does not make urine  Nurse reports pt had single episode of coughing up a quarter sized blood clot   Will start zosyn  DC rocephin   Follow hgb   Monitor closely

## 2024-04-20 NOTE — NURSING NOTE
"Pts grandson spoke with NP ROJELIO Christianson agreed to speak with hospice today 4/20  When POA arrived, he spoke with Dr. Zacarias and myself and stated \"there is no point in talking to hospice at this time\"     POA was told by the provider that the patient has no quality of life, and is actively dying and should not prolong suffering anymore. POA was augmentative. He was presented a copy of the POA paper work, that the patient And POA signed agreeing to the patients wishes,(to not prolong life, and to allow patient to have medications to prevent pain)  and that the POA is going against them, and tells staff to not treat the patient.   "

## 2024-04-20 NOTE — SIGNIFICANT NOTE
04/20/24 0726   OTHER   Discipline physical therapist   Rehab Time/Intention   Session Not Performed other (see comments)  (Minimally responsive, to have hospice consult. PT to follow up depending on goals of care)   Recommendation   PT - Next Appointment 04/21/24

## 2024-04-20 NOTE — PROGRESS NOTES
CC--- chronic atrial fibrillation, hypertension and nonsustained VT    Sub  Patient is nonresponsive        Past Medical History:   Diagnosis Date    A-fib     Asthma     Chronic renal disease     COPD (chronic obstructive pulmonary disease)     Coronary heart disease     CABG    CVA (cerebral vascular accident)     recent CVA -- Patient and Family report this is not accurate    Gout     Hypertension      Past Surgical History:   Procedure Laterality Date    ARTERIOVENOUS FISTULA/SHUNT SURGERY Right 2022    Procedure: RIGHT ARM BRACHIAL CEPHALIC ARTERIAL VENOUS FISTULA;  Surgeon: Drew Gunter MD;  Location: [facility] OR;  Service: Vascular;  Laterality: Right;    CARDIAC CATHETERIZATION  2016    [facility]    CORONARY ARTERY BYPASS GRAFT      CABG X3, reoperation, 09; CAB and     OTHER SURGICAL HISTORY  2016    loop recorder implant          Physical Exam      Neck:      no  thyromegaly, trachea central with normal respiratory effort  Lungs:      clear bilaterally to auscultation.    Heart:       irregular rate and rhythm, S1, S2 without murmurs, rubs, or gallops  Skin:      intact without lesions or rashes.            CBC    Results from last 7 days   Lab Units 24  0431 24  0816 24  1051   WBC 10*3/mm3 4.55 5.66 4.20   HEMOGLOBIN g/dL 8.9* 9.6* 9.4*   PLATELETS 10*3/mm3 47* 47* 46*     BMP   Results from last 7 days   Lab Units 24  0431 24  1307 24  0816 24  1540 24  1051   SODIUM mmol/L 141 142 143 146* 140   POTASSIUM mmol/L 4.7 3.5 3.9 4.0 4.0   CHLORIDE mmol/L 100 100 103 105 102   CO2 mmol/L 30.0* 30.0* 30.0* 29.0 31.0*   BUN mg/dL 20 16 36* 32* 32*   CREATININE mg/dL 2.95* 2.23* 4.63* 4.09* 4.34*   GLUCOSE mg/dL 55* 74 92 90 89   MAGNESIUM mg/dL 2.2 1.9  --   --  2.3   PHOSPHORUS mg/dL 3.1  --  4.8*  --   --      Infection   Results from last 7 days   Lab Units 24  1540 24  1450   BLOODCX  No growth at 24 hours No growth  at 24 hours     CMP   Results from last 7 days   Lab Units 04/20/24  0431 04/19/24  1307 04/19/24  0816 04/18/24  1540 04/18/24  1051   SODIUM mmol/L 141 142 143 146* 140   POTASSIUM mmol/L 4.7 3.5 3.9 4.0 4.0   CHLORIDE mmol/L 100 100 103 105 102   CO2 mmol/L 30.0* 30.0* 30.0* 29.0 31.0*   BUN mg/dL 20 16 36* 32* 32*   CREATININE mg/dL 2.95* 2.23* 4.63* 4.09* 4.34*   GLUCOSE mg/dL 55* 74 92 90 89   ALBUMIN g/dL 4.0  --  3.5  --  3.3*   BILIRUBIN mg/dL  --   --   --   --  0.5   ALK PHOS U/L  --   --   --   --  82   AST (SGOT) U/L  --   --   --   --  36   ALT (SGPT) U/L  --   --   --   --  12     Radiology(recent) XR Chest 1 View    Result Date: 4/20/2024  Impression: Stable mild patchy airspace disease present within the left midlung which may be related to atelectasis versus pneumonia. Stable right basilar atelectasis and elevation of the right hemidiaphragm. Stable cardiomegaly. Electronically Signed: Deepa Healy MD  4/20/2024 5:11 AM EDT  Workstation ID: [id number]     Assessment plan    Chronic atrial fibrillation  Nonsustained VT  Coronary artery disease  Hypotension    Medical management only  Continue current management as per hospitalist  Call us if needed    Electronically signed by Simeon Foley MD, 04/20/24, 2:51 PM EDT.

## 2024-04-20 NOTE — PROGRESS NOTES
"NEPHROLOGY PROGRESS NOTE------[facility]    [facility]  [phone]  Derrick Henry MD      Patient Care Team:  Pedro Purvis MD as PCP - General (Family Medicine)  Pedro Purvis MD as PCP - Family Medicine  Pino Mclean MD as Consulting Physician (Nephrology)      Provider:  Derrick Henry MD  Patient Name: Barry Calhoun Sr.  :  1932    SUBJECTIVE:  Follow-up ESRD  Dialyzed yesterday      Medication:  ipratropium-albuterol, 3 mL, Nebulization, 4x Daily - RT  levothyroxine, 75 mcg, Oral, Q AM  lidocaine-prilocaine, , Topical, Once  [Held by provider] metoprolol succinate XL, 12.5 mg, Oral, Q24H  piperacillin-tazobactam, 3.375 g, Intravenous, Q12H  potassium chloride, 40 mEq, Oral, Once  QUEtiapine, 25 mg, Oral, Nightly  senna-docusate sodium, 2 tablet, Oral, BID  sodium chloride, 10 mL, Intravenous, Q12H  warfarin, 4 mg, Oral, Daily      Pharmacy to dose warfarin,         OBJECTIVE    Vital Sign Min/Max for last 24 hours  Temp  Min: 93.8 °F (34.3 °C)  Max: 100.9 °F (38.3 °C)   BP  Min: 63/39  Max: 123/57   Pulse  Min: 62  Max: 84   Resp  Min: 15  Max: 26   SpO2  Min: 88 %  Max: 100 %   No data recorded   Weight  Min: 68.1 kg (150 lb 2.1 oz)  Max: 68.1 kg (150 lb 2.1 oz)     Flowsheet Rows      Flowsheet Row First Filed Value   Admission Height 175.3 cm (69\") Documented at 2024 1013   Admission Weight 70 kg (154 lb 5.2 oz) Documented at 2024 1013            No intake/output data recorded.  I/O last 3 completed shifts:  In: 180 [P.O.:180]  Out: 1000     Physical Exam:  General Appearance: No acute distress  Head: normocephalic, without obvious abnormality and atraumatic  Eyes: conjunctivae and sclerae normal and no icterus  Neck: supple and no JVD  Lungs: clear to auscultation and respirations regular  Heart: regular rhythm & normal rate and normal S1, S2  Chest: Wall no abnormalities observed  Abdomen: " "normal bowel sounds and soft, nontender  Extremities: moves extremities well, trace edema  Skin: Lower extremity erythema  Neurologic: Alert,    Labs:    WBC WBC   Date Value Ref Range Status   04/20/2024 4.55 3.40 - 10.80 10*3/mm3 Final   04/19/2024 5.66 3.40 - 10.80 10*3/mm3 Final   04/18/2024 4.20 3.40 - 10.80 10*3/mm3 Final      HGB Hemoglobin   Date Value Ref Range Status   04/20/2024 8.9 (L) 13.0 - 17.7 g/dL Final   04/19/2024 9.6 (L) 13.0 - 17.7 g/dL Final   04/18/2024 9.4 (L) 13.0 - 17.7 g/dL Final      HCT Hematocrit   Date Value Ref Range Status   04/20/2024 28.5 (L) 37.5 - 51.0 % Final   04/19/2024 30.2 (L) 37.5 - 51.0 % Final   04/18/2024 30.1 (L) 37.5 - 51.0 % Final      Platelets No results found for: \"LABPLAT\"   MCV MCV   Date Value Ref Range Status   04/20/2024 102.5 (H) 79.0 - 97.0 fL Final   04/19/2024 102.4 (H) 79.0 - 97.0 fL Final   04/18/2024 104.2 (H) 79.0 - 97.0 fL Final          Sodium Sodium   Date Value Ref Range Status   04/20/2024 141 136 - 145 mmol/L Final   04/19/2024 142 136 - 145 mmol/L Final   04/19/2024 143 136 - 145 mmol/L Final   04/18/2024 146 (H) 136 - 145 mmol/L Final   04/18/2024 140 136 - 145 mmol/L Final      Potassium Potassium   Date Value Ref Range Status   04/20/2024 4.7 3.5 - 5.2 mmol/L Final     Comment:     Slight hemolysis detected by analyzer. Result may be falsely elevated.  Result checked     04/19/2024 3.5 3.5 - 5.2 mmol/L Final   04/19/2024 3.9 3.5 - 5.2 mmol/L Final   04/18/2024 4.0 3.5 - 5.2 mmol/L Final   04/18/2024 4.0 3.5 - 5.2 mmol/L Final     Comment:     Specimen hemolyzed.  Result may be falsely elevated.      Chloride Chloride   Date Value Ref Range Status   04/20/2024 100 98 - 107 mmol/L Final   04/19/2024 100 98 - 107 mmol/L Final   04/19/2024 103 98 - 107 mmol/L Final   04/18/2024 105 98 - 107 mmol/L Final   04/18/2024 102 98 - 107 mmol/L Final      CO2 CO2   Date Value Ref Range Status   04/20/2024 30.0 (H) 22.0 - 29.0 mmol/L Final   04/19/2024 " "30.0 (H) 22.0 - 29.0 mmol/L Final   04/19/2024 30.0 (H) 22.0 - 29.0 mmol/L Final   04/18/2024 29.0 22.0 - 29.0 mmol/L Final   04/18/2024 31.0 (H) 22.0 - 29.0 mmol/L Final      BUN BUN   Date Value Ref Range Status   04/20/2024 20 8 - 23 mg/dL Final   04/19/2024 16 8 - 23 mg/dL Final   04/19/2024 36 (H) 8 - 23 mg/dL Final   04/18/2024 32 (H) 8 - 23 mg/dL Final   04/18/2024 32 (H) 8 - 23 mg/dL Final      Creatinine Creatinine   Date Value Ref Range Status   04/20/2024 2.95 (H) 0.76 - 1.27 mg/dL Final   04/19/2024 2.23 (H) 0.76 - 1.27 mg/dL Final   04/19/2024 4.63 (H) 0.76 - 1.27 mg/dL Final   04/18/2024 4.09 (H) 0.76 - 1.27 mg/dL Final   04/18/2024 4.34 (H) 0.76 - 1.27 mg/dL Final      Calcium Calcium   Date Value Ref Range Status   04/20/2024 8.8 8.2 - 9.6 mg/dL Final   04/19/2024 9.2 8.2 - 9.6 mg/dL Final   04/19/2024 8.9 8.2 - 9.6 mg/dL Final   04/18/2024 9.1 8.2 - 9.6 mg/dL Final   04/18/2024 8.9 8.2 - 9.6 mg/dL Final      PO4 No components found for: \"PO4\"   Albumin Albumin   Date Value Ref Range Status   04/20/2024 4.0 3.5 - 5.2 g/dL Final   04/19/2024 3.5 3.5 - 5.2 g/dL Final   04/18/2024 3.3 (L) 3.5 - 5.2 g/dL Final      Magnesium Magnesium   Date Value Ref Range Status   04/20/2024 2.2 1.7 - 2.3 mg/dL Final   04/19/2024 1.9 1.7 - 2.3 mg/dL Final   04/18/2024 2.3 1.7 - 2.3 mg/dL Final      Uric Acid No components found for: \"URIC ACID\"     Imaging Results (Last 72 Hours)       Procedure Component Value Units Date/Time    XR Chest 1 View [[id number]] Collected: 04/20/24 0509     Updated: 04/20/24 0513    Narrative:      XR CHEST 1 VW    Date of Exam: 4/20/2024 4:54 AM EDT    Indication: poss asp pnuemonia    Comparison: 4/18/2024, 5/20/2022.    Findings:  The heart appears enlarged. Median sternotomy wires are present. There is a right internal jugular Mediport with the tip in the distal SVC. Stable elevation of the right hemidiaphragm. Mild atelectatic changes are present within the right lung base,   stable. " Patchy airspace disease is seen within the left midlung which appears unchanged. Indistinctness of the pulmonary vasculature are present, stable. No pneumothorax.      Impression:      Impression:  Stable mild patchy airspace disease present within the left midlung which may be related to atelectasis versus pneumonia. Stable right basilar atelectasis and elevation of the right hemidiaphragm. Stable cardiomegaly.      Electronically Signed: Deepa Healy MD    4/20/2024 5:11 AM EDT    Workstation ID: [id number]    XR Chest 1 View [[id number]] Collected: 04/18/24 1147     Updated: 04/18/24 1154    Narrative:      XR CHEST 1 VW    Date of Exam: 4/18/2024 11:24 AM EDT    Indication: hypotension    Comparison: X-ray dated 3/29/2024    Findings:  The trachea is midline. There is cardiomegaly with pulmonary vascular congestion. Diffuse bilateral reticular and airspace opacities suspicious for pulmonary edema. There are surgical changes in the heart and mediastinum. There is a right intrajugular   chest port in place      Impression:      Impression:  Cardiomegaly with pulmonary vascular congestion and pulmonary edema/CHF pattern      Electronically Signed: Diogenes Landaverde MD    4/18/2024 11:52 AM EDT    Workstation ID: [id number]            Results for orders placed during the hospital encounter of 04/18/24    XR Chest 1 View    Narrative  XR CHEST 1 VW    Date of Exam: 4/20/2024 4:54 AM EDT    Indication: poss asp pnuemonia    Comparison: 4/18/2024, 5/20/2022.    Findings:  The heart appears enlarged. Median sternotomy wires are present. There is a right internal jugular Mediport with the tip in the distal SVC. Stable elevation of the right hemidiaphragm. Mild atelectatic changes are present within the right lung base,  stable. Patchy airspace disease is seen within the left midlung which appears unchanged. Indistinctness of the pulmonary vasculature are present, stable. No pneumothorax.    Impression  Impression:  Stable mild  patchy airspace disease present within the left midlung which may be related to atelectasis versus pneumonia. Stable right basilar atelectasis and elevation of the right hemidiaphragm. Stable cardiomegaly.      Electronically Signed: Deepa Healy MD  4/20/2024 5:11 AM EDT  Workstation ID: [id number]      XR Chest 1 View    Narrative  XR CHEST 1 VW    Date of Exam: 4/18/2024 11:24 AM EDT    Indication: hypotension    Comparison: X-ray dated 3/29/2024    Findings:  The trachea is midline. There is cardiomegaly with pulmonary vascular congestion. Diffuse bilateral reticular and airspace opacities suspicious for pulmonary edema. There are surgical changes in the heart and mediastinum. There is a right intrajugular  chest port in place    Impression  Impression:  Cardiomegaly with pulmonary vascular congestion and pulmonary edema/CHF pattern      Electronically Signed: Diogenes Landaverde MD  4/18/2024 11:52 AM EDT  Workstation ID: [id number]      Results for orders placed during the hospital encounter of 03/27/24    XR Chest 1 View    Narrative  XR CHEST 1 VW    Date of Exam: 3/29/2024 4:37 PM EDT    Indication: wheezing    Comparison: AP portable chest 2/17/2014.    Findings:  Stable right hemidiaphragm elevation. Left midlung and right infrahilar airspace disease is redemonstrated and is not thought to be significantly changed. There is stable moderate generalized cardiac enlargement with median sternotomy and CABG. Loop  recorder projects over the left lower mediastinum. Right chest wall jeremías catheter tip terminates at the lower SVC level. Right subclavian stent is noted. No acute osseous abnormality is identified.    Impression  Impression:    1. Left midlung and right infrahilar airspace disease appears unchanged from 2/28/2024. No new airspace disease is identified.  2. Stable cardiomegaly.      Electronically Signed: Mary Page MD  3/29/2024 4:40 PM EDT  Workstation ID: [id number]      Results for orders placed during  the hospital encounter of 04/18/24    Duplex venous lower extremity right    Interpretation Summary    Acute right lower extremity superficial thrombophlebitis noted in the small saphenous.    All other right sided veins appeared normal.    Incidentally identified occluded right SFA.    The quality of the study is limited due to an uncooperative patient and patient positioning.        ASSESSMENT / PLAN      Hypotension    ESRD--HD TTS. Followed by Dr Mclean  Chronic hypotension--on midodrine  A fib--anti coagulated  COPD  Lymphoma  Thrombocytopenia  ? RLE cellulitis  V tach run--cards consulted.  PNA    Off midodrine  DNR now  Dialysis in a.m.  Prognosis poor.  Discussed with daughter at bedside          Derrick Hnery MD  [facility]  [phone]  04/20/24  09:35 EDT

## 2024-04-21 LAB
ALBUMIN SERPL-MCNC: 3.7 G/DL (ref 3.5–5.2)
ANION GAP SERPL CALCULATED.3IONS-SCNC: 9 MMOL/L (ref 5–15)
ANISOCYTOSIS BLD QL: NORMAL
BASOPHILS # BLD AUTO: 0.01 10*3/MM3 (ref 0–0.2)
BASOPHILS NFR BLD AUTO: 0.2 % (ref 0–1.5)
BUN SERPL-MCNC: 24 MG/DL (ref 8–23)
BUN/CREAT SERPL: 6.1 (ref 7–25)
CALCIUM SPEC-SCNC: 8.7 MG/DL (ref 8.2–9.6)
CHLORIDE SERPL-SCNC: 100 MMOL/L (ref 98–107)
CO2 SERPL-SCNC: 31 MMOL/L (ref 22–29)
CREAT SERPL-MCNC: 3.95 MG/DL (ref 0.76–1.27)
DEPRECATED RDW RBC AUTO: 67.9 FL (ref 37–54)
EGFRCR SERPLBLD CKD-EPI 2021: 13.7 ML/MIN/1.73
EOSINOPHIL # BLD AUTO: 0.04 10*3/MM3 (ref 0–0.4)
EOSINOPHIL NFR BLD AUTO: 0.8 % (ref 0.3–6.2)
ERYTHROCYTE [DISTWIDTH] IN BLOOD BY AUTOMATED COUNT: 17.7 % (ref 12.3–15.4)
GLUCOSE BLDC GLUCOMTR-MCNC: 69 MG/DL (ref 70–105)
GLUCOSE BLDC GLUCOMTR-MCNC: 75 MG/DL (ref 70–105)
GLUCOSE BLDC GLUCOMTR-MCNC: 76 MG/DL (ref 70–105)
GLUCOSE BLDC GLUCOMTR-MCNC: 83 MG/DL (ref 70–105)
GLUCOSE SERPL-MCNC: 78 MG/DL (ref 65–99)
HCT VFR BLD AUTO: 28.5 % (ref 37.5–51)
HGB BLD-MCNC: 8.7 G/DL (ref 13–17.7)
IMM GRANULOCYTES # BLD AUTO: 0.02 10*3/MM3 (ref 0–0.05)
IMM GRANULOCYTES NFR BLD AUTO: 0.4 % (ref 0–0.5)
INR PPP: 3.12 (ref 2–3)
LYMPHOCYTES # BLD AUTO: 0.97 10*3/MM3 (ref 0.7–3.1)
LYMPHOCYTES NFR BLD AUTO: 18.8 % (ref 19.6–45.3)
MCH RBC QN AUTO: 32.3 PG (ref 26.6–33)
MCHC RBC AUTO-ENTMCNC: 30.5 G/DL (ref 31.5–35.7)
MCV RBC AUTO: 105.9 FL (ref 79–97)
MONOCYTES # BLD AUTO: 0.52 10*3/MM3 (ref 0.1–0.9)
MONOCYTES NFR BLD AUTO: 10.1 % (ref 5–12)
NEUTROPHILS NFR BLD AUTO: 3.59 10*3/MM3 (ref 1.7–7)
NEUTROPHILS NFR BLD AUTO: 69.7 % (ref 42.7–76)
NRBC BLD AUTO-RTO: 0.4 /100 WBC (ref 0–0.2)
PHOSPHATE SERPL-MCNC: 4.3 MG/DL (ref 2.5–4.5)
PLATELET # BLD AUTO: 55 10*3/MM3 (ref 140–450)
PMV BLD AUTO: 11.7 FL (ref 6–12)
POIKILOCYTOSIS BLD QL SMEAR: NORMAL
POTASSIUM SERPL-SCNC: 4.6 MMOL/L (ref 3.5–5.2)
PROTHROMBIN TIME: 31.3 SECONDS (ref 19.4–28.5)
RBC # BLD AUTO: 2.69 10*6/MM3 (ref 4.14–5.8)
SMALL PLATELETS BLD QL SMEAR: NORMAL
SODIUM SERPL-SCNC: 140 MMOL/L (ref 136–145)
WBC MORPH BLD: NORMAL
WBC NRBC COR # BLD AUTO: 5.15 10*3/MM3 (ref 3.4–10.8)

## 2024-04-21 PROCEDURE — 25010000002 PIPERACILLIN SOD-TAZOBACTAM PER 1 G: Performed by: NURSE PRACTITIONER

## 2024-04-21 PROCEDURE — 94664 DEMO&/EVAL PT USE INHALER: CPT

## 2024-04-21 PROCEDURE — 94799 UNLISTED PULMONARY SVC/PX: CPT

## 2024-04-21 PROCEDURE — 0 DEXTROSE 5 % SOLUTION: Performed by: INTERNAL MEDICINE

## 2024-04-21 PROCEDURE — 82948 REAGENT STRIP/BLOOD GLUCOSE: CPT

## 2024-04-21 PROCEDURE — 94761 N-INVAS EAR/PLS OXIMETRY MLT: CPT

## 2024-04-21 PROCEDURE — 80069 RENAL FUNCTION PANEL: CPT | Performed by: INTERNAL MEDICINE

## 2024-04-21 PROCEDURE — P9047 ALBUMIN (HUMAN), 25%, 50ML: HCPCS | Performed by: INTERNAL MEDICINE

## 2024-04-21 PROCEDURE — 85025 COMPLETE CBC W/AUTO DIFF WBC: CPT | Performed by: INTERNAL MEDICINE

## 2024-04-21 PROCEDURE — 85007 BL SMEAR W/DIFF WBC COUNT: CPT | Performed by: INTERNAL MEDICINE

## 2024-04-21 PROCEDURE — 25010000002 ALBUMIN HUMAN 25% PER 50 ML: Performed by: INTERNAL MEDICINE

## 2024-04-21 PROCEDURE — 85610 PROTHROMBIN TIME: CPT | Performed by: INTERNAL MEDICINE

## 2024-04-21 PROCEDURE — 25010000002 EPOETIN ALFA-EPBX 10000 UNIT/ML SOLUTION: Performed by: INTERNAL MEDICINE

## 2024-04-21 RX ORDER — DEXTROSE MONOHYDRATE 50 MG/ML
50 INJECTION, SOLUTION INTRAVENOUS CONTINUOUS
Status: DISCONTINUED | OUTPATIENT
Start: 2024-04-21 | End: 2024-04-25 | Stop reason: HOSPADM

## 2024-04-21 RX ADMIN — PIPERACILLIN AND TAZOBACTAM 3.38 G: 3; .375 INJECTION, POWDER, FOR SOLUTION INTRAVENOUS at 16:13

## 2024-04-21 RX ADMIN — ALBUMIN (HUMAN) 12.5 G: 0.25 INJECTION, SOLUTION INTRAVENOUS at 08:09

## 2024-04-21 RX ADMIN — IPRATROPIUM BROMIDE AND ALBUTEROL SULFATE 3 ML: .5; 3 SOLUTION RESPIRATORY (INHALATION) at 10:51

## 2024-04-21 RX ADMIN — IPRATROPIUM BROMIDE AND ALBUTEROL SULFATE 3 ML: .5; 3 SOLUTION RESPIRATORY (INHALATION) at 18:35

## 2024-04-21 RX ADMIN — EPOETIN ALFA-EPBX 20000 UNITS: 10000 INJECTION, SOLUTION INTRAVENOUS; SUBCUTANEOUS at 16:13

## 2024-04-21 RX ADMIN — ALBUMIN (HUMAN) 12.5 G: 0.25 INJECTION, SOLUTION INTRAVENOUS at 08:50

## 2024-04-21 RX ADMIN — IPRATROPIUM BROMIDE AND ALBUTEROL SULFATE 3 ML: .5; 3 SOLUTION RESPIRATORY (INHALATION) at 06:48

## 2024-04-21 RX ADMIN — Medication 10 ML: at 20:33

## 2024-04-21 RX ADMIN — DEXTROSE MONOHYDRATE 50 ML/HR: 50 INJECTION, SOLUTION INTRAVENOUS at 18:27

## 2024-04-21 RX ADMIN — IPRATROPIUM BROMIDE AND ALBUTEROL SULFATE 3 ML: .5; 3 SOLUTION RESPIRATORY (INHALATION) at 15:00

## 2024-04-21 RX ADMIN — PIPERACILLIN AND TAZOBACTAM 3.38 G: 3; .375 INJECTION, POWDER, FOR SOLUTION INTRAVENOUS at 04:16

## 2024-04-21 NOTE — PROGRESS NOTES
"Pharmacy dosing service  Anticoagulant  Warfarin     Subjective:    Barry Calhoun Sr. is a 89 y.o.male being continued on warfarin for Atrial Fibrillation / Flutter.    INR Goal: 2 - 3  Home medication?: warfarin 4 mg PO daily  Bridge Therapy Present?:  No  Interacting Medications Evaluation (New/Present/Discontinued): Zosyn (may increase INR)  Additional Contributing Factors: ESRD, CVA, lymphoma      Assessment/Plan:    INR is supra-therapeutic today. Will hold warfarin today.    Continue to monitor and adjust based on INR.         Date 4/18 4/19 4/20 4/21        INR 2.74 2.85 2.86 3.12        Dose 4 mg 4 mg 4 mg HOLD            Objective:  [Ht: 175.3 cm (69\"); Wt: 68.1 kg (150 lb 2.1 oz); BMI: Body mass index is 22.17 kg/m².]    Lab Results   Component Value Date    ALBUMIN 3.7 04/21/2024     Lab Results   Component Value Date    INR 3.12 (H) 04/21/2024    INR 2.86 04/20/2024    INR 2.85 04/19/2024    PROTIME 31.3 (H) 04/21/2024    PROTIME 28.8 (H) 04/20/2024    PROTIME 28.7 (H) 04/19/2024     Lab Results   Component Value Date    HGB 8.7 (L) 04/21/2024    HGB 8.9 (L) 04/20/2024    HGB 9.6 (L) 04/19/2024     Lab Results   Component Value Date    HCT 28.5 (L) 04/21/2024    HCT 28.5 (L) 04/20/2024    HCT 30.2 (L) 04/19/2024       Victoriano Burrell [facility]  04/21/24 09:28 EDT             "

## 2024-04-21 NOTE — PROGRESS NOTES
[facility] MEDICINE SERVICE  DAILY PROGRESS NOTE    NAME: Barry Calhoun Sr.  : 1932  MRN: [id number]      LOS: 3 days     PROVIDER OF SERVICE: Suhail Zacarias MD    Chief Complaint: Hypotension    Subjective:       Subjective       Chief Complaint: Hypertension     History of Present Illness: Barry Calhoun Sr. is a 89 y.o. male with a CMH of ESRD, A-fib on Coumadin, CABG, CVA, hypothrombocytopenia, COPD, lymphoma, gout, dementia who presented to [facility] on 2024 with hypotension.  The patient arrived from hemodialysis due to hypotension.  The patient is nonverbal and history could not be taken.  The patient was evaluated by cardiology.  Cardiology and nephrology will decide on further management.  The patient will get dialysis in the hospital.  The patient was found to have warm right leg suggestive of cellulitis      Interval History:  24- Patient is doing well today.  Appears to be at his baseline.  Does have some mild cough with congestion.  24 seen in bed DON VARELA RN BG 55, no able to take po, family had been refusing midodrine.  Patient fluid bolus 500 cc  Start D5 50 cc an hour.  BP 73/31, monitor BNP last 54,423.   2024 patient seen and examined in bed no acute distress, blood pressure has improved today.  1954.  Received albumin, for hemodialysis,  Family still have not decided to hospice.    Review of Systems  Ten point ROS: reviewed negative, unless as noted in HPI.  Objective:     Vital Signs  Temp:  [97.9 °F (36.6 °C)-99.5 °F (37.5 °C)] 98.1 °F (36.7 °C)  Heart Rate:  [] 75  Resp:  [12-28] 19  BP: ()/(26-74) 122/54  Flow (L/min):  [1-2] 2   Body mass index is 22.17 kg/m².    Physical Exam  General Appearance:  Alert, cooperative, no distress, appears stated age  Head:  Normocephalic, without obvious abnormality, atraumatic  Eyes:  PERRL, conjunctiva/corneas clear, EOM's intact, fundi benign, both eyes  Ears:  Normal TM's and external  ear canals, both ears  Nose: Nares normal, septum midline, mucosa normal, no drainage or sinus tenderness  Throat: Lips, mucosa, and tongue normal; teeth and gums normal  Neck: Supple, symmetrical, trachea midline, no adenopathy, thyroid: not enlarged, symmetric, no tenderness/mass/nodules, no carotid bruit or JVD  Lungs:   Mild right lung rhonchi  Heart:  Regular rate and rhythm, S1, S2 normal, no murmur, rub or gallop  Abdomen:  Soft, non-tender, bowel sounds active all four quadrants,  no masses, no organomegaly  Extremities: Extremities normal, atraumatic, no cyanosis or edema  Pulses: 2+ and symmetric  Skin: Skin color, texture, turgor normal, no rashes or lesions  Neurologic: Normal      Scheduled Meds   ipratropium-albuterol, 3 mL, Nebulization, 4x Daily - RT  levothyroxine, 75 mcg, Oral, Q AM  lidocaine-prilocaine, , Topical, Once  lidocaine-prilocaine, , Topical, Once  [Held by provider] metoprolol succinate XL, 12.5 mg, Oral, Q24H  midodrine, 10 mg, Oral, TID AC  piperacillin-tazobactam, 3.375 g, Intravenous, Q12H  potassium chloride, 40 mEq, Oral, Once  QUEtiapine, 25 mg, Oral, Nightly  senna-docusate sodium, 2 tablet, Oral, BID  sodium chloride, 10 mL, Intravenous, Q12H  warfarin, 4 mg, Oral, Daily       PRN Meds     acetaminophen    albumin human    senna-docusate sodium **AND** polyethylene glycol **AND** bisacodyl **AND** bisacodyl    Calcium Replacement - Follow Nurse / BPA Driven Protocol    dextrose    glycopyrrolate    Magnesium Standard Dose Replacement - Follow Nurse / BPA Driven Protocol    nitroglycerin    Pharmacy to dose warfarin    Phosphorus Replacement - Follow Nurse / BPA Driven Protocol    Potassium Replacement - Follow Nurse / BPA Driven Protocol    [COMPLETED] Insert Peripheral IV **AND** sodium chloride    sodium chloride    sodium chloride   Infusions  dextrose, 50 mL/hr, Last Rate: 50 mL/hr (04/20/24 1300)  Pharmacy to dose warfarin,           Diagnostic Data    Results from last 7  days   Lab Units 04/21/24  0301 04/18/24  1540 04/18/24  1051   WBC 10*3/mm3 5.15   < > 4.20   HEMOGLOBIN g/dL 8.7*   < > 9.4*   HEMATOCRIT % 28.5*   < > 30.1*   PLATELETS 10*3/mm3 55*   < > 46*   GLUCOSE mg/dL 78   < > 89   CREATININE mg/dL 3.95*   < > 4.34*   BUN mg/dL 24*   < > 32*   SODIUM mmol/L 140   < > 140   POTASSIUM mmol/L 4.6   < > 4.0   AST (SGOT) U/L  --   --  36   ALT (SGPT) U/L  --   --  12   ALK PHOS U/L  --   --  82   BILIRUBIN mg/dL  --   --  0.5   ANION GAP mmol/L 9.0   < > 7.0    < > = values in this interval not displayed.       XR Chest 1 View    Result Date: 4/20/2024  Impression: Stable mild patchy airspace disease present within the left midlung which may be related to atelectasis versus pneumonia. Stable right basilar atelectasis and elevation of the right hemidiaphragm. Stable cardiomegaly. Electronically Signed: Deepa Healy MD  4/20/2024 5:11 AM EDT  Workstation ID: [id number]       I reviewed the patient's new clinical results.    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       Acute on chronic hypotension  -Patient has history of chronic hypotension on midodrine  -Slight acute worsening of hypertension may be related to underlying infectious  process  -Refusing midodrine  -x1 fluid boluses   -patient is a dialysis patient and appears slightly volume overloaded  -Treatment for cellulitis with IV antibiotics    ESRD on HD  -Continue HD per nephrology  -Receiving HD treatment today    Right lower extremity cellulitis  -Patient does have some mild erythema and edema with warm and tender to touch  -Started on IV Rocephin  -Transition to oral antibiotics in the next 24 hours    Chronic A-fib  -Continue warfarin despite thrombocytopenia    Chronic thrombocytopenia  -Platelet count lower than baseline at 47 today  -May be related to underlying infectious process  -No evidence of active bleeding and therefore no indication for platelet transfusion    COPD  -Continue inhalers      DVT prophylaxis:  Medical DVT prophylaxis orders are present.       Code status is   Code Status and Medical Interventions:   Ordered at: 04/20/24 0039     Medical Intervention Limits:    NO intubation (DNI)     Level Of Support Discussed With:    Next of Kin (If No Surrogate)     Code Status (Patient has no pulse and is not breathing):    No CPR (Do Not Attempt to Resuscitate)     Medical Interventions (Patient has pulse or is breathing):    Limited Support       Plan for disposition: Awaiting palliative care evaluation.  Patient's prognosis is overall guarded.    Time: 30 minutes    Signature: Electronically signed by Suhail Zacarias MD, 04/21/24, 09:09 EDT.  [facility]ist Team

## 2024-04-21 NOTE — NURSING NOTE
Patient hypotensive upon initial HD assessment.  25 g albumin given per PRN order.  Patient continued to be hypotensive.  Dr. Henry notified.  Dr. Henry stated that it is OK to start HD as long as BP>90.

## 2024-04-21 NOTE — NURSING NOTE
Dialysis Treatment    Treatment: HD  Access: AVF  BVP: 84 liters   UF: 1500 mL    VSS. No distress present. Tolerated well.    Report given to SANTOS Koo .

## 2024-04-21 NOTE — PROGRESS NOTES
"NEPHROLOGY PROGRESS NOTE------[facility]    [facility]  [phone]  Derrick Henry MD      Patient Care Team:  Pedro Purvis MD as PCP - General (Family Medicine)  Pedro Purvis MD as PCP - Family Medicine  Pino Mclean MD as Consulting Physician (Nephrology)      Provider:  Derrick Henry MD  Patient Name: Barry Calhoun Sr.  :  1932    SUBJECTIVE:  Follow-up ESRD  Somewhat confused  No chest pain or shortness of breath  Coughing some  Seen and examined on dialysis  Blood pressure in the 130s, after receiving albumin      Medication:  ipratropium-albuterol, 3 mL, Nebulization, 4x Daily - RT  levothyroxine, 75 mcg, Oral, Q AM  lidocaine-prilocaine, , Topical, Once  lidocaine-prilocaine, , Topical, Once  [Held by provider] metoprolol succinate XL, 12.5 mg, Oral, Q24H  midodrine, 10 mg, Oral, TID AC  piperacillin-tazobactam, 3.375 g, Intravenous, Q12H  potassium chloride, 40 mEq, Oral, Once  QUEtiapine, 25 mg, Oral, Nightly  senna-docusate sodium, 2 tablet, Oral, BID  sodium chloride, 10 mL, Intravenous, Q12H  warfarin, 4 mg, Oral, Daily      dextrose, 50 mL/hr, Last Rate: 50 mL/hr (24 1300)  Pharmacy to dose warfarin,         OBJECTIVE    Vital Sign Min/Max for last 24 hours  Temp  Min: 96.3 °F (35.7 °C)  Max: 99.5 °F (37.5 °C)   BP  Min: 51/34  Max: 122/54   Pulse  Min: 67  Max: 130   Resp  Min: 12  Max: 28   SpO2  Min: 82 %  Max: 100 %   No data recorded   No data recorded     Flowsheet Rows      Flowsheet Row First Filed Value   Admission Height 175.3 cm (69\") Documented at 2024 1013   Admission Weight 70 kg (154 lb 5.2 oz) Documented at 2024 1013            No intake/output data recorded.  No intake/output data recorded.    Physical Exam:  General Appearance: No acute distress  Head: normocephalic, without obvious abnormality and atraumatic  Eyes: conjunctivae and sclerae normal and no " "icterus  Neck: supple and no JVD  Lungs: Scattered rhonchi  Heart: regular rhythm & normal rate and normal S1, S2  Chest: Wall no abnormalities observed  Abdomen: normal bowel sounds and soft, nontender  Extremities: moves extremities well, trace edema  Skin: Lower extremity erythema  Neurologic: Alert,    Labs:    WBC WBC   Date Value Ref Range Status   04/21/2024 5.15 3.40 - 10.80 10*3/mm3 Final   04/20/2024 4.55 3.40 - 10.80 10*3/mm3 Final   04/19/2024 5.66 3.40 - 10.80 10*3/mm3 Final   04/18/2024 4.20 3.40 - 10.80 10*3/mm3 Final      HGB Hemoglobin   Date Value Ref Range Status   04/21/2024 8.7 (L) 13.0 - 17.7 g/dL Final   04/20/2024 8.9 (L) 13.0 - 17.7 g/dL Final   04/19/2024 9.6 (L) 13.0 - 17.7 g/dL Final   04/18/2024 9.4 (L) 13.0 - 17.7 g/dL Final      HCT Hematocrit   Date Value Ref Range Status   04/21/2024 28.5 (L) 37.5 - 51.0 % Final   04/20/2024 28.5 (L) 37.5 - 51.0 % Final   04/19/2024 30.2 (L) 37.5 - 51.0 % Final   04/18/2024 30.1 (L) 37.5 - 51.0 % Final      Platelets No results found for: \"LABPLAT\"   MCV MCV   Date Value Ref Range Status   04/21/2024 105.9 (H) 79.0 - 97.0 fL Final   04/20/2024 102.5 (H) 79.0 - 97.0 fL Final   04/19/2024 102.4 (H) 79.0 - 97.0 fL Final   04/18/2024 104.2 (H) 79.0 - 97.0 fL Final          Sodium Sodium   Date Value Ref Range Status   04/21/2024 140 136 - 145 mmol/L Final   04/20/2024 141 136 - 145 mmol/L Final   04/19/2024 142 136 - 145 mmol/L Final   04/19/2024 143 136 - 145 mmol/L Final   04/18/2024 146 (H) 136 - 145 mmol/L Final   04/18/2024 140 136 - 145 mmol/L Final      Potassium Potassium   Date Value Ref Range Status   04/21/2024 4.6 3.5 - 5.2 mmol/L Final   04/20/2024 4.7 3.5 - 5.2 mmol/L Final     Comment:     Slight hemolysis detected by analyzer. Result may be falsely elevated.  Result checked     04/19/2024 3.5 3.5 - 5.2 mmol/L Final   04/19/2024 3.9 3.5 - 5.2 mmol/L Final   04/18/2024 4.0 3.5 - 5.2 mmol/L Final   04/18/2024 4.0 3.5 - 5.2 mmol/L Final     " "Comment:     Specimen hemolyzed.  Result may be falsely elevated.      Chloride Chloride   Date Value Ref Range Status   04/21/2024 100 98 - 107 mmol/L Final   04/20/2024 100 98 - 107 mmol/L Final   04/19/2024 100 98 - 107 mmol/L Final   04/19/2024 103 98 - 107 mmol/L Final   04/18/2024 105 98 - 107 mmol/L Final   04/18/2024 102 98 - 107 mmol/L Final      CO2 CO2   Date Value Ref Range Status   04/21/2024 31.0 (H) 22.0 - 29.0 mmol/L Final   04/20/2024 30.0 (H) 22.0 - 29.0 mmol/L Final   04/19/2024 30.0 (H) 22.0 - 29.0 mmol/L Final   04/19/2024 30.0 (H) 22.0 - 29.0 mmol/L Final   04/18/2024 29.0 22.0 - 29.0 mmol/L Final   04/18/2024 31.0 (H) 22.0 - 29.0 mmol/L Final      BUN BUN   Date Value Ref Range Status   04/21/2024 24 (H) 8 - 23 mg/dL Final   04/20/2024 20 8 - 23 mg/dL Final   04/19/2024 16 8 - 23 mg/dL Final   04/19/2024 36 (H) 8 - 23 mg/dL Final   04/18/2024 32 (H) 8 - 23 mg/dL Final   04/18/2024 32 (H) 8 - 23 mg/dL Final      Creatinine Creatinine   Date Value Ref Range Status   04/21/2024 3.95 (H) 0.76 - 1.27 mg/dL Final   04/20/2024 2.95 (H) 0.76 - 1.27 mg/dL Final   04/19/2024 2.23 (H) 0.76 - 1.27 mg/dL Final   04/19/2024 4.63 (H) 0.76 - 1.27 mg/dL Final   04/18/2024 4.09 (H) 0.76 - 1.27 mg/dL Final   04/18/2024 4.34 (H) 0.76 - 1.27 mg/dL Final      Calcium Calcium   Date Value Ref Range Status   04/21/2024 8.7 8.2 - 9.6 mg/dL Final   04/20/2024 8.8 8.2 - 9.6 mg/dL Final   04/19/2024 9.2 8.2 - 9.6 mg/dL Final   04/19/2024 8.9 8.2 - 9.6 mg/dL Final   04/18/2024 9.1 8.2 - 9.6 mg/dL Final   04/18/2024 8.9 8.2 - 9.6 mg/dL Final      PO4 No components found for: \"PO4\"   Albumin Albumin   Date Value Ref Range Status   04/21/2024 3.7 3.5 - 5.2 g/dL Final   04/20/2024 4.0 3.5 - 5.2 g/dL Final   04/19/2024 3.5 3.5 - 5.2 g/dL Final   04/18/2024 3.3 (L) 3.5 - 5.2 g/dL Final      Magnesium Magnesium   Date Value Ref Range Status   04/20/2024 2.2 1.7 - 2.3 mg/dL Final   04/19/2024 1.9 1.7 - 2.3 mg/dL Final " "  04/18/2024 2.3 1.7 - 2.3 mg/dL Final      Uric Acid No components found for: \"URIC ACID\"     Imaging Results (Last 72 Hours)       Procedure Component Value Units Date/Time    XR Chest 1 View [[id number]] Collected: 04/20/24 0509     Updated: 04/20/24 0513    Narrative:      XR CHEST 1 VW    Date of Exam: 4/20/2024 4:54 AM EDT    Indication: poss asp pnuemonia    Comparison: 4/18/2024, 5/20/2022.    Findings:  The heart appears enlarged. Median sternotomy wires are present. There is a right internal jugular Mediport with the tip in the distal SVC. Stable elevation of the right hemidiaphragm. Mild atelectatic changes are present within the right lung base,   stable. Patchy airspace disease is seen within the left midlung which appears unchanged. Indistinctness of the pulmonary vasculature are present, stable. No pneumothorax.      Impression:      Impression:  Stable mild patchy airspace disease present within the left midlung which may be related to atelectasis versus pneumonia. Stable right basilar atelectasis and elevation of the right hemidiaphragm. Stable cardiomegaly.      Electronically Signed: Deepa Healy MD    4/20/2024 5:11 AM EDT    Workstation ID: [id number]    XR Chest 1 View [[id number]] Collected: 04/18/24 1147     Updated: 04/18/24 1154    Narrative:      XR CHEST 1 VW    Date of Exam: 4/18/2024 11:24 AM EDT    Indication: hypotension    Comparison: X-ray dated 3/29/2024    Findings:  The trachea is midline. There is cardiomegaly with pulmonary vascular congestion. Diffuse bilateral reticular and airspace opacities suspicious for pulmonary edema. There are surgical changes in the heart and mediastinum. There is a right intrajugular   chest port in place      Impression:      Impression:  Cardiomegaly with pulmonary vascular congestion and pulmonary edema/CHF pattern      Electronically Signed: Diogenes Landaverde MD    4/18/2024 11:52 AM EDT    Workstation ID: [id number]            Results for orders placed " during the hospital encounter of 04/18/24    XR Chest 1 View    Narrative  XR CHEST 1 VW    Date of Exam: 4/20/2024 4:54 AM EDT    Indication: poss asp pnuemonia    Comparison: 4/18/2024, 5/20/2022.    Findings:  The heart appears enlarged. Median sternotomy wires are present. There is a right internal jugular Mediport with the tip in the distal SVC. Stable elevation of the right hemidiaphragm. Mild atelectatic changes are present within the right lung base,  stable. Patchy airspace disease is seen within the left midlung which appears unchanged. Indistinctness of the pulmonary vasculature are present, stable. No pneumothorax.    Impression  Impression:  Stable mild patchy airspace disease present within the left midlung which may be related to atelectasis versus pneumonia. Stable right basilar atelectasis and elevation of the right hemidiaphragm. Stable cardiomegaly.      Electronically Signed: Deepa Healy MD  4/20/2024 5:11 AM EDT  Workstation ID: [id number]      XR Chest 1 View    Narrative  XR CHEST 1 VW    Date of Exam: 4/18/2024 11:24 AM EDT    Indication: hypotension    Comparison: X-ray dated 3/29/2024    Findings:  The trachea is midline. There is cardiomegaly with pulmonary vascular congestion. Diffuse bilateral reticular and airspace opacities suspicious for pulmonary edema. There are surgical changes in the heart and mediastinum. There is a right intrajugular  chest port in place    Impression  Impression:  Cardiomegaly with pulmonary vascular congestion and pulmonary edema/CHF pattern      Electronically Signed: Diogenes Landaverde MD  4/18/2024 11:52 AM EDT  Workstation ID: [id number]      Results for orders placed during the hospital encounter of 03/27/24    XR Chest 1 View    Narrative  XR CHEST 1 VW    Date of Exam: 3/29/2024 4:37 PM EDT    Indication: wheezing    Comparison: AP portable chest 2/17/2014.    Findings:  Stable right hemidiaphragm elevation. Left midlung and right infrahilar airspace disease  is redemonstrated and is not thought to be significantly changed. There is stable moderate generalized cardiac enlargement with median sternotomy and CABG. Loop  recorder projects over the left lower mediastinum. Right chest wall jeremías catheter tip terminates at the lower SVC level. Right subclavian stent is noted. No acute osseous abnormality is identified.    Impression  Impression:    1. Left midlung and right infrahilar airspace disease appears unchanged from 2/28/2024. No new airspace disease is identified.  2. Stable cardiomegaly.      Electronically Signed: Mary Page MD  3/29/2024 4:40 PM EDT  Workstation ID: [id number]      Results for orders placed during the hospital encounter of 04/18/24    Duplex venous lower extremity right    Interpretation Summary    Acute right lower extremity superficial thrombophlebitis noted in the small saphenous.    All other right sided veins appeared normal.    Incidentally identified occluded right SFA.    The quality of the study is limited due to an uncooperative patient and patient positioning.        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 Followed by Dr Mclean  Chronic hypotension--on midodrine  A fib--anti coagulated  COPD  Lymphoma  Thrombocytopenia  ? RLE cellulitis  V tach run--cards consulted.  PNA  Anemia of CKD-EPO as needed with dialysis    Off midodrine  DNR now  Dialysis today  Prognosis poor.          Derrick Henry MD  [facility]  [phone]  04/21/24  08:44 EDT

## 2024-04-21 NOTE — PLAN OF CARE
Problem: Adult Inpatient Plan of Care  Goal: Plan of Care Review  Outcome: Ongoing, Not Progressing  Flowsheets (Taken 4/21/2024 0438)  Progress: no change  Plan of Care Reviewed With: durable power of   Goal: Patient-Specific Goal (Individualized)  Outcome: Ongoing, Not Progressing  Goal: Absence of Hospital-Acquired Illness or Injury  Outcome: Ongoing, Not Progressing  Intervention: Identify and Manage Fall Risk  Recent Flowsheet Documentation  Taken 4/21/2024 0400 by Lily Coe RN  Safety Promotion/Fall Prevention: safety round/check completed  Taken 4/21/2024 0200 by Lily Coe RN  Safety Promotion/Fall Prevention: safety round/check completed  Taken 4/21/2024 0001 by Lily Coe RN  Safety Promotion/Fall Prevention:   assistive device/personal items within reach   clutter free environment maintained   fall prevention program maintained   nonskid shoes/slippers when out of bed   room organization consistent   safety round/check completed  Taken 4/20/2024 2200 by Lily Coe RN  Safety Promotion/Fall Prevention: safety round/check completed  Taken 4/20/2024 2000 by Lily Coe RN  Safety Promotion/Fall Prevention:   assistive device/personal items within reach   clutter free environment maintained   fall prevention program maintained   nonskid shoes/slippers when out of bed   room organization consistent   safety round/check completed  Intervention: Prevent Skin Injury  Recent Flowsheet Documentation  Taken 4/20/2024 2000 by Lily Coe RN  Skin Protection:   adhesive use limited   tubing/devices free from skin contact  Intervention: Prevent and Manage VTE (Venous Thromboembolism) Risk  Recent Flowsheet Documentation  Taken 4/21/2024 0001 by Lily Coe RN  Activity Management: bedrest  Taken 4/20/2024 2000 by Lily Coe RN  Activity Management: bedrest  Intervention: Prevent Infection  Recent Flowsheet Documentation  Taken 4/21/2024 0001 by Saravanan  Lily FLEMING RN  Infection Prevention:   environmental surveillance performed   hand hygiene promoted   personal protective equipment utilized   single patient room provided  Taken 4/20/2024 2000 by Lily Coe RN  Infection Prevention:   environmental surveillance performed   hand hygiene promoted   personal protective equipment utilized   single patient room provided  Goal: Optimal Comfort and Wellbeing  Outcome: Ongoing, Not Progressing  Intervention: Provide Person-Centered Care  Recent Flowsheet Documentation  Taken 4/20/2024 2000 by Lily Coe RN  Trust Relationship/Rapport:   care explained   choices provided   questions encouraged   reassurance provided  Goal: Readiness for Transition of Care  Outcome: Ongoing, Not Progressing     Problem: Skin Injury Risk Increased  Goal: Skin Health and Integrity  Outcome: Ongoing, Not Progressing  Intervention: Optimize Skin Protection  Recent Flowsheet Documentation  Taken 4/20/2024 2000 by Lily Coe RN  Pressure Reduction Techniques:   weight shift assistance provided   positioned off wounds   heels elevated off bed  Head of Bed (HOB) Positioning: HOB at 20-30 degrees  Pressure Reduction Devices:   pressure-redistributing mattress utilized   positioning supports utilized  Skin Protection:   adhesive use limited   tubing/devices free from skin contact     Problem: Fall Injury Risk  Goal: Absence of Fall and Fall-Related Injury  Outcome: Ongoing, Not Progressing  Intervention: Identify and Manage Contributors  Recent Flowsheet Documentation  Taken 4/21/2024 0001 by Lily Coe RN  Medication Review/Management: medications reviewed  Taken 4/20/2024 2000 by Lily Coe RN  Medication Review/Management: medications reviewed  Intervention: Promote Injury-Free Environment  Recent Flowsheet Documentation  Taken 4/21/2024 0400 by Lily Coe RN  Safety Promotion/Fall Prevention: safety round/check completed  Taken 4/21/2024 0200 by Saravanan  Lily FLEMING RN  Safety Promotion/Fall Prevention: safety round/check completed  Taken 4/21/2024 0001 by Lily Coe RN  Safety Promotion/Fall Prevention:   assistive device/personal items within reach   clutter free environment maintained   fall prevention program maintained   nonskid shoes/slippers when out of bed   room organization consistent   safety round/check completed  Taken 4/20/2024 2200 by Lily Coe RN  Safety Promotion/Fall Prevention: safety round/check completed  Taken 4/20/2024 2000 by Lily Coe RN  Safety Promotion/Fall Prevention:   assistive device/personal items within reach   clutter free environment maintained   fall prevention program maintained   nonskid shoes/slippers when out of bed   room organization consistent   safety round/check completed     Problem: Asthma Comorbidity  Goal: Maintenance of Asthma Control  Outcome: Ongoing, Not Progressing  Intervention: Maintain Asthma Symptom Control  Recent Flowsheet Documentation  Taken 4/21/2024 0001 by Lily Coe RN  Medication Review/Management: medications reviewed  Taken 4/20/2024 2000 by Lily Coe RN  Medication Review/Management: medications reviewed     Problem: COPD (Chronic Obstructive Pulmonary Disease) Comorbidity  Goal: Maintenance of COPD Symptom Control  Outcome: Ongoing, Not Progressing  Intervention: Maintain COPD-Symptom Control  Recent Flowsheet Documentation  Taken 4/21/2024 0001 by Lily Coe RN  Medication Review/Management: medications reviewed  Taken 4/20/2024 2000 by Lily Coe RN  Supportive Measures:   active listening utilized   decision-making supported  Medication Review/Management: medications reviewed     Problem: Heart Failure Comorbidity  Goal: Maintenance of Heart Failure Symptom Control  Outcome: Ongoing, Not Progressing  Intervention: Maintain Heart Failure-Management  Recent Flowsheet Documentation  Taken 4/21/2024 0001 by Lily Coe RN  Medication  Review/Management: medications reviewed  Taken 4/20/2024 2000 by Lily Coe RN  Medication Review/Management: medications reviewed     Problem: Hypertension Comorbidity  Goal: Blood Pressure in Desired Range  Outcome: Ongoing, Not Progressing  Intervention: Maintain Blood Pressure Management  Recent Flowsheet Documentation  Taken 4/21/2024 0001 by Lily Coe RN  Medication Review/Management: medications reviewed  Taken 4/20/2024 2000 by Lily Coe RN  Medication Review/Management: medications reviewed     Problem: Restraint, Nonviolent  Goal: Absence of Harm or Injury  Outcome: Ongoing, Not Progressing  Intervention: Implement Least Restrictive Safety Strategies  Recent Flowsheet Documentation  Taken 4/21/2024 0001 by Liyl Coe RN  Medical Device Protection:   IV pole/bag removed from visual field   torso covered   tubing secured  Taken 4/20/2024 2000 by Lily Coe RN  Medical Device Protection:   IV pole/bag removed from visual field   torso covered   tubing secured  Diversional Activities: television  Intervention: Protect Dignity, Rights, and Personal Wellbeing  Recent Flowsheet Documentation  Taken 4/20/2024 2000 by Lily Coe RN  Trust Relationship/Rapport:   care explained   choices provided   questions encouraged   reassurance provided   Goal Outcome Evaluation:  Plan of Care Reviewed With: durable power of         Progress: no change

## 2024-04-22 LAB
ALBUMIN SERPL-MCNC: 3.5 G/DL (ref 3.5–5.2)
ALBUMIN SERPL-MCNC: 3.8 G/DL (ref 3.5–5.2)
ANION GAP SERPL CALCULATED.3IONS-SCNC: 10 MMOL/L (ref 5–15)
ANION GAP SERPL CALCULATED.3IONS-SCNC: 8 MMOL/L (ref 5–15)
BUN SERPL-MCNC: 15 MG/DL (ref 8–23)
BUN SERPL-MCNC: 20 MG/DL (ref 8–23)
BUN/CREAT SERPL: 5.1 (ref 7–25)
BUN/CREAT SERPL: 5.6 (ref 7–25)
CALCIUM SPEC-SCNC: 9.2 MG/DL (ref 8.2–9.6)
CALCIUM SPEC-SCNC: 9.3 MG/DL (ref 8.2–9.6)
CHLORIDE SERPL-SCNC: 101 MMOL/L (ref 98–107)
CHLORIDE SERPL-SCNC: 99 MMOL/L (ref 98–107)
CO2 SERPL-SCNC: 28 MMOL/L (ref 22–29)
CO2 SERPL-SCNC: 31 MMOL/L (ref 22–29)
CREAT SERPL-MCNC: 2.93 MG/DL (ref 0.76–1.27)
CREAT SERPL-MCNC: 3.55 MG/DL (ref 0.76–1.27)
EGFRCR SERPLBLD CKD-EPI 2021: 15.5 ML/MIN/1.73
EGFRCR SERPLBLD CKD-EPI 2021: 19.6 ML/MIN/1.73
GLUCOSE BLDC GLUCOMTR-MCNC: 75 MG/DL (ref 70–105)
GLUCOSE BLDC GLUCOMTR-MCNC: 75 MG/DL (ref 70–105)
GLUCOSE BLDC GLUCOMTR-MCNC: 77 MG/DL (ref 70–105)
GLUCOSE BLDC GLUCOMTR-MCNC: 79 MG/DL (ref 70–105)
GLUCOSE SERPL-MCNC: 80 MG/DL (ref 65–99)
GLUCOSE SERPL-MCNC: 82 MG/DL (ref 65–99)
INR PPP: 3.59 (ref 2–3)
INR PPP: 3.75 (ref 2–3)
MAGNESIUM SERPL-MCNC: 1.8 MG/DL (ref 1.7–2.3)
PHOSPHATE SERPL-MCNC: 3.5 MG/DL (ref 2.5–4.5)
PHOSPHATE SERPL-MCNC: 3.8 MG/DL (ref 2.5–4.5)
POTASSIUM SERPL-SCNC: 4.4 MMOL/L (ref 3.5–5.2)
POTASSIUM SERPL-SCNC: 4.6 MMOL/L (ref 3.5–5.2)
PROTHROMBIN TIME: 35.6 SECONDS (ref 19.4–28.5)
PROTHROMBIN TIME: 37.1 SECONDS (ref 19.4–28.5)
SODIUM SERPL-SCNC: 138 MMOL/L (ref 136–145)
SODIUM SERPL-SCNC: 139 MMOL/L (ref 136–145)

## 2024-04-22 PROCEDURE — 80069 RENAL FUNCTION PANEL: CPT | Performed by: INTERNAL MEDICINE

## 2024-04-22 PROCEDURE — 94761 N-INVAS EAR/PLS OXIMETRY MLT: CPT

## 2024-04-22 PROCEDURE — 83735 ASSAY OF MAGNESIUM: CPT | Performed by: NURSE PRACTITIONER

## 2024-04-22 PROCEDURE — 25010000002 PIPERACILLIN SOD-TAZOBACTAM PER 1 G: Performed by: NURSE PRACTITIONER

## 2024-04-22 PROCEDURE — 82533 TOTAL CORTISOL: CPT | Performed by: INTERNAL MEDICINE

## 2024-04-22 PROCEDURE — 85610 PROTHROMBIN TIME: CPT | Performed by: INTERNAL MEDICINE

## 2024-04-22 PROCEDURE — 94799 UNLISTED PULMONARY SVC/PX: CPT

## 2024-04-22 PROCEDURE — 99232 SBSQ HOSP IP/OBS MODERATE 35: CPT | Performed by: NURSE PRACTITIONER

## 2024-04-22 PROCEDURE — 82948 REAGENT STRIP/BLOOD GLUCOSE: CPT | Performed by: INTERNAL MEDICINE

## 2024-04-22 PROCEDURE — 94664 DEMO&/EVAL PT USE INHALER: CPT

## 2024-04-22 PROCEDURE — 82948 REAGENT STRIP/BLOOD GLUCOSE: CPT

## 2024-04-22 PROCEDURE — 25010000002 MAGNESIUM SULFATE 2 GM/50ML SOLUTION: Performed by: NURSE PRACTITIONER

## 2024-04-22 PROCEDURE — 0 DEXTROSE 5 % SOLUTION: Performed by: INTERNAL MEDICINE

## 2024-04-22 RX ORDER — MAGNESIUM SULFATE HEPTAHYDRATE 40 MG/ML
2 INJECTION, SOLUTION INTRAVENOUS ONCE
Status: COMPLETED | OUTPATIENT
Start: 2024-04-22 | End: 2024-04-22

## 2024-04-22 RX ORDER — ACETAMINOPHEN 650 MG/1
650 SUPPOSITORY RECTAL EVERY 4 HOURS PRN
Status: CANCELLED | OUTPATIENT
Start: 2024-04-22

## 2024-04-22 RX ADMIN — MAGNESIUM SULFATE HEPTAHYDRATE 2 G: 40 INJECTION, SOLUTION INTRAVENOUS at 13:37

## 2024-04-22 RX ADMIN — Medication 10 ML: at 19:56

## 2024-04-22 RX ADMIN — Medication 10 ML: at 19:58

## 2024-04-22 RX ADMIN — Medication 10 ML: at 12:09

## 2024-04-22 RX ADMIN — IPRATROPIUM BROMIDE AND ALBUTEROL SULFATE 3 ML: .5; 3 SOLUTION RESPIRATORY (INHALATION) at 11:16

## 2024-04-22 RX ADMIN — PIPERACILLIN AND TAZOBACTAM 3.38 G: 3; .375 INJECTION, POWDER, FOR SOLUTION INTRAVENOUS at 03:57

## 2024-04-22 RX ADMIN — PIPERACILLIN AND TAZOBACTAM 3.38 G: 3; .375 INJECTION, POWDER, FOR SOLUTION INTRAVENOUS at 17:21

## 2024-04-22 RX ADMIN — IPRATROPIUM BROMIDE AND ALBUTEROL SULFATE 3 ML: .5; 3 SOLUTION RESPIRATORY (INHALATION) at 15:30

## 2024-04-22 RX ADMIN — IPRATROPIUM BROMIDE AND ALBUTEROL SULFATE 3 ML: .5; 3 SOLUTION RESPIRATORY (INHALATION) at 07:17

## 2024-04-22 RX ADMIN — IPRATROPIUM BROMIDE AND ALBUTEROL SULFATE 3 ML: .5; 3 SOLUTION RESPIRATORY (INHALATION) at 18:20

## 2024-04-22 RX ADMIN — DEXTROSE MONOHYDRATE 50 ML/HR: 50 INJECTION, SOLUTION INTRAVENOUS at 19:54

## 2024-04-22 RX ADMIN — DEXTROSE MONOHYDRATE 50 ML/HR: 50 INJECTION, SOLUTION INTRAVENOUS at 04:02

## 2024-04-22 NOTE — CONSULTS
Nutrition Services    Patient Name: Barry Calhoun Sr.  YOB: 1932  MRN: [id number]  Admission date: 2024    Comment:    See MSA below.    Moderate chronic disease-related malnutrition related to diminished appetite in the setting of multiple chronic conditions as evidenced by moderate to severe (predominately moderate) muscle/fat wasting per NFPE, areas of 2+ moderate edema, and PO intake meeting < 75% of estimated energy requirement for > 1 month.    Will continue to monitor.    CLINICAL NUTRITION ASSESSMENT      Reason for Assessment  - wounds     H&P      Past Medical History:   Diagnosis Date    A-fib     Asthma     Chronic renal disease     COPD (chronic obstructive pulmonary disease)     Coronary heart disease     CABG    CVA (cerebral vascular accident)     recent CVA -- Patient and Family report this is not accurate    Gout     Hypertension        Past Surgical History:   Procedure Laterality Date    ARTERIOVENOUS FISTULA/SHUNT SURGERY Right 2022    Procedure: RIGHT ARM BRACHIAL CEPHALIC ARTERIAL VENOUS FISTULA;  Surgeon: Drew Gunter MD;  Location: [facility] MAIN OR;  Service: Vascular;  Laterality: Right;    CARDIAC CATHETERIZATION  2016    [facility]    CORONARY ARTERY BYPASS GRAFT      CABG X3, reoperation, 09; CAB and     OTHER SURGICAL HISTORY  2016    loop recorder implant         Current Problems   Acute on chronic hypotension  - family refusing midodrine  - appears slightly volume overloaded    ESRD on HD  - continuing HD per nephrology  - received HD     Right lower extremity cellulitis  - patient with mild erythema and edema    Chronic A-fib    Chronic thrombocytopenia  - may be related to underlying infectious process    COPD       Encounter Information        Trending Narrative      - Pt presented to [facility] via EMS from dialysis for hypotension. Family and care team with continued conversations about hospice, no decision made at this time -  "palliative following. Dietetic intern visited patient at bedside. Patient unable to answer questions at this time - family answered questions to the best of their ability. Family denies any known chewing/swallowing issues and food allergies. Pt's UBW fluctuates between 129-149 lbs per family (consistent with weight readings in EMR). Pt with DTPI to right lateral heel - not appropriate for [facility] at this time d/t pt's NPO status and ESRD. At this time, I do not anticipate pt would be appropriate for a PO diet - messaged attending to discuss feasibility of enteral nutrition if that level of support is desired at this time. Will continue to monitor and work up recommendations if enteral support is desired pending POC. NFPE completed, consistent with nutrition diagnosis of malnutrition using AND/ASPEN criteria. See MSA below.        Anthropometrics        Current Height, Weight Height: 175.3 cm (69\")  Weight: 72 kg (158 lb 11.7 oz) (04/22/24 0554)       Usual Body Weight (UBW) 129-149 lbs       Trending Weight Hx     This admission: 4/22 - 158 lbs             PTA: 3.7% weight loss x 1 mo (using wt from 3/27/24 - 164 lbs)  22% wt gain x 6 mo (using wt from 10/20/23 - 123 lbs)    Wt Readings from Last 30 Encounters:   04/22/24 0554 72 kg (158 lb 11.7 oz)   04/20/24 0759 68.1 kg (150 lb 2.1 oz)   04/19/24 0500 70.5 kg (155 lb 6.8 oz)   04/19/24 0224 70.5 kg (155 lb 6.8 oz)   04/18/24 1013 70 kg (154 lb 5.2 oz)   04/02/24 0500 69.3 kg (152 lb 12.5 oz)   04/01/24 0500 69 kg (152 lb 1.9 oz)   03/31/24 1059 68.9 kg (152 lb)   03/31/24 0500 69.2 kg (152 lb 8.9 oz)   03/30/24 0500 72.5 kg (159 lb 13.3 oz)   03/29/24 0517 73.5 kg (162 lb 0.6 oz)   03/28/24 1900 73.6 kg (162 lb 4.1 oz)   03/27/24 1755 74.7 kg (164 lb 10.9 oz)   02/17/24 0100 55 kg (121 lb 4.1 oz)   02/16/24 2124 54.4 kg (120 lb)   12/18/23 1415 59 kg (130 lb)   10/20/23 1300 55.8 kg (123 lb)   02/03/23 1505 59 kg (130 lb)   11/18/22 0648 60.7 kg (133 lb 13.1 oz) " "  08/26/22 0658 61.9 kg (136 lb 7.4 oz)   02/11/22 0613 59.1 kg (130 lb 4.8 oz)   02/03/22 1253 65.3 kg (144 lb)   12/20/21 1856 62.8 kg (138 lb 7.2 oz)   12/11/21 1618 72.1 kg (159 lb)   09/09/21 0952 72.1 kg (159 lb)   08/30/21 1020 71.7 kg (158 lb)   02/15/21 0918 75.8 kg (167 lb)   01/14/21 0919 73 kg (161 lb)   12/01/20 0924 70.8 kg (156 lb)   10/05/20 1134 73.9 kg (163 lb)   08/10/20 1001 75.8 kg (167 lb)   08/04/20 0932 77.1 kg (170 lb)   07/06/20 1332 78 kg (172 lb)   05/18/20 0858 78.9 kg (174 lb)   03/17/20 0945 77.6 kg (171 lb)   03/05/20 0810 78.1 kg (172 lb 1.6 oz)   02/18/20 0951 77.1 kg (170 lb)   02/11/20 0932 76.7 kg (169 lb)   02/04/20 1008 78.4 kg (172 lb 12.8 oz)   01/29/20 1108 78.5 kg (173 lb)   01/02/20 0926 77.6 kg (171 lb)   12/13/19 1035 76.2 kg (168 lb)   12/05/19 1135 76.7 kg (169 lb)      BMI kg/m2 Body mass index is 23.44 kg/m².       Labs        Pertinent Labs Reviewed. Management per attending.   Results from last 7 days   Lab Units 04/22/24  0118 04/21/24  0301 04/20/24  0431 04/18/24  1540 04/18/24  1051   SODIUM mmol/L 138 140 141   < > 140   POTASSIUM mmol/L 4.6 4.6 4.7   < > 4.0   CHLORIDE mmol/L 99 100 100   < > 102   CO2 mmol/L 31.0* 31.0* 30.0*   < > 31.0*   BUN mg/dL 15 24* 20   < > 32*   CREATININE mg/dL 2.93* 3.95* 2.95*   < > 4.34*   CALCIUM mg/dL 9.2 8.7 8.8   < > 8.9   BILIRUBIN mg/dL  --   --   --   --  0.5   ALK PHOS U/L  --   --   --   --  82   ALT (SGPT) U/L  --   --   --   --  12   AST (SGOT) U/L  --   --   --   --  36   GLUCOSE mg/dL 80 78 55*   < > 89    < > = values in this interval not displayed.     Results from last 7 days   Lab Units 04/22/24  0118 04/21/24  0301 04/20/24  0431 04/19/24  1307   MAGNESIUM mg/dL 1.8  --  2.2 1.9   PHOSPHORUS mg/dL 3.5 4.3 3.1  --    HEMOGLOBIN g/dL  --  8.7* 8.9*  --    HEMATOCRIT %  --  28.5* 28.5*  --      No results found for: \"HGBA1C\"     Medications    Scheduled Medications ipratropium-albuterol, 3 mL, Nebulization, 4x " Daily - RT  levothyroxine, 75 mcg, Oral, Q AM  lidocaine-prilocaine, , Topical, Once  lidocaine-prilocaine, , Topical, Once  [Held by provider] metoprolol succinate XL, 12.5 mg, Oral, Q24H  midodrine, 10 mg, Oral, TID AC  piperacillin-tazobactam, 3.375 g, Intravenous, Q12H  potassium chloride, 40 mEq, Oral, Once  QUEtiapine, 25 mg, Oral, Nightly  senna-docusate sodium, 2 tablet, Oral, BID  sodium chloride, 10 mL, Intravenous, Q12H  [Held by provider] warfarin, 4 mg, Oral, Daily        Infusions dextrose, 50 mL/hr, Last Rate: 50 mL/hr (04/22/24 0402)  Pharmacy to dose warfarin,         PRN Medications   acetaminophen    senna-docusate sodium **AND** polyethylene glycol **AND** bisacodyl **AND** bisacodyl    Calcium Replacement - Follow Nurse / BPA Driven Protocol    dextrose    glycopyrrolate    Magnesium Standard Dose Replacement - Follow Nurse / BPA Driven Protocol    nitroglycerin    Pharmacy to dose warfarin    Phosphorus Replacement - Follow Nurse / BPA Driven Protocol    Potassium Replacement - Follow Nurse / BPA Driven Protocol    [COMPLETED] Insert Peripheral IV **AND** sodium chloride    sodium chloride    sodium chloride     Physical Findings        Trending Physical   Appearance, NFPE 4/22 - NFPE completed, consistent with nutrition diagnosis of malnutrition using AND/ASPEN criteria. See MSA below.      --  Edema  2+ moderate edema to bilateral ankles, feet, and leg     Bowel Function + BM 4/21     Tubes No feeding tube in place     Chewing/Swallowing No documented chewing/swallowing issues     Skin DTPI to right lateral heel - WOCN following     --  Current Nutrition Orders & Evaluation of Intake       Oral Nutrition     Food Allergies NKFA   Current PO Diet NPO Diet NPO Type: Strict NPO   Supplement No supplement ordered   PO Evaluation     Trending % PO Intake 4/22 - Pt currently strict NPO, 0-25% PO intake before   --  Nutritional Risk Screening        NRS-2002 Score          Nutrition Diagnosis          Nutrition Dx Problem 1 Moderate chronic disease-related malnutrition related to diminished appetite in the setting of multiple chronic conditions as evidenced by moderate to severe (predominately moderate) muscle/fat wasting per NFPE, areas of 2+ moderate edema, and PO intake meeting < 75% of estimated energy requirement for > 1 month.      Nutrition Dx Problem 2        Intervention Goal         Intervention Goal(s) Diet advancement or enteral nutrition pending POC      Nutrition Intervention        RD Action NFPE completed  Continue to monitor for diet advancement and plan of care     Nutrition Prescription          Diet Prescription Strict NPO   Supplement Prescription No supplement ordered   --  Monitor/Evaluation        Monitor Per protocol, Pertinent labs, Weight, Symptoms, POC/GOC     Malnutrition Severity Assessment      Patient meets criteria for : (P) Moderate (non-severe) Malnutrition  Malnutrition Type (Last 8 Hours)       Malnutrition Severity Assessment       Row Name 04/22/24 1343       Malnutrition Severity Assessment    Malnutrition Type Chronic Disease - Related Malnutrition (P)       Row Name 04/22/24 1343       Insufficient Energy Intake     Insufficient Energy Intake Findings Moderate (P)     Insufficient Energy Intake  <75% of est. energy requirement for > or equal to 1 month (P)       Row Name 04/22/24 1343       Muscle Loss    Loss of Muscle Mass Findings Moderate (P)     Tenriism Region Moderate - slight depression (P)     Clavicle Bone Region Moderate - some protrusion in females, visible in males (P)     Acromion Bone Region Moderate - acromion may slightly protrude (P)     Dorsal Hand Region Moderate - slight depression (P)     Patellar Region Moderate - patella more prominent, less muscle definition around patella (P)       Row Name 04/22/24 1343       Fat Loss    Subcutaneous Fat Loss Findings Moderate (P)     Orbital Region  Moderate -  somewhat hollowness, slightly dark circles (P)      Upper Arm Region Severe - mostly skin, very little space between folds, fingers touch (P)       Row Name 04/22/24 1342       Fluid Accumulation (Edema)    Fluid Acumulation Findings Moderate (P)     Fluid Accumulation  Moderate equals 2+ pitting edema (P)       Row Name 04/22/24 6770       Criteria Met (Must meet criteria for severity in at least 2 of these categories: M Wasting, Fat Loss, Fluid, Secondary Signs, Wt. Status, Intake)    Patient meets criteria for  Moderate (non-severe) Malnutrition (P)                            Electronically signed by:  Adri Jensen  04/22/24 09:15 EDT

## 2024-04-22 NOTE — PROGRESS NOTES
"NEPHROLOGY PROGRESS NOTE------[facility]    [facility]  [phone]  Derrick Henry MD      Patient Care Team:  Pedro Purvis MD as PCP - General (Family Medicine)  Pedro Purvis MD as PCP - Family Medicine  Pino Mclean MD as Consulting Physician (Nephrology)      Provider:  Derrick Henry MD  Patient Name: Barry Calhoun Sr.  :  1932    SUBJECTIVE:  Follow-up ESRD  Somewhat confused  No chest pain or shortness of breath        Medication:  ipratropium-albuterol, 3 mL, Nebulization, 4x Daily - RT  levothyroxine, 75 mcg, Oral, Q AM  lidocaine-prilocaine, , Topical, Once  lidocaine-prilocaine, , Topical, Once  [Held by provider] metoprolol succinate XL, 12.5 mg, Oral, Q24H  midodrine, 10 mg, Oral, TID AC  piperacillin-tazobactam, 3.375 g, Intravenous, Q12H  potassium chloride, 40 mEq, Oral, Once  QUEtiapine, 25 mg, Oral, Nightly  senna-docusate sodium, 2 tablet, Oral, BID  sodium chloride, 10 mL, Intravenous, Q12H  [Held by provider] warfarin, 4 mg, Oral, Daily      dextrose, 50 mL/hr, Last Rate: 50 mL/hr (24 0402)  Pharmacy to dose warfarin,         OBJECTIVE    Vital Sign Min/Max for last 24 hours  Temp  Min: 96.8 °F (36 °C)  Max: 99.7 °F (37.6 °C)   BP  Min: 74/55  Max: 186/155   Pulse  Min: 70  Max: 80   Resp  Min: 14  Max: 21   SpO2  Min: 97 %  Max: 100 %   No data recorded   Weight  Min: 72 kg (158 lb 11.7 oz)  Max: 72 kg (158 lb 11.7 oz)     Flowsheet Rows      Flowsheet Row First Filed Value   Admission Height 175.3 cm (69\") Documented at 2024 1013   Admission Weight 70 kg (154 lb 5.2 oz) Documented at 2024 1013            No intake/output data recorded.  I/O last 3 completed shifts:  In: -   Out: 1500     Physical Exam:  General Appearance: No acute distress  Head: normocephalic, without obvious abnormality and atraumatic  Eyes: conjunctivae and sclerae normal and no icterus  Neck: supple and " "no JVD  Lungs: Scattered rhonchi  Heart: regular rhythm & normal rate and normal S1, S2  Chest: Wall no abnormalities observed  Abdomen: normal bowel sounds and soft, nontender  Extremities: moves extremities well, trace edema  Skin: Lower extremity erythema  Neurologic: Alert,    Labs:    WBC WBC   Date Value Ref Range Status   04/21/2024 5.15 3.40 - 10.80 10*3/mm3 Final   04/20/2024 4.55 3.40 - 10.80 10*3/mm3 Final      HGB Hemoglobin   Date Value Ref Range Status   04/21/2024 8.7 (L) 13.0 - 17.7 g/dL Final   04/20/2024 8.9 (L) 13.0 - 17.7 g/dL Final      HCT Hematocrit   Date Value Ref Range Status   04/21/2024 28.5 (L) 37.5 - 51.0 % Final   04/20/2024 28.5 (L) 37.5 - 51.0 % Final      Platelets No results found for: \"LABPLAT\"   MCV MCV   Date Value Ref Range Status   04/21/2024 105.9 (H) 79.0 - 97.0 fL Final   04/20/2024 102.5 (H) 79.0 - 97.0 fL Final          Sodium Sodium   Date Value Ref Range Status   04/22/2024 138 136 - 145 mmol/L Final   04/21/2024 140 136 - 145 mmol/L Final   04/20/2024 141 136 - 145 mmol/L Final   04/19/2024 142 136 - 145 mmol/L Final      Potassium Potassium   Date Value Ref Range Status   04/22/2024 4.6 3.5 - 5.2 mmol/L Final   04/21/2024 4.6 3.5 - 5.2 mmol/L Final   04/20/2024 4.7 3.5 - 5.2 mmol/L Final     Comment:     Slight hemolysis detected by analyzer. Result may be falsely elevated.  Result checked     04/19/2024 3.5 3.5 - 5.2 mmol/L Final      Chloride Chloride   Date Value Ref Range Status   04/22/2024 99 98 - 107 mmol/L Final   04/21/2024 100 98 - 107 mmol/L Final   04/20/2024 100 98 - 107 mmol/L Final   04/19/2024 100 98 - 107 mmol/L Final      CO2 CO2   Date Value Ref Range Status   04/22/2024 31.0 (H) 22.0 - 29.0 mmol/L Final   04/21/2024 31.0 (H) 22.0 - 29.0 mmol/L Final   04/20/2024 30.0 (H) 22.0 - 29.0 mmol/L Final   04/19/2024 30.0 (H) 22.0 - 29.0 mmol/L Final      BUN BUN   Date Value Ref Range Status   04/22/2024 15 8 - 23 mg/dL Final   04/21/2024 24 (H) 8 - 23 " "mg/dL Final   04/20/2024 20 8 - 23 mg/dL Final   04/19/2024 16 8 - 23 mg/dL Final      Creatinine Creatinine   Date Value Ref Range Status   04/22/2024 2.93 (H) 0.76 - 1.27 mg/dL Final   04/21/2024 3.95 (H) 0.76 - 1.27 mg/dL Final   04/20/2024 2.95 (H) 0.76 - 1.27 mg/dL Final   04/19/2024 2.23 (H) 0.76 - 1.27 mg/dL Final      Calcium Calcium   Date Value Ref Range Status   04/22/2024 9.2 8.2 - 9.6 mg/dL Final   04/21/2024 8.7 8.2 - 9.6 mg/dL Final   04/20/2024 8.8 8.2 - 9.6 mg/dL Final   04/19/2024 9.2 8.2 - 9.6 mg/dL Final      PO4 No components found for: \"PO4\"   Albumin Albumin   Date Value Ref Range Status   04/22/2024 3.8 3.5 - 5.2 g/dL Final   04/21/2024 3.7 3.5 - 5.2 g/dL Final   04/20/2024 4.0 3.5 - 5.2 g/dL Final      Magnesium Magnesium   Date Value Ref Range Status   04/22/2024 1.8 1.7 - 2.3 mg/dL Final   04/20/2024 2.2 1.7 - 2.3 mg/dL Final   04/19/2024 1.9 1.7 - 2.3 mg/dL Final      Uric Acid No components found for: \"URIC ACID\"     Imaging Results (Last 72 Hours)       Procedure Component Value Units Date/Time    XR Chest 1 View [[id number]] Collected: 04/20/24 0509     Updated: 04/20/24 0513    Narrative:      XR CHEST 1 VW    Date of Exam: 4/20/2024 4:54 AM EDT    Indication: poss asp pnuemonia    Comparison: 4/18/2024, 5/20/2022.    Findings:  The heart appears enlarged. Median sternotomy wires are present. There is a right internal jugular Mediport with the tip in the distal SVC. Stable elevation of the right hemidiaphragm. Mild atelectatic changes are present within the right lung base,   stable. Patchy airspace disease is seen within the left midlung which appears unchanged. Indistinctness of the pulmonary vasculature are present, stable. No pneumothorax.      Impression:      Impression:  Stable mild patchy airspace disease present within the left midlung which may be related to atelectasis versus pneumonia. Stable right basilar atelectasis and elevation of the right hemidiaphragm. Stable " cardiomegaly.      Electronically Signed: Deepa Healy MD    4/20/2024 5:11 AM EDT    Workstation ID: [id number]            Results for orders placed during the hospital encounter of 04/18/24    XR Chest 1 View    Narrative  XR CHEST 1 VW    Date of Exam: 4/20/2024 4:54 AM EDT    Indication: poss asp pnuemonia    Comparison: 4/18/2024, 5/20/2022.    Findings:  The heart appears enlarged. Median sternotomy wires are present. There is a right internal jugular Mediport with the tip in the distal SVC. Stable elevation of the right hemidiaphragm. Mild atelectatic changes are present within the right lung base,  stable. Patchy airspace disease is seen within the left midlung which appears unchanged. Indistinctness of the pulmonary vasculature are present, stable. No pneumothorax.    Impression  Impression:  Stable mild patchy airspace disease present within the left midlung which may be related to atelectasis versus pneumonia. Stable right basilar atelectasis and elevation of the right hemidiaphragm. Stable cardiomegaly.      Electronically Signed: Deepa Healy MD  4/20/2024 5:11 AM EDT  Workstation ID: [id number]      XR Chest 1 View    Narrative  XR CHEST 1 VW    Date of Exam: 4/18/2024 11:24 AM EDT    Indication: hypotension    Comparison: X-ray dated 3/29/2024    Findings:  The trachea is midline. There is cardiomegaly with pulmonary vascular congestion. Diffuse bilateral reticular and airspace opacities suspicious for pulmonary edema. There are surgical changes in the heart and mediastinum. There is a right intrajugular  chest port in place    Impression  Impression:  Cardiomegaly with pulmonary vascular congestion and pulmonary edema/CHF pattern      Electronically Signed: Diogenes Landaverde MD  4/18/2024 11:52 AM EDT  Workstation ID: [id number]      Results for orders placed during the hospital encounter of 03/27/24    XR Chest 1 View    Narrative  XR CHEST 1 VW    Date of Exam: 3/29/2024 4:37 PM EDT    Indication:  wheezing    Comparison: AP portable chest 2/17/2014.    Findings:  Stable right hemidiaphragm elevation. Left midlung and right infrahilar airspace disease is redemonstrated and is not thought to be significantly changed. There is stable moderate generalized cardiac enlargement with median sternotomy and CABG. Loop  recorder projects over the left lower mediastinum. Right chest wall jeremías catheter tip terminates at the lower SVC level. Right subclavian stent is noted. No acute osseous abnormality is identified.    Impression  Impression:    1. Left midlung and right infrahilar airspace disease appears unchanged from 2/28/2024. No new airspace disease is identified.  2. Stable cardiomegaly.      Electronically Signed: Mary Page MD  3/29/2024 4:40 PM EDT  Workstation ID: [id number]      Results for orders placed during the hospital encounter of 04/18/24    Duplex venous lower extremity right    Interpretation Summary    Acute right lower extremity superficial thrombophlebitis noted in the small saphenous.    All other right sided veins appeared normal.    Incidentally identified occluded right SFA.    The quality of the study is limited due to an uncooperative patient and patient positioning.        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 Followed by Dr Mclean  Chronic hypotension--on midodrine  A fib--anti coagulated  COPD  Lymphoma  Thrombocytopenia  ? RLE cellulitis  V tach run--cards consulted.  PNA  Anemia of CKD-EPO as needed with dialysis    Off midodrine  DNR  now  Dialysis in am. BP has been soft  Prognosis poor.  Palliative care consulted          Derrick Henry MD  [facility]  [phone]  04/22/24  11:43 EDT

## 2024-04-22 NOTE — SIGNIFICANT NOTE
04/22/24 1018   OTHER   Discipline physical therapist   Rehab Time/Intention   Session Not Performed unable to evaluate, medical status change  (spoke with RN, pt is minmally able to participate and not appropriate for skilled PT services at this time.  Please re-order if appropriate.)   Therapy Assessment/Plan (PT)   Criteria for Skilled Interventions Met (PT) no;does not meet criteria for skilled intervention

## 2024-04-22 NOTE — PLAN OF CARE
Goal Outcome Evaluation:      Pt obtunded for most of shift. Would occasionally call out for s/o. Appears comfortable. Continue to turn q2h, and off-load heels. S/o and /or grandson present at bedside for duration of shift. Continue to provide support to family. Long discussion with grandson ZEINAB re: placement of NG tube for nutrition. Per grandson, he would like to speak with family regarding this option. Did state that he does not want any type of permanent feeding tube placed. He will discuss with family next steps to take in pt care, would like to take pt home if they choose hospice services. Conversation went well.

## 2024-04-22 NOTE — CASE MANAGEMENT/SOCIAL WORK
Continued Stay Note  DEISY Simone     Patient Name: Barry Calhoun .  MRN: [id number]  Today's Date: 4/22/2024    Admit Date: 4/18/2024    Plan: Return to [facility], skilled, No precert or PASRR needed. OP HD [facility] MWF. Anticipate EMS transport. [facility] hospice following.   Discharge Plan       Row Name 04/22/24 1443       Plan    Plan Return to [facility], skilled, No precert or PASRR needed. OP HD [facility] MWF. Anticipate EMS transport. [facility] hospice following.    Patient/Family in Agreement with Plan yes    Plan Comments LH- declined in basket.  Patient was made DNR/ DNI over weekend.   [facility] discussed possible plans and being prepared if patient continues to decline.  [facility] will be talking to the rest of the family and plans to discuss with [facility] after family has discussed.  BArriers to discharge: IVF with dextrose, IV abx.  Hypotension.  Runs of vtach.   Nephro and Cardio following.             Expected Discharge Date and Time       Expected Discharge Date Expected Discharge Time    Apr 24, 2024           Carly Elliott RN     Office Phone [phone]  Office Cell [phone]

## 2024-04-22 NOTE — PLAN OF CARE
Goal Outcome Evaluation:  Plan of Care Reviewed With: spouse        Progress: no change  Outcome Evaluation: pt resting, occassional opening eyes. Family at bedside with .

## 2024-04-22 NOTE — PROGRESS NOTES
[facility] CARDIOLOGY  [facility]        LOS:  LOS: 4 days   Patient Name: Barry Calhoun Sr.  Age/Sex: 89 y.o. male  : 1932  MRN: [id number]    Day of Service: 24   Length of Stay: 4  Encounter Provider: MAISHA Tian  Place of Service: [facility] CARDIOLOGY  Patient Care Team:  Pedro Purvis MD as PCP - General (Family Medicine)  Pedro Purvis MD as PCP - Family Medicine  Pino Mclean MD as Consulting Physician (Nephrology)    Subjective:     Chief Complaint:  F/U VT    Subjective:   Patient being assisted with bed bath.  Somewhat combative.  Denies chest pain or SOA.  Primary nurse reports there is a disagreement between patient's 2 POAs regarding goals of care.  He is being considered for hospice.      Current Medications:   Scheduled Meds:ipratropium-albuterol, 3 mL, Nebulization, 4x Daily - RT  levothyroxine, 75 mcg, Oral, Q AM  lidocaine-prilocaine, , Topical, Once  lidocaine-prilocaine, , Topical, Once  [Held by provider] metoprolol succinate XL, 12.5 mg, Oral, Q24H  midodrine, 10 mg, Oral, TID AC  piperacillin-tazobactam, 3.375 g, Intravenous, Q12H  potassium chloride, 40 mEq, Oral, Once  QUEtiapine, 25 mg, Oral, Nightly  senna-docusate sodium, 2 tablet, Oral, BID  sodium chloride, 10 mL, Intravenous, Q12H  [Held by provider] warfarin, 4 mg, Oral, Daily      Continuous Infusions:dextrose, 50 mL/hr, Last Rate: 50 mL/hr (24 0402)  Pharmacy to dose warfarin,         Allergies:  No Known Allergies    ROS    Objective:     Temp:  [96.8 °F (36 °C)-99.7 °F (37.6 °C)] 99 °F (37.2 °C)  Heart Rate:  [70-80] 80  Resp:  [14-21] 16  BP: ()/() 84/44     Intake/Output Summary (Last 24 hours) at 2024 1158  Last data filed at 2024 0800  Gross per 24 hour   Intake 0 ml   Output 1500 ml   Net -1500 ml     Body mass index is 23.44 kg/m².      24  0500 24  0750  "04/22/24  0554   Weight: 70.5 kg (155 lb 6.8 oz) 68.1 kg (150 lb 2.1 oz) 72 kg (158 lb 11.7 oz)         Physical Exam:  Neuro:  CV:  Resp:  GI:  Ext:  Tele: Confused, combative  S1S2 RRR, no murmur  Nonlabored, CTA  BS+, abd soft  Pedal pulses palp, no edema  Afib with CVR with NSVT                                                   Lab Review:   Results from last 7 days   Lab Units 04/22/24  0118 04/21/24  0301 04/18/24  1540 04/18/24  1051   SODIUM mmol/L 138 140   < > 140   POTASSIUM mmol/L 4.6 4.6   < > 4.0   CHLORIDE mmol/L 99 100   < > 102   CO2 mmol/L 31.0* 31.0*   < > 31.0*   BUN mg/dL 15 24*   < > 32*   CREATININE mg/dL 2.93* 3.95*   < > 4.34*   GLUCOSE mg/dL 80 78   < > 89   CALCIUM mg/dL 9.2 8.7   < > 8.9   AST (SGOT) U/L  --   --   --  36   ALT (SGPT) U/L  --   --   --  12    < > = values in this interval not displayed.     Results from last 7 days   Lab Units 04/18/24  1254 04/18/24  1051   HSTROP T ng/L 226* 225*     Results from last 7 days   Lab Units 04/21/24  0301 04/20/24  0431   WBC 10*3/mm3 5.15 4.55   HEMOGLOBIN g/dL 8.7* 8.9*   HEMATOCRIT % 28.5* 28.5*   PLATELETS 10*3/mm3 55* 47*     Results from last 7 days   Lab Units 04/22/24  0118 04/21/24  0301   INR  3.75* 3.12*     Results from last 7 days   Lab Units 04/22/24  0118 04/20/24  0431   MAGNESIUM mg/dL 1.8 2.2           Invalid input(s): \"LDLCALC\"            Recent Radiology:  Imaging Results (Most Recent)       Procedure Component Value Units Date/Time    XR Chest 1 View [[id number]] Collected: 04/20/24 0509     Updated: 04/20/24 0513    Narrative:      XR CHEST 1 VW    Date of Exam: 4/20/2024 4:54 AM EDT    Indication: poss asp pnuemonia    Comparison: 4/18/2024, 5/20/2022.    Findings:  The heart appears enlarged. Median sternotomy wires are present. There is a right internal jugular Mediport with the tip in the distal SVC. Stable elevation of the right hemidiaphragm. Mild atelectatic changes are present within the right lung base, "   stable. Patchy airspace disease is seen within the left midlung which appears unchanged. Indistinctness of the pulmonary vasculature are present, stable. No pneumothorax.      Impression:      Impression:  Stable mild patchy airspace disease present within the left midlung which may be related to atelectasis versus pneumonia. Stable right basilar atelectasis and elevation of the right hemidiaphragm. Stable cardiomegaly.      Electronically Signed: Deepa Healy MD    4/20/2024 5:11 AM EDT    Workstation ID: [id number]    XR Chest 1 View [[id number]] Collected: 04/18/24 1147     Updated: 04/18/24 1154    Narrative:      XR CHEST 1 VW    Date of Exam: 4/18/2024 11:24 AM EDT    Indication: hypotension    Comparison: X-ray dated 3/29/2024    Findings:  The trachea is midline. There is cardiomegaly with pulmonary vascular congestion. Diffuse bilateral reticular and airspace opacities suspicious for pulmonary edema. There are surgical changes in the heart and mediastinum. There is a right intrajugular   chest port in place      Impression:      Impression:  Cardiomegaly with pulmonary vascular congestion and pulmonary edema/CHF pattern      Electronically Signed: Diogenes Landaverde MD    4/18/2024 11:52 AM EDT    Workstation ID: [id number]            ECHOCARDIOGRAM:    Results for orders placed during the hospital encounter of 03/27/24    Adult Transthoracic Echo Complete W/ Cont if Necessary Per Protocol    Interpretation Summary    Left ventricular ejection fraction appears to be 61 - 65%.    Left ventricular diastolic function is consistent with (grade II w/high LAP) pseudonormalization.    Left atrial volume is severely increased.    Abnormal mitral valve structure consistent with dilated annulus.    Moderate to severe mitral valve regurgitation is present.    Moderate to severe tricuspid valve regurgitation is present.    Estimated right ventricular systolic pressure from tricuspid regurgitation is markedly elevated (>55  mmHg).    Consider transesophageal echocardiogram to better assess severity of regurgitant lesions if clinically indicated.        I reviewed the patient's new clinical results.    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    Atrial fibrillation    Chronic diastolic heart failure    Chronic obstructive pulmonary disease    Coronary heart disease    Status post placement of implantable loop recorder    Weakness    ESRD (end stage renal disease)    Chronic anticoagulation    Acute on chronic heart failure with preserved ejection fraction (HFpEF)    1) Hypotension  - on midodrine    2) Hx NSVT  - prior EP study without inducible VT  - 2D echo 3/2024 showed EF 61-65% with grade 2 diastolic dysfunction, moderate to severe MR/TR  - not able to get beta blocker d/t hypotension    3) permanent afib    4) CAD s/p reop CABG 2009 and PCI in 2016    5) ESRD   - on dialysis  - nephrology following    6) hx CVA    7) COPD    8) non-hodgkins lymphoma    9) dementia    10) HTN    11) gout    Plan:   Tele shows SR with 10 beat run VT earlier.  This is not new for patient, and he has been unable to tolerate beta blocker d/t hypotension.  K 4.6.  I have check Mg 1.8 and will give 2 grams IV Mg.  Continue conservative management per Dr. Foley.  Palliative is following for goals of care.  Patient is being considered for hospice.  Will see PRN.  Call if needed.        Electronically signed by MAISHA Tian, 04/22/24, 12:11 PM EDT.

## 2024-04-22 NOTE — PROGRESS NOTES
[facility] MEDICINE SERVICE  DAILY PROGRESS NOTE    NAME: Barry Calhoun Sr.  : 1932  MRN: [id number]      LOS: 4 days     PROVIDER OF SERVICE: Suhail Zacarias MD    Chief Complaint: Hypotension    Subjective:       Subjective       Chief Complaint: Hypertension     History of Present Illness: Barry Calhoun Sr. is a 89 y.o. male with a CMH of ESRD, A-fib on Coumadin, CABG, CVA, hypothrombocytopenia, COPD, lymphoma, gout, dementia who presented to [facility] on 2024 with hypotension.  The patient arrived from hemodialysis due to hypotension.  The patient is nonverbal and history could not be taken.  The patient was evaluated by cardiology.  Cardiology and nephrology will decide on further management.  The patient will get dialysis in the hospital.  The patient was found to have warm right leg suggestive of cellulitis      Interval History:  24- Patient is doing well today.  Appears to be at his baseline.  Does have some mild cough with congestion.  24 seen in bed NAD, DON RN BG 55, no able to take po, family had been refusing midodrine.  Patient fluid bolus 500 cc  Start D5 50 cc an hour.  BP 73/31, monitor BNP last 54,423.   2024 patient seen and examined in bed no acute distress, blood pressure has improved today.  1954.  Received albumin, for hemodialysis,  Family still have not decided to hospice.  2024 patient seen and examined in bed no acute distress, BP still low.  Family at bedside, discussed with RN, having episodes of V. tach.  Cardiology following.    Review of Systems  Ten point ROS: reviewed negative, unless as noted in HPI.  Objective:     Vital Signs  Temp:  [96.8 °F (36 °C)-99.7 °F (37.6 °C)] 99 °F (37.2 °C)  Heart Rate:  [70-79] 79  Resp:  [14-22] 17  BP: ()/() 84/44  Flow (L/min):  [2] 2   Body mass index is 23.44 kg/m².    Physical Exam  General Appearance:  Alert, cooperative, no distress, appears stated age  Head:   Normocephalic, without obvious abnormality, atraumatic  Eyes:  PERRL, conjunctiva/corneas clear, EOM's intact, fundi benign, both eyes  Ears:  Normal TM's and external ear canals, both ears  Nose: Nares normal, septum midline, mucosa normal, no drainage or sinus tenderness  Throat: Lips, mucosa, and tongue normal; teeth and gums normal  Neck: Supple, symmetrical, trachea midline, no adenopathy, thyroid: not enlarged, symmetric, no tenderness/mass/nodules, no carotid bruit or JVD  Lungs:   Mild right lung rhonchi  Heart:  Regular rate and rhythm, S1, S2 normal, no murmur, rub or gallop  Abdomen:  Soft, non-tender, bowel sounds active all four quadrants,  no masses, no organomegaly  Extremities: Extremities normal, atraumatic, no cyanosis or edema  Pulses: 2+ and symmetric  Skin: Skin color, texture, turgor normal, no rashes or lesions  Neurologic: Normal    Scheduled Meds   ipratropium-albuterol, 3 mL, Nebulization, 4x Daily - RT  levothyroxine, 75 mcg, Oral, Q AM  lidocaine-prilocaine, , Topical, Once  lidocaine-prilocaine, , Topical, Once  [Held by provider] metoprolol succinate XL, 12.5 mg, Oral, Q24H  midodrine, 10 mg, Oral, TID AC  piperacillin-tazobactam, 3.375 g, Intravenous, Q12H  potassium chloride, 40 mEq, Oral, Once  QUEtiapine, 25 mg, Oral, Nightly  senna-docusate sodium, 2 tablet, Oral, BID  sodium chloride, 10 mL, Intravenous, Q12H  [Held by provider] warfarin, 4 mg, Oral, Daily       PRN Meds     acetaminophen    senna-docusate sodium **AND** polyethylene glycol **AND** bisacodyl **AND** bisacodyl    Calcium Replacement - Follow Nurse / BPA Driven Protocol    dextrose    glycopyrrolate    Magnesium Standard Dose Replacement - Follow Nurse / BPA Driven Protocol    nitroglycerin    Pharmacy to dose warfarin    Phosphorus Replacement - Follow Nurse / BPA Driven Protocol    Potassium Replacement - Follow Nurse / BPA Driven Protocol    [COMPLETED] Insert Peripheral IV **AND** sodium chloride    sodium  chloride    sodium chloride   Infusions  dextrose, 50 mL/hr, Last Rate: 50 mL/hr (04/22/24 0402)  Pharmacy to dose warfarin,           Diagnostic Data    Results from last 7 days   Lab Units 04/22/24  0118 04/21/24  0301 04/18/24  1540 04/18/24  1051   WBC 10*3/mm3  --  5.15   < > 4.20   HEMOGLOBIN g/dL  --  8.7*   < > 9.4*   HEMATOCRIT %  --  28.5*   < > 30.1*   PLATELETS 10*3/mm3  --  55*   < > 46*   GLUCOSE mg/dL 80 78   < > 89   CREATININE mg/dL 2.93* 3.95*   < > 4.34*   BUN mg/dL 15 24*   < > 32*   SODIUM mmol/L 138 140   < > 140   POTASSIUM mmol/L 4.6 4.6   < > 4.0   AST (SGOT) U/L  --   --   --  36   ALT (SGPT) U/L  --   --   --  12   ALK PHOS U/L  --   --   --  82   BILIRUBIN mg/dL  --   --   --  0.5   ANION GAP mmol/L 8.0 9.0   < > 7.0    < > = values in this interval not displayed.       No radiology results for the last day      I reviewed the patient's new clinical results.    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       Acute on chronic hypotension  -Patient has history of chronic hypotension on midodrine  -Slight acute worsening of hypertension may be related to underlying infectious process  -Family refusing midodrine  -x1 fluid boluses   -patient is a dialysis  patient and was slightly volume overloaded  -Treatment for cellulitis with IV antibiotics    ESRD on HD  -Continue HD per nephrology  -Receiving HD treatment today    Right lower extremity cellulitis  -Patient does have some mild erythema and edema with warm and tender to touch  -Started on IV Rocephin  -Transition to oral antibiotics in the next 24 hours    Chronic A-fib  -Continue warfarin despite thrombocytopenia    Chronic thrombocytopenia  -Platelet count lower than baseline at 47 today  -May be related to underlying infectious process  -No evidence of active bleeding and therefore no indication for platelet transfusion    COPD  -Continue inhalers  Oxygen titration    DVT prophylaxis:  Medical DVT prophylaxis orders are present.     Code status is   Code Status and Medical Interventions:   Ordered at: 04/20/24 0039     Medical Intervention Limits:    NO intubation (DNI)     Level Of Support Discussed With:    Next of Kin (If No Surrogate)     Code Status (Patient has no pulse and is not breathing):    No CPR (Do Not Attempt to Resuscitate)     Medical Interventions (Patient has pulse or is breathing):    Limited Support     Plan for disposition: Awaiting palliative care evaluation.  Patient's prognosis is overall guarded.    Time: 30 minutes    Signature: Electronically signed by Suhail Zacarias MD, 04/22/24, 11:04 EDT.  [facility] Hospitalist Team

## 2024-04-22 NOTE — PROGRESS NOTES
"Pharmacy dosing service  Anticoagulant  Warfarin     Subjective:    Barry Calhoun Sr. is a 89 y.o.male being continued on warfarin for Atrial Fibrillation / Flutter.    INR Goal: 2 - 3  Home medication?: warfarin 4 mg PO daily  Bridge Therapy Present?:  No  Interacting Medications Evaluation (New/Present/Discontinued): Zosyn (may increase INR)  Additional Contributing Factors: ESRD, lymphoma, TCP      Assessment/Plan:    INR is supra-therapeutic today. Will hold warfarin today.    Continue to monitor and adjust based on INR.         Date 4/18 4/19 4/20 4/21 4/22       INR 2.74 2.85 2.86 3.12 3.75       Dose 4 mg 4 mg 4 mg Hold Hold           Objective:  [Ht: 175.3 cm (69\"); Wt: 72 kg (158 lb 11.7 oz); BMI: Body mass index is 23.44 kg/m².]    Lab Results   Component Value Date    ALBUMIN 3.8 04/22/2024     Lab Results   Component Value Date    INR 3.75 (H) 04/22/2024    INR 3.12 (H) 04/21/2024    INR 2.86 04/20/2024    PROTIME 37.1 (H) 04/22/2024    PROTIME 31.3 (H) 04/21/2024    PROTIME 28.8 (H) 04/20/2024     Lab Results   Component Value Date    HGB 8.7 (L) 04/21/2024    HGB 8.9 (L) 04/20/2024    HGB 9.6 (L) 04/19/2024     Lab Results   Component Value Date    HCT 28.5 (L) 04/21/2024    HCT 28.5 (L) 04/20/2024    HCT 30.2 (L) 04/19/2024       Keshia Quigley, PharmD  04/22/24 09:43 EDT   "

## 2024-04-23 LAB
BACTERIA SPEC AEROBE CULT: NORMAL
BACTERIA SPEC AEROBE CULT: NORMAL
CORTIS SERPL-MCNC: 6.86 MCG/DL
GLUCOSE BLDC GLUCOMTR-MCNC: 107 MG/DL (ref 70–105)
GLUCOSE BLDC GLUCOMTR-MCNC: 76 MG/DL (ref 70–105)
GLUCOSE BLDC GLUCOMTR-MCNC: 77 MG/DL (ref 70–105)
GLUCOSE BLDC GLUCOMTR-MCNC: 87 MG/DL (ref 70–105)

## 2024-04-23 PROCEDURE — 94761 N-INVAS EAR/PLS OXIMETRY MLT: CPT

## 2024-04-23 PROCEDURE — 25010000002 PIPERACILLIN SOD-TAZOBACTAM PER 1 G: Performed by: NURSE PRACTITIONER

## 2024-04-23 PROCEDURE — 92610 EVALUATE SWALLOWING FUNCTION: CPT

## 2024-04-23 PROCEDURE — 94799 UNLISTED PULMONARY SVC/PX: CPT

## 2024-04-23 PROCEDURE — 0 DEXTROSE 5 % SOLUTION: Performed by: INTERNAL MEDICINE

## 2024-04-23 PROCEDURE — 94664 DEMO&/EVAL PT USE INHALER: CPT

## 2024-04-23 PROCEDURE — 82948 REAGENT STRIP/BLOOD GLUCOSE: CPT

## 2024-04-23 RX ORDER — ACETAMINOPHEN 650 MG/1
650 SUPPOSITORY RECTAL EVERY 6 HOURS PRN
Status: DISCONTINUED | OUTPATIENT
Start: 2024-04-23 | End: 2024-04-25 | Stop reason: HOSPADM

## 2024-04-23 RX ORDER — ALBUMIN (HUMAN) 12.5 G/50ML
12.5 SOLUTION INTRAVENOUS AS NEEDED
Status: DISCONTINUED | OUTPATIENT
Start: 2024-04-23 | End: 2024-04-24

## 2024-04-23 RX ORDER — LIDOCAINE AND PRILOCAINE 25; 25 MG/G; MG/G
CREAM TOPICAL ONCE
Status: DISCONTINUED | OUTPATIENT
Start: 2024-04-23 | End: 2024-04-25 | Stop reason: HOSPADM

## 2024-04-23 RX ADMIN — PIPERACILLIN AND TAZOBACTAM 3.38 G: 3; .375 INJECTION, POWDER, FOR SOLUTION INTRAVENOUS at 15:47

## 2024-04-23 RX ADMIN — IPRATROPIUM BROMIDE AND ALBUTEROL SULFATE 3 ML: .5; 3 SOLUTION RESPIRATORY (INHALATION) at 14:10

## 2024-04-23 RX ADMIN — IPRATROPIUM BROMIDE AND ALBUTEROL SULFATE 3 ML: .5; 3 SOLUTION RESPIRATORY (INHALATION) at 18:14

## 2024-04-23 RX ADMIN — DEXTROSE MONOHYDRATE 50 ML/HR: 50 INJECTION, SOLUTION INTRAVENOUS at 15:50

## 2024-04-23 RX ADMIN — Medication 10 ML: at 21:38

## 2024-04-23 RX ADMIN — IPRATROPIUM BROMIDE AND ALBUTEROL SULFATE 3 ML: .5; 3 SOLUTION RESPIRATORY (INHALATION) at 06:30

## 2024-04-23 RX ADMIN — PIPERACILLIN AND TAZOBACTAM 3.38 G: 3; .375 INJECTION, POWDER, FOR SOLUTION INTRAVENOUS at 03:11

## 2024-04-23 RX ADMIN — IPRATROPIUM BROMIDE AND ALBUTEROL SULFATE 3 ML: .5; 3 SOLUTION RESPIRATORY (INHALATION) at 10:38

## 2024-04-23 RX ADMIN — ACETAMINOPHEN 650 MG: 650 SUPPOSITORY RECTAL at 19:40

## 2024-04-23 NOTE — PLAN OF CARE
Goal Outcome Evaluation:  Pt observed w/ trials of ice chips and water by spoon and straw to clinically assess swallow function. All trials fed by signficant other or SLP. Pt required frequent cues to maintain alertness during evaluation. Oral phase characterized by reduced labial seal and anterior loss w/ thins by spoon. Pt able to adequately pull from straw. Functional manipulation of ice chips. Intermittent cough reaction w/ thin liquids by spoon and straw. Pt then began to refuse further trials and presented w/ decreased alertness. Pt does not appear safe for a PO diet at this time. Recommend pt remain NPO w/ [employer] Water Protocol. Much education completed w/ signficant other regarding dysphagia and NPO rationale. Significant other does not wish for pt to have alt means for nutrition at this time. Discussed evaluation w/ pt's RN. ST will continue to follow for ongoing re evaluation w/ further recs as indicated.    The rationale to recommend water when a PO diet cannot appropriately/functionally sustain nutrition is because water is low risk for aspiration pna when compared to aspiration of food or other liquids.    Benefits of a water protocol include but are not limited to:     Oral gratification   Engagement of oropharyngeal swallow musculature   Decrease likelihood of dehydration      Guidelines for proper implementation include:  Waiting a minimum of 30 minutes after consuming any other PO   Thorough oral care prior to consuming water  Upright at 90 degree hip flexion  Small sips at slow rate  Monitor for any changes in respiratory status and discontinue if distress noted    The [employer] Free Water Protocol is a research based protocol established in 1984.           SLP Swallowing Diagnosis: oral dysphagia, suspected pharyngeal dysphagia (04/23/24 1500)

## 2024-04-23 NOTE — PROGRESS NOTES
"Pharmacy dosing service  Anticoagulant  Warfarin     Subjective:    Barry Calhoun Sr. is a 89 y.o.male being continued on warfarin for Atrial Fibrillation / Flutter.    INR Goal: 2 - 3  Home medication?: warfarin 4 mg PO daily  Bridge Therapy Present?:  No  Interacting Medications Evaluation (New/Present/Discontinued): Zosyn (may increase INR)  Additional Contributing Factors: ESRD, lymphoma, TCP      Assessment/Plan:    INR remains supra-therapeutic today. Will hold warfarin again.    Continue to monitor and adjust based on INR.         Date 4/18 4/19 4/20 4/21 4/22 4/23      INR 2.74 2.85 2.86 3.12 3.75 3.59      Dose 4 mg 4 mg 4 mg Hold Hold Hold          Objective:  [Ht: 175.3 cm (69\"); Wt: 70.7 kg (155 lb 13.8 oz); BMI: Body mass index is 23.02 kg/m².]    Lab Results   Component Value Date    ALBUMIN 3.5 04/22/2024     Lab Results   Component Value Date    INR 3.59 (H) 04/22/2024    INR 3.75 (H) 04/22/2024    INR 3.12 (H) 04/21/2024    PROTIME 35.6 (H) 04/22/2024    PROTIME 37.1 (H) 04/22/2024    PROTIME 31.3 (H) 04/21/2024     Lab Results   Component Value Date    HGB 8.7 (L) 04/21/2024    HGB 8.9 (L) 04/20/2024    HGB 9.6 (L) 04/19/2024     Lab Results   Component Value Date    HCT 28.5 (L) 04/21/2024    HCT 28.5 (L) 04/20/2024    HCT 30.2 (L) 04/19/2024       Keshia Quigley, PharmD  04/23/24 09:14 EDT   "

## 2024-04-23 NOTE — PROGRESS NOTES
"NEPHROLOGY PROGRESS NOTE------[facility]    [facility]  [phone]  Derrick Henry MD      Patient Care Team:  Pedro Purvis MD as PCP - General (Family Medicine)  Pedro Purvis MD as PCP - Family Medicine  Pino Mclean MD as Consulting Physician (Nephrology)      Provider:  Derrick Henry MD  Patient Name: Barry Calhoun Sr.  :  1932    SUBJECTIVE:  Follow-up ESRD  Somewhat confused  No chest pain or shortness of breath        Medication:  ipratropium-albuterol, 3 mL, Nebulization, 4x Daily - RT  levothyroxine, 75 mcg, Oral, Q AM  [Held by provider] metoprolol succinate XL, 12.5 mg, Oral, Q24H  midodrine, 10 mg, Oral, TID AC  piperacillin-tazobactam, 3.375 g, Intravenous, Q12H  potassium chloride, 40 mEq, Oral, Once  QUEtiapine, 25 mg, Oral, Nightly  senna-docusate sodium, 2 tablet, Oral, BID  sodium chloride, 10 mL, Intravenous, Q12H  [Held by provider] warfarin, 4 mg, Oral, Daily      dextrose, 50 mL/hr, Last Rate: 50 mL/hr (24)  Pharmacy to dose warfarin,         OBJECTIVE    Vital Sign Min/Max for last 24 hours  Temp  Min: 98.6 °F (37 °C)  Max: 99.9 °F (37.7 °C)   BP  Min: 82/34  Max: 108/51   Pulse  Min: 72  Max: 85   Resp  Min: 15  Max: 21   SpO2  Min: 99 %  Max: 100 %   No data recorded   Weight  Min: 70.7 kg (155 lb 13.8 oz)  Max: 70.7 kg (155 lb 13.8 oz)     Flowsheet Rows      Flowsheet Row First Filed Value   Admission Height 175.3 cm (69\") Documented at 2024 1013   Admission Weight 70 kg (154 lb 5.2 oz) Documented at 2024 1013            No intake/output data recorded.  No intake/output data recorded.    Physical Exam:  General Appearance: No acute distress  Head: normocephalic, without obvious abnormality and atraumatic  Eyes: conjunctivae and sclerae normal and no icterus  Neck: supple and no JVD  Lungs: Scattered rhonchi  Heart: regular rhythm & normal rate and normal S1, " "S2  Chest: Wall no abnormalities observed  Abdomen: normal bowel sounds and soft, nontender  Extremities: moves extremities well, trace edema  Skin: Lower extremity erythema  Neurologic: Alert,    Labs:    WBC WBC   Date Value Ref Range Status   04/21/2024 5.15 3.40 - 10.80 10*3/mm3 Final      HGB Hemoglobin   Date Value Ref Range Status   04/21/2024 8.7 (L) 13.0 - 17.7 g/dL Final      HCT Hematocrit   Date Value Ref Range Status   04/21/2024 28.5 (L) 37.5 - 51.0 % Final      Platelets No results found for: \"LABPLAT\"   MCV MCV   Date Value Ref Range Status   04/21/2024 105.9 (H) 79.0 - 97.0 fL Final          Sodium Sodium   Date Value Ref Range Status   04/22/2024 139 136 - 145 mmol/L Final   04/22/2024 138 136 - 145 mmol/L Final   04/21/2024 140 136 - 145 mmol/L Final      Potassium Potassium   Date Value Ref Range Status   04/22/2024 4.4 3.5 - 5.2 mmol/L Final   04/22/2024 4.6 3.5 - 5.2 mmol/L Final   04/21/2024 4.6 3.5 - 5.2 mmol/L Final      Chloride Chloride   Date Value Ref Range Status   04/22/2024 101 98 - 107 mmol/L Final   04/22/2024 99 98 - 107 mmol/L Final   04/21/2024 100 98 - 107 mmol/L Final      CO2 CO2   Date Value Ref Range Status   04/22/2024 28.0 22.0 - 29.0 mmol/L Final   04/22/2024 31.0 (H) 22.0 - 29.0 mmol/L Final   04/21/2024 31.0 (H) 22.0 - 29.0 mmol/L Final      BUN BUN   Date Value Ref Range Status   04/22/2024 20 8 - 23 mg/dL Final   04/22/2024 15 8 - 23 mg/dL Final   04/21/2024 24 (H) 8 - 23 mg/dL Final      Creatinine Creatinine   Date Value Ref Range Status   04/22/2024 3.55 (H) 0.76 - 1.27 mg/dL Final   04/22/2024 2.93 (H) 0.76 - 1.27 mg/dL Final   04/21/2024 3.95 (H) 0.76 - 1.27 mg/dL Final      Calcium Calcium   Date Value Ref Range Status   04/22/2024 9.3 8.2 - 9.6 mg/dL Final   04/22/2024 9.2 8.2 - 9.6 mg/dL Final   04/21/2024 8.7 8.2 - 9.6 mg/dL Final      PO4 No components found for: \"PO4\"   Albumin Albumin   Date Value Ref Range Status   04/22/2024 3.5 3.5 - 5.2 g/dL Final " "  04/22/2024 3.8 3.5 - 5.2 g/dL Final   04/21/2024 3.7 3.5 - 5.2 g/dL Final      Magnesium Magnesium   Date Value Ref Range Status   04/22/2024 1.8 1.7 - 2.3 mg/dL Final      Uric Acid No components found for: \"URIC ACID\"     Imaging Results (Last 72 Hours)       ** No results found for the last 72 hours. **            Results for orders placed during the hospital encounter of 04/18/24    XR Chest 1 View    Narrative  XR CHEST 1 VW    Date of Exam: 4/20/2024 4:54 AM EDT    Indication: poss asp pnuemonia    Comparison: 4/18/2024, 5/20/2022.    Findings:  The heart appears enlarged. Median sternotomy wires are present. There is a right internal jugular Mediport with the tip in the distal SVC. Stable elevation of the right hemidiaphragm. Mild atelectatic changes are present within the right lung base,  stable. Patchy airspace disease is seen within the left midlung which appears unchanged. Indistinctness of the pulmonary vasculature are present, stable. No pneumothorax.    Impression  Impression:  Stable mild patchy airspace disease present within the left midlung which may be related to atelectasis versus pneumonia. Stable right basilar atelectasis and elevation of the right hemidiaphragm. Stable cardiomegaly.      Electronically Signed: Deepa Healy MD  4/20/2024 5:11 AM EDT  Workstation ID: [id number]      XR Chest 1 View    Narrative  XR CHEST 1 VW    Date of Exam: 4/18/2024 11:24 AM EDT    Indication: hypotension    Comparison: X-ray dated 3/29/2024    Findings:  The trachea is midline. There is cardiomegaly with pulmonary vascular congestion. Diffuse bilateral reticular and airspace opacities suspicious for pulmonary edema. There are surgical changes in the heart and mediastinum. There is a right intrajugular  chest port in place    Impression  Impression:  Cardiomegaly with pulmonary vascular congestion and pulmonary edema/CHF pattern      Electronically Signed: Diogenes Landaverde MD  4/18/2024 11:52 AM " EDT  Workstation ID: [id number]      Results for orders placed during the hospital encounter of 03/27/24    XR Chest 1 View    Narrative  XR CHEST 1 VW    Date of Exam: 3/29/2024 4:37 PM EDT    Indication: wheezing    Comparison: AP portable chest 2/17/2014.    Findings:  Stable right hemidiaphragm elevation. Left midlung and right infrahilar airspace disease is redemonstrated and is not thought to be significantly changed. There is stable moderate generalized cardiac enlargement with median sternotomy and CABG. Loop  recorder projects over the left lower mediastinum. Right chest wall jeremías catheter tip terminates at the lower SVC level. Right subclavian stent is noted. No acute osseous abnormality is identified.    Impression  Impression:    1. Left midlung and right infrahilar airspace disease appears unchanged from 2/28/2024. No new airspace disease is identified.  2. Stable cardiomegaly.      Electronically Signed: Mary Page MD  3/29/2024 4:40 PM EDT  Workstation ID: [id number]      Results for orders placed during the hospital encounter of 04/18/24    Duplex venous lower extremity right    Interpretation Summary    Acute right lower extremity superficial thrombophlebitis noted in the small saphenous.    All other right sided veins appeared normal.    Incidentally identified occluded right SFA.    The quality of the study is limited due to an uncooperative patient and patient positioning.        ASSESSMENT / PLAN      Hypotension    Non-Hodgkin's lymphoma    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    Atrial fibrillation    Chronic diastolic heart failure    Chronic obstructive pulmonary disease    Coronary heart disease    Status post placement of implantable loop recorder    Weakness    ESRD (end stage renal disease)    Chronic anticoagulation    Acute on chronic heart failure with preserved ejection fraction (HFpEF)    Moderate malnutrition    ESRD--HD TTS. Followed by   [facility]  Chronic hypotension--on midodrine  A fib--anti coagulated  COPD  Lymphoma  Thrombocytopenia  ? RLE cellulitis  V tach run--cards consulted.  PNA  Anemia of CKD-EPO as needed with dialysis    Off midodrine  DNR now  Dialysis Tuesday Thursday Saturday  Prognosis poor.  Palliative care consulted          Derrick Henry MD  [facility]  [phone]  04/23/24  11:27 EDT

## 2024-04-23 NOTE — THERAPY EVALUATION
Acute Care - Speech Language Pathology   Swallow Initial Evaluation  Simone     Patient Name: Barry Calhoun Sr.  : 1932  MRN: [id number]  Today's Date: 2024               Admit Date: 2024    Visit Dx:     ICD-10-CM ICD-9-CM   1. Hypotension, unspecified hypotension type  I95.9 458.9   2. Stage 5 chronic kidney disease on chronic dialysis  N18.6 585.6    Z99.2 V45.11   3. Venous embolism and thrombosis of superficial vessels of right lower extremity  I82.811 453.6   4. Cellulitis of right lower extremity  L03.115 682.6   5. Nonsustained ventricular tachycardia  I47.29 427.1     Patient Active Problem List   Diagnosis    Non-Hodgkin's lymphoma    Encounter for care related to vascular access port    Pancytopenia    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    Anemia due to chronic kidney disease stage III    Atrial fibrillation    Monitoring of monoclonal antibody treatment for malignancy    Gross hematuria, chronic    Healthcare maintenance    Moderate to severe mitral regurgitation    Acute renal failure    Asthma    Cerebrovascular accident (CVA)    Chronic diastolic heart failure    Chronic obstructive pulmonary disease    Coronary heart disease    Nonsustained paroxysmal ventricular tachycardia    Palpitations    Status post placement of implantable loop recorder    Stomach cancer    Weakness    Loss of consciousness    ESRD (end stage renal disease)    Syncope and collapse    Thrombocytopenia    Syncope    Chronic anticoagulation    Acute on chronic heart failure with preserved ejection fraction (HFpEF)    Hypotension    Moderate malnutrition     Past Medical History:   Diagnosis Date    A-fib     Asthma     Chronic renal disease     COPD (chronic obstructive pulmonary disease)     Coronary heart disease     CABG    CVA (cerebral vascular accident)     recent CVA -- Patient and Family report this is not accurate    Gout     Hypertension      Past Surgical  History:   Procedure Laterality Date    ARTERIOVENOUS FISTULA/SHUNT SURGERY Right 2022    Procedure: RIGHT ARM BRACHIAL CEPHALIC ARTERIAL VENOUS FISTULA;  Surgeon: Drew Gunter MD;  Location: [facility] MAIN OR;  Service: Vascular;  Laterality: Right;    CARDIAC CATHETERIZATION  2016    [facility]    CORONARY ARTERY BYPASS GRAFT      CABG X3, reoperation, 09; CAB and     OTHER SURGICAL HISTORY  2016    loop recorder implant        SLP Recommendation and Plan  SLP Swallowing Diagnosis: oral dysphagia, suspected pharyngeal dysphagia (24)  SLP Diet Recommendation: NPO, temporary alternate methods of nutrition/hydration, other (see comments) ([place] Water Protocol) (24)     SLP Rec. for Method of Medication Administration: meds via alternate route (24)     Monitor for Signs of Aspiration: yes, notify SLP if any concerns (24)  Recommended Diagnostics: reassess via clinical swallow evaluation (24)  Swallow Criteria for Skilled Therapeutic Interventions Met: demonstrates skilled criteria (24)     Rehab Potential/Prognosis, Swallowing: re-evaluate goals as necessary (24)  Therapy Frequency (Swallow): PRN (24)  Predicted Duration Therapy Intervention (Days): until discharge (24)  Oral Care Recommendations: Oral Care BID/PRN, Before ice/water (24)          SWALLOW EVALUATION (Last 72 Hours)       SLP Adult Swallow Evaluation       Row Name 24       Rehab Evaluation    Document Type evaluation  -LF    Patient Observations lethargic  -LF    Patient Effort fair  -LF    Symptoms Noted During/After Treatment fatigue  -LF       General Information    Patient Profile Reviewed yes  -LF    Pertinent History Of Current Problem Pt is a 89 y/o male who presented to [facility] d/t hypotension. CMH of ESRD, A-fib on Coumadin, CABG, CVA, hypothrombocytopenia, COPD, lymphoma, gout, dementia. CXR revealed  stable mild patchy airspace disease within the left mid lung. Family discussing GOC w/ medical team and undecided on plan of care. ST orders placed for swallowing per family request to assess PO safety.   -LF    Current Method of Nutrition NPO  -LF    Precautions/Limitations, Vision difficult to assess  -LF    Precautions/Limitations, Hearing difficult to assess  -LF    Prior Level of Function-Swallowing no diet consistency restrictions  -LF    Plans/Goals Discussed with patient and family  -LF    Barriers to Rehab previous functional deficit  -LF       Oral Motor Structure and Function    Oral Lesions or Structural Abnormalities and/or variants GILLIAN d/t pt unable to consistently follow commands  -LF    Secretion Management WNL/WFL  -LF    Mucosal Quality moist, healthy  -LF       General Eating/Swallowing Observations    Respiratory Support Currently in Use nasal cannula  -LF    O2 Liters 2L  -LF    Eating/Swallowing Skills fed by staff/caregiver;fed by SLP  -LF    Positioning During Eating upright 90 degree;upright in bed  -LF    Utensils Used spoon;straw  -LF    Consistencies Trialed ice chips;thin liquids  -LF       Clinical Swallow Eval    Clinical Swallow Evaluation Summary Pt observed w/ trials of ice chips and water by spoon and straw to clinically assess swallow function. All trials fed by signficant other or SLP. Pt required frequent cues to maintain alertness during evaluation. Oral phase characterized by reduced labial seal and anterior loss w/ thins by spoon. Pt able to adequately pull from straw. Functional manipulation of ice chips. Intermittent cough reaction w/ thin liquids by spoon and straw. Pt then began to refuse further trials and presented w/ decreased alertness. Pt does not appear safe for a PO diet at this time. Recommend pt remain NPO w/ Khalil Water Protocol. Much education completed w/ signficant other regarding dysphagia and NPO rationale. Significant other does not wish for pt to have  alt means for nutrition at this time. Discussed evaluation w/ pt's RN. ST will continue to follow for ongoing re evaluation w/ further recs as indicated.    The rationale to recommend water when a PO diet cannot appropriately/functionally sustain nutrition is because water is low risk for aspiration pna when compared to aspiration of food or other liquids.    Benefits of a water protocol include but are not limited to:     Oral gratification   Engagement of oropharyngeal swallow musculature   Decrease likelihood of dehydration      Guidelines for proper implementation include:  Waiting a minimum of 30 minutes after consuming any other PO   Thorough oral care prior to consuming water  Upright at 90 degree hip flexion  Small sips at slow rate  Monitor for any changes in respiratory status and discontinue if distress noted    The Khalil Free Water Protocol is a research based protocol established in 1984.     -       SLP Evaluation Clinical Impression    SLP Swallowing Diagnosis oral dysphagia;suspected pharyngeal dysphagia  -    Functional Impact risk of aspiration/pneumonia;risk of malnutrition;risk of dehydration  -    Rehab Potential/Prognosis, Swallowing re-evaluate goals as necessary  -    Swallow Criteria for Skilled Therapeutic Interventions Met demonstrates skilled criteria  -       Recommendations    Therapy Frequency (Swallow) PRN  -    Predicted Duration Therapy Intervention (Days) until discharge  -    SLP Diet Recommendation NPO;temporary alternate methods of nutrition/hydration;other (see comments)  Remi Water Protocol  -    Recommended Diagnostics reassess via clinical swallow evaluation  -    Oral Care Recommendations Oral Care BID/PRN;Before ice/water  -    SLP Rec. for Method of Medication Administration meds via alternate route  -    Monitor for Signs of Aspiration yes;notify SLP if any concerns  -       Swallow Goals (SLP)    Swallow LTGs Swallow Long Term Goal (free  text)  -LF    Swallow STGs diet tolerance goal selection (SLP)  -LF    Diet Tolerance Goal Selection (SLP) Swallow Short Term Goal 1  -LF       (LTG) Swallow    (LTG) Swallow Pt will maximize swallow function for least restrictive PO diet, exhibiting no complication associated with dysphagia, adequate PO intake, and demonstrating independent use of swallow compensation.  -LF    Time Frame (Swallow Long Term Goal) by discharge  -LF    Progress/Outcomes (Swallow Long Term Goal) new goal  -LF       (STG) Swallow 1    (STG) Swallow 1 Patient will participate in ongoing assessment of swallow, including reevaluation clinically and/or including instrumental assessment of swallow if indicated, to further assess swallow function and return to oral nutrition  -LF    Time Frame (Swallow Short Term Goal 1) 1 week  -LF    Progress/Outcomes (Swallow Short Term Goal 1) new goal  -LF              User Key  (r) = Recorded By, (t) = Taken By, (c) = Cosigned By      Initials Name Effective Dates    Hanane Quevedo SLP 06/16/21 -                     EDUCATION  The patient has been educated in the following areas:   Dysphagia (Swallowing Impairment) Oral Care/Hydration NPO rationale.                Time Calculation:                JAYLEN Mckeon  4/23/2024

## 2024-04-23 NOTE — PLAN OF CARE
"Goal Outcome Evaluation:         No significant changes today. Family at bedside. S/O states she gave pt water, and he swallowed it \"just fine\". Family requesting swallow eval. MD notified, and new order received. ST elizabeth completed, and recommend pt continue NPO status at this time.                                      "

## 2024-04-23 NOTE — PROGRESS NOTES
Nutrition Services    Patient Name:  Barry Calhoun Sr.  YOB: 1932  MRN: [id number]  Admit Date:  4/18/2024    Brief progress note to check on plan for nutrition. Grandson speaking with family and possibility of TF, but has stated that permanent feeding tube would not be desired within plan of care. Given this, palliative/hospice level of care would be most appropriate, from a nutrition standpoint. Pt is not eating.     RD remains available for nutrition support recommendations, if indicated. Will continue to follow along for goal of care decisions.    Electronically signed by:  Rina Cook RD  04/23/24 10:47 EDT

## 2024-04-23 NOTE — CASE MANAGEMENT/SOCIAL WORK
Social Work Assessment  [facility]     Patient Name: Barry Calhoun Sr.  MRN: [id number]  Today's Date: 4/23/2024    Admit Date: 4/18/2024     Psychosocial       Row Name 04/23/24 0922       Values/Beliefs    Spiritual, Cultural Beliefs, Advent Practices, Values that Affect Care no       Behavior WDL    Behavior WDL --  GILLIAN       Mental Health    Little Interest or Pleasure in Doing Things 98-->patient unable to answer    Feeling Down, Depressed or Hopeless 98-->patient unable to answer       Intellectual Performance WDL    Level of Consciousness Responds to Voice       Stress    Do you feel stress - tense, restless, nervous, or anxious, or unable to sleep at night because your mind is troubled all the time - these days? Pt Unable       Coping/Stress    Major Change/Loss/Stressor unable to assess;medical condition/diagnosis    Patient Personal Strengths strong support system    Sources of Support unable to assess    Techniques to [place] with Loss/Stress/Change other (see comments)  GILLIAN    Reaction to Health Status unable to assess    Understanding of Condition and Treatment unable to comprehend    Coping/Stress Comments LSW completed chart review for LACE screen. At present time, family is considering next steps for patient. Patient has ACP documents on file appointing his grandson (Vernon) as POA. Patient has had an overall declined related to his medical conditions, dementia being one. Last admissions, patient transitioned to a LTC facility to receive 24/7 care. Patient is unable to participate in screening questions.       Developmental Stage (Eriksson's)    Developmental Stage Stage 8 (65 years-death/Late Adulthood) Integrity vs. Despair       C-SSRS (Recent)    Q1 Wished to be Dead (Past Month) no  Pulled forward from admission.    Q2 Suicidal Thoughts (Past Month) no    Q6 Suicide Behavior (Lifetime) no       Violence Risk    Feels Like Hurting Others no    Previous Attempt to Harm Others no                    Abuse/Neglect       Row Name 04/23/24 0926       Personal Safety    Feels Unsafe at Home or Work/School no    Feels Threatened by Someone no    Does Anyone Try to Keep You From Having Contact with Others or Doing Things Outside Your Home? no    Physical Signs of Abuse Present no                   Legal       Row Name 04/23/24 0926       Financial Resource Strain    How hard is it for you to pay for the very basics like food, housing, medical care, and heating? Pt Unable       Financial/Legal    Source of Income social security       Legal    Criminal Activity/Legal Involvement none                   Substance Abuse       Row Name 04/23/24 0927       Substance Use    Substance Use Status never used       AUDIT-C (Alcohol Use Disorders ID Test)    Q1: How often do you have a drink containing alcohol? Never    Q2: How many drinks containing alcohol do you have on a typical day when you are drinking? None    Q3: How often do you have six or more drinks on one occasion? Never    Audit-C Score 0             SAM Vaca, JOSEW, CCM    Phone: [phone]  Cell: [phone]  Fax: [phone]  [email or web address]

## 2024-04-23 NOTE — PROGRESS NOTES
[facility] MEDICINE SERVICE  DAILY PROGRESS NOTE    NAME: Barry Calhoun Sr.  : 1932  MRN: [id number]      LOS: 5 days     PROVIDER OF SERVICE: Suhail Zacarias MD    Chief Complaint: Hypotension    Subjective:       Subjective       Chief Complaint: Hypertension     History of Present Illness: Barry Calhoun Sr. is a 89 y.o. male with a CMH of ESRD, A-fib on Coumadin, CABG, CVA, hypothrombocytopenia, COPD, lymphoma, gout, dementia who presented to [facility] on 2024 with hypotension.  The patient arrived from hemodialysis due to hypotension.  The patient is nonverbal and history could not be taken.  The patient was evaluated by cardiology.  Cardiology and nephrology will decide on further management.  The patient will get dialysis in the hospital.  The patient was found to have warm right leg suggestive of cellulitis      Interval History:  24- Patient is doing well today.  Appears to be at his baseline.  Does have some mild cough with congestion.  24 seen in bed NAD, DON RN BG 55, no able to take po, family had been refusing midodrine.  Patient fluid bolus 500 cc  Start D5 50 cc an hour.  BP 73/31, monitor BNP last 54,423.   2024 patient seen and examined in bed no acute distress, blood pressure has improved today.  1954.  Received albumin, for hemodialysis,  Family still have not decided to hospice.  2024 patient seen and examined in bed no acute distress, BP still low.  Family at bedside, discussed with RN, having episodes of V. tach.  Cardiology following.  2024 patient seen and examined in bed no acute distress, vital signs stable, discussed with RN, still on D5.  Family not wanting NGt.    Review of Systems  Ten point ROS: reviewed negative, unless as noted in HPI.  Objective:     Vital Signs  Temp:  [98.6 °F (37 °C)-99.9 °F (37.7 °C)] 99.7 °F (37.6 °C)  Heart Rate:  [72-85] 79  Resp:  [15-21] 20  BP: ()/(34-68) 106/39  Flow (L/min):  [2] 2    Body mass index is 23.02 kg/m².    Physical Exam  General Appearance:  Alert, cooperative, no distress, appears stated age  Head:  Normocephalic, without obvious abnormality, atraumatic  Eyes:  PERRL, conjunctiva/corneas clear, EOM's intact, fundi benign, both eyes  Ears:  Normal TM's and external ear canals, both ears  Nose: Nares normal, septum midline, mucosa normal, no drainage or sinus tenderness  Throat: Lips, mucosa, and tongue normal; teeth and gums normal  Neck: Supple, symmetrical, trachea midline, no adenopathy, thyroid: not enlarged, symmetric, no tenderness/mass/nodules, no carotid bruit or JVD  Lungs:   Mild right lung rhonchi  Heart:  Regular rate and rhythm, S1, S2 normal, no murmur, rub or gallop  Abdomen:  Soft, non-tender, bowel sounds active all four quadrants,  no masses, no organomegaly  Extremities: Extremities normal, atraumatic, no cyanosis or edema  Pulses: 2+ and symmetric  Skin: Skin color, texture, turgor normal, no rashes or lesions  Neurologic: Normal    Scheduled Meds   ipratropium-albuterol, 3 mL, Nebulization, 4x Daily - RT  levothyroxine, 75 mcg, Oral, Q AM  [Held by provider] metoprolol succinate XL, 12.5 mg, Oral, Q24H  midodrine, 10 mg, Oral, TID AC  piperacillin-tazobactam, 3.375 g, Intravenous, Q12H  potassium chloride, 40 mEq, Oral, Once  QUEtiapine, 25 mg, Oral, Nightly  senna-docusate sodium, 2 tablet, Oral, BID  sodium chloride, 10 mL, Intravenous, Q12H  [Held by provider] warfarin, 4 mg, Oral, Daily       PRN Meds     acetaminophen    senna-docusate sodium **AND** polyethylene glycol **AND** bisacodyl **AND** bisacodyl    Calcium Replacement - Follow Nurse / BPA Driven Protocol    dextrose    glycopyrrolate    Magnesium Standard Dose Replacement - Follow Nurse / BPA Driven Protocol    nitroglycerin    Pharmacy to dose warfarin    Phosphorus Replacement - Follow Nurse / BPA Driven Protocol    Potassium Replacement - Follow Nurse / BPA Driven Protocol    [COMPLETED]  Insert Peripheral IV **AND** sodium chloride    sodium chloride    sodium chloride   Infusions  dextrose, 50 mL/hr, Last Rate: 50 mL/hr (04/22/24 1954)  Pharmacy to dose warfarin,           Diagnostic Data    Results from last 7 days   Lab Units 04/22/24  2330 04/22/24  0118 04/21/24  0301 04/18/24  1540 04/18/24  1051   WBC 10*3/mm3  --   --  5.15   < > 4.20   HEMOGLOBIN g/dL  --   --  8.7*   < > 9.4*   HEMATOCRIT %  --   --  28.5*   < > 30.1*   PLATELETS 10*3/mm3  --   --  55*   < > 46*   GLUCOSE mg/dL 82   < > 78   < > 89   CREATININE mg/dL 3.55*   < > 3.95*   < > 4.34*   BUN mg/dL 20   < > 24*   < > 32*   SODIUM mmol/L 139   < > 140   < > 140   POTASSIUM mmol/L 4.4   < > 4.6   < > 4.0   AST (SGOT) U/L  --   --   --   --  36   ALT (SGPT) U/L  --   --   --   --  12   ALK PHOS U/L  --   --   --   --  82   BILIRUBIN mg/dL  --   --   --   --  0.5   ANION GAP mmol/L 10.0   < > 9.0   < > 7.0    < > = values in this interval not displayed.       No radiology results for the last day      I reviewed the patient's new clinical results.    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       Acute on chronic hypotension  -Patient has history of chronic  hypotension on midodrine  -Slight acute worsening of hypertension may be related to underlying infectious process  -Family refusing midodrine  -x1 fluid boluses   -patient is a dialysis patient and was slightly volume overloaded  -Treatment for cellulitis with IV antibiotics    ESRD on HD  -Continue HD per nephrology  -Receiving HD treatment 4/22/23    Right lower extremity cellulitis  -Patient does have some mild erythema and edema with warm and tender to touch  -Started on IV Rocephin  -Transition to oral antibiotics in the next 24 hours    Chronic A-fib  -Continue warfarin despite thrombocytopenia    Chronic thrombocytopenia  -Platelet count lower than baseline at 47 today  -May be related to underlying infectious process  -No evidence of active bleeding and therefore no indication for platelet transfusion    COPD  -Continue inhalers  Oxygen titration    DVT prophylaxis:  Medical DVT prophylaxis orders are present.     Code status is   Code Status and Medical Interventions:   Ordered at: 04/20/24 0039     Medical Intervention Limits:    NO intubation (DNI)     Level Of Support Discussed With:    Next of Kin (If No Surrogate)     Code Status (Patient has no pulse and is not breathing):    No CPR (Do Not Attempt to Resuscitate)     Medical Interventions (Patient has pulse or is breathing):    Limited Support     Plan for disposition: Awaiting palliative care evaluation.  Patient's prognosis is overall guarded.    Time: 30 minutes    Signature: Electronically signed by Suhail Zacarias MD, 04/23/24, 11:55 EDT.  [facility] Hospitalist Team

## 2024-04-23 NOTE — PLAN OF CARE
Goal Outcome Evaluation:      Family remains at bedside, VSS, patient afebrile at this time, no external indicators of pain or soa noted at this time.

## 2024-04-24 LAB
ALBUMIN SERPL-MCNC: 3.4 G/DL (ref 3.5–5.2)
ANION GAP SERPL CALCULATED.3IONS-SCNC: 10 MMOL/L (ref 5–15)
BUN SERPL-MCNC: 24 MG/DL (ref 8–23)
BUN/CREAT SERPL: 4.9 (ref 7–25)
CALCIUM SPEC-SCNC: 9.1 MG/DL (ref 8.2–9.6)
CHLORIDE SERPL-SCNC: 96 MMOL/L (ref 98–107)
CO2 SERPL-SCNC: 28 MMOL/L (ref 22–29)
CREAT SERPL-MCNC: 4.87 MG/DL (ref 0.76–1.27)
EGFRCR SERPLBLD CKD-EPI 2021: 10.6 ML/MIN/1.73
GLUCOSE BLDC GLUCOMTR-MCNC: 77 MG/DL (ref 70–105)
GLUCOSE BLDC GLUCOMTR-MCNC: 81 MG/DL (ref 70–105)
GLUCOSE BLDC GLUCOMTR-MCNC: 88 MG/DL (ref 70–105)
GLUCOSE SERPL-MCNC: 85 MG/DL (ref 65–99)
INR PPP: 2.91 (ref 2–3)
PHOSPHATE SERPL-MCNC: 4.5 MG/DL (ref 2.5–4.5)
POTASSIUM SERPL-SCNC: 4.5 MMOL/L (ref 3.5–5.2)
PROTHROMBIN TIME: 29.3 SECONDS (ref 19.4–28.5)
SODIUM SERPL-SCNC: 134 MMOL/L (ref 136–145)

## 2024-04-24 PROCEDURE — 83735 ASSAY OF MAGNESIUM: CPT | Performed by: INTERNAL MEDICINE

## 2024-04-24 PROCEDURE — 25010000002 ALBUMIN HUMAN 25% PER 50 ML

## 2024-04-24 PROCEDURE — 80069 RENAL FUNCTION PANEL: CPT | Performed by: INTERNAL MEDICINE

## 2024-04-24 PROCEDURE — 25010000002 ALBUMIN HUMAN 25% PER 50 ML: Performed by: INTERNAL MEDICINE

## 2024-04-24 PROCEDURE — 94761 N-INVAS EAR/PLS OXIMETRY MLT: CPT

## 2024-04-24 PROCEDURE — 25010000002 PIPERACILLIN SOD-TAZOBACTAM PER 1 G: Performed by: NURSE PRACTITIONER

## 2024-04-24 PROCEDURE — 25010000002 PIPERACILLIN SOD-TAZOBACTAM PER 1 G: Performed by: INTERNAL MEDICINE

## 2024-04-24 PROCEDURE — 82948 REAGENT STRIP/BLOOD GLUCOSE: CPT

## 2024-04-24 PROCEDURE — 85610 PROTHROMBIN TIME: CPT | Performed by: INTERNAL MEDICINE

## 2024-04-24 PROCEDURE — 94799 UNLISTED PULMONARY SVC/PX: CPT

## 2024-04-24 PROCEDURE — 0 DEXTROSE 5 % SOLUTION: Performed by: INTERNAL MEDICINE

## 2024-04-24 PROCEDURE — P9047 ALBUMIN (HUMAN), 25%, 50ML: HCPCS | Performed by: INTERNAL MEDICINE

## 2024-04-24 PROCEDURE — 94664 DEMO&/EVAL PT USE INHALER: CPT

## 2024-04-24 PROCEDURE — 85027 COMPLETE CBC AUTOMATED: CPT | Performed by: INTERNAL MEDICINE

## 2024-04-24 PROCEDURE — P9047 ALBUMIN (HUMAN), 25%, 50ML: HCPCS

## 2024-04-24 RX ORDER — ALBUMIN (HUMAN) 12.5 G/50ML
12.5 SOLUTION INTRAVENOUS AS NEEDED
Status: DISCONTINUED | OUTPATIENT
Start: 2024-04-24 | End: 2024-04-25 | Stop reason: HOSPADM

## 2024-04-24 RX ORDER — ALBUMIN (HUMAN) 12.5 G/50ML
SOLUTION INTRAVENOUS
Status: COMPLETED
Start: 2024-04-24 | End: 2024-04-24

## 2024-04-24 RX ORDER — FLUMAZENIL 0.1 MG/ML
0.5 INJECTION INTRAVENOUS ONCE
Status: COMPLETED | OUTPATIENT
Start: 2024-04-24 | End: 2024-04-24

## 2024-04-24 RX ORDER — ALBUMIN (HUMAN) 12.5 G/50ML
12.5 SOLUTION INTRAVENOUS AS NEEDED
Status: DISCONTINUED | OUTPATIENT
Start: 2024-04-25 | End: 2024-04-24

## 2024-04-24 RX ADMIN — DEXTROSE MONOHYDRATE 50 ML/HR: 50 INJECTION, SOLUTION INTRAVENOUS at 05:03

## 2024-04-24 RX ADMIN — DEXTROSE MONOHYDRATE 50 ML/HR: 50 INJECTION, SOLUTION INTRAVENOUS at 20:45

## 2024-04-24 RX ADMIN — FLUMAZENIL 0.5 MG: 0.1 INJECTION, SOLUTION INTRAVENOUS at 15:37

## 2024-04-24 RX ADMIN — ALBUMIN (HUMAN) 12.5 G: 0.25 INJECTION, SOLUTION INTRAVENOUS at 10:04

## 2024-04-24 RX ADMIN — ALBUMIN (HUMAN) 12.5 G: 0.25 INJECTION, SOLUTION INTRAVENOUS at 09:03

## 2024-04-24 RX ADMIN — IPRATROPIUM BROMIDE AND ALBUTEROL SULFATE 3 ML: .5; 3 SOLUTION RESPIRATORY (INHALATION) at 15:32

## 2024-04-24 RX ADMIN — ALBUMIN (HUMAN) 12.5 G: 0.25 INJECTION, SOLUTION INTRAVENOUS at 08:19

## 2024-04-24 RX ADMIN — PIPERACILLIN AND TAZOBACTAM 3.38 G: 3; .375 INJECTION, POWDER, FOR SOLUTION INTRAVENOUS at 15:42

## 2024-04-24 RX ADMIN — Medication 10 ML: at 20:44

## 2024-04-24 RX ADMIN — Medication 10 ML: at 15:46

## 2024-04-24 RX ADMIN — IPRATROPIUM BROMIDE AND ALBUTEROL SULFATE 3 ML: .5; 3 SOLUTION RESPIRATORY (INHALATION) at 06:20

## 2024-04-24 RX ADMIN — PIPERACILLIN AND TAZOBACTAM 3.38 G: 3; .375 INJECTION, POWDER, FOR SOLUTION INTRAVENOUS at 05:03

## 2024-04-24 RX ADMIN — IPRATROPIUM BROMIDE AND ALBUTEROL SULFATE 3 ML: .5; 3 SOLUTION RESPIRATORY (INHALATION) at 21:25

## 2024-04-24 NOTE — PROGRESS NOTES
"NEPHROLOGY PROGRESS NOTE------[facility]    [facility]  [phone]  So Mclean MD      Patient Care Team:  Pedro Purvis MD as PCP - General (Family Medicine)  Pedro Purvis MD as PCP - Family Medicine  Vinay, MD Pino as Consulting Physician (Nephrology)      Provider:  So Mclean MD  Patient Name: Barry Calhoun Sr.  :  1932    SUBJECTIVE:    F/U ESRD    SEEN AND EXAMINED during HD tx this AM. No SOB, CP, palpitations, cramping.     Medication:  ipratropium-albuterol, 3 mL, Nebulization, 4x Daily - RT  levothyroxine, 75 mcg, Oral, Q AM  lidocaine-prilocaine, , Topical, Once  [Held by provider] metoprolol succinate XL, 12.5 mg, Oral, Q24H  midodrine, 10 mg, Oral, TID AC  piperacillin-tazobactam, 3.375 g, Intravenous, Q12H  potassium chloride, 40 mEq, Oral, Once  QUEtiapine, 25 mg, Oral, Nightly  senna-docusate sodium, 2 tablet, Oral, BID  sodium chloride, 10 mL, Intravenous, Q12H  [Held by provider] warfarin, 4 mg, Oral, Daily      dextrose, 50 mL/hr, Last Rate: 50 mL/hr (24 0503)  Pharmacy to dose warfarin,         OBJECTIVE    Vital Sign Min/Max for last 24 hours  Temp  Min: 99 °F (37.2 °C)  Max: 99.9 °F (37.7 °C)   BP  Min: 91/37  Max: 112/55   Pulse  Min: 69  Max: 87   Resp  Min: 15  Max: 23   SpO2  Min: 64 %  Max: 100 %   No data recorded   Weight  Min: 71.1 kg (156 lb 12 oz)  Max: 71.1 kg (156 lb 12 oz)     Flowsheet Rows      Flowsheet Row First Filed Value   Admission Height 175.3 cm (69\") Documented at 2024 1013   Admission Weight 70 kg (154 lb 5.2 oz) Documented at 2024 1013            No intake/output data recorded.  No intake/output data recorded.    Physical Exam:  General Appearance: No acute distress  Head: normocephalic, without obvious abnormality and atraumatic  Eyes: conjunctivae and sclerae normal and no icterus  Neck: supple and no JVD  Lungs: Scattered " "rhonchi  Heart: regular rhythm & normal rate and normal S1, S2 +CARLTON  Chest: Wall no abnormalities observed  Abdomen: normal bowel sounds and soft, nontender  Extremities: moves extremities well, NO EDEMA. +DJD  Skin: Lower extremity erythema  Neurologic: AWAKE BUTCONFUSED    Labs:    WBC No results found for: \"WBC\"     HGB No results found for: \"HGB\"     HCT No results found for: \"HCT\"     Platelets No results found for: \"LABPLAT\"   MCV No results found for: \"MCV\"         Sodium Sodium   Date Value Ref Range Status   04/24/2024 134 (L) 136 - 145 mmol/L Final   04/22/2024 139 136 - 145 mmol/L Final   04/22/2024 138 136 - 145 mmol/L Final      Potassium Potassium   Date Value Ref Range Status   04/24/2024 4.5 3.5 - 5.2 mmol/L Final   04/22/2024 4.4 3.5 - 5.2 mmol/L Final   04/22/2024 4.6 3.5 - 5.2 mmol/L Final      Chloride Chloride   Date Value Ref Range Status   04/24/2024 96 (L) 98 - 107 mmol/L Final   04/22/2024 101 98 - 107 mmol/L Final   04/22/2024 99 98 - 107 mmol/L Final      CO2 CO2   Date Value Ref Range Status   04/24/2024 28.0 22.0 - 29.0 mmol/L Final   04/22/2024 28.0 22.0 - 29.0 mmol/L Final   04/22/2024 31.0 (H) 22.0 - 29.0 mmol/L Final      BUN BUN   Date Value Ref Range Status   04/24/2024 24 (H) 8 - 23 mg/dL Final   04/22/2024 20 8 - 23 mg/dL Final   04/22/2024 15 8 - 23 mg/dL Final      Creatinine Creatinine   Date Value Ref Range Status   04/24/2024 4.87 (H) 0.76 - 1.27 mg/dL Final   04/22/2024 3.55 (H) 0.76 - 1.27 mg/dL Final   04/22/2024 2.93 (H) 0.76 - 1.27 mg/dL Final      Calcium Calcium   Date Value Ref Range Status   04/24/2024 9.1 8.2 - 9.6 mg/dL Final   04/22/2024 9.3 8.2 - 9.6 mg/dL Final   04/22/2024 9.2 8.2 - 9.6 mg/dL Final      PO4 No components found for: \"PO4\"   Albumin Albumin   Date Value Ref Range Status   04/24/2024 3.4 (L) 3.5 - 5.2 g/dL Final   04/22/2024 3.5 3.5 - 5.2 g/dL Final   04/22/2024 3.8 3.5 - 5.2 g/dL Final      Magnesium Magnesium   Date Value Ref Range Status " "  04/22/2024 1.8 1.7 - 2.3 mg/dL Final      Uric Acid No components found for: \"URIC ACID\"     Imaging Results (Last 72 Hours)       ** No results found for the last 72 hours. **            Results for orders placed during the hospital encounter of 04/18/24    XR Chest 1 View    Narrative  XR CHEST 1 VW    Date of Exam: 4/20/2024 4:54 AM EDT    Indication: poss asp pnuemonia    Comparison: 4/18/2024, 5/20/2022.    Findings:  The heart appears enlarged. Median sternotomy wires are present. There is a right internal jugular Mediport with the tip in the distal SVC. Stable elevation of the right hemidiaphragm. Mild atelectatic changes are present within the right lung base,  stable. Patchy airspace disease is seen within the left midlung which appears unchanged. Indistinctness of the pulmonary vasculature are present, stable. No pneumothorax.    Impression  Impression:  Stable mild patchy airspace disease present within the left midlung which may be related to atelectasis versus pneumonia. Stable right basilar atelectasis and elevation of the right hemidiaphragm. Stable cardiomegaly.      Electronically Signed: Deepa Healy MD  4/20/2024 5:11 AM EDT  Workstation ID: [id number]      XR Chest 1 View    Narrative  XR CHEST 1 VW    Date of Exam: 4/18/2024 11:24 AM EDT    Indication: hypotension    Comparison: X-ray dated 3/29/2024    Findings:  The trachea is midline. There is cardiomegaly with pulmonary vascular congestion. Diffuse bilateral reticular and airspace opacities suspicious for pulmonary edema. There are surgical changes in the heart and mediastinum. There is a right intrajugular  chest port in place    Impression  Impression:  Cardiomegaly with pulmonary vascular congestion and pulmonary edema/CHF pattern      Electronically Signed: Diogenes Landaverde MD  4/18/2024 11:52 AM EDT  Workstation ID: [id number]      Results for orders placed during the hospital encounter of 03/27/24    XR Chest 1 View    Narrative  XR CHEST " 1 VW    Date of Exam: 3/29/2024 4:37 PM EDT    Indication: wheezing    Comparison: AP portable chest 2/17/2014.    Findings:  Stable right hemidiaphragm elevation. Left midlung and right infrahilar airspace disease is redemonstrated and is not thought to be significantly changed. There is stable moderate generalized cardiac enlargement with median sternotomy and CABG. Loop  recorder projects over the left lower mediastinum. Right chest wall jeremías catheter tip terminates at the lower SVC level. Right subclavian stent is noted. No acute osseous abnormality is identified.    Impression  Impression:    1. Left midlung and right infrahilar airspace disease appears unchanged from 2/28/2024. No new airspace disease is identified.  2. Stable cardiomegaly.      Electronically Signed: Mary Page MD  3/29/2024 4:40 PM EDT  Workstation ID: [id number]      Results for orders placed during the hospital encounter of 04/18/24    Duplex venous lower extremity right    Interpretation Summary    Acute right lower extremity superficial thrombophlebitis noted in the small saphenous.    All other right sided veins appeared normal.    Incidentally identified occluded right SFA.    The quality of the study is limited due to an uncooperative patient and patient positioning.        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 Vascular surgery following for RUE edema where new AVF is.       2.  HYPOTENSION----- Continue Midodrine and Albumin     3. DELIRIIUM     4. ATRIAL FIBRILLATION-------Rate controlled and anticoagulated     5. ANEMIA OF ESRD------EPO/Venofer with HD as needed     6. OA/DJD     7. DECONDITIONING--------PT/OT/Rehab as needed     8. THROMBOCYTOPENIA--------No heparin     9.  DIASTOLIC CHF/VOLUME OVERLOAD-------Increase UF with HD as BP toleratess     10. COPD------ Oxygen, nebs, pulmonary toilet      So Mclean MD  [facility]  [phone]  04/24/24  06:58 EDT

## 2024-04-24 NOTE — NURSING NOTE
Arrived to patient room and spouse present she stated that the  Told her he would get dialysis in the morning.  She stated she does not want the patient to be awakened as he will most likely need to be restrained during his dialysis treatment.  I let Jeannie FLORES know nd lft the machine in the room.

## 2024-04-24 NOTE — PROGRESS NOTES
Nutrition Services  Patient Name: Barry Calhoun Sr.  YOB: 1932  MRN: [id number]  Admission date: 4/18/2024    PROGRESS NOTE      Encounter Information: 4/24: Progress note to ascertain plan for nutrition interventions. Secure message sent to attending and RN to gauge whether family has expressed updates regarding goals of care (palliative/hospice versus aggressive.) Hospice referral has been made and palliative care is following, but no firm decision yet from family. This introduces the need to begin aggressive nutrition support (tube feeding) as long as other aggressive interventions are taking place. Today is day #4 with no nutrition intake due to inability to take diet PO. Pt had very minimal intake for the four days prior to that, making today effectively day #8 with minimal nutrition. Enteral support is indicated.       PO Diet: NPO Diet NPO Type: Strict NPO   PO Supplements: None ordered - pt unable to take    PO Intake:  NPO       Current nutrition support:    Nutrition support review:        Labs (reviewed below): Hyponatremia   Elevated BUN/Cr - C/W ESRD; ongoing HD        GI Function:  Stool Output  Stool Unmeasured Occurrence: 1 (04/21/24 1900)  Bowel Incontinence: Yes (04/21/24 1900)  Stool Amount: smear (04/22/24 0800)          Nutrition Intervention Updates: If enteral nutrition support to begin, the following would be appropriate:     Initial Goal:  *initial goal conservative d/t risk of RFS     Novasource Renal at 15mL/hr + 10mL q 2 hours water flush      End Goal:  Novasource Renal at 50 mL/hr; will adjust water flush as clinically indicated      Calories  2200 kcals (103%)    Protein  100 g (93%)    Free water  781 mL   Flushes  Will adjust as clinically indicated      The above end goal rate is for 22 hrs/day to assume interruptions for ADLs. Water flushes adjusted based on clinical picture + Rx flushes/IV fluids     Specialized formula chosen r/t ongoing ESRD with HD, and need for  "K+ and phosphorus restricted formula.      Estimated/Assessed Needs    Assessed 4/24/24   Energy Requirements    EST Needs, Method, Wt used 2133 kcal/day (30 kcal/kg BW)       Protein Requirements    EST Needs, Method, Wt used 107 g/day (1.5 g/kg BW)       Fluid Requirements     Estimated Needs (mL/day) 1000mL + UOP or per MD     Results from last 7 days   Lab Units 04/24/24  0208 04/22/24  2330 04/22/24  0118 04/18/24  1540 04/18/24  1051   SODIUM mmol/L 134* 139 138   < > 140   POTASSIUM mmol/L 4.5 4.4 4.6   < > 4.0   CHLORIDE mmol/L 96* 101 99   < > 102   CO2 mmol/L 28.0 28.0 31.0*   < > 31.0*   BUN mg/dL 24* 20 15   < > 32*   CREATININE mg/dL 4.87* 3.55* 2.93*   < > 4.34*   CALCIUM mg/dL 9.1 9.3 9.2   < > 8.9   BILIRUBIN mg/dL  --   --   --   --  0.5   ALK PHOS U/L  --   --   --   --  82   ALT (SGPT) U/L  --   --   --   --  12   AST (SGOT) U/L  --   --   --   --  36   GLUCOSE mg/dL 85 82 80   < > 89    < > = values in this interval not displayed.     Results from last 7 days   Lab Units 04/24/24  0208 04/22/24 2330 04/22/24  0118 04/21/24  0301 04/20/24  0431 04/19/24  1307   MAGNESIUM mg/dL  --   --  1.8  --  2.2 1.9   PHOSPHORUS mg/dL 4.5   < > 3.5 4.3 3.1  --    HEMOGLOBIN g/dL  --   --   --  8.7* 8.9*  --    HEMATOCRIT %  --   --   --  28.5* 28.5*  --     < > = values in this interval not displayed.     COVID19   Date Value Ref Range Status   04/18/2024 Not Detected Not Detected - Ref. Range Final     No results found for: \"HGBA1C\"    RD to follow up per protocol.    Electronically signed by:  Rina Cook RD  04/24/24 11:16 EDT    "

## 2024-04-24 NOTE — PLAN OF CARE
Goal Outcome Evaluation:         Patient continues to be disoriented, family remains at bedside, reviewed med list with family at their request, family wanted to make sure the patient was not receiving any medication without a sedating side effect, HD attempted at bedside and family refused to allow HD nurse to begin the dialysis.  Equipment remains in patient bathroom to make second attempt in am. Temp elevated at start of shift, prn tylenol supp administered and effective. VSS at this time.

## 2024-04-24 NOTE — PLAN OF CARE
Goal Outcome Evaluation:     ST continuing to follow pt peripherally for dysphagia re-evaluation.  Per nsg, pt is minimally responsive, not opening his eyes, and is unable to participate in re-evaluation.  Continuation of NPO w/ [place] Water Protocol is recommended at this time.  ST will f/u next date when pt is medically appropriate to participate.  Thank you.    The rationale to recommend water when a PO diet cannot appropriately/functionally sustain nutrition is because water is low risk for aspiration pna when compared to aspiration of food or other liquids.  Benefits of a water protocol include but are not limited to:      Oral gratification   Engagement of oropharyngeal swallow musculature   Decrease likelihood of dehydration       Guidelines for proper implementation include:  Waiting a minimum of 30 minutes after consuming any other PO   Thorough oral care prior to consuming water  Upright at 90 degree hip flexion  Small sips at slow rate  Monitor for any changes in respiratory status and discontinue if distress noted     The [employer] Free Water Protocol is a research based protocol established in 1984.

## 2024-04-24 NOTE — CASE MANAGEMENT/SOCIAL WORK
Continued Stay Note  DEISY Nichols     Patient Name: Barry Calhoun Sr.  MRN: [id number]  Today's Date: 4/24/2024    Admit Date: 4/18/2024    Plan: Plan home with [facility] hosp[facility].  Prior from home with family, current OP HD [facility] MWF.  From [place].(No precert or PASRR needed to return)   Discharge Plan       Row Name 04/24/24 1704       Plan    Plan Plan home with [facility] hosp[facility].  Prior from home with family, current OP HD [facility] MWF.  From [place].(No precert or PASRR needed to return)    Patient/Family in Agreement with Plan yes    Plan Comments Multiple discussions between [facility] and POA CJ.   Probable decision to go home with hospice.  CJ and patients SO discussing details.  Decision to stop HD made today.               Expected Discharge Date and Time       Expected Discharge Date Expected Discharge Time    Apr 25, 2024               Carly Elliott RN     Office Phone [phone]  Office Cell [phone]

## 2024-04-24 NOTE — NURSING NOTE
Nephrologist notified via secure text that pt. did not have hd tx 4.23.23 d/t spousal refusal and hd tx will be done 4.24.24 asap

## 2024-04-24 NOTE — PROGRESS NOTES
"Pharmacy dosing service  Anticoagulant  Warfarin     Subjective:    Barry Calhoun Sr. is a 89 y.o.male being continued on warfarin for Atrial Fibrillation / Flutter.    INR Goal: 2 - 3  Home medication?: warfarin 4 mg PO daily  Bridge Therapy Present?:  No  Interacting Medications Evaluation (New/Present/Discontinued): Zosyn (may increase INR)  Additional Contributing Factors: ESRD, lymphoma, TCP      Assessment/Plan:    INR is back in goal range. However, patient cannot swallow and family does not want feeding tube. Talked with hospitalist and will discontinue pharmacy to dose warfarin consult for now as well as warfarin. If needed, the team can start heparin drip for AC once INR <2 if provider deems therapeutic AC is necessary. Would avoid Lovenox with ESRD.     Please re-consult if needed.          Date 4/18 4/19 4/20 4/21 4/22 4/23 4/24     INR 2.74 2.85 2.86 3.12 3.75 3.59 2.91     Dose 4 mg 4 mg 4 mg Hold Hold Hold See above         Objective:  [Ht: 175.3 cm (69\"); Wt: 71.1 kg (156 lb 12 oz); BMI: Body mass index is 23.15 kg/m².]    Lab Results   Component Value Date    ALBUMIN 3.4 (L) 04/24/2024     Lab Results   Component Value Date    INR 2.91 04/24/2024    INR 3.59 (H) 04/22/2024    INR 3.75 (H) 04/22/2024    PROTIME 29.3 (H) 04/24/2024    PROTIME 35.6 (H) 04/22/2024    PROTIME 37.1 (H) 04/22/2024     Lab Results   Component Value Date    HGB 8.7 (L) 04/21/2024    HGB 8.9 (L) 04/20/2024    HGB 9.6 (L) 04/19/2024     Lab Results   Component Value Date    HCT 28.5 (L) 04/21/2024    HCT 28.5 (L) 04/20/2024    HCT 30.2 (L) 04/19/2024       Keshia Quigley PharmJIMMY  04/24/24 11:26 EDT   "

## 2024-04-24 NOTE — PLAN OF CARE
Goal Outcome Evaluation:      No significant changes this shift. Pt arouses to voice. Remains NPO. HD completed this morning, unable to take fluids off r/t low BP. Plan for next HD treatment tomorrow, unless family chooses hospice. Family at bedside. Hospice to meet with POA this afternoon.

## 2024-04-24 NOTE — PROGRESS NOTES
[facility] MEDICINE SERVICE  DAILY PROGRESS NOTE    NAME: Barry Calhoun Sr.  : 1932  MRN: [id number]      LOS: 6 days     PROVIDER OF SERVICE: Suhail Zacarias MD    Chief Complaint: Hypotension    Subjective:       Subjective       Chief Complaint: Hypertension     History of Present Illness: Barry Calhoun Sr. is a 89 y.o. male with a CMH of ESRD, A-fib on Coumadin, CABG, CVA, hypothrombocytopenia, COPD, lymphoma, gout, dementia who presented to [facility] on 2024 with hypotension.  The patient arrived from hemodialysis due to hypotension.  The patient is nonverbal and history could not be taken.  The patient was evaluated by cardiology.  Cardiology and nephrology will decide on further management.  The patient will get dialysis in the hospital.  The patient was found to have warm right leg suggestive of cellulitis      Interval History:  24- Patient is doing well today.  Appears to be at his baseline.  Does have some mild cough with congestion.  24 seen in bed NAD, DON RN BG 55, no able to take po, family had been refusing midodrine.  Patient fluid bolus 500 cc  Start D5 50 cc an hour.  BP 73/31, monitor BNP last 54,423.   2024 patient seen and examined in bed no acute distress, blood pressure has improved today.  1954.  Received albumin, for hemodialysis,  Family still have not decided to hospice.  2024 patient seen and examined in bed no acute distress, BP still low.  Family at bedside, discussed with RN, having episodes of V. tach.  Cardiology following.  2024 patient seen and examined in bed no acute distress, vital signs stable, discussed with RN, still on D5.  Family not wanting NGt.  24 patient seen in bed NAD, on HD, family at bed side, DON RN, DON hospice, failed swallow eval, family not wanting NGT, hope to make impatient hospice today    Review of Systems  Ten point ROS: reviewed negative, unless as noted in HPI.  Objective:     Vital  Signs  Temp:  [98 °F (36.7 °C)-99.9 °F (37.7 °C)] 98 °F (36.7 °C)  Heart Rate:  [69-87] 73  Resp:  [15-23] 15  BP: ()/(30-77) 111/54  Flow (L/min):  [2] 2   Body mass index is 23.15 kg/m².    Physical Exam  General Appearance:  Alert, cooperative, no distress, appears stated age  Head:  Normocephalic, without obvious abnormality, atraumatic  Eyes:  PERRL, conjunctiva/corneas clear, EOM's intact, fundi benign, both eyes  Ears:  Normal TM's and external ear canals, both ears  Nose: Nares normal, septum midline, mucosa normal, no drainage or sinus tenderness  Throat: Lips, mucosa, and tongue normal; teeth and gums normal  Neck: Supple, symmetrical, trachea midline, no adenopathy, thyroid: not enlarged, symmetric, no tenderness/mass/nodules, no carotid bruit or JVD  Lungs:   Mild right lung rhonchi  Heart:  Regular rate and rhythm, S1, S2 normal, no murmur, rub or gallop  Abdomen:  Soft, non-tender, bowel sounds active all four quadrants,  no masses, no organomegaly  Extremities: Extremities normal, atraumatic, no cyanosis or edema  Pulses: 2+ and symmetric  Skin: Skin color, texture, turgor normal, no rashes or lesions  Neurologic: Normal    Scheduled Meds   [START ON 4/25/2024] epoetin choco/choco-epbx, 10,000 Units, Intravenous, Once  [START ON 4/25/2024] ferric gluconate, 125 mg, Intravenous, Once in Dialysis  ipratropium-albuterol, 3 mL, Nebulization, 4x Daily - RT  levothyroxine, 75 mcg, Oral, Q AM  lidocaine-prilocaine, , Topical, Once  [Held by provider] metoprolol succinate XL, 12.5 mg, Oral, Q24H  midodrine, 10 mg, Oral, TID AC  piperacillin-tazobactam, 3.375 g, Intravenous, Q12H  potassium chloride, 40 mEq, Oral, Once  QUEtiapine, 25 mg, Oral, Nightly  senna-docusate sodium, 2 tablet, Oral, BID  sodium chloride, 10 mL, Intravenous, Q12H       PRN Meds     acetaminophen    acetaminophen    albumin human    albumin human    senna-docusate sodium **AND** polyethylene glycol **AND** bisacodyl **AND**  bisacodyl    Calcium Replacement - Follow Nurse / BPA Driven Protocol    dextrose    glycopyrrolate    Magnesium Standard Dose Replacement - Follow Nurse / BPA Driven Protocol    nitroglycerin    Phosphorus Replacement - Follow Nurse / BPA Driven Protocol    Potassium Replacement - Follow Nurse / BPA Driven Protocol    [COMPLETED] Insert Peripheral IV **AND** sodium chloride    sodium chloride    sodium chloride   Infusions  dextrose, 50 mL/hr, Last Rate: 50 mL/hr (04/24/24 0503)          Diagnostic Data    Results from last 7 days   Lab Units 04/24/24  0208 04/22/24  0118 04/21/24  0301 04/18/24  1540 04/18/24  1051   WBC 10*3/mm3  --   --  5.15   < > 4.20   HEMOGLOBIN g/dL  --   --  8.7*   < > 9.4*   HEMATOCRIT %  --   --  28.5*   < > 30.1*   PLATELETS 10*3/mm3  --   --  55*   < > 46*   GLUCOSE mg/dL 85   < > 78   < > 89   CREATININE mg/dL 4.87*   < > 3.95*   < > 4.34*   BUN mg/dL 24*   < > 24*   < > 32*   SODIUM mmol/L 134*   < > 140   < > 140   POTASSIUM mmol/L 4.5   < > 4.6   < > 4.0   AST (SGOT) U/L  --   --   --   --  36   ALT (SGPT) U/L  --   --   --   --  12   ALK PHOS U/L  --   --   --   --  82   BILIRUBIN mg/dL  --   --   --   --  0.5   ANION GAP mmol/L 10.0   < > 9.0   < > 7.0    < > = values in this interval not displayed.       No radiology results for the last day      I reviewed the patient's new clinical results.    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       Acute on chronic hypotension  -Patient has history of chronic hypotension on midodrine  -Slight acute worsening of hypertension may be related to underlying infectious process  -Family refusing midodrine  -x1 fluid boluses   -patient is a dialysis patient and was slightly volume overloaded  -Treatment for cellulitis with IV antibiotics    ESRD on HD  -Continue HD per nephrology  -Receiving HD treatment 4/22/23    Right lower extremity cellulitis  -Patient does have some mild erythema and edema with warm and tender to touch  -Started on IV Rocephin  -Transition to oral antibiotics in the next 24 hours    Chronic A-fib  -Continue warfarin despite thrombocytopenia    Chronic thrombocytopenia  -Platelet count lower than baseline at 47 today  -May be related to underlying infectious process  -No evidence of active bleeding and therefore no indication for platelet transfusion    COPD  -Continue inhalers  Oxygen titration    DVT prophylaxis:  No DVT prophylaxis order currently exists.     Code status is   Code Status and Medical Interventions:   Ordered at: 04/20/24 0039     Medical Intervention Limits:    NO intubation (DNI)     Level Of Support Discussed With:    Next of Kin (If No Surrogate)     Code Status (Patient has no pulse and is not breathing):    No CPR (Do Not Attempt to Resuscitate)     Medical Interventions (Patient has pulse or is breathing):    Limited Support     Plan for disposition: Awaiting palliative care evaluation.  Patient's prognosis is overall guarded.    Time: 30 minutes    Signature: Electronically signed by Suhail Zacarias MD, 04/24/24, 11:49 EDT.  [facility]ist Team

## 2024-04-25 VITALS
OXYGEN SATURATION: 100 % | DIASTOLIC BLOOD PRESSURE: 33 MMHG | RESPIRATION RATE: 18 BRPM | SYSTOLIC BLOOD PRESSURE: 102 MMHG | TEMPERATURE: 99.1 F | BODY MASS INDEX: 23.71 KG/M2 | HEIGHT: 69 IN | WEIGHT: 160.05 LBS | HEART RATE: 73 BPM

## 2024-04-25 PROBLEM — I50.32 CHRONIC HEART FAILURE WITH PRESERVED EJECTION FRACTION (HFPEF): Status: ACTIVE | Noted: 2024-04-25

## 2024-04-25 LAB
ALBUMIN SERPL-MCNC: 3.5 G/DL (ref 3.5–5.2)
ANION GAP SERPL CALCULATED.3IONS-SCNC: 9 MMOL/L (ref 5–15)
BUN SERPL-MCNC: 15 MG/DL (ref 8–23)
BUN/CREAT SERPL: 4.8 (ref 7–25)
CALCIUM SPEC-SCNC: 8.9 MG/DL (ref 8.2–9.6)
CHLORIDE SERPL-SCNC: 98 MMOL/L (ref 98–107)
CO2 SERPL-SCNC: 28 MMOL/L (ref 22–29)
CREAT SERPL-MCNC: 3.12 MG/DL (ref 0.76–1.27)
DEPRECATED RDW RBC AUTO: 63.6 FL (ref 37–54)
EGFRCR SERPLBLD CKD-EPI 2021: 18.1 ML/MIN/1.73
ERYTHROCYTE [DISTWIDTH] IN BLOOD BY AUTOMATED COUNT: 16.5 % (ref 12.3–15.4)
GLUCOSE BLDC GLUCOMTR-MCNC: 75 MG/DL (ref 70–105)
GLUCOSE BLDC GLUCOMTR-MCNC: 77 MG/DL (ref 70–105)
GLUCOSE BLDC GLUCOMTR-MCNC: 78 MG/DL (ref 70–105)
GLUCOSE SERPL-MCNC: 78 MG/DL (ref 65–99)
HCT VFR BLD AUTO: 27.4 % (ref 37.5–51)
HGB BLD-MCNC: 8.4 G/DL (ref 13–17.7)
INR PPP: 2.57 (ref 2–3)
MAGNESIUM SERPL-MCNC: 2 MG/DL (ref 1.7–2.3)
MCH RBC QN AUTO: 32.2 PG (ref 26.6–33)
MCHC RBC AUTO-ENTMCNC: 30.7 G/DL (ref 31.5–35.7)
MCV RBC AUTO: 105 FL (ref 79–97)
PHOSPHATE SERPL-MCNC: 3.2 MG/DL (ref 2.5–4.5)
PLATELET # BLD AUTO: 51 10*3/MM3 (ref 140–450)
PMV BLD AUTO: 10.8 FL (ref 6–12)
POTASSIUM SERPL-SCNC: 4 MMOL/L (ref 3.5–5.2)
PROTHROMBIN TIME: 26.1 SECONDS (ref 19.4–28.5)
RBC # BLD AUTO: 2.61 10*6/MM3 (ref 4.14–5.8)
SODIUM SERPL-SCNC: 135 MMOL/L (ref 136–145)
WBC NRBC COR # BLD AUTO: 4.69 10*3/MM3 (ref 3.4–10.8)

## 2024-04-25 PROCEDURE — 94799 UNLISTED PULMONARY SVC/PX: CPT

## 2024-04-25 PROCEDURE — 94664 DEMO&/EVAL PT USE INHALER: CPT

## 2024-04-25 PROCEDURE — 82948 REAGENT STRIP/BLOOD GLUCOSE: CPT

## 2024-04-25 RX ORDER — MIDODRINE HYDROCHLORIDE 10 MG/1
10 TABLET ORAL
Qty: 10 TABLET | Refills: 0 | Status: SHIPPED | OUTPATIENT
Start: 2024-04-25

## 2024-04-25 RX ORDER — QUETIAPINE FUMARATE 25 MG/1
25 TABLET, FILM COATED ORAL NIGHTLY
Qty: 7 TABLET | Refills: 0 | Status: SHIPPED | OUTPATIENT
Start: 2024-04-25

## 2024-04-25 RX ADMIN — IPRATROPIUM BROMIDE AND ALBUTEROL SULFATE 3 ML: .5; 3 SOLUTION RESPIRATORY (INHALATION) at 06:33

## 2024-04-25 RX ADMIN — Medication 10 ML: at 10:15

## 2024-04-25 NOTE — CASE MANAGEMENT/SOCIAL WORK
Continued Stay Note  [facility]     Patient Name: Barry Calhoun Sr.  MRN: [id number]  Today's Date: 4/25/2024    Admit Date: 4/18/2024    Plan: Home with family and [facility] hospice. Current OP HD [facility] MWF. From [place]. (No precert or PASRR needed to return)   Discharge Plan       Row Name 04/25/24 1222       Plan    Plan Home with family and [facility] hospice. Current OP HD [facility] MWF. From [place]. (No precert or PASRR needed to return)    Patient/Family in Agreement with Plan yes    Plan Comments [facility] stated [facility] spoke with the son, ZEINAB, and he is aware that all the medical equipment will be delivered after 3pm today, 4/25. CM sent fax over to have EMS transport for the patient after 3pm, pending response from EMS, will include floor RN in the chat with EMS transportation.               Expected Discharge Date and Time       Expected Discharge Date Expected Discharge Time    Apr 25, 2024             Met with patient in room wearing PPE: mask.    Maintained distance greater than six feet and spent less than 15 minutes in the room.     Ana Parkinson RN

## 2024-04-25 NOTE — PROGRESS NOTES
"NEPHROLOGY PROGRESS NOTE------[facility]    [facility]  [phone]  So Mclean MD      Patient Care Team:  Pedro Purvis MD as PCP - General (Family Medicine)  Pedro Purvis MD as PCP - Family Medicine  Vinay, MD Pino as Consulting Physician (Nephrology)      Provider:  So Mclean MD  Patient Name: Barry Calhoun Sr.  :  1932    SUBJECTIVE:    F/U ESRD    SEEN AND EXAMINED at the beginning of HD tx this AM. A little more alert but still not answering question.      Medication:  epoetin choco/choco-epbx, 10,000 Units, Intravenous, Once  ferric gluconate, 125 mg, Intravenous, Once in Dialysis  ipratropium-albuterol, 3 mL, Nebulization, 4x Daily - RT  levothyroxine, 75 mcg, Oral, Q AM  lidocaine-prilocaine, , Topical, Once  [Held by provider] metoprolol succinate XL, 12.5 mg, Oral, Q24H  midodrine, 10 mg, Oral, TID AC  potassium chloride, 40 mEq, Oral, Once  QUEtiapine, 25 mg, Oral, Nightly  senna-docusate sodium, 2 tablet, Oral, BID  sodium chloride, 10 mL, Intravenous, Q12H      dextrose, 50 mL/hr, Last Rate: 50 mL/hr (24)        OBJECTIVE    Vital Sign Min/Max for last 24 hours  Temp  Min: 97.2 °F (36.2 °C)  Max: 98.1 °F (36.7 °C)   BP  Min: 83/62  Max: 119/58   Pulse  Min: 71  Max: 84   Resp  Min: 14  Max: 24   SpO2  Min: 90 %  Max: 100 %   No data recorded   Weight  Min: 72.6 kg (160 lb 0.9 oz)  Max: 72.6 kg (160 lb 0.9 oz)     Flowsheet Rows      Flowsheet Row First Filed Value   Admission Height 175.3 cm (69\") Documented at 2024 1013   Admission Weight 70 kg (154 lb 5.2 oz) Documented at 2024 1013            No intake/output data recorded.  I/O last 3 completed shifts:  In: 450 [I.V.:450]  Out: 0     Physical Exam:  General Appearance: NAD  Head: normocephalic, without obvious abnormality and atraumatic  Eyes: conjunctivae and sclerae normal and no icterus  Neck: supple and " "no JVD  Lungs: Scattered rhonchi  Heart: regular rhythm & normal rate and normal S1, S2 +CARLTON  Chest: Wall no abnormalities observed  Abdomen: normal bowel sounds and soft, nontender  Extremities: moves extremities well, NO LE EDEMA. +DJD  Skin: Lower extremity erythema  Neurologic: AWAKE BUT CONFUSED    Labs:    WBC WBC   Date Value Ref Range Status   04/24/2024 4.69 3.40 - 10.80 10*3/mm3 Final        HGB Hemoglobin   Date Value Ref Range Status   04/24/2024 8.4 (L) 13.0 - 17.7 g/dL Final        HCT Hematocrit   Date Value Ref Range Status   04/24/2024 27.4 (L) 37.5 - 51.0 % Final        Platelets No results found for: \"LABPLAT\"   MCV MCV   Date Value Ref Range Status   04/24/2024 105.0 (H) 79.0 - 97.0 fL Final            Sodium Sodium   Date Value Ref Range Status   04/24/2024 135 (L) 136 - 145 mmol/L Final   04/24/2024 134 (L) 136 - 145 mmol/L Final   04/22/2024 139 136 - 145 mmol/L Final      Potassium Potassium   Date Value Ref Range Status   04/24/2024 4.0 3.5 - 5.2 mmol/L Final   04/24/2024 4.5 3.5 - 5.2 mmol/L Final   04/22/2024 4.4 3.5 - 5.2 mmol/L Final      Chloride Chloride   Date Value Ref Range Status   04/24/2024 98 98 - 107 mmol/L Final   04/24/2024 96 (L) 98 - 107 mmol/L Final   04/22/2024 101 98 - 107 mmol/L Final      CO2 CO2   Date Value Ref Range Status   04/24/2024 28.0 22.0 - 29.0 mmol/L Final   04/24/2024 28.0 22.0 - 29.0 mmol/L Final   04/22/2024 28.0 22.0 - 29.0 mmol/L Final      BUN BUN   Date Value Ref Range Status   04/24/2024 15 8 - 23 mg/dL Final   04/24/2024 24 (H) 8 - 23 mg/dL Final   04/22/2024 20 8 - 23 mg/dL Final      Creatinine Creatinine   Date Value Ref Range Status   04/24/2024 3.12 (H) 0.76 - 1.27 mg/dL Final   04/24/2024 4.87 (H) 0.76 - 1.27 mg/dL Final   04/22/2024 3.55 (H) 0.76 - 1.27 mg/dL Final      Calcium Calcium   Date Value Ref Range Status   04/24/2024 8.9 8.2 - 9.6 mg/dL Final   04/24/2024 9.1 8.2 - 9.6 mg/dL Final   04/22/2024 9.3 8.2 - 9.6 mg/dL Final      PO4 No " "components found for: \"PO4\"   Albumin Albumin   Date Value Ref Range Status   04/24/2024 3.5 3.5 - 5.2 g/dL Final   04/24/2024 3.4 (L) 3.5 - 5.2 g/dL Final   04/22/2024 3.5 3.5 - 5.2 g/dL Final      Magnesium Magnesium   Date Value Ref Range Status   04/24/2024 2.0 1.7 - 2.3 mg/dL Final      Uric Acid No components found for: \"URIC ACID\"     Imaging Results (Last 72 Hours)       ** No results found for the last 72 hours. **            Results for orders placed during the hospital encounter of 04/18/24    XR Chest 1 View    Narrative  XR CHEST 1 VW    Date of Exam: 4/20/2024 4:54 AM EDT    Indication: poss asp pnuemonia    Comparison: 4/18/2024, 5/20/2022.    Findings:  The heart appears enlarged. Median sternotomy wires are present. There is a right internal jugular Mediport with the tip in the distal SVC. Stable elevation of the right hemidiaphragm. Mild atelectatic changes are present within the right lung base,  stable. Patchy airspace disease is seen within the left midlung which appears unchanged. Indistinctness of the pulmonary vasculature are present, stable. No pneumothorax.    Impression  Impression:  Stable mild patchy airspace disease present within the left midlung which may be related to atelectasis versus pneumonia. Stable right basilar atelectasis and elevation of the right hemidiaphragm. Stable cardiomegaly.      Electronically Signed: Deepa Healy MD  4/20/2024 5:11 AM EDT  Workstation ID: [id number]      XR Chest 1 View    Narrative  XR CHEST 1 VW    Date of Exam: 4/18/2024 11:24 AM EDT    Indication: hypotension    Comparison: X-ray dated 3/29/2024    Findings:  The trachea is midline. There is cardiomegaly with pulmonary vascular congestion. Diffuse bilateral reticular and airspace opacities suspicious for pulmonary edema. There are surgical changes in the heart and mediastinum. There is a right intrajugular  chest port in place    Impression  Impression:  Cardiomegaly with pulmonary vascular " congestion and pulmonary edema/CHF pattern      Electronically Signed: Diogenes Landaverde MD  4/18/2024 11:52 AM EDT  Workstation ID: [id number]      Results for orders placed during the hospital encounter of 03/27/24    XR Chest 1 View    Narrative  XR CHEST 1 VW    Date of Exam: 3/29/2024 4:37 PM EDT    Indication: wheezing    Comparison: AP portable chest 2/17/2014.    Findings:  Stable right hemidiaphragm elevation. Left midlung and right infrahilar airspace disease is redemonstrated and is not thought to be significantly changed. There is stable moderate generalized cardiac enlargement with median sternotomy and CABG. Loop  recorder projects over the left lower mediastinum. Right chest wall jeremías catheter tip terminates at the lower SVC level. Right subclavian stent is noted. No acute osseous abnormality is identified.    Impression  Impression:    1. Left midlung and right infrahilar airspace disease appears unchanged from 2/28/2024. No new airspace disease is identified.  2. Stable cardiomegaly.      Electronically Signed: Mary Page MD  3/29/2024 4:40 PM EDT  Workstation ID: [id number]      Results for orders placed during the hospital encounter of 04/18/24    Duplex venous lower extremity right    Interpretation Summary    Acute right lower extremity superficial thrombophlebitis noted in the small saphenous.    All other right sided veins appeared normal.    Incidentally identified occluded right SFA.    The quality of the study is limited due to an uncooperative patient and patient positioning.        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 Vascular surgery following for RUE edema where new AVF is.       2. HYPOTENSION----- Continue Midodrine and Albumin     3. DELIRIIUM     4. ATRIAL FIBRILLATION-------Rate controlled and anticoagulated     5. ANEMIA OF ESRD------EPO/Venofer with HD as needed     6. OA/DJD     7. DECONDITIONING--------PT/OT/Rehab as needed     8. THROMBOCYTOPENIA--------No heparin     9.  DIASTOLIC CHF/VOLUME OVERLOAD-------Increase UF with HD as BP toleratess     10. COPD------ Oxygen, nebs, pulmonary toilet    11. FAMILY CONSIDERING HOSPICE BUT WANTS DIALYSIS THIS AM      So Mclean MD  [facility]  [phone]  04/25/24  07:24 EDT

## 2024-04-25 NOTE — CASE MANAGEMENT/SOCIAL WORK
"Physicians Statement of Medical Necessity for  Ambulance Transportation    GENERAL INFORMATION     Name: Barry Calhoun Sr.  YOB: 1932  Medicare #: [id number]   Transport Date: 04/25/24 (Valid for round trips this date, or for scheduled repetitive trips for 60 days from the date signed below.)  Origin: [facility]   Destination: [address] [place] [address]  Is the Patient's stay covered under Medicare Part A (PPS/DRG?)Yes  Closest appropriate facility? Yes  If this a hosp-hosp transfer? No  Is this a hospice patient? Yes, Is this transport related to patient's terminal illness? Yes    MEDICAL NECESSITY QUESTIONAIRE    Ambulance Transportation is medically necessary only if other means of transportation are contraindicated or would be potentially harmful to the patient.  To meet this requirement, the patient must be either \"bed confined\" or suffer from a condition such that transport by means other than an ambulance is contraindicated by the patient's condition.  The following questions must be answered by the healthcare professional signing below for this form to be valid:     1) Describe the MEDICAL CONDITION (physical and/or mental) of this patient AT THE TIME OF AMBULANCE TRANSPORT that requires the patient to be transported in an ambulance, and why transport by other means is contraindicated by the patient's condition:   Past Medical History:   Diagnosis Date    A-fib     Asthma     Chronic renal disease     COPD (chronic obstructive pulmonary disease)     Coronary heart disease     CABG    CVA (cerebral vascular accident)     recent CVA -- Patient and Family report this is not accurate    Gout     Hypertension       Past Surgical History:   Procedure Laterality Date    ARTERIOVENOUS FISTULA/SHUNT SURGERY Right 2/11/2022    Procedure: RIGHT ARM BRACHIAL CEPHALIC ARTERIAL VENOUS FISTULA;  Surgeon: Drew Gunter MD;  Location: [facility];  Service: Vascular;  Laterality: Right; " "   CARDIAC CATHETERIZATION  2016    [facility]    CORONARY ARTERY BYPASS GRAFT      CABG X3, reoperation, 09; CAB and     OTHER SURGICAL HISTORY  2016    loop recorder implant       2) Is this patient \"bed confined\" as defined below?Yes   To be \"bed confined\" the patient must satisfy all three of the following criteria:  (1) unable to get up from bed without assistance; AND (2) unable to ambulate;  AND (3) unable to sit in a chair or wheelchair.  3) Can this patient safely be transported by car or wheelchair van (I.e., may safely sit during transport, without an attendant or monitoring?)No   4. In addition to completing questions 1-3 above, please check any of the following conditions that apply*:          *Note: supporting documentation for any boxes checked must be maintained in the patient's medical records Moderate/severe pain on movement, Unable to tolerate seated position for time needed to transport, and Unable to sit in a chair or wheelchair due to decubitus ulcers or other wounds      SIGNATURE OF PHYSICIAN OR OTHER AUTHORIZED HEALTHCARE PROFESSIONAL    I certify that the above information is true and correct based on my evaluation of this patient, and represent that the patient requires transport by ambulance and that other forms of transport are contraindicated.  I understand that this information will be used by the Centers for Medicare and Medicaid Services (CMS) to support the determiniation of medical necessity for ambulance services, and I represent that I have personal knowledge of the patient's condition at the time of transport.    SD   If this box is checked, I also certify that the patient is physically or mentally incapable of signing the ambulance service's claim form and that the institution with which I am affiliated has furnished care, services or assistance to the patient.  My signature below is made on behalf of the patient pursuant to 42 .36(b)(4). In accordance " with 42 .37, the specific reason(s) that the patient is physically or mentally incapable of signing the claim for is as follows: hospice patient, bedbound, wounds    Signature of Physician or Healthcare Professional   Ana Parkinson RN Date/Time:   4/25/24 @1150AM     (For Scheduled repetitive transport, this form is not valid for transports performed more than 60 days after this date).                                                                                                                                            --------------------------------------------------------------------------------------------Ana Parkinson RN  Printed Name and Credentials of Physician or Authorized Healthcare Professional     *Form must be signed by patient's attending physician for scheduled, repetitive transports,.  For non-repetitive ambulance transports, if unable to obtain the signature of the attending physician, any of the following may sign (please select below):     Physician  Clinical Nurse Specialist X Registered Nurse     Physician Assistant X Discharge Planner  Licensed Practical Nurse     Nurse Practitioner X

## 2024-04-25 NOTE — NURSING NOTE
HD complete, Pulled no fluids today per Dr order due to Hypotensive. Ran 3.5hrs. BP stayed within normal limits. Needles pulled and hemostasis achieved. Nothing more to report. Report given to Chris GRAHAM.

## 2024-04-25 NOTE — PLAN OF CARE
Goal Outcome Evaluation:         Patient exhibits no external indicators of pain, breathing unlabored at this time, plan is for discharge of patient to home with hospice services on 4/25.

## 2024-04-25 NOTE — DISCHARGE SUMMARY
[facility] Medicine Services  Discharge Summary    Date of Service: 24  Patient Name: Barry Calhoun Sr.  : 1932  MRN: [id number]    Date of Admission: 2024  Discharge Diagnosis: Acute on chronic hypotension  Date of Discharge:  24  Primary Care Physician: Pedro Purvis MD      Presenting Problem:   Hypotension [I95.9]  Nonsustained ventricular tachycardia [I47.29]  Cellulitis of right lower extremity [L03.115]  Venous embolism and thrombosis of superficial vessels of right lower extremity [I82.811]  Hypotension, unspecified hypotension type [I95.9]  Stage 5 chronic kidney disease on chronic dialysis [N18.6, Z99.2]  Chronic heart failure with preserved ejection fraction (HFpEF) [I50.32]    Active and Resolved Hospital Problems:  Active Hospital Problems    Diagnosis POA    **Hypotension [I95.9] Yes    Chronic heart failure with preserved ejection fraction (HFpEF) [I50.32] Yes    Moderate malnutrition [E44.0] Yes    Acute on chronic heart failure with preserved ejection fraction (HFpEF) [I50.33] Yes    Chronic anticoagulation [Z79.01] Not Applicable    ESRD (end stage renal disease) [N18.6] Yes    Weakness [R53.1] Yes    Chronic obstructive pulmonary disease [J44.9] Yes    Coronary heart disease [I25.10] Yes    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     Acute on chronic hypotension  -Patient has history of chronic hypotension on midodrine  -Slight acute worsening of hypertension may be related to underlying infectious process  -Family refusing midodrine  -x1 fluid boluses   -patient is a dialysis patient and was slightly volume overloaded  -Treatment for cellulitis  with IV antibiotics     ESRD on HD  -Continue HD per nephrology  -Receiving HD treatment 4/22/23     Right lower extremity cellulitis  -Patient does have some mild erythema and edema with warm and tender to touch  -Started on IV Rocephin  -Transition to oral antibiotics in the next 24 hours     Chronic A-fib  -Continue warfarin despite thrombocytopenia     Chronic thrombocytopenia  -Platelet count lower than baseline at 47 today  -May be related to underlying infectious process  -No evidence of active bleeding and therefore no indication for platelet transfusion     COPD  -Continue inhalers  Oxygen titration  Hospital Course     History of Present Illness: Barry Calhoun Sr. is a 89 y.o. male with a CMH of ESRD, A-fib on Coumadin, CABG, CVA, hypothrombocytopenia, COPD, lymphoma, gout, dementia who presented to [facility] on 4/18/2024 with hypotension.  The patient arrived from hemodialysis due to hypotension.  The patient is nonverbal and history could not be taken.  The patient was evaluated by cardiology.  Cardiology and nephrology will decide on further management.  The patient will get dialysis in the hospital.  The patient was found to have warm right leg suggestive of cellulitis      Interval History:  4/19/24- Patient is doing well today.  Appears to be at his baseline.  Does have some mild cough with congestion.  4/20/24 seen in bed NAD, DON RN BG 55, no able to take po, family had been refusing midodrine.  Patient fluid bolus 500 cc  Start D5 50 cc an hour.  BP 73/31, monitor BNP last 54,423.   4/21/2024 patient seen and examined in bed no acute distress, blood pressure has improved today.  1/20/1954.  Received albumin, for hemodialysis,  Family still have not decided to hospice.  4/22/2024 patient seen and examined in bed no acute distress, BP still low.  Family at bedside, discussed with RN, having episodes of V. tach.  Cardiology following.  4/23/2024 patient seen and examined in bed no acute  distress, vital signs stable, discussed with RN, still on D5.  Family not wanting NGt.  4/24/24 patient seen in bed NAD, on HD, family at bed side, DW RN, DW hospice, failed swallow eval, family not wanting NGT, hope to make impatient hospice today  4/25/2024 patient seen and examined no acute distress, family at bedside, patient received hemodialysis, patient to be discharged home with hospice today, discussed with , discussed with RN.    Reasons For Change In Medications and Indications for New Medications:  START taking:  midodrine (PROAMATINE)   QUEtiapine (SEROquel)    STOP taking:  allopurinol 100 MG tablet (ZYLOPRIM)   atorvastatin 20 MG tablet (LIPITOR)   azithromycin 250 MG tablet (ZITHROMAX)   docusate sodium 100 MG capsule (COLACE)   donepezil 10 MG tablet (ARICEPT)   famotidine 20 MG tablet (PEPCID)   folic acid 1 MG tablet (FOLVITE)   lidocaine 4 % cream (LMX)   LORazepam 0.5 MG tablet (ATIVAN)   naloxone 4 MG/0.1ML nasal spray (NARCAN)   pseudoephedrine-guaifenesin  MG per 12 hr tablet (MUCINEX D)   sennosides-docusate 8.6-50 MG per tablet (PERICOLACE)   tamsulosin 0.4 MG capsule 24 hr capsule (FLOMAX)   vitamin B-12 1000 MCG tablet (CYANOCOBALAMIN)   warfarin 4 MG tablet (COUMADIN)     Day of Discharge     Vital Signs:  Temp:  [97.2 °F (36.2 °C)-99.1 °F (37.3 °C)] 99.1 °F (37.3 °C)  Heart Rate:  [74-85] 80  Resp:  [12-24] 20  BP: ()/(31-57) 99/31  Flow (L/min):  [2] 2    P  Physical Exam   General Appearance:  lethargic appears stated age  Head:   Normocephalic, without obvious abnormality, atraumatic  Eyes:   PERRL, conjunctiva/corneas clear, EOM's intact, fundi benign, both eyes  Ears:    Normal TM's and external ear canals, both ears  Nose:Nares normal, septum midline, mucosa normal, no drainage or sinus tenderness  Throat:Lips, mucosa, and tongue normal; teeth and gums normal  Neck:Supple, symmetrical, trachea midline, no adenopathy, thyroid: not enlarged, symmetric, no  tenderness/mass/nodules, no carotid bruit or JVD  Lungs:             Mild right lung rhonchi  Heart:  Regular rate and rhythm, S1, S2 normal, no murmur, rub or gallop  Abdomen:  Soft, non-tender, bowel sounds active all four quadrants,  no masses, no organomegaly  Extremities:Extremities normal, atraumatic, no cyanosis or edema  Pulses:2+ and symmetric  Skin:Skin color, texture, turgor normal, no rashes or lesions      Pertinent  and/or Most Recent Results     LAB RESULTS:      Lab 04/24/24  2346 04/24/24 0208 04/22/24 2330 04/22/24  0118 04/21/24  0301 04/20/24  0431 04/19/24  0816 04/19/24  0816 04/18/24  1540   WBC 4.69  --   --   --  5.15 4.55  --  5.66  --    HEMOGLOBIN 8.4*  --   --   --  8.7* 8.9*  --  9.6*  --    HEMATOCRIT 27.4*  --   --   --  28.5* 28.5*  --  30.2*  --    PLATELETS 51*  --   --   --  55* 47*  --  47*  --    NEUTROS ABS  --   --   --   --  3.59 3.40  --  4.65  --    IMMATURE GRANS (ABS)  --   --   --   --  0.02 0.01  --  0.02  --    LYMPHS ABS  --   --   --   --  0.97 0.76  --  0.61*  --    MONOS ABS  --   --   --   --  0.52 0.34  --  0.31  --    EOS ABS  --   --   --   --  0.04 0.03  --  0.06  --    .0*  --   --   --  105.9* 102.5*  --  102.4*  --    LACTATE  --   --   --   --   --   --   --   --  1.4   PROTIME 26.1 29.3* 35.6* 37.1* 31.3* 28.8*   < > 28.7*  --     < > = values in this interval not displayed.         Lab 04/24/24  2346 04/24/24 0208 04/22/24 2330 04/22/24  0118 04/21/24  0301 04/20/24  0431 04/19/24  1307   SODIUM 135* 134* 139 138 140 141 142   POTASSIUM 4.0 4.5 4.4 4.6 4.6 4.7 3.5   CHLORIDE 98 96* 101 99 100 100 100   CO2 28.0 28.0 28.0 31.0* 31.0* 30.0* 30.0*   ANION GAP 9.0 10.0 10.0 8.0 9.0 11.0 12.0   BUN 15 24* 20 15 24* 20 16   CREATININE 3.12* 4.87* 3.55* 2.93* 3.95* 2.95* 2.23*   EGFR 18.1* 10.6* 15.5* 19.6* 13.7* 19.4* 27.1*   GLUCOSE 78 85 82 80 78 55* 74   CALCIUM 8.9 9.1 9.3 9.2 8.7 8.8 9.2   MAGNESIUM 2.0  --   --  1.8  --  2.2 1.9   PHOSPHORUS  3.2 4.5 3.8 3.5 4.3 3.1  --          Lab 04/24/24  2346 04/24/24  0208 04/22/24  2330 04/22/24  0118 04/21/24  0301   ALBUMIN 3.5 3.4* 3.5 3.8 3.7         Lab 04/24/24  2346 04/24/24  0208 04/22/24  2330 04/22/24  0118 04/21/24  0301   PROTIME 26.1 29.3* 35.6* 37.1* 31.3*   INR 2.57 2.91 3.59* 3.75* 3.12*                 Brief Urine Lab Results       None          Microbiology Results (last 10 days)       Procedure Component Value - Date/Time    Blood Culture - Blood, Arm, Left [[id number]]  (Normal) Collected: 04/18/24 1540    Lab Status: Final result Specimen: Blood from Arm, Left Updated: 04/23/24 1545     Blood Culture No growth at 5 days    Blood Culture - Blood, Arm, Left [[id number]]  (Normal) Collected: 04/18/24 1450    Lab Status: Final result Specimen: Blood from Arm, Left Updated: 04/23/24 1515     Blood Culture No growth at 5 days    Narrative:      Less than seven (7) mL's of blood was collected.  Insufficient quantity may yield false negative results.    Respiratory Panel PCR w/COVID-19(SARS-CoV-2) MAGALYS/AI/LINDY/PAD/COR/JULIANNE In-House, NP Swab in UTM/VTM, 2 HR TAT - Swab, Nasopharynx [[id number]]  (Normal) Collected: 04/18/24 1103    Lab Status: Final result Specimen: Swab from Nasopharynx Updated: 04/18/24 1206     ADENOVIRUS, PCR Not Detected     Coronavirus 229E Not Detected     Coronavirus HKU1 Not Detected     Coronavirus NL63 Not Detected     Coronavirus OC43 Not Detected     COVID19 Not Detected     Human Metapneumovirus Not Detected     Human Rhinovirus/Enterovirus Not Detected     Influenza A PCR Not Detected     Influenza B PCR Not Detected     Parainfluenza Virus 1 Not Detected     Parainfluenza Virus 2 Not Detected     Parainfluenza Virus 3 Not Detected     Parainfluenza Virus 4 Not Detected     RSV, PCR Not Detected     Bordetella pertussis pcr Not Detected     Bordetella parapertussis PCR Not Detected     Chlamydophila pneumoniae PCR Not Detected     Mycoplasma pneumo by PCR Not Detected     Narrative:      In the setting of a positive respiratory panel with a viral infection PLUS a negative procalcitonin without other underlying concern for bacterial infection, consider observing off antibiotics or discontinuation of antibiotics and continue supportive care. If the respiratory panel is positive for atypical bacterial infection (Bordetella pertussis, Chlamydophila pneumoniae, or Mycoplasma pneumoniae), consider antibiotic de-escalation to target atypical bacterial infection.            XR Chest 1 View    Result Date: 4/20/2024  Impression: Impression: Stable mild patchy airspace disease present within the left midlung which may be related to atelectasis versus pneumonia. Stable right basilar atelectasis and elevation of the right hemidiaphragm. Stable cardiomegaly. Electronically Signed: Deepa Healy MD  4/20/2024 5:11 AM EDT  Workstation ID: [id number]    XR Chest 1 View    Result Date: 4/18/2024  Impression: Impression: Cardiomegaly with pulmonary vascular congestion and pulmonary edema/CHF pattern Electronically Signed: Diogenes Landaverde MD  4/18/2024 11:52 AM EDT  Workstation ID: [id number]    XR Chest 1 View    Result Date: 3/29/2024  Impression: Impression: 1. Left midlung and right infrahilar airspace disease appears unchanged from 2/28/2024. No new airspace disease is identified. 2. Stable cardiomegaly. Electronically Signed: Mary Page MD  3/29/2024 4:40 PM EDT  Workstation ID: [id number]    CT Head Without Contrast    Result Date: 3/27/2024  Impression: Impression: No acute intracranial abnormality. Electronically Signed: Manolo Roa DO  3/27/2024 7:17 PM EDT  Workstation ID: [id number]     Results for orders placed during the hospital encounter of 04/18/24    Duplex venous lower extremity right    Interpretation Summary    Acute right lower extremity superficial thrombophlebitis noted in the small saphenous.    All other right sided veins appeared normal.    Incidentally identified occluded  right SFA.    The quality of the study is limited due to an uncooperative patient and patient positioning.      Results for orders placed during the hospital encounter of 04/18/24    Duplex venous lower extremity right    Interpretation Summary    Acute right lower extremity superficial thrombophlebitis noted in the small saphenous.    All other right sided veins appeared normal.    Incidentally identified occluded right SFA.    The quality of the study is limited due to an uncooperative patient and patient positioning.      Results for orders placed during the hospital encounter of 03/27/24    Adult Transthoracic Echo Complete W/ Cont if Necessary Per Protocol    Interpretation Summary    Left ventricular ejection fraction appears to be 61 - 65%.    Left ventricular diastolic function is consistent with (grade II w/high LAP) pseudonormalization.    Left atrial volume is severely increased.    Abnormal mitral valve structure consistent with dilated annulus.    Moderate to severe mitral valve regurgitation is present.    Moderate to severe tricuspid valve regurgitation is present.    Estimated right ventricular systolic pressure from tricuspid regurgitation is markedly elevated (>55 mmHg).    Consider transesophageal echocardiogram to better assess severity of regurgitant lesions if clinically indicated.      Labs Pending at Discharge:      Procedures Performed           Consults:   Consults       Date and Time Order Name Status Description    4/18/2024  1:10 PM Inpatient Nephrology Consult      4/18/2024  1:10 PM Inpatient Cardiology Consult      4/18/2024 11:27 AM Nephrology (on -call MD unless specified)      4/18/2024 11:26 AM Cardiology (on-call MD unless specified) Completed     3/29/2024  4:19 PM Inpatient Vascular Surgery Consult Completed     3/29/2024 12:43 PM Inpatient Psychiatrist Consult Completed     3/29/2024  3:57 AM Inpatient Cardiology Consult Completed     3/27/2024  9:14 PM Inpatient Nephrology  Consult Completed             ASSESSMENT / PLAN       Hypotension    Non-Hodgkin's lymphoma    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 Vascular surgery following for RUE edema where new AVF is.       2. HYPOTENSION----- Continue Midodrine and Albumin     3. DELIRIIUM     4. ATRIAL FIBRILLATION-------Rate controlled and anticoagulated     5. ANEMIA OF ESRD------EPO/Venofer with HD as needed     6. OA/DJD     7. DECONDITIONING--------PT/OT/Rehab as needed     8. THROMBOCYTOPENIA--------No heparin     9.  DIASTOLIC CHF/VOLUME OVERLOAD-------Increase UF with HD as BP toleratess     10. COPD------ Oxygen, nebs, pulmonary toilet     11. FAMILY CONSIDERING HOSPICE BUT WANTS DIALYSIS THIS AM        So Mclean MD  [facility]  [phone]  04/25/24  07:24 EDT       Discharge Details        Discharge Medications        New Medications        Instructions Start Date   midodrine 10 MG tablet  Commonly known as: PROAMATINE   10 mg, Oral, 3 Times Daily Before Meals      QUEtiapine 25 MG tablet  Commonly known as: SEROquel   25 mg, Oral, Nightly             Continue These Medications        Instructions Start Date   acetaminophen 325 MG tablet  Commonly known as: TYLENOL   650 mg, Oral, Every 4 Hours PRN      ipratropium-albuterol 0.5-2.5 mg/3 ml nebulizer  Commonly known as: DUO-NEB   3 mL, Nebulization, Every 4 Hours PRN      levothyroxine 75 MCG tablet  Commonly known as: SYNTHROID, LEVOTHROID   75 mcg, Oral, Daily      nitroglycerin 0.4 MG SL  tablet  Commonly known as: NITROSTAT   0.4 mg, Sublingual, Every 5 Minutes PRN, Take no more than 3 doses in 15 minutes.             Stop These Medications      allopurinol 100 MG tablet  Commonly known as: ZYLOPRIM     atorvastatin 20 MG tablet  Commonly known as: LIPITOR     azithromycin 250 MG tablet  Commonly known as: ZITHROMAX     docusate sodium 100 MG capsule  Commonly known as: COLACE     donepezil 10 MG tablet  Commonly known as: ARICEPT     famotidine 20 MG tablet  Commonly known as: PEPCID     folic acid 1 MG tablet  Commonly known as: FOLVITE     lidocaine 4 % cream  Commonly known as: LMX     LORazepam 0.5 MG tablet  Commonly known as: ATIVAN     naloxone 4 MG/0.1ML nasal spray  Commonly known as: NARCAN     pseudoephedrine-guaifenesin  MG per 12 hr tablet  Commonly known as: MUCINEX D     sennosides-docusate 8.6-50 MG per tablet  Commonly known as: PERICOLACE     tamsulosin 0.4 MG capsule 24 hr capsule  Commonly known as: FLOMAX     vitamin B-12 1000 MCG tablet  Commonly known as: CYANOCOBALAMIN     warfarin 4 MG tablet  Commonly known as: COUMADIN              No Known Allergies      Discharge Disposition:   Hospice/Home    Diet:  Hospital:  Diet Order   Procedures    NPO Diet NPO Type: Strict NPO       Discharge Activity:   Activity Instructions       Gradually Increase Activity Until at Pre-Hospitalization Level                CODE STATUS:  Code Status and Medical Interventions:   Ordered at: 04/20/24 0039     Medical Intervention Limits:    NO intubation (DNI)     Level Of Support Discussed With:    Next of Kin (If No Surrogate)     Code Status (Patient has no pulse and is not breathing):    No CPR (Do Not Attempt to Resuscitate)     Medical Interventions (Patient has pulse or is breathing):    Limited Support         No future appointments.        Time spent on Discharge including face to face service:  >30 minutes    Signature: Electronically signed by Suhail Zacarias MD, 04/25/24,  12:55 EDT.  [facility]

## 2024-04-26 NOTE — CASE MANAGEMENT/SOCIAL WORK
Case Management Discharge Note       Home Medical Care Coordination complete.      Service Provider Selected Services Address Phone Fax Patient Preferred    [facility] [address] [place] [id number] [phone] -- --               Transportation Services  Ambulance: [facility]    Final Discharge Disposition Code: 50 - home with hospice

## 2024-05-23 ENCOUNTER — TRANSCRIBE ORDERS (OUTPATIENT)
Dept: HOME HEALTH SERVICES | Facility: HOME HEALTHCARE | Age: 89
End: 2024-05-23
Payer: MEDICARE

## 2024-05-23 ENCOUNTER — HOME HEALTH ADMISSION (OUTPATIENT)
Dept: HOME HEALTH SERVICES | Facility: HOME HEALTHCARE | Age: 89
End: 2024-05-23
Payer: MEDICARE

## 2024-05-23 ENCOUNTER — OFFICE VISIT (OUTPATIENT)
Dept: WOUND CARE | Facility: HOSPITAL | Age: 89
End: 2024-05-23
Payer: MEDICARE

## 2024-05-23 DIAGNOSIS — L97.922 NON-PRESSURE ULCER OF LOWER EXTREMITY WITH FAT LAYER EXPOSED, LEFT: Primary | ICD-10-CM

## 2024-05-23 PROCEDURE — G0463 HOSPITAL OUTPT CLINIC VISIT: HCPCS

## 2024-05-23 PROCEDURE — 97602 WOUND(S) CARE NON-SELECTIVE: CPT

## 2024-06-01 ENCOUNTER — APPOINTMENT (OUTPATIENT)
Dept: CT IMAGING | Facility: HOSPITAL | Age: 89
End: 2024-06-01
Payer: MEDICARE

## 2024-06-01 ENCOUNTER — APPOINTMENT (OUTPATIENT)
Dept: GENERAL RADIOLOGY | Facility: HOSPITAL | Age: 89
End: 2024-06-01
Payer: MEDICARE

## 2024-06-01 ENCOUNTER — HOSPITAL ENCOUNTER (OUTPATIENT)
Facility: HOSPITAL | Age: 89
Setting detail: OBSERVATION
Discharge: LEFT BEFORE DISCHARGE | End: 2024-06-01
Attending: EMERGENCY MEDICINE | Admitting: EMERGENCY MEDICINE
Payer: MEDICARE

## 2024-06-01 VITALS
BODY MASS INDEX: 23.64 KG/M2 | DIASTOLIC BLOOD PRESSURE: 64 MMHG | TEMPERATURE: 97.4 F | HEIGHT: 69 IN | OXYGEN SATURATION: 94 % | HEART RATE: 67 BPM | SYSTOLIC BLOOD PRESSURE: 78 MMHG | RESPIRATION RATE: 15 BRPM

## 2024-06-01 DIAGNOSIS — T45.511A TOXIC EFFECT OF WARFARIN, UNINTENTIONAL, INITIAL ENCOUNTER: ICD-10-CM

## 2024-06-01 DIAGNOSIS — Z99.2 ESRD ON DIALYSIS: Primary | ICD-10-CM

## 2024-06-01 DIAGNOSIS — N18.6 ESRD ON DIALYSIS: Primary | ICD-10-CM

## 2024-06-01 DIAGNOSIS — T14.8XXA BLEEDING FROM WOUND: ICD-10-CM

## 2024-06-01 LAB
ABO GROUP BLD: NORMAL
ANION GAP SERPL CALCULATED.3IONS-SCNC: 9.6 MMOL/L (ref 5–15)
ANISOCYTOSIS BLD QL: NORMAL
APTT PPP: >139 SECONDS (ref 61–76.5)
BASOPHILS # BLD AUTO: 0.01 10*3/MM3 (ref 0–0.2)
BASOPHILS NFR BLD AUTO: 0.1 % (ref 0–1.5)
BH BB BLOOD EXPIRATION DATE: NORMAL
BH BB BLOOD TYPE BARCODE: 7300
BH BB DISPENSE STATUS: NORMAL
BH BB PRODUCT CODE: NORMAL
BH BB UNIT NUMBER: NORMAL
BLD GP AB SCN SERPL QL: NEGATIVE
BUN SERPL-MCNC: 27 MG/DL (ref 8–23)
BUN/CREAT SERPL: 8.7 (ref 7–25)
C3 FRG RBC-MCNC: NORMAL
CALCIUM SPEC-SCNC: 8.7 MG/DL (ref 8.2–9.6)
CHLORIDE SERPL-SCNC: 103 MMOL/L (ref 98–107)
CO2 SERPL-SCNC: 26.4 MMOL/L (ref 22–29)
CREAT SERPL-MCNC: 3.09 MG/DL (ref 0.76–1.27)
DEPRECATED RDW RBC AUTO: 62.4 FL (ref 37–54)
EGFRCR SERPLBLD CKD-EPI 2021: 18.4 ML/MIN/1.73
EOSINOPHIL # BLD AUTO: 0.08 10*3/MM3 (ref 0–0.4)
EOSINOPHIL NFR BLD AUTO: 0.7 % (ref 0.3–6.2)
ERYTHROCYTE [DISTWIDTH] IN BLOOD BY AUTOMATED COUNT: 17.6 % (ref 12.3–15.4)
GEN 5 2HR TROPONIN T REFLEX: 249 NG/L
GLUCOSE SERPL-MCNC: 92 MG/DL (ref 65–99)
HCT VFR BLD AUTO: 22.9 % (ref 37.5–51)
HGB BLD-MCNC: 7 G/DL (ref 13–17.7)
IMM GRANULOCYTES # BLD AUTO: 0.07 10*3/MM3 (ref 0–0.05)
IMM GRANULOCYTES NFR BLD AUTO: 0.7 % (ref 0–0.5)
INR PPP: >=8 (ref 2–3)
LARGE PLATELETS: NORMAL
LYMPHOCYTES # BLD AUTO: 0.56 10*3/MM3 (ref 0.7–3.1)
LYMPHOCYTES NFR BLD AUTO: 5.2 % (ref 19.6–45.3)
MACROCYTES BLD QL SMEAR: NORMAL
MCH RBC QN AUTO: 29.9 PG (ref 26.6–33)
MCHC RBC AUTO-ENTMCNC: 30.6 G/DL (ref 31.5–35.7)
MCV RBC AUTO: 97.9 FL (ref 79–97)
MONOCYTES # BLD AUTO: 0.5 10*3/MM3 (ref 0.1–0.9)
MONOCYTES NFR BLD AUTO: 4.7 % (ref 5–12)
NEUTROPHILS NFR BLD AUTO: 88.6 % (ref 42.7–76)
NEUTROPHILS NFR BLD AUTO: 9.51 10*3/MM3 (ref 1.7–7)
NRBC BLD AUTO-RTO: 0 /100 WBC (ref 0–0.2)
PLATELET # BLD AUTO: 86 10*3/MM3 (ref 140–450)
PMV BLD AUTO: 10.6 FL (ref 6–12)
POIKILOCYTOSIS BLD QL SMEAR: NORMAL
POTASSIUM SERPL-SCNC: 4 MMOL/L (ref 3.5–5.2)
PROTHROMBIN TIME: 86.7 SECONDS (ref 19.4–28.5)
RBC # BLD AUTO: 2.34 10*6/MM3 (ref 4.14–5.8)
RH BLD: POSITIVE
SODIUM SERPL-SCNC: 139 MMOL/L (ref 136–145)
T&S EXPIRATION DATE: NORMAL
TARGETS BLD QL SMEAR: NORMAL
TOXIC GRANULATION: NORMAL
TROPONIN T DELTA: -22 NG/L
TROPONIN T SERPL HS-MCNC: 271 NG/L
UNIT  ABO: NORMAL
UNIT  RH: NORMAL
WBC NRBC COR # BLD AUTO: 10.73 10*3/MM3 (ref 3.4–10.8)

## 2024-06-01 PROCEDURE — 71045 X-RAY EXAM CHEST 1 VIEW: CPT

## 2024-06-01 PROCEDURE — 86927 PLASMA FRESH FROZEN: CPT

## 2024-06-01 PROCEDURE — 99285 EMERGENCY DEPT VISIT HI MDM: CPT

## 2024-06-01 PROCEDURE — 85007 BL SMEAR W/DIFF WBC COUNT: CPT | Performed by: NURSE PRACTITIONER

## 2024-06-01 PROCEDURE — P9059 PLASMA, FRZ BETWEEN 8-24HOUR: HCPCS

## 2024-06-01 PROCEDURE — G0378 HOSPITAL OBSERVATION PER HR: HCPCS

## 2024-06-01 PROCEDURE — 36430 TRANSFUSION BLD/BLD COMPNT: CPT

## 2024-06-01 PROCEDURE — 86900 BLOOD TYPING SEROLOGIC ABO: CPT

## 2024-06-01 PROCEDURE — 80048 BASIC METABOLIC PNL TOTAL CA: CPT | Performed by: NURSE PRACTITIONER

## 2024-06-01 PROCEDURE — 85610 PROTHROMBIN TIME: CPT | Performed by: NURSE PRACTITIONER

## 2024-06-01 PROCEDURE — 86901 BLOOD TYPING SEROLOGIC RH(D): CPT | Performed by: NURSE PRACTITIONER

## 2024-06-01 PROCEDURE — 86850 RBC ANTIBODY SCREEN: CPT | Performed by: NURSE PRACTITIONER

## 2024-06-01 PROCEDURE — P9016 RBC LEUKOCYTES REDUCED: HCPCS

## 2024-06-01 PROCEDURE — 86900 BLOOD TYPING SEROLOGIC ABO: CPT | Performed by: NURSE PRACTITIONER

## 2024-06-01 PROCEDURE — 85730 THROMBOPLASTIN TIME PARTIAL: CPT | Performed by: NURSE PRACTITIONER

## 2024-06-01 PROCEDURE — 36415 COLL VENOUS BLD VENIPUNCTURE: CPT

## 2024-06-01 PROCEDURE — 70450 CT HEAD/BRAIN W/O DYE: CPT

## 2024-06-01 PROCEDURE — 25010000002 PHYTONADIONE 10 MG/ML SOLUTION 1 ML AMPULE: Performed by: NURSE PRACTITIONER

## 2024-06-01 PROCEDURE — 86923 COMPATIBILITY TEST ELECTRIC: CPT

## 2024-06-01 PROCEDURE — 84484 ASSAY OF TROPONIN QUANT: CPT | Performed by: NURSE PRACTITIONER

## 2024-06-01 PROCEDURE — 85025 COMPLETE CBC W/AUTO DIFF WBC: CPT | Performed by: NURSE PRACTITIONER

## 2024-06-01 PROCEDURE — 96365 THER/PROPH/DIAG IV INF INIT: CPT

## 2024-06-01 PROCEDURE — 93005 ELECTROCARDIOGRAM TRACING: CPT | Performed by: NURSE PRACTITIONER

## 2024-06-01 RX ORDER — ALBUMIN (HUMAN) 12.5 G/50ML
12.5 SOLUTION INTRAVENOUS AS NEEDED
Status: CANCELLED | OUTPATIENT
Start: 2024-06-01 | End: 2024-06-02

## 2024-06-01 RX ORDER — SODIUM CHLORIDE 9 MG/ML
40 INJECTION, SOLUTION INTRAVENOUS AS NEEDED
Status: DISCONTINUED | OUTPATIENT
Start: 2024-06-01 | End: 2024-06-01 | Stop reason: HOSPADM

## 2024-06-01 RX ORDER — SODIUM CHLORIDE 0.9 % (FLUSH) 0.9 %
10 SYRINGE (ML) INJECTION AS NEEDED
Status: DISCONTINUED | OUTPATIENT
Start: 2024-06-01 | End: 2024-06-01 | Stop reason: HOSPADM

## 2024-06-01 RX ORDER — ALUMINA, MAGNESIA, AND SIMETHICONE 2400; 2400; 240 MG/30ML; MG/30ML; MG/30ML
15 SUSPENSION ORAL EVERY 6 HOURS PRN
Status: DISCONTINUED | OUTPATIENT
Start: 2024-06-01 | End: 2024-06-01 | Stop reason: HOSPADM

## 2024-06-01 RX ORDER — TRANEXAMIC ACID 100 MG/ML
500 INJECTION, SOLUTION INTRAVENOUS ONCE
Status: COMPLETED | OUTPATIENT
Start: 2024-06-01 | End: 2024-06-01

## 2024-06-01 RX ORDER — MIDODRINE HYDROCHLORIDE 5 MG/1
10 TABLET ORAL ONCE
Status: COMPLETED | OUTPATIENT
Start: 2024-06-01 | End: 2024-06-01

## 2024-06-01 RX ORDER — SODIUM CHLORIDE 0.9 % (FLUSH) 0.9 %
10 SYRINGE (ML) INJECTION EVERY 12 HOURS SCHEDULED
Status: DISCONTINUED | OUTPATIENT
Start: 2024-06-01 | End: 2024-06-01 | Stop reason: HOSPADM

## 2024-06-01 RX ORDER — ONDANSETRON 4 MG/1
4 TABLET, ORALLY DISINTEGRATING ORAL EVERY 6 HOURS PRN
Status: DISCONTINUED | OUTPATIENT
Start: 2024-06-01 | End: 2024-06-01 | Stop reason: HOSPADM

## 2024-06-01 RX ORDER — ACETAMINOPHEN 325 MG/1
650 TABLET ORAL EVERY 4 HOURS PRN
Status: DISCONTINUED | OUTPATIENT
Start: 2024-06-01 | End: 2024-06-01 | Stop reason: HOSPADM

## 2024-06-01 RX ORDER — LIDOCAINE AND PRILOCAINE 25; 25 MG/G; MG/G
CREAM TOPICAL ONCE
Status: CANCELLED | OUTPATIENT
Start: 2024-06-01 | End: 2024-06-01

## 2024-06-01 RX ORDER — ALBUMIN (HUMAN) 12.5 G/50ML
SOLUTION INTRAVENOUS
Status: DISCONTINUED
Start: 2024-06-01 | End: 2024-06-01 | Stop reason: HOSPADM

## 2024-06-01 RX ORDER — ONDANSETRON 2 MG/ML
4 INJECTION INTRAMUSCULAR; INTRAVENOUS EVERY 6 HOURS PRN
Status: DISCONTINUED | OUTPATIENT
Start: 2024-06-01 | End: 2024-06-01 | Stop reason: HOSPADM

## 2024-06-01 RX ADMIN — PHYTONADIONE 10 MG: 10 INJECTION, EMULSION INTRAMUSCULAR; INTRAVENOUS; SUBCUTANEOUS at 13:59

## 2024-06-01 RX ADMIN — TRANEXAMIC ACID 500 MG: 100 INJECTION, SOLUTION INTRAVENOUS at 13:34

## 2024-06-01 RX ADMIN — MIDODRINE HYDROCHLORIDE 10 MG: 5 TABLET ORAL at 12:12

## 2024-06-01 RX ADMIN — Medication 1 EACH: at 11:41

## 2024-06-01 NOTE — ED PROVIDER NOTES
Subjective   History of Present Illness  Chief complaint: bleeding      Context: Patient is a 89-year-old male who presents from dialysis after they would not do his treatment as he had some bleeding from his fistula.  Grandson states he was at [facility] last week for the same thing and was noted to have a greater than 8 INR.  Grandson denies any other complaints.  Patient is a poor historian.        PCP: kaye askew       DNR/DNI.  Patient's healthcare surrogate Vernon is at bedside      Review of Systems   Constitutional:  Negative for fever.       Past Medical History:   Diagnosis Date    A-fib     Asthma     Chronic renal disease     COPD (chronic obstructive pulmonary disease)     Coronary heart disease     CABG    CVA (cerebral vascular accident)     recent CVA -- Patient and Family report this is not accurate    Gout     Hypertension        No Known Allergies    Past Surgical History:   Procedure Laterality Date    ARTERIOVENOUS FISTULA/SHUNT SURGERY Right 2022    Procedure: RIGHT ARM BRACHIAL CEPHALIC ARTERIAL VENOUS FISTULA;  Surgeon: Drew Gunter MD;  Location: [facility] MAIN OR;  Service: Vascular;  Laterality: Right;    CARDIAC CATHETERIZATION  2016    BHF    CORONARY ARTERY BYPASS GRAFT      CABG X3, reoperation, 09; CAB and     OTHER SURGICAL HISTORY  2016    loop recorder implant        Family History   Problem Relation Age of Onset    Stroke Other        Social History     Socioeconomic History    Marital status:    Tobacco Use    Smoking status: Never     Passive exposure: Never    Smokeless tobacco: Never   Vaping Use    Vaping status: Never Used   Substance and Sexual Activity    Alcohol use: No    Drug use: No    Sexual activity: Defer           Objective   Physical Exam    Vital signs and triage nurse note reviewed.  Constitutional: Awake,  No acute distress is noted.  HEENT: Normocephalic,   Neck: Supple, full range of motion without pain; no  JVD  Cardiovascular: regularly irregular murmur  Pulmonary: Respiratory effort regular nonlabored, breath sounds clear to auscultation all fields.  Abdomen: Soft, nontender nondistended with normoactive bowel sounds; no rebound or guarding.  Bedside Hemoccult negative.  Some stage II coccyx pressure ulcers noted without any surrounding cellulitic changes  Musculoskeletal: Independent range of motion of all extremities .  Fistula to the right upper extremity has a pinprick sized hole that has some scant active bleeding  Neuro: Awake  Skin:  Fleshtone warm, dry,       Procedures           ED Course      Labs Reviewed   BASIC METABOLIC PANEL - Abnormal; Notable for the following components:       Result Value    BUN 27 (*)     Creatinine 3.09 (*)     eGFR 18.4 (*)     All other components within normal limits    Narrative:     GFR Normal >60  Chronic Kidney Disease <60  Kidney Failure <15    The GFR formula is only valid for adults with stable renal function between ages 18 and 70.   PROTIME-INR - Abnormal; Notable for the following components:    Protime 86.7 (*)     INR >=8.00 (*)     All other components within normal limits   APTT - Abnormal; Notable for the following components:    PTT >139.0 (*)     All other components within normal limits   CBC WITH AUTO DIFFERENTIAL - Abnormal; Notable for the following components:    RBC 2.34 (*)     Hemoglobin 7.0 (*)     Hematocrit 22.9 (*)     MCV 97.9 (*)     MCHC 30.6 (*)     RDW 17.6 (*)     RDW-SD 62.4 (*)     Platelets 86 (*)     Neutrophil % 88.6 (*)     Lymphocyte % 5.2 (*)     Monocyte % 4.7 (*)     Immature Grans % 0.7 (*)     Neutrophils, Absolute 9.51 (*)     Lymphocytes, Absolute 0.56 (*)     Immature Grans, Absolute 0.07 (*)     All other components within normal limits   SINGLE HS TROPONIN T - Abnormal; Notable for the following components:    HS Troponin T 271 (*)     All other components within normal limits    Narrative:     High Sensitive Troponin T  Reference Range:  <14.0 ng/L- Negative Female for AMI  <22.0 ng/L- Negative Male for AMI  >=14 - Abnormal Female indicating possible myocardial injury.  >=22 - Abnormal Male indicating possible myocardial injury.   Clinicians would have to utilize clinical acumen, EKG, Troponin, and serial changes to determine if it is an Acute Myocardial Infarction or myocardial injury due to an underlying chronic condition.        HIGH SENSITIVITIY TROPONIN T 2HR - Abnormal; Notable for the following components:    HS Troponin T 249 (*)     Troponin T Delta -22 (*)     All other components within normal limits    Narrative:     High Sensitive Troponin T Reference Range:  <14.0 ng/L- Negative Female for AMI  <22.0 ng/L- Negative Male for AMI  >=14 - Abnormal Female indicating possible myocardial injury.  >=22 - Abnormal Male indicating possible myocardial injury.   Clinicians would have to utilize clinical acumen, EKG, Troponin, and serial changes to determine if it is an Acute Myocardial Infarction or myocardial injury due to an underlying chronic condition.        SCAN SLIDE   TYPE AND SCREEN   PREPARE FRESH FROZEN PLASMA   PREPARE RBC   CBC AND DIFFERENTIAL    Narrative:     The following orders were created for panel order CBC & Differential.  Procedure                               Abnormality         Status                     ---------                               -----------         ------                     CBC Auto Differential[[id number]]        Abnormal            Final result               Scan Slide[[id number]]                                       Final result                 Please view results for these tests on the individual orders.     Medications   sodium chloride 0.9 % flush 10 mL (has no administration in time range)   phytonadione (AQUA-MEPHYTON, VITAMIN K) 10 mg in sodium chloride 0.9 % 50 mL IVPB (10 mg Intravenous New Bag 6/1/24 6667)   gelatin absorbable (GELFOAM) sponge (1 each Apply externally Given  6/1/24 1141)   midodrine (PROAMATINE) tablet 10 mg (10 mg Oral Given 6/1/24 1212)   tranexamic acid 500 MG/5ML (ED/Crit Care for lacerations) - VIAL DISPENSE 500 mg (500 mg Topical Given 6/1/24 1334)     CT Head Without Contrast    Result Date: 6/1/2024  Impression: No acute intracranial pathology. Electronically Signed: Shimon Jimenes MD  6/1/2024 12:50 PM EDT  Workstation ID: [id number]    XR Chest 1 View    Result Date: 6/1/2024  Impression: 1.Prominent interstitial markings with patchy opacities in the bilateral mid and lower lungs, similar to recent prior chest radiographs. These findings could be related to recurrent edema/volume overload or chronic lung disease. 2.Stable cardiomegaly. Electronically Signed: Donald Merchant  6/1/2024 11:57 AM EDT  Workstation ID: [id number]   Prior to Admission medications    Medication Sig Start Date End Date Taking? Authorizing Provider   acetaminophen (TYLENOL) 325 MG tablet Take 2 tablets by mouth Every 4 (Four) Hours As Needed for Mild Pain.    ProviderJos MD   ipratropium-albuterol (DUO-NEB) 0.5-2.5 mg/3 ml nebulizer Take 3 mL by nebulization Every 4 (Four) Hours As Needed for Wheezing.    ProviderJos MD   levothyroxine (SYNTHROID, LEVOTHROID) 75 MCG tablet Take 1 tablet by mouth Daily.    ProviderJos MD   midodrine (PROAMATINE) 10 MG tablet Take 1 tablet by mouth 3 (Three) Times a Day Before Meals. 4/25/24   Suhail Zacarias MD   nitroglycerin (NITROSTAT) 0.4 MG SL tablet Place 1 tablet under the tongue Every 5 (Five) Minutes As Needed for Chest Pain (Only if SBP Greater Than 100). Take no more than 3 doses in 15 minutes. 4/2/24   Suhail Zacarias MD   QUEtiapine (SEROquel) 25 MG tablet Take 1 tablet by mouth Every Night. 4/25/24   Suhail Zacarias MD                                              Medical Decision Making      BP 93/51 (BP Location: Left arm, Patient Position: Lying)   Pulse 62   Temp 98 °F (36.7 °C) (Axillary)   Resp 18  "  Ht 175.3 cm (69\")   SpO2 98%   BMI 23.64 kg/m²      Chart review: Hemoglobin on 5/23/2024 was 7.9    Radiology interpretation: CT head and chest x-rays reviewed and interpreted by Dr. Funez: Negative for ICH, chronic interstitial markings  Further interpretation by radiologist as above  Lab interpretation:  Labs all viewed by me and significant for, troponin 271 which is within patient's baseline window, BUN 27 creatinine 3, INR greater than 8, PTT greater than 139, hemoglobin 7    EKG viewed and interpreted by Dr. Funez, A-fib rate of 62  comparison: 4/18/2020 for a flutter rate of 55            Appropriate PPE worn during exam.  Patient was placed on a cardiac monitor and IV established labs obtained.  Blood pressure was initially mildly low and was given his prescribed midodrine with improvement.  No signs of infection.  He was not given any hydration due to his pulmonary edema on chest x-ray and fluid overloaded status.  He initially Surgicel and pressure to the right upper extremity without much relief and had TXA soaked pledget with achievement of hemostasis.  I discussed with Dr. askew who states his INR will need to be reversed prior to dialysis and recommends FFP PRBCs and vitamin K; discussed with Rickie pharmacist and he was given vitamin K.  Patient's grandson and healthcare surrogate voices some concern regarding admission overnight due to agitation and prior benzodiazepine overdoses while in the hospital and would prefer patient to be dialyzed and transfused and discharged home if possible.  I did discuss that with Liat the observation admitting provider and Dr. Askew along with holding benzos.   i discussed findings with patient who voices understanding of placement in observation.    Discussed with Dr. Funez    Problems Addressed:  Bleeding from wound: complicated acute illness or injury  ESRD on dialysis: complicated acute illness or injury  Toxic effect of warfarin, unintentional, " initial encounter: complicated acute illness or injury    Amount and/or Complexity of Data Reviewed  Labs: ordered.  Radiology: ordered.  ECG/medicine tests: ordered.    Risk  Prescription drug management.  Decision regarding hospitalization.        Final diagnoses:   ESRD on dialysis   Toxic effect of warfarin, unintentional, initial encounter   Bleeding from wound       ED Disposition  ED Disposition       ED Disposition   Decision to Admit    Condition   --    Comment   --               No follow-up provider specified.       Medication List      No changes were made to your prescriptions during this visit.            Sana Bentley, APRN  06/01/24 141

## 2024-06-01 NOTE — NURSING NOTE
Pt admitted for low hgb, dls treatment, INR >8. Pt DNR grandson at bedside. Grandson POA. Pt received 1 u of blood, ffp, 10 mg of vit K. Grandson made the choice to take papaw ama. Education given on risk and benefits of unstable INR grandson states he understands education.

## 2024-06-01 NOTE — ED NOTES
Nursing report ED to floor  Barry Calhoun .  89 y.o.  male    HPI:   Chief Complaint   Patient presents with    Wound Check       Admitting doctor:   He Funez DO    Admitting diagnosis:   The primary encounter diagnosis was ESRD on dialysis. Diagnoses of Toxic effect of warfarin, unintentional, initial encounter and Bleeding from wound were also pertinent to this visit.    Code status:   Current Code Status       Date Active Code Status Order ID Comments User Context       Prior            Allergies:   Patient has no known allergies.    Isolation:  No active isolations     Fall Risk:  Fall Risk Assessment was completed, and patient is at high risk for falls.   Predictive Model Details         34 (Low) Factor Value    Calculated 6/1/2024 15:40 Age 89    Risk of Fall Model [place] Coma Scale 14     Active Peripheral IV Present     Imaging order in this encounter Present     Respiratory Rate 19     Magnesium not on file     Number of Distinct Medication Classes administered 4     Creatinine 3.09 mg/dL     Seth Scale not on file     Clinically Relevant Sex Not Female     Albumin not on file     Diastolic BP 89     Calcium 8.7 mg/dL     Days after Admission 0.199     Chloride 103 mmol/L     Total Bilirubin not on file     Potassium 4 mmol/L     ALT not on file         Weight:   There were no vitals filed for this visit.    Intake and Output    Intake/Output Summary (Last 24 hours) at 6/1/2024 1540  Last data filed at 6/1/2024 1359  Gross per 24 hour   Intake 50 ml   Output --   Net 50 ml       Diet:        Most recent vitals:   Vitals:    06/01/24 1430 06/01/24 1444 06/01/24 1504 06/01/24 1533   BP: (!) 145/110 (!) 85/37 118/84 103/89   BP Location:   Left arm    Patient Position:   Lying    Pulse: 67 67 67 67   Resp: 16 16 18 19   Temp: 98 °F (36.7 °C) 98 °F (36.7 °C) 97 °F (36.1 °C) 97.1 °F (36.2 °C)   TempSrc:   Axillary    SpO2: 100% 100% 100% 94%   Height:           Active LDAs/IV Access:   Lines,  Drains & Airways       Active LDAs       Name Placement date Placement time Site Days    Peripheral IV 06/01/24 1120 Anterior;Left;Upper Arm 06/01/24  1120  Arm  less than 1    Single Lumen Implantable Port Right Chest --  --  Chest  --                    Skin Condition:   Skin Assessments (last day)       None             Labs (abnormal labs have a star):   Labs Reviewed   BASIC METABOLIC PANEL - Abnormal; Notable for the following components:       Result Value    BUN 27 (*)     Creatinine 3.09 (*)     eGFR 18.4 (*)     All other components within normal limits    Narrative:     GFR Normal >60  Chronic Kidney Disease <60  Kidney Failure <15    The GFR formula is only valid for adults with stable renal function between ages 18 and 70.   PROTIME-INR - Abnormal; Notable for the following components:    Protime 86.7 (*)     INR >=8.00 (*)     All other components within normal limits   APTT - Abnormal; Notable for the following components:    PTT >139.0 (*)     All other components within normal limits   CBC WITH AUTO DIFFERENTIAL - Abnormal; Notable for the following components:    RBC 2.34 (*)     Hemoglobin 7.0 (*)     Hematocrit 22.9 (*)     MCV 97.9 (*)     MCHC 30.6 (*)     RDW 17.6 (*)     RDW-SD 62.4 (*)     Platelets 86 (*)     Neutrophil % 88.6 (*)     Lymphocyte % 5.2 (*)     Monocyte % 4.7 (*)     Immature Grans % 0.7 (*)     Neutrophils, Absolute 9.51 (*)     Lymphocytes, Absolute 0.56 (*)     Immature Grans, Absolute 0.07 (*)     All other components within normal limits   SINGLE HS TROPONIN T - Abnormal; Notable for the following components:    HS Troponin T 271 (*)     All other components within normal limits    Narrative:     High Sensitive Troponin T Reference Range:  <14.0 ng/L- Negative Female for AMI  <22.0 ng/L- Negative Male for AMI  >=14 - Abnormal Female indicating possible myocardial injury.  >=22 - Abnormal Male indicating possible myocardial injury.   Clinicians would have to utilize  clinical acumen, EKG, Troponin, and serial changes to determine if it is an Acute Myocardial Infarction or myocardial injury due to an underlying chronic condition.        HIGH SENSITIVITIY TROPONIN T 2HR - Abnormal; Notable for the following components:    HS Troponin T 249 (*)     Troponin T Delta -22 (*)     All other components within normal limits    Narrative:     High Sensitive Troponin T Reference Range:  <14.0 ng/L- Negative Female for AMI  <22.0 ng/L- Negative Male for AMI  >=14 - Abnormal Female indicating possible myocardial injury.  >=22 - Abnormal Male indicating possible myocardial injury.   Clinicians would have to utilize clinical acumen, EKG, Troponin, and serial changes to determine if it is an Acute Myocardial Infarction or myocardial injury due to an underlying chronic condition.        SCAN SLIDE   HEPATITIS B SURFACE ANTIGEN   TYPE AND SCREEN   PREPARE FRESH FROZEN PLASMA   PREPARE RBC   CBC AND DIFFERENTIAL    Narrative:     The following orders were created for panel order CBC & Differential.  Procedure                               Abnormality         Status                     ---------                               -----------         ------                     CBC Auto Differential[[id number]]        Abnormal            Final result               Scan Slide[[id number]]                                       Final result                 Please view results for these tests on the individual orders.       LOC: Person    Telemetry:  Observation Unit    Cardiac Monitoring Ordered: yes    EKG:   ECG 12 Lead Electrolyte Imbalance   Preliminary Result   HEART RATE= 62  bpm   RR Interval= 974  ms   LA Interval=   ms   P Horizontal Axis=   deg   P Front Axis=   deg   QRSD Interval= 138  ms   QT Interval= 501  ms   QTcB= 508  ms   QRS Axis= -31  deg   T Wave Axis= 216  deg   - ABNORMAL ECG -   Atrial fibrillation   Ventricular premature complex   RBBB and LAFB   Abnormal T, consider ischemia, lateral  leads   When compared with ECG of 18-Apr-2024 10:42:48,   Significant repolarization change   Electronically Signed By:    Date and Time of Study: 2024-06-01 11:11:40          Medications Given in the ED:   Medications   sodium chloride 0.9 % flush 10 mL (has no administration in time range)   albumin human 25 % IV SOLN  - ADS Override Pull (has no administration in time range)   gelatin absorbable (GELFOAM) sponge (1 each Apply externally Given 6/1/24 1141)   midodrine (PROAMATINE) tablet 10 mg (10 mg Oral Given 6/1/24 1212)   tranexamic acid 500 MG/5ML (ED/Crit Care for lacerations) - VIAL DISPENSE 500 mg (500 mg Topical Given 6/1/24 1334)   phytonadione (AQUA-MEPHYTON, VITAMIN K) 10 mg in sodium chloride 0.9 % 50 mL IVPB (0 mg Intravenous Stopped 6/1/24 1430)       Imaging results:  CT Head Without Contrast    Result Date: 6/1/2024  Impression: No acute intracranial pathology. Electronically Signed: Shimon Jimenes MD  6/1/2024 12:50 PM EDT  Workstation ID: [id number]    XR Chest 1 View    Result Date: 6/1/2024  Impression: 1.Prominent interstitial markings with patchy opacities in the bilateral mid and lower lungs, similar to recent prior chest radiographs. These findings could be related to recurrent edema/volume overload or chronic lung disease. 2.Stable cardiomegaly. Electronically Signed: Donald Merchant  6/1/2024 11:57 AM EDT  Workstation ID: [id number]     Social issues:   Social History     Socioeconomic History    Marital status:    Tobacco Use    Smoking status: Never     Passive exposure: Never    Smokeless tobacco: Never   Vaping Use    Vaping status: Never Used   Substance and Sexual Activity    Alcohol use: No    Drug use: No    Sexual activity: Defer       NIH Stroke Scale:  Interval: (not recorded)  1a. Level of Consciousness: (not recorded)  1b. LOC Questions: (not recorded)  1c. LOC Commands: (not recorded)  2. Best Gaze: (not recorded)  3. Visual: (not recorded)  4. Facial Palsy: (not  recorded)  5a. Motor Arm, Left: (not recorded)  5b. Motor Arm, Right: (not recorded)  6a. Motor Leg, Left: (not recorded)  6b. Motor Leg, Right: (not recorded)  7. Limb Ataxia: (not recorded)  8. Sensory: (not recorded)  9. Best Language: (not recorded)  10. Dysarthria: (not recorded)  11. Extinction and Inattention (formerly Neglect): (not recorded)    Total (NIH Stroke Scale): (not recorded)     Additional notable assessment information:     Nursing report ED to floor:  Marilee Temple, SANTOS   06/01/24 15:40 EDT

## 2024-06-01 NOTE — ED NOTES
This Rn spoke with RN at dialysis center who reports if pt is back to dialysis center by 1300 then they will be able to complete his treatment, otherwise he will not be able to have dialysis today.

## 2024-06-01 NOTE — CONSULTS
NEPHROLOGY CONSULTATION-----[facility]    [facility]  [phone]  Derrick Henry MD    Patient Care Team:  Pedro Purvis MD as PCP - General (Family Medicine)  Pedro Purvis MD as PCP - Family Medicine  Pino Mclean MD as Consulting Physician (Nephrology)    CC/REASON FOR CONSULTATION: ESRD    PHYSICIAN REQUESTING CONSULTATION: Dr. Funez    History of Present Illness  89-year-old -American male with past medical history of ESRD on dialysis Tuesday/Saturday, hypertension, coronary disease presented from dialysis clinic with bleeding from his access.  He was noted by INR greater than 8.  He did not have dialysis today.  He was seen recently at [place] and was noted to have elevated INR, I am told he has not received Coumadin but maybe 1 dose couple days ago.  No chest pain or shortness with.  No vomiting or diarrhea.  He is followed by partner Dr. Mclean for dialysis.  He was discharged home with Hospice end of April, not sure what happened    Review of Systems   As noted above otherwise 10 system review negative.    Past Medical History:   Diagnosis Date    A-fib     Asthma     Chronic renal disease     COPD (chronic obstructive pulmonary disease)     Coronary heart disease     CABG    CVA (cerebral vascular accident)     recent CVA -- Patient and Family report this is not accurate    Gout     Hypertension        Past Surgical History:   Procedure Laterality Date    ARTERIOVENOUS FISTULA/SHUNT SURGERY Right 2022    Procedure: RIGHT ARM BRACHIAL CEPHALIC ARTERIAL VENOUS FISTULA;  Surgeon: Drew Gunter MD;  Location: [facility];  Service: Vascular;  Laterality: Right;    CARDIAC CATHETERIZATION  2016    [facility]    CORONARY ARTERY BYPASS GRAFT      CABG X3, reoperation, 09; CAB and     OTHER SURGICAL HISTORY  2016    loop recorder implant        Family History   Problem Relation Age of  "Onset    Stroke Other        Social History     Tobacco Use    Smoking status: Never     Passive exposure: Never    Smokeless tobacco: Never   Vaping Use    Vaping status: Never Used   Substance Use Topics    Alcohol use: No    Drug use: No       Home Meds: (Not in a hospital admission)      Scheduled Meds:  phytonadione (AQUA-MEPHYTON, VITAMIN K) 10 mg in sodium chloride 0.9 % 50 mL IVPB, 10 mg, Intravenous, Once        Continuous Infusions:       PRN Meds:    [COMPLETED] Insert Peripheral IV **AND** sodium chloride    Allergies:  Patient has no known allergies.    OBJECTIVE    Vital Signs  Temp:  [97.9 °F (36.6 °C)] 97.9 °F (36.6 °C)  Heart Rate:  [60-73] 62  Resp:  [16-18] 18  BP: ()/(40-96) 153/96    No intake/output data recorded.  No intake/output data recorded.    Physical Exam:  General Appearance: NAD. Non verbal  Head: normocephalic, without obvious abnormality and atraumatic  Eyes: conjunctivae and sclerae normal and no icterus  Neck: supple and no JVD  Lungs: clear to auscultation and respirations regular  Heart: regular rhythm & normal rate and normal S1, S2  Chest Wall: no abnormalities observed  Abdomen: normal bowel sounds and soft, nontender  Extremities: Trace edema, no cyanosis  Skin: access arm wrapped  Neurologic: Poorly responsive    Results Review:    I reviewed the patient's new clinical results.    WBC WBC   Date Value Ref Range Status   06/01/2024 10.73 3.40 - 10.80 10*3/mm3 Final      HGB Hemoglobin   Date Value Ref Range Status   06/01/2024 7.0 (L) 13.0 - 17.7 g/dL Final      HCT Hematocrit   Date Value Ref Range Status   06/01/2024 22.9 (L) 37.5 - 51.0 % Final      Platelets No results found for: \"LABPLAT\"   MCV MCV   Date Value Ref Range Status   06/01/2024 97.9 (H) 79.0 - 97.0 fL Final          Sodium Sodium   Date Value Ref Range Status   06/01/2024 139 136 - 145 mmol/L Final      Potassium Potassium   Date Value Ref Range Status   06/01/2024 4.0 3.5 - 5.2 mmol/L Final    " "  Chloride Chloride   Date Value Ref Range Status   06/01/2024 103 98 - 107 mmol/L Final      CO2 CO2   Date Value Ref Range Status   06/01/2024 26.4 22.0 - 29.0 mmol/L Final      BUN BUN   Date Value Ref Range Status   06/01/2024 27 (H) 8 - 23 mg/dL Final      Creatinine Creatinine   Date Value Ref Range Status   06/01/2024 3.09 (H) 0.76 - 1.27 mg/dL Final      Calcium Calcium   Date Value Ref Range Status   06/01/2024 8.7 8.2 - 9.6 mg/dL Final      PO4 No results found for: \"CAPO4\"   Albumin No results found for: \"ALBUMIN\"   Magnesium No results found for: \"MG\"   Uric Acid No results found for: \"URICACID\"       Imaging Results (Last 72 Hours)       Procedure Component Value Units Date/Time    CT Head Without Contrast [[id number]] Collected: 06/01/24 1249     Updated: 06/01/24 1252    Narrative:      CT HEAD WO CONTRAST    Date of Exam: 6/1/2024 12:35 PM EDT    Indication: AMS, on warfarin, no trauma.    Comparison: 3/27/2024    Technique: Axial CT images were obtained of the head without contrast administration.  Coronal reconstructions were performed.  Automated exposure control and iterative reconstruction methods were used.    Findings: No intracranial hemorrhage. Old left cerebellar infarct. Gray-white matter differentiation is maintained without evidence of an acute infarction. Multiple foci of decreased attenuation are present within the subcortical, deep cerebral, and   periventricular white matter consistent with chronic small vessel/microangiopathic ischemic changes. No extra-axial mass or collection. The ventricles and sulci are prominent commensurate with involutional changes. The posterior fossa appears grossly   normal. Sellar and suprasellar structures are normal.    Lens replacements. The paranasal sinuses, ethmoid air cells, and mastoid air cells are aerated. The bony calvarium is intact.      Impression:      Impression: No acute intracranial pathology.          Electronically Signed: Shimon Jmienes, " MD    6/1/2024 12:50 PM EDT    Workstation ID: [id number]    XR Chest 1 View [[id number]] Collected: 06/01/24 1154     Updated: 06/01/24 1159    Narrative:      XR CHEST 1 VW    Date of Exam: 6/1/2024 11:32 AM EDT    Indication: missed dialysis    Comparison: April 20, 2024, April 18, 2024, March 29, 2024    Findings:  The heart is enlarged, similar to the prior exam. Status post median sternotomy and CABG. Right IJ port catheter with tip projecting at the caval atrial junction. Right subclavian vascular stent, as before. Chronic elevation of the right diaphragm. No   pneumothorax. No significant new pleural effusion. There are prominent interstitial markings with patchy opacities in the bilateral mid and lower lungs. These findings appear similar to recent prior radiographs.      Impression:      Impression:  1.Prominent interstitial markings with patchy opacities in the bilateral mid and lower lungs, similar to recent prior chest radiographs. These findings could be related to recurrent edema/volume overload or chronic lung disease.  2.Stable cardiomegaly.      Electronically Signed: Donald Merchant    6/1/2024 11:57 AM EDT    Workstation ID: [id number]              Results for orders placed during the hospital encounter of 06/01/24    XR Chest 1 View    Narrative  XR CHEST 1 VW    Date of Exam: 6/1/2024 11:32 AM EDT    Indication: missed dialysis    Comparison: April 20, 2024, April 18, 2024, March 29, 2024    Findings:  The heart is enlarged, similar to the prior exam. Status post median sternotomy and CABG. Right IJ port catheter with tip projecting at the caval atrial junction. Right subclavian vascular stent, as before. Chronic elevation of the right diaphragm. No  pneumothorax. No significant new pleural effusion. There are prominent interstitial markings with patchy opacities in the bilateral mid and lower lungs. These findings appear similar to recent prior radiographs.    Impression  Impression:  1.Prominent  interstitial markings with patchy opacities in the bilateral mid and lower lungs, similar to recent prior chest radiographs. These findings could be related to recurrent edema/volume overload or chronic lung disease.  2.Stable cardiomegaly.      Electronically Signed: Donald Merchant  6/1/2024 11:57 AM EDT  Workstation ID: [id number]      Results for orders placed during the hospital encounter of 04/18/24    XR Chest 1 View    Narrative  XR CHEST 1 VW    Date of Exam: 4/20/2024 4:54 AM EDT    Indication: poss asp pnuemonia    Comparison: 4/18/2024, 5/20/2022.    Findings:  The heart appears enlarged. Median sternotomy wires are present. There is a right internal jugular Mediport with the tip in the distal SVC. Stable elevation of the right hemidiaphragm. Mild atelectatic changes are present within the right lung base,  stable. Patchy airspace disease is seen within the left midlung which appears unchanged. Indistinctness of the pulmonary vasculature are present, stable. No pneumothorax.    Impression  Impression:  Stable mild patchy airspace disease present within the left midlung which may be related to atelectasis versus pneumonia. Stable right basilar atelectasis and elevation of the right hemidiaphragm. Stable cardiomegaly.      Electronically Signed: Deepa Healy MD  4/20/2024 5:11 AM EDT  Workstation ID: [id number]      XR Chest 1 View    Narrative  XR CHEST 1 VW    Date of Exam: 4/18/2024 11:24 AM EDT    Indication: hypotension    Comparison: X-ray dated 3/29/2024    Findings:  The trachea is midline. There is cardiomegaly with pulmonary vascular congestion. Diffuse bilateral reticular and airspace opacities suspicious for pulmonary edema. There are surgical changes in the heart and mediastinum. There is a right intrajugular  chest port in place    Impression  Impression:  Cardiomegaly with pulmonary vascular congestion and pulmonary edema/CHF pattern      Electronically Signed: Diogenes Landaverde MD  4/18/2024 11:52  AM EDT  Workstation ID: [id number]        Results for orders placed during the hospital encounter of 04/18/24    Duplex venous lower extremity right    Interpretation Summary    Acute right lower extremity superficial thrombophlebitis noted in the small saphenous.    All other right sided veins appeared normal.    Incidentally identified occluded right SFA.    The quality of the study is limited due to an uncooperative patient and patient positioning.      ASSESSMENT / PLAN      ESRD (end stage renal disease) on dialysis    ESRD-dialysis Tuesday Thursday Saturday  Hypertension-now hypotensive requiring midodrine  History coronary artery disease  Coumadin toxicity-give vitamin K and FFP due to ongoing bleeding  Anemia-acute on chronic.  Related to bleeding from access site.  Transfuse 1 unit PRBC    Transfuse 1 unit PRBC/FFP  Dialysis today  Discussed at length with colten at bedside, he is wanting to take him to [facility].  Counseled that he may not be safe/ stable enough for travel given coagulopathy.  He was discharged home with hospice end of April, and coumadin was stopped. ??      I discussed the patient's findings and my recommendations with patient and consulting provider    Will follow along closely. Thank you for allowing us to see this patient in renal consultation.    [facility]  [phone]  MD Derrick Lucero MD  06/01/24  13:37 EDT

## 2024-06-02 LAB
QT INTERVAL: 501 MS
QTC INTERVAL: 508 MS

## 2024-06-03 LAB
BH BB BLOOD EXPIRATION DATE: NORMAL
BH BB BLOOD EXPIRATION DATE: NORMAL
BH BB BLOOD TYPE BARCODE: 7300
BH BB BLOOD TYPE BARCODE: 7300
BH BB DISPENSE STATUS: NORMAL
BH BB DISPENSE STATUS: NORMAL
BH BB PRODUCT CODE: NORMAL
BH BB PRODUCT CODE: NORMAL
BH BB UNIT NUMBER: NORMAL
BH BB UNIT NUMBER: NORMAL
CROSSMATCH INTERPRETATION: NORMAL
UNIT  ABO: NORMAL
UNIT  ABO: NORMAL
UNIT  RH: NORMAL
UNIT  RH: NORMAL

## 2024-06-03 NOTE — CASE MANAGEMENT/SOCIAL WORK
Case Management Discharge Note      Final Note: Left AMA                               Final Discharge Disposition Code: 07 - left AMA

## 2024-06-17 ENCOUNTER — HOSPITAL ENCOUNTER (OUTPATIENT)
Dept: INFUSION THERAPY | Facility: HOSPITAL | Age: 89
Discharge: HOME | End: 2024-06-17
Admitting: INTERNAL MEDICINE
Payer: MEDICARE

## 2024-06-17 VITALS
TEMPERATURE: 97.8 F | DIASTOLIC BLOOD PRESSURE: 78 MMHG | SYSTOLIC BLOOD PRESSURE: 140 MMHG | HEART RATE: 67 BPM | RESPIRATION RATE: 18 BRPM | OXYGEN SATURATION: 98 %

## 2024-06-17 DIAGNOSIS — Z99.2 ESRD (END STAGE RENAL DISEASE) ON DIALYSIS: Primary | ICD-10-CM

## 2024-06-17 DIAGNOSIS — N18.9 ANEMIA DUE TO CHRONIC KIDNEY DISEASE, UNSPECIFIED CKD STAGE: ICD-10-CM

## 2024-06-17 DIAGNOSIS — N18.6 ANEMIA IN CHRONIC KIDNEY DISEASE, ON CHRONIC DIALYSIS: ICD-10-CM

## 2024-06-17 DIAGNOSIS — Z99.2 ANEMIA IN CHRONIC KIDNEY DISEASE, ON CHRONIC DIALYSIS: ICD-10-CM

## 2024-06-17 DIAGNOSIS — N18.6 ESRD (END STAGE RENAL DISEASE) ON DIALYSIS: Primary | ICD-10-CM

## 2024-06-17 DIAGNOSIS — D63.1 ANEMIA DUE TO CHRONIC KIDNEY DISEASE, UNSPECIFIED CKD STAGE: ICD-10-CM

## 2024-06-17 DIAGNOSIS — D63.1 ANEMIA IN CHRONIC KIDNEY DISEASE, ON CHRONIC DIALYSIS: ICD-10-CM

## 2024-06-17 DIAGNOSIS — Z45.2 ENCOUNTER FOR CARE RELATED TO VASCULAR ACCESS PORT: ICD-10-CM

## 2024-06-17 LAB
ABO GROUP BLD: NORMAL
BB HOLD TUBE: NORMAL
BLD GP AB SCN SERPL QL: NEGATIVE
HCT VFR BLD AUTO: 23.8 % (ref 37.5–51)
HGB BLD-MCNC: 7.3 G/DL (ref 13–17.7)
RH BLD: POSITIVE
T&S EXPIRATION DATE: NORMAL

## 2024-06-17 PROCEDURE — 63710000001 ACETAMINOPHEN 325 MG TABLET: Performed by: INTERNAL MEDICINE

## 2024-06-17 PROCEDURE — 86901 BLOOD TYPING SEROLOGIC RH(D): CPT | Performed by: INTERNAL MEDICINE

## 2024-06-17 PROCEDURE — 63710000001 DIPHENHYDRAMINE 12.5 MG/5ML LIQUID: Performed by: INTERNAL MEDICINE

## 2024-06-17 PROCEDURE — A9270 NON-COVERED ITEM OR SERVICE: HCPCS | Performed by: INTERNAL MEDICINE

## 2024-06-17 PROCEDURE — 25010000002 HEPARIN LOCK FLUSH PER 10 UNITS: Performed by: INTERNAL MEDICINE

## 2024-06-17 PROCEDURE — 96374 THER/PROPH/DIAG INJ IV PUSH: CPT

## 2024-06-17 PROCEDURE — P9016 RBC LEUKOCYTES REDUCED: HCPCS

## 2024-06-17 PROCEDURE — 85014 HEMATOCRIT: CPT | Performed by: INTERNAL MEDICINE

## 2024-06-17 PROCEDURE — 36430 TRANSFUSION BLD/BLD COMPNT: CPT

## 2024-06-17 PROCEDURE — 86900 BLOOD TYPING SEROLOGIC ABO: CPT | Performed by: INTERNAL MEDICINE

## 2024-06-17 PROCEDURE — 85018 HEMOGLOBIN: CPT | Performed by: INTERNAL MEDICINE

## 2024-06-17 PROCEDURE — 86923 COMPATIBILITY TEST ELECTRIC: CPT

## 2024-06-17 PROCEDURE — 86850 RBC ANTIBODY SCREEN: CPT | Performed by: INTERNAL MEDICINE

## 2024-06-17 PROCEDURE — 86900 BLOOD TYPING SEROLOGIC ABO: CPT

## 2024-06-17 PROCEDURE — 25010000002 FUROSEMIDE PER 20 MG: Performed by: INTERNAL MEDICINE

## 2024-06-17 PROCEDURE — 36591 DRAW BLOOD OFF VENOUS DEVICE: CPT

## 2024-06-17 RX ORDER — SODIUM CHLORIDE 9 MG/ML
250 INJECTION, SOLUTION INTRAVENOUS AS NEEDED
Status: CANCELLED | OUTPATIENT
Start: 2024-06-17

## 2024-06-17 RX ORDER — HEPARIN SODIUM (PORCINE) LOCK FLUSH IV SOLN 100 UNIT/ML 100 UNIT/ML
500 SOLUTION INTRAVENOUS AS NEEDED
Status: DISCONTINUED | OUTPATIENT
Start: 2024-06-17 | End: 2024-06-19 | Stop reason: HOSPADM

## 2024-06-17 RX ORDER — ACETAMINOPHEN 325 MG/1
650 TABLET ORAL ONCE
Status: COMPLETED | OUTPATIENT
Start: 2024-06-17 | End: 2024-06-17

## 2024-06-17 RX ORDER — FUROSEMIDE 10 MG/ML
20 INJECTION INTRAMUSCULAR; INTRAVENOUS ONCE
Status: COMPLETED | OUTPATIENT
Start: 2024-06-17 | End: 2024-06-17

## 2024-06-17 RX ORDER — FUROSEMIDE 10 MG/ML
20 INJECTION INTRAMUSCULAR; INTRAVENOUS ONCE
Status: CANCELLED | OUTPATIENT
Start: 2024-06-17 | End: 2024-06-17

## 2024-06-17 RX ORDER — SODIUM CHLORIDE 0.9 % (FLUSH) 0.9 %
20 SYRINGE (ML) INJECTION AS NEEDED
Status: DISCONTINUED | OUTPATIENT
Start: 2024-06-17 | End: 2024-06-19 | Stop reason: HOSPADM

## 2024-06-17 RX ORDER — ACETAMINOPHEN 325 MG/1
650 TABLET ORAL ONCE
Status: CANCELLED | OUTPATIENT
Start: 2024-06-17 | End: 2024-06-17

## 2024-06-17 RX ORDER — SODIUM CHLORIDE 9 MG/ML
250 INJECTION, SOLUTION INTRAVENOUS AS NEEDED
Status: DISCONTINUED | OUTPATIENT
Start: 2024-06-17 | End: 2024-06-19 | Stop reason: HOSPADM

## 2024-06-17 RX ADMIN — ACETAMINOPHEN 650 MG: 325 TABLET, FILM COATED ORAL at 14:55

## 2024-06-17 RX ADMIN — DIPHENHYDRAMINE HYDROCHLORIDE 12.5 MG: 25 SOLUTION ORAL at 14:54

## 2024-06-17 RX ADMIN — Medication 500 UNITS: at 18:22

## 2024-06-17 RX ADMIN — Medication 20 ML: at 18:21

## 2024-06-17 RX ADMIN — FUROSEMIDE 20 MG: 10 INJECTION, SOLUTION INTRAMUSCULAR; INTRAVENOUS at 18:20

## 2024-06-19 LAB
BH BB BLOOD EXPIRATION DATE: NORMAL
BH BB BLOOD TYPE BARCODE: 7300
BH BB DISPENSE STATUS: NORMAL
BH BB PRODUCT CODE: NORMAL
BH BB UNIT NUMBER: NORMAL
CROSSMATCH INTERPRETATION: NORMAL
UNIT  ABO: NORMAL
UNIT  RH: NORMAL

## 2024-10-14 NOTE — H&P
October 14, 2024       Tyler Smith MD  [address]  [place] [facility] [place] [id number]  Via In Basket      Patient: Hakeem Burt   YOB: 2020   Date of Visit: 10/14/2024       Dear Dr. Smith:    Thank you for referring Hakeem Burt to me for evaluation. Below are my notes for this visit with him.    If you have questions, please do not hesitate to call me. I look forward to following your patient along with you.      Sincerely,        Wilbert Arroyo MD        CC: No Recipients  Wilbert Arroyo MD  10/14/2024 10:35 AM  Signed  Reason For Visit  Chief Complaint   Patient presents with   • Office Visit   • Ear Problem   Hakeem Burt is accompanied by parent.      Referred By  Patient was referred by Tyler Smith MD.     History of Present Illness  5yo boy with hx of ETD s/p ear tube placement and adenoidectomy on 3/1/2024. Here for routine follow up.     At last visit on 4/15/2024, I said: \"Patient is well, s/p ear tube placement and adenoidectomy. Hearing test today revealed patent tubes, normal hearing bilaterally.   No other treatment needed after adenoidectomy. I will see the patient back in clinic in 6 months, sooner if there are problems with ears.\"    Doing well. No ear infections since last visit. No drainage or pain. Hearing well. Sleeping well. No snoring.      Review of Systems  10 systems were reviewed and are negative aside from those detailed above.     Past Medical History  Significant past medical history: Yes   Past Medical History:   Diagnosis Date   • Otitis media      Surgical History  Significant past surgical history: Yes  Past Surgical History:   Procedure Laterality Date   • Removal adenoids,primary,<11 y/o     • Tympanostomy Bilateral      Social History  Custody status: Parents have full custody of the patient:  Yes     Current Meds  No current outpatient medications on file.     No current facility-administered medications for this visit.     Vitals       [facility] H&P    Patient Name: Barry Calhoun Sr.  : 1932  MRN: [id number]  Primary Care Physician: Pedro Purvis MD  Date of admission: 2024     Patient Care Team:  Pedro Purvis MD as PCP - General (Family Medicine)  Pedro Purvis MD as PCP - Family Medicine  Pino Mclean MD as Consulting Physician (Nephrology)          Subjective   History Present Illness     Chief Complaint:   Chief Complaint   Patient presents with    Wound Check     Elevated inr    Wound Check        Ed  89-year-old male who presents from dialysis after they would not do his treatment as he had some bleeding from his fistula.  Grandson states he was at [facility] last week for the same thing and was noted to have a greater than 8 INR.  Grandson denies any other complaints.  Patient is a poor historian.     Observation 24  Pt poa is grandson. Grandson states pt is dnr and palliative. Pt inr >8. Received ffp. Hgb 7.0 and received 1 PRBC. Dialysis ordere by family refuses at this time. Creatine and potassium are stable currently. Family is trying to get pt to [facility]  tomorrow. Will repeat inr and hgb and see if more blood products are needed.    Review of Systems   Reason unable to perform ROS: pt is poor historian. grandson states pt has no complaints atthis time except minimal bleeding from fistula.             Personal History     Past Medical History:   Past Medical History:   Diagnosis Date    A-fib     Asthma     Chronic renal disease     COPD (chronic obstructive pulmonary disease)     Coronary heart disease     CABG    CVA (cerebral vascular accident)     recent CVA -- Patient and Family report this is not accurate    Gout     Hypertension        Surgical History:      Past Surgical History:   Procedure Laterality Date    ARTERIOVENOUS FISTULA/SHUNT SURGERY Right 2022    Procedure: RIGHT ARM BRACHIAL CEPHALIC ARTERIAL VENOUS FISTULA;  Surgeon: Lyric  Drew DASH MD;  Location: [facility] MAIN OR;  Service: Vascular;  Laterality: Right;    CARDIAC CATHETERIZATION  2016    [facility]    CORONARY ARTERY BYPASS GRAFT      CABG X3, reoperation, 09; CAB and     OTHER SURGICAL HISTORY  2016    loop recorder implant            Family History: family history includes Stroke in an other family member. Otherwise pertinent FHx was reviewed and unremarkable.     Social History:  reports that he has never smoked. He has never been exposed to tobacco smoke. He has never used smokeless tobacco. He reports that he does not drink alcohol and does not use drugs.      Medications:  Prior to Admission medications    Medication Sig Start Date End Date Taking? Authorizing Provider   acetaminophen (TYLENOL) 325 MG tablet Take 2 tablets by mouth Every 4 (Four) Hours As Needed for Mild Pain.    ProviderJos MD   ipratropium-albuterol (DUO-NEB) 0.5-2.5 mg/3 ml nebulizer Take 3 mL by nebulization Every 4 (Four) Hours As Needed for Wheezing.    ProviderJos MD   levothyroxine (SYNTHROID, LEVOTHROID) 75 MCG tablet Take 1 tablet by mouth Daily.    ProviderJos MD   midodrine (PROAMATINE) 10 MG tablet Take 1 tablet by mouth 3 (Three) Times a Day Before Meals. 24   Suhail Zacarias MD   nitroglycerin (NITROSTAT) 0.4 MG SL tablet Place 1 tablet under the tongue Every 5 (Five) Minutes As Needed for Chest Pain (Only if SBP Greater Than 100). Take no more than 3 doses in 15 minutes. 24   Suhail Zacarias MD   QUEtiapine (SEROquel) 25 MG tablet Take 1 tablet by mouth Every Night. 24   Suhail Zacarias MD       Allergies:  No Known Allergies    Objective   Objective     Vital Signs  Temp:  [97 °F (36.1 °C)-98 °F (36.7 °C)] 97.4 °F (36.3 °C)  Heart Rate:  [60-73] 67  Resp:  [15-19] 15  BP: ()/() 78/64  SpO2:  [94 %-100 %] 94 %  on  Flow (L/min):  [2] 2;   Device (Oxygen Therapy): nasal cannula  Body mass index is 23.64  Visit Vitals  Temp 97.5 °F (36.4 °C) (Temporal)   Wt 18.8 kg (41 lb 7.1 oz)     Physical Exam  General:  The patient is awake, alert, NAD.   Head/Face:  Atraumatic, normocephalic. No dysmorphic features.  Eyes:  Conjunctiva normal.   Gaze conjugate.   Ears:  Right:  normal pinna, external auditory meatus is normal bilaterally. The external auditory canal clear. PE tubes in place and patent, no drainage, middle ear aerated.  Left:    normal pinna, external auditory meatus is normal bilaterally. The external auditory canal clear. PE tubes in place and patent, no drainage, middle ear aerated.  Nose:   Anterior rhinoscopy clear.   Oral Cavity:  The vestibule is normal appearing. Oral cavity mucosa is without lesion. The tongue is mobile and midline. Hard Palate is intact and without lesions. No ankyloglossia.  Oropharynx:  Soft palate elevates in the midline. Uvula normal. Tonsils small, symmetric. Base of tongue soft without lesions. Posterior oropharyngeal wall clear.  Larynx:   No stridor. Normal voice.  Neck:  normal surface anatomy.  Lymphatic:  No cervical lymphadenopathy.  Neuro:  Alert, no focal deficits.     Assessment  Problem List Items Addressed This Visit    None  Visit Diagnoses       Dysfunction of both eustachian tubes    -  Primary          Discussion/Summary  Patient has hx of ETD s/p ear tube placement and adenoidectomy in March 2024. Both tubes are in place and patent. No ear infections. Recommend repeat tube check in 6mo.       kg/m².    Physical Exam  Constitutional:       Appearance: He is ill-appearing.   Cardiovascular:      Rate and Rhythm: Normal rate.      Pulses: Normal pulses.   Pulmonary:      Breath sounds: Rhonchi present.   Neurological:      Mental Status: Mental status is at baseline.           Results Review:  I have personally reviewed most recent cardiac tracings, lab results, and radiology images and interpretations and agree with findings, most notably: cbc, cmp, inr, chest xray, EKG, ct head, troponin.    Results from last 7 days   Lab Units 06/01/24  1123   WBC 10*3/mm3 10.73   HEMOGLOBIN g/dL 7.0*   HEMATOCRIT % 22.9*   PLATELETS 10*3/mm3 86*   INR  >=8.00*     Results from last 7 days   Lab Units 06/01/24  1318 06/01/24  1123   SODIUM mmol/L  --  139   POTASSIUM mmol/L  --  4.0   CHLORIDE mmol/L  --  103   CO2 mmol/L  --  26.4   BUN mg/dL  --  27*   CREATININE mg/dL  --  3.09*   GLUCOSE mg/dL  --  92   CALCIUM mg/dL  --  8.7   HSTROP T ng/L 249* 271*     CrCl cannot be calculated (Unknown ideal weight.).  Brief Urine Lab Results       None            Microbiology Results (last 10 days)       ** No results found for the last 240 hours. **            ECG/EMG Results (most recent)       Procedure Component Value Units Date/Time    ECG 12 Lead Electrolyte Imbalance [[id number]] Collected: 06/01/24 1111     Updated: 06/01/24 1112     QT Interval 501 ms      QTC Interval 508 ms     Narrative:      HEART RATE= 62  bpm  RR Interval= 974  ms  NE Interval=   ms  P Horizontal Axis=   deg  P Front Axis=   deg  QRSD Interval= 138  ms  QT Interval= 501  ms  QTcB= 508  ms  QRS Axis= -31  deg  T Wave Axis= 216  deg  - ABNORMAL ECG -  Atrial fibrillation  Ventricular premature complex  RBBB and LAFB  Abnormal T, consider ischemia, lateral leads  When compared with ECG of 18-Apr-2024 10:42:48,  Significant repolarization change  Electronically Signed By:   Date and Time of Study: 2024-06-01 11:11:40            Results for orders  placed during the hospital encounter of 04/18/24    Duplex venous lower extremity right    Interpretation Summary    Acute right lower extremity superficial thrombophlebitis noted in the small saphenous.    All other right sided veins appeared normal.    Incidentally identified occluded right SFA.    The quality of the study is limited due to an uncooperative patient and patient positioning.      Results for orders placed during the hospital encounter of 03/27/24    Adult Transthoracic Echo Complete W/ Cont if Necessary Per Protocol    Interpretation Summary    Left ventricular ejection fraction appears to be 61 - 65%.    Left ventricular diastolic function is consistent with (grade II w/high LAP) pseudonormalization.    Left atrial volume is severely increased.    Abnormal mitral valve structure consistent with dilated annulus.    Moderate to severe mitral valve regurgitation is present.    Moderate to severe tricuspid valve regurgitation is present.    Estimated right ventricular systolic pressure from tricuspid regurgitation is markedly elevated (>55 mmHg).    Consider transesophageal echocardiogram to better assess severity of regurgitant lesions if clinically indicated.      CT Head Without Contrast    Result Date: 6/1/2024  Impression: No acute intracranial pathology. Electronically Signed: Shimon Jimenes MD  6/1/2024 12:50 PM EDT  Workstation ID: [id number]    XR Chest 1 View    Result Date: 6/1/2024  Impression: 1.Prominent interstitial markings with patchy opacities in the bilateral mid and lower lungs, similar to recent prior chest radiographs. These findings could be related to recurrent edema/volume overload or chronic lung disease. 2.Stable cardiomegaly. Electronically Signed: Donald Merchant  6/1/2024 11:57 AM EDT  Workstation ID: [id number]       CrCl cannot be calculated (Unknown ideal weight.).    Assessment & Plan   Assessment/Plan       Active Hospital Problems    Diagnosis  POA    **ESRD (end stage renal  disease) on dialysis [N18.6, Z99.2]  Not Applicable      Resolved Hospital Problems   No resolved problems to display.     Increased INR  - >8  - Ffp given  - will recheck inr    Anemia  - hgb 7.0   - 1u PRBC given   - will recheck    Hypotension  - midodrine  - 260 iv fluid bolus    ARF  - pt receives dialysis  - nephrology consulted  - pt family refusing at this time  - creatinine is 3, potassium 3.9    Bleeding from dialysis fistula  - pt received surgicell  - trying to normalize ibr  - hold all coumadin      VTE Prophylaxis -   Mechanical Order History:       None          Pharmalogical Order History:       None            CODE STATUS:    Code Status and Medical Interventions:   Ordered at: 06/01/24 2460     Code Status (Patient has no pulse and is not breathing):    No CPR (Do Not Attempt to Resuscitate)     Medical Interventions (Patient has pulse or is breathing):    Full Support       This patient has been examined wearing personal protective equipment.     I discussed the patient's findings and my recommendations with patient and nursing staff.      Signature:Electronically signed by Mary Anne Cohen PA-C, 06/01/24, 5:11 PM EDT.

## 2025-04-29 NOTE — THERAPY EVALUATION
Jessa notified. Indira Amaro RN on 4/29/2025 at 10:16 AM      Patient Name: Barry Calhoun Sr.  : 1932    MRN: [id number]                              Today's Date: 3/28/2024       Admit Date: 3/27/2024    Visit Dx:     ICD-10-CM ICD-9-CM   1. Syncope, unspecified syncope type  R55 780.2   2. Thrombocytopenia  D69.6 287.5   3. 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    Anemia due to chronic kidney disease stage III    Atrial fibrillation    Monitoring of monoclonal antibody treatment for malignancy    Gross hematuria, chronic    Healthcare maintenance    Essential hypertension    Moderate to severe mitral regurgitation    Acute renal failure    Asthma    Cerebrovascular accident (CVA)    Chronic diastolic heart failure    Chronic obstructive pulmonary disease    Coronary heart disease    Nonsustained paroxysmal ventricular tachycardia    Palpitations    Status post placement of implantable loop recorder    Stomach cancer    Weakness    Loss of consciousness    ESRD (end stage renal disease)    Syncope and collapse    Thrombocytopenia    Syncope     Past Medical History:   Diagnosis Date    A-fib     Asthma     Chronic renal disease     COPD (chronic obstructive pulmonary disease)     Coronary heart disease     CABG    CVA (cerebral vascular accident)     recent CVA -- Patient and Family report this is not accurate    Gout     Hypertension      Past Surgical History:   Procedure Laterality Date    ARTERIOVENOUS FISTULA/SHUNT SURGERY Right 2022    Procedure: RIGHT ARM BRACHIAL CEPHALIC ARTERIAL VENOUS FISTULA;  Surgeon: Drew Gunter MD;  Location: [facility] MAIN OR;  Service: Vascular;  Laterality: Right;    CARDIAC CATHETERIZATION  2016    [facility]    CORONARY ARTERY BYPASS GRAFT      CABG X3, reoperation, 09;  CAB and     OTHER SURGICAL HISTORY  2016    loop recorder implant       General Information       Row Name 24 0940          Physical Therapy Time and Intention    Document Type evaluation  -BR     Mode of Treatment physical therapy  -BR       Row Name 2440          General Information    Patient Profile Reviewed yes  -BR     Prior Level of Function max assist:;bed mobility;transfer  PT called [facility] to obtain PLOF. Pt was extensive assist of 2 at baseline for transfers; pt does not ambulate. Pt is dependent for feeding.  -BR     Existing Precautions/Restrictions fall  -BR     Barriers to Rehab cognitive status  -BR       Row Name 24          Living Environment    People in Home facility resident;other (see comments)  Pt lives at [facility].  -BR       Row Name 2440          Cognition    Orientation Status (Cognition) oriented to;person;disoriented to;place;situation;time;unable/difficult to assess;other (see comments)  Pt is confused with speech difficult to understand  -BR       Row Name 24          Safety Issues, Functional Mobility    Impairments Affecting Function (Mobility) range of motion (ROM);strength;balance  -BR               User Key  (r) = Recorded By, (t) = Taken By, (c) = Cosigned By      Initials Name Provider Type    BR Shelby Horowitz, PT Physical Therapist                   Mobility       Row Name 24 0946 24       Bed Mobility    Bed Mobility -- bed mobility (all) activities  -BR    All Activities, [place] (Bed Mobility) -- dependent (less than 25% patient effort);minimum assist (75% patient effort)  -BR    Assistive Device (Bed Mobility) -- draw sheet  -BR    Comment, (Bed Mobility) Pt has warming blanket in place.  -BR Pt scheduled to leave for dialysis shortly.  -BR      Row Name 24          Bed-Chair Transfer    Bed-Chair [place] (Transfers) not tested  -BR               User Key   (r) = Recorded By, (t) = Taken By, (c) = Cosigned By      Initials Name Provider Type    Shelby Lockwood PT Physical Therapist                   Obj/Interventions       Row Name 03/28/24 0944          Range of Motion Comprehensive    Comment, General Range of Motion AAROM BLE ~ 50% limited grossly  -BR       Row Name 03/28/24 0944          Strength Comprehensive (MMT)    Comment, General Manual Muscle Testing (MMT) Assessment Pt does not follow commands well for strength testing. Pt moves BLE spontaneously.  -BR       Row Name 03/28/24 0944          Sensory Assessment (Somatosensory)    Sensory Assessment (Somatosensory) unable/difficult to assess  -BR               User Key  (r) = Recorded By, (t) = Taken By, (c) = Cosigned By      Initials Name Provider Type    Shelby Lockwood PT Physical Therapist                   Goals/Plan       Row Name 03/28/24 1446          Bed Mobility Goal 1 (PT)    Activity/Assistive Device (Bed Mobility Goal 1, PT) bed mobility activities, all  -BR     [place] Level/Cues Needed (Bed Mobility Goal 1, PT) maximum assist (25-49% patient effort)  -BR     Time Frame (Bed Mobility Goal 1, PT) long term goal (LTG);2 weeks  -BR       [facility] Name 03/28/24 1446          Transfer Goal 1 (PT)    Activity/Assistive Device (Transfer Goal 1, PT) transfers, all  -BR     [place] Level/Cues Needed (Transfer Goal 1, PT) maximum assist (25-49% patient effort)  -BR     Time Frame (Transfer Goal 1, PT) long term goal (LTG);2 weeks  -BR               User Key  (r) = Recorded By, (t) = Taken By, (c) = Cosigned By      Initials Name Provider Type    Shelby Lockwood PT Physical Therapist                   Clinical Impression       Row Name 03/28/24 0941          Pain    Additional Documentation Pain Scale: FACES Pre/Post-Treatment (Group)  -BR       [facility] Name 03/28/24 0960          Pain Scale: FACES Pre/Post-Treatment    Pain: FACES Scale, Pretreatment 2-->hurts little bit  -BR      Posttreatment Pain Rating 0-->no hurt  -BR     Pain Location other (see comments)  generalized restlessness  -BR       Row Name 03/28/24 0946 03/28/24 0945       Plan of Care Review    Plan of Care Reviewed With -- patient  -BR    Outcome Evaluation Barry Calhoun is a 89 y.o. male with a PMH of asthma, non-Hodgkin's lymphoma, COPD, CAD, S/P CABG, CVA, and CKD  who presented to [facility] on 3/27/2024 from [facility] where they report he fell and loss consciousness. CT head was negative. MRI brain pending. Pt Alert to person only. This PT called [facility] to get pt PLOF and pt typically requires max assist of 2 for transfers; pt is non-ambulatory and requires total assist for feeding. This date, pt is restless and lethargic. Pt has grossly 50% limitation in AAROM BLE. Pt requiring dependence x 2 for bed mobility. At 14:30, this PT learned that pt actually had discharged to home with family from [facility]. With this new information, PT is now recommending Skilled Nursing Facility and PT will follow pt.  -BR --      Row Name 03/28/24 0946 03/28/24 0945       Therapy Assessment/Plan (PT)    Rehab Potential (PT) fair, will monitor progress closely  -BR --    Criteria for Skilled Interventions Met (PT) yes;meets criteria;skilled treatment is necessary  -BR no;does not meet criteria for skilled intervention  -BR    Therapy Frequency (PT) 3 times/wk  -BR evaluation only  -BR      Row Name 03/28/24 0946 03/28/24 0945       Vital Signs    Pre Systolic BP Rehab -- 100  -BR    Pre Treatment Diastolic BP -- 54  -BR    Pretreatment Heart Rate (beats/min) -- 70  -BR    Pre SpO2 (%) -- 97  -BR    O2 Delivery Pre Treatment -- room air  -BR    Pre Patient Position Supine  -BR --    Intra Patient Position Supine  -BR --    Post Patient Position Supine  -BR --      Row Name 03/28/24 0946          Positioning and Restraints    Pre-Treatment Position in bed  -BR     Post Treatment Position bed  -BR     In Bed notified  nsg;fowlers;call light within reach;encouraged to call for assist;side rails up x2  Pt is on a stretcher.  -BR               User Key  (r) = Recorded By, (t) = Taken By, (c) = Cosigned By      Initials Name Provider Type    Shelby Lockwood PT Physical Therapist                   Outcome Measures       Row Name 03/28/24 0954          How much help from another person do you currently need...    Turning from your back to your side while in flat bed without using bedrails? 1  -BR     Moving from lying on back to sitting on the side of a flat bed without bedrails? 1  -BR     Moving to and from a bed to a chair (including a wheelchair)? 1  -BR     Standing up from a chair using your arms (e.g., wheelchair, bedside chair)? 1  -BR     Climbing 3-5 steps with a railing? 1  -BR     To walk in hospital room? 1  -BR     AM-PAC 6 Clicks Score (PT) 6  -BR     Highest Level of Mobility Goal 2 --> Bed activities/dependent transfer  -BR       Row Name 03/28/24 0954          Functional Assessment    Outcome Measure Options AM-PAC 6 Clicks Basic Mobility (PT)  -BR               User Key  (r) = Recorded By, (t) = Taken By, (c) = Cosigned By      Initials Name Provider Type    Shelby Lockwood PT Physical Therapist                                 Physical Therapy Education       Title: PT OT SLP Therapies (In Progress)       Topic: Physical Therapy (In Progress)       Point: Mobility training (In Progress)       Learning Progress Summary             Patient Acceptance, E, NL by BR at 3/28/2024 0954                         Point: Home exercise program (Not Started)       Learner Progress:  Not documented in this visit.              Point: Body mechanics (In Progress)       Learning Progress Summary             Patient Acceptance, E, NL by BR at 3/28/2024 0954                         Point: Precautions (In Progress)       Learning Progress Summary             Patient Acceptance, E, NL by BR at 3/28/2024 0954                                          User Key       Initials Effective Dates Name Provider Type Discipline    BR 02/01/22 -  Shelby Horowitz PT Physical Therapist PT                  PT Recommendation and Plan     Plan of Care Reviewed With: patient  Outcome Evaluation: Barry Calhoun is a 89 y.o. male with a PMH of asthma, non-Hodgkin's lymphoma, COPD, CAD, S/P CABG, CVA, and CKD  who presented to [facility] on 3/27/2024 from [facility] where they report he fell and loss consciousness. CT head was negative. MRI brain pending. Pt Alert to person only. This PT called [facility] to get pt PLOF and pt typically requires max assist of 2 for transfers; pt is non-ambulatory and requires total assist for feeding. This date, pt is restless and lethargic. Pt has grossly 50% limitation in AAROM BLE. Pt requiring dependence x 2 for bed mobility. At 14:30, this PT learned that pt actually had discharged to home with family from [facility]. With this new information, PT is now recommending Skilled Nursing Facility and PT will follow pt.     Time Calculation:         PT Charges       Row Name 03/28/24 0955             Time Calculation    Start Time 0855  -BR      Stop Time 0919  -BR      Time Calculation (min) 24 min  -BR      PT Received On 03/28/24  -BR         Time Calculation- PT    Total Timed Code Minutes- PT 0 minute(s)  -BR                User Key  (r) = Recorded By, (t) = Taken By, (c) = Cosigned By      Initials Name Provider Type    BR Shelby Horowitz PT Physical Therapist                  Therapy Charges for Today       Code Description Service Date Service Provider Modifiers Qty    [id number] HC PT EVAL MOD COMPLEXITY 4 3/28/2024 Shelby Horowitz, PT GP 1            PT G-Codes  Outcome Measure Options: AM-PAC 6 Clicks Basic Mobility (PT)  AM-PAC 6 Clicks Score (PT): 6  PT Discharge Summary  Anticipated Discharge Disposition (PT): skilled nursing facility    Shelby Horowitz PT  3/28/2024

## (undated) DEVICE — SUT VIC 3/0 SH 27IN J416H

## (undated) DEVICE — GLV SURG BIOGEL LTX PF 8 1/2

## (undated) DEVICE — CONTRST ISOVUE300 61PCT 50ML

## (undated) DEVICE — SUT PROLN 3/0 FS2 18IN 8665G

## (undated) DEVICE — PK MINOR VASC 50

## (undated) DEVICE — GOWN,REINFRCE,POLY,SIRUS,BREATH SLV,XXLG: Brand: MEDLINE

## (undated) DEVICE — SOL NS 500ML

## (undated) DEVICE — PTA BALLOON DILATATION CATHETER: Brand: MUSTANG™

## (undated) DEVICE — RADIFOCUS GLIDEWIRE: Brand: GLIDEWIRE

## (undated) DEVICE — DRAPE,HAND,STERILE: Brand: MEDLINE

## (undated) DEVICE — MARKR SKIN 2TP W/RULR

## (undated) DEVICE — SOLUTION,WATER,IRRIGATION,1000ML,STERILE: Brand: MEDLINE

## (undated) DEVICE — DEV INFL COMPAK W/ACCESSPLUS IN4530

## (undated) DEVICE — BOWL PLSTC MD 16OZ BLU STRL

## (undated) DEVICE — ADHS SKIN PREMIERPRO EXOFIN TOPICAL HI/VISC .5ML

## (undated) DEVICE — SUT SILK 3/0 SH CR8 18IN C013D

## (undated) DEVICE — COVER,TABLE,44X90,STERILE: Brand: MEDLINE

## (undated) DEVICE — ST ACC MICROPUNCTURE STFF/CANN PLAT/TP 4F 21G 40CM

## (undated) DEVICE — SUT PROLN 6/0 BV1 D/A 30IN 8709H

## (undated) DEVICE — SPNG LAP PREWSH SFTPK 18X18IN STRL PK/5

## (undated) DEVICE — SPNG GZ AVANT 6PLY 4X4IN STRL PK/2

## (undated) DEVICE — MICRO TIP WIPE: Brand: DEVON

## (undated) DEVICE — SI BRITE TIP SHEATH F6 5.5CM: Brand: BRITE TIP

## (undated) DEVICE — FOAM BUMP, LARGE: Brand: MEDLINE INDUSTRIES, INC.

## (undated) DEVICE — CSI AVANTI+ CARDIOLOGY MS .038: Brand: AVANTI

## (undated) DEVICE — MAJOR VASCULAR PACK-LF: Brand: MEDLINE INDUSTRIES, INC.

## (undated) DEVICE — SOL IRRIG NACL 1000ML

## (undated) DEVICE — VI-DRAPE ISOLATION BAG: Brand: CONVERTORS

## (undated) DEVICE — UNDYED BRAIDED (POLYGLACTIN 910), SYNTHETIC ABSORBABLE SUTURE: Brand: COATED VICRYL

## (undated) DEVICE — SUT PROLN 3/0 SH D/A 36IN 8522H

## (undated) DEVICE — TBG PRESS/MONITOR FIX M/F LL A/ 24IN STRL

## (undated) DEVICE — ELECTRD DEFIB M/FUNC PROPADZ RADIOL 2PK

## (undated) DEVICE — STAPLER 35 WIDE: Brand: MEDLINE INDUSTRIES, INC.

## (undated) DEVICE — DRSNG WND BORDR/ADHS NONADHR/GZ LF 4X4IN STRL

## (undated) DEVICE — KT SURG TURNOVER 050

## (undated) DEVICE — LN INJ CONTRST FLXCIL HP F/M LL 1200PSI72

## (undated) DEVICE — PINNACLE INTRODUCER SHEATH: Brand: PINNACLE

## (undated) DEVICE — RADIFOCUS GLIDECATH: Brand: GLIDECATH